# Patient Record
Sex: FEMALE | Race: WHITE | NOT HISPANIC OR LATINO | Employment: OTHER | URBAN - NONMETROPOLITAN AREA
[De-identification: names, ages, dates, MRNs, and addresses within clinical notes are randomized per-mention and may not be internally consistent; named-entity substitution may affect disease eponyms.]

---

## 2017-01-30 ENCOUNTER — OFFICE VISIT (OUTPATIENT)
Dept: FAMILY MEDICINE CLINIC | Facility: CLINIC | Age: 57
End: 2017-01-30

## 2017-01-30 VITALS
HEART RATE: 107 BPM | BODY MASS INDEX: 22.26 KG/M2 | DIASTOLIC BLOOD PRESSURE: 80 MMHG | HEIGHT: 62 IN | SYSTOLIC BLOOD PRESSURE: 122 MMHG | WEIGHT: 121 LBS | TEMPERATURE: 98.6 F

## 2017-01-30 DIAGNOSIS — Z12.31 ENCOUNTER FOR SCREENING MAMMOGRAM FOR BREAST CANCER: ICD-10-CM

## 2017-01-30 DIAGNOSIS — F17.200 TOBACCO DEPENDENCE SYNDROME: Primary | ICD-10-CM

## 2017-01-30 DIAGNOSIS — Z13.820 ENCOUNTER FOR SCREENING FOR OSTEOPOROSIS: ICD-10-CM

## 2017-01-30 PROBLEM — I67.1 BRAIN ANEURYSM: Status: ACTIVE | Noted: 2017-01-30

## 2017-01-30 PROBLEM — F33.1 MODERATE EPISODE OF RECURRENT MAJOR DEPRESSIVE DISORDER: Status: ACTIVE | Noted: 2017-01-30

## 2017-01-30 PROBLEM — F41.1 GAD (GENERALIZED ANXIETY DISORDER): Status: ACTIVE | Noted: 2017-01-30

## 2017-01-30 PROBLEM — E78.5 HYPERLIPIDEMIA: Status: ACTIVE | Noted: 2017-01-30

## 2017-01-30 PROBLEM — K21.00 GASTROESOPHAGEAL REFLUX DISEASE WITH ESOPHAGITIS: Status: ACTIVE | Noted: 2017-01-30

## 2017-01-30 PROBLEM — F43.10 PTSD (POST-TRAUMATIC STRESS DISORDER): Status: ACTIVE | Noted: 2017-01-30

## 2017-01-30 PROCEDURE — 99214 OFFICE O/P EST MOD 30 MIN: CPT | Performed by: FAMILY MEDICINE

## 2017-01-30 RX ORDER — DEXLANSOPRAZOLE 60 MG/1
60 CAPSULE, DELAYED RELEASE ORAL DAILY
COMMUNITY
End: 2017-08-24 | Stop reason: SDUPTHER

## 2017-01-30 RX ORDER — RANITIDINE 150 MG/1
150 TABLET ORAL NIGHTLY
COMMUNITY
End: 2017-08-24

## 2017-01-30 NOTE — MR AVS SNAPSHOT
Tricia LAWSON Milla   1/30/2017 2:30 PM   Office Visit    Dept Phone:  [phone]   Encounter #:  [id number]    Provider:  Sonia Mata MD   Department:  [facility]                Your Full Care Plan              Today's Medication Changes          These changes are accurate as of: 1/30/17  3:06 PM.  If you have any questions, ask your nurse or doctor.               Stop taking medication(s)listed here:     dicyclomine 10 MG capsule   Commonly known as:  BENTYL   Stopped by:  Sonia Mata MD           pravastatin 20 MG tablet   Commonly known as:  PRAVACHOL   Stopped by:  Sonia Mata MD           VICOPROFEN 7.5-200 MG per tablet   Generic drug:  HYDROcodone-ibuprofen   Stopped by:  Sonia Mata MD                      Your Updated Medication List          This list is accurate as of: 1/30/17  3:06 PM.  Always use your most recent med list.                amitriptyline 50 MG tablet   Commonly known as:  ELAVIL       clonazePAM 1 MG tablet   Commonly known as:  KlonoPIN       DEXILANT 60 MG capsule   Generic drug:  dexlansoprazole       prochlorperazine 5 MG tablet   Commonly known as:  COMPAZINE       raNITIdine 150 MG tablet   Commonly known as:  ZANTAC               We Performed the Following     DEXA Bone Density Axial     Mammo Screening Bilateral With CAD       You Were Diagnosed With        Codes Comments    Moderate episode of recurrent major depressive disorder    -  Primary ICD-10-CM: F33.1  ICD-9-CM: 296.32     Tobacco dependence syndrome     ICD-10-CM: F17.200  ICD-9-CM: 305.1     PRISCILLA (generalized anxiety disorder)     ICD-10-CM: F41.1  ICD-9-CM: 300.02     PTSD (post-traumatic stress disorder)     ICD-10-CM: F43.10  ICD-9-CM: 309.81     Encounter for screening mammogram for breast cancer     ICD-10-CM: Z12.31  ICD-9-CM: V76.12     Encounter for screening for osteoporosis     ICD-10-CM: Z13.820  ICD-9-CM: V82.81   "     Instructions     None    Patient Instructions History      Upcoming Appointments     Visit Type Date Time Department    NEW PATIENT 1/30/2017  2:30 PM [facility]    MAMMO PWD  SCREEN BILAT 3/1/2017 10:30 AM [facility]    DE MAD BONE DENSITY AXIAL 3/1/2017 11:05 AM [facility] DEXA [facility]      MyChart Signup     Our records indicate that you have declined [facility] signup. If you would like to sign up for [employer], please email [email or web address] or call [phone] to obtain an activation code.             Other Info from Your Visit           Your Appointments     Mar 01, 2017 10:30 AM CST   Mammo mad screen bilat with MAD PWD MAMM 1   [facility] ([place])    [address] [place] [address]   [phone]           Arrive 15 minutes prior to appointment time.            Mar 01, 2017 11:05 AM CST   DEXA BONE DENSITY AXIAL with MAD PWD DEXA 1   [facility] ([place])    [address] [place] [address]   [phone]              Allergies     Augmentin [Amoxicillin-pot Clavulanate]      Hives    Ciprofloxacin      Cipro:  Rash    Fiorinal [Butalbital-aspirin-caffeine]      Rapid heart rate    Imitrex [Sumatriptan]      Rapid heart rate    Iodine      Anaphylaxis    Other      MIDRIN  -  Rapid heart rate    Toradol [Ketorolac Tromethamine]      Anaphylaxis    Ultram [Tramadol]      Rapid heart rate      Reason for Visit     Establish Care           Vital Signs     Blood Pressure Pulse Temperature Height Weight       122/80 107 98.6 °F (37 °C) 62\" (157.5 cm) 121 lb (54.9 kg)     Body Mass Index Smoking Status                22.13 kg/m2 Current Every Day Smoker          Problems and Diagnoses Noted     Tobacco dependence    Mood problem    -  Primary    PRISCILLA (generalized anxiety disorder)        PTSD (post-traumatic stress disorder)        Encounter for screening mammogram for breast cancer        " Screening for osteoporosis

## 2017-01-30 NOTE — PROGRESS NOTES
"Subjective   Chief Complaint   Patient presents with   • Establish Care     Tricia Loyola is a 57 y.o. female.   Establish Care    History of Present Illness     [facility] - h/o brain aneurysm, anxiety, depression, PTSD, insomnia, cholesterol, gerd    Anxiety - controlled with klonopin PRN    Depression, PTSD - controlled with elavil  Counseling once a month and attends group as much as possible  Followed by Jamee Gallo and Hannah at [facility]    Insomnia - controlled with ambien    hypercholesterol - controlled with atorvastatin  He recently changed from pravastatin to atorvastatin  Last lab work was done one month ago - Dr. 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    Review of Systems   Constitutional: Negative for appetite change, chills, fatigue and fever.   HENT: Negative for congestion, ear pain, rhinorrhea and sore throat.    Eyes: Negative for pain.   Respiratory: Negative for cough and shortness of breath.    Cardiovascular: Negative for chest pain and palpitations.   Gastrointestinal: Negative for abdominal pain, constipation, diarrhea and nausea.   Genitourinary: Negative for dysuria.   Musculoskeletal: Negative for back pain, joint swelling and neck pain.   Skin: Negative for rash.   Neurological: Negative for dizziness and headaches.       Objective   Visit Vitals   • /80   • Pulse 107   • Temp 98.6 °F (37 °C)   • Ht 62\" (157.5 cm)   • Wt 121 lb (54.9 kg)   • LMP Comment: menopause "   • BMI 22.13 kg/m2     Physical Exam   Constitutional: She is oriented to person, place, and time. She appears well-developed and well-nourished.   HENT:   Head: Normocephalic and atraumatic.   Eyes: Pupils are equal, round, and reactive to light.   Neck: Normal range of motion. Neck supple.   Cardiovascular: Normal rate, regular rhythm and normal heart sounds.    Pulmonary/Chest: Effort normal and breath sounds normal. No respiratory distress. She has no wheezes. She has no rales.   Abdominal: Soft. Bowel sounds are normal.   Musculoskeletal: Normal range of motion.   Neurological: She is alert and oriented to person, place, and time.   Skin: Skin is warm and dry.   Psychiatric: She has a normal mood and affect.   Nursing note and vitals reviewed.      Assessment/Plan   Problems Addressed this Visit        Other    Tobacco dependence syndrome - Primary      Other Visit Diagnoses     Encounter for screening mammogram for breast cancer        Relevant Orders    Mammo Screening Bilateral With CAD    Encounter for screening for osteoporosis        Relevant Orders    DEXA Bone Density Axial        Sign a medical release for lab work, immunizations, images, pathology    Mammogram and bone density scan ordered    Discussed and recommended smoking cessation    Recheck in 4 weeks

## 2017-02-07 ENCOUNTER — OFFICE VISIT (OUTPATIENT)
Dept: FAMILY MEDICINE CLINIC | Facility: CLINIC | Age: 57
End: 2017-02-07

## 2017-02-07 VITALS
BODY MASS INDEX: 22.26 KG/M2 | DIASTOLIC BLOOD PRESSURE: 70 MMHG | TEMPERATURE: 97.9 F | HEIGHT: 62 IN | HEART RATE: 107 BPM | SYSTOLIC BLOOD PRESSURE: 118 MMHG | WEIGHT: 121 LBS

## 2017-02-07 DIAGNOSIS — J11.1 INFLUENZA-LIKE ILLNESS: ICD-10-CM

## 2017-02-07 DIAGNOSIS — J20.9 ACUTE BRONCHITIS, UNSPECIFIED ORGANISM: Primary | ICD-10-CM

## 2017-02-07 LAB
FLUAV AG NPH QL: NEGATIVE
FLUBV AG NPH QL IA: NEGATIVE

## 2017-02-07 PROCEDURE — 87804 INFLUENZA ASSAY W/OPTIC: CPT | Performed by: FAMILY MEDICINE

## 2017-02-07 PROCEDURE — 99213 OFFICE O/P EST LOW 20 MIN: CPT | Performed by: FAMILY MEDICINE

## 2017-02-07 RX ORDER — ALBUTEROL SULFATE 90 UG/1
2 AEROSOL, METERED RESPIRATORY (INHALATION) EVERY 6 HOURS PRN
Qty: 1 INHALER | Refills: 3 | Status: SHIPPED | OUTPATIENT
Start: 2017-02-07 | End: 2018-01-23

## 2017-02-07 RX ORDER — DOXYCYCLINE HYCLATE 100 MG
100 TABLET ORAL 2 TIMES DAILY
Qty: 20 TABLET | Refills: 0 | Status: SHIPPED | OUTPATIENT
Start: 2017-02-07 | End: 2017-02-17

## 2017-02-07 RX ORDER — BENZONATATE 100 MG/1
100 CAPSULE ORAL 3 TIMES DAILY PRN
Qty: 30 CAPSULE | Refills: 0 | Status: SHIPPED | OUTPATIENT
Start: 2017-02-07 | End: 2017-02-14

## 2017-02-07 NOTE — PROGRESS NOTES
"Subjective   Chief Complaint   Patient presents with   • Nasal Congestion     sore throat, for almost 2 weeks     Tricia Loyola is a 57 y.o. female.   Nasal Congestion (sore throat, for almost 2 weeks)    Cough   This is a new problem. The current episode started in the past 7 days. The problem has been gradually worsening. The problem occurs constantly. The cough is non-productive. Associated symptoms include chills, a fever, headaches, myalgias, nasal congestion, postnasal drip, rhinorrhea and a sore throat. Pertinent negatives include no wheezing. The symptoms are aggravated by lying down. Risk factors for lung disease include smoking/tobacco exposure. She has tried a beta-agonist inhaler for the symptoms. The treatment provided no relief.      The following portions of the patient's history were reviewed and updated as appropriate: allergies, current medications, past family history, past medical history, past social history, past surgical history and problem list.    Review of Systems   Constitutional: Positive for chills and fever.   HENT: Positive for postnasal drip, rhinorrhea and sore throat.    Respiratory: Positive for cough. Negative for wheezing.    Musculoskeletal: Positive for myalgias.   Neurological: Positive for headaches.       Objective   Visit Vitals   • /70   • Pulse 107   • Temp 97.9 °F (36.6 °C)   • Ht 62\" (157.5 cm)   • Wt 121 lb (54.9 kg)   • LMP Comment: menopause   • BMI 22.13 kg/m2     Physical Exam   Constitutional: She is oriented to person, place, and time. She appears well-developed and well-nourished.   HENT:   Head: Normocephalic and atraumatic.   Right Ear: External ear normal.   Left Ear: External ear normal.   Nose: Nose normal.   Mouth/Throat: Oropharynx is clear and moist.   Eyes: Pupils are equal, round, and reactive to light.   Neck: Normal range of motion. Neck supple.   Cardiovascular: Normal rate, regular rhythm and normal heart sounds.    Pulmonary/Chest: No " respiratory distress. She has wheezes. She has no rales.   Abdominal: Soft. Bowel sounds are normal.   Musculoskeletal: Normal range of motion.   Neurological: She is alert and oriented to person, place, and time.   Skin: Skin is warm and dry.   Psychiatric: She has a normal mood and affect.   Nursing note and vitals reviewed.      Assessment/Plan   Problems Addressed this Visit     None      Visit Diagnoses     Acute bronchitis, unspecified organism    -  Primary    Relevant Medications    albuterol (VENTOLIN HFA) 108 (90 BASE) MCG/ACT inhaler    benzonatate (TESSALON PERLES) 100 MG capsule    Influenza-like illness        Relevant Orders    Influenza Antigen (Completed)        Flu screen - negative    Start inhaler    Start tessalon perles for cough    Start doxycycline     Recheck as needed

## 2017-02-08 ENCOUNTER — TELEPHONE (OUTPATIENT)
Dept: FAMILY MEDICINE CLINIC | Facility: CLINIC | Age: 57
End: 2017-02-08

## 2017-02-08 RX ORDER — FLUTICASONE PROPIONATE 50 MCG
2 SPRAY, SUSPENSION (ML) NASAL DAILY
Qty: 1 EACH | Refills: 5 | Status: SHIPPED | OUTPATIENT
Start: 2017-02-08 | End: 2018-02-06

## 2017-02-08 NOTE — TELEPHONE ENCOUNTER
----- Message from Sonia Mata MD sent at 2/8/2017  2:56 PM CST -----  Recommend flonase. This is otc.  ----- Message -----     From: Siomara Mata MA     Sent: 2/8/2017   2:30 PM       To: Sonia Mata MD    Please advise  ----- Message -----     From: Hannah Prado     Sent: 2/8/2017   9:44 AM       To: Siomara Mata MA    Patient was seen yesterday. She was given medication for bronchitis but she wants to know if she can get something for her nose. Says its been stuffy and feels like she can't breathe. Her number is [phone]

## 2017-02-14 ENCOUNTER — OFFICE VISIT (OUTPATIENT)
Dept: FAMILY MEDICINE CLINIC | Facility: CLINIC | Age: 57
End: 2017-02-14

## 2017-02-14 VITALS
DIASTOLIC BLOOD PRESSURE: 64 MMHG | SYSTOLIC BLOOD PRESSURE: 116 MMHG | HEIGHT: 62 IN | BODY MASS INDEX: 21.71 KG/M2 | TEMPERATURE: 97.7 F | WEIGHT: 118 LBS | HEART RATE: 106 BPM

## 2017-02-14 DIAGNOSIS — J20.9 ACUTE BRONCHITIS, UNSPECIFIED ORGANISM: Primary | ICD-10-CM

## 2017-02-14 PROCEDURE — 99213 OFFICE O/P EST LOW 20 MIN: CPT | Performed by: FAMILY MEDICINE

## 2017-02-14 PROCEDURE — 96372 THER/PROPH/DIAG INJ SC/IM: CPT | Performed by: FAMILY MEDICINE

## 2017-02-14 RX ORDER — GUAIFENESIN AND CODEINE PHOSPHATE 100; 10 MG/5ML; MG/5ML
10 SOLUTION ORAL 4 TIMES DAILY PRN
Qty: 236 ML | Refills: 0 | Status: SHIPPED | OUTPATIENT
Start: 2017-02-14 | End: 2017-03-20

## 2017-02-14 RX ORDER — TRIAMCINOLONE ACETONIDE 40 MG/ML
80 INJECTION, SUSPENSION INTRA-ARTICULAR; INTRAMUSCULAR ONCE
Status: COMPLETED | OUTPATIENT
Start: 2017-02-14 | End: 2017-02-14

## 2017-02-14 RX ADMIN — TRIAMCINOLONE ACETONIDE 80 MG: 40 INJECTION, SUSPENSION INTRA-ARTICULAR; INTRAMUSCULAR at 10:32

## 2017-02-14 NOTE — PROGRESS NOTES
"Subjective   Chief Complaint   Patient presents with   • not feeling any better     fever, cough, sneezing, congested, fatigue     Tricia Loyola is a 57 y.o. female.   not feeling any better (fever, cough, sneezing, congested, fatigue)    Cough   This is a new problem. The current episode started in the past 7 days. The problem has been unchanged. The problem occurs constantly. The cough is non-productive. Associated symptoms include a fever, nasal congestion, postnasal drip and rhinorrhea. The symptoms are aggravated by lying down. She has tried a beta-agonist inhaler, OTC cough suppressant and prescription cough suppressant for the symptoms. The treatment provided no relief. Her past medical history is significant for COPD.        The following portions of the patient's history were reviewed and updated as appropriate: allergies, current medications, past family history, past medical history, past social history, past surgical history and problem list.    Review of Systems   Constitutional: Positive for fatigue and fever.   HENT: Positive for postnasal drip, rhinorrhea and sneezing.    Respiratory: Positive for cough.        Objective   Visit Vitals   • /64   • Pulse 106   • Temp 97.7 °F (36.5 °C)   • Ht 62\" (157.5 cm)   • Wt 118 lb (53.5 kg)   • LMP Comment: menopause   • BMI 21.58 kg/m2     Physical Exam   Constitutional: She is oriented to person, place, and time. She appears well-developed and well-nourished.   HENT:   Head: Normocephalic and atraumatic.   Right Ear: External ear normal.   Left Ear: External ear normal.   Mouth/Throat: No oropharyngeal exudate, posterior oropharyngeal edema or posterior oropharyngeal erythema.   Postnasal drainage   Eyes: Pupils are equal, round, and reactive to light.   Neck: Normal range of motion. Neck supple.   Cardiovascular: Normal rate, regular rhythm and normal heart sounds.    Pulmonary/Chest: Effort normal. No respiratory distress. She has wheezes. She has no " rales.   Lymphadenopathy:     She has no cervical adenopathy.   Neurological: She is alert and oriented to person, place, and time.   Skin: Skin is warm and dry.   Psychiatric: She has a normal mood and affect.   Nursing note and vitals reviewed.      Assessment/Plan   Problems Addressed this Visit     None      Visit Diagnoses     Acute bronchitis, unspecified organism    -  Primary    Relevant Medications    triamcinolone acetonide (KENALOG-40) injection 80 mg (Start on 2/14/2017 10:45 AM)    guaifenesin-codeine (GUAIFENESIN AC) 100-10 MG/5ML liquid        Stop tessalon    Use inhaler every 6 hrs for soa, cough,wheeze    Use cough suppressant four times a day as needed    Kenalog 80mg IM    Recheck as needed

## 2017-02-15 ENCOUNTER — TELEPHONE (OUTPATIENT)
Dept: FAMILY MEDICINE CLINIC | Facility: CLINIC | Age: 57
End: 2017-02-15

## 2017-02-15 NOTE — TELEPHONE ENCOUNTER
Called pt and told her to stop taking the medicine and that Dr. Mata suggests taking something OTC.

## 2017-02-15 NOTE — TELEPHONE ENCOUNTER
----- Message from Hannah Prado sent at 2/15/2017 12:41 PM CST -----  Patient says the Guaifenesin that was prescribed yesterday is making her really nauseated. Wants to know what she needs to do. Her number is [phone]

## 2017-02-27 ENCOUNTER — OFFICE VISIT (OUTPATIENT)
Dept: FAMILY MEDICINE CLINIC | Facility: CLINIC | Age: 57
End: 2017-02-27

## 2017-02-27 VITALS
TEMPERATURE: 98.3 F | SYSTOLIC BLOOD PRESSURE: 122 MMHG | WEIGHT: 120 LBS | HEART RATE: 109 BPM | BODY MASS INDEX: 22.08 KG/M2 | DIASTOLIC BLOOD PRESSURE: 80 MMHG | HEIGHT: 62 IN

## 2017-02-27 DIAGNOSIS — F41.1 GAD (GENERALIZED ANXIETY DISORDER): Primary | ICD-10-CM

## 2017-02-27 DIAGNOSIS — Z79.899 LONG TERM PRESCRIPTION BENZODIAZEPINE USE: ICD-10-CM

## 2017-02-27 DIAGNOSIS — F17.200 TOBACCO DEPENDENCE SYNDROME: ICD-10-CM

## 2017-02-27 PROCEDURE — 80307 DRUG TEST PRSMV CHEM ANLYZR: CPT | Performed by: FAMILY MEDICINE

## 2017-02-27 PROCEDURE — 99213 OFFICE O/P EST LOW 20 MIN: CPT | Performed by: FAMILY MEDICINE

## 2017-02-27 RX ORDER — CLONAZEPAM 0.5 MG/1
0.25 TABLET ORAL DAILY
Qty: 15 TABLET | Refills: 2 | Status: SHIPPED | OUTPATIENT
Start: 2017-02-27 | End: 2017-03-20

## 2017-02-27 NOTE — PROGRESS NOTES
Subjective   Chief Complaint   Patient presents with   • Med Refill     last dose clonazepam this morning     Tricia Loyola is a 57 y.o. female.   Med Refill (last dose clonazepam this morning)    History of Present Illness     [facility] - h/o brain aneurysm, anxiety, depression, PTSD, insomnia, cholesterol, gerd    Anxiety - controlled with klonopin PRN  Needing a refill today  Needs uds and darcy today    Depression, PTSD - controlled with elavil  Counseling once a month and attends group as much as possible  Followed by Jamee Gallo and Hannah at [facility]    Insomnia - controlled with ambien    Hyperlipidemia - controlled with atorvastatin  He recently changed from pravastatin to atorvastatin  Last lab work was done one month ago - Dr. 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    Review of Systems   Constitutional: Negative for appetite change, chills, fatigue and fever.   HENT: Negative for congestion, ear pain, rhinorrhea and sore throat.    Eyes: Negative for pain.   Respiratory: Negative for cough and shortness of breath.    Cardiovascular: Negative for chest pain and palpitations.   Gastrointestinal: Negative for abdominal pain, constipation, diarrhea and nausea.   Genitourinary: Negative for dysuria.   Musculoskeletal: Negative for back pain, joint swelling and neck pain.   Skin: Negative for rash.   Neurological: Negative for dizziness and headaches.       Objective   Visit Vitals   • BP  "122/80   • Pulse 109   • Temp 98.3 °F (36.8 °C)   • Ht 62\" (157.5 cm)   • Wt 120 lb (54.4 kg)   • LMP Comment: menopause   • BMI 21.95 kg/m2     Physical Exam   Constitutional: She is oriented to person, place, and time. She appears well-developed and well-nourished.   HENT:   Head: Normocephalic and atraumatic.   Eyes: Pupils are equal, round, and reactive to light.   Neck: Normal range of motion. Neck supple.   Cardiovascular: Normal rate, regular rhythm and normal heart sounds.    Pulmonary/Chest: Effort normal and breath sounds normal. No respiratory distress. She has no wheezes. She has no rales.   Abdominal: Soft. Bowel sounds are normal.   Musculoskeletal: Normal range of motion.   Neurological: She is alert and oriented to person, place, and time.   Skin: Skin is warm and dry.   Psychiatric: She has a normal mood and affect.   Nursing note and vitals reviewed.      Assessment/Plan   Problems Addressed this Visit        Other    Tobacco dependence syndrome    PRISCILLA (generalized anxiety disorder) - Primary      Other Visit Diagnoses     Long term prescription benzodiazepine use        Relevant Orders    Urine Drug Screen        Last dose of klonopin 2/27  uds today  kate today [id number]  KATE query complete. Treatment plan to include limited course of prescribed  controlled substance. Risks including addiction, benefits, and alternatives presented to patient.   Refilled klonopin with 2 refills    Discussed smoking cessation    Recheck in 3 months           "

## 2017-02-28 LAB
AMPHET+METHAMPHET UR QL: NEGATIVE
BARBITURATES UR QL SCN: NEGATIVE
BENZODIAZ UR QL SCN: POSITIVE
CANNABINOIDS SERPL QL: POSITIVE
COCAINE UR QL: NEGATIVE
METHADONE UR QL SCN: NEGATIVE
OPIATES UR QL: POSITIVE
OXYCODONE UR QL SCN: NEGATIVE

## 2017-03-01 ENCOUNTER — TELEPHONE (OUTPATIENT)
Dept: FAMILY MEDICINE CLINIC | Facility: CLINIC | Age: 57
End: 2017-03-01

## 2017-03-01 NOTE — TELEPHONE ENCOUNTER
----- Message from Sonia Mata MD sent at 2/28/2017 11:02 AM CST -----  Terminated from pain contract due to positive for THC.

## 2017-03-01 NOTE — TELEPHONE ENCOUNTER
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 She was upset. 3/1/2017

## 2017-03-03 ENCOUNTER — TRANSCRIBE ORDERS (OUTPATIENT)
Dept: MAMMOGRAPHY | Facility: CLINIC | Age: 57
End: 2017-03-03

## 2017-03-03 DIAGNOSIS — Z13.820 SCREENING FOR OSTEOPOROSIS: ICD-10-CM

## 2017-03-03 DIAGNOSIS — Z12.31 VISIT FOR SCREENING MAMMOGRAM: Primary | ICD-10-CM

## 2017-03-10 ENCOUNTER — DOCUMENTATION (OUTPATIENT)
Dept: FAMILY MEDICINE CLINIC | Facility: CLINIC | Age: 57
End: 2017-03-10

## 2017-03-18 ENCOUNTER — APPOINTMENT (OUTPATIENT)
Dept: ULTRASOUND IMAGING | Facility: HOSPITAL | Age: 57
End: 2017-03-18

## 2017-03-18 ENCOUNTER — HOSPITAL ENCOUNTER (EMERGENCY)
Facility: HOSPITAL | Age: 57
Discharge: HOME | End: 2017-03-18
Attending: EMERGENCY MEDICINE | Admitting: EMERGENCY MEDICINE

## 2017-03-18 VITALS
BODY MASS INDEX: 23 KG/M2 | DIASTOLIC BLOOD PRESSURE: 69 MMHG | RESPIRATION RATE: 18 BRPM | HEART RATE: 77 BPM | SYSTOLIC BLOOD PRESSURE: 147 MMHG | TEMPERATURE: 97.5 F | HEIGHT: 62 IN | WEIGHT: 125 LBS | OXYGEN SATURATION: 98 %

## 2017-03-18 DIAGNOSIS — M25.561 ARTHRALGIA OF RIGHT KNEE: Primary | ICD-10-CM

## 2017-03-18 LAB
ALBUMIN SERPL-MCNC: 4.5 G/DL (ref 3.4–4.8)
ALBUMIN/GLOB SERPL: 1.4 G/DL (ref 1.1–1.8)
ALP SERPL-CCNC: 75 U/L (ref 38–126)
ALT SERPL W P-5'-P-CCNC: 30 U/L (ref 9–52)
ANION GAP SERPL CALCULATED.3IONS-SCNC: 10 MMOL/L (ref 5–15)
APTT PPP: 26.4 SECONDS (ref 20–40.3)
AST SERPL-CCNC: 66 U/L (ref 14–36)
BASOPHILS # BLD AUTO: 0.03 10*3/MM3 (ref 0–0.2)
BASOPHILS NFR BLD AUTO: 0.3 % (ref 0–2)
BILIRUB SERPL-MCNC: 0.3 MG/DL (ref 0.2–1.3)
BUN BLD-MCNC: 15 MG/DL (ref 7–21)
BUN/CREAT SERPL: 20 (ref 7–25)
CALCIUM SPEC-SCNC: 9.7 MG/DL (ref 8.4–10.2)
CHLORIDE SERPL-SCNC: 104 MMOL/L (ref 95–110)
CO2 SERPL-SCNC: 28 MMOL/L (ref 22–31)
CREAT BLD-MCNC: 0.75 MG/DL (ref 0.5–1)
D-DIMER, QUANTITATIVE (MAD,POW, STR): 851 NG/ML (FEU) (ref 0–470)
DEPRECATED RDW RBC AUTO: 50.2 FL (ref 36.4–46.3)
EOSINOPHIL # BLD AUTO: 0.12 10*3/MM3 (ref 0–0.7)
EOSINOPHIL NFR BLD AUTO: 1.3 % (ref 0–7)
ERYTHROCYTE [DISTWIDTH] IN BLOOD BY AUTOMATED COUNT: 15.3 % (ref 11.5–14.5)
GFR SERPL CREATININE-BSD FRML MDRD: 79 ML/MIN/1.73 (ref 51–120)
GLOBULIN UR ELPH-MCNC: 3.2 GM/DL (ref 2.3–3.5)
GLUCOSE BLD-MCNC: 87 MG/DL (ref 60–100)
HCT VFR BLD AUTO: 41.9 % (ref 35–45)
HGB BLD-MCNC: 14.4 G/DL (ref 12–15.5)
IMM GRANULOCYTES # BLD: 0.02 10*3/MM3 (ref 0–0.02)
IMM GRANULOCYTES NFR BLD: 0.2 % (ref 0–0.5)
INR PPP: 0.95 (ref 0.8–1.2)
LYMPHOCYTES # BLD AUTO: 3.24 10*3/MM3 (ref 0.6–4.2)
LYMPHOCYTES NFR BLD AUTO: 35.1 % (ref 10–50)
MCH RBC QN AUTO: 30.9 PG (ref 26.5–34)
MCHC RBC AUTO-ENTMCNC: 34.4 G/DL (ref 31.4–36)
MCV RBC AUTO: 89.9 FL (ref 80–98)
MONOCYTES # BLD AUTO: 0.97 10*3/MM3 (ref 0–0.9)
MONOCYTES NFR BLD AUTO: 10.5 % (ref 0–12)
NEUTROPHILS # BLD AUTO: 4.84 10*3/MM3 (ref 2–8.6)
NEUTROPHILS NFR BLD AUTO: 52.6 % (ref 37–80)
PLATELET # BLD AUTO: 316 10*3/MM3 (ref 150–450)
PMV BLD AUTO: 9.4 FL (ref 8–12)
POTASSIUM BLD-SCNC: 4.6 MMOL/L (ref 3.5–5.1)
PROT SERPL-MCNC: 7.7 G/DL (ref 6.3–8.6)
PROTHROMBIN TIME: 12.7 SECONDS (ref 11.1–15.3)
RBC # BLD AUTO: 4.66 10*6/MM3 (ref 3.77–5.16)
SODIUM BLD-SCNC: 142 MMOL/L (ref 137–145)
WBC NRBC COR # BLD: 9.22 10*3/MM3 (ref 3.2–9.8)

## 2017-03-18 PROCEDURE — 80053 COMPREHEN METABOLIC PANEL: CPT | Performed by: EMERGENCY MEDICINE

## 2017-03-18 PROCEDURE — 96374 THER/PROPH/DIAG INJ IV PUSH: CPT

## 2017-03-18 PROCEDURE — 25010000002 HYDROMORPHONE PER 4 MG: Performed by: EMERGENCY MEDICINE

## 2017-03-18 PROCEDURE — 93971 EXTREMITY STUDY: CPT

## 2017-03-18 PROCEDURE — 85610 PROTHROMBIN TIME: CPT | Performed by: EMERGENCY MEDICINE

## 2017-03-18 PROCEDURE — 25010000002 ONDANSETRON PER 1 MG: Performed by: EMERGENCY MEDICINE

## 2017-03-18 PROCEDURE — 85730 THROMBOPLASTIN TIME PARTIAL: CPT | Performed by: EMERGENCY MEDICINE

## 2017-03-18 PROCEDURE — 85379 FIBRIN DEGRADATION QUANT: CPT | Performed by: EMERGENCY MEDICINE

## 2017-03-18 PROCEDURE — 85025 COMPLETE CBC W/AUTO DIFF WBC: CPT | Performed by: EMERGENCY MEDICINE

## 2017-03-18 PROCEDURE — 99283 EMERGENCY DEPT VISIT LOW MDM: CPT

## 2017-03-18 PROCEDURE — 96376 TX/PRO/DX INJ SAME DRUG ADON: CPT

## 2017-03-18 PROCEDURE — 96375 TX/PRO/DX INJ NEW DRUG ADDON: CPT

## 2017-03-18 RX ORDER — BACLOFEN 10 MG/1
10 TABLET ORAL 3 TIMES DAILY
COMMUNITY
End: 2017-03-20

## 2017-03-18 RX ORDER — OXCARBAZEPINE 150 MG/1
150 TABLET, FILM COATED ORAL NIGHTLY
COMMUNITY

## 2017-03-18 RX ORDER — ATORVASTATIN CALCIUM 20 MG/1
20 TABLET, FILM COATED ORAL DAILY
COMMUNITY
End: 2017-05-02

## 2017-03-18 RX ORDER — ZOLPIDEM TARTRATE 10 MG/1
10 TABLET ORAL NIGHTLY PRN
COMMUNITY
End: 2017-04-03

## 2017-03-18 RX ORDER — ONDANSETRON 2 MG/ML
4 INJECTION INTRAMUSCULAR; INTRAVENOUS ONCE
Status: COMPLETED | OUTPATIENT
Start: 2017-03-18 | End: 2017-03-18

## 2017-03-18 RX ORDER — SODIUM CHLORIDE 0.9 % (FLUSH) 0.9 %
10 SYRINGE (ML) INJECTION AS NEEDED
Status: DISCONTINUED | OUTPATIENT
Start: 2017-03-18 | End: 2017-03-18 | Stop reason: HOSPADM

## 2017-03-18 RX ORDER — ROPINIROLE 0.5 MG/1
0.5 TABLET, FILM COATED ORAL NIGHTLY
COMMUNITY
End: 2017-08-02 | Stop reason: SDUPTHER

## 2017-03-18 RX ORDER — NAPROXEN 500 MG/1
500 TABLET ORAL 2 TIMES DAILY PRN
Qty: 10 TABLET | Refills: 0 | Status: SHIPPED | OUTPATIENT
Start: 2017-03-18 | End: 2017-03-20

## 2017-03-18 RX ORDER — PANTOPRAZOLE SODIUM 40 MG/10ML
80 INJECTION, POWDER, LYOPHILIZED, FOR SOLUTION INTRAVENOUS ONCE
Status: COMPLETED | OUTPATIENT
Start: 2017-03-18 | End: 2017-03-18

## 2017-03-18 RX ADMIN — Medication 10 ML: at 14:48

## 2017-03-18 RX ADMIN — ONDANSETRON 4 MG: 2 INJECTION INTRAMUSCULAR; INTRAVENOUS at 14:44

## 2017-03-18 RX ADMIN — PANTOPRAZOLE SODIUM 80 MG: 40 INJECTION, POWDER, FOR SOLUTION INTRAVENOUS at 16:53

## 2017-03-18 RX ADMIN — HYDROMORPHONE HYDROCHLORIDE 0.5 MG: 1 INJECTION, SOLUTION INTRAMUSCULAR; INTRAVENOUS; SUBCUTANEOUS at 14:44

## 2017-03-18 RX ADMIN — HYDROMORPHONE HYDROCHLORIDE 0.5 MG: 1 INJECTION, SOLUTION INTRAMUSCULAR; INTRAVENOUS; SUBCUTANEOUS at 17:31

## 2017-03-18 RX ADMIN — Medication 10 ML: at 17:00

## 2017-03-18 NOTE — ED NOTES
Pt presents to ED with c/o R leg pain with swelling. Reports seen per [facility] for related symptoms. Discharged from ED with dx of sprain. Reports pain has worsen as well as swelling. Concern about blood clot.     Niru Cosme RN  03/18/17 [id number]
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
## 2017-03-18 NOTE — ED NOTES
Patient presented to ED with C/O increased  right knee pain down to right ankle that began about one week ago. Patient denies falling, reports did a lot of cleaning one wk ago and woke up with right knee pain down to right ankle. Reports seen at [facility] ED and dx with a sprain, patient reports concerns about blood clot due to family history.      Nancy Taveras LPN  03/18/17 1200

## 2017-03-20 ENCOUNTER — OFFICE VISIT (OUTPATIENT)
Dept: FAMILY MEDICINE CLINIC | Facility: CLINIC | Age: 57
End: 2017-03-20

## 2017-03-20 VITALS
SYSTOLIC BLOOD PRESSURE: 126 MMHG | HEART RATE: 100 BPM | HEIGHT: 62 IN | DIASTOLIC BLOOD PRESSURE: 70 MMHG | TEMPERATURE: 97.6 F | BODY MASS INDEX: 22.08 KG/M2 | WEIGHT: 120 LBS

## 2017-03-20 DIAGNOSIS — M25.561 ACUTE PAIN OF RIGHT KNEE: Primary | ICD-10-CM

## 2017-03-20 DIAGNOSIS — R60.0 EDEMA OF RIGHT LOWER EXTREMITY: ICD-10-CM

## 2017-03-20 PROCEDURE — 96372 THER/PROPH/DIAG INJ SC/IM: CPT | Performed by: FAMILY MEDICINE

## 2017-03-20 PROCEDURE — 99213 OFFICE O/P EST LOW 20 MIN: CPT | Performed by: FAMILY MEDICINE

## 2017-03-20 RX ORDER — TRIAMCINOLONE ACETONIDE 40 MG/ML
80 INJECTION, SUSPENSION INTRA-ARTICULAR; INTRAMUSCULAR ONCE
Status: COMPLETED | OUTPATIENT
Start: 2017-03-20 | End: 2017-03-20

## 2017-03-20 RX ORDER — PROCHLORPERAZINE MALEATE 5 MG/1
10 TABLET ORAL EVERY 6 HOURS PRN
Qty: 240 TABLET | Refills: 12 | Status: SHIPPED | OUTPATIENT
Start: 2017-03-20 | End: 2017-03-24

## 2017-03-20 RX ORDER — NABUMETONE 500 MG/1
500 TABLET, FILM COATED ORAL 2 TIMES DAILY PRN
Qty: 60 TABLET | Refills: 5 | Status: SHIPPED | OUTPATIENT
Start: 2017-03-20 | End: 2017-04-03

## 2017-03-20 RX ADMIN — TRIAMCINOLONE ACETONIDE 80 MG: 40 INJECTION, SUSPENSION INTRA-ARTICULAR; INTRAMUSCULAR at 10:46

## 2017-03-20 NOTE — PROGRESS NOTES
"Subjective   Chief Complaint   Patient presents with   • Knee Injury     right knee, over 1 week   • Med Refill     Tricia Loyola is a 57 y.o. female.   Knee Injury (right knee, over 1 week) and Med Refill    History of Present Illness     New onset of lower extremity swelling of the right knee and ankle  This occurred following a day of housework  Was evaluated at [facility] and diagnosed with right knee and ankle sprain  Xray of the right knee were negative  Recommended a referral to orthopedic surgery  She was then re-evaluated at [facility]  Lower extremity ultrasound for possible DVT - negative  Referral was placed for Dr Caraballo  Currently prescribed naproxen and baclofen  Currently taking naproxen with side effect of nausea  Also using cold compress with alternating heat  Pain level is 6/10  Complaining of sharp pain in the right knee even at rest    The following portions of the patient's history were reviewed and updated as appropriate: allergies, current medications, past family history, past medical history, past social history, past surgical history and problem list.    Review of Systems   Constitutional: Negative for appetite change, chills, fatigue and fever.   HENT: Negative for congestion, ear pain, rhinorrhea and sore throat.    Eyes: Negative for pain.   Respiratory: Negative for cough and shortness of breath.    Cardiovascular: Negative for chest pain and palpitations.   Gastrointestinal: Negative for abdominal pain, constipation, diarrhea and nausea.   Genitourinary: Negative for dysuria.   Musculoskeletal: Positive for arthralgias. Negative for back pain, joint swelling and neck pain.   Skin: Negative for rash.   Neurological: Negative for dizziness and headaches.       Objective   Visit Vitals   • /70   • Pulse 100   • Temp 97.6 °F (36.4 °C)   • Ht 62\" (157.5 cm)   • Wt 120 lb (54.4 kg)   • BMI 21.95 kg/m2     Physical Exam   Constitutional: She is oriented to person, place, and " time. She appears well-developed and well-nourished.   HENT:   Head: Normocephalic and atraumatic.   Eyes: Pupils are equal, round, and reactive to light.   Neck: Normal range of motion. Neck supple.   Cardiovascular: Normal rate, regular rhythm and normal heart sounds.    Pulmonary/Chest: Effort normal and breath sounds normal. No respiratory distress. She has no wheezes. She has no rales.   Abdominal: Soft. Bowel sounds are normal.   Musculoskeletal:        Right knee: She exhibits decreased range of motion and swelling. She exhibits no erythema. Tenderness found. Medial joint line tenderness noted.        Left knee: Normal.        Right ankle: She exhibits decreased range of motion and swelling.        Left ankle: Normal.   Limited ROM due to pain   Neurological: She is alert and oriented to person, place, and time.   Skin: Skin is warm and dry.   Psychiatric: She has a normal mood and affect.   Nursing note and vitals reviewed.      Assessment/Plan   Problems Addressed this Visit        Musculoskeletal and Integument    Acute pain of right knee - Primary    Relevant Medications    nabumetone (RELAFEN) 500 MG tablet    triamcinolone acetonide (KENALOG-40) injection 80 mg (Completed) (Start on 3/20/2017 11:15 AM)    Other Relevant Orders    Ambulatory Referral to Orthopedic Surgery       Other    Edema of right lower extremity        Plenty of rest  Limited movement  Use ice for swelling with elevation  Start relafen  Kenalog IM today  Referral to orthopedics  Recheck in 2 weeks

## 2017-03-24 ENCOUNTER — OFFICE VISIT (OUTPATIENT)
Dept: ORTHOPEDIC SURGERY | Facility: CLINIC | Age: 57
End: 2017-03-24

## 2017-03-24 VITALS
WEIGHT: 117 LBS | BODY MASS INDEX: 21.53 KG/M2 | DIASTOLIC BLOOD PRESSURE: 78 MMHG | SYSTOLIC BLOOD PRESSURE: 132 MMHG | HEIGHT: 62 IN

## 2017-03-24 DIAGNOSIS — M17.11 PRIMARY OSTEOARTHRITIS OF RIGHT KNEE: ICD-10-CM

## 2017-03-24 DIAGNOSIS — M25.561 ACUTE PAIN OF RIGHT KNEE: Primary | ICD-10-CM

## 2017-03-24 DIAGNOSIS — M17.12 PRIMARY OSTEOARTHRITIS OF LEFT KNEE: Primary | ICD-10-CM

## 2017-03-24 PROCEDURE — 99203 OFFICE O/P NEW LOW 30 MIN: CPT | Performed by: ORTHOPAEDIC SURGERY

## 2017-03-24 RX ORDER — MIRTAZAPINE 15 MG/1
TABLET, FILM COATED ORAL
Refills: 4 | COMMUNITY
Start: 2017-03-08 | End: 2017-04-03

## 2017-03-24 RX ORDER — PROCHLORPERAZINE MALEATE 10 MG
TABLET ORAL
Refills: 12 | COMMUNITY
Start: 2017-03-20 | End: 2017-08-24

## 2017-03-24 RX ORDER — DOXYCYCLINE HYCLATE 100 MG/1
CAPSULE ORAL
Refills: 0 | COMMUNITY
Start: 2017-02-07 | End: 2017-04-03

## 2017-03-24 RX ORDER — BUSPIRONE HYDROCHLORIDE 10 MG/1
10 TABLET ORAL 3 TIMES DAILY
Refills: 4 | COMMUNITY
Start: 2017-03-08 | End: 2018-02-06 | Stop reason: SDUPTHER

## 2017-03-24 RX ORDER — AMITRIPTYLINE HYDROCHLORIDE 75 MG/1
TABLET, FILM COATED ORAL
Refills: 4 | COMMUNITY
Start: 2017-03-02 | End: 2017-03-24

## 2017-03-24 RX ORDER — AZITHROMYCIN 250 MG/1
TABLET, FILM COATED ORAL
Refills: 0 | COMMUNITY
Start: 2017-02-20 | End: 2017-04-03

## 2017-03-24 RX ORDER — METHYLPREDNISOLONE 4 MG/1
TABLET ORAL
Refills: 0 | COMMUNITY
Start: 2017-02-20 | End: 2017-04-03

## 2017-03-24 NOTE — PROGRESS NOTES
"Negrito Loyola is a 57 y.o. female. 1960- Consult- Right knee and ankle pain- Patient brought xrays- referred by Dr. Sonia Mata.      History of Present Illness   Patient is here for consult- right knee and ankle pain. Patient states that her right knee and ankle began hurting roughly 2 weeks ago. Patient denies fall or injury. Patient does state that she had been \"deep spring cleaning\" her home the day before the pain in the right knee and ankle appeared. Patient states that 2 days after the pain progressed she was evaluated at [facility] and DX with right knee and right ankle sprain. Patient was given a brace to wear and prescribed Naproxen and baclofen. [place] recommended to cold compress with alternative heat but the patient states that does not relieve the pain. Patient wanted a second opinion so she made a appointment with her primary care physician, Dr. Mata. Her family physician prescribed Relafen, gave Kenalog injection IM and referred her to our office. The patient states that she had to discontinue the Relafen due to the medication severely upsetting her stomach. Patient states that she has severe striking pain that is constant.     The following portions of the patient's history were reviewed and updated as appropriate:   She  has a past medical history of Abdominal pain; Anemia; Chronic post-traumatic headache; Depressive disorder; Encounter for gynecological examination; Gastroesophageal reflux disease; Generalized anxiety disorder; History of mammogram (08/2008); Hyperlipidemia; Nonruptured cerebral aneurysm; Osteoporosis; Posttraumatic stress disorder; Seizure; and Viral hepatitis A.  She  does not have any pertinent problems on file.  She  has a past surgical history that includes Appendectomy; Cholecystectomy; Pap Smear (08/16/2012); Tonsillectomy; and Craniotomy for Aneurysm (2003).  Her family history includes Alcohol abuse in her father; Anxiety disorder in her " brother, brother, brother, and mother; Arthritis in her brother; Constipation in her mother; Diabetes in her brother; Heart disease in her mother; Hypertension in her brother, brother, and mother; Kidney disease in her brother and brother.  She  reports that she has been smoking Cigarettes.  She has a 21.50 pack-year smoking history. She does not have any smokeless tobacco history on file. She reports that she does not drink alcohol or use illicit drugs.  Current Outpatient Prescriptions   Medication Sig Dispense Refill   • albuterol (VENTOLIN HFA) 108 (90 BASE) MCG/ACT inhaler Inhale 2 puffs Every 6 (Six) Hours As Needed for wheezing or shortness of air. 1 inhaler 3   • amitriptyline (ELAVIL) 50 MG tablet Take 50 mg by mouth Every Night.     • atorvastatin (LIPITOR) 20 MG tablet Take 20 mg by mouth Daily.     • azithromycin (ZITHROMAX) 250 MG tablet   0   • busPIRone (BUSPAR) 10 MG tablet   4   • dexlansoprazole (DEXILANT) 60 MG capsule Take 60 mg by mouth Daily.     • doxycycline (VIBRAMYCIN) 100 MG capsule   0   • fluticasone (FLONASE) 50 MCG/ACT nasal spray 2 sprays into each nostril Daily. 1 each 5   • HYDROcod Polst-CPM Polst ER (TUSSIONEX PENNKINETIC) 10-8 MG/5ML ER suspension   0   • MethylPREDNISolone (MEDROL, SRINIVAS,) 4 MG tablet   0   • mirtazapine (REMERON) 15 MG tablet   4   • nabumetone (RELAFEN) 500 MG tablet Take 1 tablet by mouth 2 (Two) Times a Day As Needed for Mild Pain (1-3). 60 tablet 5   • OXcarbazepine (TRILEPTAL) 150 MG tablet Take 150 mg by mouth Daily. Daily at bedtime     • prochlorperazine (COMPAZINE) 10 MG tablet   12   • raNITIdine (ZANTAC) 150 MG tablet Take 150 mg by mouth Every Night.     • rOPINIRole (REQUIP) 0.5 MG tablet Take 0.5 mg by mouth Every Night. Take 1 hour before bedtime.     • zolpidem (AMBIEN) 10 MG tablet Take 10 mg by mouth At Night As Needed for Sleep.       No current facility-administered medications for this visit.      Current Outpatient Prescriptions on File  Prior to Visit   Medication Sig   • albuterol (VENTOLIN HFA) 108 (90 BASE) MCG/ACT inhaler Inhale 2 puffs Every 6 (Six) Hours As Needed for wheezing or shortness of air.   • amitriptyline (ELAVIL) 50 MG tablet Take 50 mg by mouth Every Night.   • atorvastatin (LIPITOR) 20 MG tablet Take 20 mg by mouth Daily.   • dexlansoprazole (DEXILANT) 60 MG capsule Take 60 mg by mouth Daily.   • fluticasone (FLONASE) 50 MCG/ACT nasal spray 2 sprays into each nostril Daily.   • nabumetone (RELAFEN) 500 MG tablet Take 1 tablet by mouth 2 (Two) Times a Day As Needed for Mild Pain (1-3).   • OXcarbazepine (TRILEPTAL) 150 MG tablet Take 150 mg by mouth Daily. Daily at bedtime   • raNITIdine (ZANTAC) 150 MG tablet Take 150 mg by mouth Every Night.   • rOPINIRole (REQUIP) 0.5 MG tablet Take 0.5 mg by mouth Every Night. Take 1 hour before bedtime.   • zolpidem (AMBIEN) 10 MG tablet Take 10 mg by mouth At Night As Needed for Sleep.   • [DISCONTINUED] prochlorperazine (COMPAZINE) 5 MG tablet Take 2 tablets by mouth Every 6 (Six) Hours As Needed for Nausea or Vomiting.     No current facility-administered medications on file prior to visit.      She is allergic to augmentin [amoxicillin-pot clavulanate]; ciprofloxacin; fiorinal [butalbital-aspirin-caffeine]; imitrex [sumatriptan]; iodine; other; percocet [oxycodone-acetaminophen]; toradol [ketorolac tromethamine]; and ultram [tramadol]..    Review of Systems  REVIEW OF SYSTEMS:  Negative, other than presenting complaint.  HEENT: No headaches, diplopia, blurred vision, tinnitus, vertigo, epistaxis, hoarseness or sore throat.  Pulmonary: No cough, sputum, hemoptysis, dyspnea, wheezing, or chest pain.  Cardiac: No chest pain, palpitations, orthopnea, paroxysmal nocturnal dyspnea, shortness of breath, or pedal edema.  Gastrointestinal: No diarrhea, melena, or constipation.  Genitourinary: No dysuria, hematuria, nocturia, frequency, bladder or bowel incontinence.  Hematology: No history of any  "anemia, fatigue, fever, or chills or night sweats.  Dermatology: No rashes, pruritus, or increased pigmentation changes of the skin.     Objective   Physical Exam  /78 (BP Location: Right arm, Patient Position: Sitting)  Ht 62\" (157.5 cm)  Wt 117 lb (53.1 kg)  BMI 21.4 kg/m2    Social History     Social History   • Marital status: Legally      Spouse name: N/A   • Number of children: N/A   • Years of education: N/A     Occupational History   • Not on file.     Social History Main Topics   • Smoking status: Current Every Day Smoker     Packs/day: 0.50     Years: 43.00     Types: Cigarettes   • Smokeless tobacco: Not on file   • Alcohol use No   • Drug use: No   • Sexual activity: No     Other Topics Concern   • Not on file     Social History Narrative       HEENT: Normocephalic.  PERRLA.  TM's clear bilaterally.  Oropharynx: Clear.  Neck: Supple, with no adenopathy.  Chest: Equal bilateral expansion.  Clear to auscultation and percussion.  Heart: Regular sinus rhythm, S1 and S2 normal.  No murmurs or extra heart sounds heard.  Abdomen: Soft, nontender, and no organomegaly.  Neurological: cranial nerves II-XII normal Vascular: pulses are present  Dermatological: no rashes  or blemishes, or any abnormality of the skin.    Exam shows guarded motion from -5 degrees to 100 degrees. Mediolateral and anterior posterior stability intact neuro intact no significant crepitus or tenderness to palpation.  xrays hos minimal arthritic changes  With join space well matained.  X rays of the rt ankle are normal, negative instability to testing of the rt ankle.   neuro  Intact.       Assessment/Plan   Problems Addressed this Visit        Musculoskeletal and Integument    Acute pain of right knee - Primary    Primary osteoarthritis of right knee                 "

## 2017-04-03 ENCOUNTER — OFFICE VISIT (OUTPATIENT)
Dept: FAMILY MEDICINE CLINIC | Facility: CLINIC | Age: 57
End: 2017-04-03

## 2017-04-03 VITALS
HEART RATE: 107 BPM | BODY MASS INDEX: 21.53 KG/M2 | DIASTOLIC BLOOD PRESSURE: 82 MMHG | OXYGEN SATURATION: 97 % | HEIGHT: 62 IN | SYSTOLIC BLOOD PRESSURE: 130 MMHG | TEMPERATURE: 98.8 F | WEIGHT: 117 LBS

## 2017-04-03 DIAGNOSIS — M17.11 PRIMARY OSTEOARTHRITIS OF RIGHT KNEE: ICD-10-CM

## 2017-04-03 DIAGNOSIS — M81.0 OSTEOPOROSIS: ICD-10-CM

## 2017-04-03 DIAGNOSIS — F17.200 TOBACCO DEPENDENCE SYNDROME: ICD-10-CM

## 2017-04-03 DIAGNOSIS — M25.561 ACUTE PAIN OF RIGHT KNEE: ICD-10-CM

## 2017-04-03 DIAGNOSIS — F41.1 GAD (GENERALIZED ANXIETY DISORDER): Primary | ICD-10-CM

## 2017-04-03 PROBLEM — R60.0 EDEMA OF RIGHT LOWER EXTREMITY: Status: RESOLVED | Noted: 2017-03-20 | Resolved: 2017-04-03

## 2017-04-03 PROCEDURE — 82306 VITAMIN D 25 HYDROXY: CPT | Performed by: FAMILY MEDICINE

## 2017-04-03 PROCEDURE — 99213 OFFICE O/P EST LOW 20 MIN: CPT | Performed by: FAMILY MEDICINE

## 2017-04-03 RX ORDER — RAMELTEON 8 MG/1
8 TABLET ORAL NIGHTLY
COMMUNITY
End: 2017-11-08

## 2017-04-03 NOTE — PATIENT INSTRUCTIONS
Recommended to start buspar today    Recommended to try melatonin at bedtime to help with sleep    Bone density results discussed - reclast at [place], due in August  Will get records from [place]  Discussed good food sources of calcium and vitamin D  Order vitamin D level    Recommended patient to call Jamee Gallo to discuss issues with medication    Recheck as needed

## 2017-04-03 NOTE — PROGRESS NOTES
"Subjective   Chief Complaint   Patient presents with   • Knee Pain     2 week follow up   • medicine consult     Tricia Loyola is a 57 y.o. female.   Knee Pain (2 week follow up) and medicine consult    History of Present Illness     PRISCILLA - followed by Jamee Gallo  Has been prescribed buspar, remeron  The buspar has not yet been started  She is currently tapering her klonopin due to inappropriate uds  She had side effects with the remeron - this was discontinued  Currently prescribed rozerem for insomnia - she is having nightmares  She has not yet called this provider to inform her of these issues    R knee pain - resolved  Diagnosed with osteoarthritis  Starting physical therapy 4/5  Stopped relafen    Tobacco dependence syndrome - currently uncontrolled    Bone density and mammogram done - bone density results available  Mammogram results not resulted yet    The following portions of the patient's history were reviewed and updated as appropriate: allergies, current medications, past family history, past medical history, past social history, past surgical history and problem list.    Review of Systems   Constitutional: Negative for appetite change, chills, fatigue and fever.   HENT: Negative for congestion, ear pain, rhinorrhea and sore throat.    Eyes: Negative for pain.   Respiratory: Negative for cough and shortness of breath.    Cardiovascular: Negative for chest pain and palpitations.   Gastrointestinal: Negative for abdominal pain, constipation, diarrhea and nausea.   Genitourinary: Negative for dysuria.   Musculoskeletal: Negative for back pain, joint swelling and neck pain.   Skin: Negative for rash.   Neurological: Negative for dizziness and headaches.   Psychiatric/Behavioral: Positive for sleep disturbance. The patient is nervous/anxious.      Objective   /82  Pulse 107  Temp 98.8 °F (37.1 °C)  Ht 62\" (157.5 cm)  Wt 117 lb (53.1 kg)  SpO2 97%  BMI 21.4 kg/m2  Physical Exam   Constitutional: " She is oriented to person, place, and time. She appears well-developed and well-nourished.   HENT:   Head: Normocephalic and atraumatic.   Eyes: Pupils are equal, round, and reactive to light.   Neck: Normal range of motion. Neck supple.   Cardiovascular: Normal rate, regular rhythm and normal heart sounds.    Pulmonary/Chest: Effort normal and breath sounds normal. No respiratory distress. She has no wheezes. She has no rales.   Abdominal: Soft. Bowel sounds are normal.   Musculoskeletal: Normal range of motion.   Neurological: She is alert and oriented to person, place, and time.   Skin: Skin is warm and dry.   Psychiatric: Her mood appears anxious.   Tearful during history   Nursing note and vitals reviewed.      Assessment/Plan   Problems Addressed this Visit        Musculoskeletal and Integument    Primary osteoarthritis of right knee    Osteoporosis    Relevant Orders    Vitamin D 25 Hydroxy    RESOLVED: Acute pain of right knee       Other    Tobacco dependence syndrome    PRISCILLA (generalized anxiety disorder) - Primary    Relevant Medications    ramelteon (ROZEREM) 8 MG tablet        Recommended to start buspar today    Continue taper of klonopin    Recommended to try melatonin at bedtime to help with sleep    Bone density results discussed - reclast at [place] in August  Will get records from [facility]  Discussed good food sources of calcium and vitamin D  Order vitamin D level    Recommended patient to call Jamee Gallo to discuss issues with medication    Mammogram not available yet  Waiting on previous image to compare  Will contact patient once resulted    Discussed smoking cessation    Recheck as needed

## 2017-04-04 LAB — 25(OH)D3 SERPL-MCNC: 18.8 NG/ML (ref 30–100)

## 2017-04-05 ENCOUNTER — CONSULT (OUTPATIENT)
Dept: PHYSICAL THERAPY | Facility: CLINIC | Age: 57
End: 2017-04-05

## 2017-04-05 ENCOUNTER — TRANSCRIBE ORDERS (OUTPATIENT)
Dept: PHYSICAL THERAPY | Facility: CLINIC | Age: 57
End: 2017-04-05

## 2017-04-05 ENCOUNTER — TRANSCRIBE ORDERS (OUTPATIENT)
Dept: PHYSICAL THERAPY | Facility: HOSPITAL | Age: 57
End: 2017-04-05

## 2017-04-05 DIAGNOSIS — M17.11 PRIMARY OSTEOARTHRITIS OF RIGHT KNEE: Primary | ICD-10-CM

## 2017-04-05 PROCEDURE — 97162 PT EVAL MOD COMPLEX 30 MIN: CPT | Performed by: PHYSICAL THERAPIST

## 2017-04-05 PROCEDURE — 97110 THERAPEUTIC EXERCISES: CPT | Performed by: PHYSICAL THERAPIST

## 2017-04-05 PROCEDURE — G8978 MOBILITY CURRENT STATUS: HCPCS | Performed by: PHYSICAL THERAPIST

## 2017-04-05 PROCEDURE — G8979 MOBILITY GOAL STATUS: HCPCS | Performed by: PHYSICAL THERAPIST

## 2017-04-05 RX ORDER — ERGOCALCIFEROL 1.25 MG/1
50000 CAPSULE ORAL
Qty: 12 CAPSULE | Refills: 0 | Status: SHIPPED | OUTPATIENT
Start: 2017-04-05 | End: 2017-11-08

## 2017-04-05 NOTE — PROGRESS NOTES
Outpatient Physical Therapy Ortho Initial Evaluation       Patient Name: Tricia Loyola  : 1960  MRN: [id number]  Today's Date: 2017      Visit Date: 2017    TIME IN 14:35    TIME OUT 15:20     Patient Active Problem List   Diagnosis   • Tobacco dependence syndrome   • Moderate episode of recurrent major depressive disorder   • PRISCILLA (generalized anxiety disorder)   • PTSD (post-traumatic stress disorder)   • Gastroesophageal reflux disease with esophagitis   • Brain aneurysm   • Hyperlipidemia   • Primary osteoarthritis of right knee   • Osteoporosis        Past Medical History:   Diagnosis Date   • Abdominal pain    • Anemia    • Chronic post-traumatic headache      rebound      • Depressive disorder    • Encounter for gynecological examination    • Gastroesophageal reflux disease    • Generalized anxiety disorder    • History of mammogram 2008    MAMMOGRAM DIAGNOSTIC BILATERAL 51526 ([facility]) (1)     • Hyperlipidemia    • Nonruptured cerebral aneurysm    • Osteoporosis    • Posttraumatic stress disorder    • Seizure     Psychogenic non-epileptic sz    • Viral hepatitis A         Past Surgical History:   Procedure Laterality Date   • APPENDECTOMY     • BREAST BIOPSY     • CHOLECYSTECTOMY     • CRANIOTOMY FOR ANEURYSM     • PAP SMEAR  2012   • TONSILLECTOMY       Outpatient Medications     albuterol (VENTOLIN HFA) 108 (90 BASE) MCG/ACT inhaler      amitriptyline (ELAVIL) 50 MG tablet      atorvastatin (LIPITOR) 20 MG tablet      busPIRone (BUSPAR) 10 MG tablet      dexlansoprazole (DEXILANT) 60 MG capsule      fluticasone (FLONASE) 50 MCG/ACT nasal spray      OXcarbazepine (TRILEPTAL) 150 MG tablet      prochlorperazine (COMPAZINE) 10 MG tablet      ramelteon (ROZEREM) 8 MG tablet      raNITIdine (ZANTAC) 150 MG tablet      rOPINIRole (REQUIP) 0.5 MG tablet      vitamin D (ERGOCALCIFEROL) 25742 UNITS capsule capsule      ALLERGIES:  Augmentin, ciprofloxacin, Fiorinal, Imitrex, iodine,  Percocet, Toradol, Ultram    Visit Dx:     ICD-10-CM ICD-9-CM   1. Primary osteoarthritis of right knee M17.11 715.16       Subjective Evaluation    History of Present Illness  Mechanism of injury: Knee pain following spring cleaning of her mother's house.  She reports she did 5 days of repetitive up-and-down cleaning windows and walls off of the ladder.  He awoke on  morning with significant swelling of the right lower extremity to the ankle and could hardly walk on it.  She reports she had crutches at the visit to the orthopedic surgeon's office.  Has had shots in it a couple of years ago.    Quality of life: good    Pain  Current pain ratin  Quality: dull ache  Relieving factors: ice  Aggravating factors: lifting (walking)    Social Support  Lives in: one-story house  Lives with: parents (brother)    Hand dominance: right    Diagnostic Tests  X-ray: abnormal (osteoporosis)    Treatments  Previous treatment: immobilization (crutches)  Patient Goals  Patient goals for therapy: decreased pain and increased strength  Patient goal: 1.  Weight bearing status with appropriate assistive device with NAG  5.  LEFS score 65/80.  6.  Patient to report 50% subjective improvement.   LT. LEFS 70/80  2. LE endurance to ride bike 10 without stopping  3.  Quadriceps and Hamstring MMT 5/5 without pain        OBJECTIVE:  The patient presents today with range of motion 0-133 on the right and 0-142 on the left.  She is able to straight leg raise without lag, can't hold resistance more than 4+ with straight leg raise.  Quad and hamstring manual muscle test are 5 out of 5.  Sensation intact to crude light touch.  There is no crepitus in the patella.  There is tenderness to palpation on both the medial and lateral facets and undersurface of patella.  Infrapatellar tendon is also painful to palpation.  There is minimal swelling present today but is visibly noticed.  No ligamentous laxity noted with valgus or varus stress  "testing negative anterior drawer.         EXERCISE 32 min  Exercise 1  Exercise Name 1: Quad sets  Sets/Reps 1: 20  Exercise 1 Home Program: Yes  Exercise 2  Exercise Name 2: SAQ  Sets/Reps 2: 20  Exercise 2 Home Program: Yes Exercise 3  Exercise Name 3: Seated hamstring stretch  Sets/Reps 3: 3 x 30\"  Exercise 3 Home Program: Yes Exercise 4  Exercise Name 4: iso adducttion  Sets/Reps 4: 20x  Exercise 4 Home Program: Yes Exercise 5  Exercise Name 5: ham sets  Sets/Reps 5: 20  Exercise 5 Home Program: Yes Exercise 6  Exercise Name 6: clamshells  Sets/Reps 6: 20x   Exercise 6 Home Program: Yes   Exercise 7  Exercise Name 7: Bike LE Strengtheining.  Time 7: 6 min Exercise 8  Exercise Name 8: standing hip flexor stretch  Sets/Reps 8: 3 x 30\"  Exercise 8 Home Program: Yes                                   Assessment & Plan       Goals  1.  Weight bearing status with appropriate assistive device with NAG  5.  LEFS score 65/80.  6.  Patient to report 50% subjective improvement.   LT. LEFS 70/80  2. LE endurance to ride bike 10 without stopping  3.  Quadriceps and Hamstring MMT 5/5 without pain      Plan  Therapy options: will be seen for skilled physical therapy services  Planned modality interventions: cryotherapy and electrical stimulation/Russian stimulation  Planned therapy interventions: gait training, home exercise program, strengthening and stretching  Frequency: 2x week  Duration in weeks: 4  Treatment plan discussed with: patient  Plan details: Knee ROM, strengthening, CKC as tolerated. Gait training, edema control as needed.  Ice and e-stim for pain control PRN.      Time Calculation: 45      PT G-Codes  Outcome Measure Options: Lower Extremity Functional Scale (LEFS)  Score: 61  Functional Limitation: Mobility: Walking and moving around  Mobility: Walking and Moving Around Current Status (): At least 20 percent but less than 40 percent impaired, limited or restricted  Mobility: Walking and Moving Around " Goal Status (): At least 1 percent but less than 20 percent impaired, limited or restricted       Dia Luna, PT, ATC, DPT  4/5/2017        Tricia LAWSON Loyola    1960

## 2017-04-07 ENCOUNTER — TREATMENT (OUTPATIENT)
Dept: PHYSICAL THERAPY | Facility: CLINIC | Age: 57
End: 2017-04-07

## 2017-04-07 DIAGNOSIS — M17.11 PRIMARY OSTEOARTHRITIS OF RIGHT KNEE: Primary | ICD-10-CM

## 2017-04-07 PROCEDURE — 97110 THERAPEUTIC EXERCISES: CPT | Performed by: PHYSICAL THERAPIST

## 2017-04-07 NOTE — PROGRESS NOTES
"Daily Progress Note    Time In: 1022      Time Out: 1100    ICD-10-CM ICD-9-CM   1. Primary osteoarthritis of right knee M17.11 715.16       Subjective   Pt reports that she was in some pain after last treatment b/c of riding the bike, Pt states that her knee swelled up and she couldn't walk  Pre treatment pain    4-5/10  Post treatment pain  6/10    Visits 2/2   Recert Date 4/26/17   MD appointment TBD   Pt reports  --% improvement       Objective    NAD, TTP when moving patella in any direction    AROM:                                  PROCEDURES AND MODALITIES:            Ice  Patient reports will apply ice at home to involved area: Yes                        EXERCISE  Exercise 1  Exercise Name 1: bike   Equipment/Resistance 1: L1  Time: 3 min  Exercise 1 Completed: Yes  Exercise 2  Exercise Name 2: st HS stretch  Sets/Reps 2: 2/30  Exercise 2 Completed: Yes Exercise 3  Exercise Name 3: incline stretch  Sets/Reps 3: 2/30\"  Exercise 3 Completed: Yes Exercise 4  Exercise Name 4: step up fwd,lat  Sets/Reps 4: 20x  Exercise 4 Completed: Yes Exercise 5  Exercise Name 5: LAQ  Sets/Reps 5: 20x  Exercise 5 Completed: Yes Exercise 6  Exercise Name 6: seated hip ADD  Sets/Reps 6: 20x  Exercise 6 Completed: Yes   Exercise 7  Exercise Name 7: prone hip flexor stretch  Time 7: 1 min  Exercise 7 Completed: Yes Exercise 8  Exercise Name 8: prone TKE  Sets/Reps 8: 20x  Exercise 8 Completed: Yes Exercise 9  Exercise Name 9: bridge  Sets/Reps 9: 20x  Exercise 9 Completed: Yes Exercise 10  Exercise Name 10: hsupine hip ABD  Equipment/Resistance 10: yellow tb  Sets/Reps 10: 20x  Exercise 10 Completed: Yes Exercise 11  Exercise Name 11: SAQ  Sets/Reps 11: 20x  Time 11: 3 sec hold  Exercise 11 Completed: Yes Exercise 12  Exercise Name 12: supine SLR  Sets/Reps 12: 20x  Additional Exercises?: Yes  Exercise 12 Completed: Yes   Exercise 13  Exercise Name 13: SLR VMO  Sets/Reps 13: 20x  Exercise 13 Completed: Yes                     "       MANUAL PT:                    Therapy Exercise [id number] 38 minutes    Total Treatment Time: 38 Minutes    Assessment/Plan   Pt dasia tx well,  No adverse effects with new exercise this date. Pt reports compliant with HEP.  Progress per Plan of Care             Manav Cannon PTA  Physical Therapist

## 2017-04-17 ENCOUNTER — TREATMENT (OUTPATIENT)
Dept: PHYSICAL THERAPY | Facility: CLINIC | Age: 57
End: 2017-04-17

## 2017-04-17 DIAGNOSIS — M17.11 PRIMARY OSTEOARTHRITIS OF RIGHT KNEE: Primary | ICD-10-CM

## 2017-04-17 PROCEDURE — 97110 THERAPEUTIC EXERCISES: CPT | Performed by: PHYSICAL THERAPIST

## 2017-04-17 NOTE — PROGRESS NOTES
"Daily Progress Note    Time In: 0850      Time Out: 0932    ICD-10-CM ICD-9-CM   1. Primary osteoarthritis of right knee M17.11 715.16       Subjective   Pt reports she had a loss in the family and is not sure how she is feeling.   Pre treatment pain    0/10  Post treatment pain  /10    Visits 3/5   Recert Date 4/26/17   MD appointment    Pt reports  50% improvement       Objective    AMB     AROM:                                  PROCEDURES AND MODALITIES:            Ice  Ice Applied: Yes  Location: R knee  Rx Minutes: 10 mins (Pt left after 2 min)  Ice S/P Rx: Yes                        EXERCISE  Exercise 1  Exercise Name 1: PRO II  Equipment/Resistance 1: L 4  Time: 8 min  Exercise 1 Completed: Yes  Exercise 2  Exercise Name 2: st HS stretch  Sets/Reps 2: 2/30  Exercise 2 Completed: Yes Exercise 3  Exercise Name 3: incline stretch  Sets/Reps 3: 2/30\"  Exercise 3 Completed: Yes Exercise 4  Exercise Name 4: step up fwd,lat  Equipment/Resistance 4: 6 in  Sets/Reps 4: 20x  Exercise 4 Completed: Yes Exercise 5  Exercise Name 5: Step down   Equipment/Resistance 5: 4 in   Sets/Reps 5: 20  Exercise 5 Completed: Yes Exercise 6  Exercise Name 6: CC TKE   Equipment/Resistance 6: 20#  Sets/Reps 6: 30x  Exercise 6 Completed: Yes   Exercise 7  Exercise Name 7: LAQ.  Equipment/Resistance 7: 2#  Sets/Reps 7: 20x  Exercise 7 Completed: Yes Exercise 8  Exercise Name 8: seated ham curl  Equipment/Resistance 8: red tb  Sets/Reps 8: 20x  Exercise 8 Completed: Yes Exercise 9  Exercise Name 9: seated hip ABD  Equipment/Resistance 9: red tb  Sets/Reps 9: 20x  Exercise 9 Completed: Yes Exercise 10  Exercise Name 10: seated hip ADD  Sets/Reps 10: 20x  Exercise 10 Completed: Yes Exercise 11  Exercise Name 11: MS  Sets/Reps 11: 20x  Exercise 11 Completed: Yes Exercise 12  Exercise Name 12: sit to stand  Sets/Reps 12: 2/10  Exercise 12 Completed: Yes                               MANUAL PT:                    Therapy Exercise [id number] 40 " minutes    Total Treatment Time: 47 Minutes    Assessment/Plan    No adverse effects with new exercise this date. Pt able to complete all TE with only slight increase in pain, Pt progresses through exercise fast and needed VC's to slow down.  Progress per Plan of Care             Manav Cannon PTA  Physical Therapist

## 2017-04-19 ENCOUNTER — TREATMENT (OUTPATIENT)
Dept: PHYSICAL THERAPY | Facility: CLINIC | Age: 57
End: 2017-04-19

## 2017-04-19 DIAGNOSIS — M17.11 PRIMARY OSTEOARTHRITIS OF RIGHT KNEE: Primary | ICD-10-CM

## 2017-04-19 PROCEDURE — 97110 THERAPEUTIC EXERCISES: CPT | Performed by: PHYSICAL THERAPIST

## 2017-04-19 PROCEDURE — G0283 ELEC STIM OTHER THAN WOUND: HCPCS | Performed by: PHYSICAL THERAPIST

## 2017-04-19 NOTE — PROGRESS NOTES
"Daily Progress Note    Time In: 8:50     Time Out: 9:43    ICD-10-CM ICD-9-CM   1. Primary osteoarthritis of right knee M17.11 715.16       Subjective   Pt reports some knee pain increase today maybe 2° mowing.   Patient reports to therapy 5/10 pain, and  50% improvement.  Attendance  4/6 visits.       Objective   V cues necessary for correct TE performance. Intermittent c/o discomfort with TE. Post treatment pain 1/10.    PROCEDURES AND MODALITIES:     Ice  Ice Applied: Yes  Location: R knee  Rx Minutes: 15 mins (Pt left after 2 min)  Ice S/P Rx: Yes    Electrical Stimulation  Stimulation Type: IFC  Max mAmp: 8  Location/Electrode Placement/Other: R knee  Rx Minutes: 15 mins     EXERCISE  Exercise 1  Exercise Name 1: Bike  Time: 8 min  Exercise 1 Completed: Yes  Exercise 2  Exercise Name 2: HS stretch  Sets/Reps 2: 1 min  Exercise 2 Completed: Yes Exercise 3  Exercise Name 3: Incline bd stretch  Sets/Reps 3: 1 min  Exercise 3 Completed: Yes Exercise 4  Exercise Name 4: Prone quad stretch  Sets/Reps 4: 2/30\"  Exercise 4 Completed: Yes Exercise 5  Exercise Name 5: SLR  Equipment/Resistance 5: 1#  Sets/Reps 5: 30x  Exercise 5 Completed: Yes Exercise 6  Exercise Name 6: S/L hip abd  Sets/Reps 6: 30x  Exercise 6 Completed: Yes   Exercise 7  Exercise Name 7: Bridges with add  Sets/Reps 7: 30x  Exercise 7 Completed: Yes Exercise 8  Exercise Name 8: Sit-stands  Sets/Reps 8: 20x  Exercise 8 Completed: Yes Exercise 9  Exercise Name 9: CC TKE  Equipment/Resistance 9: 20#  Sets/Reps 9: 30x  Exercise 9 Completed: Yes                                       Therapy Exercise [id number] 38 minutes and Other Procedure CPT 15 minutes Electrical Stimulation    Total Treatment Time: 53 Minutes    Assessment/Plan   Good dasia of today's treatment dispite increased pain today. Pt continues to benefit from PT for further progression towards goals.  Progress per Plan of Care             Teddy Ahmadi, PTA  Physical Therapist    "

## 2017-04-24 ENCOUNTER — TREATMENT (OUTPATIENT)
Dept: PHYSICAL THERAPY | Facility: CLINIC | Age: 57
End: 2017-04-24

## 2017-04-24 DIAGNOSIS — M17.11 PRIMARY OSTEOARTHRITIS OF RIGHT KNEE: Primary | ICD-10-CM

## 2017-04-24 PROCEDURE — G0283 ELEC STIM OTHER THAN WOUND: HCPCS | Performed by: PHYSICAL THERAPIST

## 2017-04-24 PROCEDURE — 97110 THERAPEUTIC EXERCISES: CPT | Performed by: PHYSICAL THERAPIST

## 2017-04-24 NOTE — PROGRESS NOTES
"Daily Progress Note    Time In: 10:15     Time Out: 11:05    ICD-10-CM ICD-9-CM   1. Primary osteoarthritis of right knee M17.11 715.16       Subjective   No new reports. Pt to go on vacation next week.   Patient reports to therapy 3/10 pain, and 60% improvement.  Attendance  5/8 visits. Recert 4-26-17.       Objective     V cues necessary for correct TE performance. Pain 6/10 post TE. Post treatment pain decreased.   PROCEDURES AND MODALITIES:     Ice  Ice Applied: Yes  Location: R knee  Rx Minutes: 10 mins  Ice S/P Rx: Yes    Electrical Stimulation  Stimulation Type: IFC  Max mAmp: 8  Location/Electrode Placement/Other: R knee  Rx Minutes: 10 mins       EXERCISE  Exercise 1  Exercise Name 1: Bike  Time: 10min  Exercise 1 Completed: Yes  Exercise 2  Exercise Name 2: HS stretch  Sets/Reps 2: 1 min  Exercise 2 Completed: Yes Exercise 3  Exercise Name 3: Incline bd stretch  Sets/Reps 3: 1 min  Exercise 3 Completed: Yes Exercise 4  Exercise Name 4: Prone quad stretch  Sets/Reps 4: 2/30\"  Exercise 4 Completed: Yes Exercise 5  Exercise Name 5: SLR  Equipment/Resistance 5: 2#  Sets/Reps 5: 30x  Exercise 5 Completed: Yes Exercise 6  Exercise Name 6: Bridges with add  Sets/Reps 6: 40x  Exercise 6 Completed: Yes   Exercise 7  Exercise Name 7: ST hip abd  Equipment/Resistance 7: 1#  Sets/Reps 7: 30x  Exercise 7 Completed: Yes Exercise 8  Exercise Name 8: HS curls  Equipment/Resistance 8: green tb  Sets/Reps 8: 30x  Exercise 8 Completed: Yes Exercise 9  Exercise Name 9: CC TKE  Equipment/Resistance 9: 25#  Sets/Reps 9: 30x  Exercise 9 Completed: Yes Exercise 10  Exercise Name 10: CR off step  Sets/Reps 10: 20x  Exercise 10 Completed: Yes Exercise 11  Exercise Name 11: Step-ups  Equipment/Resistance 11: 6in  Sets/Reps 11: 30x  Exercise 11 Completed: Yes                                   Therapy Exercise [id number] 40 minutes and Other Procedure CPT 10 minutes Electrical Stimulation    Total Treatment Time: 50 " Minutes    Assessment/Plan   Good tolerance of today's treatment. Some cont mild knee reactivity. No further goals met this date.   Progress per Plan of Care, next visit.              Teddy Ahmadi, KELLY  Physical Therapist

## 2017-04-26 ENCOUNTER — TREATMENT (OUTPATIENT)
Dept: PHYSICAL THERAPY | Facility: CLINIC | Age: 57
End: 2017-04-26

## 2017-04-26 DIAGNOSIS — M17.11 PRIMARY OSTEOARTHRITIS OF RIGHT KNEE: Primary | ICD-10-CM

## 2017-04-26 PROCEDURE — G8979 MOBILITY GOAL STATUS: HCPCS | Performed by: PHYSICAL THERAPIST

## 2017-04-26 PROCEDURE — G8978 MOBILITY CURRENT STATUS: HCPCS | Performed by: PHYSICAL THERAPIST

## 2017-04-26 PROCEDURE — G0283 ELEC STIM OTHER THAN WOUND: HCPCS | Performed by: PHYSICAL THERAPIST

## 2017-04-26 PROCEDURE — 97110 THERAPEUTIC EXERCISES: CPT | Performed by: PHYSICAL THERAPIST

## 2017-04-26 NOTE — PROGRESS NOTES
"PROGRESS NOTE    Diagnosis/ICD-10 Code:  Primary osteoarthritis of right knee [M17.11]  Referring practitioner: Anup Higgins MD  Date of Initial Visit: 4/26/2017  Patient seen for 6/9 sessions  Patient reports 0/10 pain and 60 % improvement since initiating therapy.  Attendance 6/9 appointments scheduled, Medicare approved by insurance. Next MD follow up is  6/9/17.  Post pain 0/10.  Subjective:   Tricia Loyola states: Hurts while exercising is better over all.  Excited about trip to [place] for her new grand child.      Objective:   Current condition: Good  Test measurement: NAG. ROM right knee 138. Minimal pain with quad MMT in infrapatellar area.   SLR 5/5. LEFS 72/80     Assessment:   Summary of Treatment:   EXERCISE 38 min  Exercise 1  Exercise Name 1: Bike  Time: 10min  Exercise 2  Exercise Name 2: HS stretch  Sets/Reps 2: 1 min Exercise 3  Exercise Name 3: Incline bd stretch  Sets/Reps 3: 1 min Exercise 4  Exercise Name 4: Prone quad stretch  Sets/Reps 4: 2/30\" Exercise 5  Exercise Name 5: SLR  Equipment/Resistance 5: 2#  Sets/Reps 5: 30x Exercise 6  Exercise Name 6: Bridges with add  Sets/Reps 6: 40x   Exercise 7  Exercise Name 7: ST hip abd  Equipment/Resistance 7: 1#  Sets/Reps 7: 30x Exercise 8  Exercise Name 8: HS curls  Equipment/Resistance 8: green tb  Sets/Reps 8: 30x Exercise 9  Exercise Name 9: CC TKE  Equipment/Resistance 9: 25#  Sets/Reps 9: 30x Exercise 10  Exercise Name 10: CR off step  Sets/Reps 10: 20x Exercise 11  Exercise Name 11: Step-ups  Equipment/Resistance 11: 6in  Sets/Reps 11: 30x                             Ice and electrical stimulation 15 mins.  IFC to the right knee. For inflammation. Pain 3 with exercise and back to 0 post treatment.    Progress toward previous goals:STG all met.  LTG1, 2,   Met.        Plan:   Goals  Short-term goals (STG):  ROM KNee flexion 140  Long-term goals (LTG):  Quad MMT without pain.    Timeframe: 2 weeks  Prognosis to achieve goals: good  G codes " 8978CI, 8979CI  Plan  Treatment plan with rationale: continue with follow up when she returns to town to reassess prior to MD visit in June.  Recommendations: Initiate/continue PT services    PT Signature: Dia Luna, PT, ATC, DPT      Based upon review of the patient's progress and continued therapy plan, it is my medical opinion that Tricia Loyola should continue physical therapy treatment at [facility]  [address] [place] [address] Dr Praneeth MALODNADO [id number] [phone].    Signature:  Anup Higgins MD

## 2017-04-28 ENCOUNTER — TREATMENT (OUTPATIENT)
Dept: PHYSICAL THERAPY | Facility: CLINIC | Age: 57
End: 2017-04-28

## 2017-04-28 DIAGNOSIS — M17.11 PRIMARY OSTEOARTHRITIS OF RIGHT KNEE: Primary | ICD-10-CM

## 2017-04-28 PROCEDURE — 97110 THERAPEUTIC EXERCISES: CPT | Performed by: PHYSICAL THERAPIST

## 2017-04-28 NOTE — PROGRESS NOTES
Daily Progress Note    Time In: 10:19     Time Out: 11:10    ICD-10-CM ICD-9-CM   1. Primary osteoarthritis of right knee M17.11 715.16       Subjective   Pt reports knee doing well. She will be gone for the next month.   Patient reports to therapy 0/10 pain, and 75% improvement.  Attendance  7/10 visits.  MD cannon/judy 6-9-17.       Objective     V cues necessary for correct TE performance. Mild pain increase post TE. Post treatment pain decreased.     AROM: Knee flex 143°      PROCEDURES AND MODALITIES:       Ice  Ice Applied: Yes  Location: R knee  Rx Minutes: 12 mins  Ice S/P Rx: Yes    Electrical Stimulation  Stimulation Type: IFC  Max mAmp: 8  Location/Electrode Placement/Other: R knee  Rx Minutes: Other: (12 min)  EXERCISE  Exercise 1  Exercise Name 1: Bike  Time: 10min  Exercise 1 Completed: Yes  Exercise 2  Exercise Name 2: HS stretch  Sets/Reps 2: 1 min  Exercise 2 Completed: Yes Exercise 3  Exercise Name 3: Incline bd stretch  Sets/Reps 3: 1 min  Exercise 3 Completed: Yes Exercise 4  Exercise Name 4: Prone quad stretch  Sets/Reps 4: 1 min  Exercise 4 Completed: Yes Exercise 5  Exercise Name 5: Bridges with abd  Equipment/Resistance 5: red tb  Sets/Reps 5: 30x  Exercise 5 Completed: Yes Exercise 6  Exercise Name 6: TG squats  Equipment/Resistance 6: L5  Sets/Reps 6: 30x  Exercise 6 Completed: Yes   Exercise 7  Exercise Name 7: HS curls  Equipment/Resistance 7: green tb  Sets/Reps 7: 20x  Exercise 7 Completed: Yes Exercise 8  Exercise Name 8: Step ups  Equipment/Resistance 8: 6in  Sets/Reps 8: 20x  Exercise 8 Completed: Yes Exercise 9  Exercise Name 9: Rev Lunge slide  Sets/Reps 9: 2/10x  Exercise 9 Completed: Yes Exercise 10  Exercise Name 10: S/L hip abd circles  Sets/Reps 10: 2/10x ea  Exercise 10 Completed: Yes                                     Therapy Exercise [id number] 39 minutes    Total Treatment Time: 51 Minutes    Assessment/Plan   ROM increased today. Recert STG met. Good tolerance of today's treatment.    Other. F/U PRN after pt vacation.              Teddy Ahmadi, PTA  Physical Therapist

## 2017-05-02 ENCOUNTER — OFFICE VISIT (OUTPATIENT)
Dept: FAMILY MEDICINE CLINIC | Facility: CLINIC | Age: 57
End: 2017-05-02

## 2017-05-02 VITALS
HEIGHT: 62 IN | SYSTOLIC BLOOD PRESSURE: 115 MMHG | DIASTOLIC BLOOD PRESSURE: 62 MMHG | TEMPERATURE: 97.9 F | WEIGHT: 116 LBS | BODY MASS INDEX: 21.35 KG/M2 | HEART RATE: 98 BPM | OXYGEN SATURATION: 99 %

## 2017-05-02 DIAGNOSIS — B36.0 TINEA VERSICOLOR: ICD-10-CM

## 2017-05-02 DIAGNOSIS — B37.0 ORAL THRUSH: Primary | ICD-10-CM

## 2017-05-02 PROCEDURE — 99212 OFFICE O/P EST SF 10 MIN: CPT | Performed by: FAMILY MEDICINE

## 2017-05-02 RX ORDER — FLUCONAZOLE 150 MG/1
150 TABLET ORAL DAILY
Qty: 7 TABLET | Refills: 0 | Status: SHIPPED | OUTPATIENT
Start: 2017-05-02 | End: 2017-06-16

## 2017-05-02 RX ORDER — CLONIDINE HYDROCHLORIDE 0.1 MG/1
0.1 TABLET ORAL 2 TIMES DAILY
COMMUNITY
End: 2017-08-24 | Stop reason: SDUPTHER

## 2017-06-16 ENCOUNTER — OFFICE VISIT (OUTPATIENT)
Dept: FAMILY MEDICINE CLINIC | Facility: CLINIC | Age: 57
End: 2017-06-16

## 2017-06-16 VITALS
HEIGHT: 62 IN | HEART RATE: 121 BPM | WEIGHT: 117 LBS | SYSTOLIC BLOOD PRESSURE: 138 MMHG | DIASTOLIC BLOOD PRESSURE: 80 MMHG | OXYGEN SATURATION: 97 % | BODY MASS INDEX: 21.53 KG/M2 | TEMPERATURE: 98.4 F

## 2017-06-16 DIAGNOSIS — Z86.69 HISTORY OF SEIZURE DISORDER: ICD-10-CM

## 2017-06-16 DIAGNOSIS — R25.3 EYE MUSCLE TWITCHES: ICD-10-CM

## 2017-06-16 DIAGNOSIS — F17.200 TOBACCO DEPENDENCE SYNDROME: ICD-10-CM

## 2017-06-16 DIAGNOSIS — I67.1 BRAIN ANEURYSM: ICD-10-CM

## 2017-06-16 DIAGNOSIS — F41.1 GAD (GENERALIZED ANXIETY DISORDER): ICD-10-CM

## 2017-06-16 DIAGNOSIS — R00.0 SINUS TACHYCARDIA: Primary | ICD-10-CM

## 2017-06-16 PROCEDURE — 99214 OFFICE O/P EST MOD 30 MIN: CPT | Performed by: FAMILY MEDICINE

## 2017-06-16 RX ORDER — METOPROLOL SUCCINATE 25 MG/1
25 TABLET, EXTENDED RELEASE ORAL DAILY
Qty: 30 TABLET | Refills: 5 | Status: SHIPPED | OUTPATIENT
Start: 2017-06-16 | End: 2017-08-24 | Stop reason: SDUPTHER

## 2017-06-16 NOTE — PROGRESS NOTES
"Subjective   Chief Complaint   Patient presents with   • Eye Twitching     Tricia Loyola is a 57 y.o. female.   Eye Twitching    History of Present Illness     PRISCILLA - followed by Jamee Gallo  Has been prescribed buspar, remeron    Tobacco dependence syndrome - currently uncontrolled    Requesting a referral to neurology for history of bleeding brain aneurysm  Paperwork scanned into media  C/o eye twitching    The following portions of the patient's history were reviewed and updated as appropriate: allergies, current medications, past family history, past medical history, past social history, past surgical history and problem list.    Review of Systems   Constitutional: Negative for appetite change, chills, fatigue and fever.   HENT: Negative for congestion, ear pain, rhinorrhea and sore throat.    Eyes: Negative for pain.   Respiratory: Negative for cough and shortness of breath.    Cardiovascular: Negative for chest pain and palpitations.   Gastrointestinal: Negative for abdominal pain, constipation, diarrhea and nausea.   Genitourinary: Negative for dysuria.   Musculoskeletal: Negative for back pain, joint swelling and neck pain.   Skin: Negative for rash.   Neurological: Negative for dizziness and headaches.       Objective   /80  Pulse (!) 121  Temp 98.4 °F (36.9 °C)  Ht 62\" (157.5 cm)  Wt 117 lb (53.1 kg)  SpO2 97%  BMI 21.4 kg/m2  Physical Exam   Constitutional: She is oriented to person, place, and time. She appears well-developed and well-nourished.   HENT:   Head: Normocephalic and atraumatic.   Eyes: Pupils are equal, round, and reactive to light.   Neck: Normal range of motion. Neck supple.   Cardiovascular: Regular rhythm and normal heart sounds.  Tachycardia present.    Pulmonary/Chest: Effort normal and breath sounds normal. No respiratory distress. She has no wheezes. She has no rales.   Abdominal: Soft. Bowel sounds are normal.   Musculoskeletal: Normal range of motion.   Neurological: " She is alert and oriented to person, place, and time.   Skin: Skin is warm and dry.   Psychiatric: She has a normal mood and affect.   Nursing note and vitals reviewed.      Assessment/Plan   Problems Addressed this Visit        Cardiovascular and Mediastinum    Brain aneurysm    Relevant Orders    Ambulatory Referral to Neurology    Sinus tachycardia - Primary    Relevant Medications    metoprolol succinate XL (TOPROL XL) 25 MG 24 hr tablet       Other    Tobacco dependence syndrome    PRISCILLA (generalized anxiety disorder)    History of seizure disorder    Relevant Orders    Ambulatory Referral to Neurology      Other Visit Diagnoses     Eye muscle twitches        Relevant Orders    Ambulatory Referral to Neurology        buspar - recommended 2 -3 times per day    Discussed smoking cessation    Start metoprolol for HR    Referral to neurology  Copy of records provided by patient on history of aneurysm to be scanned into chart    Recheck in 4 weeks

## 2017-06-19 PROBLEM — Z86.69 HISTORY OF SEIZURE DISORDER: Status: ACTIVE | Noted: 2017-06-19

## 2017-06-23 DIAGNOSIS — I67.1 BRAIN ANEURYSM: Primary | ICD-10-CM

## 2017-07-14 ENCOUNTER — OFFICE VISIT (OUTPATIENT)
Dept: FAMILY MEDICINE CLINIC | Facility: CLINIC | Age: 57
End: 2017-07-14

## 2017-07-14 VITALS
BODY MASS INDEX: 21.71 KG/M2 | DIASTOLIC BLOOD PRESSURE: 64 MMHG | WEIGHT: 118 LBS | SYSTOLIC BLOOD PRESSURE: 118 MMHG | HEIGHT: 62 IN | HEART RATE: 85 BPM | OXYGEN SATURATION: 98 % | TEMPERATURE: 98.2 F

## 2017-07-14 DIAGNOSIS — R11.0 NAUSEA: ICD-10-CM

## 2017-07-14 DIAGNOSIS — G43.111 INTRACTABLE MIGRAINE WITH AURA WITH STATUS MIGRAINOSUS: Primary | ICD-10-CM

## 2017-07-14 DIAGNOSIS — R35.0 URINARY FREQUENCY: ICD-10-CM

## 2017-07-14 LAB
BILIRUB BLD-MCNC: NEGATIVE MG/DL
CLARITY, POC: CLEAR
COLOR UR: YELLOW
GLUCOSE UR STRIP-MCNC: NEGATIVE MG/DL
KETONES UR QL: NEGATIVE
LEUKOCYTE EST, POC: NEGATIVE
NITRITE UR-MCNC: NEGATIVE MG/ML
PH UR: 6 [PH] (ref 5–8)
PROT UR STRIP-MCNC: ABNORMAL MG/DL
RBC # UR STRIP: NEGATIVE /UL
SP GR UR: 1.01 (ref 1–1.03)
UROBILINOGEN UR QL: NORMAL

## 2017-07-14 PROCEDURE — 99214 OFFICE O/P EST MOD 30 MIN: CPT | Performed by: FAMILY MEDICINE

## 2017-07-14 PROCEDURE — 81002 URINALYSIS NONAUTO W/O SCOPE: CPT | Performed by: FAMILY MEDICINE

## 2017-07-14 PROCEDURE — 96372 THER/PROPH/DIAG INJ SC/IM: CPT | Performed by: FAMILY MEDICINE

## 2017-07-14 RX ORDER — HYDROCODONE BITARTRATE AND ACETAMINOPHEN 10; 325 MG/1; MG/1
1 TABLET ORAL EVERY 4 HOURS PRN
Qty: 3 TABLET | Refills: 0 | Status: SHIPPED | OUTPATIENT
Start: 2017-07-14 | End: 2017-08-02 | Stop reason: SDUPTHER

## 2017-07-14 RX ORDER — ONDANSETRON 2 MG/ML
4 INJECTION INTRAMUSCULAR; INTRAVENOUS ONCE
Status: COMPLETED | OUTPATIENT
Start: 2017-07-14 | End: 2017-07-14

## 2017-07-14 RX ORDER — TRIAMCINOLONE ACETONIDE 40 MG/ML
80 INJECTION, SUSPENSION INTRA-ARTICULAR; INTRAMUSCULAR ONCE
Status: COMPLETED | OUTPATIENT
Start: 2017-07-14 | End: 2017-07-14

## 2017-07-14 RX ADMIN — ONDANSETRON 4 MG: 2 INJECTION INTRAMUSCULAR; INTRAVENOUS at 14:47

## 2017-07-14 RX ADMIN — TRIAMCINOLONE ACETONIDE 80 MG: 40 INJECTION, SUSPENSION INTRA-ARTICULAR; INTRAMUSCULAR at 14:48

## 2017-07-14 NOTE — PROGRESS NOTES
Subjective   Chief Complaint   Patient presents with   • follow up on heart rate     4 week follow up   • Headache     started this morning   • Urinary Tract Infection      about 1 week     Tricia Loyola is a 57 y.o. female.   follow up on heart rate (4 week follow up); Headache (started this morning); and Urinary Tract Infection ( about 1 week)    Urinary Tract Infection    This is a new problem. The current episode started in the past 7 days. The problem occurs every urination. The problem has been gradually worsening. The pain is at a severity of 0/10. The patient is experiencing no pain. There has been no fever. She is not sexually active. There is a history of pyelonephritis. Associated symptoms include frequency, nausea and urgency. Pertinent negatives include no chills, discharge, flank pain or hesitancy. She has tried nothing for the symptoms.      C/o nausea, headache   Took tylenol am, compazine - no improvement  Associated with phonophobia, photophobia, nausea  Persistent throbbing headache  Usually abortive after visiting the ER and receiving injection    The following portions of the patient's history were reviewed and updated as appropriate: allergies, current medications, past family history, past medical history, past social history, past surgical history and problem list.    Review of Systems   Constitutional: Negative for appetite change, chills, fatigue and fever.   HENT: Negative for congestion, ear pain, rhinorrhea and sore throat.    Eyes: Negative for pain.   Respiratory: Negative for cough and shortness of breath.    Cardiovascular: Negative for chest pain and palpitations.   Gastrointestinal: Positive for nausea. Negative for abdominal pain, constipation and diarrhea.   Genitourinary: Positive for frequency and urgency. Negative for dysuria, flank pain and hesitancy.   Musculoskeletal: Negative for back pain, joint swelling and neck pain.   Skin: Negative for rash.   Neurological: Positive  "for headaches. Negative for dizziness.       Objective   /64  Pulse 85  Temp 98.2 °F (36.8 °C)  Ht 62\" (157.5 cm)  Wt 118 lb (53.5 kg)  SpO2 98%  BMI 21.58 kg/m2  Physical Exam   Constitutional: She is oriented to person, place, and time. She appears well-developed and well-nourished.   HENT:   Head: Normocephalic and atraumatic.   Eyes: Pupils are equal, round, and reactive to light.   Neck: Normal range of motion. Neck supple.   Cardiovascular: Normal rate, regular rhythm and normal heart sounds.    Pulmonary/Chest: Effort normal and breath sounds normal. No respiratory distress. She has no wheezes. She has no rales.   Abdominal: Soft. Bowel sounds are normal.   Musculoskeletal: Normal range of motion.   Neurological: She is alert and oriented to person, place, and time.   Skin: Skin is warm and dry.   Psychiatric: She has a normal mood and affect.   Nursing note and vitals reviewed.      Assessment/Plan   Problems Addressed this Visit     None      Visit Diagnoses     Intractable migraine with aura with status migrainosus    -  Primary    Relevant Medications    triamcinolone acetonide (KENALOG-40) injection 80 mg (Start on 7/14/2017  3:00 PM)    HYDROcodone-acetaminophen (NORCO)  MG per tablet    Nausea        Relevant Medications    ondansetron (ZOFRAN) injection 4 mg (Start on 7/14/2017  3:00 PM)    Urinary frequency            zofran IM    Kenalog IM    norco - disp#3    uA - negative    Recheck as needed         "

## 2017-08-02 ENCOUNTER — OFFICE VISIT (OUTPATIENT)
Dept: FAMILY MEDICINE CLINIC | Facility: CLINIC | Age: 57
End: 2017-08-02

## 2017-08-02 VITALS
TEMPERATURE: 97 F | HEART RATE: 84 BPM | DIASTOLIC BLOOD PRESSURE: 68 MMHG | WEIGHT: 118 LBS | SYSTOLIC BLOOD PRESSURE: 120 MMHG | HEIGHT: 62 IN | BODY MASS INDEX: 21.71 KG/M2 | OXYGEN SATURATION: 98 %

## 2017-08-02 DIAGNOSIS — F17.200 TOBACCO DEPENDENCE SYNDROME: ICD-10-CM

## 2017-08-02 DIAGNOSIS — R11.0 NAUSEA: ICD-10-CM

## 2017-08-02 DIAGNOSIS — G25.81 RLS (RESTLESS LEGS SYNDROME): ICD-10-CM

## 2017-08-02 DIAGNOSIS — G43.111 INTRACTABLE MIGRAINE WITH AURA WITH STATUS MIGRAINOSUS: Primary | ICD-10-CM

## 2017-08-02 PROCEDURE — 99213 OFFICE O/P EST LOW 20 MIN: CPT | Performed by: FAMILY MEDICINE

## 2017-08-02 PROCEDURE — 96372 THER/PROPH/DIAG INJ SC/IM: CPT | Performed by: FAMILY MEDICINE

## 2017-08-02 RX ORDER — ONDANSETRON 2 MG/ML
4 INJECTION INTRAMUSCULAR; INTRAVENOUS ONCE
Status: COMPLETED | OUTPATIENT
Start: 2017-08-02 | End: 2017-08-02

## 2017-08-02 RX ORDER — HYDROCODONE BITARTRATE AND ACETAMINOPHEN 10; 325 MG/1; MG/1
1 TABLET ORAL EVERY 4 HOURS PRN
Qty: 10 TABLET | Refills: 0 | Status: SHIPPED | OUTPATIENT
Start: 2017-08-02 | End: 2017-08-24 | Stop reason: SDUPTHER

## 2017-08-02 RX ORDER — ROPINIROLE 1 MG/1
1 TABLET, FILM COATED ORAL NIGHTLY
Qty: 30 TABLET | Refills: 5 | Status: SHIPPED | OUTPATIENT
Start: 2017-08-02 | End: 2017-12-28 | Stop reason: SDUPTHER

## 2017-08-02 RX ADMIN — ONDANSETRON 4 MG: 2 INJECTION INTRAMUSCULAR; INTRAVENOUS at 08:14

## 2017-08-02 NOTE — PROGRESS NOTES
"Subjective   Chief Complaint   Patient presents with   • Med Dose Change     restless legs   • Headache     Tricia Loyola is a 57 y.o. female.   Med Dose Change (restless legs) and Headache    History of Present Illness     Acute headache - present since yesterday  Has taken a hydrocodone without improvement  C/o tension headache, nausea, photophobia  Has failed imitrex, maxalt, excedrin, fioricet  Currently taking elavil but cannot tolerate a higher dose secondary to tachycardia and worsened anxiety    History of seizure disorder, headache, history of brain aneursym - referred to [place]  Last seizure was 4 years ago  Scheduled for an evaluation in September    RLS - not controlled with requip  Requesting a dose change    Tobacco dependence - remains uncontrolled    The following portions of the patient's history were reviewed and updated as appropriate: allergies, current medications, past family history, past medical history, past social history, past surgical history and problem list.    Review of Systems   Constitutional: Negative for appetite change, chills, fatigue and fever.   HENT: Negative for congestion, ear pain, rhinorrhea and sore throat.    Eyes: Positive for photophobia. Negative for pain.   Respiratory: Negative for cough and shortness of breath.    Cardiovascular: Negative for chest pain and palpitations.   Gastrointestinal: Negative for abdominal pain, constipation, diarrhea and nausea.   Genitourinary: Negative for dysuria.   Musculoskeletal: Negative for back pain, joint swelling and neck pain.   Skin: Negative for rash.   Neurological: Positive for headaches. Negative for dizziness.       Objective   /68  Pulse 84  Temp 97 °F (36.1 °C)  Ht 62\" (157.5 cm)  Wt 118 lb (53.5 kg)  SpO2 98%  BMI 21.58 kg/m2  Physical Exam   Constitutional: She is oriented to person, place, and time. She appears well-developed and well-nourished.   HENT:   Head: Normocephalic and atraumatic.   Eyes: " Pupils are equal, round, and reactive to light.   Neck: Normal range of motion. Neck supple.   Cardiovascular: Normal rate, regular rhythm and normal heart sounds.    Pulmonary/Chest: Effort normal and breath sounds normal. No respiratory distress. She has no wheezes. She has no rales.   Abdominal: Soft. Bowel sounds are normal.   Musculoskeletal: Normal range of motion.   Neurological: She is alert and oriented to person, place, and time.   Skin: Skin is warm and dry.   Psychiatric: She has a normal mood and affect.   Nursing note and vitals reviewed.      Assessment/Plan   Problems Addressed this Visit        Other    Tobacco dependence syndrome    RLS (restless legs syndrome)      Other Visit Diagnoses     Intractable migraine with aura with status migrainosus    -  Primary    Relevant Medications    HYDROcodone-acetaminophen (NORCO)  MG per tablet    Nausea        Relevant Medications    ondansetron (ZOFRAN) injection 4 mg (Start on 8/2/2017  8:45 AM)        zofran IM  Temporary norco    Referral scheduled for neurology    Adjusted dose of requip    Counseled on smoking cessation    Recheck as needed

## 2017-08-24 ENCOUNTER — OFFICE VISIT (OUTPATIENT)
Dept: FAMILY MEDICINE CLINIC | Facility: CLINIC | Age: 57
End: 2017-08-24

## 2017-08-24 VITALS
OXYGEN SATURATION: 98 % | HEART RATE: 117 BPM | BODY MASS INDEX: 21.9 KG/M2 | DIASTOLIC BLOOD PRESSURE: 74 MMHG | WEIGHT: 119 LBS | HEIGHT: 62 IN | TEMPERATURE: 96.8 F | SYSTOLIC BLOOD PRESSURE: 130 MMHG

## 2017-08-24 DIAGNOSIS — R00.0 SINUS TACHYCARDIA: ICD-10-CM

## 2017-08-24 DIAGNOSIS — R11.0 NAUSEA: Primary | ICD-10-CM

## 2017-08-24 DIAGNOSIS — G43.111 INTRACTABLE MIGRAINE WITH AURA WITH STATUS MIGRAINOSUS: ICD-10-CM

## 2017-08-24 PROCEDURE — 96372 THER/PROPH/DIAG INJ SC/IM: CPT | Performed by: FAMILY MEDICINE

## 2017-08-24 PROCEDURE — 99214 OFFICE O/P EST MOD 30 MIN: CPT | Performed by: FAMILY MEDICINE

## 2017-08-24 RX ORDER — HYDROCODONE BITARTRATE AND ACETAMINOPHEN 10; 325 MG/1; MG/1
1 TABLET ORAL EVERY 4 HOURS PRN
Qty: 30 TABLET | Refills: 0 | Status: SHIPPED | OUTPATIENT
Start: 2017-08-24 | End: 2017-09-12 | Stop reason: SDUPTHER

## 2017-08-24 RX ORDER — RANITIDINE 150 MG/1
150 TABLET ORAL NIGHTLY
Status: CANCELLED | OUTPATIENT
Start: 2017-08-24

## 2017-08-24 RX ORDER — METOPROLOL SUCCINATE 25 MG/1
25 TABLET, EXTENDED RELEASE ORAL DAILY
Qty: 30 TABLET | Refills: 5 | Status: CANCELLED | OUTPATIENT
Start: 2017-08-24

## 2017-08-24 RX ORDER — ATORVASTATIN CALCIUM 20 MG/1
20 TABLET, FILM COATED ORAL DAILY
Qty: 30 TABLET | Refills: 5 | Status: SHIPPED | OUTPATIENT
Start: 2017-08-24 | End: 2018-02-08 | Stop reason: SDUPTHER

## 2017-08-24 RX ORDER — CLONIDINE HYDROCHLORIDE 0.1 MG/1
0.1 TABLET ORAL 2 TIMES DAILY
Qty: 60 TABLET | Refills: 5 | Status: SHIPPED | OUTPATIENT
Start: 2017-08-24 | End: 2019-08-12 | Stop reason: ALTCHOICE

## 2017-08-24 RX ORDER — DEXLANSOPRAZOLE 60 MG/1
60 CAPSULE, DELAYED RELEASE ORAL DAILY
Qty: 30 CAPSULE | Refills: 5 | Status: SHIPPED | OUTPATIENT
Start: 2017-08-24 | End: 2018-02-06 | Stop reason: SDUPTHER

## 2017-08-24 RX ORDER — METOPROLOL SUCCINATE 50 MG/1
50 TABLET, EXTENDED RELEASE ORAL DAILY
Qty: 30 TABLET | Refills: 5 | Status: SHIPPED | OUTPATIENT
Start: 2017-08-24 | End: 2018-01-23 | Stop reason: SDUPTHER

## 2017-08-24 RX ORDER — ATORVASTATIN CALCIUM 20 MG/1
20 TABLET, FILM COATED ORAL DAILY
Status: CANCELLED | OUTPATIENT
Start: 2017-08-24

## 2017-08-24 RX ORDER — ONDANSETRON 2 MG/ML
4 INJECTION INTRAMUSCULAR; INTRAVENOUS ONCE
Status: COMPLETED | OUTPATIENT
Start: 2017-08-24 | End: 2017-08-24

## 2017-08-24 RX ORDER — ATORVASTATIN CALCIUM 20 MG/1
20 TABLET, FILM COATED ORAL DAILY
COMMUNITY
End: 2017-08-24 | Stop reason: SDUPTHER

## 2017-08-24 RX ORDER — ONDANSETRON 4 MG/1
4 TABLET, FILM COATED ORAL EVERY 8 HOURS PRN
Qty: 90 TABLET | Refills: 0 | Status: SHIPPED | OUTPATIENT
Start: 2017-08-24 | End: 2017-12-14 | Stop reason: SDUPTHER

## 2017-08-24 RX ORDER — DEXLANSOPRAZOLE 60 MG/1
60 CAPSULE, DELAYED RELEASE ORAL DAILY
Status: CANCELLED | OUTPATIENT
Start: 2017-08-24

## 2017-08-24 RX ADMIN — ONDANSETRON 4 MG: 2 INJECTION INTRAMUSCULAR; INTRAVENOUS at 11:20

## 2017-08-24 NOTE — PROGRESS NOTES
"Subjective   Chief Complaint   Patient presents with   • referral to pain clinic   • Med Refill     Tricia Loyola is a 57 y.o. female.   referral to pain clinic and Med Refill    History of Present Illness     Chronic headaches not controlled  Complaining of change in intensity  Associated with nausea, eye twitching, blurry vision  Denies aura prior to headache or migraine  Previously seen by neurology in [place] - Dr Falcon   Previously treated with botox injections  Currently taking elavil, norco  Neurology scheduled with [place] in September  Neurologist in [place], Dr. Campa in September  Current headache started 8/22 and has been using norco PRN  Pain medication lasts about 4hrs   Headache rated 5-6/10 with norco, without pain rated 8-9/10  Associated with nausea, headache on the occipital lobe to the left side of the face    Requesting medication refills    Sinus tachycardia and blood pressure - remain uncontrolled on toprol    The following portions of the patient's history were reviewed and updated as appropriate: allergies, current medications, past family history, past medical history, past social history, past surgical history and problem list.    Review of Systems   Constitutional: Negative for appetite change, chills, fatigue and fever.   HENT: Negative for congestion, ear pain, rhinorrhea and sore throat.    Eyes: Negative for pain.   Respiratory: Negative for cough and shortness of breath.    Cardiovascular: Negative for chest pain and palpitations.   Gastrointestinal: Positive for nausea. Negative for abdominal pain, constipation and diarrhea.   Genitourinary: Negative for dysuria.   Musculoskeletal: Negative for back pain, joint swelling and neck pain.   Skin: Negative for rash.   Neurological: Positive for headaches. Negative for dizziness.       Objective   /74  Pulse 117  Temp 96.8 °F (36 °C)  Ht 62\" (157.5 cm)  Wt 119 lb (54 kg)  SpO2 98%  BMI 21.77 kg/m2  Physical Exam "   Constitutional: She is oriented to person, place, and time. She appears well-developed and well-nourished.   HENT:   Head: Normocephalic and atraumatic.   Eyes: Pupils are equal, round, and reactive to light.   Neck: Normal range of motion. Neck supple.   Cardiovascular: Regular rhythm and normal heart sounds.  Tachycardia present.    Pulmonary/Chest: Effort normal and breath sounds normal. No respiratory distress. She has no wheezes. She has no rales.   Abdominal: Soft. Bowel sounds are normal.   Musculoskeletal: Normal range of motion.   Neurological: She is alert and oriented to person, place, and time.   Skin: Skin is warm and dry.   Psychiatric: She has a normal mood and affect.   Nursing note and vitals reviewed.      Assessment/Plan   Problems Addressed this Visit        Cardiovascular and Mediastinum    Sinus tachycardia    Relevant Medications    metoprolol succinate XL (TOPROL-XL) 50 MG 24 hr tablet      Other Visit Diagnoses     Nausea    -  Primary    Relevant Medications    ondansetron (ZOFRAN) injection 4 mg (Start on 8/24/2017 11:30 AM)    ondansetron (ZOFRAN) 4 MG tablet    Intractable migraine with aura with status migrainosus        Relevant Medications    HYDROcodone-acetaminophen (NORCO)  MG per tablet    metoprolol succinate XL (TOPROL-XL) 50 MG 24 hr tablet    Other Relevant Orders    CT Head Without Contrast        Ct head ordered    Refilled norco  darcy reviewed [id number]    Stop compazine  Start zofran    zofran IM today    Refilled medications    Adjusted toprol    Recheck in 4 weeks

## 2017-08-25 ENCOUNTER — TELEPHONE (OUTPATIENT)
Dept: FAMILY MEDICINE CLINIC | Facility: CLINIC | Age: 57
End: 2017-08-25

## 2017-09-12 ENCOUNTER — OFFICE VISIT (OUTPATIENT)
Dept: FAMILY MEDICINE CLINIC | Facility: CLINIC | Age: 57
End: 2017-09-12

## 2017-09-12 VITALS
WEIGHT: 119 LBS | SYSTOLIC BLOOD PRESSURE: 122 MMHG | OXYGEN SATURATION: 98 % | TEMPERATURE: 97.2 F | HEIGHT: 62 IN | DIASTOLIC BLOOD PRESSURE: 72 MMHG | HEART RATE: 76 BPM | BODY MASS INDEX: 21.9 KG/M2

## 2017-09-12 DIAGNOSIS — J30.2 SEASONAL ALLERGIC RHINITIS, UNSPECIFIED ALLERGIC RHINITIS TRIGGER: ICD-10-CM

## 2017-09-12 DIAGNOSIS — G43.111 INTRACTABLE MIGRAINE WITH AURA WITH STATUS MIGRAINOSUS: Primary | ICD-10-CM

## 2017-09-12 DIAGNOSIS — F17.200 TOBACCO DEPENDENCE SYNDROME: ICD-10-CM

## 2017-09-12 DIAGNOSIS — R00.0 SINUS TACHYCARDIA: ICD-10-CM

## 2017-09-12 PROCEDURE — 99214 OFFICE O/P EST MOD 30 MIN: CPT | Performed by: FAMILY MEDICINE

## 2017-09-12 RX ORDER — HYDROCODONE BITARTRATE AND ACETAMINOPHEN 10; 325 MG/1; MG/1
1 TABLET ORAL EVERY 4 HOURS PRN
Qty: 30 TABLET | Refills: 0 | Status: SHIPPED | OUTPATIENT
Start: 2017-09-12 | End: 2017-11-08 | Stop reason: SDUPTHER

## 2017-09-12 RX ORDER — CETIRIZINE HYDROCHLORIDE 10 MG/1
10 TABLET ORAL DAILY
Qty: 30 TABLET | Refills: 0 | Status: SHIPPED | OUTPATIENT
Start: 2017-09-12 | End: 2018-11-19

## 2017-09-12 NOTE — PROGRESS NOTES
"Subjective   Chief Complaint   Patient presents with   • want to talk about her results     Mufti   • Med Refill     hydrocodone last dose yesterday     Tricia Loyola is a 57 y.o. female.   want to talk about her results (Mufti) and Med Refill (hydrocodone last dose yesterday)    History of Present Illness     Presents to discuss consultation with Dr Campa due to intractable migraines, history of brain aneurysm  Plan is to start with topamax BID and EEG  EEG scheduled for next week  Last headache was a couple of weeks ago  Patient is currently waiting for topamax prescription to start for migraine therapy  Patient understands that norco is just used for acute intractable migraines  Patient is getting ready to travel to [place] to visit family for one month    Seasonal allergies - currenlty using flonase  Complaining of postnasal drainage with rhinorrhea, dry cough in the evenings  Currently using benadryl and has recently restarted flonase    Tobacco dependence syndrome - currently smoking 1ppd    The following portions of the patient's history were reviewed and updated as appropriate: allergies, current medications, past family history, past medical history, past social history, past surgical history and problem list.    Review of Systems   Constitutional: Negative for appetite change, chills, fatigue and fever.   HENT: Positive for postnasal drip and rhinorrhea. Negative for congestion, ear pain and sore throat.    Eyes: Negative for pain.   Respiratory: Positive for cough. Negative for shortness of breath.    Cardiovascular: Negative for chest pain and palpitations.   Gastrointestinal: Negative for abdominal pain, constipation, diarrhea and nausea.   Genitourinary: Negative for dysuria.   Musculoskeletal: Negative for back pain, joint swelling and neck pain.   Skin: Negative for rash.   Neurological: Negative for dizziness and headaches.       Objective   /72  Pulse 76  Temp 97.2 °F (36.2 °C)  Ht 62\" " (157.5 cm)  Wt 119 lb (54 kg)  SpO2 98%  BMI 21.77 kg/m2  Physical Exam   Constitutional: She is oriented to person, place, and time. She appears well-developed and well-nourished.   HENT:   Head: Normocephalic and atraumatic.   Eyes: Pupils are equal, round, and reactive to light.   Neck: Normal range of motion. Neck supple.   Cardiovascular: Normal rate, regular rhythm and normal heart sounds.    Pulmonary/Chest: Effort normal and breath sounds normal. No respiratory distress. She has no wheezes. She has no rales.   Abdominal: Soft. Bowel sounds are normal.   Musculoskeletal: Normal range of motion.   Neurological: She is alert and oriented to person, place, and time.   Skin: Skin is warm and dry.   Psychiatric: She has a normal mood and affect.   Nursing note and vitals reviewed.      Assessment/Plan   Problems Addressed this Visit        Cardiovascular and Mediastinum    Sinus tachycardia       Other    Tobacco dependence syndrome      Other Visit Diagnoses     Intractable migraine with aura with status migrainosus    -  Primary    Relevant Medications    HYDROcodone-acetaminophen (NORCO)  MG per tablet    Seasonal allergic rhinitis, unspecified allergic rhinitis trigger        Relevant Medications    cetirizine (zyrTEC) 10 MG tablet        Start zyrtec    Continue with metoprolol    Refilled norco PRN  Controlled contract signed for initial prescription  Temporary script for intractable migraines  EEG scheduled  Start topamax as ordered  Follow up scheduled with Dr Campa    Discussed smoking cessation    Recheck in 6-8 weeks

## 2017-09-13 ENCOUNTER — DOCUMENTATION (OUTPATIENT)
Dept: PHYSICAL THERAPY | Facility: CLINIC | Age: 57
End: 2017-09-13

## 2017-09-13 NOTE — PROGRESS NOTES
Discharge Summary  Discharge Summary from Physical Therapy Report      Date od eval: 4-5-17  Number of Visits: 7/10     Discharge Status of Patient: Pt reported 75% improvement. Pt was to f/u with PT post vacation PRN.     Goals: Partially Met    Prognosis: Good    Comments: D/C to [facility].     Date of Discharge: 9-13-17.        Teddy Ahmadi, PTA  Physical Therapist

## 2017-11-01 ENCOUNTER — APPOINTMENT (OUTPATIENT)
Dept: GENERAL RADIOLOGY | Age: 57
End: 2017-11-01
Attending: FAMILY MEDICINE
Payer: MEDICARE

## 2017-11-01 ENCOUNTER — HOSPITAL ENCOUNTER (OUTPATIENT)
Age: 57
Discharge: HOME | End: 2017-11-01
Attending: FAMILY MEDICINE
Payer: MEDICARE

## 2017-11-01 VITALS
SYSTOLIC BLOOD PRESSURE: 129 MMHG | OXYGEN SATURATION: 98 % | WEIGHT: 118 LBS | RESPIRATION RATE: 16 BRPM | HEART RATE: 74 BPM | DIASTOLIC BLOOD PRESSURE: 74 MMHG | BODY MASS INDEX: 21.71 KG/M2 | HEIGHT: 62 IN | TEMPERATURE: 98 F

## 2017-11-01 DIAGNOSIS — M25.561 CHRONIC PAIN OF RIGHT KNEE: Primary | ICD-10-CM

## 2017-11-01 DIAGNOSIS — G89.29 CHRONIC PAIN OF RIGHT KNEE: Primary | ICD-10-CM

## 2017-11-01 PROCEDURE — 73560 X-RAY EXAM OF KNEE 1 OR 2: CPT | Performed by: FAMILY MEDICINE

## 2017-11-01 PROCEDURE — 99203 OFFICE O/P NEW LOW 30 MIN: CPT

## 2017-11-01 RX ORDER — BUSPIRONE HYDROCHLORIDE 10 MG/1
10 TABLET ORAL 3 TIMES DAILY
COMMUNITY

## 2017-11-01 RX ORDER — PREDNISONE 20 MG/1
20 TABLET ORAL 2 TIMES DAILY
Qty: 14 TABLET | Refills: 0 | Status: SHIPPED | OUTPATIENT
Start: 2017-11-01 | End: 2017-11-08

## 2017-11-01 RX ORDER — OXCARBAZEPINE 150 MG/1
150 TABLET, FILM COATED ORAL 2 TIMES DAILY
COMMUNITY

## 2017-11-01 RX ORDER — PROCHLORPERAZINE MALEATE 5 MG/1
5 TABLET ORAL EVERY 6 HOURS PRN
COMMUNITY

## 2017-11-01 RX ORDER — METOPROLOL SUCCINATE 25 MG/1
25 TABLET, EXTENDED RELEASE ORAL DAILY
COMMUNITY

## 2017-11-01 RX ORDER — ATORVASTATIN CALCIUM 20 MG/1
20 TABLET, FILM COATED ORAL NIGHTLY
COMMUNITY

## 2017-11-01 RX ORDER — ACETAMINOPHEN 500 MG
500 TABLET ORAL ONCE
Status: COMPLETED | OUTPATIENT
Start: 2017-11-01 | End: 2017-11-01

## 2017-11-01 RX ORDER — AMITRIPTYLINE HYDROCHLORIDE 75 MG/1
75 TABLET, FILM COATED ORAL NIGHTLY
COMMUNITY

## 2017-11-01 RX ORDER — RANITIDINE 150 MG/1
150 TABLET ORAL 2 TIMES DAILY
COMMUNITY

## 2017-11-01 RX ORDER — ROPINIROLE 0.5 MG/1
0.5 TABLET, FILM COATED ORAL 3 TIMES DAILY
COMMUNITY

## 2017-11-01 NOTE — ED NOTES
Patient came out of exam room yelling that she wanted to see an MD and that she did not like like way Dr Carolyn Farley \"twisted my knee\" during exam. Explained to patient that Dr Carolyn Farley is a medical doctor and explained that he was trying to assess her knee.  Of

## 2017-11-01 NOTE — ED NOTES
While discharging patient. States her pain is now a ten and is requesting ES Tylenol. States her last dose was 4 1/2 hours ago.

## 2017-11-01 NOTE — ED INITIAL ASSESSMENT (HPI)
[place] from [place]. C/O right knee pain. Has a history of injuring her right knee approximately 3-4 months ago. Was seen by Ortho at that time. Was given a leg stabilizer, crutches and physical therapy.  States began having pain and slight sw

## 2017-11-01 NOTE — ED PROVIDER NOTES
Patient Seen in: [facility] [place]    History   Patient presents with:  Knee Pain    Stated Complaint: knee swelling    HPI    57-year-old female with a past medical history of Baker's cyst and osteoarthritis of the right knee had a recent Extension with pain, Full Flexion with pain  Neg Ant Drawer, Neg Lachman, Neg Post Drawer  Pos Valgus Stress, Pos Varus Stress  Neg Cielo, Neg Apley  Vascular: Popliteal pulses intact. Dorsalis pedis and posterior tibialis pulses intact. Neg Rustam's.  No

## 2017-11-08 ENCOUNTER — OFFICE VISIT (OUTPATIENT)
Dept: FAMILY MEDICINE CLINIC | Facility: CLINIC | Age: 57
End: 2017-11-08

## 2017-11-08 VITALS
HEIGHT: 62 IN | OXYGEN SATURATION: 98 % | HEART RATE: 82 BPM | DIASTOLIC BLOOD PRESSURE: 70 MMHG | BODY MASS INDEX: 21.71 KG/M2 | SYSTOLIC BLOOD PRESSURE: 136 MMHG | TEMPERATURE: 98.7 F | WEIGHT: 118 LBS

## 2017-11-08 DIAGNOSIS — M54.50 ACUTE BILATERAL LOW BACK PAIN WITHOUT SCIATICA: Primary | ICD-10-CM

## 2017-11-08 DIAGNOSIS — Z79.891 LONG TERM PRESCRIPTION OPIATE USE: Primary | ICD-10-CM

## 2017-11-08 DIAGNOSIS — Z79.891 LONG TERM PRESCRIPTION OPIATE USE: ICD-10-CM

## 2017-11-08 DIAGNOSIS — M17.11 PRIMARY OSTEOARTHRITIS OF RIGHT KNEE: ICD-10-CM

## 2017-11-08 DIAGNOSIS — G43.111 INTRACTABLE MIGRAINE WITH AURA WITH STATUS MIGRAINOSUS: ICD-10-CM

## 2017-11-08 DIAGNOSIS — M25.561 ACUTE PAIN OF RIGHT KNEE: ICD-10-CM

## 2017-11-08 PROCEDURE — 96372 THER/PROPH/DIAG INJ SC/IM: CPT | Performed by: FAMILY MEDICINE

## 2017-11-08 PROCEDURE — 80307 DRUG TEST PRSMV CHEM ANLYZR: CPT | Performed by: FAMILY MEDICINE

## 2017-11-08 PROCEDURE — 99214 OFFICE O/P EST MOD 30 MIN: CPT | Performed by: FAMILY MEDICINE

## 2017-11-08 RX ORDER — TRIAMCINOLONE ACETONIDE 40 MG/ML
80 INJECTION, SUSPENSION INTRA-ARTICULAR; INTRAMUSCULAR ONCE
Status: COMPLETED | OUTPATIENT
Start: 2017-11-08 | End: 2017-11-08

## 2017-11-08 RX ORDER — ORPHENADRINE CITRATE 100 MG/1
100 TABLET, EXTENDED RELEASE ORAL 2 TIMES DAILY
Qty: 30 TABLET | Refills: 0 | Status: SHIPPED | OUTPATIENT
Start: 2017-11-08 | End: 2018-03-07

## 2017-11-08 RX ORDER — PROCHLORPERAZINE MALEATE 10 MG
10 TABLET ORAL EVERY 6 HOURS PRN
COMMUNITY
End: 2018-10-19 | Stop reason: SDUPTHER

## 2017-11-08 RX ORDER — HYDROCODONE BITARTRATE AND ACETAMINOPHEN 10; 325 MG/1; MG/1
1 TABLET ORAL EVERY 8 HOURS PRN
Qty: 30 TABLET | Refills: 0 | Status: SHIPPED | OUTPATIENT
Start: 2017-11-08 | End: 2017-12-14

## 2017-11-08 RX ORDER — ORPHENADRINE CITRATE 30 MG/ML
60 INJECTION INTRAMUSCULAR; INTRAVENOUS ONCE
Status: COMPLETED | OUTPATIENT
Start: 2017-11-08 | End: 2017-11-08

## 2017-11-08 RX ORDER — PHENOL 1.4 %
20 AEROSOL, SPRAY (ML) MUCOUS MEMBRANE NIGHTLY
COMMUNITY
End: 2019-09-10

## 2017-11-08 RX ORDER — RANITIDINE 150 MG/1
150 TABLET ORAL DAILY
Qty: 30 TABLET | Refills: 5 | Status: SHIPPED | OUTPATIENT
Start: 2017-11-08 | End: 2018-11-05 | Stop reason: SDUPTHER

## 2017-11-08 RX ORDER — RANITIDINE 150 MG/1
150 TABLET ORAL DAILY
COMMUNITY
End: 2017-11-08 | Stop reason: SDUPTHER

## 2017-11-08 RX ORDER — AMITRIPTYLINE HYDROCHLORIDE 75 MG/1
75 TABLET, FILM COATED ORAL NIGHTLY
COMMUNITY
End: 2018-01-29 | Stop reason: SDUPTHER

## 2017-11-08 RX ADMIN — ORPHENADRINE CITRATE 60 MG: 30 INJECTION INTRAMUSCULAR; INTRAVENOUS at 14:45

## 2017-11-08 RX ADMIN — TRIAMCINOLONE ACETONIDE 80 MG: 40 INJECTION, SUSPENSION INTRA-ARTICULAR; INTRAMUSCULAR at 14:45

## 2017-11-08 NOTE — PROGRESS NOTES
Subjective   Chief Complaint   Patient presents with   • Knee Pain     right knee   • Back Pain     Tricia Loyola is a 57 y.o. female.   Knee Pain (right knee) and Back Pain    Knee Pain    The incident occurred more than 1 week ago. The incident occurred at home. The injury mechanism was a fall. The pain is present in the right knee. The quality of the pain is described as burning. The pain is at a severity of 5/10. The pain is moderate. The pain has been constant since onset. Pertinent negatives include no inability to bear weight, loss of motion, muscle weakness, numbness or tingling. She reports no foreign bodies present. The symptoms are aggravated by movement. She has tried acetaminophen, ice, rest and immobilization (steroid, knee sleeve) for the symptoms. The treatment provided mild relief.   Back Pain   This is a new problem. The current episode started yesterday. The problem occurs constantly. The problem has been gradually worsening since onset. The pain is present in the lumbar spine. The quality of the pain is described as burning and shooting. The pain does not radiate. The pain is at a severity of 5/10. The pain is moderate. The pain is the same all the time. The symptoms are aggravated by bending, lying down, position, sitting and standing. Stiffness is present all day. Pertinent negatives include no abdominal pain, chest pain, dysuria, fever, headaches, numbness or tingling. Risk factors: recent immobiliation due to long train ride.   suddenly reoccured - while on vacation  Steroids had to be stopped due to worsening anxiety  Knee xray - negative    The following portions of the patient's history were reviewed and updated as appropriate: allergies, current medications, past family history, past medical history, past social history, past surgical history and problem list.    Review of Systems   Constitutional: Negative for appetite change, chills, fatigue and fever.   HENT: Negative for congestion,  "ear pain, rhinorrhea and sore throat.    Eyes: Negative for pain.   Respiratory: Negative for cough and shortness of breath.    Cardiovascular: Negative for chest pain and palpitations.   Gastrointestinal: Negative for abdominal pain, constipation, diarrhea and nausea.   Genitourinary: Negative for dysuria.   Musculoskeletal: Positive for arthralgias and back pain. Negative for joint swelling and neck pain.   Skin: Negative for rash.   Neurological: Negative for dizziness, tingling, numbness and headaches.       Objective   /70 (BP Location: Left arm, Patient Position: Sitting, Cuff Size: Adult)  Pulse 82  Temp 98.7 °F (37.1 °C) (Oral)   Ht 62\" (157.5 cm)  Wt 118 lb (53.5 kg)  SpO2 98% Comment: room air  BMI 21.58 kg/m2  Physical Exam   Constitutional: She is oriented to person, place, and time. She appears well-developed and well-nourished.   HENT:   Head: Normocephalic and atraumatic.   Eyes: Pupils are equal, round, and reactive to light.   Neck: Normal range of motion. Neck supple.   Cardiovascular: Normal rate, regular rhythm and normal heart sounds.    Pulmonary/Chest: Effort normal and breath sounds normal. No respiratory distress. She has no wheezes. She has no rales.   Abdominal: Soft. Bowel sounds are normal.   Musculoskeletal:        Right knee: She exhibits decreased range of motion and bony tenderness. Tenderness found. Medial joint line tenderness noted.        Left knee: Normal.        Thoracic back: She exhibits decreased range of motion, tenderness, pain and spasm.        Lumbar back: She exhibits decreased range of motion, tenderness, pain and spasm.   Tenderness along the spinal muscles bilateral thoracic into lumbar region   Neurological: She is alert and oriented to person, place, and time.   Skin: Skin is warm and dry.   Psychiatric: She has a normal mood and affect.   Nursing note and vitals reviewed.      Assessment/Plan   Problems Addressed this Visit        Musculoskeletal and " Integument    Primary osteoarthritis of right knee      Other Visit Diagnoses     Acute bilateral low back pain without sciatica    -  Primary    Relevant Medications    triamcinolone acetonide (KENALOG-40) injection 80 mg (Completed)    orphenadrine (NORFLEX) injection 60 mg (Completed)    orphenadrine (NORFLEX) 100 MG 12 hr tablet    Acute pain of right knee        Intractable migraine with aura with status migrainosus        Relevant Medications    amitriptyline (ELAVIL) 75 MG tablet    orphenadrine (NORFLEX) 100 MG 12 hr tablet    HYDROcodone-acetaminophen (NORCO)  MG per tablet    Long term prescription opiate use            Kenalog IM  Norflex IM    Start norflex PO    Refilled zantac and norco    Recheck as needed

## 2017-11-09 LAB
AMPHET+METHAMPHET UR QL: NEGATIVE
BARBITURATES UR QL SCN: NEGATIVE
BENZODIAZ UR QL SCN: NEGATIVE
CANNABINOIDS SERPL QL: NEGATIVE
COCAINE UR QL: NEGATIVE
METHADONE UR QL SCN: NEGATIVE
OPIATES UR QL: NEGATIVE
OXYCODONE UR QL SCN: NEGATIVE

## 2017-12-14 ENCOUNTER — OFFICE VISIT (OUTPATIENT)
Dept: FAMILY MEDICINE CLINIC | Facility: CLINIC | Age: 57
End: 2017-12-14

## 2017-12-14 VITALS
HEART RATE: 81 BPM | SYSTOLIC BLOOD PRESSURE: 126 MMHG | TEMPERATURE: 96.7 F | WEIGHT: 117 LBS | HEIGHT: 62 IN | BODY MASS INDEX: 21.53 KG/M2 | DIASTOLIC BLOOD PRESSURE: 72 MMHG | OXYGEN SATURATION: 94 %

## 2017-12-14 DIAGNOSIS — R11.0 NAUSEA: ICD-10-CM

## 2017-12-14 DIAGNOSIS — G43.111 INTRACTABLE MIGRAINE WITH AURA WITH STATUS MIGRAINOSUS: Primary | ICD-10-CM

## 2017-12-14 PROCEDURE — 96372 THER/PROPH/DIAG INJ SC/IM: CPT | Performed by: FAMILY MEDICINE

## 2017-12-14 PROCEDURE — 99213 OFFICE O/P EST LOW 20 MIN: CPT | Performed by: FAMILY MEDICINE

## 2017-12-14 RX ORDER — ONDANSETRON 4 MG/1
4 TABLET, FILM COATED ORAL EVERY 8 HOURS PRN
Qty: 90 TABLET | Refills: 5 | Status: SHIPPED | OUTPATIENT
Start: 2017-12-14 | End: 2018-06-22 | Stop reason: SDUPTHER

## 2017-12-14 RX ORDER — TOPIRAMATE 50 MG/1
50 TABLET, FILM COATED ORAL 2 TIMES DAILY
COMMUNITY
End: 2020-05-28 | Stop reason: DRUGHIGH

## 2017-12-14 RX ORDER — ONDANSETRON 2 MG/ML
4 INJECTION INTRAMUSCULAR; INTRAVENOUS ONCE
Status: COMPLETED | OUTPATIENT
Start: 2017-12-14 | End: 2017-12-14

## 2017-12-14 RX ORDER — ONDANSETRON 2 MG/ML
4 INJECTION INTRAMUSCULAR; INTRAVENOUS ONCE
Status: DISCONTINUED | OUTPATIENT
Start: 2017-12-14 | End: 2017-12-14

## 2017-12-14 RX ORDER — HYDROCODONE BITARTRATE AND ACETAMINOPHEN 10; 325 MG/1; MG/1
1 TABLET ORAL EVERY 8 HOURS PRN
Qty: 10 TABLET | Refills: 0 | Status: SHIPPED | OUTPATIENT
Start: 2017-12-14 | End: 2017-12-28

## 2017-12-14 RX ORDER — TRIAMCINOLONE ACETONIDE 40 MG/ML
40 INJECTION, SUSPENSION INTRA-ARTICULAR; INTRAMUSCULAR ONCE
Status: COMPLETED | OUTPATIENT
Start: 2017-12-14 | End: 2017-12-14

## 2017-12-14 RX ADMIN — ONDANSETRON 4 MG: 2 INJECTION INTRAMUSCULAR; INTRAVENOUS at 14:51

## 2017-12-14 RX ADMIN — TRIAMCINOLONE ACETONIDE 40 MG: 40 INJECTION, SUSPENSION INTRA-ARTICULAR; INTRAMUSCULAR at 14:48

## 2017-12-14 NOTE — PROGRESS NOTES
"Subjective   Chief Complaint   Patient presents with   • Headache     Tricia Loyola is a 57 y.o. female.   Headache    History of Present Illness     Presents today for an acute migraine  Has seen neurology - followed by Dr Campa  She had made an adjustment to the topamax  EEG - results indicated slowed brain waves  If migraines and headaches persist she would consider a nerve block  Acute migraine started 2 days ago  Associated with left sided facial and head pain, nausea  Has taken compazine without improvement  Needs a refill on zofran  Follow up with neurology is scheduled in 3 months     The following portions of the patient's history were reviewed and updated as appropriate: allergies, current medications, past family history, past medical history, past social history, past surgical history and problem list.    Review of Systems   Eyes: Positive for photophobia.   Gastrointestinal: Positive for nausea.   Neurological: Positive for headaches.       Objective   /72  Pulse 81  Temp 96.7 °F (35.9 °C)  Ht 157.5 cm (62.01\")  Wt 53.1 kg (117 lb)  SpO2 94%  BMI 21.39 kg/m2  Physical Exam   Constitutional: She is oriented to person, place, and time. She appears well-developed and well-nourished.   HENT:   Head: Normocephalic and atraumatic.   Eyes:   photophobic   Neck: Neck supple. Muscular tenderness present.   Cardiovascular: Normal rate and regular rhythm.    Pulmonary/Chest: Effort normal and breath sounds normal.   Musculoskeletal: Normal range of motion.   Neurological: She is alert and oriented to person, place, and time.   Skin: Skin is warm and dry.   Psychiatric: She has a normal mood and affect.   Nursing note and vitals reviewed.      Assessment/Plan   Problems Addressed this Visit     None      Visit Diagnoses     Intractable migraine with aura with status migrainosus    -  Primary    Relevant Medications    topiramate (TOPAMAX) 50 MG tablet    HYDROcodone-acetaminophen (NORCO)  MG per " tablet    triamcinolone acetonide (KENALOG-40) injection 40 mg (Start on 12/14/2017  3:15 PM)    Nausea        Relevant Medications    ondansetron (ZOFRAN) injection 4 mg (Start on 12/14/2017  3:00 PM)    ondansetron (ZOFRAN) 4 MG tablet        zofran refilled    zofran IM  Kenalog IM    norco script - this will be the last prescription for controlled medication  Recommend for future migraines she be evaluated by the ER due to limited treatments available for her in the office    Follow up with neurology as scheduled

## 2018-01-03 RX ORDER — ROPINIROLE 1 MG/1
TABLET, FILM COATED ORAL
Qty: 30 TABLET | Refills: 1 | Status: SHIPPED | OUTPATIENT
Start: 2018-01-03 | End: 2018-01-23 | Stop reason: SDUPTHER

## 2018-01-07 ENCOUNTER — APPOINTMENT (OUTPATIENT)
Dept: GENERAL RADIOLOGY | Facility: HOSPITAL | Age: 58
End: 2018-01-07

## 2018-01-07 ENCOUNTER — HOSPITAL ENCOUNTER (EMERGENCY)
Facility: HOSPITAL | Age: 58
Discharge: HOME | End: 2018-01-07
Attending: EMERGENCY MEDICINE | Admitting: EMERGENCY MEDICINE

## 2018-01-07 VITALS
RESPIRATION RATE: 20 BRPM | HEART RATE: 98 BPM | HEIGHT: 62 IN | SYSTOLIC BLOOD PRESSURE: 111 MMHG | WEIGHT: 118 LBS | OXYGEN SATURATION: 98 % | TEMPERATURE: 99.2 F | DIASTOLIC BLOOD PRESSURE: 61 MMHG | BODY MASS INDEX: 21.71 KG/M2

## 2018-01-07 DIAGNOSIS — R05.9 COUGH: ICD-10-CM

## 2018-01-07 DIAGNOSIS — J10.1 INFLUENZA A: Primary | ICD-10-CM

## 2018-01-07 LAB
ALBUMIN SERPL-MCNC: 3.9 G/DL (ref 3.4–4.8)
ALBUMIN/GLOB SERPL: 1.3 G/DL (ref 1.1–1.8)
ALP SERPL-CCNC: 89 U/L (ref 38–126)
ALT SERPL W P-5'-P-CCNC: 44 U/L (ref 9–52)
ANION GAP SERPL CALCULATED.3IONS-SCNC: 11 MMOL/L (ref 5–15)
AST SERPL-CCNC: 79 U/L (ref 14–36)
BASOPHILS # BLD AUTO: 0.01 10*3/MM3 (ref 0–0.2)
BASOPHILS NFR BLD AUTO: 0.1 % (ref 0–2)
BILIRUB SERPL-MCNC: <0.1 MG/DL (ref 0.2–1.3)
BUN BLD-MCNC: 21 MG/DL (ref 7–21)
BUN/CREAT SERPL: 23.6 (ref 7–25)
CALCIUM SPEC-SCNC: 8.5 MG/DL (ref 8.4–10.2)
CHLORIDE SERPL-SCNC: 103 MMOL/L (ref 95–110)
CO2 SERPL-SCNC: 20 MMOL/L (ref 22–31)
CREAT BLD-MCNC: 0.89 MG/DL (ref 0.5–1)
DEPRECATED RDW RBC AUTO: 49.6 FL (ref 36.4–46.3)
EOSINOPHIL # BLD AUTO: 0.01 10*3/MM3 (ref 0–0.7)
EOSINOPHIL NFR BLD AUTO: 0.1 % (ref 0–7)
ERYTHROCYTE [DISTWIDTH] IN BLOOD BY AUTOMATED COUNT: 14.5 % (ref 11.5–14.5)
FLUAV AG NPH QL: POSITIVE
FLUBV AG NPH QL IA: NEGATIVE
GFR SERPL CREATININE-BSD FRML MDRD: 65 ML/MIN/1.73 (ref 60–120)
GLOBULIN UR ELPH-MCNC: 3.1 GM/DL (ref 2.3–3.5)
GLUCOSE BLD-MCNC: 110 MG/DL (ref 60–100)
HCT VFR BLD AUTO: 39.7 % (ref 35–45)
HGB BLD-MCNC: 13.4 G/DL (ref 12–15.5)
IMM GRANULOCYTES # BLD: 0.06 10*3/MM3 (ref 0–0.02)
IMM GRANULOCYTES NFR BLD: 0.7 % (ref 0–0.5)
LYMPHOCYTES # BLD AUTO: 1.25 10*3/MM3 (ref 0.6–4.2)
LYMPHOCYTES NFR BLD AUTO: 15.5 % (ref 10–50)
MCH RBC QN AUTO: 31.5 PG (ref 26.5–34)
MCHC RBC AUTO-ENTMCNC: 33.8 G/DL (ref 31.4–36)
MCV RBC AUTO: 93.4 FL (ref 80–98)
MONOCYTES # BLD AUTO: 0.88 10*3/MM3 (ref 0–0.9)
MONOCYTES NFR BLD AUTO: 10.9 % (ref 0–12)
NEUTROPHILS # BLD AUTO: 5.84 10*3/MM3 (ref 2–8.6)
NEUTROPHILS NFR BLD AUTO: 72.7 % (ref 37–80)
PLATELET # BLD AUTO: 188 10*3/MM3 (ref 150–450)
PMV BLD AUTO: 10.1 FL (ref 8–12)
POTASSIUM BLD-SCNC: 3.8 MMOL/L (ref 3.5–5.1)
PROT SERPL-MCNC: 7 G/DL (ref 6.3–8.6)
RBC # BLD AUTO: 4.25 10*6/MM3 (ref 3.77–5.16)
S PYO AG THROAT QL: NEGATIVE
SODIUM BLD-SCNC: 134 MMOL/L (ref 137–145)
WBC NRBC COR # BLD: 8.05 10*3/MM3 (ref 3.2–9.8)

## 2018-01-07 PROCEDURE — 99283 EMERGENCY DEPT VISIT LOW MDM: CPT

## 2018-01-07 PROCEDURE — 85025 COMPLETE CBC W/AUTO DIFF WBC: CPT | Performed by: PHYSICIAN ASSISTANT

## 2018-01-07 PROCEDURE — 80053 COMPREHEN METABOLIC PANEL: CPT | Performed by: PHYSICIAN ASSISTANT

## 2018-01-07 PROCEDURE — 71046 X-RAY EXAM CHEST 2 VIEWS: CPT

## 2018-01-07 PROCEDURE — 87081 CULTURE SCREEN ONLY: CPT | Performed by: PHYSICIAN ASSISTANT

## 2018-01-07 PROCEDURE — 87804 INFLUENZA ASSAY W/OPTIC: CPT | Performed by: PHYSICIAN ASSISTANT

## 2018-01-07 PROCEDURE — 87880 STREP A ASSAY W/OPTIC: CPT | Performed by: PHYSICIAN ASSISTANT

## 2018-01-07 RX ORDER — GUAIFENESIN AND CODEINE PHOSPHATE 100; 10 MG/5ML; MG/5ML
5 SOLUTION ORAL 3 TIMES DAILY PRN
Qty: 70 ML | Refills: 0 | Status: SHIPPED | OUTPATIENT
Start: 2018-01-07 | End: 2018-01-11

## 2018-01-07 NOTE — ED PROVIDER NOTES
Subjective   HPI Comments: Patient presents to emergency department with URI which is not improving with antibiotics.  States her fever was 103F last night.  Endorses taking Tylenol when she gets a fever but denies regular dosing.  Endorses smoking but not currently.  States her cough is causing her a headache and chest to be sore (only when coughing).      Patient is a 57 y.o. female presenting with URI.   History provided by:  Patient   used: No    URI   Presenting symptoms: cough, ear pain, rhinorrhea and sore throat    Presenting symptoms: no fever    Cough:     Cough characteristics:  Dry    Severity:  Moderate    Onset quality:  Sudden    Duration:  7 days    Timing:  Constant    Progression:  Worsening    Chronicity:  New  Severity:  Moderate  Onset quality:  Sudden  Associated symptoms: headaches, myalgias and sneezing    Associated symptoms: no arthralgias, no neck pain, no sinus pain, no swollen glands and no wheezing        Review of Systems   Constitutional: Negative for chills and fever.   HENT: Positive for ear pain, rhinorrhea, sneezing and sore throat. Negative for sinus pain.    Respiratory: Positive for cough. Negative for shortness of breath and wheezing.    Cardiovascular: Negative for chest pain, palpitations and leg swelling.   Gastrointestinal: Negative for constipation, diarrhea, nausea and vomiting.   Genitourinary: Negative for dysuria, flank pain, frequency, hematuria, vaginal bleeding and vaginal discharge.   Musculoskeletal: Positive for myalgias. Negative for arthralgias, neck pain and neck stiffness.   Skin: Negative for color change.   Allergic/Immunologic: Negative for immunocompromised state.   Neurological: Positive for headaches. Negative for dizziness, syncope, speech difficulty, weakness and light-headedness.   Hematological: Does not bruise/bleed easily.   Psychiatric/Behavioral: Negative for confusion.       Past Medical History:   Diagnosis Date   •  Abdominal pain    • Anemia    • Chronic post-traumatic headache      rebound      • Depressive disorder    • Encounter for gynecological examination    • Gastroesophageal reflux disease    • Generalized anxiety disorder    • History of mammogram 08/2008    MAMMOGRAM DIAGNOSTIC BILATERAL 58439 ([facility]) (1)     • Hyperlipidemia    • Nonruptured cerebral aneurysm    • Osteoporosis    • Posttraumatic stress disorder    • Seizure     Psychogenic non-epileptic sz    • Viral hepatitis A        Allergies   Allergen Reactions   • Augmentin [Amoxicillin-Pot Clavulanate]      Hives   • Ciprofloxacin      Cipro:  Rash   • Codeine GI Intolerance   • Fiorinal [Butalbital-Aspirin-Caffeine]      Rapid heart rate   • Imitrex [Sumatriptan]      Rapid heart rate   • Iodine      Anaphylaxis   • Other      MIDRIN  -  Rapid heart rate   • Percocet [Oxycodone-Acetaminophen]    • Toradol [Ketorolac Tromethamine]      Anaphylaxis   • Ultram [Tramadol]      Rapid heart rate   • Ibuprofen Rash       Past Surgical History:   Procedure Laterality Date   • APPENDECTOMY     • BREAST BIOPSY     • CHOLECYSTECTOMY     • CRANIOTOMY FOR ANEURYSM  2003   • PAP SMEAR  08/16/2012   • TONSILLECTOMY         Family History   Problem Relation Age of Onset   • Constipation Mother    • Heart disease Mother    • Hypertension Mother    • Anxiety disorder Mother    • Alcohol abuse Father    • Anxiety disorder Brother    • Kidney disease Brother    • Hypertension Brother    • Arthritis Brother    • Anxiety disorder Brother    • Kidney disease Brother    • Diabetes Brother    • Anxiety disorder Brother    • Hypertension Brother    • Breast cancer Paternal Aunt        Social History     Social History   • Marital status: Legally      Spouse name: N/A   • Number of children: N/A   • Years of education: N/A     Social History Main Topics   • Smoking status: Current Every Day Smoker     Packs/day: 0.50     Years: 43.00     Types: Cigarettes   • Smokeless tobacco:  "Never Used   • Alcohol use No   • Drug use: No   • Sexual activity: No     Other Topics Concern   • None     Social History Narrative           Objective      /61 (BP Location: Right arm, Patient Position: Lying)  Pulse 98  Temp 99.2 °F (37.3 °C) Comment: tylenol 1 hour prior to arrival  Resp 20  Ht 157.5 cm (62\")  Wt 53.5 kg (118 lb)  SpO2 98%  BMI 21.58 kg/m2    Physical Exam   Constitutional: She is oriented to person, place, and time. She appears well-developed and well-nourished.   HENT:   Head: Normocephalic and atraumatic.   Right Ear: Hearing, tympanic membrane, external ear and ear canal normal. No mastoid tenderness. Tympanic membrane is not injected, not perforated and not bulging. No middle ear effusion.   Left Ear: Hearing, tympanic membrane, external ear and ear canal normal. No mastoid tenderness. Tympanic membrane is not injected, not perforated and not bulging.  No middle ear effusion.   Mouth/Throat: Oropharynx is clear and moist.   Clear rhinorrhea   Eyes: EOM are normal. Pupils are equal, round, and reactive to light. Right conjunctiva is injected. Left conjunctiva is not injected.   Cardiovascular: Normal rate, regular rhythm, normal heart sounds and intact distal pulses.    Pulmonary/Chest: Effort normal and breath sounds normal. No respiratory distress. She has no wheezes. She has no rales. She exhibits no tenderness.   Abdominal: Soft. Bowel sounds are normal. She exhibits no distension and no mass. There is no tenderness. There is no guarding.   Neurological: She is alert and oriented to person, place, and time.   Skin: Skin is warm and dry.   Psychiatric: She has a normal mood and affect. Her behavior is normal. Thought content normal.   Nursing note and vitals reviewed.      Procedures         ED Course  ED Course   Comment By Time   Discussed with patient regular dosing of Tylenol before fever can get severe.  Keep adequate hydration and gave medication to help with cough " symptoms at night. Manav Millan PA-C 01/07 0954   [facility][id number] Manav Millan PA-C 01/07 0959      Results for orders placed or performed during the hospital encounter of 01/07/18   Influenza Antigen, Rapid - Swab, Nasopharynx   Result Value Ref Range    Influenza A Ag, EIA Positive (A) Negative    Influenza B Ag, EIA Negative Negative   Rapid Strep A Screen - Swab, Throat   Result Value Ref Range    Strep A Ag Negative Negative   CBC Auto Differential   Result Value Ref Range    WBC 8.05 3.20 - 9.80 10*3/mm3    RBC 4.25 3.77 - 5.16 10*6/mm3    Hemoglobin 13.4 12.0 - 15.5 g/dL    Hematocrit 39.7 35.0 - 45.0 %    MCV 93.4 80.0 - 98.0 fL    MCH 31.5 26.5 - 34.0 pg    MCHC 33.8 31.4 - 36.0 g/dL    RDW 14.5 11.5 - 14.5 %    RDW-SD 49.6 (H) 36.4 - 46.3 fl    MPV 10.1 8.0 - 12.0 fL    Platelets 188 150 - 450 10*3/mm3    Neutrophil % 72.7 37.0 - 80.0 %    Lymphocyte % 15.5 10.0 - 50.0 %    Monocyte % 10.9 0.0 - 12.0 %    Eosinophil % 0.1 0.0 - 7.0 %    Basophil % 0.1 0.0 - 2.0 %    Immature Grans % 0.7 (H) 0.0 - 0.5 %    Neutrophils, Absolute 5.84 2.00 - 8.60 10*3/mm3    Lymphocytes, Absolute 1.25 0.60 - 4.20 10*3/mm3    Monocytes, Absolute 0.88 0.00 - 0.90 10*3/mm3    Eosinophils, Absolute 0.01 0.00 - 0.70 10*3/mm3    Basophils, Absolute 0.01 0.00 - 0.20 10*3/mm3    Immature Grans, Absolute 0.06 (H) 0.00 - 0.02 10*3/mm3     Xr Chest 2 View    Result Date: 12/28/2017  Narrative: EXAM:          Radiograph(s), Chest VIEWS:    PA / Lat ; 2     DATE/TIME:  12/28/2017 2:52 PM CST            INDICATION:   cough with fever x 3 days  COMPARISON:  CXR: none         FINDINGS:         - lines/tubes:    none   - cardiac:         size within normal limits       - mediastinum: contour within normal limits       - lungs:         no focal air space process, pulmonary interstitial edema, nodule(s)/mass           - pleura:         no evidence of  fluid                - osseous:         unremarkable for age                 - misc.:        Impression: CONCLUSION:    1. No evidence of an acute cardiopulmonary process.                                  Electronically signed by:  LG Werner MD  12/28/2017 2:53 PM CST Workstation: [id number]    Xr Chest Pa & Lateral    Result Date: 1/7/2018  Narrative: Chest x-ray PA and lateral. CLINICAL INDICATION: Shortness of breath. Respiratory infection. COMPARISON: Chest December 28, 2017 FINDINGS: Cardiac silhouette is normal in size. Pulmonary vascularity is unremarkable. No focal infiltrate or consolidation.  No pleural effusion.  No pneumothorax. No change since prior exam.     Impression: CONCLUSION: No evidence of active disease.  Electronically signed by:  Nolan Mendez MD  1/7/2018 9:27 AM CST Workstation: [id number]                [facility]    Final diagnoses:   Influenza A   Cough            Manav Millan PA-C  01/07/18 0955       Manav Millan PA-C  01/07/18 1002

## 2018-01-07 NOTE — ED NOTES
Patient presented to ED with C/O fever, cough, bilateral ear infections, URI that has been going on since christmas. On antibiotics now with last dose to take today.     Nancy Taveras LPN  01/07/18 0864

## 2018-01-10 LAB — BACTERIA SPEC AEROBE CULT: NORMAL

## 2018-01-23 ENCOUNTER — OFFICE VISIT (OUTPATIENT)
Dept: FAMILY MEDICINE CLINIC | Facility: CLINIC | Age: 58
End: 2018-01-23

## 2018-01-23 VITALS
HEIGHT: 62 IN | BODY MASS INDEX: 20.8 KG/M2 | SYSTOLIC BLOOD PRESSURE: 115 MMHG | TEMPERATURE: 98.1 F | OXYGEN SATURATION: 98 % | DIASTOLIC BLOOD PRESSURE: 66 MMHG | WEIGHT: 113 LBS | HEART RATE: 84 BPM

## 2018-01-23 DIAGNOSIS — J20.9 ACUTE BRONCHITIS, UNSPECIFIED ORGANISM: Primary | ICD-10-CM

## 2018-01-23 DIAGNOSIS — R00.0 SINUS TACHYCARDIA: ICD-10-CM

## 2018-01-23 DIAGNOSIS — F17.200 TOBACCO DEPENDENCE SYNDROME: ICD-10-CM

## 2018-01-23 PROCEDURE — 99214 OFFICE O/P EST MOD 30 MIN: CPT | Performed by: FAMILY MEDICINE

## 2018-01-23 RX ORDER — GUAIFENESIN AND CODEINE PHOSPHATE 100; 10 MG/5ML; MG/5ML
5 SOLUTION ORAL 4 TIMES DAILY PRN
Qty: 180 ML | Refills: 0 | Status: SHIPPED | OUTPATIENT
Start: 2018-01-23 | End: 2018-02-06

## 2018-01-23 RX ORDER — ALBUTEROL SULFATE 90 UG/1
2 AEROSOL, METERED RESPIRATORY (INHALATION) EVERY 6 HOURS
Qty: 1 INHALER | Refills: 3
Start: 2018-01-23 | End: 2018-08-06 | Stop reason: SDUPTHER

## 2018-01-23 RX ORDER — METOPROLOL SUCCINATE 50 MG/1
50 TABLET, EXTENDED RELEASE ORAL DAILY
Qty: 90 TABLET | Refills: 1 | Status: SHIPPED | OUTPATIENT
Start: 2018-01-23 | End: 2018-05-18 | Stop reason: SDUPTHER

## 2018-01-23 RX ORDER — ROPINIROLE 1 MG/1
1 TABLET, FILM COATED ORAL NIGHTLY
Qty: 90 TABLET | Refills: 1 | Status: SHIPPED | OUTPATIENT
Start: 2018-01-23 | End: 2018-04-23 | Stop reason: SDUPTHER

## 2018-01-23 NOTE — PROGRESS NOTES
"Subjective   Chief Complaint   Patient presents with   • Otitis Media   • Bronchitis   • Med Refill     Tricia Loyola is a 58 y.o. female.   Otitis Media; Bronchitis; and Med Refill    History of Present Illness     Started getting sick prior to [place]  Has been to urgent care 12/28 - diagnosed with acute bronchitis  Was treated with doxycycliine  Was then seen 1/03 and diagnosed with acute otitis externa of both ears  Was treated with ear drops  Was then seen 1/07 and diagnosed with influenza  Was treated with cough suppressant  Was then seen on 1/16 at [facility] and diagnosed with acute bronchitis  Treated with steroid injection, zpak, tessalon perles, cefprozil, steroid pack  Currently complaining of cough, ear pain, chest tightness   Nothing has made an improvement in her symptoms    Asking for refills    The following portions of the patient's history were reviewed and updated as appropriate: allergies, current medications, past family history, past medical history, past social history, past surgical history and problem list.    Review of Systems   Constitutional: Positive for fatigue. Negative for appetite change, chills and fever.   HENT: Positive for congestion and ear pain. Negative for rhinorrhea and sore throat.    Eyes: Negative for pain.   Respiratory: Positive for cough, chest tightness, shortness of breath and wheezing.    Cardiovascular: Negative for chest pain and palpitations.   Gastrointestinal: Negative for abdominal pain, constipation, diarrhea and nausea.   Genitourinary: Negative for dysuria.   Musculoskeletal: Negative for back pain, joint swelling and neck pain.   Skin: Negative for rash.   Neurological: Negative for dizziness and headaches.       Objective   /66  Pulse 84  Temp 98.1 °F (36.7 °C)  Ht 157.5 cm (62.01\")  Wt 51.3 kg (113 lb)  SpO2 98%  BMI 20.66 kg/m2  Physical Exam   Constitutional: She is oriented to person, place, and time. She appears well-developed " and well-nourished.   HENT:   Head: Normocephalic and atraumatic.   Eyes: Pupils are equal, round, and reactive to light.   Neck: Normal range of motion. Neck supple.   Cardiovascular: Normal rate, regular rhythm and normal heart sounds.    Pulmonary/Chest: No respiratory distress. She has wheezes. She has no rales.   Abdominal: Soft. Bowel sounds are normal.   Musculoskeletal: Normal range of motion.   Neurological: She is alert and oriented to person, place, and time.   Skin: Skin is warm and dry.   Psychiatric: She has a normal mood and affect.   Nursing note and vitals reviewed.      Assessment/Plan   Problems Addressed this Visit        Cardiovascular and Mediastinum    Sinus tachycardia    Relevant Medications    metoprolol succinate XL (TOPROL-XL) 50 MG 24 hr tablet       Other    Tobacco dependence syndrome      Other Visit Diagnoses     Acute bronchitis, unspecified organism    -  Primary    Relevant Medications    guaifenesin-codeine (GUAIFENESIN AC) 100-10 MG/5ML liquid    albuterol (VENTOLIN HFA) 108 (90 Base) MCG/ACT inhaler        Smoking cessation recommended    Change to nebulizer breathing treatments scheduled every 6 hours or may use her inhaler but not together    Start cheritussin AC cough suppressant as needed QID    Finish course of medication - steroids, antibiotics    Refilled medications    Recheck in 1 week

## 2018-01-29 RX ORDER — AMITRIPTYLINE HYDROCHLORIDE 75 MG/1
TABLET, FILM COATED ORAL
Qty: 30 TABLET | Refills: 2 | Status: SHIPPED | OUTPATIENT
Start: 2018-01-29 | End: 2021-03-15

## 2018-02-06 ENCOUNTER — OFFICE VISIT (OUTPATIENT)
Dept: FAMILY MEDICINE CLINIC | Facility: CLINIC | Age: 58
End: 2018-02-06

## 2018-02-06 VITALS
HEIGHT: 62 IN | WEIGHT: 113 LBS | BODY MASS INDEX: 20.8 KG/M2 | DIASTOLIC BLOOD PRESSURE: 70 MMHG | OXYGEN SATURATION: 98 % | HEART RATE: 84 BPM | SYSTOLIC BLOOD PRESSURE: 114 MMHG | TEMPERATURE: 97 F

## 2018-02-06 DIAGNOSIS — R00.0 SINUS TACHYCARDIA: ICD-10-CM

## 2018-02-06 DIAGNOSIS — Z12.31 ENCOUNTER FOR SCREENING MAMMOGRAM FOR BREAST CANCER: ICD-10-CM

## 2018-02-06 DIAGNOSIS — F17.200 TOBACCO DEPENDENCE SYNDROME: ICD-10-CM

## 2018-02-06 DIAGNOSIS — F41.1 GAD (GENERALIZED ANXIETY DISORDER): Primary | ICD-10-CM

## 2018-02-06 DIAGNOSIS — E78.5 HYPERLIPIDEMIA, UNSPECIFIED HYPERLIPIDEMIA TYPE: ICD-10-CM

## 2018-02-06 DIAGNOSIS — M81.6 LOCALIZED OSTEOPOROSIS WITHOUT CURRENT PATHOLOGICAL FRACTURE: ICD-10-CM

## 2018-02-06 DIAGNOSIS — K21.00 GASTROESOPHAGEAL REFLUX DISEASE WITH ESOPHAGITIS: ICD-10-CM

## 2018-02-06 DIAGNOSIS — Z23 FLU VACCINE NEED: ICD-10-CM

## 2018-02-06 DIAGNOSIS — Z11.59 NEED FOR HEPATITIS C SCREENING TEST: ICD-10-CM

## 2018-02-06 PROCEDURE — G0008 ADMIN INFLUENZA VIRUS VAC: HCPCS | Performed by: FAMILY MEDICINE

## 2018-02-06 PROCEDURE — 99214 OFFICE O/P EST MOD 30 MIN: CPT | Performed by: FAMILY MEDICINE

## 2018-02-06 PROCEDURE — 90686 IIV4 VACC NO PRSV 0.5 ML IM: CPT | Performed by: FAMILY MEDICINE

## 2018-02-06 RX ORDER — DEXLANSOPRAZOLE 60 MG/1
CAPSULE, DELAYED RELEASE ORAL
Qty: 30 CAPSULE | Refills: 1 | Status: SHIPPED | OUTPATIENT
Start: 2018-02-06 | End: 2018-02-06 | Stop reason: SDUPTHER

## 2018-02-06 RX ORDER — DEXLANSOPRAZOLE 60 MG/1
60 CAPSULE, DELAYED RELEASE ORAL DAILY
Qty: 90 CAPSULE | Refills: 1 | Status: SHIPPED | OUTPATIENT
Start: 2018-02-06 | End: 2018-04-06 | Stop reason: SDUPTHER

## 2018-02-06 RX ORDER — BUSPIRONE HYDROCHLORIDE 30 MG/1
30 TABLET ORAL 2 TIMES DAILY
Qty: 60 TABLET | Refills: 0 | Status: SHIPPED | OUTPATIENT
Start: 2018-02-06 | End: 2018-03-07 | Stop reason: SDUPTHER

## 2018-02-06 NOTE — PROGRESS NOTES
"Subjective   Chief Complaint   Patient presents with   • Follow-up     1 week    • Med Refill     Tricia Loyola is a 58 y.o. female.   Follow-up (1 week ) and Med Refill    History of Present Illness     Recheck after recurrent issues with bronchitis  Cough has improved, still present but less severe    Complaining of increased anxiety due to medical problems with her brother  He is scheduled for vascular surgery tomorrow  Currently managed with buspar  Followed by mental health    Osteoporosis diagnosed by dexa bone scan  Previously treated with reclast ordered by last provider   Last infusion/injection done at [facility]  Sinus tachycardia - managed with metoprolol    History of migraine  Currently managed with topamax, elavil    Due for mammogram, pelvic exam    Colonoscopy - need medical record    Influenza vaccine - patient desire vaccination    Due for shingles vaccine    Due for lab work    The following portions of the patient's history were reviewed and updated as appropriate: allergies, current medications, past family history, past medical history, past social history, past surgical history and problem list.    Review of Systems   Constitutional: Negative for appetite change, chills, fatigue and fever.   HENT: Negative for congestion, ear pain, rhinorrhea and sore throat.    Eyes: Negative for pain.   Respiratory: Positive for cough. Negative for shortness of breath.    Cardiovascular: Negative for chest pain and palpitations.   Gastrointestinal: Negative for abdominal pain, constipation, diarrhea and nausea.   Genitourinary: Negative for dysuria.   Musculoskeletal: Negative for back pain, joint swelling and neck pain.   Skin: Negative for rash.   Neurological: Negative for dizziness and headaches.   Psychiatric/Behavioral: The patient is nervous/anxious.        Objective   /70  Pulse 84  Temp 97 °F (36.1 °C)  Ht 157.5 cm (62.01\")  Wt 51.3 kg (113 lb)  SpO2 98%  BMI 20.66 " kg/m2  Physical Exam   Constitutional: She is oriented to person, place, and time. She appears well-developed and well-nourished.   HENT:   Head: Normocephalic and atraumatic.   Eyes: Pupils are equal, round, and reactive to light.   Neck: Normal range of motion. Neck supple.   Cardiovascular: Normal rate, regular rhythm and normal heart sounds.    Pulmonary/Chest: Effort normal and breath sounds normal. No respiratory distress. She has no wheezes. She has no rales.   Abdominal: Soft. Bowel sounds are normal.   Musculoskeletal: Normal range of motion.   Neurological: She is alert and oriented to person, place, and time.   Skin: Skin is warm and dry.   Psychiatric: She has a normal mood and affect.   Nursing note and vitals reviewed.      Assessment/Plan   Problems Addressed this Visit        Cardiovascular and Mediastinum    Hyperlipidemia    Relevant Orders    Lipid Panel    Sinus tachycardia       Digestive    Gastroesophageal reflux disease with esophagitis    Relevant Medications    dexlansoprazole (DEXILANT) 60 MG capsule       Musculoskeletal and Integument    Osteoporosis       Other    Tobacco dependence syndrome    PRISCILLA (generalized anxiety disorder) - Primary    Relevant Medications    busPIRone (BUSPAR) 30 MG tablet      Other Visit Diagnoses     Need for hepatitis C screening test        Relevant Orders    Hepatitis Panel, Acute    Flu vaccine need        Relevant Orders    Flu Vaccine Quad PF 3YR+ (FLUARIX/FLUZONE [id number]) (Completed)    Encounter for screening mammogram for breast cancer        Relevant Orders    Mammo Screening Digital Tomosynthesis Bilateral With CAD        Influenza vaccine today    Medical release for colonoscopy and infusion history (osteoporosis, reclast)    Adjusted buspar    Mammogram and pelvic exam scheduled    Refilled dexilant    Labs ordered    Information provided on shingles vaccine    Will be due for a medicare wellness visit    Smoking cessation recommended    Recheck  as scheduled

## 2018-02-08 RX ORDER — ATORVASTATIN CALCIUM 20 MG/1
TABLET, FILM COATED ORAL
Qty: 30 TABLET | Refills: 3 | Status: SHIPPED | OUTPATIENT
Start: 2018-02-08 | End: 2018-03-16 | Stop reason: SDUPTHER

## 2018-03-07 ENCOUNTER — TELEPHONE (OUTPATIENT)
Dept: FAMILY MEDICINE CLINIC | Facility: CLINIC | Age: 58
End: 2018-03-07

## 2018-03-07 ENCOUNTER — OFFICE VISIT (OUTPATIENT)
Dept: FAMILY MEDICINE CLINIC | Facility: CLINIC | Age: 58
End: 2018-03-07

## 2018-03-07 VITALS
SYSTOLIC BLOOD PRESSURE: 124 MMHG | BODY MASS INDEX: 21.35 KG/M2 | WEIGHT: 116 LBS | OXYGEN SATURATION: 99 % | TEMPERATURE: 97.5 F | HEIGHT: 62 IN | DIASTOLIC BLOOD PRESSURE: 74 MMHG | HEART RATE: 99 BPM

## 2018-03-07 DIAGNOSIS — R51.9 CHRONIC INTRACTABLE HEADACHE, UNSPECIFIED HEADACHE TYPE: ICD-10-CM

## 2018-03-07 DIAGNOSIS — G89.29 CHRONIC INTRACTABLE HEADACHE, UNSPECIFIED HEADACHE TYPE: ICD-10-CM

## 2018-03-07 DIAGNOSIS — F41.1 GAD (GENERALIZED ANXIETY DISORDER): ICD-10-CM

## 2018-03-07 DIAGNOSIS — Z86.69 HISTORY OF SEIZURE DISORDER: ICD-10-CM

## 2018-03-07 DIAGNOSIS — M54.50 ACUTE BILATERAL LOW BACK PAIN WITHOUT SCIATICA: Primary | ICD-10-CM

## 2018-03-07 PROCEDURE — 99214 OFFICE O/P EST MOD 30 MIN: CPT | Performed by: FAMILY MEDICINE

## 2018-03-07 PROCEDURE — 96372 THER/PROPH/DIAG INJ SC/IM: CPT | Performed by: FAMILY MEDICINE

## 2018-03-07 RX ORDER — NABUMETONE 750 MG/1
750 TABLET, FILM COATED ORAL 2 TIMES DAILY PRN
Qty: 30 TABLET | Refills: 0 | Status: SHIPPED | OUTPATIENT
Start: 2018-03-07 | End: 2018-03-16

## 2018-03-07 RX ORDER — TIZANIDINE 2 MG/1
2 TABLET ORAL EVERY 8 HOURS PRN
Qty: 30 TABLET | Refills: 0 | Status: SHIPPED | OUTPATIENT
Start: 2018-03-07 | End: 2018-03-16 | Stop reason: SDUPTHER

## 2018-03-07 RX ORDER — BUSPIRONE HYDROCHLORIDE 30 MG/1
30 TABLET ORAL 2 TIMES DAILY
Qty: 60 TABLET | Refills: 0 | Status: SHIPPED | OUTPATIENT
Start: 2018-03-07

## 2018-03-07 RX ORDER — TRIAMCINOLONE ACETONIDE 40 MG/ML
80 INJECTION, SUSPENSION INTRA-ARTICULAR; INTRAMUSCULAR ONCE
Status: COMPLETED | OUTPATIENT
Start: 2018-03-07 | End: 2018-03-07

## 2018-03-07 RX ADMIN — TRIAMCINOLONE ACETONIDE 80 MG: 40 INJECTION, SUSPENSION INTRA-ARTICULAR; INTRAMUSCULAR at 14:32

## 2018-03-07 NOTE — PROGRESS NOTES
"Subjective   Chief Complaint   Patient presents with   • Back Pain     3 days ago   • Med Refill     Tricia Loyola is a 58 y.o. female.   Back Pain (3 days ago) and Med Refill    Back Pain   This is a new problem. The current episode started in the past 7 days. The problem occurs daily. The problem has been gradually worsening since onset. The pain is present in the lumbar spine. The quality of the pain is described as burning. The pain does not radiate. The pain is at a severity of 7/10. The pain is moderate. The pain is the same all the time. The symptoms are aggravated by sitting, standing, position and bending. Stiffness is present all day. Pertinent negatives include no abdominal pain, chest pain, dysuria, fever or headaches. She has tried ice, heat and NSAIDs (acetaminophen) for the symptoms. The treatment provided no relief.      Migraines - requested a referral to neurology due to current provider now unavailable    Anxiety - refilled buspar    The following portions of the patient's history were reviewed and updated as appropriate: allergies, current medications, past family history, past medical history, past social history, past surgical history and problem list.    Review of Systems   Constitutional: Negative for appetite change, chills, fatigue and fever.   HENT: Negative for congestion, ear pain, rhinorrhea and sore throat.    Eyes: Negative for pain.   Respiratory: Negative for cough and shortness of breath.    Cardiovascular: Negative for chest pain and palpitations.   Gastrointestinal: Negative for abdominal pain, constipation, diarrhea and nausea.   Genitourinary: Negative for dysuria.   Musculoskeletal: Positive for back pain. Negative for joint swelling and neck pain.   Skin: Negative for rash.   Neurological: Negative for dizziness and headaches.       Objective   /74  Pulse 99  Temp 97.5 °F (36.4 °C)  Ht 157.5 cm (62.01\")  Wt 52.6 kg (116 lb)  SpO2 99%  BMI 21.21 kg/m2  Physical " Exam   Constitutional: She is oriented to person, place, and time. She appears well-developed and well-nourished.   HENT:   Head: Normocephalic and atraumatic.   Eyes: Pupils are equal, round, and reactive to light.   Neck: Normal range of motion. Neck supple.   Cardiovascular: Normal rate, regular rhythm and normal heart sounds.    Pulmonary/Chest: Effort normal and breath sounds normal. No respiratory distress. She has no wheezes. She has no rales.   Abdominal: Soft. Bowel sounds are normal.   Musculoskeletal:        Lumbar back: She exhibits decreased range of motion, tenderness, pain and spasm.   Neurological: She is alert and oriented to person, place, and time.   Skin: Skin is warm and dry.   Psychiatric: She has a normal mood and affect.   Nursing note and vitals reviewed.      Assessment/Plan   Problems Addressed this Visit        Other    PRISCILLA (generalized anxiety disorder)    Relevant Medications    busPIRone (BUSPAR) 30 MG tablet    History of seizure disorder      Other Visit Diagnoses     Acute bilateral low back pain without sciatica    -  Primary    Relevant Medications    nabumetone (RELAFEN) 750 MG tablet    triamcinolone acetonide (KENALOG-40) injection 80 mg (Start on 3/7/2018  3:00 PM)    tiZANidine (ZANAFLEX) 2 MG tablet    Other Relevant Orders    XR Spine Lumbar 2 or 3 View    Chronic intractable headache, unspecified headache type        Relevant Orders    Ambulatory Referral to Neurology (Completed)        Lumbar xray ordered    Kenalog IM  Start zanaflex  Start relafen  Use ice and heat PRN    Refilled buspar  Referral to neurology    Recheck as needed

## 2018-03-07 NOTE — TELEPHONE ENCOUNTER
----- Message from Sonia Mata MD sent at 3/7/2018  3:26 PM CST -----  Possible left lower kidney stone.  No new issues.

## 2018-03-08 ENCOUNTER — OFFICE VISIT (OUTPATIENT)
Dept: FAMILY MEDICINE CLINIC | Facility: CLINIC | Age: 58
End: 2018-03-08

## 2018-03-08 ENCOUNTER — LAB (OUTPATIENT)
Dept: LAB | Facility: OTHER | Age: 58
End: 2018-03-08

## 2018-03-08 VITALS
OXYGEN SATURATION: 98 % | HEIGHT: 62 IN | WEIGHT: 116 LBS | TEMPERATURE: 97.7 F | HEART RATE: 90 BPM | SYSTOLIC BLOOD PRESSURE: 142 MMHG | BODY MASS INDEX: 21.35 KG/M2 | DIASTOLIC BLOOD PRESSURE: 80 MMHG

## 2018-03-08 DIAGNOSIS — F41.1 GAD (GENERALIZED ANXIETY DISORDER): Chronic | ICD-10-CM

## 2018-03-08 DIAGNOSIS — R30.0 DYSURIA: ICD-10-CM

## 2018-03-08 DIAGNOSIS — F17.200 TOBACCO DEPENDENCE SYNDROME: Chronic | ICD-10-CM

## 2018-03-08 DIAGNOSIS — R11.0 NAUSEA: ICD-10-CM

## 2018-03-08 DIAGNOSIS — N30.00 ACUTE CYSTITIS WITHOUT HEMATURIA: Primary | ICD-10-CM

## 2018-03-08 DIAGNOSIS — M54.42 BILATERAL LOW BACK PAIN WITH LEFT-SIDED SCIATICA, UNSPECIFIED CHRONICITY: ICD-10-CM

## 2018-03-08 DIAGNOSIS — F33.1 MODERATE EPISODE OF RECURRENT MAJOR DEPRESSIVE DISORDER (HCC): Chronic | ICD-10-CM

## 2018-03-08 LAB
BACTERIA UR QL AUTO: ABNORMAL /HPF
BILIRUB UR QL STRIP: NEGATIVE
CLARITY UR: CLEAR
COLOR UR: YELLOW
GLUCOSE UR STRIP-MCNC: NEGATIVE MG/DL
HGB UR QL STRIP.AUTO: NEGATIVE
HYALINE CASTS UR QL AUTO: ABNORMAL /LPF
KETONES UR QL STRIP: NEGATIVE
LEUKOCYTE ESTERASE UR QL STRIP.AUTO: ABNORMAL
NITRITE UR QL STRIP: NEGATIVE
PH UR STRIP.AUTO: 7 [PH] (ref 5.5–8)
PROT UR QL STRIP: NEGATIVE
RBC # UR: ABNORMAL /HPF
REF LAB TEST METHOD: ABNORMAL
SP GR UR STRIP: 1.01 (ref 1–1.03)
SQUAMOUS #/AREA URNS HPF: ABNORMAL /HPF
UROBILINOGEN UR QL STRIP: ABNORMAL
WBC UR QL AUTO: ABNORMAL /HPF

## 2018-03-08 PROCEDURE — 96372 THER/PROPH/DIAG INJ SC/IM: CPT | Performed by: NURSE PRACTITIONER

## 2018-03-08 PROCEDURE — 99214 OFFICE O/P EST MOD 30 MIN: CPT | Performed by: NURSE PRACTITIONER

## 2018-03-08 PROCEDURE — 81001 URINALYSIS AUTO W/SCOPE: CPT | Performed by: NURSE PRACTITIONER

## 2018-03-08 RX ORDER — CEFTRIAXONE 1 G/1
1 INJECTION, POWDER, FOR SOLUTION INTRAMUSCULAR; INTRAVENOUS ONCE
Status: COMPLETED | OUTPATIENT
Start: 2018-03-08 | End: 2018-03-08

## 2018-03-08 RX ORDER — ONDANSETRON 2 MG/ML
4 INJECTION INTRAMUSCULAR; INTRAVENOUS EVERY 6 HOURS PRN
Status: DISCONTINUED | OUTPATIENT
Start: 2018-03-08 | End: 2018-03-16

## 2018-03-08 RX ORDER — ONDANSETRON 2 MG/ML
4 INJECTION INTRAMUSCULAR; INTRAVENOUS EVERY 6 HOURS PRN
Status: DISCONTINUED | OUTPATIENT
Start: 2018-03-08 | End: 2018-03-08

## 2018-03-08 RX ORDER — HYDROCODONE BITARTRATE AND ACETAMINOPHEN 7.5; 325 MG/1; MG/1
1 TABLET ORAL 2 TIMES DAILY PRN
Qty: 6 TABLET | Refills: 0 | Status: SHIPPED | OUTPATIENT
Start: 2018-03-08 | End: 2018-03-16

## 2018-03-08 RX ADMIN — ONDANSETRON 4 MG: 2 INJECTION INTRAMUSCULAR; INTRAVENOUS at 15:21

## 2018-03-08 RX ADMIN — CEFTRIAXONE 1 G: 1 INJECTION, POWDER, FOR SOLUTION INTRAMUSCULAR; INTRAVENOUS at 15:23

## 2018-03-08 NOTE — PROGRESS NOTES
"Negrito Loyola is a 58 y.o. female. Patient here today with complaints of Urinary Tract Infection and Flank Pain (kidney stones per ER visit )  Patient here today with complaints of dysuria, lower back pain, lower, pain, decreased urinary output.  Reports she saw PCP yesterday, ER last night, was given Keflex and Pyridium in the emergency room but has not started on these.  Was also given to Norco in ER and has taken these for pain.  Also had urinalysis, CT of abdomen and pelvis as well as labs in ER.  Relates has history of low back pain, Sunday after carrying groceries her back \"went out\".  Has had pain since.  Was given muscle relaxers, steroid injection, pain persists.  Rates pain as 8/10 on pain scale.  Has history of anxiety, depression, PTSD, osteoporosis.  Reports has numerous allergies to medications and did not take Keflex or Pyridium.    Vitals:    03/08/18 1419   BP: 142/80   Pulse: 90   Temp: 97.7 °F (36.5 °C)   SpO2: 98%     Past Medical History:   Diagnosis Date   • Abdominal pain    • Anemia    • Chronic post-traumatic headache      rebound      • Depressive disorder    • Encounter for gynecological examination    • Gastroesophageal reflux disease    • Generalized anxiety disorder    • History of mammogram 08/2008    MAMMOGRAM DIAGNOSTIC BILATERAL 70146 ([facility]) (1)     • Hyperlipidemia    • Nonruptured cerebral aneurysm    • Osteoporosis    • Posttraumatic stress disorder    • Seizure     Psychogenic non-epileptic sz    • Viral hepatitis A      Urinary Tract Infection    This is a new problem. The current episode started in the past 7 days. The problem occurs intermittently. The problem has been waxing and waning. The quality of the pain is described as aching. The pain is at a severity of 8/10. The pain is severe. There has been no fever. Associated symptoms include flank pain, frequency and urgency. Pertinent negatives include no chills, discharge, hematuria, nausea, possible pregnancy, " sweats or vomiting. She has tried increased fluids for the symptoms. The treatment provided no relief.   Back Pain   This is a recurrent problem. The pain is present in the lumbar spine. The quality of the pain is described as aching. The pain does not radiate. The pain is at a severity of 8/10. The pain is severe. The pain is the same all the time. Associated symptoms include abdominal pain and dysuria. Pertinent negatives include no bladder incontinence, bowel incontinence, headaches, leg pain, numbness, pelvic pain, perianal numbness, tingling, weakness or weight loss. She has tried muscle relaxant, analgesics and bed rest for the symptoms. The treatment provided mild relief.        The following portions of the patient's history were reviewed and updated as appropriate: allergies, current medications, past family history, past medical history, past social history, past surgical history and problem list.    Review of Systems   Constitutional: Negative.  Negative for chills and weight loss.   HENT: Negative.    Eyes: Negative.    Respiratory: Negative.    Cardiovascular: Negative.    Gastrointestinal: Positive for abdominal pain. Negative for bowel incontinence, nausea and vomiting.   Endocrine: Negative.    Genitourinary: Positive for dysuria, flank pain, frequency and urgency. Negative for bladder incontinence, hematuria and pelvic pain.   Musculoskeletal: Positive for back pain.   Skin: Negative.    Allergic/Immunologic: Negative.    Neurological: Negative.  Negative for tingling, weakness, numbness and headaches.   Hematological: Negative.    Psychiatric/Behavioral: Negative.        Objective   Physical Exam   Constitutional: She is oriented to person, place, and time. She appears well-developed and well-nourished. No distress.   HENT:   Head: Normocephalic and atraumatic.   Cardiovascular: Normal rate, regular rhythm and normal heart sounds.  Exam reveals no gallop and no friction rub.    No murmur  heard.  Pulmonary/Chest: Effort normal and breath sounds normal. No respiratory distress. She has no wheezes. She has no rales.   Abdominal: Soft. Bowel sounds are normal. She exhibits no distension and no mass. There is tenderness in the right lower quadrant, suprapubic area and left lower quadrant. There is CVA tenderness (mild CVA tenderness noted on exam). There is no rigidity, no rebound, no guarding, no tenderness at McBurney's point and negative Lucas's sign. No hernia.   Musculoskeletal: She exhibits tenderness.        Lumbar back: She exhibits decreased range of motion, tenderness, bony tenderness and pain. She exhibits no swelling, no edema, no deformity, no laceration, no spasm and normal pulse.   Neurological: She is alert and oriented to person, place, and time. She has normal reflexes.   Skin: Skin is warm and dry. No rash noted. She is not diaphoretic. No erythema. No pallor.   Psychiatric: Her mood appears anxious. She exhibits a depressed mood.   Pt crying in pain in exam room, appears by herself in exam room but states her mother drove her here today    Nursing note and vitals reviewed.      Assessment/Plan   Tricia was seen today for urinary tract infection and flank pain.    Diagnoses and all orders for this visit:    Acute cystitis without hematuria  -     cefTRIAXone (ROCEPHIN) injection 1 g; Inject 1 g into the shoulder, thigh, or buttocks 1 (One) Time.    Dysuria  -     XR Abdomen KUB  -     Urinalysis With / Culture If Indicated - Urine, Clean Catch; Future  -     cefTRIAXone (ROCEPHIN) injection 1 g; Inject 1 g into the shoulder, thigh, or buttocks 1 (One) Time.    Bilateral low back pain with left-sided sciatica, unspecified chronicity  -     cefTRIAXone (ROCEPHIN) injection 1 g; Inject 1 g into the shoulder, thigh, or buttocks 1 (One) Time.    Tobacco dependence syndrome    Moderate episode of recurrent major depressive disorder    PRISCILLA (generalized anxiety disorder)    Nausea  -      Discontinue: ondansetron (ZOFRAN) injection 4 mg; Infuse 2 mL into a venous catheter Every 6 (Six) Hours As Needed for Nausea or Vomiting.  -     ondansetron (ZOFRAN) injection 4 mg; Inject 2 mL into the shoulder, thigh, or buttocks Every 6 (Six) Hours As Needed for Nausea or Vomiting.    Other orders  -     HYDROcodone-acetaminophen (NORCO) 7.5-325 MG per tablet; Take 1 tablet by mouth 2 (Two) Times a Day As Needed for Moderate Pain .    [facility] [place] ER records are reviewed including labs, CT and notes  Also reviewed last PCP note  Will repeat UA and obtain KUB today   Consulted with her PCP today   kate is obtained and reviewed  zofran given to her today IM in office, 4mg   Will give her rocephin 1gm IM in office today as well  She is given norco as above x 3 days only  Will follow up here, ER, with pcp should symptoms persist or worsen  She is aware and is in agreement to this plan  All questions and concerns are addressed with understanding noted.   KATE query complete. Treatment plan to include limited course of prescribed controlled substance. Risks including addiction, benefits, and alternatives presented to patient.   Pt was kept in office for approx 10-15 minutes at least after rocephin inj without adverse reaction noted

## 2018-03-15 ENCOUNTER — LAB (OUTPATIENT)
Dept: LAB | Facility: OTHER | Age: 58
End: 2018-03-15

## 2018-03-15 DIAGNOSIS — Z11.59 NEED FOR HEPATITIS C SCREENING TEST: ICD-10-CM

## 2018-03-15 LAB
CHOLEST SERPL-MCNC: 185 MG/DL (ref 150–200)
HDLC SERPL-MCNC: 43 MG/DL (ref 35–100)
LDLC SERPL CALC-MCNC: 108 MG/DL
LDLC/HDLC SERPL: 2.52 {RATIO}
TRIGL SERPL-MCNC: 168 MG/DL (ref 35–160)
VLDLC SERPL-MCNC: 33.6 MG/DL

## 2018-03-15 PROCEDURE — 80074 ACUTE HEPATITIS PANEL: CPT | Performed by: FAMILY MEDICINE

## 2018-03-15 PROCEDURE — 80061 LIPID PANEL: CPT | Performed by: FAMILY MEDICINE

## 2018-03-15 PROCEDURE — 36415 COLL VENOUS BLD VENIPUNCTURE: CPT | Performed by: FAMILY MEDICINE

## 2018-03-16 ENCOUNTER — OFFICE VISIT (OUTPATIENT)
Dept: FAMILY MEDICINE CLINIC | Facility: CLINIC | Age: 58
End: 2018-03-16

## 2018-03-16 ENCOUNTER — TELEPHONE (OUTPATIENT)
Dept: FAMILY MEDICINE CLINIC | Facility: CLINIC | Age: 58
End: 2018-03-16

## 2018-03-16 VITALS
HEIGHT: 62 IN | OXYGEN SATURATION: 99 % | SYSTOLIC BLOOD PRESSURE: 132 MMHG | WEIGHT: 116 LBS | HEART RATE: 88 BPM | DIASTOLIC BLOOD PRESSURE: 74 MMHG | BODY MASS INDEX: 21.35 KG/M2 | TEMPERATURE: 97.6 F

## 2018-03-16 DIAGNOSIS — R30.0 DYSURIA: Primary | ICD-10-CM

## 2018-03-16 DIAGNOSIS — J41.0 SIMPLE CHRONIC BRONCHITIS (HCC): Primary | ICD-10-CM

## 2018-03-16 DIAGNOSIS — M54.50 ACUTE BILATERAL LOW BACK PAIN WITHOUT SCIATICA: ICD-10-CM

## 2018-03-16 LAB
BILIRUB BLD-MCNC: NEGATIVE MG/DL
CLARITY, POC: CLEAR
COLOR UR: YELLOW
GLUCOSE UR STRIP-MCNC: NEGATIVE MG/DL
HAV IGM SERPL QL IA: NEGATIVE
HBV CORE IGM SERPL QL IA: NEGATIVE
HBV SURFACE AG SERPL QL IA: NEGATIVE
HCV AB SER DONR QL: NEGATIVE
KETONES UR QL: NEGATIVE
LEUKOCYTE EST, POC: NEGATIVE
NITRITE UR-MCNC: NEGATIVE MG/ML
PH UR: 5 [PH] (ref 5–8)
PROT UR STRIP-MCNC: ABNORMAL MG/DL
RBC # UR STRIP: NEGATIVE /UL
SP GR UR: 1.01 (ref 1–1.03)
UROBILINOGEN UR QL: NORMAL

## 2018-03-16 PROCEDURE — 99213 OFFICE O/P EST LOW 20 MIN: CPT | Performed by: FAMILY MEDICINE

## 2018-03-16 RX ORDER — PREDNISONE 20 MG/1
20 TABLET ORAL 2 TIMES DAILY
Qty: 10 TABLET | Refills: 0 | Status: SHIPPED | OUTPATIENT
Start: 2018-03-16 | End: 2018-04-06

## 2018-03-16 RX ORDER — TIZANIDINE 2 MG/1
1 TABLET ORAL EVERY 8 HOURS PRN
Qty: 30 TABLET | Refills: 0
Start: 2018-03-16 | End: 2018-04-06 | Stop reason: SDUPTHER

## 2018-03-16 RX ORDER — ATORVASTATIN CALCIUM 20 MG/1
20 TABLET, FILM COATED ORAL DAILY
Qty: 30 TABLET | Refills: 3 | Status: SHIPPED | OUTPATIENT
Start: 2018-03-16 | End: 2018-07-19 | Stop reason: SDUPTHER

## 2018-03-16 NOTE — TELEPHONE ENCOUNTER
Patient called and states she forgot to let Dr. Mata know she needed a refill of Lipitor. Sent note to [facility] in [place].

## 2018-03-16 NOTE — PROGRESS NOTES
"Subjective   Chief Complaint   Patient presents with   • Follow-up     seen Rita Tobar    • Cough     Tricia Loyola is a 58 y.o. female.   Follow-up (seen Rita Tobar ) and Cough    History of Present Illness     Presents today to follow up with back pain  Recently treated by Rita Tobar for a urinary tract infection  Had completed course of treatment  Presents today with persistent low back pain  She was provided a temporary script for norco that she had completed  She also has been using zanaflex but cannot tolerate due to sedation    C/o cough    The following portions of the patient's history were reviewed and updated as appropriate: allergies, current medications, past family history, past medical history, past social history, past surgical history and problem list.    Review of Systems   Constitutional: Negative for appetite change, chills, fatigue and fever.   HENT: Negative for congestion, ear pain, rhinorrhea and sore throat.    Eyes: Negative for pain.   Respiratory: Positive for cough. Negative for shortness of breath.    Cardiovascular: Negative for chest pain and palpitations.   Gastrointestinal: Negative for abdominal pain, constipation, diarrhea and nausea.   Genitourinary: Negative for dysuria.   Musculoskeletal: Positive for back pain. Negative for joint swelling and neck pain.   Skin: Negative for rash.   Neurological: Negative for dizziness and headaches.       Objective   /74   Pulse 88   Temp 97.6 °F (36.4 °C)   Ht 157.5 cm (62.01\")   Wt 52.6 kg (116 lb)   SpO2 99%   BMI 21.21 kg/m²   Physical Exam   Constitutional: She is oriented to person, place, and time. She appears well-developed and well-nourished.   HENT:   Head: Normocephalic and atraumatic.   Eyes: Pupils are equal, round, and reactive to light.   Neck: Normal range of motion. Neck supple.   Cardiovascular: Normal rate, regular rhythm and normal heart sounds.    Pulmonary/Chest: Effort normal and breath sounds normal. No " respiratory distress. She has no wheezes. She has no rales.   Abdominal: Soft. Bowel sounds are normal.   Musculoskeletal:        Lumbar back: She exhibits decreased range of motion and pain.   Neurological: She is alert and oriented to person, place, and time.   Skin: Skin is warm and dry.   Psychiatric: She has a normal mood and affect.   Nursing note and vitals reviewed.      Assessment/Plan   Problems Addressed this Visit     None      Visit Diagnoses     Simple chronic bronchitis    -  Primary    Acute bilateral low back pain without sciatica        Relevant Medications    tiZANidine (ZANAFLEX) 2 MG tablet        Adjust zanaflex to half dose  Start prednisone BID  Urinalysis - negative  Updated medication list  Use inhaler scheduled every 6 hours  Recheck as needed

## 2018-04-02 ENCOUNTER — PROCEDURE VISIT (OUTPATIENT)
Dept: FAMILY MEDICINE CLINIC | Facility: CLINIC | Age: 58
End: 2018-04-02

## 2018-04-02 VITALS
BODY MASS INDEX: 21.35 KG/M2 | HEART RATE: 80 BPM | TEMPERATURE: 97.8 F | WEIGHT: 116 LBS | SYSTOLIC BLOOD PRESSURE: 124 MMHG | OXYGEN SATURATION: 96 % | DIASTOLIC BLOOD PRESSURE: 68 MMHG | HEIGHT: 62 IN

## 2018-04-02 DIAGNOSIS — Z01.419 ENCOUNTER FOR WELL WOMAN EXAM WITH ROUTINE GYNECOLOGICAL EXAM: Primary | ICD-10-CM

## 2018-04-02 DIAGNOSIS — F17.200 TOBACCO DEPENDENCE SYNDROME: ICD-10-CM

## 2018-04-02 DIAGNOSIS — Z12.4 ENCOUNTER FOR PAPANICOLAOU SMEAR FOR CERVICAL CANCER SCREENING: ICD-10-CM

## 2018-04-02 PROCEDURE — G0101 CA SCREEN;PELVIC/BREAST EXAM: HCPCS | Performed by: FAMILY MEDICINE

## 2018-04-02 PROCEDURE — 87624 HPV HI-RISK TYP POOLED RSLT: CPT | Performed by: FAMILY MEDICINE

## 2018-04-02 NOTE — PROGRESS NOTES
"Subjective   Chief Complaint   Patient presents with   • Gynecologic Exam     PAP     Tricia Loyola is a 58 y.o. year old  presenting to be seen for her annual exam.      She is not sexually active.  In the past 12 months there has not been new sexual partners.  Condoms are not typically used.  She would not like to be screened for STD's at today's exam.     She exercises regularly: yes.  She wears her seat belt:yes.  She has concerns about domestic violence: no.  She is taking Vit D and Calcium:no  Last colonoscopy: 5 years ago - Dr Walters  Last DEXA: unknown  Last MM  Last PAP: 2 years ago  Hx of abnormal pap: never    Yes  NATURAL MENOPAUSE  LMP: unknown    OB History      Para Term  AB Living    3 2 2  1 2    SAB TAB Ectopic Molar Multiple Live Births    1               No Additional Complaints Reported    The following portions of the patient's history were reviewed and updated as appropriate:problem list, current medications, allergies, past family history, past medical history, past social history and past surgical history.    Smoking status: Light Tobacco Smoker                                                       Packs/day: 0.50      Years: 43.00        Types: Cigarettes  Smokeless tobacco: Never Used                        Review of Systems      Objective   /68   Pulse 80   Temp 97.8 °F (36.6 °C)   Ht 157.5 cm (62.01\")   Wt 52.6 kg (116 lb)   SpO2 96%   BMI 21.21 kg/m²     General:  well developed; well nourished  no acute distress   Skin:  No suspicious lesions seen   Cardiac: Heart sounds are normal.  Regular rate and rhythm without murmur, gallop or rub.  Heart regular rate and rhythm   Resp:  Normal expansion.  Clear to auscultation.  No rales, rhonchi, or wheezing.   Thyroid: normal to inspection and palpation   Breasts:  Examined in supine position  Symmetric without masses or skin dimpling  Nipples normal without inversion, lesions or discharge  There are " no palpable axillary nodes   Abdomen: soft, non-tender; no masses  no umbilical or inginual hernias are present  no hepato-splenomegaly   Psych: alert,oriented, in NAD with a full range of affect, normal behavior and no psychotic features  alert, oriented and normal behavior   Pelvis: Clinical staff was present for exam  External genitalia:  normal appearance of the external genitalia including Bartholin's and [place]'s glands.  :  urethral meatus normal;  Vaginal:  normal pink mucosa without prolapse or lesions.  Cervix:  normal appearance.  Uterus:  normal size, shape and consistency.  Adnexa:  absent, bilateral.  Rectal:  digital rectal exam not performed; external hemorrhoids present;  Pelvic floor pain: pelvic floor is nontender to palpation in 4 quadrants.     Lab Review   No data reviewed    Imaging  No data reviewed       Diagnoses and all orders for this visit:    Encounter for well woman exam with routine gynecological exam    Encounter for Papanicolaou smear for cervical cancer screening  -     Liquid-based Pap Smear, Screening    Tobacco dependence syndrome      Mammogram scheduled today     I advised the patient of the risks in continuing to use tobacco, and I provided this patient with smoking cessation educational materials.    During this visit, I spent < 3 minutes counseling the patient regarding smoking cessation.    Discussed the patient's BMI with her. BMI is within normal parameters. No follow-up required.      This note was electronically signed.    Sonia Mata MD  April 2, 2018

## 2018-04-06 ENCOUNTER — OFFICE VISIT (OUTPATIENT)
Dept: FAMILY MEDICINE CLINIC | Facility: CLINIC | Age: 58
End: 2018-04-06

## 2018-04-06 ENCOUNTER — TELEPHONE (OUTPATIENT)
Dept: FAMILY MEDICINE CLINIC | Facility: CLINIC | Age: 58
End: 2018-04-06

## 2018-04-06 VITALS
HEART RATE: 83 BPM | WEIGHT: 113 LBS | BODY MASS INDEX: 20.8 KG/M2 | TEMPERATURE: 98.6 F | DIASTOLIC BLOOD PRESSURE: 70 MMHG | OXYGEN SATURATION: 97 % | HEIGHT: 62 IN | SYSTOLIC BLOOD PRESSURE: 122 MMHG

## 2018-04-06 DIAGNOSIS — M54.50 CHRONIC MIDLINE LOW BACK PAIN WITHOUT SCIATICA: Primary | ICD-10-CM

## 2018-04-06 DIAGNOSIS — M54.50 ACUTE BILATERAL LOW BACK PAIN WITHOUT SCIATICA: ICD-10-CM

## 2018-04-06 DIAGNOSIS — G89.29 CHRONIC MIDLINE LOW BACK PAIN WITHOUT SCIATICA: Primary | ICD-10-CM

## 2018-04-06 DIAGNOSIS — K21.00 GASTROESOPHAGEAL REFLUX DISEASE WITH ESOPHAGITIS: ICD-10-CM

## 2018-04-06 DIAGNOSIS — M54.6 CHRONIC THORACIC SPINE PAIN: ICD-10-CM

## 2018-04-06 DIAGNOSIS — G89.29 CHRONIC THORACIC SPINE PAIN: ICD-10-CM

## 2018-04-06 LAB
LAB AP CASE REPORT: NORMAL
LAB AP GYN ADDITIONAL INFORMATION: NORMAL
LAB AP GYN OTHER FINDINGS: NORMAL
Lab: NORMAL
PATH INTERP SPEC-IMP: NORMAL
STAT OF ADQ CVX/VAG CYTO-IMP: NORMAL

## 2018-04-06 PROCEDURE — 99214 OFFICE O/P EST MOD 30 MIN: CPT | Performed by: FAMILY MEDICINE

## 2018-04-06 RX ORDER — TIZANIDINE 4 MG/1
4 TABLET ORAL EVERY 8 HOURS PRN
Qty: 30 TABLET | Refills: 0
Start: 2018-04-06 | End: 2018-07-27 | Stop reason: SDUPTHER

## 2018-04-06 RX ORDER — DEXLANSOPRAZOLE 60 MG/1
60 CAPSULE, DELAYED RELEASE ORAL DAILY
Qty: 90 CAPSULE | Refills: 1 | Status: SHIPPED | OUTPATIENT
Start: 2018-04-06 | End: 2018-10-19 | Stop reason: SDUPTHER

## 2018-04-06 NOTE — PROGRESS NOTES
"Subjective   Chief Complaint   Patient presents with   • wants referral to pain clinic   • wants referral to PT   • Med Refill     wants 90 day supply     Tricia Loyola is a 58 y.o. female.   wants referral to pain clinic; wants referral to PT; and Med Refill (wants 90 day supply)    History of Present Illness     Back Pain   This is a new problem that stared in November. The pain is present in the lumbar spine. The quality of the pain is described as burning and shooting. The pain does not radiate. The pain is at a severity of 5/10. The pain is moderate. The pain is intermittent. The symptoms are aggravated by bending, lying down, position, sitting and standing. Stiffness is present all day. Pertinent negatives include no abdominal pain, chest pain, dysuria, fever, headaches, numbness or tingling.  Lumbar spine xray done 3/07/2018 - normal lumbar spine.    Cannot get an MRI done - due to clips in her brain.  CT abdomen/pelvis done at the emergency room but did not show any findings of the spine.    The following portions of the patient's history were reviewed and updated as appropriate: allergies, current medications, past family history, past medical history, past social history, past surgical history and problem list.    Review of Systems   Constitutional: Negative for appetite change, chills, fatigue and fever.   HENT: Negative for congestion, ear pain, rhinorrhea and sore throat.    Eyes: Negative for pain.   Respiratory: Negative for cough and shortness of breath.    Cardiovascular: Negative for chest pain and palpitations.   Gastrointestinal: Negative for abdominal pain, constipation, diarrhea and nausea.   Genitourinary: Negative for dysuria.   Musculoskeletal: Positive for back pain. Negative for joint swelling and neck pain.   Skin: Negative for rash.   Neurological: Negative for dizziness and headaches.       Objective   /70   Pulse 83   Temp 98.6 °F (37 °C)   Ht 157.5 cm (62.01\")   Wt 51.3 kg " (113 lb)   SpO2 97%   BMI 20.66 kg/m²   Physical Exam   Constitutional: She is oriented to person, place, and time. She appears well-developed and well-nourished.   HENT:   Head: Normocephalic and atraumatic.   Eyes: Pupils are equal, round, and reactive to light.   Neck: Normal range of motion. Neck supple.   Cardiovascular: Normal rate, regular rhythm and normal heart sounds.    Pulmonary/Chest: Effort normal and breath sounds normal. No respiratory distress. She has no wheezes. She has no rales.   Abdominal: Soft. Bowel sounds are normal.   Musculoskeletal:        Thoracic back: She exhibits decreased range of motion, bony tenderness and pain.        Lumbar back: She exhibits decreased range of motion, bony tenderness and pain.   Neurological: She is alert and oriented to person, place, and time.   Skin: Skin is warm and dry.   Psychiatric: She has a normal mood and affect.   Nursing note and vitals reviewed.      Assessment/Plan   Problems Addressed this Visit        Digestive    Gastroesophageal reflux disease with esophagitis    Relevant Medications    dexlansoprazole (DEXILANT) 60 MG capsule      Other Visit Diagnoses     Chronic midline low back pain without sciatica    -  Primary    Relevant Orders    Ambulatory Referral to Pain Management (Completed)    Ambulatory Referral to Physical Therapy Evaluate and treat    CT Lumbar Spine Without Contrast    Chronic thoracic spine pain        Relevant Orders    Ambulatory Referral to Pain Management (Completed)    Ambulatory Referral to Physical Therapy Evaluate and treat    CT Thoracic Spine Without Contrast        CT thoracic and lumbar spine ordered    Reviewed xray results and previous CT abdomen/pelvis    Referral to pain management    Referral to physical therapy    Refilled dexilant    Recheck in 6 weeks

## 2018-04-06 NOTE — TELEPHONE ENCOUNTER
----- Message from Sonia Mata MD sent at 4/6/2018  1:05 PM CDT -----  papsmear is negative for malignancy

## 2018-04-10 LAB — HPV I/H RISK 4 DNA CVX QL PROBE+SIG AMP: NEGATIVE

## 2018-04-17 ENCOUNTER — HOSPITAL ENCOUNTER (EMERGENCY)
Facility: HOSPITAL | Age: 58
Discharge: HOME | End: 2018-04-17
Attending: EMERGENCY MEDICINE | Admitting: EMERGENCY MEDICINE

## 2018-04-17 VITALS
HEIGHT: 62 IN | RESPIRATION RATE: 20 BRPM | OXYGEN SATURATION: 99 % | HEART RATE: 83 BPM | BODY MASS INDEX: 20.8 KG/M2 | TEMPERATURE: 99.8 F | SYSTOLIC BLOOD PRESSURE: 145 MMHG | DIASTOLIC BLOOD PRESSURE: 88 MMHG | WEIGHT: 113 LBS

## 2018-04-17 DIAGNOSIS — M54.6 ACUTE BILATERAL THORACIC BACK PAIN: Primary | ICD-10-CM

## 2018-04-17 PROCEDURE — 25010000002 MORPHINE SULFATE (PF) 2 MG/ML SOLUTION: Performed by: EMERGENCY MEDICINE

## 2018-04-17 PROCEDURE — 96372 THER/PROPH/DIAG INJ SC/IM: CPT

## 2018-04-17 PROCEDURE — 25010000002 PROMETHAZINE PER 50 MG: Performed by: EMERGENCY MEDICINE

## 2018-04-17 PROCEDURE — 99283 EMERGENCY DEPT VISIT LOW MDM: CPT

## 2018-04-17 PROCEDURE — 25010000002 MORPHINE PER 10 MG

## 2018-04-17 RX ORDER — PROMETHAZINE HYDROCHLORIDE 25 MG/ML
25 INJECTION, SOLUTION INTRAMUSCULAR; INTRAVENOUS ONCE
Status: COMPLETED | OUTPATIENT
Start: 2018-04-17 | End: 2018-04-17

## 2018-04-17 RX ORDER — ONDANSETRON 4 MG/1
4 TABLET, ORALLY DISINTEGRATING ORAL EVERY 6 HOURS PRN
Qty: 12 TABLET | Refills: 0 | Status: SHIPPED | OUTPATIENT
Start: 2018-04-17 | End: 2018-11-19

## 2018-04-17 RX ORDER — HYDROCODONE BITARTRATE AND ACETAMINOPHEN 5; 325 MG/1; MG/1
1 TABLET ORAL EVERY 6 HOURS PRN
Qty: 15 TABLET | Refills: 0 | Status: SHIPPED | OUTPATIENT
Start: 2018-04-17 | End: 2018-07-19

## 2018-04-17 RX ORDER — MORPHINE SULFATE 2 MG/ML
6 INJECTION, SOLUTION INTRAMUSCULAR; INTRAVENOUS ONCE
Status: COMPLETED | OUTPATIENT
Start: 2018-04-17 | End: 2018-04-17

## 2018-04-17 RX ORDER — MORPHINE SULFATE 4 MG/ML
INJECTION, SOLUTION INTRAMUSCULAR; INTRAVENOUS
Status: COMPLETED
Start: 2018-04-17 | End: 2018-04-17

## 2018-04-17 RX ADMIN — MORPHINE SULFATE 2 MG: 4 INJECTION, SOLUTION INTRAMUSCULAR; INTRAVENOUS at 20:15

## 2018-04-17 RX ADMIN — MORPHINE SULFATE 6 MG: 2 INJECTION, SOLUTION INTRAMUSCULAR; INTRAVENOUS at 20:16

## 2018-04-17 RX ADMIN — PROMETHAZINE HYDROCHLORIDE 25 MG: 25 INJECTION INTRAMUSCULAR; INTRAVENOUS at 20:19

## 2018-04-18 ENCOUNTER — TELEPHONE (OUTPATIENT)
Dept: FAMILY MEDICINE CLINIC | Facility: CLINIC | Age: 58
End: 2018-04-18

## 2018-04-18 PROCEDURE — 87086 URINE CULTURE/COLONY COUNT: CPT | Performed by: PHYSICIAN ASSISTANT

## 2018-04-18 NOTE — TELEPHONE ENCOUNTER
Informed patient of results, she said something has to be done about her pain, I informed her that her referral to pain management was put in. Patient asked about what the CT of her neck showed, I informed her that we did not do one of her Neck, she said the technician told her that it would show her neck, and it did not, I informed her that the CT was of her Thoracic and Lumbar spine. She stated that someone has messed up and does not know what they are talking about pt said she would discuss this with  on Friday. I informed dr mejia of pt's concern.

## 2018-04-18 NOTE — ED PROVIDER NOTES
Subjective   Patient presents with a back pain.  Patient notes a subacute back pain for which she is being seen by an outside provider.  She is being treated with muscle relaxants and a cream that she applies to her back.  Patient had CT scan of her thoracic spine and lumbar spine.  The thoracic is been read at this point which shows degenerative changes and some loss of vertebral height at T11 otherwise unremarkable for acute pathology.  Patient denies any fevers chills nausea vomiting.  Patient notes that 2 days of the acute nature of this back pain on her chronic.  Patient denies any dysuria or bowel changes.  CT scan was read as degenerative changes otherwise unremarkable.  Patient notes no bowel or bladder changes no saddle anesthesia no bilateral radicular symptoms.            Review of Systems   Constitutional: Negative.  Negative for appetite change, chills and fever.   HENT: Negative.  Negative for congestion.    Eyes: Negative.  Negative for photophobia and visual disturbance.   Respiratory: Negative.  Negative for cough, chest tightness and shortness of breath.    Cardiovascular: Negative.  Negative for chest pain and palpitations.   Gastrointestinal: Negative.  Negative for abdominal pain, constipation, diarrhea, nausea and vomiting.   Endocrine: Negative.    Genitourinary: Negative.  Negative for decreased urine volume, dysuria, flank pain and hematuria.   Musculoskeletal: Positive for back pain. Negative for arthralgias, myalgias, neck pain and neck stiffness.   Skin: Negative.  Negative for pallor.   Neurological: Negative.  Negative for dizziness, syncope, weakness, light-headedness, numbness and headaches.   Psychiatric/Behavioral: Negative.  Negative for confusion and suicidal ideas. The patient is not nervous/anxious.        Past Medical History:   Diagnosis Date   • Abdominal pain    • Anemia    • Chronic post-traumatic headache      rebound      • Depressive disorder    • Encounter for  gynecological examination    • Gastroesophageal reflux disease    • Generalized anxiety disorder    • History of mammogram 08/2008    MAMMOGRAM DIAGNOSTIC BILATERAL 49261 ([facility]) (1)     • Hyperlipidemia    • Injury of back    • Nonruptured cerebral aneurysm    • Osteoporosis    • Posttraumatic stress disorder    • Seizure     Psychogenic non-epileptic sz    • Viral hepatitis A        Allergies   Allergen Reactions   • Augmentin [Amoxicillin-Pot Clavulanate]      Hives   • Ciprofloxacin      Cipro:  Rash   • Fiorinal [Butalbital-Aspirin-Caffeine]      Rapid heart rate   • Imitrex [Sumatriptan]      Rapid heart rate   • Iodine      Anaphylaxis   • Other      MIDRIN  -  Rapid heart rate   • Percocet [Oxycodone-Acetaminophen]    • Toradol [Ketorolac Tromethamine]      Anaphylaxis   • Ultram [Tramadol]      Rapid heart rate   • Ibuprofen Rash       Past Surgical History:   Procedure Laterality Date   • APPENDECTOMY     • BREAST BIOPSY     • CHOLECYSTECTOMY     • CRANIOTOMY FOR ANEURYSM  2003   • PAP SMEAR  08/16/2012   • TONSILLECTOMY         Family History   Problem Relation Age of Onset   • Constipation Mother    • Heart disease Mother    • Hypertension Mother    • Anxiety disorder Mother    • Alcohol abuse Father    • Anxiety disorder Brother    • Kidney disease Brother    • Hypertension Brother    • Arthritis Brother    • Anxiety disorder Brother    • Kidney disease Brother    • Diabetes Brother    • Anxiety disorder Brother    • Hypertension Brother    • Breast cancer Paternal Aunt        Social History     Social History   • Marital status: Legally      Social History Main Topics   • Smoking status: Light Tobacco Smoker     Packs/day: 0.50     Years: 43.00     Types: Cigarettes   • Smokeless tobacco: Never Used   • Alcohol use No   • Drug use: No   • Sexual activity: No     Other Topics Concern   • Not on file           Objective   Physical Exam   Constitutional: She is oriented to person, place, and time.  She appears well-developed and well-nourished. No distress.   HENT:   Head: Normocephalic and atraumatic.   Nose: Nose normal.   Mouth/Throat: Oropharynx is clear and moist.   Eyes: Conjunctivae and EOM are normal. No scleral icterus.   Neck: Normal range of motion. Neck supple. No JVD present.   Cardiovascular: Normal rate, regular rhythm, normal heart sounds and intact distal pulses.  Exam reveals no gallop and no friction rub.    No murmur heard.  Pulmonary/Chest: Effort normal. No respiratory distress. She has no wheezes. She has no rales. She exhibits no tenderness.   Abdominal: Soft. She exhibits no distension and no mass. There is no tenderness. There is no rebound and no guarding.   Musculoskeletal: Normal range of motion. She exhibits no edema, tenderness or deformity.   Negative straight leg raise.  Patient has normal bilateral reflexes.  No neurologic findings.   Lymphadenopathy:     She has no cervical adenopathy.   Neurological: She is alert and oriented to person, place, and time. No cranial nerve deficit. She exhibits normal muscle tone.   Skin: Skin is warm and dry. No rash noted. She is not diaphoretic. No erythema. No pallor.   Psychiatric: She has a normal mood and affect. Her behavior is normal. Judgment and thought content normal.   Nursing note and vitals reviewed.      Procedures         ED Course  ED Course    Patient receives some pain control ED [facility] refusing further workup would like to go home.  Patient refusing urinalysis.  No acute findings on the patient's CT scan dated today.  Patient with degenerative changes.  We'll give a short-term course of pain control and follow-up with her primary care physician for further evaluation and management.[facility]# [id number]                [facility]    Final diagnoses:   Acute bilateral thoracic back pain            Lorenzo Richardson MD  04/17/18 2030       Lorenzo Richardson MD  04/17/18 2030

## 2018-04-18 NOTE — TELEPHONE ENCOUNTER
----- Message from Sonia Mata MD sent at 4/18/2018 12:28 PM CDT -----  CT of thoracic and lumbar spine - degenerative disc disease and osteopenia.

## 2018-04-20 ENCOUNTER — TELEPHONE (OUTPATIENT)
Dept: FAMILY MEDICINE CLINIC | Facility: CLINIC | Age: 58
End: 2018-04-20

## 2018-04-20 ENCOUNTER — OFFICE VISIT (OUTPATIENT)
Dept: FAMILY MEDICINE CLINIC | Facility: CLINIC | Age: 58
End: 2018-04-20

## 2018-04-20 ENCOUNTER — LAB (OUTPATIENT)
Dept: LAB | Facility: OTHER | Age: 58
End: 2018-04-20

## 2018-04-20 VITALS
TEMPERATURE: 97.4 F | HEART RATE: 78 BPM | BODY MASS INDEX: 21.35 KG/M2 | DIASTOLIC BLOOD PRESSURE: 78 MMHG | SYSTOLIC BLOOD PRESSURE: 126 MMHG | HEIGHT: 62 IN | OXYGEN SATURATION: 98 % | WEIGHT: 116 LBS

## 2018-04-20 DIAGNOSIS — M85.80 OSTEOPENIA DETERMINED BY X-RAY: ICD-10-CM

## 2018-04-20 DIAGNOSIS — G89.29 CHRONIC MIDLINE THORACIC BACK PAIN: ICD-10-CM

## 2018-04-20 DIAGNOSIS — M54.6 CHRONIC MIDLINE THORACIC BACK PAIN: ICD-10-CM

## 2018-04-20 DIAGNOSIS — M81.6 LOCALIZED OSTEOPOROSIS WITHOUT CURRENT PATHOLOGICAL FRACTURE: ICD-10-CM

## 2018-04-20 DIAGNOSIS — R30.0 DYSURIA: ICD-10-CM

## 2018-04-20 DIAGNOSIS — R30.0 DYSURIA: Primary | ICD-10-CM

## 2018-04-20 DIAGNOSIS — R10.9 FLANK PAIN: ICD-10-CM

## 2018-04-20 DIAGNOSIS — M54.50 CHRONIC MIDLINE LOW BACK PAIN WITHOUT SCIATICA: Primary | ICD-10-CM

## 2018-04-20 DIAGNOSIS — G89.29 CHRONIC MIDLINE LOW BACK PAIN WITHOUT SCIATICA: Primary | ICD-10-CM

## 2018-04-20 LAB
ALBUMIN SERPL-MCNC: 4.5 G/DL (ref 3.2–5.5)
ALBUMIN/GLOB SERPL: 1.3 G/DL (ref 1–3)
ALP SERPL-CCNC: 64 U/L (ref 15–121)
ALT SERPL W P-5'-P-CCNC: 20 U/L (ref 10–60)
ANION GAP SERPL CALCULATED.3IONS-SCNC: 11 MMOL/L (ref 5–15)
ANISOCYTOSIS BLD QL: NORMAL
AST SERPL-CCNC: 53 U/L (ref 10–60)
BILIRUB BLD-MCNC: NEGATIVE MG/DL
BILIRUB SERPL-MCNC: 0.5 MG/DL (ref 0.2–1)
BUN BLD-MCNC: 12 MG/DL (ref 8–25)
BUN/CREAT SERPL: 12 (ref 7–25)
CALCIUM SPEC-SCNC: 9.2 MG/DL (ref 8.4–10.8)
CHLORIDE SERPL-SCNC: 105 MMOL/L (ref 100–112)
CLARITY, POC: CLEAR
CO2 SERPL-SCNC: 21 MMOL/L (ref 20–32)
COLOR UR: YELLOW
CREAT BLD-MCNC: 1 MG/DL (ref 0.4–1.3)
DEPRECATED RDW RBC AUTO: 51.8 FL (ref 36.4–46.3)
EOSINOPHIL # BLD MANUAL: 0.16 10*3/MM3 (ref 0–0.7)
EOSINOPHIL NFR BLD MANUAL: 2 % (ref 0–7)
ERYTHROCYTE [DISTWIDTH] IN BLOOD BY AUTOMATED COUNT: 14.6 % (ref 11.5–14.5)
GFR SERPL CREATININE-BSD FRML MDRD: 57 ML/MIN/1.73 (ref 45–104)
GLOBULIN UR ELPH-MCNC: 3.5 GM/DL (ref 2.5–4.6)
GLUCOSE BLD-MCNC: 96 MG/DL (ref 70–100)
GLUCOSE UR STRIP-MCNC: NEGATIVE MG/DL
HCT VFR BLD AUTO: 42.8 % (ref 35–45)
HGB BLD-MCNC: 13.9 G/DL (ref 12–15.5)
KETONES UR QL: NEGATIVE
LARGE PLATELETS: NORMAL
LEUKOCYTE EST, POC: NEGATIVE
LYMPHOCYTES # BLD MANUAL: 1.44 10*3/MM3 (ref 0.6–4.2)
LYMPHOCYTES NFR BLD MANUAL: 18 % (ref 10–50)
LYMPHOCYTES NFR BLD MANUAL: 9 % (ref 0–12)
MCH RBC QN AUTO: 32 PG (ref 26.5–34)
MCHC RBC AUTO-ENTMCNC: 32.5 G/DL (ref 31.4–36)
MCV RBC AUTO: 98.4 FL (ref 80–98)
MONOCYTES # BLD AUTO: 0.72 10*3/MM3 (ref 0–0.9)
NEUTROPHILS # BLD AUTO: 5.69 10*3/MM3 (ref 2–8.6)
NEUTROPHILS NFR BLD MANUAL: 71 % (ref 37–80)
NITRITE UR-MCNC: NEGATIVE MG/ML
PH UR: 6 [PH] (ref 5–8)
PLATELET # BLD AUTO: 288 10*3/MM3 (ref 150–450)
PMV BLD AUTO: 9.5 FL (ref 8–12)
POTASSIUM BLD-SCNC: 3.5 MMOL/L (ref 3.4–5.4)
PROT SERPL-MCNC: 8 G/DL (ref 6.7–8.2)
PROT UR STRIP-MCNC: ABNORMAL MG/DL
RBC # BLD AUTO: 4.35 10*6/MM3 (ref 3.77–5.16)
RBC # UR STRIP: NEGATIVE /UL
SODIUM BLD-SCNC: 137 MMOL/L (ref 134–146)
SP GR UR: 1 (ref 1–1.03)
UROBILINOGEN UR QL: NORMAL
WBC MORPH BLD: NORMAL
WBC NRBC COR # BLD: 8.02 10*3/MM3 (ref 3.2–9.8)

## 2018-04-20 PROCEDURE — 36415 COLL VENOUS BLD VENIPUNCTURE: CPT | Performed by: FAMILY MEDICINE

## 2018-04-20 PROCEDURE — 80053 COMPREHEN METABOLIC PANEL: CPT | Performed by: FAMILY MEDICINE

## 2018-04-20 PROCEDURE — 87086 URINE CULTURE/COLONY COUNT: CPT | Performed by: FAMILY MEDICINE

## 2018-04-20 PROCEDURE — 99214 OFFICE O/P EST MOD 30 MIN: CPT | Performed by: FAMILY MEDICINE

## 2018-04-20 PROCEDURE — 85025 COMPLETE CBC W/AUTO DIFF WBC: CPT | Performed by: FAMILY MEDICINE

## 2018-04-20 PROCEDURE — 81002 URINALYSIS NONAUTO W/O SCOPE: CPT | Performed by: FAMILY MEDICINE

## 2018-04-20 RX ORDER — ZOLEDRONIC ACID 5 MG/100ML
5 INJECTION, SOLUTION INTRAVENOUS ONCE
Status: CANCELLED | OUTPATIENT
Start: 2018-04-20 | End: 2018-04-20

## 2018-04-20 NOTE — PROGRESS NOTES
Subjective   Chief Complaint   Patient presents with   • Follow-up     ER follow up       Tricia Loyola is a 58 y.o. female.   Follow-up (ER follow up  )    History of Present Illness     Presents today to follow up from urgent care for dysuria on 4/18/18  She was diagnosed with acute cystitis and started on bactrim  The urinalysis indicated trace leukocytes and the urine culture was contaminated  She started taking her antibiotics 2 days ago  She feels like her back pain has been gradually worsening  She is concerned about a kidney infection  She is asking for blood work and repeat urinalysis today    Presents today for results on CT of thoracic and lumbar spine  She previously asked for a pain management referral  CT of thoracic spine and lumbar spine:  1.  Mild age-related degenerative changes with osteopenia.    Bone density indicated osteopenia and osteoporosis - previously treated with reclast at [facility]  She does not know when this was last done  She has failed numerous medications but she cannot provide any information  There are no records to indicate this has been previously ordered or approved    The following portions of the patient's history were reviewed and updated as appropriate: allergies, current medications, past family history, past medical history, past social history, past surgical history and problem list.    Review of Systems   Constitutional: Negative for appetite change, chills, fatigue and fever.   HENT: Negative for congestion, ear pain, rhinorrhea and sore throat.    Eyes: Negative for pain.   Respiratory: Negative for cough and shortness of breath.    Cardiovascular: Negative for chest pain and palpitations.   Gastrointestinal: Positive for abdominal pain. Negative for constipation, diarrhea and nausea.   Genitourinary: Negative for dysuria.   Musculoskeletal: Positive for back pain and myalgias. Negative for joint swelling and neck pain.   Skin: Negative for rash.  "  Neurological: Negative for dizziness and headaches.       Objective   /78   Pulse 78   Temp 97.4 °F (36.3 °C)   Ht 157.5 cm (62.01\")   Wt 52.6 kg (116 lb)   SpO2 98%   BMI 21.21 kg/m²   Physical Exam   Constitutional: She is oriented to person, place, and time. She appears well-developed and well-nourished.   HENT:   Head: Normocephalic and atraumatic.   Eyes: Pupils are equal, round, and reactive to light.   Neck: Normal range of motion. Neck supple.   Cardiovascular: Normal rate, regular rhythm and normal heart sounds.    Pulmonary/Chest: Effort normal and breath sounds normal. No respiratory distress. She has no wheezes. She has no rales.   Abdominal: Soft. Bowel sounds are normal.   Musculoskeletal:        Thoracic back: She exhibits decreased range of motion and pain.        Lumbar back: She exhibits decreased range of motion and pain.   Neurological: She is alert and oriented to person, place, and time.   Skin: Skin is warm and dry.   Psychiatric: She has a normal mood and affect.   Nursing note and vitals reviewed.      Assessment/Plan   Problems Addressed this Visit        Nervous and Auditory    Chronic midline thoracic back pain    Chronic midline low back pain without sciatica - Primary       Musculoskeletal and Integument    Osteoporosis      Other Visit Diagnoses     Flank pain        Relevant Orders    CBC & Differential    Comprehensive Metabolic Panel    Osteopenia determined by x-ray        Dysuria            Urinalysis ordered - negative  Urine culture ordered  Continue with bactrim    Labs ordered    Reviewed CT scan results  Reviewed bone density scan results  Suggested orthopedic referral - she declined    Need more information to order reclast  Offered to order and work on a PA - she declined and said she would work on getting records    Recheck as needed             "

## 2018-04-20 NOTE — TELEPHONE ENCOUNTER
----- Message from Sonia Mata MD sent at 4/20/2018  4:29 PM CDT -----  No elevation in her wbc count - antibiotics are working so she needs to continue with bactrim.

## 2018-04-20 NOTE — TELEPHONE ENCOUNTER
Paige called  [facility], her broth answered. Paige told him of results and to continue with medication. 4/20/18

## 2018-04-21 LAB — BACTERIA SPEC AEROBE CULT: NORMAL

## 2018-04-23 RX ORDER — ROPINIROLE 1 MG/1
1 TABLET, FILM COATED ORAL NIGHTLY
Qty: 90 TABLET | Refills: 1 | Status: SHIPPED | OUTPATIENT
Start: 2018-04-23 | End: 2018-07-19 | Stop reason: SDUPTHER

## 2018-05-18 DIAGNOSIS — R00.0 SINUS TACHYCARDIA: ICD-10-CM

## 2018-05-21 RX ORDER — METOPROLOL SUCCINATE 50 MG/1
TABLET, EXTENDED RELEASE ORAL
Qty: 90 TABLET | Refills: 1 | Status: SHIPPED | OUTPATIENT
Start: 2018-05-21 | End: 2019-01-30 | Stop reason: SDUPTHER

## 2018-06-20 RX ORDER — ATORVASTATIN CALCIUM 20 MG/1
TABLET, FILM COATED ORAL
Qty: 30 TABLET | Refills: 3 | Status: SHIPPED | OUTPATIENT
Start: 2018-06-20 | End: 2018-11-19

## 2018-06-22 ENCOUNTER — OFFICE VISIT (OUTPATIENT)
Dept: FAMILY MEDICINE CLINIC | Facility: CLINIC | Age: 58
End: 2018-06-22

## 2018-06-22 VITALS
SYSTOLIC BLOOD PRESSURE: 114 MMHG | HEART RATE: 81 BPM | WEIGHT: 109.6 LBS | OXYGEN SATURATION: 99 % | HEIGHT: 62 IN | DIASTOLIC BLOOD PRESSURE: 76 MMHG | BODY MASS INDEX: 20.17 KG/M2

## 2018-06-22 DIAGNOSIS — K52.9 GASTROENTERITIS: Primary | ICD-10-CM

## 2018-06-22 DIAGNOSIS — R11.0 NAUSEA: ICD-10-CM

## 2018-06-22 PROCEDURE — 99213 OFFICE O/P EST LOW 20 MIN: CPT | Performed by: FAMILY MEDICINE

## 2018-06-22 PROCEDURE — 96372 THER/PROPH/DIAG INJ SC/IM: CPT | Performed by: FAMILY MEDICINE

## 2018-06-22 RX ORDER — ONDANSETRON 2 MG/ML
4 INJECTION INTRAMUSCULAR; INTRAVENOUS ONCE
Status: COMPLETED | OUTPATIENT
Start: 2018-06-22 | End: 2018-06-22

## 2018-06-22 RX ORDER — ONDANSETRON 4 MG/1
4 TABLET, FILM COATED ORAL EVERY 8 HOURS PRN
Qty: 90 TABLET | Refills: 5 | Status: SHIPPED | OUTPATIENT
Start: 2018-06-22 | End: 2019-10-09

## 2018-06-22 RX ADMIN — ONDANSETRON 4 MG: 2 INJECTION INTRAMUSCULAR; INTRAVENOUS at 13:18

## 2018-06-22 NOTE — PROGRESS NOTES
"Subjective   Chief Complaint   Patient presents with   • Nausea     Going on since last week- vomited once    • Diarrhea     Tricia Loyola is a 58 y.o. female.   Nausea (Going on since last week- vomited once ) and Diarrhea    History of Present Illness     Complaints of nausea, vomiting, diarrhea  Started last week  Was seen last week at urgent care for a rash on the left upper extremity  She was diagnosed with a contusion   Lab work was normal    The following portions of the patient's history were reviewed and updated as appropriate: allergies, current medications, past family history, past medical history, past social history, past surgical history and problem list.    Review of Systems   Gastrointestinal: Positive for diarrhea, nausea and vomiting.       Objective   /76   Pulse 81   Ht 157.5 cm (62.01\")   Wt 49.7 kg (109 lb 9.6 oz)   SpO2 99%   BMI 20.04 kg/m²   Physical Exam   Constitutional: She is oriented to person, place, and time. She appears well-developed and well-nourished.   HENT:   Head: Normocephalic and atraumatic.   Eyes: Pupils are equal, round, and reactive to light.   Neck: Normal range of motion. Neck supple.   Cardiovascular: Normal rate, regular rhythm and normal heart sounds.    Pulmonary/Chest: Effort normal and breath sounds normal. No respiratory distress. She has no wheezes. She has no rales.   Abdominal: Soft. Bowel sounds are normal. There is no tenderness.   Musculoskeletal: Normal range of motion.   Neurological: She is alert and oriented to person, place, and time.   Skin: Skin is warm and dry.   Psychiatric: She has a normal mood and affect.   Nursing note and vitals reviewed.      Assessment/Plan   Problems Addressed this Visit     None      Visit Diagnoses     Gastroenteritis    -  Primary    Nausea        Relevant Medications    ondansetron (ZOFRAN) 4 MG tablet    ondansetron (ZOFRAN) injection 4 mg (Start on 6/22/2018  1:45 PM)        zofran IM    Refilled " zofran    Educational material for diet and fluid replacement  Suggested a bland diet    Recheck as needed

## 2018-06-22 NOTE — PATIENT INSTRUCTIONS
Food Choices to Help Relieve Diarrhea, Adult  When you have diarrhea, the foods you eat and your eating habits are very important. Choosing the right foods and drinks can help:  · Relieve diarrhea.  · Replace lost fluids and nutrients.  · Prevent dehydration.    What general guidelines should I follow?  Relieving diarrhea  · Choose foods with less than 2 g or .07 oz. of fiber per serving.  · Limit fats to less than 8 tsp (38 g or 1.34 oz.) a day.  · Avoid the following:  ? Foods and beverages sweetened with high-fructose corn syrup, honey, or sugar alcohols such as xylitol, sorbitol, and mannitol.  ? Foods that contain a lot of fat or sugar.  ? Fried, greasy, or spicy foods.  ? High-fiber grains, breads, and cereals.  ? Raw fruits and vegetables.  · Eat foods that are rich in probiotics. These foods include dairy products such as yogurt and fermented milk products. They help increase healthy bacteria in the stomach and intestines (gastrointestinal tract, or GI tract).  · If you have lactose intolerance, avoid dairy products. These may make your diarrhea worse.  · Take medicine to help stop diarrhea (antidiarrheal medicine) only as told by your health care provider.  Replacing nutrients  · Eat small meals or snacks every 3-4 hours.  · Eat bland foods, such as white rice, toast, or baked potato, until your diarrhea starts to get better. Gradually reintroduce nutrient-rich foods as tolerated or as told by your health care provider. This includes:  ? Well-cooked protein foods.  ? Peeled, seeded, and soft-cooked fruits and vegetables.  ? Low-fat dairy products.  · Take vitamin and mineral supplements as told by your health care provider.  Preventing dehydration    · Start by sipping water or a special solution to prevent dehydration (oral rehydration solution, ORS). Urine that is clear or pale yellow means that you are getting enough fluid.  · Try to drink at least 8-10 cups of fluid each day to help replace lost  fluids.  · You may add other liquids in addition to water, such as clear juice or decaffeinated sports drinks, as tolerated or as told by your health care provider.  · Avoid drinks with caffeine, such as coffee, tea, or soft drinks.  · Avoid alcohol.  What foods are recommended?  The items listed may not be a complete list. Talk with your health care provider about what dietary choices are best for you.  Grains  White rice. White, Vincentian, or addis breads (fresh or toasted), including plain rolls, buns, or bagels. White pasta. Saltine, soda, or marcos crackers. Pretzels. Low-fiber cereal. Cooked cereals made with water (such as cornmeal, farina, or cream cereals). Plain muffins. Matzo. [place] toast. [place]wie[place].  Vegetables  Potatoes (without the skin). Most well-cooked and canned vegetables without skins or seeds. Tender lettuce.  Fruits  Apple sauce. Fruits canned in juice. Cooked apricots, cherries, grapefruit, peaches, pears, or plums. Fresh bananas and cantaloupe.  Meats and other protein foods  Baked or boiled chicken. Eggs. Tofu. Fish. Seafood. Smooth nut butters. Ground or well-cooked tender beef, ham, veal, lamb, pork, or poultry.  Dairy  Plain yogurt, kefir, and unsweetened liquid yogurt. Lactose-free milk, buttermilk, skim milk, or soy milk. Low-fat or nonfat hard cheese.  Beverages  Water. Low-calorie sports drinks. Fruit juices without pulp. Strained tomato and vegetable juices. Decaffeinated teas. Sugar-free beverages not sweetened with sugar alcohols. Oral rehydration solutions, if approved by your health care provider.  Seasoning and other foods  Bouillon, broth, or soups made from recommended foods.  What foods are not recommended?  The items listed may not be a complete list. Talk with your health care provider about what dietary choices are best for you.  Grains  Whole grain, whole wheat, bran, or rye breads, rolls, pastas, and crackers. Wild or brown rice. Whole grain or bran cereals. Barley. Oats and  oatmeal. Corn tortillas or taco shells. Granola. Popcorn.  Vegetables  Raw vegetables. Fried vegetables. Cabbage, broccoli, [place] sprouts, artichokes, baked beans, beet greens, corn, kale, legumes, peas, sweet potatoes, and yams. Potato skins. Cooked spinach and cabbage.  Fruits  Dried fruit, including raisins and dates. Raw fruits. Stewed or dried prunes. Canned fruits with syrup.  Meat and other protein foods  Fried or fatty meats. Deli meats. [place] nut butters. Nuts and seeds. Beans and lentils. [place]. Hot dogs. Sausage.  Dairy  High-fat cheeses. Whole milk, chocolate milk, and beverages made with milk, such as milk shakes. Half-and-half. Cream. sour cream. Ice cream.  Beverages  Caffeinated beverages (such as coffee, tea, soda, or energy drinks). Alcoholic beverages. Fruit juices with pulp. Prune juice. Soft drinks sweetened with high-fructose corn syrup or sugar alcohols. High-calorie sports drinks.  Fats and oils  Butter. Cream sauces. Margarine. Salad oils. Plain salad dressings. Olives. Avocados. Mayonnaise.  Sweets and desserts  Sweet rolls, doughnuts, and sweet breads. Sugar-free desserts sweetened with sugar alcohols such as xylitol and sorbitol.  Seasoning and other foods  Honey. Hot sauce. Chili powder. Gravy. Cream-based or milk-based soups. Pancakes and waffles.  Summary  · When you have diarrhea, the foods you eat and your eating habits are very important.  · Make sure you get at least 8-10 cups of fluid each day, or enough to keep your urine clear or pale yellow.  · Eat bland foods and gradually reintroduce healthy, nutrient-rich foods as tolerated, or as told by your health care provider.  · Avoid high-fiber, fried, greasy, or spicy foods.  This information is not intended to replace advice given to you by your health care provider. Make sure you discuss any questions you have with your health care provider.  Document Released: 03/09/2005 Document Revised: 12/15/2017 Document Reviewed:  12/15/2017  [employer] Interactive Patient Education © 2018 [employer]    Viral Gastroenteritis, Adult  Viral gastroenteritis is also known as the stomach flu. This condition is caused by certain germs (viruses). These germs can be passed from person to person very easily (are very contagious). This condition can cause sudden watery poop (diarrhea), fever, and throwing up (vomiting).  Having watery poop and throwing up can make you feel weak and cause you to get dehydrated. Dehydration can make you tired and thirsty, make you have a dry mouth, and make it so you pee (urinate) less often. Older adults and people with other diseases or a weak defense system (immune system) are at higher risk for dehydration. It is important to replace the fluids that you lose from having watery poop and throwing up.  Follow these instructions at home:  Follow instructions from your doctor about how to care for yourself at home.  Eating and drinking    Follow these instructions as told by your doctor:  · Take an oral rehydration solution (ORS). This is a drink that is sold at pharmacies and stores.  · Drink clear fluids in small amounts as you are able, such as:  ? Water.  ? Ice chips.  ? Diluted fruit juice.  ? Low-calorie sports drinks.  · Eat bland, easy-to-digest foods in small amounts as you are able, such as:  ? Bananas.  ? Applesauce.  ? Rice.  ? Low-fat (lean) meats.  ? Toast.  ? Crackers.  · Avoid fluids that have a lot of sugar or caffeine in them.  · Avoid alcohol.  · Avoid spicy or fatty foods.    General instructions  · Drink enough fluid to keep your pee (urine) clear or pale yellow.  · Wash your hands often. If you cannot use soap and water, use hand .  · Make sure that all people in your home wash their hands well and often.  · Rest at home while you get better.  · Take over-the-counter and prescription medicines only as told by your doctor.  · Watch your condition for any changes.  · Take a warm bath to help  with any burning or pain from having watery poop.  · Keep all follow-up visits as told by your doctor. This is important.  Contact a doctor if:  · You cannot keep fluids down.  · Your symptoms get worse.  · You have new symptoms.  · You feel light-headed or dizzy.  · You have muscle cramps.  Get help right away if:  · You have chest pain.  · You feel very weak or you pass out (faint).  · You see blood in your throw-up.  · Your throw-up looks like coffee grounds.  · You have bloody or black poop (stools) or poop that look like tar.  · You have a very bad headache, a stiff neck, or both.  · You have a rash.  · You have very bad pain, cramping, or bloating in your belly (abdomen).  · You have trouble breathing.  · You are breathing very quickly.  · Your heart is beating very quickly.  · Your skin feels cold and clammy.  · You feel confused.  · You have pain when you pee.  · You have signs of dehydration, such as:  ? Dark pee, hardly any pee, or no pee.  ? Cracked lips.  ? Dry mouth.  ? Sunken eyes.  ? Sleepiness.  ? Weakness.  This information is not intended to replace advice given to you by your health care provider. Make sure you discuss any questions you have with your health care provider.  Document Released: 06/05/2009 Document Revised: 07/07/2017 Document Reviewed: 08/23/2016  [employer] Interactive Patient Education © 2017 [employer]

## 2018-07-19 ENCOUNTER — OFFICE VISIT (OUTPATIENT)
Dept: FAMILY MEDICINE CLINIC | Facility: CLINIC | Age: 58
End: 2018-07-19

## 2018-07-19 VITALS
HEIGHT: 62 IN | HEART RATE: 79 BPM | BODY MASS INDEX: 19.58 KG/M2 | SYSTOLIC BLOOD PRESSURE: 115 MMHG | TEMPERATURE: 98.1 F | OXYGEN SATURATION: 100 % | WEIGHT: 106.4 LBS | DIASTOLIC BLOOD PRESSURE: 76 MMHG

## 2018-07-19 DIAGNOSIS — F41.0 PANIC DISORDER WITHOUT AGORAPHOBIA: Primary | ICD-10-CM

## 2018-07-19 DIAGNOSIS — G25.81 RESTLESS LEG SYNDROME: ICD-10-CM

## 2018-07-19 DIAGNOSIS — Z86.69 HISTORY OF MIGRAINE HEADACHES: ICD-10-CM

## 2018-07-19 DIAGNOSIS — E78.5 HYPERLIPIDEMIA, UNSPECIFIED HYPERLIPIDEMIA TYPE: ICD-10-CM

## 2018-07-19 PROCEDURE — 99214 OFFICE O/P EST MOD 30 MIN: CPT | Performed by: FAMILY MEDICINE

## 2018-07-19 RX ORDER — CLONAZEPAM 0.5 MG/1
0.5 TABLET ORAL DAILY PRN
Qty: 15 TABLET | Refills: 0 | Status: SHIPPED | OUTPATIENT
Start: 2018-07-19 | End: 2018-09-19 | Stop reason: SDUPTHER

## 2018-07-19 RX ORDER — ATORVASTATIN CALCIUM 20 MG/1
20 TABLET, FILM COATED ORAL DAILY
Qty: 30 TABLET | Refills: 5 | Status: SHIPPED | OUTPATIENT
Start: 2018-07-19 | End: 2019-01-30 | Stop reason: SDUPTHER

## 2018-07-19 RX ORDER — TOPIRAMATE 25 MG/1
25 TABLET ORAL 2 TIMES DAILY
COMMUNITY
End: 2018-07-19 | Stop reason: SDUPTHER

## 2018-07-19 RX ORDER — ROPINIROLE 1 MG/1
1 TABLET, FILM COATED ORAL NIGHTLY
Qty: 30 TABLET | Refills: 5 | Status: SHIPPED | OUTPATIENT
Start: 2018-07-19 | End: 2018-09-19 | Stop reason: SDUPTHER

## 2018-07-19 NOTE — PROGRESS NOTES
Subjective   Chief Complaint   Patient presents with   • gastroenteritis     4 weeks   • Med Refill   • having panic attacks     Tricia Loyola is a 58 y.o. female.   gastroenteritis (4 weeks); Med Refill; and having panic attacks    Panic Attack   This is a chronic problem. The current episode started more than 1 year ago. The problem occurs daily. The problem has been gradually worsening. Associated symptoms include diaphoresis. Pertinent negatives include no abdominal pain, chest pain, chills, congestion, coughing, fatigue, fever, headaches, joint swelling, nausea, neck pain, rash or sore throat. Associated symptoms comments: palpitations. Nothing aggravates the symptoms. Treatments tried: buspar. The treatment provided no relief.   currently treated with buspar BID with very little effect  Cannot tolerate zoloft - caused insomnia, hallucinations  Failed celexa - unable to explain why  Asking for something for panic attacks  Scheduled for vacation in 1.5 weeks and worried about her panic disorder consuming her and causing travel complications.    Asking for medication refills on lipitor and requip    Migraines - managed by neurology  botox injections for treatment  Has done one treatment  Recheck every 3 months    The following portions of the patient's history were reviewed and updated as appropriate: allergies, current medications, past family history, past medical history, past social history, past surgical history and problem list.    Review of Systems   Constitutional: Positive for diaphoresis. Negative for appetite change, chills, fatigue and fever.   HENT: Negative for congestion, ear pain, rhinorrhea and sore throat.    Eyes: Negative for pain.   Respiratory: Positive for shortness of breath. Negative for cough.    Cardiovascular: Positive for palpitations. Negative for chest pain.   Gastrointestinal: Negative for abdominal pain, constipation, diarrhea and nausea.   Genitourinary: Negative for dysuria.  "  Musculoskeletal: Negative for back pain, joint swelling and neck pain.   Skin: Negative for rash.   Neurological: Negative for dizziness and headaches.   Psychiatric/Behavioral: The patient is nervous/anxious.        Objective   /76   Pulse 79   Temp 98.1 °F (36.7 °C)   Ht 157.5 cm (62.01\")   Wt 48.3 kg (106 lb 6.4 oz)   SpO2 100%   BMI 19.46 kg/m²   Physical Exam   Constitutional: She is oriented to person, place, and time. She appears well-developed and well-nourished.   HENT:   Head: Normocephalic and atraumatic.   Eyes: Pupils are equal, round, and reactive to light.   Neck: Normal range of motion. Neck supple.   Cardiovascular: Normal rate, regular rhythm and normal heart sounds.    Pulmonary/Chest: Effort normal and breath sounds normal. No respiratory distress. She has no wheezes. She has no rales.   Abdominal: Soft. Bowel sounds are normal.   Musculoskeletal: Normal range of motion.   Neurological: She is alert and oriented to person, place, and time.   Skin: Skin is warm and dry.   Psychiatric: She has a normal mood and affect.   Nursing note and vitals reviewed.      Assessment/Plan   Problems Addressed this Visit        Cardiovascular and Mediastinum    Hyperlipidemia    Relevant Medications    atorvastatin (LIPITOR) 20 MG tablet       Other    Restless leg syndrome    Relevant Medications    rOPINIRole (REQUIP) 1 MG tablet    Panic disorder without agoraphobia - Primary    Relevant Medications    clonazePAM (KLONOPIN) 0.5 MG tablet      Other Visit Diagnoses     History of migraine headaches            Continue to follow up with neurology    Start klonopin  The patient has read and signed the [facility] Controlled Substance Contract.  I will continue to see patient for regular follow up appointments.  They are well controlled on their medication.  KATE is updated every 3 months. The patient is aware of the potential for addiction and dependence.    Refilled requp    Refilled " lipitor    I advised Tricia of the risks of continuing to use tobacco, and I provided her with tobacco cessation educational materials in the After Visit Summary.     During this visit, I spent <3 minutes counseling the patient regarding tobacco cessation.    Patient's Body mass index is 19.46 kg/m². BMI is within normal parameters. No follow-up required.    Recheck in 4 weeks

## 2018-07-27 DIAGNOSIS — M54.50 ACUTE BILATERAL LOW BACK PAIN WITHOUT SCIATICA: ICD-10-CM

## 2018-07-27 RX ORDER — TIZANIDINE 4 MG/1
TABLET ORAL
Qty: 30 TABLET | Refills: 0 | Status: SHIPPED | OUTPATIENT
Start: 2018-07-27 | End: 2018-09-13 | Stop reason: ALTCHOICE

## 2018-08-06 RX ORDER — ALBUTEROL SULFATE 90 UG/1
2 AEROSOL, METERED RESPIRATORY (INHALATION) EVERY 6 HOURS
Qty: 1 INHALER | Refills: 0
Start: 2018-08-06 | End: 2018-08-09 | Stop reason: SDUPTHER

## 2018-08-09 RX ORDER — ALBUTEROL SULFATE 90 UG/1
2 AEROSOL, METERED RESPIRATORY (INHALATION) EVERY 6 HOURS
Qty: 1 INHALER | Refills: 0 | Status: SHIPPED | OUTPATIENT
Start: 2018-08-09 | End: 2018-12-20 | Stop reason: SDUPTHER

## 2018-08-09 RX ORDER — ALBUTEROL SULFATE 90 UG/1
2 AEROSOL, METERED RESPIRATORY (INHALATION) EVERY 6 HOURS
Qty: 1 INHALER | Refills: 0
Start: 2018-08-09 | End: 2018-08-09 | Stop reason: SDUPTHER

## 2018-08-27 RX ORDER — ALBUTEROL SULFATE 90 UG/1
AEROSOL, METERED RESPIRATORY (INHALATION)
Qty: 18 G | Refills: 0 | OUTPATIENT
Start: 2018-08-27

## 2018-09-13 ENCOUNTER — APPOINTMENT (OUTPATIENT)
Dept: GENERAL RADIOLOGY | Facility: HOSPITAL | Age: 58
End: 2018-09-13

## 2018-09-13 ENCOUNTER — HOSPITAL ENCOUNTER (EMERGENCY)
Facility: HOSPITAL | Age: 58
Discharge: HOME | End: 2018-09-13
Attending: EMERGENCY MEDICINE | Admitting: FAMILY MEDICINE

## 2018-09-13 VITALS
BODY MASS INDEX: 20.04 KG/M2 | RESPIRATION RATE: 20 BRPM | OXYGEN SATURATION: 99 % | HEART RATE: 72 BPM | HEIGHT: 62 IN | TEMPERATURE: 97.4 F | DIASTOLIC BLOOD PRESSURE: 65 MMHG | WEIGHT: 108.9 LBS | SYSTOLIC BLOOD PRESSURE: 119 MMHG

## 2018-09-13 DIAGNOSIS — R10.84 GENERALIZED ABDOMINAL PAIN: ICD-10-CM

## 2018-09-13 DIAGNOSIS — M54.50 ACUTE BILATERAL LOW BACK PAIN WITHOUT SCIATICA: Primary | ICD-10-CM

## 2018-09-13 LAB
ALBUMIN SERPL-MCNC: 4 G/DL (ref 3.4–4.8)
ALBUMIN/GLOB SERPL: 1.3 G/DL (ref 1.1–1.8)
ALP SERPL-CCNC: 84 U/L (ref 38–126)
ALT SERPL W P-5'-P-CCNC: 31 U/L (ref 9–52)
ANION GAP SERPL CALCULATED.3IONS-SCNC: 9 MMOL/L (ref 5–15)
AST SERPL-CCNC: 69 U/L (ref 14–36)
BACTERIA UR QL AUTO: NORMAL /HPF
BASOPHILS # BLD AUTO: 0.03 10*3/MM3 (ref 0–0.2)
BASOPHILS NFR BLD AUTO: 0.3 % (ref 0–2)
BILIRUB SERPL-MCNC: 0.3 MG/DL (ref 0.2–1.3)
BILIRUB UR QL STRIP: NEGATIVE
BUN BLD-MCNC: 13 MG/DL (ref 7–21)
BUN/CREAT SERPL: 14.8 (ref 7–25)
CALCIUM SPEC-SCNC: 8.8 MG/DL (ref 8.4–10.2)
CHLORIDE SERPL-SCNC: 102 MMOL/L (ref 95–110)
CLARITY UR: CLEAR
CO2 SERPL-SCNC: 25 MMOL/L (ref 22–31)
COLOR UR: YELLOW
CREAT BLD-MCNC: 0.88 MG/DL (ref 0.5–1)
DEPRECATED RDW RBC AUTO: 49.1 FL (ref 36.4–46.3)
EOSINOPHIL # BLD AUTO: 0.18 10*3/MM3 (ref 0–0.7)
EOSINOPHIL NFR BLD AUTO: 1.7 % (ref 0–7)
ERYTHROCYTE [DISTWIDTH] IN BLOOD BY AUTOMATED COUNT: 14.2 % (ref 11.5–14.5)
GFR SERPL CREATININE-BSD FRML MDRD: 66 ML/MIN/1.73 (ref 45–104)
GLOBULIN UR ELPH-MCNC: 3.1 GM/DL (ref 2.3–3.5)
GLUCOSE BLD-MCNC: 94 MG/DL (ref 60–100)
GLUCOSE UR STRIP-MCNC: NEGATIVE MG/DL
HCT VFR BLD AUTO: 39 % (ref 35–45)
HGB BLD-MCNC: 13.1 G/DL (ref 12–15.5)
HGB UR QL STRIP.AUTO: NEGATIVE
HOLD SPECIMEN: NORMAL
HOLD SPECIMEN: NORMAL
HYALINE CASTS UR QL AUTO: NORMAL /LPF
IMM GRANULOCYTES # BLD: 0.03 10*3/MM3 (ref 0–0.02)
IMM GRANULOCYTES NFR BLD: 0.3 % (ref 0–0.5)
KETONES UR QL STRIP: NEGATIVE
LEUKOCYTE ESTERASE UR QL STRIP.AUTO: ABNORMAL
LIPASE SERPL-CCNC: 28 U/L (ref 23–300)
LYMPHOCYTES # BLD AUTO: 3.48 10*3/MM3 (ref 0.6–4.2)
LYMPHOCYTES NFR BLD AUTO: 32.6 % (ref 10–50)
MCH RBC QN AUTO: 31.8 PG (ref 26.5–34)
MCHC RBC AUTO-ENTMCNC: 33.6 G/DL (ref 31.4–36)
MCV RBC AUTO: 94.7 FL (ref 80–98)
MONOCYTES # BLD AUTO: 0.95 10*3/MM3 (ref 0–0.9)
MONOCYTES NFR BLD AUTO: 8.9 % (ref 0–12)
NEUTROPHILS # BLD AUTO: 5.99 10*3/MM3 (ref 2–8.6)
NEUTROPHILS NFR BLD AUTO: 56.2 % (ref 37–80)
NITRITE UR QL STRIP: NEGATIVE
PH UR STRIP.AUTO: 7.5 [PH] (ref 5–9)
PLATELET # BLD AUTO: 288 10*3/MM3 (ref 150–450)
PMV BLD AUTO: 10.1 FL (ref 8–12)
POTASSIUM BLD-SCNC: 4 MMOL/L (ref 3.5–5.1)
PROT SERPL-MCNC: 7.1 G/DL (ref 6.3–8.6)
PROT UR QL STRIP: NEGATIVE
RBC # BLD AUTO: 4.12 10*6/MM3 (ref 3.77–5.16)
RBC # UR: NORMAL /HPF
REF LAB TEST METHOD: NORMAL
SODIUM BLD-SCNC: 136 MMOL/L (ref 137–145)
SP GR UR STRIP: 1.01 (ref 1–1.03)
SQUAMOUS #/AREA URNS HPF: NORMAL /HPF
UROBILINOGEN UR QL STRIP: ABNORMAL
WBC NRBC COR # BLD: 10.66 10*3/MM3 (ref 3.2–9.8)
WBC UR QL AUTO: NORMAL /HPF
WHOLE BLOOD HOLD SPECIMEN: NORMAL
WHOLE BLOOD HOLD SPECIMEN: NORMAL

## 2018-09-13 PROCEDURE — 99284 EMERGENCY DEPT VISIT MOD MDM: CPT

## 2018-09-13 PROCEDURE — 80053 COMPREHEN METABOLIC PANEL: CPT | Performed by: PHYSICIAN ASSISTANT

## 2018-09-13 PROCEDURE — 25010000002 ONDANSETRON PER 1 MG: Performed by: PHYSICIAN ASSISTANT

## 2018-09-13 PROCEDURE — 81001 URINALYSIS AUTO W/SCOPE: CPT

## 2018-09-13 PROCEDURE — 96372 THER/PROPH/DIAG INJ SC/IM: CPT

## 2018-09-13 PROCEDURE — 83690 ASSAY OF LIPASE: CPT | Performed by: PHYSICIAN ASSISTANT

## 2018-09-13 PROCEDURE — 72100 X-RAY EXAM L-S SPINE 2/3 VWS: CPT

## 2018-09-13 PROCEDURE — 74018 RADEX ABDOMEN 1 VIEW: CPT

## 2018-09-13 PROCEDURE — 85025 COMPLETE CBC W/AUTO DIFF WBC: CPT | Performed by: PHYSICIAN ASSISTANT

## 2018-09-13 PROCEDURE — 96374 THER/PROPH/DIAG INJ IV PUSH: CPT

## 2018-09-13 PROCEDURE — 25010000002 DEXAMETHASONE PER 1 MG: Performed by: PHYSICIAN ASSISTANT

## 2018-09-13 RX ORDER — SODIUM CHLORIDE 0.9 % (FLUSH) 0.9 %
10 SYRINGE (ML) INJECTION AS NEEDED
Status: DISCONTINUED | OUTPATIENT
Start: 2018-09-13 | End: 2018-09-13 | Stop reason: HOSPADM

## 2018-09-13 RX ORDER — DEXAMETHASONE SODIUM PHOSPHATE 10 MG/ML
6 INJECTION INTRAMUSCULAR; INTRAVENOUS ONCE
Status: COMPLETED | OUTPATIENT
Start: 2018-09-13 | End: 2018-09-13

## 2018-09-13 RX ORDER — ACETAMINOPHEN AND CODEINE PHOSPHATE 300; 30 MG/1; MG/1
1 TABLET ORAL ONCE
Status: COMPLETED | OUTPATIENT
Start: 2018-09-13 | End: 2018-09-13

## 2018-09-13 RX ORDER — ONDANSETRON 2 MG/ML
4 INJECTION INTRAMUSCULAR; INTRAVENOUS ONCE
Status: COMPLETED | OUTPATIENT
Start: 2018-09-13 | End: 2018-09-13

## 2018-09-13 RX ORDER — ALUMINA, MAGNESIA, AND SIMETHICONE 2400; 2400; 240 MG/30ML; MG/30ML; MG/30ML
15 SUSPENSION ORAL ONCE
Status: COMPLETED | OUTPATIENT
Start: 2018-09-13 | End: 2018-09-13

## 2018-09-13 RX ORDER — CYCLOBENZAPRINE HCL 5 MG
5 TABLET ORAL 3 TIMES DAILY PRN
Qty: 12 TABLET | Refills: 0 | Status: SHIPPED | OUTPATIENT
Start: 2018-09-13 | End: 2018-09-17

## 2018-09-13 RX ADMIN — LIDOCAINE HYDROCHLORIDE 15 ML: 20 SOLUTION ORAL; TOPICAL at 16:50

## 2018-09-13 RX ADMIN — ALUMINUM HYDROXIDE, MAGNESIUM HYDROXIDE, AND DIMETHICONE 15 ML: 400; 400; 40 SUSPENSION ORAL at 16:50

## 2018-09-13 RX ADMIN — DEXAMETHASONE SODIUM PHOSPHATE 6 MG: 10 INJECTION INTRAMUSCULAR; INTRAVENOUS at 16:53

## 2018-09-13 RX ADMIN — ONDANSETRON HYDROCHLORIDE 4 MG: 2 INJECTION, SOLUTION INTRAMUSCULAR; INTRAVENOUS at 16:53

## 2018-09-13 RX ADMIN — ACETAMINOPHEN AND CODEINE PHOSPHATE 1 TABLET: 300; 30 TABLET ORAL at 18:01

## 2018-09-13 NOTE — ED NOTES
While provider in room, Patient requesting dilaudid by name for her pain.     Sherry Palomares, RN  09/13/18 1007

## 2018-09-13 NOTE — ED PROVIDER NOTES
Subjective   Patient presents to emergency department for abdominal pain, back pain since last Tuesday after lifting some heavy luggage.  Was given flexeril at urgent care while out of town.  Abdominal pain started 3 days ago and described as burning.                History provided by:  Patient   used: No    Abdominal Pain   Pain location:  Generalized  Pain quality: burning    Pain radiates to:  Does not radiate  Pain severity:  Moderate  Onset quality:  Gradual  Duration:  7 days  Timing:  Constant  Progression:  Worsening  Chronicity:  New  Associated symptoms: no chest pain, no chills, no dysuria, no fever, no nausea, no shortness of breath, no sore throat and no vomiting    Back Pain   Location:  Lumbar spine  Quality:  Aching  Radiates to:  Does not radiate  Pain severity:  Moderate  Onset quality:  Sudden  Duration:  1 week  Timing:  Constant  Progression:  Worsening  Chronicity:  New  Context: lifting heavy objects    Context: not falling, not MVA, not recent injury and not twisting    Relieved by:  Nothing  Associated symptoms: abdominal pain    Associated symptoms: no abdominal swelling, no bladder incontinence, no bowel incontinence, no chest pain, no dysuria, no fever, no headaches, no leg pain, no numbness, no paresthesias, no pelvic pain, no perianal numbness, no tingling, no weakness and no weight loss    Risk factors: hx of osteoporosis        Review of Systems   Constitutional: Negative for chills, fever and weight loss.   HENT: Negative for sore throat and trouble swallowing.    Eyes: Negative for visual disturbance.   Respiratory: Negative for shortness of breath and wheezing.    Cardiovascular: Negative for chest pain.   Gastrointestinal: Positive for abdominal pain. Negative for bowel incontinence, nausea and vomiting.   Genitourinary: Negative for bladder incontinence, dysuria, flank pain and pelvic pain.   Musculoskeletal: Positive for back pain. Negative for joint  swelling, neck pain and neck stiffness.   Skin: Negative for color change.   Allergic/Immunologic: Negative for immunocompromised state.   Neurological: Negative for tingling, weakness, numbness, headaches and paresthesias.   Hematological: Does not bruise/bleed easily.   Psychiatric/Behavioral: Negative for confusion.       Past Medical History:   Diagnosis Date   • Abdominal pain    • Anemia    • Chronic post-traumatic headache      rebound      • Depressive disorder    • Encounter for gynecological examination    • Gastroesophageal reflux disease    • Generalized anxiety disorder    • History of mammogram 08/2008    MAMMOGRAM DIAGNOSTIC BILATERAL 95501 ([facility]) (1)     • Hyperlipidemia    • Injury of back    • Nonruptured cerebral aneurysm    • Osteoporosis    • Posttraumatic stress disorder    • Seizure (CMS/HCC)     Psychogenic non-epileptic sz    • Viral hepatitis A        Allergies   Allergen Reactions   • Augmentin [Amoxicillin-Pot Clavulanate]      Hives   • Ciprofloxacin      Cipro:  Rash   • Fiorinal [Butalbital-Aspirin-Caffeine]      Rapid heart rate   • Imitrex [Sumatriptan]      Rapid heart rate   • Iodine      Anaphylaxis   • Other      MIDRIN  -  Rapid heart rate   • Percocet [Oxycodone-Acetaminophen]    • Toradol [Ketorolac Tromethamine]      Anaphylaxis   • Ultram [Tramadol]      Rapid heart rate   • Ibuprofen Rash       Past Surgical History:   Procedure Laterality Date   • APPENDECTOMY     • BREAST BIOPSY     • CHOLECYSTECTOMY     • CRANIOTOMY FOR ANEURYSM  2003   • PAP SMEAR  08/16/2012   • TONSILLECTOMY         Family History   Problem Relation Age of Onset   • Constipation Mother    • Heart disease Mother    • Hypertension Mother    • Anxiety disorder Mother    • Alcohol abuse Father    • Anxiety disorder Brother    • Kidney disease Brother    • Hypertension Brother    • Arthritis Brother    • Anxiety disorder Brother    • Kidney disease Brother    • Diabetes Brother    • Anxiety disorder Brother  "   • Hypertension Brother    • Breast cancer Paternal Aunt        Social History     Social History   • Marital status: Legally      Social History Main Topics   • Smoking status: Light Tobacco Smoker     Packs/day: 0.50     Years: 43.00     Types: Cigarettes   • Smokeless tobacco: Never Used   • Alcohol use No   • Drug use: No   • Sexual activity: No     Other Topics Concern   • Not on file           Objective      /65 (BP Location: Left arm, Patient Position: Lying)   Pulse 72   Temp 97.4 °F (36.3 °C)   Resp 20   Ht 157.5 cm (62\")   Wt 49.4 kg (108 lb 14.4 oz)   SpO2 99%   BMI 19.92 kg/m²     Physical Exam   Constitutional: She is oriented to person, place, and time. She appears well-developed and well-nourished. No distress.   HENT:   Head: Normocephalic and atraumatic.   Eyes: Conjunctivae are normal.   Cardiovascular: Normal rate, regular rhythm, normal heart sounds and intact distal pulses.    Pulmonary/Chest: Effort normal and breath sounds normal. No respiratory distress. She has no wheezes.   Abdominal: Soft. Normal appearance and bowel sounds are normal. She exhibits no distension. There is generalized tenderness. There is no rigidity, no rebound, no guarding and no CVA tenderness.   Musculoskeletal: She exhibits no edema.        Lumbar back: She exhibits tenderness. She exhibits normal range of motion, no swelling, no edema, no deformity and no pain.        Back:    Ambulates without pain   Neurological: She is alert and oriented to person, place, and time.   Skin: Skin is warm. Capillary refill takes less than 2 seconds.   Psychiatric: She has a normal mood and affect. Her behavior is normal.   Flattened affect   Nursing note and vitals reviewed.      Procedures           ED Course  ED Course as of Sep 13 1746   Thu Sep 13, 2018   1622 When asked what she normally takes for pain she states she can only take Dilaudid.  [CORNELIO]   7995 Discussed results patient.  No concerning findings, " vital stable.  Recommend follow-up with primary care provider for further symptoms.  [CORNELIO]      ED Course User Index  [CORNELIO] Manav Millan PA-C      Results for orders placed or performed during the hospital encounter of 09/13/18   Urinalysis With Microscopic If Indicated (No Culture) - Urine, Clean Catch   Result Value Ref Range    Color, UA Yellow Yellow, Straw, Dark Yellow, Paige    Appearance, UA Clear Clear    pH, UA 7.5 5.0 - 9.0    Specific Gravity, UA 1.012 1.003 - 1.030    Glucose, UA Negative Negative    Ketones, UA Negative Negative    Bilirubin, UA Negative Negative    Blood, UA Negative Negative    Protein, UA Negative Negative    Leuk Esterase, UA Trace (A) Negative    Nitrite, UA Negative Negative    Urobilinogen, UA 0.2 E.U./dL 0.2 - 1.0 E.U./dL   Urinalysis, Microscopic Only - Urine, Clean Catch   Result Value Ref Range    RBC, UA None Seen None Seen /HPF    WBC, UA 0-2 None Seen, 0-2, 3-5 /HPF    Bacteria, UA None Seen None Seen /HPF    Squamous Epithelial Cells, UA None Seen None Seen, 0-2 /HPF    Hyaline Casts, UA None Seen None Seen /LPF    Methodology Automated Microscopy    Comprehensive Metabolic Panel   Result Value Ref Range    Glucose 94 60 - 100 mg/dL    BUN 13 7 - 21 mg/dL    Creatinine 0.88 0.50 - 1.00 mg/dL    Sodium 136 (L) 137 - 145 mmol/L    Potassium 4.0 3.5 - 5.1 mmol/L    Chloride 102 95 - 110 mmol/L    CO2 25.0 22.0 - 31.0 mmol/L    Calcium 8.8 8.4 - 10.2 mg/dL    Total Protein 7.1 6.3 - 8.6 g/dL    Albumin 4.00 3.40 - 4.80 g/dL    ALT (SGPT) 31 9 - 52 U/L    AST (SGOT) 69 (H) 14 - 36 U/L    Alkaline Phosphatase 84 38 - 126 U/L    Total Bilirubin 0.3 0.2 - 1.3 mg/dL    eGFR Non  Amer 66 45 - 104 mL/min/1.73    Globulin 3.1 2.3 - 3.5 gm/dL    A/G Ratio 1.3 1.1 - 1.8 g/dL    BUN/Creatinine Ratio 14.8 7.0 - 25.0    Anion Gap 9.0 5.0 - 15.0 mmol/L   Lipase   Result Value Ref Range    Lipase 28 23 - 300 U/L   CBC Auto Differential   Result Value Ref Range    WBC 10.66 (H)  3.20 - 9.80 10*3/mm3    RBC 4.12 3.77 - 5.16 10*6/mm3    Hemoglobin 13.1 12.0 - 15.5 g/dL    Hematocrit 39.0 35.0 - 45.0 %    MCV 94.7 80.0 - 98.0 fL    MCH 31.8 26.5 - 34.0 pg    MCHC 33.6 31.4 - 36.0 g/dL    RDW 14.2 11.5 - 14.5 %    RDW-SD 49.1 (H) 36.4 - 46.3 fl    MPV 10.1 8.0 - 12.0 fL    Platelets 288 150 - 450 10*3/mm3    Neutrophil % 56.2 37.0 - 80.0 %    Lymphocyte % 32.6 10.0 - 50.0 %    Monocyte % 8.9 0.0 - 12.0 %    Eosinophil % 1.7 0.0 - 7.0 %    Basophil % 0.3 0.0 - 2.0 %    Immature Grans % 0.3 0.0 - 0.5 %    Neutrophils, Absolute 5.99 2.00 - 8.60 10*3/mm3    Lymphocytes, Absolute 3.48 0.60 - 4.20 10*3/mm3    Monocytes, Absolute 0.95 (H) 0.00 - 0.90 10*3/mm3    Eosinophils, Absolute 0.18 0.00 - 0.70 10*3/mm3    Basophils, Absolute 0.03 0.00 - 0.20 10*3/mm3    Immature Grans, Absolute 0.03 (H) 0.00 - 0.02 10*3/mm3   Light Blue Top   Result Value Ref Range    Extra Tube hold for add-on    Green Top (Gel)   Result Value Ref Range    Extra Tube Hold for add-ons.    Lavender Top   Result Value Ref Range    Extra Tube hold for add-on    Gold Top - SST   Result Value Ref Range    Extra Tube Hold for add-ons.      Xr Spine Lumbar 2 Or 3 View    Result Date: 9/13/2018  Narrative: EXAM:        Radiograph(s)   REGION:        Spine, lumbar    VIEWS:       3        INDICATION:      low back pain, osteoporosis COMPARISON:    none        FINDINGS:          - Fracture(s):      none      - Alignment:      within normal limits        - Disc space heights:    within normal limits for age        - Focal osteolytic/blastic: none       - Bone density:    grossly within normal limits for age      - misc.:      mild degenerative facet changes    .       Impression: CONCLUSION:      1.  Negative examination for age.           If pain or symptoms persist beyond reasonable expectations, suggest MRI as is deemed clinically appropriate.                        Electronically signed by:  LG Werner MD  9/13/2018 5:28 PM CDT  Workstation: [id number]    Xr Abdomen Kub    Result Date: 9/13/2018  Narrative: EXAM:      Radiograph(s), Abdomen     VIEWS:    1 DATE/TIME:   9/13/2018 5:28 PM CDT    INDICATION:      abdominal pain, nausea COMPARISON:   none             FINDINGS:         - bowel gas pattern:    nonobstructive     - free air:    none     - soft tissue:    No gross evidence of organomegaly, an abdominal mass, ascites     - calculi:    none projecting over gallbladder fossa, renal shadows, or anticipated course of the ureters.     - osseous:      limited assessment, unremarkable for age.     - misc.:      Status post cholecystectomy     .         Impression: CONCLUSION:         1. Non-obstructed bowel gas pattern.         If pain or symptoms persist beyond reasonable expectations, a contrast enhanced CT examination is suggested, as is deemed clinical appropriate.                          Electronically signed by:  LG Werner MD  9/13/2018 5:29 PM CDT Workstation: [id number]                 [facility]      Final diagnoses:   Acute bilateral low back pain without sciatica   Generalized abdominal pain            Manav Millan PA-C  09/13/18 [id number]

## 2018-09-19 ENCOUNTER — OFFICE VISIT (OUTPATIENT)
Dept: FAMILY MEDICINE CLINIC | Facility: CLINIC | Age: 58
End: 2018-09-19

## 2018-09-19 VITALS
TEMPERATURE: 99.1 F | DIASTOLIC BLOOD PRESSURE: 72 MMHG | OXYGEN SATURATION: 100 % | SYSTOLIC BLOOD PRESSURE: 118 MMHG | HEIGHT: 62 IN | HEART RATE: 91 BPM | BODY MASS INDEX: 20.24 KG/M2 | WEIGHT: 110 LBS

## 2018-09-19 DIAGNOSIS — Z12.12 SCREENING FOR COLORECTAL CANCER: ICD-10-CM

## 2018-09-19 DIAGNOSIS — M54.50 CHRONIC MIDLINE LOW BACK PAIN WITHOUT SCIATICA: ICD-10-CM

## 2018-09-19 DIAGNOSIS — Z23 NEED FOR SHINGLES VACCINE: ICD-10-CM

## 2018-09-19 DIAGNOSIS — L98.9 SKIN LESION: ICD-10-CM

## 2018-09-19 DIAGNOSIS — G89.29 CHRONIC MIDLINE LOW BACK PAIN WITHOUT SCIATICA: ICD-10-CM

## 2018-09-19 DIAGNOSIS — Z00.00 ENCOUNTER FOR MEDICARE ANNUAL WELLNESS EXAM: Primary | ICD-10-CM

## 2018-09-19 DIAGNOSIS — F17.200 TOBACCO DEPENDENCE SYNDROME: ICD-10-CM

## 2018-09-19 DIAGNOSIS — G25.81 RESTLESS LEG SYNDROME: ICD-10-CM

## 2018-09-19 DIAGNOSIS — F41.0 PANIC DISORDER WITHOUT AGORAPHOBIA: ICD-10-CM

## 2018-09-19 DIAGNOSIS — Z12.11 SCREENING FOR COLORECTAL CANCER: ICD-10-CM

## 2018-09-19 PROCEDURE — G0438 PPPS, INITIAL VISIT: HCPCS | Performed by: FAMILY MEDICINE

## 2018-09-19 PROCEDURE — 17110 DESTRUCTION B9 LES UP TO 14: CPT | Performed by: FAMILY MEDICINE

## 2018-09-19 PROCEDURE — 99214 OFFICE O/P EST MOD 30 MIN: CPT | Performed by: FAMILY MEDICINE

## 2018-09-19 RX ORDER — ROPINIROLE 2 MG/1
2 TABLET, FILM COATED ORAL NIGHTLY
Qty: 30 TABLET | Refills: 5 | Status: SHIPPED | OUTPATIENT
Start: 2018-09-19 | End: 2019-03-08 | Stop reason: SDUPTHER

## 2018-09-19 RX ORDER — LIDOCAINE 50 MG/G
1 PATCH TOPICAL EVERY 24 HOURS
Qty: 30 PATCH | Refills: 1 | Status: SHIPPED | OUTPATIENT
Start: 2018-09-19 | End: 2018-10-10 | Stop reason: SDUPTHER

## 2018-09-19 RX ORDER — CLONAZEPAM 0.5 MG/1
0.5 TABLET ORAL DAILY PRN
Qty: 15 TABLET | Refills: 0 | Status: SHIPPED | OUTPATIENT
Start: 2018-09-19 | End: 2018-11-14 | Stop reason: SDUPTHER

## 2018-09-19 NOTE — PROGRESS NOTES
QUICK REFERENCE INFORMATION:  The ABCs of the Annual Wellness Visit    Initial Medicare Wellness Visit    HEALTH RISK ASSESSMENT    1960    Recent Hospitalizations:  No hospitalization(s) within the last year..        Current Medical Providers:  Patient Care Team:  Sonia Mata MD as PCP - General (Family Medicine)        Smoking Status:  History   Smoking Status   • Light Tobacco Smoker   • Packs/day: 0.50   • Years: 43.00   • Types: Cigarettes   Smokeless Tobacco   • Never Used       Alcohol Consumption:  History   Alcohol Use No       Depression Screen:   PHQ-2/PHQ-9 Depression Screening 9/19/2018   Little interest or pleasure in doing things 1   Feeling down, depressed, or hopeless 1   Trouble falling or staying asleep, or sleeping too much 3   Feeling tired or having little energy 1   Poor appetite or overeating 1   Feeling bad about yourself - or that you are a failure or have let yourself or your family down 1   Trouble concentrating on things, such as reading the newspaper or watching television 1   Moving or speaking so slowly that other people could have noticed. Or the opposite - being so fidgety or restless that you have been moving around a lot more than usual 0   Thoughts that you would be better off dead, or of hurting yourself in some way 0   Total Score 9       Health Habits and Functional and Cognitive Screening:  Functional & Cognitive Status 9/19/2018   Do you have difficulty preparing food and eating? No   Do you have difficulty bathing yourself, getting dressed or grooming yourself? No   Do you have difficulty using the toilet? No   Do you have difficulty moving around from place to place? No   Do you have trouble with steps or getting out of a bed or a chair? No   In the past year have you fallen or experienced a near fall? No   Current Diet Well Balanced Diet   Dental Exam Up to date   Eye Exam Not up to date   Exercise (times per week) 3 times per week   Current Exercise  Activities Include Housecleaning   Do you need help using the phone?  No   Are you deaf or do you have serious difficulty hearing?  No   Do you need help with transportation? No   Do you need help shopping? No   Do you need help preparing meals?  No   Do you need help with housework?  No   Do you need help with laundry? No   Do you need help taking your medications? No   Do you need help managing money? No   Do you ever drive or ride in a car without wearing a seat belt? No   Have you felt unusual stress, anger or loneliness in the last month? No   Who do you live with? Sibling   If you need help, do you have trouble finding someone available to you? No   Have you been bothered in the last four weeks by sexual problems? No   Do you have difficulty concentrating, remembering or making decisions? No           Does the patient have evidence of cognitive impairment? No    Asiprin use counseling: Does not need ASA (and currently is not on it)      Recent Lab Results:    Visual Acuity:  No exam data present    Age-appropriate Screening Schedule:  Refer to the list below for future screening recommendations based on patient's age, sex and/or medical conditions. Orders for these recommended tests are listed in the plan section. The patient has been provided with a written plan.    Health Maintenance   Topic Date Due   • ZOSTER VACCINE (1 of 2) 01/21/2008   • COLONOSCOPY  01/30/2017   • INFLUENZA VACCINE  08/01/2018   • LIPID PANEL  03/15/2019   • DXA SCAN  03/31/2019   • MAMMOGRAM  04/02/2019   • PAP SMEAR  04/02/2021   • TDAP/TD VACCINES (2 - Td) 05/10/2026   • PNEUMOCOCCAL VACCINE (19-64 MEDIUM RISK)  Completed        Subjective   History of Present Illness    Tricia Loyola is a 58 y.o. female who presents for an Annual Wellness Visit.    The following portions of the patient's history were reviewed and updated as appropriate: allergies, current medications, past family history, past medical history, past social  history, past surgical history and problem list.    Outpatient Medications Prior to Visit   Medication Sig Dispense Refill   • albuterol (VENTOLIN HFA) 108 (90 Base) MCG/ACT inhaler Inhale 2 puffs Every 6 (Six) Hours. 1 inhaler 0   • amitriptyline (ELAVIL) 75 MG tablet TAKE ONE TABLET BY MOUTH AT BEDTIME. 30 tablet 2   • atorvastatin (LIPITOR) 20 MG tablet TAKE ONE TABLET BY MOUTH EVERY DAY. 30 tablet 3   • atorvastatin (LIPITOR) 20 MG tablet Take 1 tablet by mouth Daily. 30 tablet 5   • busPIRone (BUSPAR) 30 MG tablet Take 1 tablet by mouth 2 (Two) Times a Day. 60 tablet 0   • cetirizine (zyrTEC) 10 MG tablet Take 1 tablet by mouth Daily. 30 tablet 0   • CloNIDine (CATAPRES) 0.1 MG tablet Take 1 tablet by mouth 2 (Two) Times a Day. 60 tablet 5   • dexlansoprazole (DEXILANT) 60 MG capsule Take 1 capsule by mouth Daily. 90 capsule 1   • Melatonin 10 MG tablet Take 20 mg by mouth Every Night.     • metoprolol succinate XL (TOPROL-XL) 50 MG 24 hr tablet TAKE ONE TABLET BY MOUTH DAILY 90 tablet 1   • ondansetron (ZOFRAN) 4 MG tablet Take 1 tablet by mouth Every 8 (Eight) Hours As Needed for Nausea or Vomiting. 90 tablet 5   • ondansetron ODT (ZOFRAN-ODT) 4 MG disintegrating tablet Take 1 tablet by mouth Every 6 (Six) Hours As Needed for Nausea or Vomiting. 12 tablet 0   • OXcarbazepine (TRILEPTAL) 150 MG tablet Take 150 mg by mouth Daily. Daily at bedtime     • prochlorperazine (COMPAZINE) 10 MG tablet Take 10 mg by mouth Every 6 (Six) Hours As Needed for Nausea or Vomiting.     • raNITIdine (ZANTAC) 150 MG tablet Take 1 tablet by mouth Daily. 30 tablet 5   • topiramate (TOPAMAX) 50 MG tablet Take 50 mg by mouth 2 (Two) Times a Day.     • clonazePAM (KLONOPIN) 0.5 MG tablet Take 1 tablet by mouth Daily As Needed for Anxiety (panic attacks). 15 tablet 0   • rOPINIRole (REQUIP) 1 MG tablet Take 1 tablet by mouth Every Night. Take 1 hour before bedtime. 30 tablet 5     No facility-administered medications prior to visit.   "      Patient Active Problem List   Diagnosis   • Tobacco dependence syndrome   • Moderate episode of recurrent major depressive disorder (CMS/HCC)   • PRISCILLA (generalized anxiety disorder)   • PTSD (post-traumatic stress disorder)   • Gastroesophageal reflux disease with esophagitis   • Brain aneurysm   • Hyperlipidemia   • Primary osteoarthritis of right knee   • Osteoporosis   • Sinus tachycardia   • History of seizure disorder   • Restless leg syndrome   • Chronic midline thoracic back pain   • Chronic midline low back pain without sciatica   • Panic disorder without agoraphobia       Advance Care Planning:  has NO advance directive - information provided to the patient today    Identification of Risk Factors:  Risk factors include: weight , unhealthy diet, cardiovascular risk, inactivity, tobacco use and chronic pain.    Review of Systems    Compared to one year ago, the patient feels her physical health is the same.  Compared to one year ago, the patient feels her mental health is the same.    Objective     Physical Exam    Vitals:    09/19/18 1521   BP: 118/72   Pulse: 91   Temp: 99.1 °F (37.3 °C)   SpO2: 100%   Weight: 49.9 kg (110 lb)   Height: 157.5 cm (62.01\")   PainSc:   5       Patient's Body mass index is 20.11 kg/m². BMI is within normal parameters. No follow-up required.      Assessment/Plan   Patient Self-Management and Personalized Health Advice  The patient has been provided with information about: diet, exercise, weight management, prevention of cardiac or vascular disease, the relationship between weight and GERD, tobacco cessation, fall prevention and designing advance directives and preventive services including:   · Advance directive, Exercise counseling provided, Fall Risk assessment done, Fall Risk plan of care done, Nutrition counseling provided, Pneumococcal vaccine , Smoking cessation counseling completed, Zostavax vaccine (Herpes Zoster).    Visit Diagnoses:    ICD-10-CM ICD-9-CM   1. " Encounter for Medicare annual wellness exam Z00.00 V70.0   2. Panic disorder without agoraphobia F41.0 300.01   3. Restless leg syndrome G25.81 333.94   4. Tobacco dependence syndrome F17.200 305.1   5. Screening for colorectal cancer Z12.11 V76.51    Z12.12    6. Need for shingles vaccine Z23 V04.89   7. Chronic midline low back pain without sciatica M54.5 724.2    G89.29 338.29       Orders Placed This Encounter   Procedures   • Ambulatory Referral to General Surgery     Referral Priority:   Routine     Referral Type:   Consultation     Referral Reason:   Specialty Services Required     Referred to Provider:   Meet Mcintyre MD     Requested Specialty:   General Surgery     Number of Visits Requested:   1       Outpatient Encounter Prescriptions as of 9/19/2018   Medication Sig Dispense Refill   • albuterol (VENTOLIN HFA) 108 (90 Base) MCG/ACT inhaler Inhale 2 puffs Every 6 (Six) Hours. 1 inhaler 0   • amitriptyline (ELAVIL) 75 MG tablet TAKE ONE TABLET BY MOUTH AT BEDTIME. 30 tablet 2   • atorvastatin (LIPITOR) 20 MG tablet TAKE ONE TABLET BY MOUTH EVERY DAY. 30 tablet 3   • atorvastatin (LIPITOR) 20 MG tablet Take 1 tablet by mouth Daily. 30 tablet 5   • busPIRone (BUSPAR) 30 MG tablet Take 1 tablet by mouth 2 (Two) Times a Day. 60 tablet 0   • cetirizine (zyrTEC) 10 MG tablet Take 1 tablet by mouth Daily. 30 tablet 0   • CloNIDine (CATAPRES) 0.1 MG tablet Take 1 tablet by mouth 2 (Two) Times a Day. 60 tablet 5   • dexlansoprazole (DEXILANT) 60 MG capsule Take 1 capsule by mouth Daily. 90 capsule 1   • Melatonin 10 MG tablet Take 20 mg by mouth Every Night.     • metoprolol succinate XL (TOPROL-XL) 50 MG 24 hr tablet TAKE ONE TABLET BY MOUTH DAILY 90 tablet 1   • ondansetron (ZOFRAN) 4 MG tablet Take 1 tablet by mouth Every 8 (Eight) Hours As Needed for Nausea or Vomiting. 90 tablet 5   • ondansetron ODT (ZOFRAN-ODT) 4 MG disintegrating tablet Take 1 tablet by mouth Every 6 (Six) Hours As Needed for Nausea  or Vomiting. 12 tablet 0   • OXcarbazepine (TRILEPTAL) 150 MG tablet Take 150 mg by mouth Daily. Daily at bedtime     • prochlorperazine (COMPAZINE) 10 MG tablet Take 10 mg by mouth Every 6 (Six) Hours As Needed for Nausea or Vomiting.     • raNITIdine (ZANTAC) 150 MG tablet Take 1 tablet by mouth Daily. 30 tablet 5   • rOPINIRole (REQUIP) 2 MG tablet Take 1 tablet by mouth Every Night. Take 1 hour before bedtime. 30 tablet 5   • topiramate (TOPAMAX) 50 MG tablet Take 50 mg by mouth 2 (Two) Times a Day.     • [DISCONTINUED] clonazePAM (KLONOPIN) 0.5 MG tablet Take 1 tablet by mouth Daily As Needed for Anxiety (panic attacks). 15 tablet 0   • [DISCONTINUED] rOPINIRole (REQUIP) 1 MG tablet Take 1 tablet by mouth Every Night. Take 1 hour before bedtime. 30 tablet 5   • clonazePAM (KLONOPIN) 0.5 MG tablet Take 1 tablet by mouth Daily As Needed for Anxiety (panic attacks). 15 tablet 0   • lidocaine (LIDODERM) 5 % Place 1 patch on the skin as directed by provider Daily. Remove & Discard patch within 12 hours or as directed by MD 30 patch 1     No facility-administered encounter medications on file as of 9/19/2018.        Reviewed use of high risk medication in the elderly: yes  Reviewed for potential of harmful drug interactions in the elderly: yes    Follow Up:  Return in about 4 weeks (around 10/17/2018), or if symptoms worsen or fail to improve.     An After Visit Summary and PPPS with all of these plans were given to the patient.

## 2018-09-19 NOTE — PROGRESS NOTES
Procedure   Cryotherapy, Skin Lesion  Date/Time: 9/19/2018 5:14 PM  Performed by: TIANA RAGSDALE  Authorized by: TIANA RAGSDALE   Local anesthesia used: no    Anesthesia:  Local anesthesia used: no    Sedation:  Patient sedated: no  Patient tolerance: Patient tolerated the procedure well with no immediate complications  Comments: Area treated twice with good coverage on the mid left back

## 2018-09-19 NOTE — PROGRESS NOTES
"Subjective   Chief Complaint   Patient presents with   • Pain     2 months   • Med Refill   • spot on back     Tricia Loyola is a 58 y.o. female.   Pain (2 months); Med Refill; and spot on back    History of Present Illness     Due for a medicare wellness examination    Due for influenza, shingles vaccine    Due for colonoscopy    Had a previous visit to urgent care for acute back pain  She was started on flexeril but had to stop medication  Asking for alternative therapy    Complaining of skin lesion on her back    Complaints of restless leg syndrome not controlled with requp    The following portions of the patient's history were reviewed and updated as appropriate: allergies, current medications, past family history, past medical history, past social history, past surgical history and problem list.    Review of Systems   Constitutional: Negative for appetite change, chills, fatigue and fever.   HENT: Negative for congestion, ear pain, rhinorrhea and sore throat.    Eyes: Negative for pain.   Respiratory: Negative for cough and shortness of breath.    Cardiovascular: Negative for chest pain and palpitations.   Gastrointestinal: Negative for abdominal pain, constipation, diarrhea and nausea.   Genitourinary: Negative for dysuria.   Musculoskeletal: Positive for back pain. Negative for joint swelling and neck pain.   Skin: Negative for rash.        Skin lesion on back   Neurological: Negative for dizziness and headaches.       Objective   /72   Pulse 91   Temp 99.1 °F (37.3 °C)   Ht 157.5 cm (62.01\")   Wt 49.9 kg (110 lb)   SpO2 100%   BMI 20.11 kg/m²   Physical Exam   Constitutional: She is oriented to person, place, and time. She appears well-developed and well-nourished.   HENT:   Head: Normocephalic and atraumatic.   Eyes: Pupils are equal, round, and reactive to light.   Neck: Normal range of motion. Neck supple.   Cardiovascular: Normal rate, regular rhythm and normal heart sounds.  "   Pulmonary/Chest: Effort normal and breath sounds normal. No respiratory distress. She has no wheezes. She has no rales.   Abdominal: Soft. Bowel sounds are normal.   Musculoskeletal:        Lumbar back: She exhibits decreased range of motion and pain.   Neurological: She is alert and oriented to person, place, and time.   Skin: Skin is warm and dry.        Psychiatric: She has a normal mood and affect.   Nursing note and vitals reviewed.      Assessment/Plan   Problems Addressed this Visit        Nervous and Auditory    Chronic midline low back pain without sciatica       Other    Tobacco dependence syndrome    Restless leg syndrome    Relevant Medications    rOPINIRole (REQUIP) 2 MG tablet    Panic disorder without agoraphobia    Relevant Medications    clonazePAM (KLONOPIN) 0.5 MG tablet      Other Visit Diagnoses     Encounter for Medicare annual wellness exam    -  Primary    Screening for colorectal cancer        Relevant Orders    Ambulatory Referral to General Surgery    Need for shingles vaccine            Script for shingles vaccine    Medicare wellness visit done today    Patient's Body mass index is 20.11 kg/m². BMI is within normal parameters. No follow-up required.    The patient has read and signed the [facility] Controlled Substance Contract.  I will continue to see patient for regular follow up appointments.  They are well controlled on their medication.  KATE is updated every 3 months. The patient is aware of the potential for addiction and dependence.  Refilled klonopin PRN    Cryotherapy of skin lesion in office    Referral to general surgery    Adjusted dose of requip for RLS    I advised Tricia of the risks of continuing to use tobacco, and I provided her with tobacco cessation educational materials in the After Visit Summary.     During this visit, I spent <3 minutes counseling the patient regarding tobacco cessation.    Recheck in 4 weeks

## 2018-09-19 NOTE — PATIENT INSTRUCTIONS
Medicare Wellness  Personal Prevention Plan of Service     Date of Office Visit:  2018  Encounter Provider:  Sonia Mata MD  Place of Service:  [facility]  Patient Name: Tricia Loyola  :  1960    As part of the Medicare Wellness portion of your visit today, we are providing you with this personalized preventive plan of services (PPPS). This plan is based upon recommendations of the [place] Preventive Services Task Force (USPSTF) and the Advisory Committee on Immunization Practices (ACIP).    This lists the preventive care services that should be considered, and provides dates of when you are due. Items listed as completed are up-to-date and do not require any further intervention.    Health Maintenance   Topic Date Due   • ZOSTER VACCINE (1 of 2) 2008   • MEDICARE ANNUAL WELLNESS  2017   • COLONOSCOPY  2017   • INFLUENZA VACCINE  2018   • LIPID PANEL  03/15/2019   • DXA SCAN  2019   • MAMMOGRAM  2019   • PAP SMEAR  2021   • TDAP/TD VACCINES (2 - Td) 05/10/2026   • PNEUMOCOCCAL VACCINE (19-64 MEDIUM RISK)  Completed   • HEPATITIS C SCREENING  Completed       Orders Placed This Encounter   Procedures   • Ambulatory Referral to General Surgery     Referral Priority:   Routine     Referral Type:   Consultation     Referral Reason:   Specialty Services Required     Referred to Provider:   Meet Mcintyre MD     Requested Specialty:   General Surgery     Number of Visits Requested:   1       No Follow-up on file.

## 2018-10-05 ENCOUNTER — OFFICE VISIT (OUTPATIENT)
Dept: FAMILY MEDICINE CLINIC | Facility: CLINIC | Age: 58
End: 2018-10-05

## 2018-10-05 VITALS
WEIGHT: 110 LBS | BODY MASS INDEX: 20.24 KG/M2 | HEART RATE: 89 BPM | HEIGHT: 62 IN | DIASTOLIC BLOOD PRESSURE: 90 MMHG | SYSTOLIC BLOOD PRESSURE: 130 MMHG | RESPIRATION RATE: 14 BRPM | OXYGEN SATURATION: 98 %

## 2018-10-05 DIAGNOSIS — Z79.899 LONG TERM USE OF DRUG: ICD-10-CM

## 2018-10-05 DIAGNOSIS — M54.15 RADICULOPATHY OF THORACOLUMBAR REGION: Chronic | ICD-10-CM

## 2018-10-05 DIAGNOSIS — M54.6 CHRONIC MIDLINE THORACIC BACK PAIN: ICD-10-CM

## 2018-10-05 DIAGNOSIS — G89.29 CHRONIC MIDLINE THORACIC BACK PAIN: ICD-10-CM

## 2018-10-05 DIAGNOSIS — F41.0 PANIC DISORDER WITHOUT AGORAPHOBIA: ICD-10-CM

## 2018-10-05 DIAGNOSIS — G40.909 SEIZURE DISORDER (HCC): Chronic | ICD-10-CM

## 2018-10-05 DIAGNOSIS — M80.00XA OSTEOPOROSIS WITH CURRENT PATHOLOGICAL FRACTURE, UNSPECIFIED OSTEOPOROSIS TYPE, INITIAL ENCOUNTER: ICD-10-CM

## 2018-10-05 DIAGNOSIS — Z79.891 ENCOUNTER FOR LONG-TERM USE OF OPIATE ANALGESIC: ICD-10-CM

## 2018-10-05 DIAGNOSIS — M48.50XA COMPRESSION FRACTURE OF VERTEBRA (HCC): Primary | ICD-10-CM

## 2018-10-05 DIAGNOSIS — G89.29 CHRONIC MIDLINE LOW BACK PAIN WITHOUT SCIATICA: ICD-10-CM

## 2018-10-05 DIAGNOSIS — M54.50 CHRONIC MIDLINE LOW BACK PAIN WITHOUT SCIATICA: ICD-10-CM

## 2018-10-05 PROBLEM — I72.9 ANEURYSM (HCC): Status: ACTIVE | Noted: 2018-10-05

## 2018-10-05 PROBLEM — I72.9 ANEURYSM: Status: RESOLVED | Noted: 2018-10-05 | Resolved: 2018-10-05

## 2018-10-05 PROBLEM — G43.909 MIGRAINE: Status: ACTIVE | Noted: 2018-10-05
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
## 2018-10-05 PROBLEM — F51.5 NIGHTMARES ASSOCIATED WITH CHRONIC POST-TRAUMATIC STRESS DISORDER: Status: ACTIVE | Noted: 2018-10-05

## 2018-10-05 PROBLEM — Z72.0 TOBACCO ABUSE: Status: ACTIVE | Noted: 2018-10-05

## 2018-10-05 PROBLEM — H91.90 HEARING LOSS: Status: ACTIVE | Noted: 2018-10-05

## 2018-10-05 PROBLEM — Z90.89 HX OF TONSILLECTOMY: Status: ACTIVE | Noted: 2018-10-05

## 2018-10-05 PROBLEM — I67.1 BRAIN ANEURYSM: Status: RESOLVED | Noted: 2017-01-30 | Resolved: 2018-10-05

## 2018-10-05 PROBLEM — J44.9 COPD (CHRONIC OBSTRUCTIVE PULMONARY DISEASE) (HCC): Status: ACTIVE | Noted: 2018-10-05

## 2018-10-05 PROBLEM — IMO0002 CHRONIC MIGRAINE: Status: ACTIVE | Noted: 2018-10-05

## 2018-10-05 PROBLEM — Z90.49 HX OF CHOLECYSTECTOMY: Status: ACTIVE | Noted: 2018-10-05

## 2018-10-05 PROBLEM — F43.12 NIGHTMARES ASSOCIATED WITH CHRONIC POST-TRAUMATIC STRESS DISORDER: Status: ACTIVE | Noted: 2018-10-05

## 2018-10-05 PROBLEM — R12 HEARTBURN: Status: ACTIVE | Noted: 2018-10-05

## 2018-10-05 LAB
AMPHET+METHAMPHET UR QL: NEGATIVE
BARBITURATES UR QL SCN: NEGATIVE
BENZODIAZ UR QL SCN: POSITIVE
CANNABINOIDS SERPL QL: NEGATIVE
COCAINE UR QL: NEGATIVE
METHADONE UR QL SCN: NEGATIVE
OPIATES UR QL: POSITIVE
OXYCODONE UR QL SCN: NEGATIVE

## 2018-10-05 PROCEDURE — 80307 DRUG TEST PRSMV CHEM ANLYZR: CPT | Performed by: NURSE PRACTITIONER

## 2018-10-05 PROCEDURE — 99214 OFFICE O/P EST MOD 30 MIN: CPT | Performed by: NURSE PRACTITIONER

## 2018-10-05 PROCEDURE — G0480 DRUG TEST DEF 1-7 CLASSES: HCPCS | Performed by: NURSE PRACTITIONER

## 2018-10-05 RX ORDER — HYDROCODONE BITARTRATE AND ACETAMINOPHEN 5; 325 MG/1; MG/1
1 TABLET ORAL EVERY 8 HOURS PRN
Qty: 90 TABLET | Refills: 0 | Status: SHIPPED | OUTPATIENT
Start: 2018-10-05 | End: 2018-10-05 | Stop reason: SDUPTHER

## 2018-10-05 RX ORDER — GABAPENTIN 300 MG/1
CAPSULE ORAL
COMMUNITY
Start: 2018-10-04 | End: 2018-10-05 | Stop reason: DRUGHIGH

## 2018-10-05 RX ORDER — GABAPENTIN 300 MG/1
300 CAPSULE ORAL 2 TIMES DAILY
Qty: 60 CAPSULE | Refills: 0 | Status: SHIPPED | OUTPATIENT
Start: 2018-10-05 | End: 2018-11-19

## 2018-10-05 RX ORDER — HYDROCODONE BITARTRATE AND ACETAMINOPHEN 5; 325 MG/1; MG/1
1 TABLET ORAL EVERY 8 HOURS PRN
Qty: 90 TABLET | Refills: 0 | Status: SHIPPED | OUTPATIENT
Start: 2018-10-05 | End: 2018-11-14 | Stop reason: SDUPTHER

## 2018-10-05 NOTE — PROGRESS NOTES
Subjective   Tricia Loyola is a 58 y.o. female.     Patient of Dr Ibarra presents with acute on chronic thoracolumbar pain with left radiculopathy. There is a pathologic fracture of the spine related to osteoporosis. For some reason there has been a gap in the provision of Reclast infusions for her osteoporosis for several years. She does not know why. She is primary caregiver for her brother who is on chronic hemodialysis and her mother who has dementia and she has little time for her own care. She has had inconsistent PCP relationship for several years and may have simply been lost to followup.      Pain   This is a chronic (acute on chronic) problem. The current episode started more than 1 year ago. The problem occurs constantly. The problem has been rapidly worsening. Pertinent negatives include no chest pain, coughing, fever or rash. The symptoms are aggravated by bending, walking, twisting and standing. She has tried acetaminophen, lying down, NSAIDs, relaxation, position changes, rest, sleep, walking and oral narcotics (gabapentin ordered yesterday by ER provider helps some with the burning, lortab x 1 helped but didnt last long.) for the symptoms.        The following portions of the patient's history were reviewed and updated as appropriate: allergies, current medications, past family history, past medical history, past social history, past surgical history and problem list.    Review of Systems   Constitutional: Negative.  Negative for fever and unexpected weight change.   HENT: Negative.    Eyes: Negative.    Respiratory: Negative.  Negative for cough, chest tightness and shortness of breath.    Cardiovascular: Negative.  Negative for chest pain.   Gastrointestinal: Negative.    Endocrine: Negative.    Genitourinary: Negative.  Negative for dysuria.   Musculoskeletal: Positive for back pain.   Skin: Negative.  Negative for color change, pallor, rash and wound.   Allergic/Immunologic: Negative.   "  Neurological: Negative.    Hematological: Negative.    Psychiatric/Behavioral: Negative for sleep disturbance and suicidal ideas. The patient is nervous/anxious (states history of panic disorder).         Very stressed with care of her mother and a handicapped brother.       Objective    Vitals:    10/05/18 1038   BP: 130/90   Pulse: 89   Resp: 14   SpO2: 98%   Weight: 49.9 kg (110 lb)   Height: 157.5 cm (62\")   PainSc: 10-Worst pain ever       Physical Exam   Constitutional: She is oriented to person, place, and time. She appears well-developed and well-nourished. No distress.   Very thin   HENT:   Head: Normocephalic and atraumatic.   Eyes: Pupils are equal, round, and reactive to light. Conjunctivae and EOM are normal. Right eye exhibits no discharge. Left eye exhibits no discharge. No scleral icterus.   Neck: Normal range of motion. Neck supple. No JVD present. No tracheal deviation present. No thyromegaly present.   Cardiovascular: Normal rate, regular rhythm, normal heart sounds and intact distal pulses.  Exam reveals no gallop and no friction rub.    No murmur heard.  Pulmonary/Chest: Effort normal and breath sounds normal. No stridor. No respiratory distress. She has no wheezes. She has no rales. She exhibits no tenderness.   Abdominal: Soft. Bowel sounds are normal. She exhibits no distension and no mass. There is no tenderness. There is no guarding.   Musculoskeletal: She exhibits tenderness (over paravertebral musculatore of thoracic spine and lower Tspine. ). She exhibits no edema or deformity.   Limited ROM of back due to pain   Lymphadenopathy:     She has no cervical adenopathy.   Neurological: She is alert and oriented to person, place, and time. No cranial nerve deficit. Coordination normal.   Skin: Skin is warm. Capillary refill takes 2 to 3 seconds. She is not diaphoretic. No erythema. No pallor.   Psychiatric: She has a normal mood and affect. Her behavior is normal. Judgment and thought " content normal.   Nursing note and vitals reviewed.      Assessment/Plan   Tricia was seen today for pain.    Diagnoses and all orders for this visit:    Compression fracture of vertebra (CMS/[facility])  Comments:  acute exacerbation of pain due to pathogenic fracture related to osteoporosis  Orders:  -     DEXA Assess Fracture Vertebral  -     Discontinue: HYDROcodone-acetaminophen (NORCO) 5-325 MG per tablet; Take 1 tablet by mouth Every 8 (Eight) Hours As Needed for Moderate Pain  or Severe Pain .  -     Urine Drug Screen - Urine, Clean Catch  -     HYDROcodone-acetaminophen (NORCO) 5-325 MG per tablet; Take 1 tablet by mouth Every 8 (Eight) Hours As Needed for Moderate Pain  or Severe Pain .    Osteoporosis with current pathological fracture, unspecified osteoporosis type, initial encounter  -     DEXA Assess Fracture Vertebral  -     Discontinue: HYDROcodone-acetaminophen (NORCO) 5-325 MG per tablet; Take 1 tablet by mouth Every 8 (Eight) Hours As Needed for Moderate Pain  or Severe Pain .  -     Urine Drug Screen - Urine, Clean Catch  -     HYDROcodone-acetaminophen (NORCO) 5-325 MG per tablet; Take 1 tablet by mouth Every 8 (Eight) Hours As Needed for Moderate Pain  or Severe Pain .    Radiculopathy of thoracolumbar region  Comments:  acute exacerbation of chronic radiculopathy due to pathologic fracture of the spine  Orders:  -     DEXA Assess Fracture Vertebral  -     Discontinue: HYDROcodone-acetaminophen (NORCO) 5-325 MG per tablet; Take 1 tablet by mouth Every 8 (Eight) Hours As Needed for Moderate Pain  or Severe Pain .  -     Urine Drug Screen - Urine, Clean Catch  -     HYDROcodone-acetaminophen (NORCO) 5-325 MG per tablet; Take 1 tablet by mouth Every 8 (Eight) Hours As Needed for Moderate Pain  or Severe Pain .    Panic disorder without agoraphobia  -     DEXA Assess Fracture Vertebral  -     Discontinue: HYDROcodone-acetaminophen (NORCO) 5-325 MG per tablet; Take 1 tablet by mouth Every 8 (Eight) Hours As  Needed for Moderate Pain  or Severe Pain .  -     Urine Drug Screen - Urine, Clean Catch  -     HYDROcodone-acetaminophen (NORCO) 5-325 MG per tablet; Take 1 tablet by mouth Every 8 (Eight) Hours As Needed for Moderate Pain  or Severe Pain .    Chronic midline thoracic back pain  -     DEXA Assess Fracture Vertebral  -     Discontinue: HYDROcodone-acetaminophen (NORCO) 5-325 MG per tablet; Take 1 tablet by mouth Every 8 (Eight) Hours As Needed for Moderate Pain  or Severe Pain .  -     Urine Drug Screen - Urine, Clean Catch  -     HYDROcodone-acetaminophen (NORCO) 5-325 MG per tablet; Take 1 tablet by mouth Every 8 (Eight) Hours As Needed for Moderate Pain  or Severe Pain .    Chronic midline low back pain without sciatica  -     DEXA Assess Fracture Vertebral  -     Discontinue: HYDROcodone-acetaminophen (NORCO) 5-325 MG per tablet; Take 1 tablet by mouth Every 8 (Eight) Hours As Needed for Moderate Pain  or Severe Pain .  -     Urine Drug Screen - Urine, Clean Catch  -     HYDROcodone-acetaminophen (NORCO) 5-325 MG per tablet; Take 1 tablet by mouth Every 8 (Eight) Hours As Needed for Moderate Pain  or Severe Pain .    Encounter for long-term use of opiate analgesic  -     Urine Drug Screen - Urine, Clean Catch    Long term use of drug  -     Urine Drug Screen - Urine, Clean Catch  -     Gabapentin, Urine -    Seizure disorder (CMS/[facility])  Comments:  stable no seizures in years    Other orders  -     gabapentin (NEURONTIN) 300 MG capsule; Take 1 capsule by mouth 2 (Two) Times a Day.      Return in about 1 month (around 11/5/2018).   There are no Patient Instructions on file for this visit.  After results of BMD/ dexa received and reviewed, consider resuming her Reclast infusions.       This document has been electronically signed by UMESH Guzmán on October 12, 2018 12:13 PM

## 2018-10-09 LAB — GABAPENTIN UR-MCNC: 125.7 UG/ML

## 2018-10-10 RX ORDER — LIDOCAINE 50 MG/G
1 PATCH TOPICAL EVERY 24 HOURS
Qty: 30 PATCH | Refills: 1 | Status: SHIPPED | OUTPATIENT
Start: 2018-10-10 | End: 2018-11-14

## 2018-10-12 PROBLEM — F51.5 NIGHTMARES ASSOCIATED WITH CHRONIC POST-TRAUMATIC STRESS DISORDER: Status: RESOLVED | Noted: 2018-10-05 | Resolved: 2018-10-12

## 2018-10-12 PROBLEM — F43.12 NIGHTMARES ASSOCIATED WITH CHRONIC POST-TRAUMATIC STRESS DISORDER: Status: RESOLVED | Noted: 2018-10-05 | Resolved: 2018-10-12

## 2018-10-12 PROBLEM — G40.909 SEIZURE DISORDER: Status: RESOLVED | Noted: 2018-10-05 | Resolved: 2018-10-12

## 2018-10-12 PROBLEM — F33.1 MODERATE EPISODE OF RECURRENT MAJOR DEPRESSIVE DISORDER: Status: RESOLVED | Noted: 2017-01-30 | Resolved: 2018-10-12

## 2018-10-17 ENCOUNTER — PREP FOR SURGERY (OUTPATIENT)
Dept: OTHER | Facility: HOSPITAL | Age: 58
End: 2018-10-17

## 2018-10-17 DIAGNOSIS — Z12.11 SCREEN FOR COLON CANCER: Primary | ICD-10-CM

## 2018-10-17 RX ORDER — DEXTROSE AND SODIUM CHLORIDE 5; .45 G/100ML; G/100ML
100 INJECTION, SOLUTION INTRAVENOUS CONTINUOUS
Status: CANCELLED | OUTPATIENT
Start: 2018-11-26

## 2018-10-19 ENCOUNTER — OFFICE VISIT (OUTPATIENT)
Dept: FAMILY MEDICINE CLINIC | Facility: CLINIC | Age: 58
End: 2018-10-19

## 2018-10-19 VITALS
DIASTOLIC BLOOD PRESSURE: 70 MMHG | BODY MASS INDEX: 20.68 KG/M2 | SYSTOLIC BLOOD PRESSURE: 118 MMHG | OXYGEN SATURATION: 100 % | HEART RATE: 88 BPM | WEIGHT: 112.4 LBS | TEMPERATURE: 98.7 F | HEIGHT: 62 IN

## 2018-10-19 DIAGNOSIS — M80.80XA LOCALIZED OSTEOPOROSIS WITH CURRENT PATHOLOGICAL FRACTURE, INITIAL ENCOUNTER: Primary | ICD-10-CM

## 2018-10-19 DIAGNOSIS — K21.00 GASTROESOPHAGEAL REFLUX DISEASE WITH ESOPHAGITIS: ICD-10-CM

## 2018-10-19 DIAGNOSIS — G89.29 CHRONIC MIDLINE THORACIC BACK PAIN: ICD-10-CM

## 2018-10-19 DIAGNOSIS — Z23 INFLUENZA VACCINE NEEDED: ICD-10-CM

## 2018-10-19 DIAGNOSIS — R23.3 EASY BRUISING: ICD-10-CM

## 2018-10-19 DIAGNOSIS — M54.6 CHRONIC MIDLINE THORACIC BACK PAIN: ICD-10-CM

## 2018-10-19 DIAGNOSIS — M48.50XA COMPRESSION FRACTURE OF VERTEBRA (HCC): ICD-10-CM

## 2018-10-19 PROCEDURE — G0008 ADMIN INFLUENZA VIRUS VAC: HCPCS | Performed by: FAMILY MEDICINE

## 2018-10-19 PROCEDURE — 99214 OFFICE O/P EST MOD 30 MIN: CPT | Performed by: FAMILY MEDICINE

## 2018-10-19 PROCEDURE — 90674 CCIIV4 VAC NO PRSV 0.5 ML IM: CPT | Performed by: FAMILY MEDICINE

## 2018-10-19 RX ORDER — ZOLEDRONIC ACID 0.04 MG/ML
4 INJECTION, SOLUTION INTRAVENOUS ONCE
Status: DISCONTINUED | OUTPATIENT
Start: 2018-10-19 | End: 2019-09-05

## 2018-10-19 RX ORDER — PROCHLORPERAZINE MALEATE 10 MG
10 TABLET ORAL EVERY 6 HOURS PRN
Qty: 120 TABLET | Refills: 5 | Status: SHIPPED | OUTPATIENT
Start: 2018-10-19 | End: 2019-11-11 | Stop reason: SDUPTHER

## 2018-10-19 RX ORDER — DEXLANSOPRAZOLE 60 MG/1
60 CAPSULE, DELAYED RELEASE ORAL DAILY
Qty: 90 CAPSULE | Refills: 1 | Status: SHIPPED | OUTPATIENT
Start: 2018-10-19 | End: 2019-01-30 | Stop reason: SDUPTHER

## 2018-10-19 NOTE — PROGRESS NOTES
Subjective   Chief Complaint   Patient presents with   • Back Pain   • Med Refill   • Flu Vaccine     Tricia Loyola is a 58 y.o. female.   Back Pain; Med Refill; and Flu Vaccine    History of Present Illness     Would like to restart reclast infusions  Currently diagnosed with a compression fracture of the thoracic spine secondary to osteoporosis  She was previously treated with reclast by her last provider but we have been unable to obtain records that indicate this treatment  Her last bone density scan was performed in 2017 which indicated osteopenia and osteoporosis  She states that she could not tolerate the oral medications that she was prescribed and she was never started on an IM injection such as prolia  She is currently using norco PRN, neurontin BID  She was referred to a pain clinic but canceled this appointment and failed to reschedule  She did see Rosario Ceron APRKENNEDY 10/12/18 and she ordered dexa assess fracture    Due for flu vaccine    Due for refills on dexilant and compazine    C/o easy bruising and easily broken skin  Denies use of anticoagulants or aspirin    The following portions of the patient's history were reviewed and updated as appropriate: allergies, current medications, past family history, past medical history, past social history, past surgical history and problem list.    Review of Systems   Constitutional: Negative for appetite change, chills, fatigue and fever.   HENT: Negative for congestion, ear pain, rhinorrhea and sore throat.    Eyes: Negative for pain.   Respiratory: Negative for cough and shortness of breath.    Cardiovascular: Negative for chest pain and palpitations.   Gastrointestinal: Negative for abdominal pain, constipation, diarrhea and nausea.   Genitourinary: Negative for dysuria.   Musculoskeletal: Positive for back pain. Negative for joint swelling and neck pain.   Skin: Negative for rash.   Neurological: Negative for dizziness and headaches.   Hematological:  "Bruises/bleeds easily.       Objective   /70   Pulse 88   Temp 98.7 °F (37.1 °C)   Ht 157.5 cm (62.01\")   Wt 51 kg (112 lb 6.4 oz)   SpO2 100%   BMI 20.55 kg/m²   Physical Exam   Constitutional: She is oriented to person, place, and time. She appears well-developed and well-nourished.   HENT:   Head: Normocephalic and atraumatic.   Eyes: Pupils are equal, round, and reactive to light.   Neck: Normal range of motion. Neck supple.   Cardiovascular: Normal rate, regular rhythm and normal heart sounds.    Pulmonary/Chest: Effort normal and breath sounds normal. No respiratory distress. She has no wheezes. She has no rales.   Abdominal: Soft. Bowel sounds are normal.   Musculoskeletal:        Thoracic back: She exhibits decreased range of motion and pain.   Neurological: She is alert and oriented to person, place, and time.   Skin: Skin is warm and dry.   Psychiatric: She has a normal mood and affect.   Nursing note and vitals reviewed.      Assessment/Plan   Problems Addressed this Visit        Digestive    Gastroesophageal reflux disease with esophagitis    Relevant Medications    dexlansoprazole (DEXILANT) 60 MG capsule       Nervous and Auditory    Chronic midline thoracic back pain       Musculoskeletal and Integument    Osteoporosis - Primary    Relevant Medications    zoledronic acid (ZOMETA) infusion 4 mg/100 mL (premix)    Compression fracture of vertebra (CMS/HCC)      Other Visit Diagnoses     Influenza vaccine needed        Relevant Orders    Flucelvax Quad=>4Years (6958-2841) (Completed)    Easy bruising            Flu vaccine today    Patient's Body mass index is 20.55 kg/m². BMI is within normal parameters. No follow-up required.    Continue with the norco PRN, neurontin  dexa scan ordered - pending approval to schedule    Ordered reclast to see if insurance will approve  Otherwise may try for prolia    Recommended increasing sources of vitamin K to help with bruising    Refilled dexilant, " compazine    Recheck in 3 months

## 2018-11-05 ENCOUNTER — OFFICE VISIT (OUTPATIENT)
Dept: FAMILY MEDICINE CLINIC | Facility: CLINIC | Age: 58
End: 2018-11-05

## 2018-11-05 VITALS
SYSTOLIC BLOOD PRESSURE: 130 MMHG | DIASTOLIC BLOOD PRESSURE: 76 MMHG | HEART RATE: 91 BPM | TEMPERATURE: 98.1 F | WEIGHT: 113.2 LBS | HEIGHT: 62 IN | BODY MASS INDEX: 20.83 KG/M2 | OXYGEN SATURATION: 98 %

## 2018-11-05 DIAGNOSIS — L60.0 INGROWN NAIL OF GREAT TOE OF RIGHT FOOT: Primary | ICD-10-CM

## 2018-11-05 DIAGNOSIS — L60.0 INGROWN NAIL OF GREAT TOE OF LEFT FOOT: ICD-10-CM

## 2018-11-05 PROCEDURE — 99213 OFFICE O/P EST LOW 20 MIN: CPT | Performed by: FAMILY MEDICINE

## 2018-11-05 RX ORDER — RANITIDINE 150 MG/1
150 TABLET ORAL DAILY
Qty: 30 TABLET | Refills: 5 | Status: SHIPPED | OUTPATIENT
Start: 2018-11-05 | End: 2019-03-30 | Stop reason: SDUPTHER

## 2018-11-05 NOTE — PROGRESS NOTES
"Subjective   Chief Complaint   Patient presents with   • Ingrown Toenail     want referral to podiatry   • Med Refill     Tricia Loyola is a 58 y.o. female.   Ingrown Toenail (want referral to podiatry) and Med Refill    History of Present Illness     She has complaints of bilateral ingrown toe nails  Symptoms started last week  Complaints of tenderness along the medial aspect of the right great toe and the medial aspect of the left great toe  She can barely wear shoes due to the pain    The following portions of the patient's history were reviewed and updated as appropriate: allergies, current medications, past family history, past medical history, past social history, past surgical history and problem list.    Review of Systems   Constitutional: Negative for appetite change, chills, fatigue and fever.   HENT: Negative for congestion, ear pain, rhinorrhea and sore throat.    Eyes: Negative for pain.   Respiratory: Negative for cough and shortness of breath.    Cardiovascular: Negative for chest pain and palpitations.   Gastrointestinal: Negative for abdominal pain, constipation, diarrhea and nausea.   Genitourinary: Negative for dysuria.   Musculoskeletal: Negative for back pain, joint swelling and neck pain.        Foot pain   Skin: Negative for rash.        Ingrown nail   Neurological: Negative for dizziness and headaches.       Objective   /76   Pulse 91   Temp 98.1 °F (36.7 °C)   Ht 157.5 cm (62.01\")   Wt 51.3 kg (113 lb 3.2 oz)   SpO2 98%   BMI 20.70 kg/m²   Physical Exam   Constitutional: She is oriented to person, place, and time. She appears well-developed and well-nourished.   HENT:   Head: Normocephalic and atraumatic.   Eyes: Pupils are equal, round, and reactive to light.   Neck: Normal range of motion. Neck supple.   Cardiovascular: Normal rate, regular rhythm and normal heart sounds.    Pulmonary/Chest: Effort normal and breath sounds normal. No respiratory distress. She has no wheezes. " She has no rales.   Abdominal: Soft. Bowel sounds are normal.   Musculoskeletal: Normal range of motion.     Skin Integrity  -  She has right foot ingrown toenail.She has left foot ingrown toenail..  Neurological: She is alert and oriented to person, place, and time.   Skin: Skin is warm and dry.   Psychiatric: She has a normal mood and affect.   Nursing note and vitals reviewed.      Assessment/Plan   Problems Addressed this Visit     None      Visit Diagnoses     Ingrown nail of great toe of right foot    -  Primary    Relevant Orders    Ambulatory Referral to Podiatry    Ingrown nail of great toe of left foot        Relevant Orders    Ambulatory Referral to Podiatry        Referral to podiatry - toe nail removal  No infection noted - suggest foot soaks  Suggested soft surface shoes - slippers or flip flops  Educational handout provided    Recheck as needed

## 2018-11-05 NOTE — PATIENT INSTRUCTIONS
Ingrown Toenail  An ingrown toenail occurs when the corner or sides of your toenail grow into the surrounding skin. The big toe is most commonly affected, but it can happen to any of your toes. If your ingrown toenail is not treated, you will be at risk for infection.  What are the causes?  This condition may be caused by:  · Wearing shoes that are too small or tight.  · Injury or trauma, such as stubbing your toe or having your toe stepped on.  · Improper cutting or care of your toenails.  · Being born with (congenital) nail or foot abnormalities, such as having a nail that is too big for your toe.    What increases the risk?  Risk factors for an ingrown toenail include:  · Age. Your nails tend to thicken as you get older, so ingrown nails are more common in older people.  · Diabetes.  · Cutting your toenails incorrectly.  · Blood circulation problems.    What are the signs or symptoms?  Symptoms may include:  · Pain, soreness, or tenderness.  · Redness.  · Swelling.  · Hardening of the skin surrounding the toe.    Your ingrown toenail may be infected if there is fluid, pus, or drainage.  How is this diagnosed?  An ingrown toenail may be diagnosed by medical history and physical exam. If your toenail is infected, your health care provider may test a sample of the drainage.  How is this treated?  Treatment depends on the severity of your ingrown toenail. Some ingrown toenails may be treated at home. More severe or infected ingrown toenails may require surgery to remove all or part of the nail. Infected ingrown toenails may also be treated with antibiotic medicines.  Follow these instructions at home:  · If you were prescribed an antibiotic medicine, finish all of it even if you start to feel better.  · Soak your foot in warm soapy water for 20 minutes, 3 times per day or as directed by your health care provider.  · Carefully lift the edge of the nail away from the sore skin by wedging a small piece of cotton under  the corner of the nail. This may help with the pain. Be careful not to cause more injury to the area.  · Wear shoes that fit well. If your ingrown toenail is causing you pain, try wearing sandals, if possible.  · Trim your toenails regularly and carefully. Do not cut them in a curved shape. Cut your toenails straight across. This prevents injury to the skin at the corners of the toenail.  · Keep your feet clean and dry.  · If you are having trouble walking and are given crutches by your health care provider, use them as directed.  · Do not pick at your toenail or try to remove it yourself.  · Take medicines only as directed by your health care provider.  · Keep all follow-up visits as directed by your health care provider. This is important.  Contact a health care provider if:  · Your symptoms do not improve with treatment.  Get help right away if:  · You have red streaks that start at your foot and go up your leg.  · You have a fever.  · You have increased redness, swelling, or pain.  · You have fluid, blood, or pus coming from your toenail.  This information is not intended to replace advice given to you by your health care provider. Make sure you discuss any questions you have with your health care provider.  Document Released: 12/15/2001 Document Revised: 05/19/2017 Document Reviewed: 11/11/2015  [employer] Patient Education © 2018 [employer]

## 2018-11-14 ENCOUNTER — OFFICE VISIT (OUTPATIENT)
Dept: FAMILY MEDICINE CLINIC | Facility: CLINIC | Age: 58
End: 2018-11-14

## 2018-11-14 VITALS
OXYGEN SATURATION: 98 % | BODY MASS INDEX: 21.12 KG/M2 | DIASTOLIC BLOOD PRESSURE: 74 MMHG | HEART RATE: 83 BPM | WEIGHT: 114.8 LBS | TEMPERATURE: 97.7 F | HEIGHT: 62 IN | SYSTOLIC BLOOD PRESSURE: 130 MMHG

## 2018-11-14 DIAGNOSIS — F41.0 PANIC DISORDER WITHOUT AGORAPHOBIA: ICD-10-CM

## 2018-11-14 DIAGNOSIS — M54.6 CHRONIC MIDLINE THORACIC BACK PAIN: ICD-10-CM

## 2018-11-14 DIAGNOSIS — M48.50XA COMPRESSION FRACTURE OF VERTEBRA (HCC): Primary | ICD-10-CM

## 2018-11-14 DIAGNOSIS — M80.00XA OSTEOPOROSIS WITH CURRENT PATHOLOGICAL FRACTURE, UNSPECIFIED OSTEOPOROSIS TYPE, INITIAL ENCOUNTER: ICD-10-CM

## 2018-11-14 DIAGNOSIS — M54.15 RADICULOPATHY OF THORACOLUMBAR REGION: Chronic | ICD-10-CM

## 2018-11-14 DIAGNOSIS — G89.29 CHRONIC MIDLINE THORACIC BACK PAIN: ICD-10-CM

## 2018-11-14 PROCEDURE — 99214 OFFICE O/P EST MOD 30 MIN: CPT | Performed by: FAMILY MEDICINE

## 2018-11-14 PROCEDURE — 96372 THER/PROPH/DIAG INJ SC/IM: CPT | Performed by: FAMILY MEDICINE

## 2018-11-14 RX ORDER — HYDROCODONE BITARTRATE AND ACETAMINOPHEN 5; 325 MG/1; MG/1
1 TABLET ORAL EVERY 8 HOURS PRN
Qty: 90 TABLET | Refills: 0 | Status: SHIPPED | OUTPATIENT
Start: 2018-11-14 | End: 2018-11-14 | Stop reason: SDUPTHER

## 2018-11-14 RX ORDER — CLONAZEPAM 0.5 MG/1
0.5 TABLET ORAL DAILY PRN
Qty: 15 TABLET | Refills: 0 | Status: SHIPPED | OUTPATIENT
Start: 2018-11-14 | End: 2019-01-30 | Stop reason: SDUPTHER

## 2018-11-14 RX ORDER — HYDROCODONE BITARTRATE AND ACETAMINOPHEN 5; 325 MG/1; MG/1
1 TABLET ORAL EVERY 8 HOURS PRN
Qty: 90 TABLET | Refills: 0 | Status: SHIPPED | OUTPATIENT
Start: 2018-11-14 | End: 2019-01-07 | Stop reason: SDUPTHER

## 2018-11-14 RX ORDER — TRIAMCINOLONE ACETONIDE 40 MG/ML
80 INJECTION, SUSPENSION INTRA-ARTICULAR; INTRAMUSCULAR ONCE
Status: COMPLETED | OUTPATIENT
Start: 2018-11-14 | End: 2018-11-14

## 2018-11-14 RX ADMIN — TRIAMCINOLONE ACETONIDE 80 MG: 40 INJECTION, SUSPENSION INTRA-ARTICULAR; INTRAMUSCULAR at 13:37

## 2018-11-14 NOTE — PROGRESS NOTES
Subjective   Chief Complaint   Patient presents with   • Back Pain   • Med Refill     Tricia Loyola is a 58 y.o. female.   Back Pain and Med Refill    History of Present Illness     Presents today with acute back pain in the thoracic region  Currently diagnosed with a compression fracture of the thoracic spine secondary to osteoporosis  She was previously treated with reclast by her last provider but we have been unable to obtain records that indicate this treatment  Her last bone density scan was performed in 2017 which indicated osteopenia and osteoporosis  She was prescribed norco by Rosario CALVO but had finished this prescription and was not interested in refilling this at her last appointment  She was also scheduled for pain management but canceled this because she did not want to be on long term pain medication    Due for klonopin refill for anxiety/ panic attacks    She is scheduled to see podiatry for bilateral toe nail removal with Dr Baljit baldwin  She is having a lot of pain with the right great toe     The following portions of the patient's history were reviewed and updated as appropriate: allergies, current medications, past family history, past medical history, past social history, past surgical history and problem list.    Review of Systems   Constitutional: Negative for appetite change, chills, fatigue and fever.   HENT: Negative for congestion, ear pain, rhinorrhea and sore throat.    Eyes: Negative for pain.   Respiratory: Negative for cough and shortness of breath.    Cardiovascular: Negative for chest pain and palpitations.   Gastrointestinal: Negative for abdominal pain, constipation, diarrhea and nausea.   Genitourinary: Negative for dysuria.   Musculoskeletal: Positive for back pain. Negative for joint swelling and neck pain.   Skin: Negative for rash.   Neurological: Negative for dizziness and headaches.       Objective   /74   Pulse 83   Temp 97.7 °F (36.5 °C)   Ht 157.5  "cm (62.01\")   Wt 52.1 kg (114 lb 12.8 oz)   SpO2 98%   BMI 20.99 kg/m²   Physical Exam   Constitutional: She is oriented to person, place, and time. She appears well-developed and well-nourished.   HENT:   Head: Normocephalic and atraumatic.   Eyes: Pupils are equal, round, and reactive to light.   Neck: Normal range of motion. Neck supple.   Cardiovascular: Normal rate, regular rhythm and normal heart sounds.   Pulmonary/Chest: Effort normal and breath sounds normal. No respiratory distress. She has no wheezes. She has no rales.   Abdominal: Soft. Bowel sounds are normal.   Musculoskeletal:        Thoracic back: She exhibits decreased range of motion, tenderness, bony tenderness and pain.   Neurological: She is alert and oriented to person, place, and time.   Skin: Skin is warm and dry.   Psychiatric: She has a normal mood and affect.   Nursing note and vitals reviewed.      Assessment/Plan   Problems Addressed this Visit        Nervous and Auditory    Chronic midline thoracic back pain    Relevant Medications    HYDROcodone-acetaminophen (NORCO) 5-325 MG per tablet    Other Relevant Orders    Ambulatory Referral to Pain Management (Completed)    Radiculopathy of thoracolumbar region    Relevant Medications    triamcinolone acetonide (KENALOG-40) injection 80 mg    HYDROcodone-acetaminophen (NORCO) 5-325 MG per tablet    Other Relevant Orders    Ambulatory Referral to Pain Management (Completed)       Musculoskeletal and Integument    Osteoporosis    Relevant Medications    HYDROcodone-acetaminophen (NORCO) 5-325 MG per tablet    Compression fracture of vertebra (CMS/HCC) - Primary    Relevant Medications    HYDROcodone-acetaminophen (NORCO) 5-325 MG per tablet    Other Relevant Orders    Ambulatory Referral to Pain Management (Completed)       Other    Panic disorder without agoraphobia    Relevant Medications    clonazePAM (KLONOPIN) 0.5 MG tablet    HYDROcodone-acetaminophen (NORCO) 5-325 MG per tablet    "     Restart norco  Refilled klonopin - used only for panic attacks  Kenalog IM today  The patient has read and signed the [facility] Controlled Substance Contract.  I will continue to see patient for regular follow up appointments.  They are well controlled on their medication.  KATE is updated every 3 months. The patient is aware of the potential for addiction and dependence.    Referral to pain management    Call made to podiatry to ask for a sooner appointment    Recheck in 4 weeks

## 2018-11-26 ENCOUNTER — HOSPITAL ENCOUNTER (OUTPATIENT)
Facility: HOSPITAL | Age: 58
Setting detail: HOSPITAL OUTPATIENT SURGERY
Discharge: HOME | End: 2018-11-26
Attending: SURGERY | Admitting: SURGERY

## 2018-11-26 ENCOUNTER — ANESTHESIA (OUTPATIENT)
Dept: GASTROENTEROLOGY | Facility: HOSPITAL | Age: 58
End: 2018-11-26

## 2018-11-26 ENCOUNTER — ANESTHESIA EVENT (OUTPATIENT)
Dept: GASTROENTEROLOGY | Facility: HOSPITAL | Age: 58
End: 2018-11-26

## 2018-11-26 VITALS
HEIGHT: 62 IN | RESPIRATION RATE: 18 BRPM | BODY MASS INDEX: 20.33 KG/M2 | HEART RATE: 75 BPM | OXYGEN SATURATION: 100 % | WEIGHT: 110.5 LBS | SYSTOLIC BLOOD PRESSURE: 116 MMHG | TEMPERATURE: 97.6 F | DIASTOLIC BLOOD PRESSURE: 58 MMHG

## 2018-11-26 DIAGNOSIS — Z12.11 SCREEN FOR COLON CANCER: ICD-10-CM

## 2018-11-26 PROCEDURE — 88305 TISSUE EXAM BY PATHOLOGIST: CPT | Performed by: SURGERY

## 2018-11-26 PROCEDURE — 25010000002 ONDANSETRON PER 1 MG: Performed by: NURSE ANESTHETIST, CERTIFIED REGISTERED

## 2018-11-26 PROCEDURE — 88305 TISSUE EXAM BY PATHOLOGIST: CPT | Performed by: PATHOLOGY

## 2018-11-26 PROCEDURE — 25010000002 PROPOFOL 10 MG/ML EMULSION: Performed by: NURSE ANESTHETIST, CERTIFIED REGISTERED

## 2018-11-26 PROCEDURE — 45380 COLONOSCOPY AND BIOPSY: CPT | Performed by: SURGERY

## 2018-11-26 RX ORDER — ONDANSETRON 2 MG/ML
INJECTION INTRAMUSCULAR; INTRAVENOUS AS NEEDED
Status: DISCONTINUED | OUTPATIENT
Start: 2018-11-26 | End: 2018-11-26 | Stop reason: SURG

## 2018-11-26 RX ORDER — DEXTROSE AND SODIUM CHLORIDE 5; .45 G/100ML; G/100ML
100 INJECTION, SOLUTION INTRAVENOUS CONTINUOUS
Status: DISCONTINUED | OUTPATIENT
Start: 2018-11-26 | End: 2018-11-26 | Stop reason: HOSPADM

## 2018-11-26 RX ORDER — PROPOFOL 10 MG/ML
VIAL (ML) INTRAVENOUS AS NEEDED
Status: DISCONTINUED | OUTPATIENT
Start: 2018-11-26 | End: 2018-11-26 | Stop reason: SURG

## 2018-11-26 RX ORDER — LIDOCAINE HYDROCHLORIDE 10 MG/ML
INJECTION, SOLUTION INFILTRATION; PERINEURAL AS NEEDED
Status: DISCONTINUED | OUTPATIENT
Start: 2018-11-26 | End: 2018-11-26 | Stop reason: SURG

## 2018-11-26 RX ADMIN — DEXTROSE AND SODIUM CHLORIDE 100 ML/HR: 5; 450 INJECTION, SOLUTION INTRAVENOUS at 09:17

## 2018-11-26 RX ADMIN — ONDANSETRON 4 MG: 2 INJECTION INTRAMUSCULAR; INTRAVENOUS at 10:08

## 2018-11-26 RX ADMIN — PROPOFOL 100 MG: 10 INJECTION, EMULSION INTRAVENOUS at 10:08

## 2018-11-26 RX ADMIN — PROPOFOL 20 MG: 10 INJECTION, EMULSION INTRAVENOUS at 10:15

## 2018-11-26 RX ADMIN — PROPOFOL 30 MG: 10 INJECTION, EMULSION INTRAVENOUS at 10:12

## 2018-11-26 RX ADMIN — LIDOCAINE HYDROCHLORIDE 50 MG: 10 INJECTION, SOLUTION INFILTRATION; PERINEURAL at 10:08

## 2018-11-26 NOTE — H&P
No chief complaint on file.      Tricia Loyola is a 58 y.o. female referred today for evaluation for colonoscopy.  She notes no change in bowel habits, no blood in the stool.     Prior Colonoscopy:yes  Prior Polyps:no  Family History of Colon Cancer:no  On anticoagulation:no    Past Surgical History:   Procedure Laterality Date   • APPENDECTOMY     • BREAST BIOPSY     • CHOLECYSTECTOMY     • CRANIOTOMY FOR ANEURYSM  2003   • PAP SMEAR  08/16/2012   • TONSILLECTOMY       Past Medical History:   Diagnosis Date   • Abdominal pain    • Anemia    • Chronic post-traumatic headache      rebound      • Depressive disorder    • Encounter for gynecological examination    • Gastroesophageal reflux disease    • Generalized anxiety disorder    • History of mammogram 08/2008    MAMMOGRAM DIAGNOSTIC BILATERAL 11697 ([facility]) (1)     • Hyperlipidemia    • Injury of back    • Nonruptured cerebral aneurysm    • Osteoporosis    • Posttraumatic stress disorder    • Seizure (CMS/HCC)     Psychogenic non-epileptic sz    • Viral hepatitis A      Social History     Socioeconomic History   • Marital status: Legally      Spouse name: Not on file   • Number of children: Not on file   • Years of education: Not on file   • Highest education level: Not on file   Social Needs   • Financial resource strain: Not on file   • Food insecurity - worry: Not on file   • Food insecurity - inability: Not on file   • Transportation needs - medical: Not on file   • Transportation needs - non-medical: Not on file   Occupational History   • Not on file   Tobacco Use   • Smoking status: Light Tobacco Smoker     Packs/day: 0.50     Years: 43.00     Pack years: 21.50     Types: Cigarettes   • Smokeless tobacco: Never Used   Substance and Sexual Activity   • Alcohol use: No   • Drug use: No   • Sexual activity: No   Other Topics Concern   • Not on file   Social History Narrative   • Not on file     Allergies   Allergen Reactions   • Augmentin  [Amoxicillin-Pot Clavulanate]      Hives   • Ciprofloxacin      Cipro:  Rash   • Fiorinal [Butalbital-Aspirin-Caffeine]      Rapid heart rate   • Imitrex [Sumatriptan]      Rapid heart rate   • Iodine      Anaphylaxis   • Other      MIDRIN  -  Rapid heart rate   • Toradol [Ketorolac Tromethamine]      Anaphylaxis   • Ultram [Tramadol]      Rapid heart rate   • Ibuprofen Rash       Home Medications:  Prior to Admission medications    Medication Sig Start Date End Date Taking? Authorizing Provider   amitriptyline (ELAVIL) 75 MG tablet TAKE ONE TABLET BY MOUTH AT BEDTIME. 1/29/18  Yes Sonia Mata MD   atorvastatin (LIPITOR) 20 MG tablet Take 1 tablet by mouth Daily. 7/19/18  Yes Sonia Mata MD   busPIRone (BUSPAR) 30 MG tablet Take 1 tablet by mouth 2 (Two) Times a Day. 3/7/18  Yes Sonia Mata MD   clonazePAM (KLONOPIN) 0.5 MG tablet Take 1 tablet by mouth Daily As Needed for Anxiety (panic attacks). 11/14/18  Yes Sonia Mata MD   dexlansoprazole (DEXILANT) 60 MG capsule Take 1 capsule by mouth Daily. 10/19/18  Yes Sonia Mata MD   HYDROcodone-acetaminophen (NORCO) 5-325 MG per tablet Take 1 tablet by mouth Every 8 (Eight) Hours As Needed for Moderate Pain  or Severe Pain . 11/14/18  Yes Sonia Mata MD   Melatonin 10 MG tablet Take 20 mg by mouth Every Night.   Yes Doris Alejo MD   metoprolol succinate XL (TOPROL-XL) 50 MG 24 hr tablet TAKE ONE TABLET BY MOUTH DAILY 5/21/18  Yes Sonia Mata MD   ondansetron (ZOFRAN) 4 MG tablet Take 1 tablet by mouth Every 8 (Eight) Hours As Needed for Nausea or Vomiting. 6/22/18  Yes Sonia Mata MD   OXcarbazepine (TRILEPTAL) 150 MG tablet Take 150 mg by mouth Daily. Daily at bedtime   Yes Doris Alejo MD   prochlorperazine (COMPAZINE) 10 MG tablet Take 1 tablet by mouth Every 6 (Six) Hours As Needed for Nausea or Vomiting. 10/19/18  Yes Sonia Mata MD   raNITIdine (ZANTAC) 150  MG tablet Take 1 tablet by mouth Daily. 11/5/18  Yes Sonia Mata MD   rOPINIRole (REQUIP) 2 MG tablet Take 1 tablet by mouth Every Night. Take 1 hour before bedtime. 9/19/18  Yes Sonia Mata MD   topiramate (TOPAMAX) 50 MG tablet Take 50 mg by mouth 2 (Two) Times a Day.   Yes ProviderDoris MD   albuterol (VENTOLIN HFA) 108 (90 Base) MCG/ACT inhaler Inhale 2 puffs Every 6 (Six) Hours. 8/9/18   Sonia Mata MD   CloNIDine (CATAPRES) 0.1 MG tablet Take 1 tablet by mouth 2 (Two) Times a Day. 8/24/17   Sonia Mata MD       Review of Systems   Constitutional: Negative.    HENT: Negative for hearing loss, nosebleeds and trouble swallowing.    Respiratory: Negative for apnea, chest tightness and shortness of breath.    Cardiovascular: Negative for chest pain and palpitations.   Gastrointestinal: Negative for abdominal distention, abdominal pain, blood in stool, constipation, diarrhea, nausea and vomiting.   Genitourinary: Negative for difficulty urinating, dysuria, frequency and urgency.   Musculoskeletal: Negative for back pain, joint swelling and neck pain.   Skin: Negative for rash.   Neurological: Negative for dizziness, seizures, weakness, light-headedness, numbness and headaches.   Hematological: Negative for adenopathy.   Psychiatric/Behavioral: Negative for agitation. The patient is not nervous/anxious.        Vitals:    11/26/18 0902   BP: 140/74   Pulse: 79   Resp: 20   Temp: 98.2 °F (36.8 °C)   SpO2: 100%       Physical Exam   Constitutional: She is oriented to person, place, and time. She appears well-developed and well-nourished. No distress.   HENT:   Head: Normocephalic and atraumatic.   Nose: Nose normal.   Eyes: Conjunctivae are normal.   Neck: Normal range of motion. No tracheal deviation present. No thyromegaly present.   Cardiovascular: Normal rate, regular rhythm and normal heart sounds.   No murmur heard.  Pulmonary/Chest: Effort normal and breath sounds  normal. No respiratory distress. She has no wheezes. She has no rales. She exhibits no tenderness.   Abdominal: Soft. She exhibits no distension. There is no tenderness. There is no rebound and no guarding. No hernia.   Musculoskeletal: She exhibits no tenderness or deformity.   Neurological: She is alert and oriented to person, place, and time.   Skin: Skin is warm and dry. No rash noted.   Psychiatric: She has a normal mood and affect. Her behavior is normal. Judgment and thought content normal.   Vitals reviewed.      Assessment     In need of screening colonoscopy.    Plan     Risks, benefits, rationale and prep for colonoscopy have been discussed with the patient.  The patient indicates understanding of these issues and agrees with the plan.

## 2018-11-26 NOTE — ANESTHESIA POSTPROCEDURE EVALUATION
Patient: Tricia Loyola    Procedure Summary     Date:  11/26/18 Room / Location:  [facility] ENDOSCOPY 2 / [facility]COPY    Anesthesia Start:  1004 Anesthesia Stop:  1030    Procedure:  COLONOSCOPY (N/A ) Diagnosis:       Screen for colon cancer      (Screen for colon cancer [Z12.11])    Surgeon:  Meet Mcintyre MD Provider:  Chano Galdamez CRNA    Anesthesia Type:  MAC ASA Status:  3          Anesthesia Type: MAC  Last vitals  BP   140/74 (11/26/18 0902)   Temp   98.2 °F (36.8 °C) (11/26/18 0902)   Pulse   79 (11/26/18 0902)   Resp   20 (11/26/18 0902)     SpO2   100 % (11/26/18 0902)     Post Anesthesia Care and Evaluation    Patient location during evaluation: bedside  Patient participation: complete - patient participated  Level of consciousness: awake and alert  Pain score: 0  Pain management: adequate  Airway patency: patent  Anesthetic complications: No anesthetic complications  PONV Status: none  Cardiovascular status: acceptable  Respiratory status: acceptable  Hydration status: acceptable

## 2018-11-26 NOTE — ANESTHESIA PREPROCEDURE EVALUATION
Anesthesia Evaluation     Patient summary reviewed and Nursing notes reviewed   NPO Solid Status: > 8 hours  NPO Liquid Status: > 2 hours           Airway   Mallampati: II  TM distance: >3 FB  Neck ROM: full  No difficulty expected  Dental    (+) poor dentition    Pulmonary - normal exam   (+) a smoker Current Abstained day of surgery,   Cardiovascular     Rhythm: regular  Rate: normal        Neuro/Psych  GI/Hepatic/Renal/Endo      Musculoskeletal     Abdominal    Substance History      OB/GYN          Other                        Anesthesia Plan    ASA 3     MAC     intravenous induction   Anesthetic plan, all risks, benefits, and alternatives have been provided, discussed and informed consent has been obtained with: patient.    Plan discussed with CRNA.

## 2018-11-27 LAB
LAB AP CASE REPORT: NORMAL
PATH REPORT.FINAL DX SPEC: NORMAL
PATH REPORT.GROSS SPEC: NORMAL

## 2018-11-29 ENCOUNTER — OFFICE VISIT (OUTPATIENT)
Dept: PODIATRY | Facility: CLINIC | Age: 58
End: 2018-11-29

## 2018-11-29 VITALS — HEIGHT: 62 IN | HEART RATE: 79 BPM | OXYGEN SATURATION: 98 % | WEIGHT: 112 LBS | BODY MASS INDEX: 20.61 KG/M2

## 2018-11-29 DIAGNOSIS — L60.0 INGROWN TOENAIL: Primary | ICD-10-CM

## 2018-11-29 DIAGNOSIS — M79.674 PAIN IN TOES OF BOTH FEET: ICD-10-CM

## 2018-11-29 DIAGNOSIS — M79.675 PAIN IN TOES OF BOTH FEET: ICD-10-CM

## 2018-11-29 PROCEDURE — 99203 OFFICE O/P NEW LOW 30 MIN: CPT | Performed by: PODIATRIST

## 2018-11-29 PROCEDURE — 11750 EXCISION NAIL&NAIL MATRIX: CPT | Performed by: PODIATRIST

## 2018-12-03 ENCOUNTER — OFFICE VISIT (OUTPATIENT)
Dept: SURGERY | Facility: CLINIC | Age: 58
End: 2018-12-03

## 2018-12-03 VITALS
TEMPERATURE: 97 F | BODY MASS INDEX: 20.87 KG/M2 | WEIGHT: 113.4 LBS | HEART RATE: 84 BPM | SYSTOLIC BLOOD PRESSURE: 122 MMHG | HEIGHT: 62 IN | DIASTOLIC BLOOD PRESSURE: 64 MMHG

## 2018-12-03 DIAGNOSIS — Z12.11 ENCOUNTER FOR COLONOSCOPY DUE TO HISTORY OF ADENOMATOUS COLONIC POLYPS: Primary | ICD-10-CM

## 2018-12-03 DIAGNOSIS — Z86.010 ENCOUNTER FOR COLONOSCOPY DUE TO HISTORY OF ADENOMATOUS COLONIC POLYPS: Primary | ICD-10-CM

## 2018-12-03 PROCEDURE — 99212 OFFICE O/P EST SF 10 MIN: CPT | Performed by: SURGERY

## 2018-12-03 RX ORDER — TIZANIDINE 2 MG/1
1 TABLET ORAL
COMMUNITY
Start: 2018-03-16 | End: 2019-01-07

## 2018-12-03 RX ORDER — CYCLOBENZAPRINE HCL 10 MG
TABLET ORAL
Refills: 0 | COMMUNITY
Start: 2018-09-04 | End: 2019-01-07 | Stop reason: SDUPTHER

## 2018-12-03 NOTE — PATIENT INSTRUCTIONS

## 2018-12-03 NOTE — PROGRESS NOTES
58-year-old female here to follow-up after recent screening colonoscopy.  Patient is a good job with her prep and findings were significant for a 5 mm tubular adenomatous polyp removed from her hepatic flexure.  Patient was also noted to have a staple line consistent with the hemorrhoidal pexy in the past.  Patient did well with the procedure she has no complaints.  I would recommend that she have another colonoscopy in 3 years or sooner she has any other concerns or questions.

## 2018-12-10 ENCOUNTER — OFFICE VISIT (OUTPATIENT)
Dept: PODIATRY | Facility: CLINIC | Age: 58
End: 2018-12-10

## 2018-12-10 VITALS — BODY MASS INDEX: 20.8 KG/M2 | HEIGHT: 62 IN | WEIGHT: 113 LBS

## 2018-12-10 DIAGNOSIS — S91.209D AVULSION OF TOENAIL, SUBSEQUENT ENCOUNTER: Primary | ICD-10-CM

## 2018-12-10 DIAGNOSIS — L60.0 INGROWN TOENAIL: ICD-10-CM

## 2018-12-10 PROCEDURE — 99213 OFFICE O/P EST LOW 20 MIN: CPT | Performed by: PODIATRIST

## 2018-12-10 NOTE — PROGRESS NOTES
Tricia Loyola  1960  58 y.o. female   Patient presents to office today for a follow up after having a partial permanent nail avulsion on the right foot. Patient states she has been to urgent care and the ER and her toe is infected.     12/10/2018    Chief Complaint   Patient presents with   • Right Foot - Follow-up       History of Present Illness    Tricia Loyola is a 58 y.o. female who presents for f/u right hallux partial permanent nail avulsion. Continues to have some pain, states it is improving.     Past Medical History:   Diagnosis Date   • Abdominal pain    • Anemia    • Chronic post-traumatic headache      rebound      • Depressive disorder    • Encounter for gynecological examination    • Gastroesophageal reflux disease    • Generalized anxiety disorder    • History of mammogram 08/2008    MAMMOGRAM DIAGNOSTIC BILATERAL 03621 ([facility]) (1)     • Hyperlipidemia    • Ingrown toenail    • Injury of back    • Nonruptured cerebral aneurysm    • Osteoporosis    • Posttraumatic stress disorder    • Seizure (CMS/HCC)     Psychogenic non-epileptic sz    • Viral hepatitis A          Past Surgical History:   Procedure Laterality Date   • APPENDECTOMY     • BREAST BIOPSY     • CHOLECYSTECTOMY     • CRANIOTOMY FOR ANEURYSM  2003   • PAP SMEAR  08/16/2012   • TONSILLECTOMY           Family History   Problem Relation Age of Onset   • Constipation Mother    • Hypertension Mother    • Anxiety disorder Mother    • Heart disease Mother    • Alcohol abuse Father    • Heart disease Father    • Anxiety disorder Brother    • Kidney disease Brother    • Hypertension Brother    • Arthritis Brother    • Anxiety disorder Brother    • Kidney disease Brother    • Diabetes Brother    • Anxiety disorder Brother    • Hypertension Brother    • Breast cancer Paternal Aunt    • Heart disease Maternal Grandmother    • Clotting disorder Maternal Grandmother    • Heart disease Maternal Grandfather          Social History     Socioeconomic  History   • Marital status: Legally      Spouse name: Not on file   • Number of children: Not on file   • Years of education: Not on file   • Highest education level: Not on file   Social Needs   • Financial resource strain: Not on file   • Food insecurity - worry: Not on file   • Food insecurity - inability: Not on file   • Transportation needs - medical: Not on file   • Transportation needs - non-medical: Not on file   Occupational History   • Not on file   Tobacco Use   • Smoking status: Light Tobacco Smoker     Packs/day: 0.50     Years: 43.00     Pack years: 21.50     Types: Cigarettes   • Smokeless tobacco: Never Used   Substance and Sexual Activity   • Alcohol use: No   • Drug use: No   • Sexual activity: No   Other Topics Concern   • Not on file   Social History Narrative   • Not on file         Current Outpatient Medications   Medication Sig Dispense Refill   • albuterol (VENTOLIN HFA) 108 (90 Base) MCG/ACT inhaler Inhale 2 puffs Every 6 (Six) Hours. 1 inhaler 0   • amitriptyline (ELAVIL) 75 MG tablet TAKE ONE TABLET BY MOUTH AT BEDTIME. 30 tablet 2   • atorvastatin (LIPITOR) 20 MG tablet Take 1 tablet by mouth Daily. 30 tablet 5   • busPIRone (BUSPAR) 30 MG tablet Take 1 tablet by mouth 2 (Two) Times a Day. 60 tablet 0   • clonazePAM (KLONOPIN) 0.5 MG tablet Take 1 tablet by mouth Daily As Needed for Anxiety (panic attacks). 15 tablet 0   • CloNIDine (CATAPRES) 0.1 MG tablet Take 1 tablet by mouth 2 (Two) Times a Day. 60 tablet 5   • cyclobenzaprine (FLEXERIL) 10 MG tablet TK 1 T PO TID FOR 5 DAYS PRF MUSCLE SPASM  0   • dexlansoprazole (DEXILANT) 60 MG capsule Take 1 capsule by mouth Daily. 90 capsule 1   • HYDROcodone-acetaminophen (NORCO) 5-325 MG per tablet Take 1 tablet by mouth Every 8 (Eight) Hours As Needed for Moderate Pain  or Severe Pain . 90 tablet 0   • Melatonin 10 MG tablet Take 20 mg by mouth Every Night.     • metoprolol succinate XL (TOPROL-XL) 50 MG 24 hr tablet TAKE ONE TABLET  "BY MOUTH DAILY 90 tablet 1   • mupirocin (BACTROBAN) 2 % ointment Apply  topically to the appropriate area as directed 3 (Three) Times a Day. 30 g 0   • ondansetron (ZOFRAN) 4 MG tablet Take 1 tablet by mouth Every 8 (Eight) Hours As Needed for Nausea or Vomiting. 90 tablet 5   • OXcarbazepine (TRILEPTAL) 150 MG tablet Take 150 mg by mouth Daily. Daily at bedtime     • prochlorperazine (COMPAZINE) 10 MG tablet Take 1 tablet by mouth Every 6 (Six) Hours As Needed for Nausea or Vomiting. 120 tablet 5   • raNITIdine (ZANTAC) 150 MG tablet Take 1 tablet by mouth Daily. 30 tablet 5   • rOPINIRole (REQUIP) 2 MG tablet Take 1 tablet by mouth Every Night. Take 1 hour before bedtime. 30 tablet 5   • sulfamethoxazole-trimethoprim (BACTRIM DS,SEPTRA DS) 800-160 MG per tablet Take 1 tablet by mouth 2 (Two) Times a Day. 14 tablet 0   • tiZANidine (ZANAFLEX) 2 MG tablet Take 1 mg by mouth.     • topiramate (TOPAMAX) 50 MG tablet Take 50 mg by mouth 2 (Two) Times a Day.       Current Facility-Administered Medications   Medication Dose Route Frequency Provider Last Rate Last Dose   • zoledronic acid (ZOMETA) infusion 4 mg/100 mL (premix)  4 mg Intravenous Once Sonia Mata MD             OBJECTIVE    Ht 157.5 cm (62\")   Wt 51.3 kg (113 lb)   BMI 20.67 kg/m²       Review of Systems   Constitutional: Negative.    HENT: Positive for hearing loss.    Eyes: Negative.    Respiratory: Negative.    Cardiovascular: Negative.    Gastrointestinal: Negative.    Endocrine: Negative.    Genitourinary: Negative.    Musculoskeletal: Positive for arthralgias and back pain.   Neurological: Positive for headaches.   Psychiatric/Behavioral: The patient is nervous/anxious.         Anxiety   Depression          Physical Exam   Constitutional: she appears well-developed and well-nourished.   CV: No chest pain. Normal RR  Resp: Non labored respirations  Psychiatric: she has a normal mood and affect. her behavior is normal.      Lower Extremity " Exam:  Vascular: DP/PT pulses palpable 2+.   No hallux edema  Toes warm  Neuro: Protective sensation intact, b/l.  DTRs intact  Integument: No open wounds.  Ingrown left nail border, bilateral hallux. No erythema, edema. +tenderness to palpation.  Right hallux bilateral nail borders c/d/i, minimal erythema. +TTP  Web spaces c/d/i  No skin lesions  Musculoskeletal: LE muscle strength 5/5.   Gait normal  Ankle ROM full without pain or crepitus  STJ ROM full without pain or crepitus  Mild hammertoes 2-5, b/l      Procedures          ASSESSMENT AND PLAN    Tricia was seen today for follow-up.    Diagnoses and all orders for this visit:    Avulsion of toenail, subsequent encounter    Ingrown toenail      -Doing well overall. Pain, erythema improving  -Continue soaks, bandaging for 1 additional week  -Recheck 2 weeks, as needed            This document has been electronically signed by Alfred Smiley DPM on December 16, 2018 4:33 PM     EMR Dragon/Transcription disclaimer:   Much of this encounter note is an electronic transcription/translation of spoken language to printed text. The electronic translation of spoken language may permit erroneous, or at times, nonsensical words or phrases to be inadvertently transcribed; Although I have reviewed the note for such errors, some may still exist.    Alfred Smiley DPM  12/16/2018  4:33 PM

## 2018-12-20 RX ORDER — ALBUTEROL SULFATE 90 UG/1
2 AEROSOL, METERED RESPIRATORY (INHALATION) EVERY 6 HOURS
Qty: 1 INHALER | Refills: 0 | Status: SHIPPED | OUTPATIENT
Start: 2018-12-20 | End: 2019-06-06 | Stop reason: SDUPTHER

## 2018-12-20 RX ORDER — ALBUTEROL SULFATE 90 UG/1
2 AEROSOL, METERED RESPIRATORY (INHALATION) EVERY 6 HOURS
Qty: 1 INHALER | Refills: 0 | Status: SHIPPED | OUTPATIENT
Start: 2018-12-20 | End: 2018-12-20 | Stop reason: SDUPTHER

## 2019-01-07 ENCOUNTER — OFFICE VISIT (OUTPATIENT)
Dept: FAMILY MEDICINE CLINIC | Facility: CLINIC | Age: 59
End: 2019-01-07

## 2019-01-07 VITALS
TEMPERATURE: 97.6 F | DIASTOLIC BLOOD PRESSURE: 80 MMHG | HEART RATE: 87 BPM | OXYGEN SATURATION: 99 % | HEIGHT: 62 IN | WEIGHT: 111.4 LBS | BODY MASS INDEX: 20.5 KG/M2 | RESPIRATION RATE: 14 BRPM | SYSTOLIC BLOOD PRESSURE: 130 MMHG

## 2019-01-07 DIAGNOSIS — M48.50XA COMPRESSION FRACTURE OF VERTEBRA (HCC): ICD-10-CM

## 2019-01-07 DIAGNOSIS — M54.6 CHRONIC MIDLINE THORACIC BACK PAIN: Primary | ICD-10-CM

## 2019-01-07 DIAGNOSIS — R11.0 NAUSEA: ICD-10-CM

## 2019-01-07 DIAGNOSIS — M80.00XA OSTEOPOROSIS WITH CURRENT PATHOLOGICAL FRACTURE, UNSPECIFIED OSTEOPOROSIS TYPE, INITIAL ENCOUNTER: ICD-10-CM

## 2019-01-07 DIAGNOSIS — G89.29 CHRONIC MIDLINE THORACIC BACK PAIN: Primary | ICD-10-CM

## 2019-01-07 DIAGNOSIS — M54.15 RADICULOPATHY OF THORACOLUMBAR REGION: Chronic | ICD-10-CM

## 2019-01-07 PROCEDURE — 96372 THER/PROPH/DIAG INJ SC/IM: CPT | Performed by: FAMILY MEDICINE

## 2019-01-07 PROCEDURE — 99214 OFFICE O/P EST MOD 30 MIN: CPT | Performed by: FAMILY MEDICINE

## 2019-01-07 RX ORDER — METHYLPREDNISOLONE ACETATE 80 MG/ML
80 INJECTION, SUSPENSION INTRA-ARTICULAR; INTRALESIONAL; INTRAMUSCULAR; SOFT TISSUE ONCE
Status: COMPLETED | OUTPATIENT
Start: 2019-01-07 | End: 2019-01-07

## 2019-01-07 RX ORDER — ONDANSETRON 2 MG/ML
4 INJECTION INTRAMUSCULAR; INTRAVENOUS ONCE
Status: COMPLETED | OUTPATIENT
Start: 2019-01-07 | End: 2019-01-07

## 2019-01-07 RX ORDER — HYDROCODONE BITARTRATE AND ACETAMINOPHEN 5; 325 MG/1; MG/1
1 TABLET ORAL EVERY 8 HOURS PRN
Qty: 45 TABLET | Refills: 0 | Status: SHIPPED | OUTPATIENT
Start: 2019-01-07 | End: 2019-01-30 | Stop reason: SDUPTHER

## 2019-01-07 RX ORDER — CYCLOBENZAPRINE HCL 10 MG
10 TABLET ORAL 3 TIMES DAILY PRN
Qty: 90 TABLET | Refills: 0 | Status: SHIPPED | OUTPATIENT
Start: 2019-01-07 | End: 2020-05-28

## 2019-01-07 RX ADMIN — METHYLPREDNISOLONE ACETATE 80 MG: 80 INJECTION, SUSPENSION INTRA-ARTICULAR; INTRALESIONAL; INTRAMUSCULAR; SOFT TISSUE at 11:31

## 2019-01-07 RX ADMIN — ONDANSETRON 4 MG: 2 INJECTION INTRAMUSCULAR; INTRAVENOUS at 11:33

## 2019-01-07 NOTE — PROGRESS NOTES
Subjective   Chief Complaint   Patient presents with   • Back Pain     Tricia Loyola is a 58 y.o. female.   Back Pain    History of Present Illness     Presents today with acute on chronic back pain in the thoracic region  Currently diagnosed with a compression fracture of the thoracic spine secondary to osteoporosis  She was previously treated with reclast by her last provider but we have been unable to obtain records that indicate this treatment  Her last bone density scan was performed in 2017 which indicated osteopenia and osteoporosis  She was scheduled for pain management but her appointment was rescheduled  She was then seen at the emergency room for uncontrolled back pain  She was provided prednisone tablets - she chose not to take this  She was also provided flexeril x 21 pills that she has been taking to help with her back pain  She is emotional today and she does not have an appointment scheduled with pain management yet - she has tried numerous times to contact them    She is asking for a zofran injection today    The following portions of the patient's history were reviewed and updated as appropriate: allergies, current medications, past family history, past medical history, past social history, past surgical history and problem list.    Review of Systems   Constitutional: Negative for appetite change, chills, fatigue and fever.   HENT: Negative for congestion, ear pain, rhinorrhea and sore throat.    Eyes: Negative for pain.   Respiratory: Negative for cough and shortness of breath.    Cardiovascular: Negative for chest pain and palpitations.   Gastrointestinal: Positive for nausea. Negative for abdominal pain, constipation and diarrhea.   Genitourinary: Negative for dysuria.   Musculoskeletal: Positive for back pain. Negative for joint swelling and neck pain.   Skin: Negative for rash.   Neurological: Negative for dizziness and headaches.       Objective   /80   Pulse 87   Temp 97.6 °F (36.4  "°C) (Temporal)   Resp 14   Ht 157.5 cm (62\")   Wt 50.5 kg (111 lb 6.4 oz)   SpO2 99%   Breastfeeding? No   BMI 20.38 kg/m²   Physical Exam   Constitutional: She is oriented to person, place, and time. She appears well-developed and well-nourished.   HENT:   Head: Normocephalic and atraumatic.   Eyes: Pupils are equal, round, and reactive to light.   Neck: Normal range of motion. Neck supple.   Cardiovascular: Normal rate, regular rhythm and normal heart sounds.   Pulmonary/Chest: Effort normal and breath sounds normal. No respiratory distress. She has no wheezes. She has no rales.   Abdominal: Soft. Bowel sounds are normal.   Musculoskeletal:        Thoracic back: She exhibits decreased range of motion, bony tenderness and pain.        Lumbar back: She exhibits decreased range of motion and pain.   Neurological: She is alert and oriented to person, place, and time.   Skin: Skin is warm and dry.   Psychiatric: She has a normal mood and affect.   Nursing note and vitals reviewed.      Assessment/Plan   Problems Addressed this Visit        Nervous and Auditory    Chronic midline thoracic back pain - Primary    Relevant Medications    HYDROcodone-acetaminophen (NORCO) 5-325 MG per tablet    methylPREDNISolone acetate (DEPO-medrol) injection 80 mg    Radiculopathy of thoracolumbar region    Relevant Medications    HYDROcodone-acetaminophen (NORCO) 5-325 MG per tablet       Musculoskeletal and Integument    Osteoporosis    Relevant Medications    HYDROcodone-acetaminophen (NORCO) 5-325 MG per tablet    Compression fracture of vertebra (CMS/HCC)    Relevant Medications    HYDROcodone-acetaminophen (NORCO) 5-325 MG per tablet      Other Visit Diagnoses     Nausea        Relevant Medications    ondansetron (ZOFRAN) injection 4 mg        The patient has read and signed the [facility] Controlled Substance Contract.  I will continue to see patient for regular follow up appointments.  They are well controlled on their " medication.  KATE is updated every 3 months. The patient is aware of the potential for addiction and dependence.  kate [id number]  Refilled norco - temporary prescription - provided 2 weeks of medication  Refilled flexeri  Pain clinic called - verified that there was a provider that was scheduled to see the patient but was no longer working at the clinic  They will call her to reschedule her initial appointment - this was relayed to the patient    zofran IM for nausea    Patient's Body mass index is 20.38 kg/m². BMI is within normal parameters. No follow-up required.    Recheck as needed

## 2019-01-30 ENCOUNTER — OFFICE VISIT (OUTPATIENT)
Dept: FAMILY MEDICINE CLINIC | Facility: CLINIC | Age: 59
End: 2019-01-30

## 2019-01-30 VITALS
DIASTOLIC BLOOD PRESSURE: 90 MMHG | SYSTOLIC BLOOD PRESSURE: 130 MMHG | HEART RATE: 88 BPM | TEMPERATURE: 97.2 F | HEIGHT: 62 IN | OXYGEN SATURATION: 99 % | WEIGHT: 111 LBS | BODY MASS INDEX: 20.43 KG/M2 | RESPIRATION RATE: 16 BRPM

## 2019-01-30 DIAGNOSIS — R00.0 SINUS TACHYCARDIA: ICD-10-CM

## 2019-01-30 DIAGNOSIS — F17.200 TOBACCO DEPENDENCE SYNDROME: ICD-10-CM

## 2019-01-30 DIAGNOSIS — M54.15 RADICULOPATHY OF THORACOLUMBAR REGION: Chronic | ICD-10-CM

## 2019-01-30 DIAGNOSIS — L60.0 INGROWN TOENAIL OF RIGHT FOOT: ICD-10-CM

## 2019-01-30 DIAGNOSIS — K21.00 GASTROESOPHAGEAL REFLUX DISEASE WITH ESOPHAGITIS: ICD-10-CM

## 2019-01-30 DIAGNOSIS — M54.6 CHRONIC MIDLINE THORACIC BACK PAIN: Primary | ICD-10-CM

## 2019-01-30 DIAGNOSIS — E78.5 HYPERLIPIDEMIA, UNSPECIFIED HYPERLIPIDEMIA TYPE: ICD-10-CM

## 2019-01-30 DIAGNOSIS — R68.84 JAW PAIN: ICD-10-CM

## 2019-01-30 DIAGNOSIS — M48.50XA COMPRESSION FRACTURE OF VERTEBRA (HCC): ICD-10-CM

## 2019-01-30 DIAGNOSIS — G89.29 CHRONIC MIDLINE THORACIC BACK PAIN: Primary | ICD-10-CM

## 2019-01-30 DIAGNOSIS — F41.0 PANIC DISORDER WITHOUT AGORAPHOBIA: ICD-10-CM

## 2019-01-30 DIAGNOSIS — M80.00XA OSTEOPOROSIS WITH CURRENT PATHOLOGICAL FRACTURE, UNSPECIFIED OSTEOPOROSIS TYPE, INITIAL ENCOUNTER: ICD-10-CM

## 2019-01-30 PROCEDURE — 99214 OFFICE O/P EST MOD 30 MIN: CPT | Performed by: FAMILY MEDICINE

## 2019-01-30 RX ORDER — DEXLANSOPRAZOLE 60 MG/1
60 CAPSULE, DELAYED RELEASE ORAL DAILY
Qty: 90 CAPSULE | Refills: 1 | Status: SHIPPED | OUTPATIENT
Start: 2019-01-30 | End: 2019-03-08 | Stop reason: SDUPTHER

## 2019-01-30 RX ORDER — METOPROLOL SUCCINATE 50 MG/1
50 TABLET, EXTENDED RELEASE ORAL DAILY
Qty: 90 TABLET | Refills: 1 | Status: SHIPPED | OUTPATIENT
Start: 2019-01-30 | End: 2019-04-29 | Stop reason: SDUPTHER

## 2019-01-30 RX ORDER — ATORVASTATIN CALCIUM 20 MG/1
20 TABLET, FILM COATED ORAL DAILY
Qty: 90 TABLET | Refills: 1 | Status: SHIPPED | OUTPATIENT
Start: 2019-01-30 | End: 2019-06-03 | Stop reason: SDUPTHER

## 2019-01-30 RX ORDER — ZOSTER VACCINE RECOMBINANT, ADJUVANTED 50 MCG/0.5
KIT INTRAMUSCULAR
Refills: 0 | COMMUNITY
Start: 2019-01-15 | End: 2019-02-08

## 2019-01-30 RX ORDER — HYDROCODONE BITARTRATE AND ACETAMINOPHEN 5; 325 MG/1; MG/1
1 TABLET ORAL EVERY 8 HOURS PRN
Qty: 21 TABLET | Refills: 0 | Status: SHIPPED | OUTPATIENT
Start: 2019-01-30 | End: 2019-03-08 | Stop reason: SDUPTHER

## 2019-01-30 RX ORDER — CLONAZEPAM 0.5 MG/1
0.5 TABLET ORAL DAILY PRN
Qty: 15 TABLET | Refills: 0 | Status: SHIPPED | OUTPATIENT
Start: 2019-01-30 | End: 2019-05-17 | Stop reason: SDUPTHER

## 2019-01-30 NOTE — PROGRESS NOTES
Subjective   Chief Complaint   Patient presents with   • Follow-up     med refill , back pain and face swelling dog bite      Tricia Loyola is a 59 y.o. female.   Follow-up (med refill , back pain and face swelling dog bite )    History of Present Illness     She presents today with complaints of an ingrown toe nail of the right great toe  She was seen in urgent care 12/04/18  She was started on bactrim and mupirocin  She completed the course of antibiotic provided  She had complaints of toe nail not improving  She also has complaints of her last visit with podiatry - she does not want to see Dr Smiley again  She is asking for a new referral to a different provider    Recently seen in urgent care for dog bite to the right maxillofacial region  She was treated with clindamycin and bactrim but she had a reaction - she had a flushing reaction and was seen in the emergency room for treatment  She was treated with zantac, atarax and steroid injection  She was started on keflex to replace the other antibiotics    Acute on chronic back pain in the thoracic region  Currently diagnosed with a compression fracture of the thoracic spine secondary to osteoporosis  Her last bone density scan was performed in 2017 which indicated osteopenia and osteoporosis  Scheduled for pain management February 4th - [place] location  She is asking for a temporary prescription to hold her over until her appointment with pain management    She is also requesting a refill on her klonopin for her panic disorder - last filled back in November    Asking for medication refills on maintenance medications    The following portions of the patient's history were reviewed and updated as appropriate: allergies, current medications, past family history, past medical history, past social history, past surgical history and problem list.    Review of Systems   Constitutional: Negative for appetite change, chills, fatigue and fever.   HENT: Negative for  "congestion, ear pain, rhinorrhea and sore throat.         Jaw pain   Eyes: Negative for pain.   Respiratory: Negative for cough and shortness of breath.    Cardiovascular: Negative for chest pain and palpitations.   Gastrointestinal: Negative for abdominal pain, constipation, diarrhea and nausea.   Genitourinary: Negative for dysuria.   Musculoskeletal: Positive for back pain. Negative for joint swelling and neck pain.   Skin: Negative for rash.        Toe pain and swelling   Neurological: Negative for dizziness and headaches.       Objective   /90   Pulse 88   Temp 97.2 °F (36.2 °C) (Temporal)   Resp 16   Ht 157.5 cm (62\")   Wt 50.3 kg (111 lb)   SpO2 99%   Breastfeeding? No   BMI 20.30 kg/m²   Physical Exam   Constitutional: She is oriented to person, place, and time. She appears well-developed and well-nourished.   HENT:   Head: Normocephalic and atraumatic.       Eyes: Pupils are equal, round, and reactive to light.   Neck: Normal range of motion. Neck supple.   Cardiovascular: Normal rate, regular rhythm and normal heart sounds.   Pulmonary/Chest: Effort normal and breath sounds normal. No respiratory distress. She has no wheezes. She has no rales.   Abdominal: Soft. Bowel sounds are normal.   Musculoskeletal:        Lumbar back: She exhibits decreased range of motion, bony tenderness and pain.     Skin Integrity  -  She has right foot ingrown toenail..  Neurological: She is alert and oriented to person, place, and time.   Skin: Skin is warm and dry.   Psychiatric: She has a normal mood and affect.   Nursing note and vitals reviewed.      Assessment/Plan   Problems Addressed this Visit        Cardiovascular and Mediastinum    Hyperlipidemia    Relevant Medications    atorvastatin (LIPITOR) 20 MG tablet    Sinus tachycardia    Relevant Medications    metoprolol succinate XL (TOPROL-XL) 50 MG 24 hr tablet       Digestive    Gastroesophageal reflux disease with esophagitis    Relevant Medications    " dexlansoprazole (DEXILANT) 60 MG capsule       Nervous and Auditory    Chronic midline thoracic back pain - Primary    Relevant Medications    HYDROcodone-acetaminophen (NORCO) 5-325 MG per tablet    Radiculopathy of thoracolumbar region    Relevant Medications    HYDROcodone-acetaminophen (NORCO) 5-325 MG per tablet       Musculoskeletal and Integument    Osteoporosis    Relevant Medications    HYDROcodone-acetaminophen (NORCO) 5-325 MG per tablet    Compression fracture of vertebra (CMS/HCC)    Relevant Medications    HYDROcodone-acetaminophen (NORCO) 5-325 MG per tablet       Other    Tobacco dependence syndrome    Panic disorder without agoraphobia    Relevant Medications    clonazePAM (KLONOPIN) 0.5 MG tablet      Other Visit Diagnoses     Ingrown toenail of right foot        Relevant Orders    Ambulatory Referral to Podiatry    Jaw pain            Referral to podiatry  Continue with foot soaks  Continue with antibiotic until course completed    Jaw pain secondary to injury by jaw pain - recommended cold compress and complete course of antibiotics    Patient's Body mass index is 20.3 kg/m². BMI is within normal parameters. No follow-up required..    The patient has read and signed the [facility] Controlled Substance Contract.  I will continue to see patient for regular follow up appointments.  They are well controlled on their medication.  KATE is updated every 3 months. The patient is aware of the potential for addiction and dependence.  Refilled norco - temporary supply until her appointment with pain management Feb 4th - disp#21 tablets    Refilled klonopin - cautioned patient on use of medication with concurrent use of opiates - patient understands risk but only uses for panic attacks    Refilled maintenance medications    Recheck in 3 - 4 months

## 2019-02-08 ENCOUNTER — OFFICE VISIT (OUTPATIENT)
Dept: FAMILY MEDICINE CLINIC | Facility: CLINIC | Age: 59
End: 2019-02-08

## 2019-02-08 VITALS
HEIGHT: 62 IN | RESPIRATION RATE: 14 BRPM | SYSTOLIC BLOOD PRESSURE: 126 MMHG | BODY MASS INDEX: 20.24 KG/M2 | OXYGEN SATURATION: 99 % | WEIGHT: 110 LBS | DIASTOLIC BLOOD PRESSURE: 80 MMHG | TEMPERATURE: 97.9 F | HEART RATE: 85 BPM

## 2019-02-08 DIAGNOSIS — F41.1 GAD (GENERALIZED ANXIETY DISORDER): ICD-10-CM

## 2019-02-08 DIAGNOSIS — R00.2 INTERMITTENT PALPITATIONS: Primary | ICD-10-CM

## 2019-02-08 DIAGNOSIS — G89.29 CHRONIC BILATERAL THORACIC BACK PAIN: ICD-10-CM

## 2019-02-08 DIAGNOSIS — M54.6 CHRONIC BILATERAL THORACIC BACK PAIN: ICD-10-CM

## 2019-02-08 DIAGNOSIS — Z00.00 ENCOUNTER FOR MEDICAL EXAMINATION TO ESTABLISH CARE: ICD-10-CM

## 2019-02-08 PROBLEM — Z72.0 TOBACCO ABUSE: Chronic | Status: ACTIVE | Noted: 2018-10-05

## 2019-02-08 PROBLEM — Z90.49 HX OF CHOLECYSTECTOMY: Chronic | Status: RESOLVED | Noted: 2018-10-05 | Resolved: 2019-02-08

## 2019-02-08 PROBLEM — F17.200 TOBACCO DEPENDENCE SYNDROME: Chronic | Status: ACTIVE | Noted: 2017-01-30

## 2019-02-08 PROBLEM — K21.00 GASTROESOPHAGEAL REFLUX DISEASE WITH ESOPHAGITIS: Chronic | Status: ACTIVE | Noted: 2017-01-30

## 2019-02-08 PROBLEM — G25.81 RESTLESS LEG SYNDROME: Chronic | Status: ACTIVE | Noted: 2017-08-02

## 2019-02-08 PROBLEM — Z86.010 ENCOUNTER FOR COLONOSCOPY DUE TO HISTORY OF ADENOMATOUS COLONIC POLYPS: Chronic | Status: ACTIVE | Noted: 2018-12-03

## 2019-02-08 PROBLEM — Z90.49 HX OF CHOLECYSTECTOMY: Chronic | Status: ACTIVE | Noted: 2018-10-05

## 2019-02-08 PROBLEM — F43.10 PTSD (POST-TRAUMATIC STRESS DISORDER): Chronic | Status: ACTIVE | Noted: 2017-01-30

## 2019-02-08 PROBLEM — M81.0 OSTEOPOROSIS: Chronic | Status: ACTIVE | Noted: 2017-04-03

## 2019-02-08 PROBLEM — M54.15 RADICULOPATHY OF THORACOLUMBAR REGION: Chronic | Status: ACTIVE | Noted: 2018-10-05

## 2019-02-08 PROBLEM — Z90.89 HX OF TONSILLECTOMY: Chronic | Status: RESOLVED | Noted: 2018-10-05 | Resolved: 2019-02-08

## 2019-02-08 PROBLEM — R12 HEARTBURN: Chronic | Status: ACTIVE | Noted: 2018-10-05

## 2019-02-08 PROBLEM — M54.50 CHRONIC MIDLINE LOW BACK PAIN WITHOUT SCIATICA: Chronic | Status: ACTIVE | Noted: 2018-04-20

## 2019-02-08 PROBLEM — IMO0002 CHRONIC MIGRAINE: Chronic | Status: ACTIVE | Noted: 2018-10-05

## 2019-02-08 PROBLEM — F41.0 PANIC DISORDER WITHOUT AGORAPHOBIA: Chronic | Status: ACTIVE | Noted: 2018-07-19

## 2019-02-08 PROBLEM — R12 HEARTBURN: Chronic | Status: RESOLVED | Noted: 2018-10-05 | Resolved: 2019-02-08
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
## 2019-02-08 PROBLEM — M48.50XA COMPRESSION FRACTURE OF VERTEBRA: Chronic | Status: ACTIVE | Noted: 2018-10-05

## 2019-02-08 PROBLEM — R00.0 SINUS TACHYCARDIA: Chronic | Status: ACTIVE | Noted: 2017-06-16

## 2019-02-08 PROBLEM — Z12.11 ENCOUNTER FOR COLONOSCOPY DUE TO HISTORY OF ADENOMATOUS COLONIC POLYPS: Chronic | Status: ACTIVE | Noted: 2018-12-03

## 2019-02-08 PROBLEM — Z86.69 HISTORY OF SEIZURE DISORDER: Chronic | Status: RESOLVED | Noted: 2017-06-19 | Resolved: 2019-02-08

## 2019-02-08 PROBLEM — E78.5 HYPERLIPIDEMIA: Chronic | Status: ACTIVE | Noted: 2017-01-30

## 2019-02-08 PROBLEM — Z86.69 HISTORY OF SEIZURE DISORDER: Chronic | Status: ACTIVE | Noted: 2017-06-19

## 2019-02-08 PROBLEM — M17.11 PRIMARY OSTEOARTHRITIS OF RIGHT KNEE: Chronic | Status: ACTIVE | Noted: 2017-03-24

## 2019-02-08 PROBLEM — H91.90 HEARING LOSS: Chronic | Status: ACTIVE | Noted: 2018-10-05

## 2019-02-08 PROBLEM — Z72.0 TOBACCO ABUSE: Chronic | Status: RESOLVED | Noted: 2018-10-05 | Resolved: 2019-02-08

## 2019-02-08 PROBLEM — J44.9 COPD (CHRONIC OBSTRUCTIVE PULMONARY DISEASE): Chronic | Status: ACTIVE | Noted: 2018-10-05

## 2019-02-08 PROBLEM — Z90.89 HX OF TONSILLECTOMY: Chronic | Status: ACTIVE | Noted: 2018-10-05

## 2019-02-08 LAB
ANION GAP SERPL CALCULATED.3IONS-SCNC: 9 MMOL/L (ref 5–15)
BUN BLD-MCNC: 10 MG/DL (ref 7–17)
BUN/CREAT SERPL: 10.6 (ref 7–25)
CALCIUM SPEC-SCNC: 9.2 MG/DL (ref 8.4–10.2)
CHLORIDE SERPL-SCNC: 111 MMOL/L (ref 98–107)
CO2 SERPL-SCNC: 23 MMOL/L (ref 22–30)
CREAT BLD-MCNC: 0.94 MG/DL (ref 0.52–1.04)
GFR SERPL CREATININE-BSD FRML MDRD: 61 ML/MIN/1.73 (ref 45–104)
GLUCOSE BLD-MCNC: 85 MG/DL (ref 74–99)
MAGNESIUM SERPL-MCNC: 1.9 MG/DL (ref 1.6–2.3)
POTASSIUM BLD-SCNC: 3.6 MMOL/L (ref 3.4–5)
SODIUM BLD-SCNC: 143 MMOL/L (ref 137–145)

## 2019-02-08 PROCEDURE — 93005 ELECTROCARDIOGRAM TRACING: CPT | Performed by: NURSE PRACTITIONER

## 2019-02-08 PROCEDURE — 99214 OFFICE O/P EST MOD 30 MIN: CPT | Performed by: NURSE PRACTITIONER

## 2019-02-08 PROCEDURE — 80048 BASIC METABOLIC PNL TOTAL CA: CPT | Performed by: NURSE PRACTITIONER

## 2019-02-08 PROCEDURE — 83735 ASSAY OF MAGNESIUM: CPT | Performed by: NURSE PRACTITIONER

## 2019-02-08 PROCEDURE — 36415 COLL VENOUS BLD VENIPUNCTURE: CPT | Performed by: NURSE PRACTITIONER

## 2019-02-08 NOTE — PROGRESS NOTES
"Chief Complaint   Patient presents with   • Establish Care     Subjective   Tricia Loyola is a 59 y.o. female who presents to the office to establish care.  She does not want to go to pain clinic any more, wants to see if her back problem can be fixed surgically instead.  Previously seen in absence of primary care provider, when she presented imaging records from [facility].  These are not available today and all imaging in chart indicates mild DDD of cspine only, no mention of any degeneration or fracture which may be operable. She is to sign consent for records from [facility] imaging.  On closer inspection the ED record is on file now from [facility] of 10/5/18. CT of T and L spine done on that day show stable Tspine with iniaml old cpompression fracture deformity of superior endplate of T11.  Mild posterior spurring of superior enplates at T2-3 and T5-6. L spine shows mild degenerative changes at L4-5 and L5-S1. Unfortunately, there is nothing that supports a neurosurgical consult. If pain persists, will schedule MRI. She does not wish to go back to pain clinic. First appointment there on 2/4/19 lasted over 4 hours and she states she \"simply does not have the time to do that every month\".  She wishes for me to assume her pain medication management. I will assume this as long as she remains compliant and qualifies for continued opiate treatment, when she needs her next refill. She did receive a prescription from the pain management provider on 2/4/19. clw    The following portions of the patient's history were reviewed and updated as appropriate: allergies, current medications, past family history, past medical history, past social history, past surgical history and problem list.    Previous patient of Dr Ibarra presents to establish care as Dr Mata will be leaving soon.  Previously seen by me with acute on chronic thoracolumbar pain with left radiculopathy. There was at that time a CT or MRI presented from [facility] by the " patient showing a pathologic fracture of the spine related to osteoporosis.She had previously received Reclast infusions for her osteoporosis for several years, which has not been consistently maintained. She does not know why. She is primary caregiver for her brother who is on chronic hemodialysis and her mother who has dementia and she has little time for her own care. She has had inconsistent PCP relationship for several years and may have simply been lost to followup.      Pain   This is a chronic (acute on chronic) problem. The current episode started more than 1 year ago. The problem occurs constantly. The problem has been rapidly worsening. Pertinent negatives include no chest pain, coughing, fever, nausea or rash. The symptoms are aggravated by bending, walking, twisting and standing. She has tried acetaminophen, lying down, NSAIDs, relaxation, position changes, rest, sleep, walking and oral narcotics (gabapentin helps some with the burning, lortab  helps) for the symptoms. The treatment provided significant relief.   Anxiety   Presents for follow-up visit. Symptoms include excessive worry, nervous/anxious behavior (states history of panic disorder), palpitations (INTERMITTENT), panic (occassional panic attacks) and restlessness. Patient reports no chest pain, compulsions, confusion, decreased concentration, depressed mood, dizziness, dry mouth, feeling of choking, hyperventilation, impotence, insomnia, irritability, malaise, muscle tension, nausea, obsessions, shortness of breath or suicidal ideas. Symptoms occur occasionally (controlled without frequent exacerbations while on her medications). The most recent episode lasted 3 hours. The severity of symptoms is moderate. The patient sleeps 7 hours per night. The quality of sleep is good. Nighttime awakenings: occasional.     Her past medical history is significant for anxiety/panic attacks. Compliance with medications is %.   Palpitations    This is a  new problem. The current episode started 1 to 4 weeks ago. The problem occurs 2 to 4 times per day. The problem has been gradually worsening. On average, each episode lasts 2 minutes. Nothing aggravates the symptoms. Associated symptoms include anxiety (states history of panic disorder). Pertinent negatives include no chest pain, coughing, dizziness, fever, nausea or shortness of breath. She has tried nothing for the symptoms. Risk factors include post menopause and stress. Her past medical history is significant for anxiety.        Past Medical History:   Diagnosis Date   • Abdominal pain    • Anemia    • Chronic post-traumatic headache      rebound      • Depressive disorder    • Encounter for gynecological examination    • Gastroesophageal reflux disease    • Generalized anxiety disorder    • History of mammogram 08/2008    MAMMOGRAM DIAGNOSTIC BILATERAL 86077 ([facility]) (1)     • Hyperlipidemia    • Ingrown toenail    • Injury of back    • Nonruptured cerebral aneurysm    • Osteoporosis    • Posttraumatic stress disorder    • Seizure (CMS/HCC)     Psychogenic non-epileptic sz    • Viral hepatitis A           Family History   Problem Relation Age of Onset   • Constipation Mother    • Hypertension Mother    • Anxiety disorder Mother    • Heart disease Mother    • Alcohol abuse Father    • Heart disease Father    • Anxiety disorder Brother    • Kidney disease Brother    • Hypertension Brother    • Arthritis Brother    • Anxiety disorder Brother    • Kidney disease Brother    • Diabetes Brother    • Anxiety disorder Brother    • Hypertension Brother    • Breast cancer Paternal Aunt    • Heart disease Maternal Grandmother    • Clotting disorder Maternal Grandmother    • Heart disease Maternal Grandfather         Review of Systems   Constitutional: Negative for fever, irritability and unexpected weight change.   HENT: Negative.    Eyes: Negative.    Respiratory: Negative.  Negative for cough, chest tightness and shortness  "of breath.    Cardiovascular: Positive for palpitations (INTERMITTENT). Negative for chest pain.   Gastrointestinal: Negative.  Negative for nausea.   Endocrine: Negative.    Genitourinary: Negative.  Negative for dysuria and impotence.   Musculoskeletal: Positive for back pain.   Skin: Negative.  Negative for color change, pallor, rash and wound.   Allergic/Immunologic: Negative.    Neurological: Negative.  Negative for dizziness.   Hematological: Negative.    Psychiatric/Behavioral: Negative for confusion, decreased concentration, sleep disturbance and suicidal ideas. The patient is nervous/anxious (states history of panic disorder). The patient does not have insomnia.         Very stressed with care of her mother and a handicapped brother.       Objective   Vitals:    02/08/19 0938   BP: 126/80   BP Location: Left arm   Patient Position: Sitting   Cuff Size: Adult   Pulse: 85   Resp: 14   Temp: 97.9 °F (36.6 °C)   TempSrc: Oral   SpO2: 99%   Weight: 49.9 kg (110 lb)   Height: 157.5 cm (62\")     Physical Exam   Constitutional: She is oriented to person, place, and time. She appears well-developed and well-nourished. No distress.   Very thin   HENT:   Head: Normocephalic and atraumatic.   Eyes: Conjunctivae and EOM are normal. Pupils are equal, round, and reactive to light. Right eye exhibits no discharge. Left eye exhibits no discharge. No scleral icterus.   Neck: Normal range of motion. Neck supple. No JVD present. No tracheal deviation present. No thyromegaly present.   Cardiovascular: Normal rate, regular rhythm, normal heart sounds and intact distal pulses. Exam reveals no gallop and no friction rub.   No murmur heard.  Pulmonary/Chest: Effort normal and breath sounds normal. No stridor. No respiratory distress. She has no wheezes. She has no rales. She exhibits no tenderness.   Abdominal: Soft. Bowel sounds are normal. She exhibits no distension and no mass. There is no tenderness. There is no guarding. "   Musculoskeletal: She exhibits tenderness (over paravertebral musculatore of thoracic spine and lower Tspine. ). She exhibits no edema or deformity.   Limited ROM of back due to pain   Lymphadenopathy:     She has no cervical adenopathy.   Neurological: She is alert and oriented to person, place, and time. No cranial nerve deficit. Coordination normal.   Skin: Skin is warm. Capillary refill takes 2 to 3 seconds. She is not diaphoretic. No erythema. No pallor.   Psychiatric: She has a normal mood and affect. Her behavior is normal. Judgment and thought content normal.   Nursing note and vitals reviewed.      Assessment/Plan   Tricia was seen today for establish care.    Diagnoses and all orders for this visit:    Intermittent palpitations  -     Basic Metabolic Panel  -     Magnesium  -     ECG 12 Lead  -     Holter Monitor - 48 Hour; Future  -     Ambulatory Referral to Cardiology    Chronic bilateral thoracic back pain    Encounter for medical examination to establish care    PRISCILLA (generalized anxiety disorder)    EKG:   Indication: palpitations  Vent Rate: 69  bpm   HEIDI:  160  ms   QRS: 90 ms   QT/QTc:402/430  ms   Interpretation: NSR. Possible right ventricular conduction delay and possible old anterior infarct  Previous EKG: none available for comparison.  Referral to cardiology.         PHQ-2/PHQ-9 Depression Screening 2/8/2019   Little interest or pleasure in doing things 0   Feeling down, depressed, or hopeless 0   Trouble falling or staying asleep, or sleeping too much -   Feeling tired or having little energy -   Poor appetite or overeating -   Feeling bad about yourself - or that you are a failure or have let yourself or your family down -   Trouble concentrating on things, such as reading the newspaper or watching television -   Moving or speaking so slowly that other people could have noticed. Or the opposite - being so fidgety or restless that you have been moving around a lot more than usual -    Thoughts that you would be better off dead, or of hurting yourself in some way -   Total Score 0       Crystal L Ceron, APRN         Return in about 1 week (around 2/15/2019).    There are no Patient Instructions on file for this visit.

## 2019-02-15 ENCOUNTER — OFFICE VISIT (OUTPATIENT)
Dept: FAMILY MEDICINE CLINIC | Facility: CLINIC | Age: 59
End: 2019-02-15

## 2019-02-15 VITALS
OXYGEN SATURATION: 100 % | HEART RATE: 96 BPM | HEIGHT: 62 IN | TEMPERATURE: 98.1 F | DIASTOLIC BLOOD PRESSURE: 82 MMHG | WEIGHT: 112 LBS | RESPIRATION RATE: 14 BRPM | BODY MASS INDEX: 20.61 KG/M2 | SYSTOLIC BLOOD PRESSURE: 120 MMHG

## 2019-02-15 DIAGNOSIS — M48.54XS COMPRESSION FRACTURE OF THORACIC SPINE, NON-TRAUMATIC, SEQUELA: ICD-10-CM

## 2019-02-15 DIAGNOSIS — G89.29 CHRONIC MIDLINE THORACIC BACK PAIN: Primary | Chronic | ICD-10-CM

## 2019-02-15 DIAGNOSIS — M54.6 CHRONIC MIDLINE THORACIC BACK PAIN: Primary | Chronic | ICD-10-CM

## 2019-02-15 DIAGNOSIS — M17.11 PRIMARY OSTEOARTHRITIS OF RIGHT KNEE: Chronic | ICD-10-CM

## 2019-02-15 DIAGNOSIS — M54.15 RADICULOPATHY OF THORACOLUMBAR REGION: Chronic | ICD-10-CM

## 2019-02-15 DIAGNOSIS — M48.50XA COMPRESSION FRACTURE OF VERTEBRA (HCC): Chronic | ICD-10-CM

## 2019-02-15 PROCEDURE — 99214 OFFICE O/P EST MOD 30 MIN: CPT | Performed by: NURSE PRACTITIONER

## 2019-02-15 NOTE — PATIENT INSTRUCTIONS
See me again in 3 weeks, for chronic pain management,  or sooner if needed  Physical therapy orders have been sent to [facility]    We will call with a report and any new orders regarding your 24 hour heart monitor after it is reviewed.

## 2019-02-15 NOTE — PROGRESS NOTES
Chief Complaint   Patient presents with   • Follow-up     1 week follow-up     Subjective   Tricia Loyola is a 59 y.o. female who presents to the office for follow up 1 week after see in office for 1) chronic back pain and 2) palpitations.  Back pain not so severe now she filled the Rx from the pain provider at her first and only visit.   Her palpitations have recurred since last weeks visit. She did wear the 24 hour holter from 2/12/19-2/14/19, it was removed and submitted after 3pm yesterday, but no report is yet available. No other new concerns or questions at this time.            The following portions of the patient's history were reviewed and updated as appropriate: allergies, current medications, past family history, past medical history, past social history, past surgical history and problem list.      Anxiety: She has difficulty being consistent with appointments and has been inconsistent in regard to compliance with Reclast infusion treatment in the past. She is rather overwhelmed by her family responsibilities. She is primary caregiver for her brother who is on chronic hemodialysis and her mother who has dementia and she has little time for her own care.     Pain   This is a chronic (acute on chronic) problem. The current episode started more than 1 year ago. The problem occurs constantly. The problem has been waxing and waning. Pertinent negatives include no chest pain, coughing, fever, nausea or rash. The symptoms are aggravated by bending, walking, twisting and standing. She has tried acetaminophen, lying down, NSAIDs, relaxation, position changes, rest, sleep, walking and oral narcotics (gabapentin helps some with the burning, lortab  helps) for the symptoms. The treatment provided significant relief.   Anxiety   Presents for follow-up visit. Symptoms include excessive worry, nervous/anxious behavior (states history of panic disorder), palpitations (INTERMITTENT), panic (occassional panic attacks)  and restlessness. Patient reports no chest pain, compulsions, confusion, decreased concentration, depressed mood, dizziness, dry mouth, feeling of choking, hyperventilation, impotence, insomnia, irritability, malaise, muscle tension, nausea, obsessions, shortness of breath or suicidal ideas. Symptoms occur occasionally (controlled without frequent exacerbations while on her medications). The most recent episode lasted 3 hours. The severity of symptoms is moderate. The patient sleeps 7 hours per night. The quality of sleep is good. Nighttime awakenings: occasional.     Her past medical history is significant for anxiety/panic attacks. Compliance with medications is % (a prescription of 15 tablets Klonopin lasts several months, only uses benzos when has a panic attack. ).   Palpitations    This is a new problem. The current episode started 1 to 4 weeks ago. The problem occurs 2 to 4 times per day. The problem has been gradually worsening. On average, each episode lasts 2 minutes. Nothing aggravates the symptoms. Associated symptoms include anxiety (states history of panic disorder). Pertinent negatives include no chest pain, coughing, dizziness, fever, nausea or shortness of breath. She has tried nothing for the symptoms. Risk factors include post menopause and stress. Her past medical history is significant for anxiety.    Back pain:  Imaging in chart indicates mild DDD of cspine . An ED record is on file from [facility] of 10/5/18. CT of T and L spine done on that day show stable Tspine with an old compression fracture deformity of superior endplate of T11. This is thought a result of chronic osteoporosis.  Mild posterior spurring of superior enplates at T2-3 and T5-6. L spine shows mild degenerative changes at L4-5 and L5-S1. Unfortunately, there is nothing that supports a neurosurgical consult. If pain persists in excess of these findings, will schedule MRI.     She is in agreement with a physical therapy consult  "for evaluation and treatment including any modalities felt to be beneficial in relieving the burning pain she has in the thoracic spine. She is intolerant of gabapentin at lowest dose (100mg daily) stating this made her \"feel like a Zombie\".  She is already on elavil, moderate dose of 75 mg daily, which should give some relief of nerve pain, which is what this sounds like.  She takes several other psychiatric medications, so I am hesitant to try Duloxetine. I will assume her chronic pain management prescribing for her back pain when she needs her next refill. She did receive a prescription from the pain management provider on 2/4/19 and has filled this once. She will follow with me ongoing.     Previously seen by me with acute on chronic thoracolumbar pain with left radiculopathy.     Past Medical History:   Diagnosis Date   • Abdominal pain    • Anemia    • Chronic post-traumatic headache      rebound      • Depressive disorder    • Encounter for gynecological examination    • Gastroesophageal reflux disease    • Generalized anxiety disorder    • History of mammogram 08/2008    MAMMOGRAM DIAGNOSTIC BILATERAL 98874 ([facility]) (1)     • Hyperlipidemia    • Ingrown toenail    • Injury of back    • Nonruptured cerebral aneurysm    • Osteoporosis    • Posttraumatic stress disorder    • Seizure (CMS/HCC)     Psychogenic non-epileptic sz    • Viral hepatitis A           Family History   Problem Relation Age of Onset   • Constipation Mother    • Hypertension Mother    • Anxiety disorder Mother    • Heart disease Mother    • Alcohol abuse Father    • Heart disease Father    • Anxiety disorder Brother    • Kidney disease Brother    • Hypertension Brother    • Arthritis Brother    • Anxiety disorder Brother    • Kidney disease Brother    • Diabetes Brother    • Anxiety disorder Brother    • Hypertension Brother    • Breast cancer Paternal Aunt    • Heart disease Maternal Grandmother    • Clotting disorder Maternal Grandmother    • " "Heart disease Maternal Grandfather         Review of Systems   Constitutional: Negative.  Negative for fever, irritability and unexpected weight change.   HENT: Negative.    Eyes: Negative.    Respiratory: Negative.  Negative for cough, chest tightness and shortness of breath.    Cardiovascular: Positive for palpitations (INTERMITTENT). Negative for chest pain.   Gastrointestinal: Negative.  Negative for nausea.   Endocrine: Negative.    Genitourinary: Negative.  Negative for dysuria and impotence.   Musculoskeletal: Positive for back pain.   Skin: Negative.  Negative for color change, pallor, rash and wound.   Allergic/Immunologic: Negative.    Neurological: Negative.  Negative for dizziness.   Hematological: Negative.    Psychiatric/Behavioral: Negative for confusion, decreased concentration, sleep disturbance and suicidal ideas. The patient is nervous/anxious (states history of panic disorder). The patient does not have insomnia.         Very stressed with care of her mother and a handicapped brother.   All other systems reviewed and are negative.      Objective   Vitals:    02/15/19 1305   BP: 120/82   BP Location: Left arm   Patient Position: Sitting   Cuff Size: Adult   Pulse: 96   Resp: 14   Temp: 98.1 °F (36.7 °C)   TempSrc: Oral   SpO2: 100%   Weight: 50.8 kg (112 lb)   Height: 157.5 cm (62\")   PainSc:   4     Physical Exam   Constitutional: She is oriented to person, place, and time. She appears well-developed and well-nourished. No distress.   Very thin   HENT:   Head: Normocephalic and atraumatic.   Eyes: Conjunctivae and EOM are normal. Pupils are equal, round, and reactive to light. Right eye exhibits no discharge. Left eye exhibits no discharge. No scleral icterus.   Neck: Normal range of motion. Neck supple. No JVD present. No tracheal deviation present. No thyromegaly present.   Cardiovascular: Normal rate, regular rhythm, normal heart sounds and intact distal pulses. Exam reveals no gallop and no " friction rub.   No murmur heard.  Pulmonary/Chest: Effort normal and breath sounds normal. No stridor. No respiratory distress. She has no wheezes. She has no rales. She exhibits no tenderness.   Abdominal: Soft. Bowel sounds are normal. She exhibits no distension and no mass. There is no tenderness. There is no guarding.   Musculoskeletal: She exhibits tenderness (over paravertebral musculatore of thoracic spine and lower Tspine. ). She exhibits no edema or deformity.   Limited ROM of back due to pain   Lymphadenopathy:     She has no cervical adenopathy.   Neurological: She is alert and oriented to person, place, and time. No cranial nerve deficit. Coordination normal.   Skin: Skin is warm. Capillary refill takes 2 to 3 seconds. She is not diaphoretic. No erythema. No pallor.   Psychiatric: She has a normal mood and affect. Her behavior is normal. Judgment and thought content normal.   Nursing note and vitals reviewed.      Assessment/Plan   Tricia was seen today for follow-up.    Diagnoses and all orders for this visit:    Chronic midline thoracic back pain  -     Ambulatory Referral to Physical Therapy Evaluate and treat (chronic T spine pain, burning pain); Heat (anything beneficial for burning pain along Tspine. Dry needling? ), Electrotherapy; (eval and treat); Full weight bearing    Radiculopathy of thoracolumbar region  -     Ambulatory Referral to Physical Therapy Evaluate and treat (chronic T spine pain, burning pain); Heat (anything beneficial for burning pain along Tspine. Dry needling? ), Electrotherapy; (eval and treat); Full weight bearing    Primary osteoarthritis of right knee  -     Ambulatory Referral to Physical Therapy Evaluate and treat (chronic T spine pain, burning pain); Heat (anything beneficial for burning pain along Tspine. Dry needling? ), Electrotherapy; (eval and treat); Full weight bearing    Compression fracture of vertebra (CMS/HCC)  -     Ambulatory Referral to Physical Therapy  Evaluate and treat (chronic T spine pain, burning pain); Heat (anything beneficial for burning pain along Tspine. Dry needling? ), Electrotherapy; (eval and treat); Full weight bearing    Compression fracture of thoracic spine, non-traumatic, sequela  -     Ambulatory Referral to Physical Therapy Evaluate and treat (chronic T spine pain, burning pain); Heat (anything beneficial for burning pain along Tspine. Dry needling? ), Electrotherapy; (eval and treat); Full weight bearing                  PHQ-2/PHQ-9 Depression Screening 2/8/2019   Little interest or pleasure in doing things 0   Feeling down, depressed, or hopeless 0   Trouble falling or staying asleep, or sleeping too much -   Feeling tired or having little energy -   Poor appetite or overeating -   Feeling bad about yourself - or that you are a failure or have let yourself or your family down -   Trouble concentrating on things, such as reading the newspaper or watching television -   Moving or speaking so slowly that other people could have noticed. Or the opposite - being so fidgety or restless that you have been moving around a lot more than usual -   Thoughts that you would be better off dead, or of hurting yourself in some way -   Total Score 0       Rosario Ceron, APRKENNEDY         No Follow-up on file.    There are no Patient Instructions on file for this visit.

## 2019-03-07 RX ORDER — RANITIDINE 150 MG/1
TABLET ORAL
Qty: 90 TABLET | Refills: 3 | OUTPATIENT
Start: 2019-03-07

## 2019-03-08 ENCOUNTER — OFFICE VISIT (OUTPATIENT)
Dept: FAMILY MEDICINE CLINIC | Facility: CLINIC | Age: 59
End: 2019-03-08

## 2019-03-08 VITALS
BODY MASS INDEX: 20.43 KG/M2 | TEMPERATURE: 97.4 F | HEART RATE: 80 BPM | DIASTOLIC BLOOD PRESSURE: 70 MMHG | OXYGEN SATURATION: 99 % | WEIGHT: 111 LBS | HEIGHT: 62 IN | SYSTOLIC BLOOD PRESSURE: 110 MMHG | RESPIRATION RATE: 14 BRPM

## 2019-03-08 DIAGNOSIS — M48.50XA COMPRESSION FRACTURE OF VERTEBRA (HCC): ICD-10-CM

## 2019-03-08 DIAGNOSIS — G89.29 CHRONIC MIDLINE THORACIC BACK PAIN: ICD-10-CM

## 2019-03-08 DIAGNOSIS — M54.15 RADICULOPATHY OF THORACOLUMBAR REGION: Primary | Chronic | ICD-10-CM

## 2019-03-08 DIAGNOSIS — G25.81 RESTLESS LEG SYNDROME: ICD-10-CM

## 2019-03-08 DIAGNOSIS — M54.6 CHRONIC MIDLINE THORACIC BACK PAIN: ICD-10-CM

## 2019-03-08 DIAGNOSIS — M80.00XA OSTEOPOROSIS WITH CURRENT PATHOLOGICAL FRACTURE, UNSPECIFIED OSTEOPOROSIS TYPE, INITIAL ENCOUNTER: ICD-10-CM

## 2019-03-08 DIAGNOSIS — K21.00 GASTROESOPHAGEAL REFLUX DISEASE WITH ESOPHAGITIS: ICD-10-CM

## 2019-03-08 DIAGNOSIS — M54.50 CHRONIC MIDLINE LOW BACK PAIN WITHOUT SCIATICA: Chronic | ICD-10-CM

## 2019-03-08 DIAGNOSIS — G89.29 CHRONIC MIDLINE LOW BACK PAIN WITHOUT SCIATICA: Chronic | ICD-10-CM

## 2019-03-08 DIAGNOSIS — M54.15 RADICULOPATHY OF THORACOLUMBAR REGION: Chronic | ICD-10-CM

## 2019-03-08 PROCEDURE — 99214 OFFICE O/P EST MOD 30 MIN: CPT | Performed by: NURSE PRACTITIONER

## 2019-03-08 RX ORDER — ROPINIROLE 2 MG/1
2 TABLET, FILM COATED ORAL NIGHTLY
Qty: 90 TABLET | Refills: 3 | Status: SHIPPED | OUTPATIENT
Start: 2019-03-08 | End: 2019-07-08 | Stop reason: DRUGHIGH

## 2019-03-08 RX ORDER — HYDROCODONE BITARTRATE AND ACETAMINOPHEN 5; 325 MG/1; MG/1
1 TABLET ORAL EVERY 8 HOURS PRN
Qty: 90 TABLET | Refills: 0 | Status: SHIPPED | OUTPATIENT
Start: 2019-03-08 | End: 2019-04-04 | Stop reason: SDUPTHER

## 2019-03-08 RX ORDER — DEXLANSOPRAZOLE 60 MG/1
60 CAPSULE, DELAYED RELEASE ORAL DAILY
Qty: 90 CAPSULE | Refills: 3 | Status: SHIPPED | OUTPATIENT
Start: 2019-03-08 | End: 2020-03-23

## 2019-03-08 NOTE — PROGRESS NOTES
Chief Complaint   Patient presents with   • Follow-up     Subjective   Tricia Loyola is a 59 y.o. female who presents to the office for follow up of chronic pain management. Also needs refills on Dexilant and Requip.    The following portions of the patient's history were reviewed and updated as appropriate: allergies, current medications, past family history, past medical history, past social history, past surgical history and problem list.    History of Present Illness   She is primary caregiver for her brother who is on chronic hemodialysis and her mother who has dementia and she has little time for her own care.      Pain   This is a chronic (acute on chronic) problem. The current episode started more than 1 year ago. The problem occurs constantly. The problem has been waxing and waning. Pertinent negatives include no chest pain, coughing, fever, nausea or rash. The symptoms are aggravated by bending, walking, twisting and standing. She has tried acetaminophen, lying down, NSAIDs, relaxation, position changes, rest, sleep, walking and oral narcotics (gabapentin helps some with the burning, lortab  helps) for the symptoms. The treatment provided significant relief.   Back pain:  Imaging in chart indicates mild DDD of cspine . An ED record is on file from [facility] of 10/5/18. CT of T and L spine done on that day show stable Tspine with an old compression fracture deformity of superior endplate of T11. This is thought a result of chronic osteoporosis.  Mild posterior spurring of superior enplates at T2-3 and T5-6. L spine shows mild degenerative changes at L4-5 and L5-S1. Unfortunately, there is nothing that supports a neurosurgical consult. If pain persists in excess of these findings, will schedule MRI.      She is in agreement with a physical therapy consult for evaluation and treatment including any modalities felt to be beneficial in relieving the burning pain she has in the thoracic spine. She is intolerant of  "gabapentin at lowest dose (100mg daily) stating this made her \"feel like a Zombie\".  She is already on elavil, moderate dose of 75 mg daily, which should give some relief of nerve pain, which is what this sounds like.  She takes several other psychiatric medications, so I am hesitant to try Duloxetine. Managed well on hydrocodone.    Past Medical History:   Diagnosis Date   • Abdominal pain    • Anemia    • Chronic post-traumatic headache      rebound      • Depressive disorder    • Encounter for gynecological examination    • Gastroesophageal reflux disease    • Generalized anxiety disorder    • History of mammogram 08/2008    MAMMOGRAM DIAGNOSTIC BILATERAL 57818 ([facility]) (1)     • Hyperlipidemia    • Ingrown toenail    • Injury of back    • Nonruptured cerebral aneurysm    • Osteoporosis    • Posttraumatic stress disorder    • Seizure (CMS/HCC)     Psychogenic non-epileptic sz    • Viral hepatitis A           Family History   Problem Relation Age of Onset   • Constipation Mother    • Hypertension Mother    • Anxiety disorder Mother    • Heart disease Mother    • Alcohol abuse Father    • Heart disease Father    • Anxiety disorder Brother    • Kidney disease Brother    • Hypertension Brother    • Arthritis Brother    • Anxiety disorder Brother    • Kidney disease Brother    • Diabetes Brother    • Anxiety disorder Brother    • Hypertension Brother    • Breast cancer Paternal Aunt    • Heart disease Maternal Grandmother    • Clotting disorder Maternal Grandmother    • Heart disease Maternal Grandfather         Review of Systems   Constitutional: Negative for appetite change, chills, fatigue and fever.   HENT: Negative for congestion, ear pain, rhinorrhea and sore throat.         Jaw pain   Eyes: Negative for pain.   Respiratory: Negative for cough and shortness of breath.    Cardiovascular: Negative for chest pain and palpitations.   Gastrointestinal: Negative for abdominal pain, constipation, diarrhea and nausea. " "  Genitourinary: Negative for dysuria.   Musculoskeletal: Positive for back pain. Negative for joint swelling and neck pain.   Skin: Negative for rash.        Toe pain and swelling   Neurological: Negative for dizziness and headaches.       Objective   Vitals:    03/08/19 1106   BP: 110/70   BP Location: Left arm   Patient Position: Sitting   Cuff Size: Adult   Pulse: 80   Resp: 14   Temp: 97.4 °F (36.3 °C)   TempSrc: Oral   SpO2: 99%   Weight: 50.3 kg (111 lb)   Height: 157.5 cm (62\")   PainSc: 0-No pain     Physical Exam   Constitutional: She is oriented to person, place, and time. She appears well-developed and well-nourished.   HENT:   Head: Normocephalic and atraumatic.       Eyes: Conjunctivae are normal. Pupils are equal, round, and reactive to light.   Neck: Normal range of motion. Neck supple.   Cardiovascular: Normal rate, regular rhythm, normal heart sounds and intact distal pulses. Exam reveals no gallop and no friction rub.   No murmur heard.  Pulmonary/Chest: Effort normal and breath sounds normal. No respiratory distress. She has no wheezes. She has no rales. She exhibits no tenderness.   Abdominal: Soft. Bowel sounds are normal.   Musculoskeletal: She exhibits no edema, tenderness or deformity.        Lumbar back: She exhibits decreased range of motion, bony tenderness and pain.        Back:      Skin Integrity  -  She has right foot ingrown toenail..  Lymphadenopathy:     She has no cervical adenopathy.   Neurological: She is alert and oriented to person, place, and time.   Skin: Skin is warm and dry. Capillary refill takes 2 to 3 seconds. No erythema. No pallor.   Psychiatric: She has a normal mood and affect. Her behavior is normal. Judgment and thought content normal.   Nursing note and vitals reviewed.      Assessment/Plan   Tricia was seen today for follow-up.    Diagnoses and all orders for this visit:    Radiculopathy of thoracolumbar region  -     HYDROcodone-acetaminophen (NORCO) 5-325 MG " per tablet; Take 1 tablet by mouth Every 8 (Eight) Hours As Needed for Moderate Pain  or Severe Pain .    Chronic midline low back pain without sciatica    Compression fracture of vertebra (CMS/HCC)  Comments:  acute exacerbation of pain due to pathogenic fracture related to osteoporosis  Orders:  -     HYDROcodone-acetaminophen (NORCO) 5-325 MG per tablet; Take 1 tablet by mouth Every 8 (Eight) Hours As Needed for Moderate Pain  or Severe Pain .    Osteoporosis with current pathological fracture, unspecified osteoporosis type, initial encounter  -     HYDROcodone-acetaminophen (NORCO) 5-325 MG per tablet; Take 1 tablet by mouth Every 8 (Eight) Hours As Needed for Moderate Pain  or Severe Pain .    Radiculopathy of thoracolumbar region  Comments:  acute exacerbation of chronic radiculopathy due to pathologic fracture of the spine  Orders:  -     HYDROcodone-acetaminophen (NORCO) 5-325 MG per tablet; Take 1 tablet by mouth Every 8 (Eight) Hours As Needed for Moderate Pain  or Severe Pain .    Chronic midline thoracic back pain  -     HYDROcodone-acetaminophen (NORCO) 5-325 MG per tablet; Take 1 tablet by mouth Every 8 (Eight) Hours As Needed for Moderate Pain  or Severe Pain .    Gastroesophageal reflux disease with esophagitis  -     dexlansoprazole (DEXILANT) 60 MG capsule; Take 1 capsule by mouth Daily.    Restless leg syndrome  -     rOPINIRole (REQUIP) 2 MG tablet; Take 1 tablet by mouth Every Night. Take 1 hour before bedtime.           PHQ-2/PHQ-9 Depression Screening 2/8/2019   Little interest or pleasure in doing things 0   Feeling down, depressed, or hopeless 0   Trouble falling or staying asleep, or sleeping too much -   Feeling tired or having little energy -   Poor appetite or overeating -   Feeling bad about yourself - or that you are a failure or have let yourself or your family down -   Trouble concentrating on things, such as reading the newspaper or watching television -   Moving or speaking so  slowly that other people could have noticed. Or the opposite - being so fidgety or restless that you have been moving around a lot more than usual -   Thoughts that you would be better off dead, or of hurting yourself in some way -   Total Score 0       UMESH Guzmán         Return in about 4 weeks (around 4/5/2019).    Patient Instructions   See me in 4 weeks for pain management.

## 2019-04-01 RX ORDER — RANITIDINE 150 MG/1
TABLET ORAL
Qty: 90 TABLET | Refills: 0 | Status: SHIPPED | OUTPATIENT
Start: 2019-04-01 | End: 2019-10-29 | Stop reason: RX

## 2019-04-02 DIAGNOSIS — M81.0 AGE-RELATED OSTEOPOROSIS WITHOUT CURRENT PATHOLOGICAL FRACTURE: Primary | Chronic | ICD-10-CM

## 2019-04-02 DIAGNOSIS — Z13.820 SCREENING FOR OSTEOPOROSIS: ICD-10-CM

## 2019-04-04 ENCOUNTER — OFFICE VISIT (OUTPATIENT)
Dept: FAMILY MEDICINE CLINIC | Facility: CLINIC | Age: 59
End: 2019-04-04

## 2019-04-04 VITALS
OXYGEN SATURATION: 99 % | HEART RATE: 94 BPM | HEIGHT: 62 IN | BODY MASS INDEX: 20.61 KG/M2 | RESPIRATION RATE: 16 BRPM | WEIGHT: 112 LBS | TEMPERATURE: 98.4 F | DIASTOLIC BLOOD PRESSURE: 78 MMHG | SYSTOLIC BLOOD PRESSURE: 128 MMHG

## 2019-04-04 DIAGNOSIS — M48.50XA COMPRESSION FRACTURE OF VERTEBRA (HCC): ICD-10-CM

## 2019-04-04 DIAGNOSIS — G89.29 CHRONIC MIDLINE LOW BACK PAIN WITHOUT SCIATICA: Primary | Chronic | ICD-10-CM

## 2019-04-04 DIAGNOSIS — Z12.39 SCREENING FOR MALIGNANT NEOPLASM OF BREAST: ICD-10-CM

## 2019-04-04 DIAGNOSIS — M79.674 PAIN IN TOE OF RIGHT FOOT: ICD-10-CM

## 2019-04-04 DIAGNOSIS — M80.00XA OSTEOPOROSIS WITH CURRENT PATHOLOGICAL FRACTURE, UNSPECIFIED OSTEOPOROSIS TYPE, INITIAL ENCOUNTER: ICD-10-CM

## 2019-04-04 DIAGNOSIS — M54.6 CHRONIC MIDLINE THORACIC BACK PAIN: ICD-10-CM

## 2019-04-04 DIAGNOSIS — M54.15 RADICULOPATHY OF THORACOLUMBAR REGION: Chronic | ICD-10-CM

## 2019-04-04 DIAGNOSIS — M54.50 CHRONIC MIDLINE LOW BACK PAIN WITHOUT SCIATICA: Primary | Chronic | ICD-10-CM

## 2019-04-04 DIAGNOSIS — G89.29 CHRONIC MIDLINE THORACIC BACK PAIN: ICD-10-CM

## 2019-04-04 DIAGNOSIS — M48.50XA COMPRESSION FRACTURE OF VERTEBRA (HCC): Chronic | ICD-10-CM

## 2019-04-04 DIAGNOSIS — S91.209D NAIL AVULSION OF TOE, SUBSEQUENT ENCOUNTER: ICD-10-CM

## 2019-04-04 PROCEDURE — 99214 OFFICE O/P EST MOD 30 MIN: CPT | Performed by: NURSE PRACTITIONER

## 2019-04-04 RX ORDER — CALCIUM CARBONATE 300MG(750)
3 TABLET,CHEWABLE ORAL AS NEEDED
Qty: 3 EACH | Refills: 11 | Status: SHIPPED | OUTPATIENT
Start: 2019-04-04 | End: 2019-09-10

## 2019-04-04 RX ORDER — HYDROCODONE BITARTRATE AND ACETAMINOPHEN 5; 325 MG/1; MG/1
1 TABLET ORAL EVERY 8 HOURS PRN
Qty: 90 TABLET | Refills: 0 | Status: SHIPPED | OUTPATIENT
Start: 2019-04-04 | End: 2019-05-03 | Stop reason: SDUPTHER

## 2019-04-04 NOTE — PROGRESS NOTES
"Chief Complaint   Patient presents with   • Ingrown Toenail     Subjective   Tricia Loyola is a 59 y.o. female who presents to the office for painful right great nailbed.  She is over due for a mammogram since 4/2/19- will order today.  Was scheduled for chronic pain management visit on Monday 4/8/19, will see her today instead as she is already here.     The following portions of the patient's history were reviewed and updated as appropriate: allergies, current medications, past family history, past medical history, past social history, past surgical history and problem list.    History of Present Illness   Pain   This is a chronic (acute on chronic) problem. The current episode started more than 1 year ago. The problem occurs constantly. The problem has been waxing and waning. Pertinent negatives include no chest pain, coughing, fever, nausea or rash. The symptoms are aggravated by bending, walking, twisting and standing. She has tried acetaminophen, lying down, NSAIDs, relaxation, position changes, rest, sleep, walking and oral narcotics (gabapentin helps some with the burning, lortab  helps) for the symptoms. The treatment provided significant relief.   Back pain:  Imaging in chart indicates mild DDD of cspine . An ED record is on file from [facility] of 10/5/18. CT of T and L spine done on that day show stable Tspine with an old compression fracture deformity of superior endplate of T11. This is thought a result of chronic osteoporosis.  Mild posterior spurring of superior enplates at T2-3 and T5-6. L spine shows mild degenerative changes at L4-5 and L5-S1. Unfortunately, there is nothing that supports a neurosurgical consult. If pain persists in excess of these findings, will schedule MRI.      She is in \"doing well\" with physical therapy consult for evaluation and treatment including any modalities felt to be beneficial in relieving the burning pain she has in the thoracic spine. Her physical therapist has " "requested an order for a TENS unit for back pain, will submit today. She is intolerant of gabapentin at lowest dose (100mg daily) stating this made her \"feel like a Zombie\".  She is already on elavil, moderate dose of 75 mg daily, which should give some relief of nerve pain, which is what this sounds like.  She takes several other psychiatric medications, so I am hesitant to try Duloxetine. Managed well on hydrocodone.    Osteoporosis: BMP with T score -2.5 with known pathologic thoracic spine compression fractures. Previously on Reclast, has been off since local hospital outpatient department stopped administering this medication, will consult with Dr Davey (Dr Baird out of office) regarding best treatment for her with her comorbid GERD.      Partial nail avulsion right great toe: a previously removed partial nail (original procedure by Dr Smiley, [place] [facility] caught on her sock last week and was traumatically ripped off at home several days ago. There is so much tenderness she can not tolerate full weight bearing. She does not wish to return to Dr Smiley, but wants a podiatrist in [place] (referral) for full nail removal. There is uneven depth surface of nail bed with apparent adherent pieces of nail remaining, and  I am uncomfortable with attempting any manipulation myself in office. I will refer to Dr Pérez in [place].     Past Medical History:   Diagnosis Date   • Abdominal pain    • Anemia    • Chronic post-traumatic headache      rebound      • Depressive disorder    • Encounter for gynecological examination    • Gastroesophageal reflux disease    • Generalized anxiety disorder    • History of mammogram 08/2008    MAMMOGRAM DIAGNOSTIC BILATERAL 76672 ([facility]) (1)     • Hyperlipidemia    • Ingrown toenail    • Injury of back    • Nonruptured cerebral aneurysm    • Osteoporosis    • Posttraumatic stress disorder    • Seizure (CMS/HCC)     Psychogenic non-epileptic sz    • Viral hepatitis A     " "      Family History   Problem Relation Age of Onset   • Constipation Mother    • Hypertension Mother    • Anxiety disorder Mother    • Heart disease Mother    • Alcohol abuse Father    • Heart disease Father    • Anxiety disorder Brother    • Kidney disease Brother    • Hypertension Brother    • Arthritis Brother    • Anxiety disorder Brother    • Kidney disease Brother    • Diabetes Brother    • Anxiety disorder Brother    • Hypertension Brother    • Breast cancer Paternal Aunt    • Heart disease Maternal Grandmother    • Clotting disorder Maternal Grandmother    • Heart disease Maternal Grandfather         Review of Systems   Constitutional: Negative for appetite change, chills, fatigue and fever.   HENT: Negative for congestion, ear pain, rhinorrhea and sore throat.         Jaw pain   Eyes: Negative for pain.   Respiratory: Negative for cough and shortness of breath.    Cardiovascular: Negative for chest pain and palpitations.   Gastrointestinal: Negative for abdominal pain, constipation, diarrhea and nausea.   Genitourinary: Negative for dysuria.   Musculoskeletal: Positive for back pain. Negative for joint swelling and neck pain.   Skin: Negative for rash.        Toe pain and swelling right great toe, partial nail avulsion.   Neurological: Negative for dizziness and headaches.       Objective   Vitals:    04/04/19 1423   BP: 128/78   BP Location: Left arm   Patient Position: Sitting   Cuff Size: Adult   Pulse: 94   Resp: 16   Temp: 98.4 °F (36.9 °C)   TempSrc: Oral   SpO2: 99%   Weight: 50.8 kg (112 lb)   Height: 157.5 cm (62.01\")   PainSc:   5   PainLoc: Foot     Physical Exam   Constitutional: She is oriented to person, place, and time. She appears well-developed and well-nourished.   HENT:   Head: Normocephalic and atraumatic.       Eyes: Conjunctivae are normal. Pupils are equal, round, and reactive to light.   Neck: Normal range of motion. Neck supple.   Cardiovascular: Normal rate, regular rhythm, normal " heart sounds and intact distal pulses. Exam reveals no gallop and no friction rub.   No murmur heard.  Pulmonary/Chest: Effort normal and breath sounds normal. No respiratory distress. She has no wheezes. She has no rales. She exhibits no tenderness.   Abdominal: Soft. Bowel sounds are normal.   Musculoskeletal: She exhibits no edema, tenderness or deformity.        Lumbar back: She exhibits decreased range of motion, bony tenderness and pain.        Back:      Skin Integrity  -  She has right foot ingrown toenail..  Lymphadenopathy:     She has no cervical adenopathy.   Neurological: She is alert and oriented to person, place, and time.   Skin: Skin is warm and dry. Capillary refill takes 2 to 3 seconds. No erythema. No pallor.   Partial right great toe nail avulsion appears layers have been removed, no redness, drainage or bleeding. CMS normal with great tenderness.    Psychiatric: She has a normal mood and affect. Her behavior is normal. Judgment and thought content normal.   Nursing note and vitals reviewed.      Assessment/Plan   Tricia was seen today for ingrown toenail.    Diagnoses and all orders for this visit:    Chronic midline low back pain without sciatica  -     Nerve Stimulator (TENS THERAPY PAIN RELIEF) device; 1 each 1 (One) Time for 1 dose. For use as prescribed for back pain  -     Nerve Stimulator (TENS THERAPY REPLACE BODY PADS) misc; Apply 3 each topically to the appropriate area as directed As Needed (loose, dry or soiled pad).    Nail avulsion of toe, subsequent encounter  -     Ambulatory Referral to Podiatry    Pain in toe of right foot  -     Ambulatory Referral to Podiatry  -     lidocaine (XYLOCAINE) 2 % jelly; Apply  topically to the appropriate area as directed 2 (Two) Times a Day.    Screening for malignant neoplasm of breast  -     Mammo Screening Digital Tomosynthesis Bilateral With CAD    Radiculopathy of thoracolumbar region  -     Nerve Stimulator (TENS THERAPY PAIN RELIEF)  device; 1 each 1 (One) Time for 1 dose. For use as prescribed for back pain  -     Nerve Stimulator (TENS THERAPY REPLACE BODY PADS) misc; Apply 3 each topically to the appropriate area as directed As Needed (loose, dry or soiled pad).  -     HYDROcodone-acetaminophen (NORCO) 5-325 MG per tablet; Take 1 tablet by mouth Every 8 (Eight) Hours As Needed for Moderate Pain  or Severe Pain .    Compression fracture of vertebra (CMS/HCC)  -     HYDROcodone-acetaminophen (NORCO) 5-325 MG per tablet; Take 1 tablet by mouth Every 8 (Eight) Hours As Needed for Moderate Pain  or Severe Pain .    Compression fracture of vertebra (CMS/HCC)  Comments:  acute exacerbation of pain due to pathogenic fracture related to osteoporosis  Orders:  -     HYDROcodone-acetaminophen (NORCO) 5-325 MG per tablet; Take 1 tablet by mouth Every 8 (Eight) Hours As Needed for Moderate Pain  or Severe Pain .    Osteoporosis with current pathological fracture, unspecified osteoporosis type, initial encounter  -     HYDROcodone-acetaminophen (NORCO) 5-325 MG per tablet; Take 1 tablet by mouth Every 8 (Eight) Hours As Needed for Moderate Pain  or Severe Pain .    Radiculopathy of thoracolumbar region  Comments:  acute exacerbation of chronic radiculopathy due to pathologic fracture of the spine  Orders:  -     Nerve Stimulator (TENS THERAPY PAIN RELIEF) device; 1 each 1 (One) Time for 1 dose. For use as prescribed for back pain  -     Nerve Stimulator (TENS THERAPY REPLACE BODY PADS) misc; Apply 3 each topically to the appropriate area as directed As Needed (loose, dry or soiled pad).  -     HYDROcodone-acetaminophen (NORCO) 5-325 MG per tablet; Take 1 tablet by mouth Every 8 (Eight) Hours As Needed for Moderate Pain  or Severe Pain .    Chronic midline thoracic back pain  -     HYDROcodone-acetaminophen (NORCO) 5-325 MG per tablet; Take 1 tablet by mouth Every 8 (Eight) Hours As Needed for Moderate Pain  or Severe Pain .           PHQ-2/PHQ-9 Depression  Screening 4/4/2019   Little interest or pleasure in doing things 0   Feeling down, depressed, or hopeless 0   Trouble falling or staying asleep, or sleeping too much -   Feeling tired or having little energy -   Poor appetite or overeating -   Feeling bad about yourself - or that you are a failure or have let yourself or your family down -   Trouble concentrating on things, such as reading the newspaper or watching television -   Moving or speaking so slowly that other people could have noticed. Or the opposite - being so fidgety or restless that you have been moving around a lot more than usual -   Thoughts that you would be better off dead, or of hurting yourself in some way -   Total Score 0   Patient understands the risks associated with this controlled medication, including tolerance and addiction.  Patient also agrees to only obtain this medication from me, and not from a another provider, unless that provider is covering for me in my absence.  Patient also agrees to be compliant in dosing, and not self adjust the dose of medication.  A signed controlled substance agreement is on file, and the patient has received a controlled substance education sheet at this a previous visit.  The patient has also signed a consent for treatment with a controlled substance as per [facility] policy. KATE was obtained.      UMESH Guzmán         Return in about 4 weeks (around 5/2/2019).    There are no Patient Instructions on file for this visit.

## 2019-04-18 ENCOUNTER — OFFICE VISIT (OUTPATIENT)
Dept: FAMILY MEDICINE CLINIC | Facility: CLINIC | Age: 59
End: 2019-04-18

## 2019-04-18 VITALS
SYSTOLIC BLOOD PRESSURE: 126 MMHG | OXYGEN SATURATION: 98 % | WEIGHT: 113.2 LBS | HEIGHT: 62 IN | BODY MASS INDEX: 20.83 KG/M2 | HEART RATE: 88 BPM | DIASTOLIC BLOOD PRESSURE: 76 MMHG | RESPIRATION RATE: 16 BRPM

## 2019-04-18 DIAGNOSIS — M79.674 PAIN IN TOE OF RIGHT FOOT: ICD-10-CM

## 2019-04-18 DIAGNOSIS — S91.209D NAIL AVULSION OF TOE, SUBSEQUENT ENCOUNTER: Primary | ICD-10-CM

## 2019-04-18 PROCEDURE — 99214 OFFICE O/P EST MOD 30 MIN: CPT | Performed by: NURSE PRACTITIONER

## 2019-04-18 NOTE — PROGRESS NOTES
Chief Complaint   Patient presents with   • Toe Pain     Subjective   Tricia Loyola is a 59 y.o. female who presents to the office for pain of right great toe, post nail removal yesterday, 4/17/19. Her lortab is not controlling this pain. I will approve an additional 2 lortab per day for the next week, so she will run out of lortab sooner, and she is to return before out for refill due to earlier depletion.     The following portions of the patient's history were reviewed and updated as appropriate: allergies, current medications, past family history, past medical history, past social history, past surgical history and problem list.    History of Present Illness   She is advised to perform epsom salt baths / soak of right foot 15 minutes daily to twice daily until it feels better. Continue other wound care as directed by podiatry. She is advised she may Increase hydrocodone to 1-2 tabs po three times daily as needed for pain, not to exceed 5 tablets daily x next 7 days. Current pill count is #54, filled #90 on 4/5/19. Count is good for appropriate dosing to date. 5 per day for 7 days would account for #35 being taken between now and a week from today, which would leave #19, and this would last for 6 additional days at the previous TID dosing.  I need to see her back in 1-2 weeks, dependent on pain severity.  She is due to see me again on 5/2/19, so I think she wont even be short.  Past Medical History:   Diagnosis Date   • Abdominal pain    • Anemia    • Chronic post-traumatic headache      rebound      • Depressive disorder    • Encounter for gynecological examination    • Gastroesophageal reflux disease    • Generalized anxiety disorder    • History of mammogram 08/2008    MAMMOGRAM DIAGNOSTIC BILATERAL 48491 ([facility]) (1)     • Hyperlipidemia    • Ingrown toenail    • Injury of back    • Nonruptured cerebral aneurysm    • Osteoporosis    • Posttraumatic stress disorder    • Seizure (CMS/HCC)     Psychogenic  "non-epileptic sz    • Viral hepatitis A           Family History   Problem Relation Age of Onset   • Constipation Mother    • Hypertension Mother    • Anxiety disorder Mother    • Heart disease Mother    • Alcohol abuse Father    • Heart disease Father    • Anxiety disorder Brother    • Kidney disease Brother    • Hypertension Brother    • Arthritis Brother    • Anxiety disorder Brother    • Kidney disease Brother    • Diabetes Brother    • Anxiety disorder Brother    • Hypertension Brother    • Breast cancer Paternal Aunt    • Heart disease Maternal Grandmother    • Clotting disorder Maternal Grandmother    • Heart disease Maternal Grandfather         Review of Systems   Constitutional: Negative for appetite change, chills, fatigue and fever.   HENT: Negative for congestion, ear pain, rhinorrhea and sore throat.         Jaw pain   Eyes: Negative for pain.   Respiratory: Negative for cough and shortness of breath.    Cardiovascular: Negative for chest pain and palpitations.   Gastrointestinal: Negative for abdominal pain, constipation, diarrhea and nausea.   Genitourinary: Negative for dysuria.   Musculoskeletal: Positive for back pain. Negative for joint swelling and neck pain.   Skin: Negative for rash.        Toe pain and swelling right great toe, nail is surgically absent and she is in a postop shoe.    Neurological: Negative for dizziness and headaches.   All other systems reviewed and are negative.      Objective   Vitals:    04/18/19 1541   BP: 126/76   BP Location: Left arm   Patient Position: Sitting   Cuff Size: Adult   Pulse: 88   Resp: 16   SpO2: 98%   Weight: 51.3 kg (113 lb 3.2 oz)   Height: 157.5 cm (62.01\")   PainSc: 10-Worst pain ever   PainLoc: Toe     Physical Exam   Constitutional: She is oriented to person, place, and time. She appears well-developed and well-nourished.   HENT:   Head: Normocephalic and atraumatic.       Eyes: Conjunctivae are normal. Pupils are equal, round, and reactive to " light.   Neck: Normal range of motion. Neck supple.   Cardiovascular: Normal rate, regular rhythm, normal heart sounds and intact distal pulses. Exam reveals no gallop and no friction rub.   No murmur heard.  Pulmonary/Chest: Effort normal and breath sounds normal. No respiratory distress. She has no wheezes. She has no rales. She exhibits no tenderness.   Abdominal: Soft. Bowel sounds are normal.   Musculoskeletal: She exhibits no edema, tenderness or deformity.        Lumbar back: She exhibits decreased range of motion, bony tenderness and pain.        Back:      Skin Integrity  -  She has right foot ingrown toenail..  Lymphadenopathy:     She has no cervical adenopathy.   Neurological: She is alert and oriented to person, place, and time.   Skin: Skin is warm and dry. Capillary refill takes 2 to 3 seconds. No erythema. No pallor.   Partial right great toe nail avulsion appears layers have been removed, no redness, drainage or bleeding. CMS normal with great tenderness.    Psychiatric: She has a normal mood and affect. Her behavior is normal. Judgment and thought content normal.   Nursing note and vitals reviewed.      Assessment/Plan   Tricia was seen today for toe pain.    Diagnoses and all orders for this visit:    Nail avulsion of toe, subsequent encounter    Pain in toe of right foot           PHQ-2/PHQ-9 Depression Screening 4/4/2019   Little interest or pleasure in doing things 0   Feeling down, depressed, or hopeless 0   Trouble falling or staying asleep, or sleeping too much -   Feeling tired or having little energy -   Poor appetite or overeating -   Feeling bad about yourself - or that you are a failure or have let yourself or your family down -   Trouble concentrating on things, such as reading the newspaper or watching television -   Moving or speaking so slowly that other people could have noticed. Or the opposite - being so fidgety or restless that you have been moving around a lot more than usual -    Thoughts that you would be better off dead, or of hurting yourself in some way -   Total Score 0   Patient understands the risks associated with this controlled medication, including tolerance and addiction.  Patient also agrees to only obtain this medication from me, and not from a another provider, unless that provider is covering for me in my absence.  Patient also agrees to be compliant in dosing, and not self adjust the dose of medication.  A signed controlled substance agreement is on file, and the patient has received a controlled substance education sheet at this a previous visit.  The patient has also signed a consent for treatment with a controlled substance as per [facility] policy. KATE was obtained.      UMESH Guzmán         Return in about 2 weeks (around 5/2/2019).    There are no Patient Instructions on file for this visit.

## 2019-04-23 RX ORDER — CEPHALEXIN 250 MG/1
250 CAPSULE ORAL 2 TIMES DAILY
Qty: 14 CAPSULE | Refills: 0 | Status: SHIPPED | OUTPATIENT
Start: 2019-04-23 | End: 2019-09-03

## 2019-04-29 ENCOUNTER — OFFICE VISIT (OUTPATIENT)
Dept: FAMILY MEDICINE CLINIC | Facility: CLINIC | Age: 59
End: 2019-04-29

## 2019-04-29 ENCOUNTER — LAB (OUTPATIENT)
Dept: LAB | Facility: OTHER | Age: 59
End: 2019-04-29

## 2019-04-29 VITALS
RESPIRATION RATE: 16 BRPM | BODY MASS INDEX: 20.68 KG/M2 | OXYGEN SATURATION: 99 % | HEART RATE: 74 BPM | TEMPERATURE: 97.4 F | DIASTOLIC BLOOD PRESSURE: 74 MMHG | WEIGHT: 112.4 LBS | HEIGHT: 62 IN | SYSTOLIC BLOOD PRESSURE: 122 MMHG

## 2019-04-29 DIAGNOSIS — N39.0 URINARY TRACT INFECTION WITHOUT HEMATURIA, SITE UNSPECIFIED: ICD-10-CM

## 2019-04-29 DIAGNOSIS — R35.0 URINARY FREQUENCY: ICD-10-CM

## 2019-04-29 DIAGNOSIS — R31.9 URINARY TRACT INFECTION WITH HEMATURIA, SITE UNSPECIFIED: ICD-10-CM

## 2019-04-29 DIAGNOSIS — N39.0 URINARY TRACT INFECTION WITH HEMATURIA, SITE UNSPECIFIED: ICD-10-CM

## 2019-04-29 DIAGNOSIS — R10.9 RT FLANK PAIN: Primary | ICD-10-CM

## 2019-04-29 DIAGNOSIS — N39.0 URINARY TRACT INFECTION WITHOUT HEMATURIA, SITE UNSPECIFIED: Primary | ICD-10-CM

## 2019-04-29 DIAGNOSIS — Z87.442 PERSONAL HISTORY OF KIDNEY STONES: ICD-10-CM

## 2019-04-29 DIAGNOSIS — R31.9 HEMATURIA, UNSPECIFIED TYPE: ICD-10-CM

## 2019-04-29 DIAGNOSIS — R00.0 SINUS TACHYCARDIA: ICD-10-CM

## 2019-04-29 LAB
BACTERIA UR QL AUTO: ABNORMAL /HPF
BILIRUB UR QL STRIP: NEGATIVE
CLARITY UR: CLEAR
COLOR UR: YELLOW
GLUCOSE UR STRIP-MCNC: NEGATIVE MG/DL
HGB UR QL STRIP.AUTO: ABNORMAL
HYALINE CASTS UR QL AUTO: ABNORMAL /LPF
KETONES UR QL STRIP: NEGATIVE
LEUKOCYTE ESTERASE UR QL STRIP.AUTO: NEGATIVE
NITRITE UR QL STRIP: NEGATIVE
PH UR STRIP.AUTO: 7 [PH] (ref 5.5–8)
PROT UR QL STRIP: NEGATIVE
RBC # UR: ABNORMAL /HPF
REF LAB TEST METHOD: ABNORMAL
SP GR UR STRIP: 1.01 (ref 1–1.03)
SQUAMOUS #/AREA URNS HPF: ABNORMAL /HPF
UROBILINOGEN UR QL STRIP: ABNORMAL
WBC UR QL AUTO: ABNORMAL /HPF

## 2019-04-29 PROCEDURE — 99214 OFFICE O/P EST MOD 30 MIN: CPT | Performed by: NURSE PRACTITIONER

## 2019-04-29 PROCEDURE — 87086 URINE CULTURE/COLONY COUNT: CPT | Performed by: NURSE PRACTITIONER

## 2019-04-29 PROCEDURE — 81001 URINALYSIS AUTO W/SCOPE: CPT | Performed by: NURSE PRACTITIONER

## 2019-04-29 RX ORDER — SULFAMETHOXAZOLE AND TRIMETHOPRIM 800; 160 MG/1; MG/1
1 TABLET ORAL 2 TIMES DAILY
Qty: 20 TABLET | Refills: 0 | Status: SHIPPED | OUTPATIENT
Start: 2019-04-29 | End: 2019-05-09

## 2019-04-29 RX ORDER — METOPROLOL SUCCINATE 50 MG/1
50 TABLET, EXTENDED RELEASE ORAL DAILY
Qty: 90 TABLET | Refills: 3 | Status: SHIPPED | OUTPATIENT
Start: 2019-04-29 | End: 2019-11-01
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
## 2019-04-30 ENCOUNTER — TELEPHONE (OUTPATIENT)
Dept: FAMILY MEDICINE CLINIC | Facility: CLINIC | Age: 59
End: 2019-04-30

## 2019-04-30 DIAGNOSIS — R35.0 URINARY FREQUENCY: ICD-10-CM

## 2019-04-30 DIAGNOSIS — R31.9 HEMATURIA, UNSPECIFIED TYPE: Primary | ICD-10-CM

## 2019-04-30 DIAGNOSIS — R10.9 RT FLANK PAIN: Primary | ICD-10-CM

## 2019-04-30 DIAGNOSIS — Z87.442 PERSONAL HISTORY OF KIDNEY STONES: ICD-10-CM

## 2019-04-30 DIAGNOSIS — R31.9 HEMATURIA, UNSPECIFIED TYPE: ICD-10-CM

## 2019-04-30 LAB — BACTERIA SPEC AEROBE CULT: NO GROWTH

## 2019-04-30 PROCEDURE — 88112 CYTOPATH CELL ENHANCE TECH: CPT | Performed by: NURSE PRACTITIONER

## 2019-04-30 PROCEDURE — 88112 CYTOPATH CELL ENHANCE TECH: CPT | Performed by: PATHOLOGY

## 2019-04-30 NOTE — TELEPHONE ENCOUNTER
----- Message from UMESH Arriaga sent at 4/30/2019  1:37 PM CDT -----  Please have patient return to the lab for a urine cytology (cell count and idenitfication by pathology) thanks edith

## 2019-04-30 NOTE — TELEPHONE ENCOUNTER
Pt is aware of all test results and is aware of the referral. Pt states she will come in later today to do another urine

## 2019-05-03 ENCOUNTER — OFFICE VISIT (OUTPATIENT)
Dept: FAMILY MEDICINE CLINIC | Facility: CLINIC | Age: 59
End: 2019-05-03

## 2019-05-03 VITALS
TEMPERATURE: 97.8 F | SYSTOLIC BLOOD PRESSURE: 120 MMHG | RESPIRATION RATE: 16 BRPM | OXYGEN SATURATION: 98 % | WEIGHT: 111.39 LBS | DIASTOLIC BLOOD PRESSURE: 68 MMHG | HEART RATE: 76 BPM | BODY MASS INDEX: 20.5 KG/M2 | HEIGHT: 62 IN

## 2019-05-03 DIAGNOSIS — M48.50XA COMPRESSION FRACTURE OF VERTEBRA (HCC): ICD-10-CM

## 2019-05-03 DIAGNOSIS — Z87.442 PERSONAL HISTORY OF KIDNEY STONES: ICD-10-CM

## 2019-05-03 DIAGNOSIS — R35.0 URINARY FREQUENCY: ICD-10-CM

## 2019-05-03 DIAGNOSIS — M54.15 RADICULOPATHY OF THORACOLUMBAR REGION: Chronic | ICD-10-CM

## 2019-05-03 DIAGNOSIS — G89.29 CHRONIC MIDLINE THORACIC BACK PAIN: ICD-10-CM

## 2019-05-03 DIAGNOSIS — R10.9 RT FLANK PAIN: ICD-10-CM

## 2019-05-03 DIAGNOSIS — M80.00XA OSTEOPOROSIS WITH CURRENT PATHOLOGICAL FRACTURE, UNSPECIFIED OSTEOPOROSIS TYPE, INITIAL ENCOUNTER: ICD-10-CM

## 2019-05-03 DIAGNOSIS — M54.6 CHRONIC MIDLINE THORACIC BACK PAIN: ICD-10-CM

## 2019-05-03 DIAGNOSIS — R31.9 HEMATURIA, UNSPECIFIED TYPE: Primary | ICD-10-CM

## 2019-05-03 PROCEDURE — 99214 OFFICE O/P EST MOD 30 MIN: CPT | Performed by: NURSE PRACTITIONER

## 2019-05-03 RX ORDER — HYDROCODONE BITARTRATE AND ACETAMINOPHEN 5; 325 MG/1; MG/1
1 TABLET ORAL EVERY 8 HOURS PRN
Qty: 90 TABLET | Refills: 0 | Status: SHIPPED | OUTPATIENT
Start: 2019-05-03 | End: 2019-06-03 | Stop reason: SDUPTHER
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
## 2019-05-06 LAB
CYTO UR: NORMAL
LAB AP CASE REPORT: NORMAL
LAB AP NON-GYN INTERPRETATION: NORMAL
PATH REPORT.FINAL DX SPEC: NORMAL
PATH REPORT.GROSS SPEC: NORMAL
STAT OF ADQ CVX/VAG CYTO-IMP: NORMAL

## 2019-05-09 ENCOUNTER — TELEPHONE (OUTPATIENT)
Dept: FAMILY MEDICINE CLINIC | Facility: CLINIC | Age: 59
End: 2019-05-09

## 2019-05-09 ENCOUNTER — HOSPITAL ENCOUNTER (EMERGENCY)
Facility: HOSPITAL | Age: 59
Discharge: HOME | End: 2019-05-10
Attending: EMERGENCY MEDICINE | Admitting: EMERGENCY MEDICINE

## 2019-05-09 ENCOUNTER — APPOINTMENT (OUTPATIENT)
Dept: CT IMAGING | Facility: HOSPITAL | Age: 59
End: 2019-05-09

## 2019-05-09 DIAGNOSIS — R31.9 HEMATURIA, UNSPECIFIED TYPE: Primary | ICD-10-CM

## 2019-05-09 DIAGNOSIS — R10.9 RT FLANK PAIN: ICD-10-CM

## 2019-05-09 DIAGNOSIS — Z87.442 PERSONAL HISTORY OF KIDNEY STONES: ICD-10-CM

## 2019-05-09 DIAGNOSIS — R35.0 URINARY FREQUENCY: ICD-10-CM

## 2019-05-09 DIAGNOSIS — N20.1 URETEROLITHIASIS: Primary | ICD-10-CM

## 2019-05-09 LAB
ALBUMIN SERPL-MCNC: 4.1 G/DL (ref 3.5–5.2)
ALBUMIN/GLOB SERPL: 1.2 G/DL
ALP SERPL-CCNC: 161 U/L (ref 39–117)
ALT SERPL W P-5'-P-CCNC: 203 U/L (ref 1–33)
ANION GAP SERPL CALCULATED.3IONS-SCNC: 12 MMOL/L
AST SERPL-CCNC: 190 U/L (ref 1–32)
BACTERIA UR QL AUTO: ABNORMAL /HPF
BASOPHILS # BLD AUTO: 0.08 10*3/MM3 (ref 0–0.2)
BASOPHILS NFR BLD AUTO: 0.9 % (ref 0–1.5)
BILIRUB SERPL-MCNC: <0.2 MG/DL (ref 0.2–1.2)
BILIRUB UR QL STRIP: NEGATIVE
BUN BLD-MCNC: 14 MG/DL (ref 8–23)
BUN/CREAT SERPL: 12.2 (ref 7–25)
CALCIUM SPEC-SCNC: 9.4 MG/DL (ref 8.6–10.5)
CHLORIDE SERPL-SCNC: 103 MMOL/L (ref 98–107)
CLARITY UR: CLEAR
CO2 SERPL-SCNC: 22 MMOL/L (ref 22–29)
COLOR UR: YELLOW
CREAT BLD-MCNC: 1.15 MG/DL (ref 0.57–1)
DEPRECATED RDW RBC AUTO: 45.6 FL (ref 37–54)
EOSINOPHIL # BLD AUTO: 0.25 10*3/MM3 (ref 0–0.4)
EOSINOPHIL NFR BLD AUTO: 2.8 % (ref 0.3–6.2)
ERYTHROCYTE [DISTWIDTH] IN BLOOD BY AUTOMATED COUNT: 13.4 % (ref 12.3–15.4)
GFR SERPL CREATININE-BSD FRML MDRD: 48 ML/MIN/1.73
GLOBULIN UR ELPH-MCNC: 3.5 GM/DL
GLUCOSE BLD-MCNC: 99 MG/DL (ref 65–99)
GLUCOSE UR STRIP-MCNC: NEGATIVE MG/DL
HCT VFR BLD AUTO: 39.4 % (ref 34–46.6)
HGB BLD-MCNC: 13.2 G/DL (ref 12–15.9)
HGB UR QL STRIP.AUTO: ABNORMAL
HYALINE CASTS UR QL AUTO: ABNORMAL /LPF
IMM GRANULOCYTES # BLD AUTO: 0.06 10*3/MM3 (ref 0–0.05)
IMM GRANULOCYTES NFR BLD AUTO: 0.7 % (ref 0–0.5)
KETONES UR QL STRIP: NEGATIVE
LEUKOCYTE ESTERASE UR QL STRIP.AUTO: NEGATIVE
LIPASE SERPL-CCNC: 18 U/L (ref 13–60)
LYMPHOCYTES # BLD AUTO: 2.47 10*3/MM3 (ref 0.7–3.1)
LYMPHOCYTES NFR BLD AUTO: 27.6 % (ref 19.6–45.3)
MCH RBC QN AUTO: 30.9 PG (ref 26.6–33)
MCHC RBC AUTO-ENTMCNC: 33.5 G/DL (ref 31.5–35.7)
MCV RBC AUTO: 92.3 FL (ref 79–97)
MONOCYTES # BLD AUTO: 1.12 10*3/MM3 (ref 0.1–0.9)
MONOCYTES NFR BLD AUTO: 12.5 % (ref 5–12)
NEUTROPHILS # BLD AUTO: 4.97 10*3/MM3 (ref 1.7–7)
NEUTROPHILS NFR BLD AUTO: 55.5 % (ref 42.7–76)
NITRITE UR QL STRIP: NEGATIVE
NRBC BLD AUTO-RTO: 0 /100 WBC (ref 0–0.2)
PH UR STRIP.AUTO: 7 [PH] (ref 5–9)
PLATELET # BLD AUTO: 304 10*3/MM3 (ref 140–450)
PMV BLD AUTO: 9.5 FL (ref 6–12)
POTASSIUM BLD-SCNC: 3.9 MMOL/L (ref 3.5–5.2)
PROT SERPL-MCNC: 7.6 G/DL (ref 6–8.5)
PROT UR QL STRIP: NEGATIVE
RBC # BLD AUTO: 4.27 10*6/MM3 (ref 3.77–5.28)
RBC # UR: ABNORMAL /HPF
REF LAB TEST METHOD: ABNORMAL
SODIUM BLD-SCNC: 137 MMOL/L (ref 136–145)
SP GR UR STRIP: 1 (ref 1–1.03)
SQUAMOUS #/AREA URNS HPF: ABNORMAL /HPF
UROBILINOGEN UR QL STRIP: ABNORMAL
WBC NRBC COR # BLD: 8.95 10*3/MM3 (ref 3.4–10.8)
WBC UR QL AUTO: ABNORMAL /HPF

## 2019-05-09 PROCEDURE — 80053 COMPREHEN METABOLIC PANEL: CPT | Performed by: EMERGENCY MEDICINE

## 2019-05-09 PROCEDURE — 99284 EMERGENCY DEPT VISIT MOD MDM: CPT

## 2019-05-09 PROCEDURE — 81001 URINALYSIS AUTO W/SCOPE: CPT | Performed by: EMERGENCY MEDICINE

## 2019-05-09 PROCEDURE — 96375 TX/PRO/DX INJ NEW DRUG ADDON: CPT

## 2019-05-09 PROCEDURE — 85025 COMPLETE CBC W/AUTO DIFF WBC: CPT | Performed by: EMERGENCY MEDICINE

## 2019-05-09 PROCEDURE — 74176 CT ABD & PELVIS W/O CONTRAST: CPT

## 2019-05-09 PROCEDURE — 25010000002 MORPHINE PER 10 MG: Performed by: EMERGENCY MEDICINE

## 2019-05-09 PROCEDURE — 83690 ASSAY OF LIPASE: CPT | Performed by: EMERGENCY MEDICINE

## 2019-05-09 PROCEDURE — 25010000002 ONDANSETRON PER 1 MG: Performed by: EMERGENCY MEDICINE

## 2019-05-09 PROCEDURE — 96374 THER/PROPH/DIAG INJ IV PUSH: CPT

## 2019-05-09 RX ORDER — ONDANSETRON 2 MG/ML
4 INJECTION INTRAMUSCULAR; INTRAVENOUS ONCE
Status: COMPLETED | OUTPATIENT
Start: 2019-05-09 | End: 2019-05-09

## 2019-05-09 RX ADMIN — MORPHINE SULFATE 4 MG: 4 INJECTION INTRAVENOUS at 23:27

## 2019-05-09 RX ADMIN — ONDANSETRON 4 MG: 2 INJECTION INTRAMUSCULAR; INTRAVENOUS at 23:25

## 2019-05-09 RX ADMIN — SODIUM CHLORIDE 1000 ML: 900 INJECTION, SOLUTION INTRAVENOUS at 23:10

## 2019-05-09 NOTE — TELEPHONE ENCOUNTER
----- Message from UMESH Arriaga sent at 5/9/2019  5:18 PM CDT -----  Regarding: RE: REFERRAL  Referral sent for Dr Hayder Amaya, Urology in [place], she may call his office personally to see when they can see her.    [phone]  Thanks clw    ----- Message -----  From: Case, Eden Duong MA  Sent: 5/9/2019   5:00 PM  To: UMESH Arriaga  Subject: REFERRAL                                         PT WANT ANOTHER REFERRAL NEUROLOGIST SHE CAN NOT WAIT 6 WEEKS TO GO SEE THE ONE IN [place] AND PT ALSO STATED SHE WENT TO THE REST ROOM 23 TIMES SO FAR TODAY.

## 2019-05-09 NOTE — TELEPHONE ENCOUNTER
----- Message from UMESH Arriaga sent at 5/6/2019  5:56 PM CDT -----  Please notify pt that her urine cytology exam shows no sign of cancer! Thanks clw

## 2019-05-10 VITALS
DIASTOLIC BLOOD PRESSURE: 63 MMHG | HEART RATE: 75 BPM | RESPIRATION RATE: 18 BRPM | HEIGHT: 62 IN | SYSTOLIC BLOOD PRESSURE: 117 MMHG | BODY MASS INDEX: 20.8 KG/M2 | OXYGEN SATURATION: 100 % | TEMPERATURE: 98.3 F | WEIGHT: 113 LBS

## 2019-05-10 RX ORDER — DOXYCYCLINE 100 MG/1
100 CAPSULE ORAL 2 TIMES DAILY
Qty: 10 CAPSULE | Refills: 0 | Status: SHIPPED | OUTPATIENT
Start: 2019-05-10 | End: 2019-09-03

## 2019-05-13 ENCOUNTER — PATIENT OUTREACH (OUTPATIENT)
Dept: CASE MANAGEMENT | Facility: OTHER | Age: 59
End: 2019-05-13

## 2019-05-13 ENCOUNTER — EPISODE CHANGES (OUTPATIENT)
Dept: CASE MANAGEMENT | Facility: OTHER | Age: 59
End: 2019-05-13

## 2019-05-13 NOTE — OUTREACH NOTE
Care Plan Note    Care Management Plan 5/13/2019   Lifestyle Goals Routine follow-up with doctor(s)   Barriers Disease education   Self Management Medication Adherence   Does patient have depression diagnosis? Yes   Ed Visits past 12 months: 3 or more   Hospitalizations past 12 months 1   Medication Adherence Medications understood     The main concerns and/or symptoms the patient would like to address are: Patient seen at [facility] on 5-9-19 with kidney stones. She stated she passed the stone but believes she has another one trying to pass.    Education/instruction provided by Care Coordinator: RN-CC could hear the discomfort in the patient's voice and opted to do a brief care plan and assessment to avoid keeping patient on the phone. She stated she does have some pain medication. She has help at home and has no difficulty obtaining her medications. She is agreeable to a return call in 3-4 days.    Follow Up Outreach Due: 3-4 days    Cecily Conley RN    5/13/2019, 1:18 PM

## 2019-05-16 ENCOUNTER — PATIENT OUTREACH (OUTPATIENT)
Dept: CASE MANAGEMENT | Facility: OTHER | Age: 59
End: 2019-05-16

## 2019-05-16 NOTE — OUTREACH NOTE
"Care Plan Note    Care Management Plan 5/16/2019   Lifestyle Goals Routine follow-up with doctor(s)   Barriers Disease education   Self Management Medication Adherence;Home BP Monitoring   Annual Wellness Visit:  Patient Will Schedule   Specific Disease Process Teaching COPD   Does patient have depression diagnosis? No   Advanced Directives: Not Interested At This Time   Ed Visits past 12 months: 3 or more   Hospitalizations past 12 months None   Medication Adherence Medications understood   Health Literacy Good     The main concerns and/or symptoms the patient would like to address are: Patient seen in ED at [facility] for ureterolithiasis. She has pain in her right flank but believes she passed the stone.    Education/instruction provided by Care Coordinator: She is to see urologist on 5-22-19 for f/u. She has taken the antibiotics prescribed in the ED and she continues to drink plenty of water. She denies hematuria, burning, or urgency. She monitors her BP at home and stated it \"runs good\". She has an Active MyChart but does not have an Advance Directive. She had a mammogram 4-26-19 and colonoscopy 11-26-18. She had flu vaccine 10-19-18 and pneumonia vaccine 1-30-15. She continues to smoke and has not had a fall. She does not have a depression diagnosis. She did not wish for further calls at this time. I provided my contact information for needs.     Follow Up Outreach Due: None at this time.     Cecily Conley RN    5/16/2019, 11:40 AM    "

## 2019-05-17 ENCOUNTER — OFFICE VISIT (OUTPATIENT)
Dept: FAMILY MEDICINE CLINIC | Facility: CLINIC | Age: 59
End: 2019-05-17

## 2019-05-17 VITALS
RESPIRATION RATE: 18 BRPM | BODY MASS INDEX: 20.8 KG/M2 | WEIGHT: 113 LBS | SYSTOLIC BLOOD PRESSURE: 118 MMHG | TEMPERATURE: 98.4 F | HEART RATE: 95 BPM | DIASTOLIC BLOOD PRESSURE: 68 MMHG | HEIGHT: 62 IN | OXYGEN SATURATION: 99 %

## 2019-05-17 DIAGNOSIS — F41.0 PANIC DISORDER WITHOUT AGORAPHOBIA: Chronic | ICD-10-CM

## 2019-05-17 DIAGNOSIS — R74.8 ELEVATED LIVER ENZYMES: Primary | ICD-10-CM

## 2019-05-17 LAB
ALBUMIN SERPL-MCNC: 4.1 G/DL (ref 3.5–5)
ALP SERPL-CCNC: 99 U/L (ref 38–126)
ALT SERPL W P-5'-P-CCNC: 51 U/L
AST SERPL-CCNC: 87 U/L (ref 14–36)
BILIRUB CONJ SERPL-MCNC: <0 MG/DL (ref 0–0.3)
BILIRUB INDIRECT SERPL-MCNC: 0.2 MG/DL (ref 0–1.1)
BILIRUB SERPL-MCNC: 0.3 MG/DL (ref 0.2–1.3)
PROT SERPL-MCNC: 7.3 G/DL (ref 6.3–8.6)

## 2019-05-17 PROCEDURE — 80076 HEPATIC FUNCTION PANEL: CPT | Performed by: NURSE PRACTITIONER

## 2019-05-17 PROCEDURE — 99213 OFFICE O/P EST LOW 20 MIN: CPT | Performed by: NURSE PRACTITIONER

## 2019-05-17 PROCEDURE — 36415 COLL VENOUS BLD VENIPUNCTURE: CPT | Performed by: NURSE PRACTITIONER

## 2019-05-17 RX ORDER — CLONAZEPAM 0.5 MG/1
0.5 TABLET ORAL DAILY PRN
Qty: 15 TABLET | Refills: 0 | Status: SHIPPED | OUTPATIENT
Start: 2019-05-17 | End: 2019-07-08 | Stop reason: SDUPTHER

## 2019-05-17 NOTE — ADDENDUM NOTE
Addended by: RENETTA HORNER on: 5/17/2019 01:27 PM     Modules accepted: Orders, Level of Service

## 2019-05-17 NOTE — PROGRESS NOTES
Chief Complaint   Patient presents with   • Results     Talk with crystal about results.      Subjective   Tricia Loyola is a 59 y.o. female who presents to the office for review of lab results she found on My Chart.     The following portions of the patient's history were reviewed and updated as appropriate: allergies, current medications, past family history, past medical history, past social history, past surgical history and problem list.    History of Present Illness     Liver enzymes were elevated on 5/9/19 in [facility] when there for kidney stone found on CT that day.  No other labs are concerning other than expected hematuria associated with kidney stones.  Will repeat LFTs today.     Anxiety:chronic problem, usually well controlled with amitriptylline and PRN buspar, with rare use of buspirone. Requests a refill of clonazepam today, last filled on 1/30/19 for #15 tablets. Will refill today. Uses sparingly.        Past Medical History:   Diagnosis Date   • Abdominal pain    • Anemia    • Chronic post-traumatic headache      rebound      • Depressive disorder    • Encounter for gynecological examination    • Gastroesophageal reflux disease    • Generalized anxiety disorder    • History of mammogram 08/2008    MAMMOGRAM DIAGNOSTIC BILATERAL 94501 ([facility]) (1)     • Hyperlipidemia    • Ingrown toenail    • Injury of back    • Nonruptured cerebral aneurysm    • Osteoporosis    • Posttraumatic stress disorder    • Seizure (CMS/HCC)     Psychogenic non-epileptic sz    • Viral hepatitis A           Family History   Problem Relation Age of Onset   • Constipation Mother    • Hypertension Mother    • Anxiety disorder Mother    • Heart disease Mother    • Alcohol abuse Father    • Heart disease Father    • Anxiety disorder Brother    • Kidney disease Brother    • Hypertension Brother    • Arthritis Brother    • Anxiety disorder Brother    • Kidney disease Brother    • Diabetes Brother    • Anxiety disorder  "Brother    • Hypertension Brother    • Breast cancer Paternal Aunt    • Heart disease Maternal Grandmother    • Clotting disorder Maternal Grandmother    • Heart disease Maternal Grandfather         Review of Systems   Constitutional: Negative.  Negative for fever and unexpected weight change.   HENT: Negative.    Eyes: Negative.    Respiratory: Negative.  Negative for cough, chest tightness and shortness of breath.    Cardiovascular: Negative.  Negative for chest pain.   Gastrointestinal: Negative.  Negative for nausea and vomiting.   Endocrine: Negative.    Genitourinary: Positive for flank pain, frequency, urgency and vaginal pain. Negative for dysuria.   Skin: Negative.  Negative for color change, pallor, rash and wound.   Allergic/Immunologic: Negative.    Neurological: Negative.    Hematological: Negative.    Psychiatric/Behavioral: Negative.  Negative for sleep disturbance and suicidal ideas.   All other systems reviewed and are negative.      Objective   Vitals:    05/17/19 1300   BP: 118/68   BP Location: Left arm   Patient Position: Sitting   Pulse: 95   Resp: 18   Temp: 98.4 °F (36.9 °C)   SpO2: 99%   Weight: 51.3 kg (113 lb)   Height: 157.5 cm (62.01\")   PainSc:   2     Physical Exam   Constitutional: She is oriented to person, place, and time. She appears well-developed and well-nourished.   HENT:   Head: Normocephalic and atraumatic.   Eyes: Conjunctivae are normal. Pupils are equal, round, and reactive to light.   Neck: Normal range of motion. Neck supple.   Cardiovascular: Normal rate, regular rhythm, normal heart sounds and intact distal pulses. Exam reveals no gallop and no friction rub.   No murmur heard.  Pulmonary/Chest: Effort normal and breath sounds normal. No respiratory distress. She has no wheezes. She has no rales. She exhibits no tenderness.   Abdominal: Soft. Bowel sounds are normal.   Musculoskeletal: Normal range of motion. She exhibits no edema, tenderness or deformity. "   Lymphadenopathy:     She has no cervical adenopathy.   Neurological: She is alert and oriented to person, place, and time.   Skin: Skin is warm and dry. Capillary refill takes 2 to 3 seconds. No erythema. No pallor.   Psychiatric: She has a normal mood and affect. Her behavior is normal. Judgment and thought content normal.   Nursing note and vitals reviewed.      Assessment/Plan   Tricia was seen today for results.    Diagnoses and all orders for this visit:    Elevated liver enzymes  -     Hepatic Function Panel    Panic disorder without agoraphobia  -     clonazePAM (KLONOPIN) 0.5 MG tablet; Take 1 tablet by mouth Daily As Needed for Anxiety (panic attacks).           PHQ-2/PHQ-9 Depression Screening 4/29/2019   Little interest or pleasure in doing things 0   Feeling down, depressed, or hopeless 0   Trouble falling or staying asleep, or sleeping too much -   Feeling tired or having little energy -   Poor appetite or overeating -   Feeling bad about yourself - or that you are a failure or have let yourself or your family down -   Trouble concentrating on things, such as reading the newspaper or watching television -   Moving or speaking so slowly that other people could have noticed. Or the opposite - being so fidgety or restless that you have been moving around a lot more than usual -   Thoughts that you would be better off dead, or of hurting yourself in some way -   Total Score 0   Patient understands the risks associated with this controlled medication, including tolerance and addiction.  Patient also agrees to only obtain this medication from me, and not from a another provider, unless that provider is covering for me in my absence.  Patient also agrees to be compliant in dosing, and not self adjust the dose of medication.  A signed controlled substance agreement is on file, and the patient has received a controlled substance education sheet at this a previous visit.  The patient has also signed a consent  for treatment with a controlled substance as per [facility] policy. KATE was obtained.      Rosario CERVANTES Osmany, APRN         Return for Next scheduled follow up.    There are no Patient Instructions on file for this visit.    Admission on 05/09/2019, Discharged on 05/10/2019   Component Date Value Ref Range Status   • Color, UA 05/09/2019 Yellow  Yellow, Straw, Dark Yellow, Paige Final   • Appearance, UA 05/09/2019 Clear  Clear Final   • pH, UA 05/09/2019 7.0  5.0 - 9.0 Final   • Specific Gravity, UA 05/09/2019 1.002* 1.003 - 1.030 Final    Result obtained by Refractometer   • Glucose, UA 05/09/2019 Negative  Negative Final   • Ketones, UA 05/09/2019 Negative  Negative Final   • Bilirubin, UA 05/09/2019 Negative  Negative Final   • Blood, UA 05/09/2019 Small (1+)* Negative Final   • Protein, UA 05/09/2019 Negative  Negative Final   • Leuk Esterase, UA 05/09/2019 Negative  Negative Final   • Nitrite, UA 05/09/2019 Negative  Negative Final   • Urobilinogen, UA 05/09/2019 0.2 E.U./dL  0.2 - 1.0 E.U./dL Final   • RBC, UA 05/09/2019 3-5* None Seen /HPF Final   • WBC, UA 05/09/2019 None Seen  None Seen, 0-2, 3-5 /HPF Final   • Bacteria, UA 05/09/2019 None Seen  None Seen /HPF Final   • Squamous Epithelial Cells, UA 05/09/2019 None Seen  None Seen, 0-2 /HPF Final   • Hyaline Casts, UA 05/09/2019 None Seen  None Seen /LPF Final   • Methodology 05/09/2019 Automated Microscopy   Final   • Glucose 05/09/2019 99  65 - 99 mg/dL Final   • BUN 05/09/2019 14  8 - 23 mg/dL Final   • Creatinine 05/09/2019 1.15* 0.57 - 1.00 mg/dL Final   • Sodium 05/09/2019 137  136 - 145 mmol/L Final   • Potassium 05/09/2019 3.9  3.5 - 5.2 mmol/L Final   • Chloride 05/09/2019 103  98 - 107 mmol/L Final   • CO2 05/09/2019 22.0  22.0 - 29.0 mmol/L Final   • Calcium 05/09/2019 9.4  8.6 - 10.5 mg/dL Final   • Total Protein 05/09/2019 7.6  6.0 - 8.5 g/dL Final   • Albumin 05/09/2019 4.10  3.50 - 5.20 g/dL Final   • ALT (SGPT) 05/09/2019 203* 1 - 33 U/L  Final   • AST (SGOT) 05/09/2019 190* 1 - 32 U/L Final   • Alkaline Phosphatase 05/09/2019 161* 39 - 117 U/L Final   • Total Bilirubin 05/09/2019 <0.2* 0.2 - 1.2 mg/dL Final   • eGFR Non African Amer 05/09/2019 48* >60 mL/min/1.73 Final   • Globulin 05/09/2019 3.5  gm/dL Final   • A/G Ratio 05/09/2019 1.2  g/dL Final   • BUN/Creatinine Ratio 05/09/2019 12.2  7.0 - 25.0 Final   • Anion Gap 05/09/2019 12.0  mmol/L Final   • Lipase 05/09/2019 18  13 - 60 U/L Final   • WBC 05/09/2019 8.95  3.40 - 10.80 10*3/mm3 Final   • RBC 05/09/2019 4.27  3.77 - 5.28 10*6/mm3 Final   • Hemoglobin 05/09/2019 13.2  12.0 - 15.9 g/dL Final   • Hematocrit 05/09/2019 39.4  34.0 - 46.6 % Final   • MCV 05/09/2019 92.3  79.0 - 97.0 fL Final   • MCH 05/09/2019 30.9  26.6 - 33.0 pg Final   • MCHC 05/09/2019 33.5  31.5 - 35.7 g/dL Final   • RDW 05/09/2019 13.4  12.3 - 15.4 % Final   • RDW-SD 05/09/2019 45.6  37.0 - 54.0 fl Final   • MPV 05/09/2019 9.5  6.0 - 12.0 fL Final   • Platelets 05/09/2019 304  140 - 450 10*3/mm3 Final   • Neutrophil % 05/09/2019 55.5  42.7 - 76.0 % Final   • Lymphocyte % 05/09/2019 27.6  19.6 - 45.3 % Final   • Monocyte % 05/09/2019 12.5* 5.0 - 12.0 % Final   • Eosinophil % 05/09/2019 2.8  0.3 - 6.2 % Final   • Basophil % 05/09/2019 0.9  0.0 - 1.5 % Final   • Immature Grans % 05/09/2019 0.7* 0.0 - 0.5 % Final   • Neutrophils, Absolute 05/09/2019 4.97  1.70 - 7.00 10*3/mm3 Final   • Lymphocytes, Absolute 05/09/2019 2.47  0.70 - 3.10 10*3/mm3 Final   • Monocytes, Absolute 05/09/2019 1.12* 0.10 - 0.90 10*3/mm3 Final   • Eosinophils, Absolute 05/09/2019 0.25  0.00 - 0.40 10*3/mm3 Final   • Basophils, Absolute 05/09/2019 0.08  0.00 - 0.20 10*3/mm3 Final   • Immature Grans, Absolute 05/09/2019 0.06* 0.00 - 0.05 10*3/mm3 Final   • nRBC 05/09/2019 0.0  0.0 - 0.2 /100 WBC Final   Orders Only on 04/30/2019   Component Date Value Ref Range Status   • Case Report 04/30/2019    Final                    Value:Non-gynecologic Cytology                           Case: [id number]                                  Authorizing Provider:  Rosario Ceron APRN   Collected:           04/30/2019 02:19 PM          Ordering Location:     [facility]     Received:            04/30/2019 02:19 PM                                 [facility]                                                                                  [facility]                                                                     Pathologist:           Goyo Camarena MD                                                         Specimen:    Urine, Clean Catch, CLEAR, 50MLS                                                          • Final Diagnosis 04/30/2019    Final                    Value:This result contains rich text formatting which cannot be displayed here.   • Specimen Adequacy 04/30/2019 Satisfactory for evaluation   Final   • Interpretation 04/30/2019 See final diagnosis   Final   • Gross Description 04/30/2019    Final                    Value:This result contains rich text formatting which cannot be displayed here.   • Microscopic Description 04/30/2019    Final                    Value:This result contains rich text formatting which cannot be displayed here.   Lab on 04/29/2019   Component Date Value Ref Range Status   • Urine Culture 04/29/2019 No growth   Final   • Color, UA 04/29/2019 Yellow  Yellow, Straw Final   • Appearance, UA 04/29/2019 Clear  Clear Final   • pH, UA 04/29/2019 7.0  5.5 - 8.0 Final   • Specific Gravity, UA 04/29/2019 1.015  1.005 - 1.030 Final   • Glucose, UA 04/29/2019 Negative  Negative Final   • Ketones, UA 04/29/2019 Negative  Negative Final   • Bilirubin, UA 04/29/2019 Negative  Negative Final   • Blood, UA 04/29/2019 Moderate (2+)* Negative Final   • Protein, UA 04/29/2019 Negative  Negative Final   • Leuk Esterase, UA 04/29/2019 Negative  Negative Final   • Nitrite, UA 04/29/2019 Negative  Negative Final   • Urobilinogen, UA 04/29/2019  0.2 E.U./dL  0.2 - 1.0 E.U./dL Final   • RBC, UA 04/29/2019 13-20* None Seen /HPF Final   • WBC, UA 04/29/2019 None Seen  None Seen /HPF Final   • Bacteria, UA 04/29/2019 None Seen  None Seen /HPF Final   • Squamous Epithelial Cells, UA 04/29/2019 0-2  None Seen, 0-2 /HPF Final   • Hyaline Casts, UA 04/29/2019 None Seen  None Seen /LPF Final   • Methodology 04/29/2019 Manual Light Microscopy   Final   Office Visit on 02/08/2019   Component Date Value Ref Range Status   • Glucose 02/08/2019 85  74 - 99 mg/dL Final   • BUN 02/08/2019 10  7 - 17 mg/dL Final   • Creatinine 02/08/2019 0.94  0.52 - 1.04 mg/dL Final   • Sodium 02/08/2019 143  137 - 145 mmol/L Final   • Potassium 02/08/2019 3.6  3.4 - 5.0 mmol/L Final   • Chloride 02/08/2019 111* 98 - 107 mmol/L Final   • CO2 02/08/2019 23.0  22.0 - 30.0 mmol/L Final   • Calcium 02/08/2019 9.2  8.4 - 10.2 mg/dL Final   • eGFR Non African Amer 02/08/2019 61  45 - 104 mL/min/1.73 Final   • BUN/Creatinine Ratio 02/08/2019 10.6  7.0 - 25.0 Final   • Anion Gap 02/08/2019 9.0  5.0 - 15.0 mmol/L Final   • Magnesium 02/08/2019 1.9  1.6 - 2.3 mg/dL Final   ]

## 2019-05-20 ENCOUNTER — TELEPHONE (OUTPATIENT)
Dept: FAMILY MEDICINE CLINIC | Facility: CLINIC | Age: 59
End: 2019-05-20

## 2019-05-20 NOTE — TELEPHONE ENCOUNTER
PT IS AWARE OF RESULTS PT STATED SHE HAD AN APPOINTMENT ON THE 3RD AND SHE WILL GET THE BLOOD WORK DONE THEN

## 2019-05-20 NOTE — TELEPHONE ENCOUNTER
----- Message from UMESH Arriaga sent at 5/17/2019  3:34 PM CDT -----  Notify pt that her liver function tests are nearly normal now, repeat in 2 more weeks to ensure return to normal. Thanks edith

## 2019-05-21 ENCOUNTER — EPISODE CHANGES (OUTPATIENT)
Dept: CASE MANAGEMENT | Facility: OTHER | Age: 59
End: 2019-05-21

## 2019-06-03 ENCOUNTER — OFFICE VISIT (OUTPATIENT)
Dept: FAMILY MEDICINE CLINIC | Facility: CLINIC | Age: 59
End: 2019-06-03

## 2019-06-03 ENCOUNTER — TELEPHONE (OUTPATIENT)
Dept: FAMILY MEDICINE CLINIC | Facility: CLINIC | Age: 59
End: 2019-06-03

## 2019-06-03 ENCOUNTER — LAB (OUTPATIENT)
Dept: LAB | Facility: OTHER | Age: 59
End: 2019-06-03

## 2019-06-03 VITALS
DIASTOLIC BLOOD PRESSURE: 74 MMHG | HEART RATE: 77 BPM | RESPIRATION RATE: 16 BRPM | WEIGHT: 110.5 LBS | SYSTOLIC BLOOD PRESSURE: 118 MMHG | OXYGEN SATURATION: 99 % | BODY MASS INDEX: 25.57 KG/M2 | HEIGHT: 55 IN | TEMPERATURE: 99.1 F

## 2019-06-03 DIAGNOSIS — G89.29 CHRONIC MIDLINE THORACIC BACK PAIN: ICD-10-CM

## 2019-06-03 DIAGNOSIS — M54.6 CHRONIC MIDLINE THORACIC BACK PAIN: ICD-10-CM

## 2019-06-03 DIAGNOSIS — M48.50XA COMPRESSION FRACTURE OF VERTEBRA (HCC): ICD-10-CM

## 2019-06-03 DIAGNOSIS — R74.8 ELEVATED LIVER ENZYMES: ICD-10-CM

## 2019-06-03 DIAGNOSIS — Z79.891 LONG TERM (CURRENT) USE OF OPIATE ANALGESIC: Primary | ICD-10-CM

## 2019-06-03 DIAGNOSIS — M80.00XA OSTEOPOROSIS WITH CURRENT PATHOLOGICAL FRACTURE, UNSPECIFIED OSTEOPOROSIS TYPE, INITIAL ENCOUNTER: ICD-10-CM

## 2019-06-03 DIAGNOSIS — E78.5 HYPERLIPIDEMIA, UNSPECIFIED HYPERLIPIDEMIA TYPE: ICD-10-CM

## 2019-06-03 DIAGNOSIS — M54.15 RADICULOPATHY OF THORACOLUMBAR REGION: Chronic | ICD-10-CM

## 2019-06-03 LAB
ALBUMIN SERPL-MCNC: 4.3 G/DL (ref 3.5–5)
ALP SERPL-CCNC: 84 U/L (ref 38–126)
ALT SERPL W P-5'-P-CCNC: 18 U/L
AST SERPL-CCNC: 57 U/L (ref 14–36)
BILIRUB CONJ SERPL-MCNC: <0 MG/DL (ref 0–0.3)
BILIRUB INDIRECT SERPL-MCNC: 0.2 MG/DL (ref 0–1.1)
BILIRUB SERPL-MCNC: 0.2 MG/DL (ref 0.2–1.3)
PROT SERPL-MCNC: 7.7 G/DL (ref 6.3–8.6)

## 2019-06-03 PROCEDURE — G0481 DRUG TEST DEF 8-14 CLASSES: HCPCS | Performed by: NURSE PRACTITIONER

## 2019-06-03 PROCEDURE — 99214 OFFICE O/P EST MOD 30 MIN: CPT | Performed by: NURSE PRACTITIONER

## 2019-06-03 PROCEDURE — 80307 DRUG TEST PRSMV CHEM ANLYZR: CPT | Performed by: NURSE PRACTITIONER

## 2019-06-03 PROCEDURE — 36415 COLL VENOUS BLD VENIPUNCTURE: CPT | Performed by: NURSE PRACTITIONER

## 2019-06-03 PROCEDURE — 80076 HEPATIC FUNCTION PANEL: CPT | Performed by: NURSE PRACTITIONER

## 2019-06-03 RX ORDER — ATORVASTATIN CALCIUM 20 MG/1
20 TABLET, FILM COATED ORAL DAILY
Qty: 90 TABLET | Refills: 1 | Status: SHIPPED | OUTPATIENT
Start: 2019-06-03 | End: 2019-11-21 | Stop reason: SDUPTHER

## 2019-06-03 RX ORDER — TAMSULOSIN HYDROCHLORIDE 0.4 MG/1
0.4 CAPSULE ORAL DAILY
COMMUNITY
Start: 2019-05-04 | End: 2019-06-04

## 2019-06-03 RX ORDER — HYDROCODONE BITARTRATE AND ACETAMINOPHEN 5; 325 MG/1; MG/1
1 TABLET ORAL EVERY 6 HOURS PRN
Qty: 105 TABLET | Refills: 0 | Status: SHIPPED | OUTPATIENT
Start: 2019-06-03 | End: 2019-07-08 | Stop reason: SDUPTHER

## 2019-06-03 NOTE — PROGRESS NOTES
Chief Complaint   Patient presents with   • Pain     4 week f/u med refills     Subjective   Tricia Loyola is a 59 y.o. female who presents to the office for chronic pain management. Due for refill.    The following portions of the patient's history were reviewed and updated as appropriate: allergies, current medications, past family history, past medical history, past social history, past surgical history and problem list.    History of Present Illness   Pain   This is a chronic (acute on chronic) problem. The current episode started more than 1 year ago. The problem occurs constantly. The problem has been waxing and waning. Pertinent negatives include no chest pain, coughing, fever, nausea or rash. The symptoms are aggravated by bending, walking, twisting and standing. She has tried acetaminophen, lying down, NSAIDs, relaxation, position changes, rest, sleep, walking and oral narcotics (gabapentin helps some with the burning, lortab  helps) for the symptoms. The treatment provided significant relief.   Back pain:  Imaging in chart indicates mild DDD of cspine . An ED record is on file from [facility] of 10/5/18. CT of T and L spine done on that day show stable Tspine with an old compression fracture deformity of superior endplate of T11. This is thought a result of chronic osteoporosis.  Mild posterior spurring of superior enplates at T2-3 and T5-6. L spine shows mild degenerative changes at L4-5 and L5-S1. Unfortunately, there is nothing that supports a neurosurgical consult. If pain persists in excess of these findings, will schedule MRI  Anxiety: controlled with amitriptyline and buspirone with PRN clonazepam.   Elevated LFTs: due for repeat labs today.   Kidney stones: still has one known, unpassed. No pain today.     Chief concern today is having enough pain medication to last until she returns from [place]. Must leave this week for her sons house to watch a grandchild for a month, will not be back until 7.9/19.  Her pain med will be due again on 7/3/19. Is requesting a temporary increase in number issued to include 15 tablets additional over her scheduled TID dosing as I cannot [place] a ref[place]. With her recurrent kidney stone problems she does not feel she can reasonably space current number out enough to cover additional 5 days before she returns. I agree for this one time request only and will immediately decrease to #90 from the #105 issued today, at next refill in July. I have increased the frequency with which she may take her pain meds for 1 month and she will have to mete them out to make them last the month or do without when they are gone.     Past Medical History:   Diagnosis Date   • Abdominal pain    • Anemia    • Chronic post-traumatic headache      rebound      • Depressive disorder    • Encounter for gynecological examination    • Gastroesophageal reflux disease    • Generalized anxiety disorder    • History of mammogram 08/2008    MAMMOGRAM DIAGNOSTIC BILATERAL 33735 ([facility]) (1)     • Hyperlipidemia    • Ingrown toenail    • Injury of back    • Nonruptured cerebral aneurysm    • Osteoporosis    • Posttraumatic stress disorder    • Seizure (CMS/HCC)     Psychogenic non-epileptic sz    • Viral hepatitis A           Family History   Problem Relation Age of Onset   • Constipation Mother    • Hypertension Mother    • Anxiety disorder Mother    • Heart disease Mother    • Alcohol abuse Father    • Heart disease Father    • Anxiety disorder Brother    • Kidney disease Brother    • Hypertension Brother    • Arthritis Brother    • Anxiety disorder Brother    • Kidney disease Brother    • Diabetes Brother    • Anxiety disorder Brother    • Hypertension Brother    • Breast cancer Paternal Aunt    • Heart disease Maternal Grandmother    • Clotting disorder Maternal Grandmother    • Heart disease Maternal Grandfather         Review of Systems   Constitutional: Negative for appetite change, chills, fatigue and  "fever.   HENT: Negative for congestion, ear pain, rhinorrhea and sore throat.         Jaw pain   Eyes: Negative for pain.   Respiratory: Negative for cough and shortness of breath.    Cardiovascular: Negative for chest pain and palpitations.   Gastrointestinal: Negative for abdominal pain, constipation, diarrhea and nausea.   Genitourinary: Negative for dysuria.   Musculoskeletal: Positive for back pain. Negative for joint swelling and neck pain.   Skin: Negative for rash.        Toe pain and swelling right great toe, partial nail avulsion.   Neurological: Negative for dizziness and headaches.   Psychiatric/Behavioral: Negative for confusion, decreased concentration, dysphoric mood, self-injury, sleep disturbance and suicidal ideas. The patient is nervous/anxious.    All other systems reviewed and are negative.      Objective   Vitals:    06/03/19 0902   BP: 118/74   BP Location: Left arm   Patient Position: Sitting   Cuff Size: Adult   Pulse: 77   Resp: 16   Temp: 99.1 °F (37.3 °C)   SpO2: 99%   Weight: 50.1 kg (110 lb 8 oz)   Height: 81.3 cm (32.01\")   PainSc:   5   PainLoc: Back     Physical Exam   Constitutional: She is oriented to person, place, and time. She appears well-developed and well-nourished.   HENT:   Head: Normocephalic and atraumatic.       Eyes: Conjunctivae are normal. Pupils are equal, round, and reactive to light.   Neck: Normal range of motion. Neck supple.   Cardiovascular: Normal rate, regular rhythm, normal heart sounds and intact distal pulses. Exam reveals no gallop and no friction rub.   No murmur heard.  Pulmonary/Chest: Effort normal and breath sounds normal. No respiratory distress. She has no wheezes. She has no rales. She exhibits no tenderness.   Abdominal: Soft. Bowel sounds are normal.   Musculoskeletal: She exhibits no edema, tenderness or deformity.        Lumbar back: She exhibits decreased range of motion, bony tenderness and pain.        Back:      Skin Integrity  -  She has " right foot ingrown toenail..  Lymphadenopathy:     She has no cervical adenopathy.   Neurological: She is alert and oriented to person, place, and time.   Skin: Skin is warm and dry. Capillary refill takes 2 to 3 seconds. No erythema. No pallor.   Partial right great toe nail avulsion appears layers have been removed, no redness, drainage or bleeding. CMS normal with great tenderness.    Psychiatric: She has a normal mood and affect. Her behavior is normal. Judgment and thought content normal.   Nursing note and vitals reviewed.      Assessment/Plan   Tricia was seen today for pain.    Diagnoses and all orders for this visit:    Long term (current) use of opiate analgesic  -     ToxASSURE Select 13 Discrete -    Compression fracture of vertebra (CMS/HCC)  Comments:  acute exacerbation of pain due to pathogenic fracture related to osteoporosis  Orders:  -     HYDROcodone-acetaminophen (NORCO) 5-325 MG per tablet; Take 1 tablet by mouth Every 6 (Six) Hours As Needed for Moderate Pain  or Severe Pain . Fill when due    Osteoporosis with current pathological fracture, unspecified osteoporosis type, initial encounter  -     HYDROcodone-acetaminophen (NORCO) 5-325 MG per tablet; Take 1 tablet by mouth Every 6 (Six) Hours As Needed for Moderate Pain  or Severe Pain . Fill when due    Radiculopathy of thoracolumbar region  Comments:  acute exacerbation of chronic radiculopathy due to pathologic fracture of the spine  Orders:  -     HYDROcodone-acetaminophen (NORCO) 5-325 MG per tablet; Take 1 tablet by mouth Every 6 (Six) Hours As Needed for Moderate Pain  or Severe Pain . Fill when due    Chronic midline thoracic back pain  -     HYDROcodone-acetaminophen (NORCO) 5-325 MG per tablet; Take 1 tablet by mouth Every 6 (Six) Hours As Needed for Moderate Pain  or Severe Pain . Fill when due    Hyperlipidemia, unspecified hyperlipidemia type  -     atorvastatin (LIPITOR) 20 MG tablet; Take 1 tablet by mouth Daily.            PHQ-2/PHQ-9 Depression Screening 6/3/2019   Little interest or pleasure in doing things 0   Feeling down, depressed, or hopeless 0   Trouble falling or staying asleep, or sleeping too much -   Feeling tired or having little energy -   Poor appetite or overeating -   Feeling bad about yourself - or that you are a failure or have let yourself or your family down -   Trouble concentrating on things, such as reading the newspaper or watching television -   Moving or speaking so slowly that other people could have noticed. Or the opposite - being so fidgety or restless that you have been moving around a lot more than usual -   Thoughts that you would be better off dead, or of hurting yourself in some way -   Total Score 0   Patient understands the risks associated with this controlled medication, including tolerance and addiction.  Patient also agrees to only obtain this medication from me, and not from a another provider, unless that provider is covering for me in my absence.  Patient also agrees to be compliant in dosing, and not self adjust the dose of medication.  A signed controlled substance agreement is on file, and the patient has received a controlled substance education sheet at this a previous visit.  The patient has also signed a consent for treatment with a controlled substance as per [facility] policy. KATE was obtained.      MUESH Guzmán         Return in about 4 weeks (around 7/1/2019) for Next scheduled follow up.    There are no Patient Instructions on file for this visit.    Office Visit on 05/17/2019   Component Date Value Ref Range Status   • Total Protein 05/17/2019 7.3  6.3 - 8.6 g/dL Final   • Albumin 05/17/2019 4.10  3.50 - 5.00 g/dL Final   • ALT (SGPT) 05/17/2019 51* <=35 U/L Final   • AST (SGOT) 05/17/2019 87* 14 - 36 U/L Final   • Alkaline Phosphatase 05/17/2019 99  38 - 126 U/L Final   • Total Bilirubin 05/17/2019 0.3  0.2 - 1.3 mg/dL Final   • Bilirubin, Direct 05/17/2019  <0.0* 0.0 - 0.3 mg/dL Final   • Bilirubin, Indirect 05/17/2019 0.2  0.0 - 1.1 mg/dL Final   Admission on 05/09/2019, Discharged on 05/10/2019   Component Date Value Ref Range Status   • Color, UA 05/09/2019 Yellow  Yellow, Straw, Dark Yellow, Paige Final   • Appearance, UA 05/09/2019 Clear  Clear Final   • pH, UA 05/09/2019 7.0  5.0 - 9.0 Final   • Specific Gravity, UA 05/09/2019 1.002* 1.003 - 1.030 Final    Result obtained by Refractometer   • Glucose, UA 05/09/2019 Negative  Negative Final   • Ketones, UA 05/09/2019 Negative  Negative Final   • Bilirubin, UA 05/09/2019 Negative  Negative Final   • Blood, UA 05/09/2019 Small (1+)* Negative Final   • Protein, UA 05/09/2019 Negative  Negative Final   • Leuk Esterase, UA 05/09/2019 Negative  Negative Final   • Nitrite, UA 05/09/2019 Negative  Negative Final   • Urobilinogen, UA 05/09/2019 0.2 E.U./dL  0.2 - 1.0 E.U./dL Final   • RBC, UA 05/09/2019 3-5* None Seen /HPF Final   • WBC, UA 05/09/2019 None Seen  None Seen, 0-2, 3-5 /HPF Final   • Bacteria, UA 05/09/2019 None Seen  None Seen /HPF Final   • Squamous Epithelial Cells, UA 05/09/2019 None Seen  None Seen, 0-2 /HPF Final   • Hyaline Casts, UA 05/09/2019 None Seen  None Seen /LPF Final   • Methodology 05/09/2019 Automated Microscopy   Final   • Glucose 05/09/2019 99  65 - 99 mg/dL Final   • BUN 05/09/2019 14  8 - 23 mg/dL Final   • Creatinine 05/09/2019 1.15* 0.57 - 1.00 mg/dL Final   • Sodium 05/09/2019 137  136 - 145 mmol/L Final   • Potassium 05/09/2019 3.9  3.5 - 5.2 mmol/L Final   • Chloride 05/09/2019 103  98 - 107 mmol/L Final   • CO2 05/09/2019 22.0  22.0 - 29.0 mmol/L Final   • Calcium 05/09/2019 9.4  8.6 - 10.5 mg/dL Final   • Total Protein 05/09/2019 7.6  6.0 - 8.5 g/dL Final   • Albumin 05/09/2019 4.10  3.50 - 5.20 g/dL Final   • ALT (SGPT) 05/09/2019 203* 1 - 33 U/L Final   • AST (SGOT) 05/09/2019 190* 1 - 32 U/L Final   • Alkaline Phosphatase 05/09/2019 161* 39 - 117 U/L Final   • Total Bilirubin  05/09/2019 <0.2* 0.2 - 1.2 mg/dL Final   • eGFR Non African Amer 05/09/2019 48* >60 mL/min/1.73 Final   • Globulin 05/09/2019 3.5  gm/dL Final   • A/G Ratio 05/09/2019 1.2  g/dL Final   • BUN/Creatinine Ratio 05/09/2019 12.2  7.0 - 25.0 Final   • Anion Gap 05/09/2019 12.0  mmol/L Final   • Lipase 05/09/2019 18  13 - 60 U/L Final   • WBC 05/09/2019 8.95  3.40 - 10.80 10*3/mm3 Final   • RBC 05/09/2019 4.27  3.77 - 5.28 10*6/mm3 Final   • Hemoglobin 05/09/2019 13.2  12.0 - 15.9 g/dL Final   • Hematocrit 05/09/2019 39.4  34.0 - 46.6 % Final   • MCV 05/09/2019 92.3  79.0 - 97.0 fL Final   • MCH 05/09/2019 30.9  26.6 - 33.0 pg Final   • MCHC 05/09/2019 33.5  31.5 - 35.7 g/dL Final   • RDW 05/09/2019 13.4  12.3 - 15.4 % Final   • RDW-SD 05/09/2019 45.6  37.0 - 54.0 fl Final   • MPV 05/09/2019 9.5  6.0 - 12.0 fL Final   • Platelets 05/09/2019 304  140 - 450 10*3/mm3 Final   • Neutrophil % 05/09/2019 55.5  42.7 - 76.0 % Final   • Lymphocyte % 05/09/2019 27.6  19.6 - 45.3 % Final   • Monocyte % 05/09/2019 12.5* 5.0 - 12.0 % Final   • Eosinophil % 05/09/2019 2.8  0.3 - 6.2 % Final   • Basophil % 05/09/2019 0.9  0.0 - 1.5 % Final   • Immature Grans % 05/09/2019 0.7* 0.0 - 0.5 % Final   • Neutrophils, Absolute 05/09/2019 4.97  1.70 - 7.00 10*3/mm3 Final   • Lymphocytes, Absolute 05/09/2019 2.47  0.70 - 3.10 10*3/mm3 Final   • Monocytes, Absolute 05/09/2019 1.12* 0.10 - 0.90 10*3/mm3 Final   • Eosinophils, Absolute 05/09/2019 0.25  0.00 - 0.40 10*3/mm3 Final   • Basophils, Absolute 05/09/2019 0.08  0.00 - 0.20 10*3/mm3 Final   • Immature Grans, Absolute 05/09/2019 0.06* 0.00 - 0.05 10*3/mm3 Final   • nRBC 05/09/2019 0.0  0.0 - 0.2 /100 WBC Final   Orders Only on 04/30/2019   Component Date Value Ref Range Status   • Case Report 04/30/2019    Final                    Value:Non-gynecologic Cytology                          Case: [id number]                                  Authorizing Provider:  Rosario Ceron APRN   Collected:            04/30/2019 02:19 PM          Ordering Location:     [facility]     Received:            04/30/2019 02:19 PM                                 [facility]                                                                                  [facility]                                                                     Pathologist:           Goyo Camarena MD                                                         Specimen:    Urine, Clean Catch, CLEAR, 50MLS                                                          • Final Diagnosis 04/30/2019    Final                    Value:This result contains rich text formatting which cannot be displayed here.   • Specimen Adequacy 04/30/2019 Satisfactory for evaluation   Final   • Interpretation 04/30/2019 See final diagnosis   Final   • Gross Description 04/30/2019    Final                    Value:This result contains rich text formatting which cannot be displayed here.   • Microscopic Description 04/30/2019    Final                    Value:This result contains rich text formatting which cannot be displayed here.   Lab on 04/29/2019   Component Date Value Ref Range Status   • Urine Culture 04/29/2019 No growth   Final   • Color, UA 04/29/2019 Yellow  Yellow, Straw Final   • Appearance, UA 04/29/2019 Clear  Clear Final   • pH, UA 04/29/2019 7.0  5.5 - 8.0 Final   • Specific Gravity, UA 04/29/2019 1.015  1.005 - 1.030 Final   • Glucose, UA 04/29/2019 Negative  Negative Final   • Ketones, UA 04/29/2019 Negative  Negative Final   • Bilirubin, UA 04/29/2019 Negative  Negative Final   • Blood, UA 04/29/2019 Moderate (2+)* Negative Final   • Protein, UA 04/29/2019 Negative  Negative Final   • Leuk Esterase, UA 04/29/2019 Negative  Negative Final   • Nitrite, UA 04/29/2019 Negative  Negative Final   • Urobilinogen, UA 04/29/2019 0.2 E.U./dL  0.2 - 1.0 E.U./dL Final   • RBC, UA 04/29/2019 13-20* None Seen /HPF Final   • WBC, UA 04/29/2019 None Seen  None Seen  /HPF Final   • Bacteria, UA 04/29/2019 None Seen  None Seen /HPF Final   • Squamous Epithelial Cells, UA 04/29/2019 0-2  None Seen, 0-2 /HPF Final   • Hyaline Casts, UA 04/29/2019 None Seen  None Seen /LPF Final   • Methodology 04/29/2019 Manual Light Microscopy   Final   Office Visit on 02/08/2019   Component Date Value Ref Range Status   • Glucose 02/08/2019 85  74 - 99 mg/dL Final   • BUN 02/08/2019 10  7 - 17 mg/dL Final   • Creatinine 02/08/2019 0.94  0.52 - 1.04 mg/dL Final   • Sodium 02/08/2019 143  137 - 145 mmol/L Final   • Potassium 02/08/2019 3.6  3.4 - 5.0 mmol/L Final   • Chloride 02/08/2019 111* 98 - 107 mmol/L Final   • CO2 02/08/2019 23.0  22.0 - 30.0 mmol/L Final   • Calcium 02/08/2019 9.2  8.4 - 10.2 mg/dL Final   • eGFR Non African Amer 02/08/2019 61  45 - 104 mL/min/1.73 Final   • BUN/Creatinine Ratio 02/08/2019 10.6  7.0 - 25.0 Final   • Anion Gap 02/08/2019 9.0  5.0 - 15.0 mmol/L Final   • Magnesium 02/08/2019 1.9  1.6 - 2.3 mg/dL Final   ]

## 2019-06-03 NOTE — TELEPHONE ENCOUNTER
----- Message from UMESH Arriaga sent at 6/3/2019  1:21 PM CDT -----  Please notify pt of nearly normal liver functions  At this time. Thanks clw

## 2019-06-06 RX ORDER — ALBUTEROL SULFATE 90 UG/1
2 AEROSOL, METERED RESPIRATORY (INHALATION) EVERY 6 HOURS PRN
Qty: 1 INHALER | Refills: 3 | Status: SHIPPED | OUTPATIENT
Start: 2019-06-06 | End: 2020-06-01

## 2019-06-07 LAB
6-ACETYLMORPHINE: NEGATIVE
6MAM SERPLBLD-MCNC: NOT DETECTED NG/MG CREAT
7-AMINOCLONAZEPAM UR: NOT DETECTED NG/MG CREAT
A-OH ALPRAZ/CREAT UR: NOT DETECTED NG/MG CREAT
ALFENTANIL UR QL: NOT DETECTED NG/MG CREAT
ALPHA-HYDROXYMIDAZOLAM, URINE: NOT DETECTED NG/MG CREAT
ALPHA-HYDROXYTRIAZOLAM, URINE: NOT DETECTED NG/MG CREAT
AMOBARBITAL UR: NOT DETECTED
AMPHET UR QL CFM: NOT DETECTED NG/MG CREAT
AMPHETAMINES UR QL SCN: NEGATIVE
BARBITAL UR QL CFM: NOT DETECTED
BARBITURATES UR QL SCN: NEGATIVE
BENZODIAZ UR QL SCN: NEGATIVE
BENZOYLECGONINE UR: NOT DETECTED NG/MG CREAT
BUPRENORPHINE UR QL: NEGATIVE
BUPRENORPHINE UR QL: NOT DETECTED NG/MG CREAT
BUTABARBITAL UR QL: NOT DETECTED
BUTALBITAL UR QL: NOT DETECTED
CANNABINOIDS UR QL CFM: NEGATIVE
CLONAZEPAM UR QL: NOT DETECTED NG/MG CREAT
COCAETHYLENE UR QL CFM: NOT DETECTED NG/MG CREAT
COCAINE UR QL CFM: NEGATIVE
CODEINE UR QL: NOT DETECTED NG/MG CREAT
CONV COCAINE, UR: NOT DETECTED NG/MG CREAT
CONV REPORT SUMMARY: NORMAL
CREAT 24H UR-MCNC: 72 MG/DL
DESALKYLFLURAZ/CREAT UR: NOT DETECTED NG/MG CREAT
DESMETHYLFLUNITRAZEPAM: NOT DETECTED NG/MG CREAT
DIAZEPAM UR-MCNC: NOT DETECTED NG/MG CREAT
DIHYDROCODEINE UR: 201 NG/MG CREAT
EDDP SERPL QL: NOT DETECTED NG/MG CREAT
ETHANOL SCREEN URINE (REF): NEGATIVE
ETHANOL UR-MCNC: NOT DETECTED G/DL
FENTANYL UR QL: NEGATIVE
FENTANYL+NORFENTANYL UR QL SCN: NOT DETECTED NG/MG CREAT
FLUNITRAZEPAM SERPLBLD-MCNC: NOT DETECTED NG/MG CREAT
HYDROCODONE UR QL: 1282 NG/MG CREAT
HYDROMORPHONE UR QL: NOT DETECTED NG/MG CREAT
LEVEL OF DETECTION:: NORMAL
LORAZEPAM UR-MCNC: NOT DETECTED NG/MG CREAT
LORAZEPAM/CREAT UR: NOT DETECTED NG/MG CREAT
MDA SERPLBLD-MCNC: NOT DETECTED NG/MG CREAT
MDMA UR QL SCN: NOT DETECTED NG/MG CREAT
MEPHOBARBITAL UR QL CFM: NOT DETECTED
METHADONE BLD QL SCN: NEGATIVE
METHADONE, URINE: NOT DETECTED NG/MG CREAT
METHAMPHETAMINE UR: NOT DETECTED NG/MG CREAT
MIDAZOLAM UR-MCNC: NOT DETECTED NG/MG CREAT
MORPHINE UR QL: NOT DETECTED NG/MG CREAT
N-DESMETHYLTRAMADOL, U: NOT DETECTED NG/MG CREAT
NARCOTICS UR: NEGATIVE
NORBUPRENORPHINE SERPLBLD-MCNC: NOT DETECTED NG/MG CREAT
NORCODEINE UR-MCNC: NOT DETECTED NG/MG CREAT
NORDIAZEPAM SERPL CFM-MCNC: NOT DETECTED NG/MG CREAT
NORFENTANYL UR: NOT DETECTED NG/MG CREAT
NORMORPHINE: NOT DETECTED NG/MG CREAT
NOROXYMORPHONE: NOT DETECTED NG/MG CREAT
O-DESMETHYLTRAMADOL, UR: NOT DETECTED NG/MG CREAT
OPIATES UR QL CFM: NORMAL
OPIATES, URINE, NORHYDROCODONE: 3168 NG/MG CREAT
OPIATES, URINE, NOROXYCODONE: NOT DETECTED NG/MG CREAT
OXAZEPAM UR QL: NOT DETECTED NG/MG CREAT
OXYCODONE UR QL: NEGATIVE
OXYCODONE UR: NOT DETECTED NG/MG CREAT
OXYMORPHONE UR: NOT DETECTED NG/MG CREAT
PENTOBARBITAL UR: NOT DETECTED
PHENOBARB UR QL: NOT DETECTED
SECOBARBITAL UR QL: NOT DETECTED
SUFENTANIL UR QL: NOT DETECTED NG/MG CREAT
TAPENTADOL SERPLBLD-MCNC: NEGATIVE NG/ML
TAPENTADOL UR-MCNC: NOT DETECTED NG/MG CREAT
TEMAZEPAM UR QL CFM: NOT DETECTED NG/MG CREAT
THC UR CFM-MCNC: NOT DETECTED NG/MG CREAT
THIOPENTAL UR QL CFM: NOT DETECTED
TRAMADOL UR: NOT DETECTED NG/MG CREAT

## 2019-07-08 ENCOUNTER — OFFICE VISIT (OUTPATIENT)
Dept: FAMILY MEDICINE CLINIC | Facility: CLINIC | Age: 59
End: 2019-07-08

## 2019-07-08 VITALS
SYSTOLIC BLOOD PRESSURE: 116 MMHG | WEIGHT: 110.9 LBS | OXYGEN SATURATION: 98 % | TEMPERATURE: 97.8 F | HEIGHT: 62 IN | RESPIRATION RATE: 16 BRPM | BODY MASS INDEX: 20.41 KG/M2 | DIASTOLIC BLOOD PRESSURE: 70 MMHG | HEART RATE: 89 BPM

## 2019-07-08 DIAGNOSIS — R79.89 ABNORMAL LFTS: Primary | ICD-10-CM

## 2019-07-08 DIAGNOSIS — G89.29 CHRONIC MIDLINE THORACIC BACK PAIN: Primary | Chronic | ICD-10-CM

## 2019-07-08 DIAGNOSIS — F41.1 GAD (GENERALIZED ANXIETY DISORDER): Chronic | ICD-10-CM

## 2019-07-08 DIAGNOSIS — R74.8 ELEVATED LIVER ENZYMES: ICD-10-CM

## 2019-07-08 DIAGNOSIS — M48.50XA COMPRESSION FRACTURE OF VERTEBRA (HCC): ICD-10-CM

## 2019-07-08 DIAGNOSIS — R23.4 THINNING OF SKIN: ICD-10-CM

## 2019-07-08 DIAGNOSIS — R94.4 ABNORMAL RENAL FUNCTION TEST: ICD-10-CM

## 2019-07-08 DIAGNOSIS — G25.81 RESTLESS LEG SYNDROME: Chronic | ICD-10-CM

## 2019-07-08 DIAGNOSIS — M54.15 RADICULOPATHY OF THORACOLUMBAR REGION: Chronic | ICD-10-CM

## 2019-07-08 DIAGNOSIS — F41.0 PANIC DISORDER WITHOUT AGORAPHOBIA: Chronic | ICD-10-CM

## 2019-07-08 DIAGNOSIS — M54.6 CHRONIC MIDLINE THORACIC BACK PAIN: Primary | Chronic | ICD-10-CM

## 2019-07-08 LAB
ALBUMIN SERPL-MCNC: 4.5 G/DL (ref 3.5–5)
ALBUMIN/GLOB SERPL: 1.3 G/DL (ref 1.1–1.8)
ALP SERPL-CCNC: 80 U/L (ref 38–126)
ALT SERPL W P-5'-P-CCNC: 19 U/L
ANION GAP SERPL CALCULATED.3IONS-SCNC: 13 MMOL/L (ref 5–15)
AST SERPL-CCNC: 64 U/L (ref 14–36)
BILIRUB SERPL-MCNC: 0.2 MG/DL (ref 0.2–1.3)
BUN BLD-MCNC: 19 MG/DL (ref 7–23)
BUN/CREAT SERPL: 19 (ref 7–25)
CALCIUM SPEC-SCNC: 9.5 MG/DL (ref 8.4–10.2)
CHLORIDE SERPL-SCNC: 109 MMOL/L (ref 101–112)
CO2 SERPL-SCNC: 23 MMOL/L (ref 22–30)
CREAT BLD-MCNC: 1 MG/DL (ref 0.52–1.04)
GFR SERPL CREATININE-BSD FRML MDRD: 56 ML/MIN/1.73 (ref 45–104)
GLOBULIN UR ELPH-MCNC: 3.4 GM/DL (ref 2.3–3.5)
GLUCOSE BLD-MCNC: 98 MG/DL (ref 70–99)
POTASSIUM BLD-SCNC: 4.6 MMOL/L (ref 3.4–5)
PROT SERPL-MCNC: 7.9 G/DL (ref 6.3–8.6)
SODIUM BLD-SCNC: 145 MMOL/L (ref 137–145)

## 2019-07-08 PROCEDURE — 84480 ASSAY TRIIODOTHYRONINE (T3): CPT | Performed by: NURSE PRACTITIONER

## 2019-07-08 PROCEDURE — 80053 COMPREHEN METABOLIC PANEL: CPT | Performed by: NURSE PRACTITIONER

## 2019-07-08 PROCEDURE — 36415 COLL VENOUS BLD VENIPUNCTURE: CPT | Performed by: NURSE PRACTITIONER

## 2019-07-08 PROCEDURE — 84443 ASSAY THYROID STIM HORMONE: CPT | Performed by: NURSE PRACTITIONER

## 2019-07-08 PROCEDURE — 99214 OFFICE O/P EST MOD 30 MIN: CPT | Performed by: NURSE PRACTITIONER

## 2019-07-08 PROCEDURE — 84439 ASSAY OF FREE THYROXINE: CPT | Performed by: NURSE PRACTITIONER

## 2019-07-08 RX ORDER — HYDROCODONE BITARTRATE AND ACETAMINOPHEN 5; 325 MG/1; MG/1
1 TABLET ORAL EVERY 6 HOURS PRN
Qty: 90 TABLET | Refills: 0 | Status: SHIPPED | OUTPATIENT
Start: 2019-07-08 | End: 2019-08-06 | Stop reason: SDUPTHER

## 2019-07-08 RX ORDER — CLONAZEPAM 0.5 MG/1
0.5 TABLET ORAL DAILY PRN
Qty: 15 TABLET | Refills: 0 | Status: SHIPPED | OUTPATIENT
Start: 2019-07-08 | End: 2019-10-04 | Stop reason: SDUPTHER

## 2019-07-08 RX ORDER — ROPINIROLE 3 MG/1
3 TABLET, FILM COATED ORAL NIGHTLY
Qty: 30 TABLET | Refills: 5 | Status: SHIPPED | OUTPATIENT
Start: 2019-07-08 | End: 2019-11-21 | Stop reason: SDUPTHER

## 2019-07-08 RX ORDER — GLYCOPYRROLATE 2 MG/1
TABLET ORAL
Refills: 2 | COMMUNITY
Start: 2019-06-10 | End: 2019-07-08

## 2019-07-08 NOTE — PROGRESS NOTES
Chief Complaint   Patient presents with   • Anxiety     follow up med rerills   • Pain     Subjective   rTicia Loyola is a 59 y.o. female who presents to the office for chronic pain management.     The following portions of the patient's history were reviewed and updated as appropriate: allergies, current medications, past family history, past medical history, past social history, past surgical history and problem list.    History of Present Illness   UDS 6/3/19 appropriate for hydrocodone. PRN use clonazepam appropriately absent.   6/3/19 LFT  AST 57  Fasting labs 5/9/19  Lipids over due. Last done 3/5/18 trig 168  TSH, T4 over due.   CBC normal  UA 3-5 RBC likely due to kidney stones  Lipase WNL    New complaint today: thinning skin, easily broken and bruised, asks for a referral to derm today.    Pain   This is a chronic (acute on chronic) problem. The current episode started more than 1 year ago. The problem occurs constantly. The problem has been waxing and waning. Pertinent negatives include no chest pain, coughing, fever, nausea or rash. The symptoms are aggravated by bending, walking, twisting and standing. She has tried acetaminophen, lying down, NSAIDs, relaxation, position changes, rest, sleep, walking and oral narcotics (gabapentin helps some with the burning, lortab  helps) for the symptoms. The treatment provided significant relief.   Back pain:  Imaging in chart indicates mild DDD of cspine . An ED record is on file from [facility] of 10/5/18. CT of T and L spine done on that day show stable Tspine with an old compression fracture deformity of superior endplate of T11. This is thought a result of chronic osteoporosis.  Mild posterior spurring of superior enplates at T2-3 and T5-6. L spine shows mild degenerative changes at L4-5 and L5-S1. Unfortunately, there is nothing that supports a neurosurgical consult. If pain persists in excess of these findings, will schedule MRI  Anxiety: controlled with  amitriptyline and buspirone with PRN clonazepam. Needs refill today.   Elevated LFTs: due for repeat labs today.   Kidney stones: still has one known, unpassed. No pain today.   RLS: states is worsening, interfering with sleep despite requip. Requests increased dose of requip today.    Past Medical History:   Diagnosis Date   • Abdominal pain    • Anemia    • Chronic post-traumatic headache      rebound      • Depressive disorder    • Encounter for gynecological examination    • Gastroesophageal reflux disease    • Generalized anxiety disorder    • History of mammogram 08/2008    MAMMOGRAM DIAGNOSTIC BILATERAL 74472 ([facility]) (1)     • Hyperlipidemia    • Ingrown toenail    • Injury of back    • Nonruptured cerebral aneurysm    • Osteoporosis    • Posttraumatic stress disorder    • Seizure (CMS/HCC)     Psychogenic non-epileptic sz    • Viral hepatitis A           Family History   Problem Relation Age of Onset   • Constipation Mother    • Hypertension Mother    • Anxiety disorder Mother    • Heart disease Mother    • Alcohol abuse Father    • Heart disease Father    • Anxiety disorder Brother    • Kidney disease Brother    • Hypertension Brother    • Arthritis Brother    • Anxiety disorder Brother    • Kidney disease Brother    • Diabetes Brother    • Anxiety disorder Brother    • Hypertension Brother    • Breast cancer Paternal Aunt    • Heart disease Maternal Grandmother    • Clotting disorder Maternal Grandmother    • Heart disease Maternal Grandfather         Review of Systems   Constitutional: Negative for appetite change, chills, fatigue and fever.   HENT: Negative for congestion, ear pain, rhinorrhea and sore throat.         Jaw pain   Eyes: Negative for pain.   Respiratory: Negative for cough and shortness of breath.    Cardiovascular: Negative for chest pain and palpitations.   Gastrointestinal: Negative for abdominal pain, constipation, diarrhea and nausea.   Genitourinary: Negative for dysuria.  "  Musculoskeletal: Positive for back pain. Negative for joint swelling and neck pain.   Skin: Negative for rash.        Complaints of thinning skin, states easily torn and bruised.   Neurological: Negative for dizziness and headaches.   Psychiatric/Behavioral: Negative for self-injury, sleep disturbance and suicidal ideas. The patient is nervous/anxious.    All other systems reviewed and are negative.      Objective   Vitals:    07/08/19 1030   BP: 116/70   BP Location: Left arm   Patient Position: Sitting   Cuff Size: Adult   Pulse: 89   Resp: 16   Temp: 97.8 °F (36.6 °C)   TempSrc: Tympanic   SpO2: 98%   Weight: 50.3 kg (110 lb 14.4 oz)   Height: 157.5 cm (62.01\")   PainSc:   4   PainLoc: Back     Physical Exam   Constitutional: She is oriented to person, place, and time. She appears well-developed and well-nourished.   HENT:   Head: Normocephalic and atraumatic.   Eyes: Conjunctivae are normal. Pupils are equal, round, and reactive to light.   Neck: Normal range of motion. Neck supple.   Cardiovascular: Normal rate, regular rhythm, normal heart sounds and intact distal pulses. Exam reveals no gallop and no friction rub.   No murmur heard.  Pulmonary/Chest: Effort normal and breath sounds normal. No respiratory distress. She has no wheezes. She has no rales. She exhibits no tenderness.   Abdominal: Soft. Bowel sounds are normal.   Musculoskeletal: Normal range of motion. She exhibits no edema, tenderness or deformity.   Lymphadenopathy:     She has no cervical adenopathy.   Neurological: She is alert and oriented to person, place, and time.   Skin: Skin is warm and dry. Capillary refill takes 2 to 3 seconds. No erythema. No pallor.   Few small skin tears of right arm, small, fading bruises of right arm as well.    Psychiatric: She has a normal mood and affect. Her behavior is normal. Judgment and thought content normal.   Nursing note and vitals reviewed.      Assessment/Plan   Tricia was seen today for anxiety and " pain.    Diagnoses and all orders for this visit:    Chronic midline thoracic back pain  Comments:  controlled with hydrocodone, refill due today  Orders:  -     HYDROcodone-acetaminophen (NORCO) 5-325 MG per tablet; Take 1 tablet by mouth Every 6 (Six) Hours As Needed for Moderate Pain  or Severe Pain . Fill when due    Elevated liver enzymes  Comments:  improving, repeat labs today  Orders:  -     Comprehensive Metabolic Panel    Abnormal renal function test  Comments:  repeat due today  Orders:  -     Comprehensive Metabolic Panel    PRISCILLA (generalized anxiety disorder)  -     T4, Free  -     TSH  -     T3    Compression fracture of vertebra (CMS/HCC)  Comments:  acute exacerbation of pain due to pathogenic fracture related to osteoporosis  Orders:  -     HYDROcodone-acetaminophen (NORCO) 5-325 MG per tablet; Take 1 tablet by mouth Every 6 (Six) Hours As Needed for Moderate Pain  or Severe Pain . Fill when due    Radiculopathy of thoracolumbar region  Comments:  acute exacerbation of chronic radiculopathy due to pathologic fracture of the spine  Orders:  -     HYDROcodone-acetaminophen (NORCO) 5-325 MG per tablet; Take 1 tablet by mouth Every 6 (Six) Hours As Needed for Moderate Pain  or Severe Pain . Fill when due    Panic disorder without agoraphobia  -     clonazePAM (KLONOPIN) 0.5 MG tablet; Take 1 tablet by mouth Daily As Needed for Anxiety (panic attacks).    Restless leg syndrome  Comments:  worsening, needs increased dose today  Orders:  -     rOPINIRole (REQUIP) 3 MG tablet; Take 1 tablet by mouth Every Night. Dose increase 7/8/19 may fill today    Thinning of skin  -     Ambulatory Referral to Dermatology           PHQ-2/PHQ-9 Depression Screening 7/8/2019   Little interest or pleasure in doing things 0   Feeling down, depressed, or hopeless 0   Trouble falling or staying asleep, or sleeping too much -   Feeling tired or having little energy -   Poor appetite or overeating -   Feeling bad about yourself -  or that you are a failure or have let yourself or your family down -   Trouble concentrating on things, such as reading the newspaper or watching television -   Moving or speaking so slowly that other people could have noticed. Or the opposite - being so fidgety or restless that you have been moving around a lot more than usual -   Thoughts that you would be better off dead, or of hurting yourself in some way -   Total Score 0   Patient understands the risks associated with this controlled medication, including tolerance and addiction.  Patient also agrees to only obtain this medication from me, and not from a another provider, unless that provider is covering for me in my absence.  Patient also agrees to be compliant in dosing, and not self adjust the dose of medication.  A signed controlled substance agreement is on file, and the patient has received a controlled substance education sheet at this a previous visit.  The patient has also signed a consent for treatment with a controlled substance as per [facility] policy. KATE was obtained.      UMESH Guzmán         Return in about 4 weeks (around 8/5/2019).    Patient Instructions   Have labs drawn today to check thyroid function, liver enzymes and kidney function.

## 2019-07-09 DIAGNOSIS — E07.89 OTHER SPECIFIED DISORDERS OF THYROID: ICD-10-CM

## 2019-07-09 DIAGNOSIS — R79.89 ABNORMAL SERUM THYROXINE (T4) LEVEL: Primary | ICD-10-CM

## 2019-07-09 LAB
T3 SERPL-MCNC: 109 NG/DL (ref 80–200)
T4 FREE SERPL-MCNC: 0.87 NG/DL (ref 0.93–1.7)
TSH SERPL DL<=0.05 MIU/L-ACNC: 1.54 MIU/ML (ref 0.27–4.2)

## 2019-07-10 ENCOUNTER — LAB (OUTPATIENT)
Dept: LAB | Facility: OTHER | Age: 59
End: 2019-07-10

## 2019-07-10 DIAGNOSIS — R79.89 ABNORMAL LFTS: ICD-10-CM

## 2019-07-10 DIAGNOSIS — R79.89 ABNORMAL SERUM THYROXINE (T4) LEVEL: ICD-10-CM

## 2019-07-10 DIAGNOSIS — E07.89 OTHER SPECIFIED DISORDERS OF THYROID: ICD-10-CM

## 2019-07-10 PROCEDURE — 80074 ACUTE HEPATITIS PANEL: CPT | Performed by: NURSE PRACTITIONER

## 2019-07-10 PROCEDURE — 36415 COLL VENOUS BLD VENIPUNCTURE: CPT | Performed by: NURSE PRACTITIONER

## 2019-07-11 DIAGNOSIS — R23.4 THINNING OF SKIN: Primary | ICD-10-CM

## 2019-07-11 LAB
HAV IGM SERPL QL IA: NORMAL
HBV CORE IGM SERPL QL IA: NORMAL
HBV SURFACE AG SERPL QL IA: NORMAL
HCV AB SER DONR QL: NORMAL
T3 SERPL-MCNC: 98.3 NG/DL (ref 80–200)
T4 FREE SERPL-MCNC: 0.87 NG/DL (ref 0.93–1.7)
TSH SERPL DL<=0.05 MIU/L-ACNC: 2.07 MIU/ML (ref 0.27–4.2)

## 2019-07-15 ENCOUNTER — OFFICE VISIT (OUTPATIENT)
Dept: FAMILY MEDICINE CLINIC | Facility: CLINIC | Age: 59
End: 2019-07-15

## 2019-07-15 VITALS
WEIGHT: 109.7 LBS | OXYGEN SATURATION: 97 % | TEMPERATURE: 98 F | HEART RATE: 80 BPM | HEIGHT: 62 IN | BODY MASS INDEX: 20.19 KG/M2 | RESPIRATION RATE: 16 BRPM | SYSTOLIC BLOOD PRESSURE: 112 MMHG | DIASTOLIC BLOOD PRESSURE: 66 MMHG

## 2019-07-15 DIAGNOSIS — R79.89 LOW SERUM T4 LEVEL: Primary | ICD-10-CM

## 2019-07-15 DIAGNOSIS — R79.89 ABNORMAL LFTS: ICD-10-CM

## 2019-07-15 PROCEDURE — 99213 OFFICE O/P EST LOW 20 MIN: CPT | Performed by: NURSE PRACTITIONER

## 2019-07-15 NOTE — PROGRESS NOTES
Chief Complaint   Patient presents with   • Follow-up     labs     Subjective   Tricia Loyola is a 59 y.o. female who presents to the office for follow up of recent labs.     The following portions of the patient's history were reviewed and updated as appropriate: allergies, current medications, past family history, past medical history, past social history, past surgical history and problem list.    History of Present Illness     Abnormal T4, minimally low with normal TSH and T3.  Done 7 days ago, was inadvertently drawn again 4 days later, which should be credited to the patient as was due a month later, but was still low. Discussed possible causes and patient is agreeable with endocrinology consult.      Elevated LFTs: initial spike was noted 5/9/19- asymptomatic with , , alk phos 161. Labs have shown a steady decline in LFTs since.  On 7/9/19 AST 64, otherwise LFTs normal. Hepatitis panel 7/10/19 negative. Denies ETOH use, street drug use or over use of controlled hydrocodone prescription. Med changes since then included increase of topiramate from 75mg to 200mg by neurology.         Past Medical History:   Diagnosis Date   • Abdominal pain    • Anemia    • Chronic post-traumatic headache      rebound      • Depressive disorder    • Encounter for gynecological examination    • Gastroesophageal reflux disease    • Generalized anxiety disorder    • History of mammogram 08/2008    MAMMOGRAM DIAGNOSTIC BILATERAL 67127 ([facility]) (1)     • Hyperlipidemia    • Ingrown toenail    • Injury of back    • Nonruptured cerebral aneurysm    • Osteoporosis    • Posttraumatic stress disorder    • Seizure (CMS/HCC)     Psychogenic non-epileptic sz    • Viral hepatitis A           Family History   Problem Relation Age of Onset   • Constipation Mother    • Hypertension Mother    • Anxiety disorder Mother    • Heart disease Mother    • Alcohol abuse Father    • Heart disease Father    • Anxiety disorder Brother    •  "Kidney disease Brother    • Hypertension Brother    • Arthritis Brother    • Anxiety disorder Brother    • Kidney disease Brother    • Diabetes Brother    • Anxiety disorder Brother    • Hypertension Brother    • Breast cancer Paternal Aunt    • Heart disease Maternal Grandmother    • Clotting disorder Maternal Grandmother    • Heart disease Maternal Grandfather         Review of Systems   Constitutional: Negative.  Negative for fever and unexpected weight change.   HENT: Negative.    Eyes: Negative.    Respiratory: Negative.  Negative for cough, chest tightness and shortness of breath.    Cardiovascular: Negative.  Negative for chest pain.   Gastrointestinal: Negative.    Endocrine: Negative.    Genitourinary: Negative.  Negative for dysuria.   Musculoskeletal: Negative.    Skin: Negative.  Negative for color change, pallor, rash and wound.   Allergic/Immunologic: Negative.    Neurological: Negative.    Hematological: Negative.    Psychiatric/Behavioral: Negative.  Negative for sleep disturbance and suicidal ideas.   All other systems reviewed and are negative.      Objective   Vitals:    07/15/19 1112   BP: 112/66   BP Location: Left arm   Patient Position: Sitting   Cuff Size: Adult   Pulse: 80   Resp: 16   Temp: 98 °F (36.7 °C)   TempSrc: Tympanic   SpO2: 97%   Weight: 49.8 kg (109 lb 11.2 oz)   Height: 157.5 cm (62.01\")   PainSc: 0-No pain     Physical Exam   Constitutional: She appears well-developed and well-nourished.   Cardiovascular: Normal rate, regular rhythm, normal heart sounds and intact distal pulses. Exam reveals no gallop and no friction rub.   No murmur heard.  Pulmonary/Chest: Breath sounds normal. No stridor. No respiratory distress. She has no wheezes. She has no rales.   Skin: Skin is warm and dry.       Assessment/Plan   Tricia was seen today for follow-up.    Diagnoses and all orders for this visit:    Low serum T4 level  -     Ambulatory Referral to Endocrinology    Abnormal " LFTs  Comments:  slowly decreasing AST            PHQ-2/PHQ-9 Depression Screening 7/8/2019   Little interest or pleasure in doing things 0   Feeling down, depressed, or hopeless 0   Trouble falling or staying asleep, or sleeping too much -   Feeling tired or having little energy -   Poor appetite or overeating -   Feeling bad about yourself - or that you are a failure or have let yourself or your family down -   Trouble concentrating on things, such as reading the newspaper or watching television -   Moving or speaking so slowly that other people could have noticed. Or the opposite - being so fidgety or restless that you have been moving around a lot more than usual -   Thoughts that you would be better off dead, or of hurting yourself in some way -   Total Score 0       Crystal L Ceron, APRN         Return for Next scheduled follow up.    There are no Patient Instructions on file for this visit.    Lab on 07/10/2019   Component Date Value Ref Range Status   • Hepatitis B Surface Ag 07/10/2019 Non-Reactive  Non-Reactive Final   • Hep A IgM 07/10/2019 Non-Reactive  Non-Reactive Final   • Hep B C IgM 07/10/2019 Non-Reactive  Non-Reactive Final   • Hepatitis C Ab 07/10/2019 Non-Reactive  Non-Reactive Final   • Free T4 07/10/2019 0.87* 0.93 - 1.70 ng/dL Final   • TSH 07/10/2019 2.070  0.270 - 4.200 mIU/mL Final   • T3, Total 07/10/2019 98.3  80.0 - 200.0 ng/dl Final   Office Visit on 07/08/2019   Component Date Value Ref Range Status   • Glucose 07/08/2019 98  70 - 99 mg/dL Final   • BUN 07/08/2019 19  7 - 23 mg/dL Final   • Creatinine 07/08/2019 1.00  0.52 - 1.04 mg/dL Final   • Sodium 07/08/2019 145  137 - 145 mmol/L Final   • Potassium 07/08/2019 4.6  3.4 - 5.0 mmol/L Final   • Chloride 07/08/2019 109  101 - 112 mmol/L Final   • CO2 07/08/2019 23.0  22.0 - 30.0 mmol/L Final   • Calcium 07/08/2019 9.5  8.4 - 10.2 mg/dL Final   • Total Protein 07/08/2019 7.9  6.3 - 8.6 g/dL Final   • Albumin 07/08/2019 4.50  3.50 -  5.00 g/dL Final   • ALT (SGPT) 07/08/2019 19  <=35 U/L Final   • AST (SGOT) 07/08/2019 64* 14 - 36 U/L Final   • Alkaline Phosphatase 07/08/2019 80  38 - 126 U/L Final   • Total Bilirubin 07/08/2019 0.2  0.2 - 1.3 mg/dL Final   • eGFR Non  Amer 07/08/2019 56  45 - 104 mL/min/1.73 Final   • Globulin 07/08/2019 3.4  2.3 - 3.5 gm/dL Final   • A/G Ratio 07/08/2019 1.3  1.1 - 1.8 g/dL Final   • BUN/Creatinine Ratio 07/08/2019 19.0  7.0 - 25.0 Final   • Anion Gap 07/08/2019 13.0  5.0 - 15.0 mmol/L Final   • Free T4 07/08/2019 0.87* 0.93 - 1.70 ng/dL Final   • TSH 07/08/2019 1.540  0.270 - 4.200 mIU/mL Final   • T3, Total 07/08/2019 109.0  80.0 - 200.0 ng/dl Final   Lab on 06/03/2019   Component Date Value Ref Range Status   • Total Protein 06/03/2019 7.7  6.3 - 8.6 g/dL Final   • Albumin 06/03/2019 4.30  3.50 - 5.00 g/dL Final   • ALT (SGPT) 06/03/2019 18  <=35 U/L Final   • AST (SGOT) 06/03/2019 57* 14 - 36 U/L Final   • Alkaline Phosphatase 06/03/2019 84  38 - 126 U/L Final   • Total Bilirubin 06/03/2019 0.2  0.2 - 1.3 mg/dL Final   • Bilirubin, Direct 06/03/2019 <0.0* 0.0 - 0.3 mg/dL Final   • Bilirubin, Indirect 06/03/2019 0.2  0.0 - 1.1 mg/dL Final   Office Visit on 06/03/2019   Component Date Value Ref Range Status   • Report Summary 06/03/2019 FINAL   Final    Comment: ====================================================================  TOXASSURE SELECT 13 PRACHI-DIS  ====================================================================  Test                             Result       Flag       Units  Drug Present    Hydrocodone                    1282                    ng/mg creat    Dihydrocodeine                 201                     ng/mg creat    Norhydrocodone                 3168                    ng/mg creat     Sources of hydrocodone include scheduled prescription     medications. Dihydrocodeine and norhydrocodone are expected     metabolites of hydrocodone. Dihydrocodeine is also available as a      scheduled prescription medication.  ====================================================================  Test                      Result    Flag   Units      Ref Range    Creatinine              72               mg/dL      >=20  ====================================================================  Declared Medications:   Medication list was not                            provided.  ====================================================================  For clinical consultation, please call [phone].  ====================================================================   • Ethanol Screen Urine (Ref) 06/03/2019 Negative   Final   • Ethanol, Urine 06/03/2019 Not Detected  g/dL Final   • Amphetamine, Urine Qual 06/03/2019 Negative   Final   • Methamphetamine, Urine 06/03/2019 Not Detected  ng/mg creat Final   • Amphetamine, Urine 06/03/2019 Not Detected  ng/mg creat Final   • MDMA URINE 06/03/2019 Not Detected  ng/mg creat Final   • MDA 06/03/2019 Not Detected  ng/mg creat Final   • Benzodiazepine Screen, Urine 06/03/2019 Negative   Final   • DIAZEPAM URINE QUANT (REF) 06/03/2019 Not Detected  ng/mg creat Final   • Desmethyldiazepam 06/03/2019 Not Detected  ng/mg creat Final   • Oxazepam, urine 06/03/2019 Not Detected  ng/mg creat Final   • Temazepam, urine 06/03/2019 Not Detected  ng/mg creat Final    Expected metabolism of benzodiazepine class drugs:   Parent Drug       Detected Metabolites   -----------       --------------------   Diazepam:         Desmethyldiazepam, Temazepam, Oxazepam   Chlordiazepoxide: Desmethyldiazepam, Oxazepam   Clorazepate:      Desmethyldiazepam, Oxazepam   Halazepam:        Desmethyldiazepam, Oxazepam   Temazepam:        Oxazepam   Oxazepam:         None   • ALPRAZOLAM 06/03/2019 Not Detected  ng/mg creat Final   • ALPHA-HYDROXYALPRAZOLAM UR, QT (RE* 06/03/2019 Not Detected  ng/mg creat Final   • DESALKLFLURAZEPAM UR QUANT (REF) 06/03/2019 Not Detected  ng/mg creat Final   •  LORAZEPAM URINE QUANT (REF) 06/03/2019 Not Detected  ng/mg creat Final   • Alpha-hydroxytriazolam, Urine 06/03/2019 Not Detected  ng/mg creat Final   • Clonazepam Urine 06/03/2019 Not Detected  ng/mg creat Final   • 7-Aminoclonazepam Urine 06/03/2019 Not Detected  ng/mg creat Final   • MIDAZOLAM UR, QUANT (REF) 06/03/2019 Not Detected  ng/mg creat Final   • Alpha-hydroxymidazolam, Urine 06/03/2019 Not Detected  ng/mg creat Final   • Flunitrazepam 06/03/2019 Not Detected  ng/mg creat Final   • DESMETHYLFLUNITRAZEPAM 06/03/2019 Not Detected  ng/mg creat Final   • Cocaine & Metabolite 06/03/2019 Negative   Final   • Cocaine Screen, Urine 06/03/2019 Not Detected  ng/mg creat Final   • Benzoylecgonine, Urine 06/03/2019 Not Detected  ng/mg creat Final   • Cocaethylene Ur 06/03/2019 Not Detected  ng/mg creat Final   • THC, Urine 06/03/2019 Negative   Final   • Carboxy THC, Urine 06/03/2019 Not Detected  ng/mg creat Final   • 6-ACETYLMORPHINE 06/03/2019 Negative   Final   • 6-acetylmorphine 06/03/2019 Not Detected  ng/mg creat Final   • Opiate Class 06/03/2019 +POSITIVE+   Final   • Codeine, Urine 06/03/2019 Not Detected  ng/mg creat Final   • Morphine, Urine 06/03/2019 Not Detected  ng/mg creat Final   • normorphine 06/03/2019 Not Detected  ng/mg creat Final   • Norcodeine Ur 06/03/2019 Not Detected  ng/mg creat Final   • Hydrocodone UR 06/03/2019 1282  ng/mg creat Final   • Hydromorphone Urine 06/03/2019 Not Detected  ng/mg creat Final   • Dihydrocodeine, Urine 06/03/2019 201  ng/mg creat Final   • Opiates, Norhydrocodone, Urine 06/03/2019 3168  ng/mg creat Final    Expected metabolism of opiate class drugs:  Parent Drug       Detected Metabolites   -----------       --------------------   Codeine:          Major:  Morphine, Norcodeine                     Minor:  Hydrocodone, Hydromorphone,                             Dihydrocodeine, Norhydrocodone,                             Normorphine   Morphine:         Major:   Normorphine                     Minor:  Hydromorphone   Hydrocodone:      Hydromorphone, Dihydrocodeine,                     Norhydrocodone   Hydromorphone:    None   Dihydrocodeine:   None   Heroin:           6-Acetylmorphine (if included),                     Morphine, Normorphine                     Codeine, in small amounts in comparison                      to morphine, is often detected when                      heroin is the source drug.   • Oxycodone Class Ur 06/03/2019 Negative   Final   • Oxycodone, Confirmation, Urine 06/03/2019 Not Detected  ng/mg creat Final   • Oxymorphone UR 06/03/2019 Not Detected  ng/mg creat Final   • Opiates, Noroxycodone, Urine 06/03/2019 Not Detected  ng/mg creat Final   • Noroxymorphone 06/03/2019 Not Detected  ng/mg creat Final    Expected metabolism of oxycodone class drugs:   Parent Drug       Detected Metabolites   -----------       --------------------   Oxycodone:        Oxymorphone, Noroxycodone, Noroxymorphone   Oxymorphone:      Noroxymorphone   • Methadone 06/03/2019 Negative   Final   • Methadone, Quantitative 06/03/2019 Not Detected  ng/mg creat Final   • EDDP 06/03/2019 Not Detected  ng/mg creat Final   • Fentanyl and Analogues, Ur 06/03/2019 Negative   Final   • Fentanyl, Urine 06/03/2019 Not Detected  ng/mg creat Final   • Norfentanyl Urine 06/03/2019 Not Detected  ng/mg creat Final   • Sufentanil, Ur 06/03/2019 Not Detected  ng/mg creat Final   • Alfentanil, Ur 06/03/2019 Not Detected  ng/mg creat Final   • BUPRENORPHINE 06/03/2019 Negative   Final   • Buprenorphine, Urine 06/03/2019 Not Detected  ng/mg creat Final   • Norbuprenorphine 06/03/2019 Not Detected  ng/mg creat Final   • Tapentadol 06/03/2019 Negative   Final   • Tapentadol Ur 06/03/2019 Not Detected  ng/mg creat Final   • Opoidss other, UR 06/03/2019 Negative   Final   • Tramadol, Urine 06/03/2019 Not Detected  ng/mg creat Final   • O-desmethyltramadol, Ur 06/03/2019 Not Detected  ng/mg creat Final    • N-Desmethyltramadol, U 06/03/2019 Not Detected  ng/mg creat Final   • BARBITURATES, URINE 06/03/2019 Negative   Final   • Amobarbital, Urine 06/03/2019 Not Detected   Final   • Barbital 06/03/2019 Not Detected   Final   • Butabarbital, Ur 06/03/2019 Not Detected   Final   • Butalbital Screen, Urine 06/03/2019 Not Detected   Final   • Mephobarbital Urine 06/03/2019 Not Detected   Final   • Pentobarbital UR 06/03/2019 Not Detected   Final   • Phenobarbital 06/03/2019 Not Detected   Final   • Secobarbital, urine 06/03/2019 Not Detected   Final   • Thiopental, Ur 06/03/2019 Not Detected   Final   • Creatinine, Urine 06/03/2019 72  mg/dL Final    REFERENCE RANGE: Ref Range>=20   • Level of Detection: 06/03/2019 Comment   Final    TESTING THRESHOLDS ARE AS FOLLOWS:  AMPHETAMINES: 50 ng/mL  BENZODIAZEPINES: 20 ng/mL  COCAINE / METABOLITE: 50 ng/mL  ALCOHOL, ETHYL: 0.020 gm/dL  FENTANYL / ANALOGUES: fentanyl-1.0 ng/mL, others-5.0 ng/mL  BUPRENORPHINE: buprenorphine-1.0 ng/mL,                 norbuprenorphine-5 ng/mL  TAPENTADOL: 50 ng/mL  CANNABINOIDS: total-20 ng/mL, carboxy-THC-2 ng/mL  METHADONE: 50 ng/mL/mL  OPIATE CLASS: 50 ng/mL/mL  OXYCODONE CLASS: 50 ng/mL  BARBITURATES: 200 ng/mL  TRAMADOL: 50 ng/mL  This test was developed and its performance characteristics  determined by [facility].  It has not been cleared or approved  by the Food and Drug Administration.   Office Visit on 05/17/2019   Component Date Value Ref Range Status   • Total Protein 05/17/2019 7.3  6.3 - 8.6 g/dL Final   • Albumin 05/17/2019 4.10  3.50 - 5.00 g/dL Final   • ALT (SGPT) 05/17/2019 51* <=35 U/L Final   • AST (SGOT) 05/17/2019 87* 14 - 36 U/L Final   • Alkaline Phosphatase 05/17/2019 99  38 - 126 U/L Final   • Total Bilirubin 05/17/2019 0.3  0.2 - 1.3 mg/dL Final   • Bilirubin, Direct 05/17/2019 <0.0* 0.0 - 0.3 mg/dL Final   • Bilirubin, Indirect 05/17/2019 0.2  0.0 - 1.1 mg/dL Final   Admission on 05/09/2019, Discharged on 05/10/2019    Component Date Value Ref Range Status   • Color, UA 05/09/2019 Yellow  Yellow, Straw, Dark Yellow, Paige Final   • Appearance, UA 05/09/2019 Clear  Clear Final   • pH, UA 05/09/2019 7.0  5.0 - 9.0 Final   • Specific Gravity, UA 05/09/2019 1.002* 1.003 - 1.030 Final    Result obtained by Refractometer   • Glucose, UA 05/09/2019 Negative  Negative Final   • Ketones, UA 05/09/2019 Negative  Negative Final   • Bilirubin, UA 05/09/2019 Negative  Negative Final   • Blood, UA 05/09/2019 Small (1+)* Negative Final   • Protein, UA 05/09/2019 Negative  Negative Final   • Leuk Esterase, UA 05/09/2019 Negative  Negative Final   • Nitrite, UA 05/09/2019 Negative  Negative Final   • Urobilinogen, UA 05/09/2019 0.2 E.U./dL  0.2 - 1.0 E.U./dL Final   • RBC, UA 05/09/2019 3-5* None Seen /HPF Final   • WBC, UA 05/09/2019 None Seen  None Seen, 0-2, 3-5 /HPF Final   • Bacteria, UA 05/09/2019 None Seen  None Seen /HPF Final   • Squamous Epithelial Cells, UA 05/09/2019 None Seen  None Seen, 0-2 /HPF Final   • Hyaline Casts, UA 05/09/2019 None Seen  None Seen /LPF Final   • Methodology 05/09/2019 Automated Microscopy   Final   • Glucose 05/09/2019 99  65 - 99 mg/dL Final   • BUN 05/09/2019 14  8 - 23 mg/dL Final   • Creatinine 05/09/2019 1.15* 0.57 - 1.00 mg/dL Final   • Sodium 05/09/2019 137  136 - 145 mmol/L Final   • Potassium 05/09/2019 3.9  3.5 - 5.2 mmol/L Final   • Chloride 05/09/2019 103  98 - 107 mmol/L Final   • CO2 05/09/2019 22.0  22.0 - 29.0 mmol/L Final   • Calcium 05/09/2019 9.4  8.6 - 10.5 mg/dL Final   • Total Protein 05/09/2019 7.6  6.0 - 8.5 g/dL Final   • Albumin 05/09/2019 4.10  3.50 - 5.20 g/dL Final   • ALT (SGPT) 05/09/2019 203* 1 - 33 U/L Final   • AST (SGOT) 05/09/2019 190* 1 - 32 U/L Final   • Alkaline Phosphatase 05/09/2019 161* 39 - 117 U/L Final   • Total Bilirubin 05/09/2019 <0.2* 0.2 - 1.2 mg/dL Final   • eGFR Non African Amer 05/09/2019 48* >60 mL/min/1.73 Final   • Globulin 05/09/2019 3.5  gm/dL Final    • A/G Ratio 05/09/2019 1.2  g/dL Final   • BUN/Creatinine Ratio 05/09/2019 12.2  7.0 - 25.0 Final   • Anion Gap 05/09/2019 12.0  mmol/L Final   • Lipase 05/09/2019 18  13 - 60 U/L Final   • WBC 05/09/2019 8.95  3.40 - 10.80 10*3/mm3 Final   • RBC 05/09/2019 4.27  3.77 - 5.28 10*6/mm3 Final   • Hemoglobin 05/09/2019 13.2  12.0 - 15.9 g/dL Final   • Hematocrit 05/09/2019 39.4  34.0 - 46.6 % Final   • MCV 05/09/2019 92.3  79.0 - 97.0 fL Final   • MCH 05/09/2019 30.9  26.6 - 33.0 pg Final   • MCHC 05/09/2019 33.5  31.5 - 35.7 g/dL Final   • RDW 05/09/2019 13.4  12.3 - 15.4 % Final   • RDW-SD 05/09/2019 45.6  37.0 - 54.0 fl Final   • MPV 05/09/2019 9.5  6.0 - 12.0 fL Final   • Platelets 05/09/2019 304  140 - 450 10*3/mm3 Final   • Neutrophil % 05/09/2019 55.5  42.7 - 76.0 % Final   • Lymphocyte % 05/09/2019 27.6  19.6 - 45.3 % Final   • Monocyte % 05/09/2019 12.5* 5.0 - 12.0 % Final   • Eosinophil % 05/09/2019 2.8  0.3 - 6.2 % Final   • Basophil % 05/09/2019 0.9  0.0 - 1.5 % Final   • Immature Grans % 05/09/2019 0.7* 0.0 - 0.5 % Final   • Neutrophils, Absolute 05/09/2019 4.97  1.70 - 7.00 10*3/mm3 Final   • Lymphocytes, Absolute 05/09/2019 2.47  0.70 - 3.10 10*3/mm3 Final   • Monocytes, Absolute 05/09/2019 1.12* 0.10 - 0.90 10*3/mm3 Final   • Eosinophils, Absolute 05/09/2019 0.25  0.00 - 0.40 10*3/mm3 Final   • Basophils, Absolute 05/09/2019 0.08  0.00 - 0.20 10*3/mm3 Final   • Immature Grans, Absolute 05/09/2019 0.06* 0.00 - 0.05 10*3/mm3 Final   • nRBC 05/09/2019 0.0  0.0 - 0.2 /100 WBC Final   Orders Only on 04/30/2019   Component Date Value Ref Range Status   • Case Report 04/30/2019    Final                    Value:Non-gynecologic Cytology                          Case: [id number]                                  Authorizing Provider:  Rosario Ceron APRN   Collected:           04/30/2019 02:19 PM          Ordering Location:     [facility]     Received:            04/30/2019 02:19 PM                                  [facility]                                                                                  [place]                                                                     Pathologist:           Goyo Camarena MD                                                         Specimen:    Urine, Clean Catch, CLEAR, 50MLS                                                          • Final Diagnosis 04/30/2019    Final                    Value:This result contains rich text formatting which cannot be displayed here.   • Specimen Adequacy 04/30/2019 Satisfactory for evaluation   Final   • Interpretation 04/30/2019 See final diagnosis   Final   • Gross Description 04/30/2019    Final                    Value:This result contains rich text formatting which cannot be displayed here.   • Microscopic Description 04/30/2019    Final                    Value:This result contains rich text formatting which cannot be displayed here.   Lab on 04/29/2019   Component Date Value Ref Range Status   • Urine Culture 04/29/2019 No growth   Final   • Color, UA 04/29/2019 Yellow  Yellow, Straw Final   • Appearance, UA 04/29/2019 Clear  Clear Final   • pH, UA 04/29/2019 7.0  5.5 - 8.0 Final   • Specific Gravity, UA 04/29/2019 1.015  1.005 - 1.030 Final   • Glucose, UA 04/29/2019 Negative  Negative Final   • Ketones, UA 04/29/2019 Negative  Negative Final   • Bilirubin, UA 04/29/2019 Negative  Negative Final   • Blood, UA 04/29/2019 Moderate (2+)* Negative Final   • Protein, UA 04/29/2019 Negative  Negative Final   • Leuk Esterase, UA 04/29/2019 Negative  Negative Final   • Nitrite, UA 04/29/2019 Negative  Negative Final   • Urobilinogen, UA 04/29/2019 0.2 E.U./dL  0.2 - 1.0 E.U./dL Final   • RBC, UA 04/29/2019 13-20* None Seen /HPF Final   • WBC, UA 04/29/2019 None Seen  None Seen /HPF Final   • Bacteria, UA 04/29/2019 None Seen  None Seen /HPF Final   • Squamous Epithelial Cells, UA 04/29/2019 0-2  None Seen, 0-2 /HPF  Final   • Hyaline Casts, UA 04/29/2019 None Seen  None Seen /LPF Final   • Methodology 04/29/2019 Manual Light Microscopy   Final   ]

## 2019-07-29 ENCOUNTER — OFFICE VISIT (OUTPATIENT)
Dept: FAMILY MEDICINE CLINIC | Facility: CLINIC | Age: 59
End: 2019-07-29

## 2019-07-29 VITALS
HEIGHT: 62 IN | BODY MASS INDEX: 20.06 KG/M2 | HEART RATE: 89 BPM | DIASTOLIC BLOOD PRESSURE: 58 MMHG | WEIGHT: 109 LBS | TEMPERATURE: 98.6 F | OXYGEN SATURATION: 99 % | SYSTOLIC BLOOD PRESSURE: 118 MMHG

## 2019-07-29 DIAGNOSIS — E78.5 HYPERLIPIDEMIA, UNSPECIFIED HYPERLIPIDEMIA TYPE: Chronic | ICD-10-CM

## 2019-07-29 DIAGNOSIS — K64.4 EXTERNAL HEMORRHOIDS WITHOUT COMPLICATION: Chronic | ICD-10-CM

## 2019-07-29 DIAGNOSIS — Z12.4 SCREENING FOR CERVICAL CANCER: ICD-10-CM

## 2019-07-29 DIAGNOSIS — R79.89 LOW SERUM T4 LEVEL: ICD-10-CM

## 2019-07-29 DIAGNOSIS — R79.89 ABNORMAL LFTS: Chronic | ICD-10-CM

## 2019-07-29 DIAGNOSIS — Z00.00 ANNUAL PHYSICAL EXAM: ICD-10-CM

## 2019-07-29 DIAGNOSIS — R94.6 ABNORMAL THYROID FUNCTION TEST: Primary | ICD-10-CM

## 2019-07-29 PROCEDURE — 99214 OFFICE O/P EST MOD 30 MIN: CPT | Performed by: NURSE PRACTITIONER

## 2019-07-29 PROCEDURE — G0123 SCREEN CERV/VAG THIN LAYER: HCPCS | Performed by: NURSE PRACTITIONER

## 2019-07-29 NOTE — PROGRESS NOTES
Chief Complaint   Patient presents with   • Annual Exam     pap     Subjective   Tricia Loyola is a 59 y.o. female who presents to the office for annual physical with PAP/ pelvic.    The following portions of the patient's history were reviewed and updated as appropriate: allergies, current medications, past family history, past medical history, past social history, past surgical history and problem list.    History of Present Illness     UDS 6/3/19 appropriate for hydrocodone. PRN use clonazepam appropriately absent.   6/3/19 LFT  AST 57  Fasting labs 5/9/19  Lipids over due. Last done 3/5/18 trig 168  TSH normal, T3 normal T4 0.87  CBC normal  UA 3-5 RBC likely due to kidney stones  Lipase WNL  Mammogram: 4/4/19  Colonoscopy 11/26/18  Dexa scan 4/2/19  Tetanus due 5/10/26  Influenza vaccine due 8/1/19 or when available  Medicare AWV due 9/19/19      Abnormal T4, minimally low with normal TSH and T3.  Done 7 days ago, was inadvertently drawn again 4 days later, which should be credited to the patient as was due a month later, but was still low. Discussed possible causes and patient is agreeable with endocrinology consult. Dr Montana cannot see her until November, so I will order some preliminary tests.     Pain   This is a chronic problem. Pain located mid upper back, constant. The current episode started more than 1 year ago. The problem has been waxing and waning. Pertinent negatives include no chest pain, coughing, fever, nausea or rash. The symptoms are aggravated by bending, walking, twisting and standing. She has tried acetaminophen, lying down, NSAIDs, relaxation, position changes, rest, sleep, walking and oral narcotics for the symptoms. Ineffective treatment without hydrocodone, which has provided significant relief. Pain rated as 4/10 at this time, gets as high as 8 at times. Not due for refill today.    Back pain:  Imaging in chart indicates mild DDD of cspine . An ED record is on file from [facility] of  10/5/18. CT of T and L spine done on that day show stable Tspine with an old compression fracture deformity of superior endplate of T11. This is thought a result of chronic osteoporosis.  Mild posterior spurring of superior enplates at T2-3 and T5-6. L spine shows mild degenerative changes at L4-5 and L5-S1. Unfortunately, there is nothing that supports a neurosurgical consult. If pain persists in excess of these findings, will schedule MRI  Anxiety/depression: controlled with amitriptyline and buspirone with PRN clonazepam. Not due for refill today.    Elevated LFTs: initial spike was noted 5/9/19- asymptomatic with , , alk phos 161. Labs have shown a steady decline in LFTs since.  On 7/9/19 AST 64, otherwise LFTs normal. Hepatitis panel 7/10/19 negative. Denies ETOH use, street drug use or over use of controlled hydrocodone prescription. Med changes since then included increase of topiramate from 75mg to 200mg by neurology.   Kidney stones: still has one known, unpassed. No pain today.   CT abd pelvis 5/9/19:    IMPRESSION:  1. Mild right hydronephrosis and hydroureter to the level of a 5 mm right distal ureteral stone a few CM above the right UVJ in the right pelvis.   2. Left nephrolithiasis.  3. Diverticulosis.  RLS: states is worsening, interfering with sleep despite requip. Requests increased dose of requip today.       Past Surgical History:   Procedure Laterality Date   • APPENDECTOMY     • BREAST BIOPSY Left    • CHOLECYSTECTOMY     • COLONOSCOPY N/A 11/26/2018    Procedure: COLONOSCOPY;  Surgeon: Meet Mcintyre MD;  Location: [facility] ENDOSCOPY;  Service: General   • CRANIOTOMY FOR ANEURYSM  2003   • PAP SMEAR  08/16/2012   • TONSILLECTOMY         Past Medical History:   Diagnosis Date   • Abdominal pain    • Anemia    • Chronic post-traumatic headache      rebound      • Depressive disorder    • Encounter for gynecological examination    • Gastroesophageal reflux disease    • Generalized  anxiety disorder    • History of mammogram 08/2008    MAMMOGRAM DIAGNOSTIC BILATERAL 34410 ([facility]) (1)     • Hyperlipidemia    • Ingrown toenail    • Injury of back    • Nonruptured cerebral aneurysm    • Osteoporosis    • Posttraumatic stress disorder    • Seizure (CMS/HCC)     Psychogenic non-epileptic sz    • Viral hepatitis A           Family History   Problem Relation Age of Onset   • Constipation Mother    • Hypertension Mother    • Anxiety disorder Mother    • Heart disease Mother    • Alcohol abuse Father    • Heart disease Father    • Anxiety disorder Brother    • Kidney disease Brother    • Hypertension Brother    • Arthritis Brother    • Anxiety disorder Brother    • Kidney disease Brother    • Diabetes Brother    • Anxiety disorder Brother    • Hypertension Brother    • Breast cancer Paternal Aunt    • Heart disease Maternal Grandmother    • Clotting disorder Maternal Grandmother    • Heart disease Maternal Grandfather         Review of Systems   Constitutional: Negative.  Negative for appetite change, chills, fatigue, fever and unexpected weight change.   HENT: Negative.  Negative for congestion, ear pain, rhinorrhea and sore throat.         Jaw pain   Eyes: Negative.  Negative for pain.   Respiratory: Negative.  Negative for cough, chest tightness and shortness of breath.    Cardiovascular: Negative.  Negative for chest pain and palpitations.   Gastrointestinal: Negative.  Negative for abdominal pain, constipation, diarrhea and nausea.   Endocrine: Negative.    Genitourinary: Negative.  Negative for dysuria.   Musculoskeletal: Positive for back pain. Negative for joint swelling and neck pain.   Skin: Negative.  Negative for color change, pallor, rash and wound.        Complaints of thinning skin, states easily torn and bruised.   Allergic/Immunologic: Negative.    Neurological: Negative.  Negative for dizziness and headaches.   Hematological: Negative.    Psychiatric/Behavioral: Negative.  Negative for  "confusion, decreased concentration, dysphoric mood, self-injury, sleep disturbance and suicidal ideas.   All other systems reviewed and are negative.      Objective   Vitals:    07/29/19 1339 07/29/19 1436   BP: 118/58    Pulse: 89    Temp: 98.6 °F (37 °C)    TempSrc: Temporal    SpO2: 99%    Weight: 49.4 kg (109 lb)    Height: 157.5 cm (62\")    PainSc: 0-No pain   4   PainLoc:  Back     Physical Exam   Constitutional: She is oriented to person, place, and time. She appears well-developed and well-nourished.   HENT:   Head: Normocephalic and atraumatic.       Eyes: Conjunctivae are normal. Pupils are equal, round, and reactive to light.   Neck: Normal range of motion. Neck supple.   Cardiovascular: Normal rate, regular rhythm, normal heart sounds and intact distal pulses. Exam reveals no gallop and no friction rub.   No murmur heard.  Pulmonary/Chest: Effort normal and breath sounds normal. No respiratory distress. She has no wheezes. She has no rales. She exhibits no tenderness.   Abdominal: Soft. Bowel sounds are normal. A hernia is present. Hernia confirmed negative in the right inguinal area and confirmed negative in the left inguinal area.   Genitourinary: Vagina normal and uterus normal. Rectal exam shows external hemorrhoid (painful) and tenderness. Rectal exam shows no internal hemorrhoid, no fissure, no mass, anal tone normal and guaiac negative stool (negative 7/29/19). Pelvic exam was performed with patient supine. There is no rash, tenderness, lesion or injury on the right labia. There is no rash, tenderness, lesion or injury on the left labia. Uterus is not deviated, not enlarged, not fixed and not tender. Cervix exhibits no motion tenderness, no discharge and no friability. Right adnexum displays no mass, no tenderness and no fullness. Left adnexum displays no mass, no tenderness and no fullness.       Musculoskeletal: She exhibits no edema, tenderness or deformity.        Thoracic back: She exhibits " bony tenderness and pain.        Lumbar back: She exhibits decreased range of motion, bony tenderness and pain.        Back:    Lymphadenopathy:     She has no cervical adenopathy. No inguinal adenopathy noted on the right or left side.   Neurological: She is alert and oriented to person, place, and time.   Skin: Skin is warm, dry and intact. Capillary refill takes 2 to 3 seconds. No erythema. No pallor.   Psychiatric: She has a normal mood and affect. Her behavior is normal. Judgment and thought content normal.   Nursing note and vitals reviewed.      Assessment/Plan   Tricia was seen today for annual exam.    Diagnoses and all orders for this visit:    Abnormal thyroid function test  -     ACTH; Future  -     Cortisol - AM; Future  -     Estradiol; Future  -     Testosterone (Free & Total), LC / MS  -     Prolactin; Future  -     Insulin-like Growth Factor; Future  -     Osmolality, Urine - Urine, Clean Catch; Future  -     Anti-Thyroglobulin Antibody  -     T3  -     T4, Free  -     Thyroid Peroxidase Antibody  -     TSH    Abnormal LFTs  Comments:  since large elevation on 5/9/19 which coicides with date of CT abd/ pelvis for kidney stones. falling since.   Orders:  -     Ambulatory Referral to Gastroenterology  -     US Liver    Low serum T4 level  Comments:  referred to Dr Izaguirre, appt is in November, will get labs until able to see him to eval for potential hypopituitarism  Orders:  -     ACTH; Future  -     Cortisol - AM; Future  -     Estradiol; Future  -     Testosterone (Free & Total), LC / MS  -     Prolactin; Future  -     Insulin-like Growth Factor; Future  -     Osmolality, Urine - Urine, Clean Catch; Future  -     Anti-Thyroglobulin Antibody  -     T3  -     T4, Free  -     Thyroid Peroxidase Antibody  -     TSH    Screening for cervical cancer  Comments:  thin prep pap collected  Orders:  -     Liquid-based Pap Smear, Screening    Hyperlipidemia, unspecified hyperlipidemia type  Comments:  due for  labs  Orders:  -     Lipid Panel    Annual physical exam  Comments:  physical exam without acute problem, PAP/ pelvic performed    External hemorrhoids without complication  Comments:  painful, recurrent, referral to GI also to eval this.            PHQ-2/PHQ-9 Depression Screening 7/8/2019   Little interest or pleasure in doing things 0   Feeling down, depressed, or hopeless 0   Trouble falling or staying asleep, or sleeping too much -   Feeling tired or having little energy -   Poor appetite or overeating -   Feeling bad about yourself - or that you are a failure or have let yourself or your family down -   Trouble concentrating on things, such as reading the newspaper or watching television -   Moving or speaking so slowly that other people could have noticed. Or the opposite - being so fidgety or restless that you have been moving around a lot more than usual -   Thoughts that you would be better off dead, or of hurting yourself in some way -   Total Score 0       UMESH Guzmán         Return in about 1 year (around 7/29/2020) for Next scheduled follow up Friday for pain.    There are no Patient Instructions on file for this visit.

## 2019-08-01 ENCOUNTER — TELEPHONE (OUTPATIENT)
Dept: FAMILY MEDICINE CLINIC | Facility: CLINIC | Age: 59
End: 2019-08-01

## 2019-08-01 LAB
GEN CATEG CVX/VAG CYTO-IMP: NORMAL
LAB AP CASE REPORT: NORMAL
LAB AP GYN ADDITIONAL INFORMATION: NORMAL
PATH INTERP SPEC-IMP: NORMAL
STAT OF ADQ CVX/VAG CYTO-IMP: NORMAL

## 2019-08-01 NOTE — TELEPHONE ENCOUNTER
----- Message from UMESH Arriaga sent at 8/1/2019  7:27 AM CDT -----  Regarding: notify pt needs early morning fasting labs Thurs or Fri before visit next week.  notify pt needs early morning fasting labs Thurs or Fri before visit next week.  Thanks Rosario

## 2019-08-02 ENCOUNTER — LAB (OUTPATIENT)
Dept: LAB | Facility: OTHER | Age: 59
End: 2019-08-02

## 2019-08-02 ENCOUNTER — TELEPHONE (OUTPATIENT)
Dept: FAMILY MEDICINE CLINIC | Facility: CLINIC | Age: 59
End: 2019-08-02

## 2019-08-02 DIAGNOSIS — R94.6 ABNORMAL THYROID FUNCTION TEST: ICD-10-CM

## 2019-08-02 DIAGNOSIS — R79.89 LOW SERUM T4 LEVEL: ICD-10-CM

## 2019-08-02 LAB
CHOLEST SERPL-MCNC: 214 MG/DL (ref 150–200)
CORTIS AM PEAK SERPL-MCNC: 18.83 MCG/DL
ESTRADIOL SERPL HS-MCNC: <5 PG/ML
HDLC SERPL-MCNC: 35 MG/DL (ref 40–59)
LDLC SERPL CALC-MCNC: 129 MG/DL
LDLC/HDLC SERPL: 3.69 {RATIO} (ref 0–3.22)
PROLACTIN SERPL-MCNC: 9.27 NG/ML (ref 4.79–23.3)
T3 SERPL-MCNC: 103 NG/DL (ref 80–200)
T4 FREE SERPL-MCNC: 0.9 NG/DL (ref 0.93–1.7)
TRIGL SERPL-MCNC: 250 MG/DL
TSH SERPL DL<=0.05 MIU/L-ACNC: 2.48 MIU/ML (ref 0.27–4.2)
VLDLC SERPL-MCNC: 50 MG/DL

## 2019-08-02 PROCEDURE — 83935 ASSAY OF URINE OSMOLALITY: CPT | Performed by: NURSE PRACTITIONER

## 2019-08-02 PROCEDURE — 82670 ASSAY OF TOTAL ESTRADIOL: CPT | Performed by: NURSE PRACTITIONER

## 2019-08-02 PROCEDURE — 84403 ASSAY OF TOTAL TESTOSTERONE: CPT | Performed by: NURSE PRACTITIONER

## 2019-08-02 PROCEDURE — 84480 ASSAY TRIIODOTHYRONINE (T3): CPT | Performed by: NURSE PRACTITIONER

## 2019-08-02 PROCEDURE — 82024 ASSAY OF ACTH: CPT | Performed by: NURSE PRACTITIONER

## 2019-08-02 PROCEDURE — 80061 LIPID PANEL: CPT | Performed by: NURSE PRACTITIONER

## 2019-08-02 PROCEDURE — 84305 ASSAY OF SOMATOMEDIN: CPT | Performed by: NURSE PRACTITIONER

## 2019-08-02 PROCEDURE — 86800 THYROGLOBULIN ANTIBODY: CPT | Performed by: NURSE PRACTITIONER

## 2019-08-02 PROCEDURE — 84402 ASSAY OF FREE TESTOSTERONE: CPT | Performed by: NURSE PRACTITIONER

## 2019-08-02 PROCEDURE — 86376 MICROSOMAL ANTIBODY EACH: CPT | Performed by: NURSE PRACTITIONER

## 2019-08-02 PROCEDURE — 84439 ASSAY OF FREE THYROXINE: CPT | Performed by: NURSE PRACTITIONER

## 2019-08-02 PROCEDURE — 84146 ASSAY OF PROLACTIN: CPT | Performed by: NURSE PRACTITIONER

## 2019-08-02 PROCEDURE — 82533 TOTAL CORTISOL: CPT | Performed by: NURSE PRACTITIONER

## 2019-08-02 PROCEDURE — 84443 ASSAY THYROID STIM HORMONE: CPT | Performed by: NURSE PRACTITIONER

## 2019-08-02 PROCEDURE — 36415 COLL VENOUS BLD VENIPUNCTURE: CPT | Performed by: NURSE PRACTITIONER

## 2019-08-02 NOTE — TELEPHONE ENCOUNTER
----- Message from UMESH Arriaga sent at 8/1/2019  7:12 PM CDT -----  Regarding: RE: QUESTIONS  1- she needs to still see gastroenterology because I am not a liver specialist and even with a normal ultrasound, she has elevated liver function tests for some reason. Also she has acid reflux and diverticular disease and he will assess and treat all this. clw    ----- Message -----  From: Case, Eden Duong MA  Sent: 8/1/2019   4:20 PM  To: UMESH Arriaga  Subject: QUESTIONS                                        PT WANTS TO KNOW WHY SHE NEEDS TO SEE A GASTRO DR IF HER ULTRASOUND WAS NORMAL AND CAN SHE TAKE HER MEDS IN THE MORNING BEFORE SHE GETS HER BLOOD DRAWN

## 2019-08-03 LAB
IGF-I SERPL-MCNC: 119 NG/ML (ref 42–169)
OSMOLALITY UR: 578 MOSM/KG
THYROPEROXIDASE AB SERPL-ACNC: 16 IU/ML (ref 0–34)

## 2019-08-05 LAB
ACTH PLAS-MCNC: 32.1 PG/ML (ref 7.2–63.3)
TESTOST FREE SERPL-MCNC: 0.8 PG/ML (ref 0–4.2)
TESTOST SERPL-MCNC: 15.9 NG/DL (ref 7–40)
THYROGLOB AB SERPL-ACNC: <1 IU/ML (ref 0–0.9)

## 2019-08-06 ENCOUNTER — OFFICE VISIT (OUTPATIENT)
Dept: FAMILY MEDICINE CLINIC | Facility: CLINIC | Age: 59
End: 2019-08-06

## 2019-08-06 ENCOUNTER — OFFICE VISIT (OUTPATIENT)
Dept: GASTROENTEROLOGY | Facility: CLINIC | Age: 59
End: 2019-08-06

## 2019-08-06 VITALS
HEART RATE: 76 BPM | RESPIRATION RATE: 16 BRPM | WEIGHT: 109 LBS | TEMPERATURE: 98.2 F | DIASTOLIC BLOOD PRESSURE: 68 MMHG | HEIGHT: 62 IN | BODY MASS INDEX: 20.06 KG/M2 | OXYGEN SATURATION: 99 % | SYSTOLIC BLOOD PRESSURE: 128 MMHG

## 2019-08-06 VITALS
WEIGHT: 109 LBS | OXYGEN SATURATION: 99 % | SYSTOLIC BLOOD PRESSURE: 128 MMHG | DIASTOLIC BLOOD PRESSURE: 68 MMHG | BODY MASS INDEX: 20.06 KG/M2 | HEART RATE: 76 BPM | HEIGHT: 62 IN

## 2019-08-06 DIAGNOSIS — R74.8 ELEVATED LIVER ENZYMES: Primary | ICD-10-CM

## 2019-08-06 DIAGNOSIS — F41.0 PANIC DISORDER WITHOUT AGORAPHOBIA: Chronic | ICD-10-CM

## 2019-08-06 DIAGNOSIS — M54.6 CHRONIC MIDLINE THORACIC BACK PAIN: Chronic | ICD-10-CM

## 2019-08-06 DIAGNOSIS — E03.9 ACQUIRED HYPOTHYROIDISM: Primary | ICD-10-CM

## 2019-08-06 DIAGNOSIS — E71.30 DISORDER OF FATTY-ACID METABOLISM: ICD-10-CM

## 2019-08-06 DIAGNOSIS — K76.0 FATTY (CHANGE OF) LIVER, NOT ELSEWHERE CLASSIFIED: ICD-10-CM

## 2019-08-06 DIAGNOSIS — K74.00 HEPATIC FIBROSIS: ICD-10-CM

## 2019-08-06 DIAGNOSIS — G89.29 CHRONIC MIDLINE THORACIC BACK PAIN: Chronic | ICD-10-CM

## 2019-08-06 DIAGNOSIS — R10.10 PAIN OF UPPER ABDOMEN: ICD-10-CM

## 2019-08-06 DIAGNOSIS — M54.15 RADICULOPATHY OF THORACOLUMBAR REGION: Chronic | ICD-10-CM

## 2019-08-06 DIAGNOSIS — M48.50XA COMPRESSION FRACTURE OF VERTEBRA (HCC): ICD-10-CM

## 2019-08-06 PROCEDURE — 99213 OFFICE O/P EST LOW 20 MIN: CPT | Performed by: PHYSICIAN ASSISTANT

## 2019-08-06 PROCEDURE — 99214 OFFICE O/P EST MOD 30 MIN: CPT | Performed by: NURSE PRACTITIONER

## 2019-08-06 RX ORDER — CLONAZEPAM 0.5 MG/1
0.5 TABLET ORAL DAILY PRN
Qty: 15 TABLET | Refills: 0 | Status: CANCELLED | OUTPATIENT
Start: 2019-08-06

## 2019-08-06 RX ORDER — LEVOTHYROXINE SODIUM 0.03 MG/1
12.5 TABLET ORAL DAILY
Qty: 15 TABLET | Refills: 3 | Status: SHIPPED | OUTPATIENT
Start: 2019-08-06 | End: 2019-10-04 | Stop reason: SDUPTHER

## 2019-08-06 RX ORDER — HYDROCODONE BITARTRATE AND ACETAMINOPHEN 5; 325 MG/1; MG/1
1 TABLET ORAL EVERY 6 HOURS PRN
Qty: 90 TABLET | Refills: 0 | Status: SHIPPED | OUTPATIENT
Start: 2019-08-06 | End: 2019-09-05 | Stop reason: SDUPTHER

## 2019-08-06 NOTE — PROGRESS NOTES
Chief Complaint   Patient presents with   • abnormal liver enzymes       ENDO PROCEDURE ORDERED:    Negrito Loyola is a 59 y.o. female. she is being seen for consultation today at the request of UMESH Arriaga    History of Present Illness    This 59-year-old disabled female was sent for consultation for elevated liver enzymes by UMESH Barahona, who saw the patient for gynecological exam on 07/29/2019. The patient has had at least a mild elevation in her AST since 2017. She states she has never had extensive workup to evaluate this. She has been having some problems with nausea recently. She thinks it is related to anxiety about her health. She is on Dexilant and Zantac for chronic heartburn. She denied dysphagia. Bowels are moving without blood or mucus. Weight is stable. She had a prior colonoscopy by Dr. Mcintyre on 11/26/2018 that showed a tubular adenoma removed from the hepatic flexure.     The patient has had extensive studies recently to evaluate for a potential pituitary disorder. She had a renal ultrasound on 04/29/2019 that showed mild right hydronephrosis with a 1.94 cm left renal cyst. CT scan abdomen and pelvis without contrast on 05/09/2019 showed postcholecystectomy with mild right hydronephrosis, hydroureter with stone in the right UVJ, left renal cyst, diverticular disease, and prior appendectomy. Laboratory on that date showed normal lipase, CMP showed creatinine 1.15, alkaline phosphatase 161, , , GFR 48, otherwise normal. CBC was fairly normal. Urinalysis showed some blood.     Patient had repeat LFTs on 05/17/2019 that showed AST 51, ALT 87, otherwise normal. Repeat laboratory on 06/03/2019 LFTs showed AST of 57, otherwise normal. Urine drug screen positive for hydrocodone on that date.     Laboratory on 07/08/2019 showed normal T3, TSH, T4 was 0.87; CMP showed AST 64, otherwise normal. Laboratory 07/10/2019: TSH, T3 were normal, T4 0.87. It is not clear  why this was repeated. Hepatitis diagnostic panel was negative. Liver ultrasound on 2019 showed postcholecystectomy with 4.1 mm common bile duct.     Most recent laboratory on 2019 showed normal insulin growth factor, prolactin, estradiol, cortisol, ACTH, osmolality, TSH, thyroid antibody was negative. T4 was low at 0.90, T3 was normal, testosterone normal. Cholesterol 214 with triglycerides 250, and . The patient was very anxious about all of the laboratories that had been drawn on her. She is on Lipitor as well as Topamax. She states she saw Dr. Duckworth for her kidney. He is a nephrologist not a urologist.     The patient is a half-pack-per-day smoker for 20 years, strongly encouraged to quit. She denied alcohol and illicit substance use. She has had an aneurysm repair, cholecystectomy, appendectomy, tonsillectomy, history of tachycardia, PTSD and anxiety.     Family history of heart disease, ulcers, cancer, hypertension, kidney problems, and nervous problems. Father  age 49 of heart attack. Mother age 84 in good health. Spouse age 60 in good health.  since  but have been  since . Three brothers, 1 in poor health with liver cancer and renal failure. Two sons in good health.     A/P: Patient with mild elevation primarily in transaminases. She had a recent significant elevation in her enzymes but at the time had a right-sided hydronephrosis and this likely contributed to that. She may have some degree of BOLDEN, but she is certainly not overweight. I recommended BOLDEN FibroSure, YUKO, AMA, SMA, alpha-1 antitrypsin, ceruloplasmin, and iron studies. She had a negative hepatitis diagnostic panel. Liver appeared normal on imaging. Would consider EGD if her nausea persists, but I have asked her to follow up in 4-6 weeks, further pending clinical course and the results of the above. Note that she was very anxious to get to her followup with UMESH Barahona, and her visit was  somewhat rushed. Will pursue further pending clinical course and the results of the above.     Thank you very much, Rosario, for this consultation and for allowing us to participate in the care of your patient. Will keep you informed.        The following portions of the patient's history were reviewed and updated as appropriate:   Past Medical History:   Diagnosis Date   • Abdominal pain    • Anemia    • Chronic post-traumatic headache      rebound      • Depressive disorder    • Encounter for gynecological examination    • Gastroesophageal reflux disease    • Generalized anxiety disorder    • History of mammogram 2008    MAMMOGRAM DIAGNOSTIC BILATERAL 35306 ([facility]) (1)     • Hyperlipidemia    • Ingrown toenail    • Injury of back    • Nonruptured cerebral aneurysm    • Osteoporosis    • Posttraumatic stress disorder    • Seizure (CMS/HCC)     Psychogenic non-epileptic sz    • Viral hepatitis A      Past Surgical History:   Procedure Laterality Date   • APPENDECTOMY     • BREAST BIOPSY Left    • CHOLECYSTECTOMY     • COLONOSCOPY N/A 2018    Procedure: COLONOSCOPY;  Surgeon: Meet Mcintyre MD;  Location: [facility] ENDOSCOPY;  Service: General   • CRANIOTOMY FOR ANEURYSM     • PAP SMEAR  2012   • TONSILLECTOMY       Family History   Problem Relation Age of Onset   • Constipation Mother    • Hypertension Mother    • Anxiety disorder Mother    • Heart disease Mother    • Alcohol abuse Father    • Heart disease Father    • Anxiety disorder Brother    • Kidney disease Brother    • Hypertension Brother    • Arthritis Brother    • Anxiety disorder Brother    • Kidney disease Brother    • Diabetes Brother    • Anxiety disorder Brother    • Hypertension Brother    • Breast cancer Paternal Aunt    • Heart disease Maternal Grandmother    • Clotting disorder Maternal Grandmother    • Heart disease Maternal Grandfather      OB History      Para Term  AB Living    3 2 2   1 2    SAB TAB Ectopic  Molar Multiple Live Births    1                  Allergies   Allergen Reactions   • Augmentin [Amoxicillin-Pot Clavulanate]      Hives   • Ciprofloxacin      Cipro:  Rash   • Fiorinal [Butalbital-Aspirin-Caffeine]      Rapid heart rate   • Imitrex [Sumatriptan]      Rapid heart rate   • Iodine      Anaphylaxis   • Other      MIDRIN  -  Rapid heart rate   • Toradol [Ketorolac Tromethamine]      Anaphylaxis   • Ultram [Tramadol]      Rapid heart rate   • Ibuprofen Rash     Social History     Socioeconomic History   • Marital status: Legally      Spouse name: Not on file   • Number of children: Not on file   • Years of education: Not on file   • Highest education level: Not on file   Tobacco Use   • Smoking status: Light Tobacco Smoker     Packs/day: 0.50     Years: 43.00     Pack years: 21.50     Types: Cigarettes   • Smokeless tobacco: Never Used   Substance and Sexual Activity   • Alcohol use: No   • Drug use: No   • Sexual activity: No     Current Medications:  Prior to Admission medications    Medication Sig Start Date End Date Taking? Authorizing Provider   albuterol sulfate HFA (VENTOLIN HFA) 108 (90 Base) MCG/ACT inhaler Inhale 2 puffs Every 6 (Six) Hours As Needed for Wheezing or Shortness of Air. 6/6/19  Yes Rosario Ceron APRN   amitriptyline (ELAVIL) 75 MG tablet TAKE ONE TABLET BY MOUTH AT BEDTIME. 1/29/18  Yes Sonia Mata MD   atorvastatin (LIPITOR) 20 MG tablet Take 1 tablet by mouth Daily. 6/3/19  Yes Rosario Ceron APRN   busPIRone (BUSPAR) 30 MG tablet Take 1 tablet by mouth 2 (Two) Times a Day. 3/7/18  Yes Sonia Mata MD   cephalexin (KEFLEX) 250 MG capsule Take 1 capsule by mouth 2 (Two) Times a Day. 4/23/19  Yes Rosario Ceron APRN   clonazePAM (KLONOPIN) 0.5 MG tablet Take 1 tablet by mouth Daily As Needed for Anxiety (panic attacks). 7/8/19  Yes Rosario Ceron APRN   cyclobenzaprine (FLEXERIL) 10 MG tablet Take 1 tablet by mouth 3 (Three) Times a  Day As Needed for Muscle Spasms. 1/7/19  Yes Sonia Mata MD   dexlansoprazole (DEXILANT) 60 MG capsule Take 1 capsule by mouth Daily. 3/8/19  Yes Rosario Ceron APRN   doxycycline (MONODOX) 100 MG capsule Take 1 capsule by mouth 2 (Two) Times a Day. 5/10/19  Yes Jonathon Miranda MD   HYDROcodone-acetaminophen (NORCO) 5-325 MG per tablet Take 1 tablet by mouth Every 6 (Six) Hours As Needed for Moderate Pain  or Severe Pain . Fill when due 7/8/19  Yes Rosario Ceron APRN   lidocaine (XYLOCAINE) 2 % jelly Apply  topically to the appropriate area as directed 2 (Two) Times a Day. 4/4/19  Yes Rosario Ceron APRN   Melatonin 10 MG tablet Take 20 mg by mouth Every Night.   Yes ProviderDoris MD   metoprolol succinate XL (TOPROL-XL) 50 MG 24 hr tablet Take 1 tablet by mouth Daily. 4/29/19  Yes Rosario Ceron APRN   Nerve Stimulator (TENS THERAPY REPLACE BODY PADS) misc Apply 3 each topically to the appropriate area as directed As Needed (loose, dry or soiled pad). 4/4/19  Yes Rosario Ceron APRN   ondansetron (ZOFRAN) 4 MG tablet Take 1 tablet by mouth Every 8 (Eight) Hours As Needed for Nausea or Vomiting. 6/22/18  Yes Sonia Mata MD   OXcarbazepine (TRILEPTAL) 150 MG tablet Take 150 mg by mouth Daily. Daily at bedtime   Yes Provider, MD Doris   prochlorperazine (COMPAZINE) 10 MG tablet Take 1 tablet by mouth Every 6 (Six) Hours As Needed for Nausea or Vomiting. 10/19/18  Yes Sonia Mata MD   raNITIdine (ZANTAC) 150 MG tablet take 1 tablet by mouth once daily 4/1/19  Yes Rosario Ceron APRN   rOPINIRole (REQUIP) 3 MG tablet Take 1 tablet by mouth Every Night. Dose increase 7/8/19 may fill today 7/8/19  Yes Rosario Ceron APRN   topiramate (TOPAMAX) 50 MG tablet Take 50 mg by mouth 2 (Two) Times a Day.   Yes Provider, MD Doris   CloNIDine (CATAPRES) 0.1 MG tablet Take 1 tablet by mouth 2 (Two) Times a Day. 8/24/17   Sonia Mata MD  "  Nerve Stimulator (TENS THERAPY PAIN RELIEF) device 1 each 1 (One) Time for 1 dose. For use as prescribed for back pain 4/4/19 4/4/19  Ceron, UMESH Ponce     Review of Systems  Review of Systems   Constitutional: Negative for unexpected weight change.   HENT: Negative for trouble swallowing.    Gastrointestinal: Positive for nausea. Negative for abdominal distention, abdominal pain, anal bleeding, blood in stool, constipation, diarrhea, rectal pain and vomiting.   All other systems reviewed and are negative.         Objective    /68 (BP Location: Left arm, Patient Position: Sitting)   Pulse 76   Ht 157.5 cm (62\")   Wt 49.4 kg (109 lb)   SpO2 99%   BMI 19.94 kg/m²   Physical Exam   Constitutional: She is oriented to person, place, and time. She appears well-developed and well-nourished. No distress.   HENT:   Head: Normocephalic and atraumatic.   Eyes: EOM are normal. Pupils are equal, round, and reactive to light.   Neck: Normal range of motion.   Cardiovascular: Normal rate, regular rhythm and normal heart sounds.   Pulmonary/Chest: Effort normal and breath sounds normal.   Abdominal: Soft. Bowel sounds are normal. She exhibits no shifting dullness, no distension, no abdominal bruit, no ascites and no mass. There is no hepatosplenomegaly. There is tenderness. There is no rigidity, no rebound, no guarding and no CVA tenderness. No hernia. Hernia confirmed negative in the ventral area.   mild   Musculoskeletal: Normal range of motion.   Neurological: She is alert and oriented to person, place, and time.   Skin: Skin is warm and dry.   Psychiatric: She has a normal mood and affect. Her behavior is normal. Judgment and thought content normal.   Nursing note and vitals reviewed.    Assessment/Plan      1. Elevated liver enzymes    2. Pain of upper abdomen    3. Hepatic fibrosis     4. Fatty (change of) liver, not elsewhere classified     5. Disorder of fatty-acid metabolism     .   Tricia was seen today " for abnormal liver enzymes.    Diagnoses and all orders for this visit:    Elevated liver enzymes  -     Iron and TIBC  -     Ferritin  -     AFP Tumor Marker  -     Alpha - 1 - Antitrypsin  -     Anti-Smooth Muscle Antibody Titer  -     Ceruloplasmin  -     BOLDEN Fibrosure  -     Nuclear Antigen Antibody, IFA  -     Mitochondrial Antibodies, M2    Pain of upper abdomen    Hepatic fibrosis   -     AFP Tumor Marker    Fatty (change of) liver, not elsewhere classified   -     BOLDEN Fibrosure    Disorder of fatty-acid metabolism   -     BOLDEN Fibrosure        Orders placed during this encounter include:  Orders Placed This Encounter   Procedures   • Iron and TIBC   • Ferritin     Order Specific Question:   Is the patient on iron therapy?     Answer:   No   • AFP Tumor Marker   • Alpha - 1 - Antitrypsin   • Anti-Smooth Muscle Antibody Titer   • Ceruloplasmin   • BOLDEN Fibrosure   • Nuclear Antigen Antibody, IFA   • Mitochondrial Antibodies, M2       Medications prescribed:  No orders of the defined types were placed in this encounter.      Requested Prescriptions      No prescriptions requested or ordered in this encounter       Review and/or summary of lab tests, radiology, procedures, medications. Review and summary of old records and obtaining of history. The risks and benefits of my recommendations, as well as other treatment options were discussed with the patient today. Questions were answered.    Follow-up: Return in about 1 month (around 9/6/2019), or if symptoms worsen or fail to improve.     * Surgery not found *      This document has been electronically signed by Johnny Smiley PA-C on August 12, 2019 5:25 PM      Results for orders placed or performed in visit on 08/02/19   Cortisol - AM   Result Value Ref Range    Cortisol - AM 18.83 mcg/dL   Osmolality, Urine - Urine, Clean Catch   Result Value Ref Range    Osmolality, Urine 578 mOsm/kg   Prolactin   Result Value Ref Range    Prolactin 9.27 4.79 - 23.30  ng/mL   Insulin-like Growth Factor   Result Value Ref Range    Insulin-Like Growth Factor-1 119 42 - 169 ng/mL   Estradiol   Result Value Ref Range    Estradiol <5.0 pg/mL   ACTH   Result Value Ref Range    ACTH 32.1 7.2 - 63.3 pg/mL   Results for orders placed or performed in visit on 07/29/19   Testosterone (Free & Total), LC / MS   Result Value Ref Range    Testosterone, Total 15.9 7.0 - 40.0 ng/dL    Testosterone, Free 0.8 0.0 - 4.2 pg/mL   Thyroid Peroxidase Antibody   Result Value Ref Range    Thyroid Peroxidase Antibody 16 0 - 34 IU/mL   Anti-Thyroglobulin Antibody   Result Value Ref Range    Thyroglobulin Ab <1.0 0.0 - 0.9 IU/mL   T3   Result Value Ref Range    T3, Total 103.0 80.0 - 200.0 ng/dl   TSH   Result Value Ref Range    TSH 2.480 0.270 - 4.200 mIU/mL   T4, Free   Result Value Ref Range    Free T4 0.90 (L) 0.93 - 1.70 ng/dL   Lipid Panel   Result Value Ref Range    Total Cholesterol 214 (H) 150 - 200 mg/dL    Triglycerides 250 (H) <=150 mg/dL    HDL Cholesterol 35 (L) 40 - 59 mg/dL    LDL Cholesterol  129 (H) <=100 mg/dL    VLDL Cholesterol 50 mg/dL    LDL/HDL Ratio 3.69 (H) 0.00 - 3.22   Liquid-based Pap Smear, Screening   Result Value Ref Range    Case Report       Gynecologic Cytology Report                       Case: [id number]                                  Authorizing Provider:  Rosario Ceron APRN   Collected:           07/29/2019 02:15 PM          Ordering Location:     [facility]     Received:            07/29/2019 02:56 PM                                 [facility]                                                                                  [facility]                                                                     First Screen:          Carmel Hardin                                                              Specimen:    Liquid-Based Pap, Screening, Cervix                                                        Interpretation Negative for intraepithelial  lesion or malignancy      General Categorization Within normal limits     Specimen Adequacy Satisfactory for evaluation     Additional Information       Disclaimer: Cervical cytology is a screening test primarily for squamous cancer and its precursors and has associated false-negative and false-positive results.  Technologies such as liquid-based preparations may decrease but will not eliminate all false-negative results.  Follow-up of unexplained clinical signs and symptoms is recommended to minimize false-negative results. (The [employer] System for Reporting Cervical Cytology: Wang, 2015).     Results for orders placed or performed in visit on 07/10/19   Hepatitis panel, acute   Result Value Ref Range    Hepatitis B Surface Ag Non-Reactive Non-Reactive    Hep A IgM Non-Reactive Non-Reactive    Hep B C IgM Non-Reactive Non-Reactive    Hepatitis C Ab Non-Reactive Non-Reactive   T3   Result Value Ref Range    T3, Total 98.3 80.0 - 200.0 ng/dl   TSH   Result Value Ref Range    TSH 2.070 0.270 - 4.200 mIU/mL   T4, Free   Result Value Ref Range    Free T4 0.87 (L) 0.93 - 1.70 ng/dL   Results for orders placed or performed in visit on 07/08/19   T3   Result Value Ref Range    T3, Total 109.0 80.0 - 200.0 ng/dl   TSH   Result Value Ref Range    TSH 1.540 0.270 - 4.200 mIU/mL   T4, Free   Result Value Ref Range    Free T4 0.87 (L) 0.93 - 1.70 ng/dL   Comprehensive Metabolic Panel   Result Value Ref Range    Glucose 98 70 - 99 mg/dL    BUN 19 7 - 23 mg/dL    Creatinine 1.00 0.52 - 1.04 mg/dL    Sodium 145 137 - 145 mmol/L    Potassium 4.6 3.4 - 5.0 mmol/L    Chloride 109 101 - 112 mmol/L    CO2 23.0 22.0 - 30.0 mmol/L    Calcium 9.5 8.4 - 10.2 mg/dL    Total Protein 7.9 6.3 - 8.6 g/dL    Albumin 4.50 3.50 - 5.00 g/dL    ALT (SGPT) 19 <=35 U/L    AST (SGOT) 64 (H) 14 - 36 U/L    Alkaline Phosphatase 80 38 - 126 U/L    Total Bilirubin 0.2 0.2 - 1.3 mg/dL    eGFR Non  Amer 56 45 - 104 mL/min/1.73    Globulin 3.4 2.3  - 3.5 gm/dL    A/G Ratio 1.3 1.1 - 1.8 g/dL    BUN/Creatinine Ratio 19.0 7.0 - 25.0    Anion Gap 13.0 5.0 - 15.0 mmol/L   Results for orders placed or performed in visit on 06/03/19   Hepatic Function Panel   Result Value Ref Range    Total Protein 7.7 6.3 - 8.6 g/dL    Albumin 4.30 3.50 - 5.00 g/dL    ALT (SGPT) 18 <=35 U/L    AST (SGOT) 57 (H) 14 - 36 U/L    Alkaline Phosphatase 84 38 - 126 U/L    Total Bilirubin 0.2 0.2 - 1.3 mg/dL    Bilirubin, Direct <0.0 (L) 0.0 - 0.3 mg/dL    Bilirubin, Indirect 0.2 0.0 - 1.1 mg/dL   Results for orders placed or performed in visit on 06/03/19   ToxASSURE Select 13 Discrete -   Result Value Ref Range    Report Summary FINAL     Ethanol Screen Urine (Ref) Negative     Ethanol, Urine Not Detected g/dL    Amphetamine, Urine Qual Negative     Methamphetamine, Urine Not Detected ng/mg creat    Amphetamine, Urine Not Detected ng/mg creat    MDMA URINE Not Detected ng/mg creat    MDA Not Detected ng/mg creat    Benzodiazepine Screen, Urine Negative     DIAZEPAM URINE QUANT (REF) Not Detected ng/mg creat    Desmethyldiazepam Not Detected ng/mg creat    Oxazepam, urine Not Detected ng/mg creat    Temazepam, urine Not Detected ng/mg creat    ALPRAZOLAM Not Detected ng/mg creat    ALPHA-HYDROXYALPRAZOLAM UR, QT (REF) Not Detected ng/mg creat    DESALKLFLURAZEPAM UR QUANT (REF) Not Detected ng/mg creat    LORAZEPAM URINE QUANT (REF) Not Detected ng/mg creat    Alpha-hydroxytriazolam, Urine Not Detected ng/mg creat    Clonazepam Urine Not Detected ng/mg creat    7-Aminoclonazepam Urine Not Detected ng/mg creat    MIDAZOLAM UR, QUANT (REF) Not Detected ng/mg creat    Alpha-hydroxymidazolam, Urine Not Detected ng/mg creat    Flunitrazepam Not Detected ng/mg creat    DESMETHYLFLUNITRAZEPAM Not Detected ng/mg creat    Cocaine & Metabolite Negative     Cocaine Screen, Urine Not Detected ng/mg creat    Benzoylecgonine, Urine Not Detected ng/mg creat    Cocaethylene Ur Not Detected ng/mg creat     THC, Urine Negative     Carboxy THC, Urine Not Detected ng/mg creat    6-ACETYLMORPHINE Negative     6-acetylmorphine Not Detected ng/mg creat    Opiate Class +POSITIVE+     Codeine, Urine Not Detected ng/mg creat    Morphine, Urine Not Detected ng/mg creat    normorphine Not Detected ng/mg creat    Norcodeine Ur Not Detected ng/mg creat    Hydrocodone UR 1282 ng/mg creat    Hydromorphone Urine Not Detected ng/mg creat    Dihydrocodeine, Urine 201 ng/mg creat    Opiates, Norhydrocodone, Urine 3168 ng/mg creat    Oxycodone Class Ur Negative     Oxycodone, Confirmation, Urine Not Detected ng/mg creat    Oxymorphone UR Not Detected ng/mg creat    Opiates, Noroxycodone, Urine Not Detected ng/mg creat    Noroxymorphone Not Detected ng/mg creat    Methadone Negative     Methadone, Quantitative Not Detected ng/mg creat    EDDP Not Detected ng/mg creat    Fentanyl and Analogues, Ur Negative     Fentanyl, Urine Not Detected ng/mg creat    Norfentanyl Urine Not Detected ng/mg creat    Sufentanil, Ur Not Detected ng/mg creat    Alfentanil, Ur Not Detected ng/mg creat    BUPRENORPHINE Negative     Buprenorphine, Urine Not Detected ng/mg creat    Norbuprenorphine Not Detected ng/mg creat    Tapentadol Negative     Tapentadol Ur Not Detected ng/mg creat    Opoidss other, UR Negative     Tramadol, Urine Not Detected ng/mg creat    O-desmethyltramadol, Ur Not Detected ng/mg creat    N-Desmethyltramadol, U Not Detected ng/mg creat    BARBITURATES, URINE Negative     Amobarbital, Urine Not Detected     Barbital Not Detected     Butabarbital, Ur Not Detected     Butalbital Screen, Urine Not Detected     Mephobarbital Urine Not Detected     Pentobarbital UR Not Detected     Phenobarbital Not Detected     Secobarbital, urine Not Detected     Thiopental, Ur Not Detected     Creatinine, Urine 72 mg/dL    Level of Detection: Comment      *Note: Due to a large number of results and/or encounters for the requested time period, some results  have not been displayed. A complete set of results can be found in Results Review.       Some portions of this note have been dictated using voice recognition software and may contain errors and/or omissions.

## 2019-08-06 NOTE — PATIENT INSTRUCTIONS

## 2019-08-06 NOTE — PROGRESS NOTES
Chief Complaint   Patient presents with   • Pain     med refills, lab results     Subjective   Tricia Loyola is a 59 y.o. female who presents to the office for chronic pain medication and abnormally low T4 findings. Reviewed recent labs    The following portions of the patient's history were reviewed and updated as appropriate: allergies, current medications, past family history, past medical history, past social history, past surgical history and problem list.    History of Present Illness   UDS 6/3/19 appropriate for hydrocodone. PRN use clonazepam appropriately absent.   6/3/19 LFT  AST 57  Fasting labs 5/9/19  Lipids over due. Last done 3/5/18 trig 168  TSH normal, T3 normal T4 0.87  CBC normal  UA 3-5 RBC likely due to kidney stones  Lipase WNL  Mammogram: 4/4/19  Colonoscopy 11/26/18  Dexa scan 4/2/19  Tetanus due 5/10/26  Influenza vaccine due 8/1/19 or when available  Medicare AWV due 9/19/19        Abnormal T4, minimally low with normal TSH and T3. Referred to Dr Montana, who cannot see her until November. ACTH, prolactin, ILGF, testosterone, cortisol, urine osmolality and thyroid antibodies all negative. Will give her a very low dose levothyroxine until she can see Dr Izaguirre.     Pain   This is a chronic problem. Pain located mid upper back, constant. The current episode started more than 1 year ago. The problem has been waxing and waning. Pertinent negatives include no chest pain, coughing, fever, nausea or rash. The symptoms are aggravated by bending, walking, twisting and standing. She has tried acetaminophen, lying down, NSAIDs, relaxation, position changes, rest, sleep, walking and oral narcotics for the symptoms. Ineffective treatment without hydrocodone, which has provided significant relief. Pain rated as 4/10 at this time, gets as high as 8 at times. due for refill today.    Back pain:  Imaging in chart indicates mild DDD of cspine . An ED record is on file from [facility] of 10/5/18. CT of T and L  spine done on that day show stable Tspine with an old compression fracture deformity of superior endplate of T11. This is thought a result of chronic osteoporosis.  Mild posterior spurring of superior enplates at T2-3 and T5-6. L spine shows mild degenerative changes at L4-5 and L5-S1. Unfortunately, there is nothing that supports a neurosurgical consult. If pain persists in excess of these findings, will schedule MRI  Anxiety/depression: controlled with amitriptyline and buspirone with PRN clonazepam. Not due for refill today.    Elevated LFTs: initial spike was noted 5/9/19- asymptomatic with , , alk phos 161. Labs have shown a steady decline in LFTs since.  On 7/9/19 AST 64, otherwise LFTs normal. Hepatitis panel 7/10/19 negative. Denies ETOH use, street drug use or over use of controlled hydrocodone prescription. Med changes since then included increase of topiramate from 75mg to 200mg by neurology.   Seen by Johnny Smiley, gastroenterology, today for this. Labs pending and follow up scheduled.   Past Medical History:   Diagnosis Date   • Abdominal pain    • Anemia    • Chronic post-traumatic headache      rebound      • Depressive disorder    • Encounter for gynecological examination    • Gastroesophageal reflux disease    • Generalized anxiety disorder    • History of mammogram 08/2008    MAMMOGRAM DIAGNOSTIC BILATERAL 42490 ([facility]) (1)     • Hyperlipidemia    • Ingrown toenail    • Injury of back    • Nonruptured cerebral aneurysm    • Osteoporosis    • Posttraumatic stress disorder    • Seizure (CMS/HCC)     Psychogenic non-epileptic sz    • Viral hepatitis A           Family History   Problem Relation Age of Onset   • Constipation Mother    • Hypertension Mother    • Anxiety disorder Mother    • Heart disease Mother    • Alcohol abuse Father    • Heart disease Father    • Anxiety disorder Brother    • Kidney disease Brother    • Hypertension Brother    • Arthritis Brother    • Anxiety disorder  "Brother    • Kidney disease Brother    • Diabetes Brother    • Anxiety disorder Brother    • Hypertension Brother    • Breast cancer Paternal Aunt    • Heart disease Maternal Grandmother    • Clotting disorder Maternal Grandmother    • Heart disease Maternal Grandfather         Review of Systems   Constitutional: Negative.  Negative for appetite change, chills, fatigue, fever and unexpected weight change.   HENT: Negative.  Negative for congestion, ear pain, rhinorrhea and sore throat.         Jaw pain   Eyes: Negative.  Negative for pain.   Respiratory: Negative.  Negative for cough, chest tightness and shortness of breath.    Cardiovascular: Negative.  Negative for chest pain and palpitations.   Gastrointestinal: Negative.  Negative for abdominal pain, constipation, diarrhea and nausea.   Endocrine: Negative.    Genitourinary: Negative.  Negative for dysuria.   Musculoskeletal: Positive for back pain. Negative for joint swelling and neck pain.   Skin: Negative.  Negative for color change, pallor, rash and wound.        Complaints of thinning skin, states easily torn and bruised.   Allergic/Immunologic: Negative.    Neurological: Negative.  Negative for dizziness and headaches.   Hematological: Negative.    Psychiatric/Behavioral: Negative.  Negative for confusion, decreased concentration, dysphoric mood, self-injury, sleep disturbance and suicidal ideas.   All other systems reviewed and are negative.      Objective   Vitals:    08/06/19 1140   BP: 128/68   BP Location: Left arm   Patient Position: Sitting   Cuff Size: Adult   Pulse: 76   Resp: 16   Temp: 98.2 °F (36.8 °C)   TempSrc: Tympanic   SpO2: 99%   Weight: 49.4 kg (109 lb)   Height: 157.5 cm (62\")     Physical Exam   Constitutional: She is oriented to person, place, and time. She appears well-developed and well-nourished.   HENT:   Head: Normocephalic and atraumatic.   Eyes: Conjunctivae are normal. Pupils are equal, round, and reactive to light.   Neck: " Normal range of motion. Neck supple.   Cardiovascular: Normal rate, regular rhythm, normal heart sounds and intact distal pulses. Exam reveals no gallop and no friction rub.   No murmur heard.  Pulmonary/Chest: Effort normal and breath sounds normal. No respiratory distress. She has no wheezes. She has no rales. She exhibits no tenderness.   Abdominal: Soft. Bowel sounds are normal. A hernia is present.   Musculoskeletal: She exhibits no edema, tenderness or deformity.        Thoracic back: She exhibits bony tenderness and pain.        Lumbar back: She exhibits decreased range of motion, bony tenderness and pain.        Back:    Lymphadenopathy:     She has no cervical adenopathy.   Neurological: She is alert and oriented to person, place, and time.   Skin: Skin is warm, dry and intact. Capillary refill takes 2 to 3 seconds. No erythema. No pallor.   Psychiatric: She has a normal mood and affect. Her behavior is normal. Judgment and thought content normal.   Nursing note and vitals reviewed.      Assessment/Plan   Tricia was seen today for pain.    Diagnoses and all orders for this visit:    Acquired hypothyroidism  -     levothyroxine (SYNTHROID, LEVOTHROID) 25 MCG tablet; Take 0.5 tablets by mouth Daily.    Compression fracture of vertebra (CMS/[facility])  Comments:  acute exacerbation of pain due to pathogenic fracture related to osteoporosis  Orders:  -     HYDROcodone-acetaminophen (NORCO) 5-325 MG per tablet; Take 1 tablet by mouth Every 6 (Six) Hours As Needed for Moderate Pain  or Severe Pain . Fill when due    Radiculopathy of thoracolumbar region  Comments:  acute exacerbation of chronic radiculopathy due to pathologic fracture of the spine  Orders:  -     HYDROcodone-acetaminophen (NORCO) 5-325 MG per tablet; Take 1 tablet by mouth Every 6 (Six) Hours As Needed for Moderate Pain  or Severe Pain . Fill when due    Chronic midline thoracic back pain  Comments:  controlled with hydrocodone, refill due  today  Orders:  -     HYDROcodone-acetaminophen (NORCO) 5-325 MG per tablet; Take 1 tablet by mouth Every 6 (Six) Hours As Needed for Moderate Pain  or Severe Pain . Fill when due    Panic disorder without agoraphobia    Other orders  -     Cancel: clonazePAM (KLONOPIN) 0.5 MG tablet; Take 1 tablet by mouth Daily As Needed for Anxiety (panic attacks).           PHQ-2/PHQ-9 Depression Screening 7/8/2019   Little interest or pleasure in doing things 0   Feeling down, depressed, or hopeless 0   Trouble falling or staying asleep, or sleeping too much -   Feeling tired or having little energy -   Poor appetite or overeating -   Feeling bad about yourself - or that you are a failure or have let yourself or your family down -   Trouble concentrating on things, such as reading the newspaper or watching television -   Moving or speaking so slowly that other people could have noticed. Or the opposite - being so fidgety or restless that you have been moving around a lot more than usual -   Thoughts that you would be better off dead, or of hurting yourself in some way -   Total Score 0   Patient understands the risks associated with this controlled medication, including tolerance and addiction.  Patient also agrees to only obtain this medication from me, and not from a another provider, unless that provider is covering for me in my absence.  Patient also agrees to be compliant in dosing, and not self adjust the dose of medication.  A signed controlled substance agreement is on file, and the patient has received a controlled substance education sheet at this a previous visit.  The patient has also signed a consent for treatment with a controlled substance as per [facility] policy. KATE was obtained.      UMESH Guzmán         Return in about 4 weeks (around 9/3/2019).    There are no Patient Instructions on file for this visit.    Lab on 08/02/2019   Component Date Value Ref Range Status   • Osmolality, Urine  08/02/2019 578  mOsm/kg Final   • ACTH 08/02/2019 32.1  7.2 - 63.3 pg/mL Final    ACTH reference interval for samples collected between 7 and 10 AM.   • Cortisol - AM 08/02/2019 18.83  mcg/dL Final   • Estradiol 08/02/2019 <5.0  pg/mL Final   • Prolactin 08/02/2019 9.27  4.79 - 23.30 ng/mL Final   • Insulin-Like Growth Factor-1 08/02/2019 119  42 - 169 ng/mL Final   Office Visit on 07/29/2019   Component Date Value Ref Range Status   • Case Report 07/29/2019    Final                    Value:Gynecologic Cytology Report                       Case: [id number]                                  Authorizing Provider:  Rosario Ceron APRN   Collected:           07/29/2019 02:15 PM          Ordering Location:     [facility]     Received:            07/29/2019 02:56 PM                                 [facility]                                                                                  [facility]                                                                     First Screen:          Carmel Hardin                                                              Specimen:    Liquid-Based Pap, Screening, Cervix                                                       • Interpretation 07/29/2019 Negative for intraepithelial lesion or malignancy    Final   • General Categorization 07/29/2019 Within normal limits   Final   • Specimen Adequacy 07/29/2019 Satisfactory for evaluation   Final   • Additional Information 07/29/2019    Final                    Value:This result contains rich text formatting which cannot be displayed here.   • Testosterone, Total 08/02/2019 15.9  7.0 - 40.0 ng/dL Final    This test was developed and its performance characteristics  determined by [facility]. It has not been cleared or approved  by the Food and Drug Administration.   • Testosterone, Free 08/02/2019 0.8  0.0 - 4.2 pg/mL Final   • Thyroglobulin Ab 08/02/2019 <1.0  0.0 - 0.9 IU/mL Final    Thyroglobulin Antibody measured  by [facility] Methodology   • T3, Total 08/02/2019 103.0  80.0 - 200.0 ng/dl Final   • Free T4 08/02/2019 0.90* 0.93 - 1.70 ng/dL Final   • Thyroid Peroxidase Antibody 08/02/2019 16  0 - 34 IU/mL Final   • TSH 08/02/2019 2.480  0.270 - 4.200 mIU/mL Final   • Total Cholesterol 08/02/2019 214* 150 - 200 mg/dL Final   • Triglycerides 08/02/2019 250* <=150 mg/dL Final   • HDL Cholesterol 08/02/2019 35* 40 - 59 mg/dL Final   • LDL Cholesterol  08/02/2019 129* <=100 mg/dL Final   • VLDL Cholesterol 08/02/2019 50  mg/dL Final   • LDL/HDL Ratio 08/02/2019 3.69* 0.00 - 3.22 Final   Lab on 07/10/2019   Component Date Value Ref Range Status   • Hepatitis B Surface Ag 07/10/2019 Non-Reactive  Non-Reactive Final   • Hep A IgM 07/10/2019 Non-Reactive  Non-Reactive Final   • Hep B C IgM 07/10/2019 Non-Reactive  Non-Reactive Final   • Hepatitis C Ab 07/10/2019 Non-Reactive  Non-Reactive Final   • Free T4 07/10/2019 0.87* 0.93 - 1.70 ng/dL Final   • TSH 07/10/2019 2.070  0.270 - 4.200 mIU/mL Final   • T3, Total 07/10/2019 98.3  80.0 - 200.0 ng/dl Final   Office Visit on 07/08/2019   Component Date Value Ref Range Status   • Glucose 07/08/2019 98  70 - 99 mg/dL Final   • BUN 07/08/2019 19  7 - 23 mg/dL Final   • Creatinine 07/08/2019 1.00  0.52 - 1.04 mg/dL Final   • Sodium 07/08/2019 145  137 - 145 mmol/L Final   • Potassium 07/08/2019 4.6  3.4 - 5.0 mmol/L Final   • Chloride 07/08/2019 109  101 - 112 mmol/L Final   • CO2 07/08/2019 23.0  22.0 - 30.0 mmol/L Final   • Calcium 07/08/2019 9.5  8.4 - 10.2 mg/dL Final   • Total Protein 07/08/2019 7.9  6.3 - 8.6 g/dL Final   • Albumin 07/08/2019 4.50  3.50 - 5.00 g/dL Final   • ALT (SGPT) 07/08/2019 19  <=35 U/L Final   • AST (SGOT) 07/08/2019 64* 14 - 36 U/L Final   • Alkaline Phosphatase 07/08/2019 80  38 - 126 U/L Final   • Total Bilirubin 07/08/2019 0.2  0.2 - 1.3 mg/dL Final   • eGFR Non  Amer 07/08/2019 56  45 - 104 mL/min/1.73 Final   • Globulin 07/08/2019 3.4  2.3 -  3.5 gm/dL Final   • A/G Ratio 07/08/2019 1.3  1.1 - 1.8 g/dL Final   • BUN/Creatinine Ratio 07/08/2019 19.0  7.0 - 25.0 Final   • Anion Gap 07/08/2019 13.0  5.0 - 15.0 mmol/L Final   • Free T4 07/08/2019 0.87* 0.93 - 1.70 ng/dL Final   • TSH 07/08/2019 1.540  0.270 - 4.200 mIU/mL Final   • T3, Total 07/08/2019 109.0  80.0 - 200.0 ng/dl Final   Lab on 06/03/2019   Component Date Value Ref Range Status   • Total Protein 06/03/2019 7.7  6.3 - 8.6 g/dL Final   • Albumin 06/03/2019 4.30  3.50 - 5.00 g/dL Final   • ALT (SGPT) 06/03/2019 18  <=35 U/L Final   • AST (SGOT) 06/03/2019 57* 14 - 36 U/L Final   • Alkaline Phosphatase 06/03/2019 84  38 - 126 U/L Final   • Total Bilirubin 06/03/2019 0.2  0.2 - 1.3 mg/dL Final   • Bilirubin, Direct 06/03/2019 <0.0* 0.0 - 0.3 mg/dL Final   • Bilirubin, Indirect 06/03/2019 0.2  0.0 - 1.1 mg/dL Final   Office Visit on 06/03/2019   Component Date Value Ref Range Status   • Report Summary 06/03/2019 FINAL   Final    Comment: ====================================================================  TOXASSURE SELECT 13 PRACHI-DIS  ====================================================================  Test                             Result       Flag       Units  Drug Present    Hydrocodone                    1282                    ng/mg creat    Dihydrocodeine                 201                     ng/mg creat    Norhydrocodone                 3168                    ng/mg creat     Sources of hydrocodone include scheduled prescription     medications. Dihydrocodeine and norhydrocodone are expected     metabolites of hydrocodone. Dihydrocodeine is also available as a     scheduled prescription medication.  ====================================================================  Test                      Result    Flag   Units      Ref Range    Creatinine              72               mg/dL      >=20  ====================================================================  Declared Medications:    Medication list was not                            provided.  ====================================================================  For clinical consultation, please call [phone].  ====================================================================   • Ethanol Screen Urine (Ref) 06/03/2019 Negative   Final   • Ethanol, Urine 06/03/2019 Not Detected  g/dL Final   • Amphetamine, Urine Qual 06/03/2019 Negative   Final   • Methamphetamine, Urine 06/03/2019 Not Detected  ng/mg creat Final   • Amphetamine, Urine 06/03/2019 Not Detected  ng/mg creat Final   • MDMA URINE 06/03/2019 Not Detected  ng/mg creat Final   • MDA 06/03/2019 Not Detected  ng/mg creat Final   • Benzodiazepine Screen, Urine 06/03/2019 Negative   Final   • DIAZEPAM URINE QUANT (REF) 06/03/2019 Not Detected  ng/mg creat Final   • Desmethyldiazepam 06/03/2019 Not Detected  ng/mg creat Final   • Oxazepam, urine 06/03/2019 Not Detected  ng/mg creat Final   • Temazepam, urine 06/03/2019 Not Detected  ng/mg creat Final    Expected metabolism of benzodiazepine class drugs:   Parent Drug       Detected Metabolites   -----------       --------------------   Diazepam:         Desmethyldiazepam, Temazepam, Oxazepam   Chlordiazepoxide: Desmethyldiazepam, Oxazepam   Clorazepate:      Desmethyldiazepam, Oxazepam   Halazepam:        Desmethyldiazepam, Oxazepam   Temazepam:        Oxazepam   Oxazepam:         None   • ALPRAZOLAM 06/03/2019 Not Detected  ng/mg creat Final   • ALPHA-HYDROXYALPRAZOLAM UR, QT (RE* 06/03/2019 Not Detected  ng/mg creat Final   • DESALKLFLURAZEPAM UR QUANT (REF) 06/03/2019 Not Detected  ng/mg creat Final   • LORAZEPAM URINE QUANT (REF) 06/03/2019 Not Detected  ng/mg creat Final   • Alpha-hydroxytriazolam, Urine 06/03/2019 Not Detected  ng/mg creat Final   • Clonazepam Urine 06/03/2019 Not Detected  ng/mg creat Final   • 7-Aminoclonazepam Urine 06/03/2019 Not Detected  ng/mg creat Final   • MIDAZOLAM UR, QUANT (REF) 06/03/2019 Not  Detected  ng/mg creat Final   • Alpha-hydroxymidazolam, Urine 06/03/2019 Not Detected  ng/mg creat Final   • Flunitrazepam 06/03/2019 Not Detected  ng/mg creat Final   • DESMETHYLFLUNITRAZEPAM 06/03/2019 Not Detected  ng/mg creat Final   • Cocaine & Metabolite 06/03/2019 Negative   Final   • Cocaine Screen, Urine 06/03/2019 Not Detected  ng/mg creat Final   • Benzoylecgonine, Urine 06/03/2019 Not Detected  ng/mg creat Final   • Cocaethylene Ur 06/03/2019 Not Detected  ng/mg creat Final   • THC, Urine 06/03/2019 Negative   Final   • Carboxy THC, Urine 06/03/2019 Not Detected  ng/mg creat Final   • 6-ACETYLMORPHINE 06/03/2019 Negative   Final   • 6-acetylmorphine 06/03/2019 Not Detected  ng/mg creat Final   • Opiate Class 06/03/2019 +POSITIVE+   Final   • Codeine, Urine 06/03/2019 Not Detected  ng/mg creat Final   • Morphine, Urine 06/03/2019 Not Detected  ng/mg creat Final   • normorphine 06/03/2019 Not Detected  ng/mg creat Final   • Norcodeine Ur 06/03/2019 Not Detected  ng/mg creat Final   • Hydrocodone UR 06/03/2019 1282  ng/mg creat Final   • Hydromorphone Urine 06/03/2019 Not Detected  ng/mg creat Final   • Dihydrocodeine, Urine 06/03/2019 201  ng/mg creat Final   • Opiates, Norhydrocodone, Urine 06/03/2019 3168  ng/mg creat Final    Expected metabolism of opiate class drugs:  Parent Drug       Detected Metabolites   -----------       --------------------   Codeine:          Major:  Morphine, Norcodeine                     Minor:  Hydrocodone, Hydromorphone,                             Dihydrocodeine, Norhydrocodone,                             Normorphine   Morphine:         Major:  Normorphine                     Minor:  Hydromorphone   Hydrocodone:      Hydromorphone, Dihydrocodeine,                     Norhydrocodone   Hydromorphone:    None   Dihydrocodeine:   None   Heroin:           6-Acetylmorphine (if included),                     Morphine, Normorphine                     Codeine, in small amounts in  comparison                      to morphine, is often detected when                      heroin is the source drug.   • Oxycodone Class Ur 06/03/2019 Negative   Final   • Oxycodone, Confirmation, Urine 06/03/2019 Not Detected  ng/mg creat Final   • Oxymorphone UR 06/03/2019 Not Detected  ng/mg creat Final   • Opiates, Noroxycodone, Urine 06/03/2019 Not Detected  ng/mg creat Final   • Noroxymorphone 06/03/2019 Not Detected  ng/mg creat Final    Expected metabolism of oxycodone class drugs:   Parent Drug       Detected Metabolites   -----------       --------------------   Oxycodone:        Oxymorphone, Noroxycodone, Noroxymorphone   Oxymorphone:      Noroxymorphone   • Methadone 06/03/2019 Negative   Final   • Methadone, Quantitative 06/03/2019 Not Detected  ng/mg creat Final   • EDDP 06/03/2019 Not Detected  ng/mg creat Final   • Fentanyl and Analogues, Ur 06/03/2019 Negative   Final   • Fentanyl, Urine 06/03/2019 Not Detected  ng/mg creat Final   • Norfentanyl Urine 06/03/2019 Not Detected  ng/mg creat Final   • Sufentanil, Ur 06/03/2019 Not Detected  ng/mg creat Final   • Alfentanil, Ur 06/03/2019 Not Detected  ng/mg creat Final   • BUPRENORPHINE 06/03/2019 Negative   Final   • Buprenorphine, Urine 06/03/2019 Not Detected  ng/mg creat Final   • Norbuprenorphine 06/03/2019 Not Detected  ng/mg creat Final   • Tapentadol 06/03/2019 Negative   Final   • Tapentadol Ur 06/03/2019 Not Detected  ng/mg creat Final   • Opoidss other, UR 06/03/2019 Negative   Final   • Tramadol, Urine 06/03/2019 Not Detected  ng/mg creat Final   • O-desmethyltramadol, Ur 06/03/2019 Not Detected  ng/mg creat Final   • N-Desmethyltramadol, U 06/03/2019 Not Detected  ng/mg creat Final   • BARBITURATES, URINE 06/03/2019 Negative   Final   • Amobarbital, Urine 06/03/2019 Not Detected   Final   • Barbital 06/03/2019 Not Detected   Final   • Butabarbital, Ur 06/03/2019 Not Detected   Final   • Butalbital Screen, Urine 06/03/2019 Not Detected    Final   • Mephobarbital Urine 06/03/2019 Not Detected   Final   • Pentobarbital UR 06/03/2019 Not Detected   Final   • Phenobarbital 06/03/2019 Not Detected   Final   • Secobarbital, urine 06/03/2019 Not Detected   Final   • Thiopental, Ur 06/03/2019 Not Detected   Final   • Creatinine, Urine 06/03/2019 72  mg/dL Final    REFERENCE RANGE: Ref Range>=20   • Level of Detection: 06/03/2019 Comment   Final    TESTING THRESHOLDS ARE AS FOLLOWS:  AMPHETAMINES: 50 ng/mL  BENZODIAZEPINES: 20 ng/mL  COCAINE / METABOLITE: 50 ng/mL  ALCOHOL, ETHYL: 0.020 gm/dL  FENTANYL / ANALOGUES: fentanyl-1.0 ng/mL, others-5.0 ng/mL  BUPRENORPHINE: buprenorphine-1.0 ng/mL,                 norbuprenorphine-5 ng/mL  TAPENTADOL: 50 ng/mL  CANNABINOIDS: total-20 ng/mL, carboxy-THC-2 ng/mL  METHADONE: 50 ng/mL/mL  OPIATE CLASS: 50 ng/mL/mL  OXYCODONE CLASS: 50 ng/mL  BARBITURATES: 200 ng/mL  TRAMADOL: 50 ng/mL  This test was developed and its performance characteristics  determined by [facility].  It has not been cleared or approved  by the Food and Drug Administration.   Office Visit on 05/17/2019   Component Date Value Ref Range Status   • Total Protein 05/17/2019 7.3  6.3 - 8.6 g/dL Final   • Albumin 05/17/2019 4.10  3.50 - 5.00 g/dL Final   • ALT (SGPT) 05/17/2019 51* <=35 U/L Final   • AST (SGOT) 05/17/2019 87* 14 - 36 U/L Final   • Alkaline Phosphatase 05/17/2019 99  38 - 126 U/L Final   • Total Bilirubin 05/17/2019 0.3  0.2 - 1.3 mg/dL Final   • Bilirubin, Direct 05/17/2019 <0.0* 0.0 - 0.3 mg/dL Final   • Bilirubin, Indirect 05/17/2019 0.2  0.0 - 1.1 mg/dL Final   Admission on 05/09/2019, Discharged on 05/10/2019   Component Date Value Ref Range Status   • Color, UA 05/09/2019 Yellow  Yellow, Straw, Dark Yellow, Paige Final   • Appearance, UA 05/09/2019 Clear  Clear Final   • pH, UA 05/09/2019 7.0  5.0 - 9.0 Final   • Specific Gravity, UA 05/09/2019 1.002* 1.003 - 1.030 Final    Result obtained by Refractometer   • Glucose, UA 05/09/2019  Negative  Negative Final   • Ketones, UA 05/09/2019 Negative  Negative Final   • Bilirubin, UA 05/09/2019 Negative  Negative Final   • Blood, UA 05/09/2019 Small (1+)* Negative Final   • Protein, UA 05/09/2019 Negative  Negative Final   • Leuk Esterase, UA 05/09/2019 Negative  Negative Final   • Nitrite, UA 05/09/2019 Negative  Negative Final   • Urobilinogen, UA 05/09/2019 0.2 E.U./dL  0.2 - 1.0 E.U./dL Final   • RBC, UA 05/09/2019 3-5* None Seen /HPF Final   • WBC, UA 05/09/2019 None Seen  None Seen, 0-2, 3-5 /HPF Final   • Bacteria, UA 05/09/2019 None Seen  None Seen /HPF Final   • Squamous Epithelial Cells, UA 05/09/2019 None Seen  None Seen, 0-2 /HPF Final   • Hyaline Casts, UA 05/09/2019 None Seen  None Seen /LPF Final   • Methodology 05/09/2019 Automated Microscopy   Final   • Glucose 05/09/2019 99  65 - 99 mg/dL Final   • BUN 05/09/2019 14  8 - 23 mg/dL Final   • Creatinine 05/09/2019 1.15* 0.57 - 1.00 mg/dL Final   • Sodium 05/09/2019 137  136 - 145 mmol/L Final   • Potassium 05/09/2019 3.9  3.5 - 5.2 mmol/L Final   • Chloride 05/09/2019 103  98 - 107 mmol/L Final   • CO2 05/09/2019 22.0  22.0 - 29.0 mmol/L Final   • Calcium 05/09/2019 9.4  8.6 - 10.5 mg/dL Final   • Total Protein 05/09/2019 7.6  6.0 - 8.5 g/dL Final   • Albumin 05/09/2019 4.10  3.50 - 5.20 g/dL Final   • ALT (SGPT) 05/09/2019 203* 1 - 33 U/L Final   • AST (SGOT) 05/09/2019 190* 1 - 32 U/L Final   • Alkaline Phosphatase 05/09/2019 161* 39 - 117 U/L Final   • Total Bilirubin 05/09/2019 <0.2* 0.2 - 1.2 mg/dL Final   • eGFR Non African Amer 05/09/2019 48* >60 mL/min/1.73 Final   • Globulin 05/09/2019 3.5  gm/dL Final   • A/G Ratio 05/09/2019 1.2  g/dL Final   • BUN/Creatinine Ratio 05/09/2019 12.2  7.0 - 25.0 Final   • Anion Gap 05/09/2019 12.0  mmol/L Final   • Lipase 05/09/2019 18  13 - 60 U/L Final   • WBC 05/09/2019 8.95  3.40 - 10.80 10*3/mm3 Final   • RBC 05/09/2019 4.27  3.77 - 5.28 10*6/mm3 Final   • Hemoglobin 05/09/2019 13.2  12.0 -  15.9 g/dL Final   • Hematocrit 05/09/2019 39.4  34.0 - 46.6 % Final   • MCV 05/09/2019 92.3  79.0 - 97.0 fL Final   • MCH 05/09/2019 30.9  26.6 - 33.0 pg Final   • MCHC 05/09/2019 33.5  31.5 - 35.7 g/dL Final   • RDW 05/09/2019 13.4  12.3 - 15.4 % Final   • RDW-SD 05/09/2019 45.6  37.0 - 54.0 fl Final   • MPV 05/09/2019 9.5  6.0 - 12.0 fL Final   • Platelets 05/09/2019 304  140 - 450 10*3/mm3 Final   • Neutrophil % 05/09/2019 55.5  42.7 - 76.0 % Final   • Lymphocyte % 05/09/2019 27.6  19.6 - 45.3 % Final   • Monocyte % 05/09/2019 12.5* 5.0 - 12.0 % Final   • Eosinophil % 05/09/2019 2.8  0.3 - 6.2 % Final   • Basophil % 05/09/2019 0.9  0.0 - 1.5 % Final   • Immature Grans % 05/09/2019 0.7* 0.0 - 0.5 % Final   • Neutrophils, Absolute 05/09/2019 4.97  1.70 - 7.00 10*3/mm3 Final   • Lymphocytes, Absolute 05/09/2019 2.47  0.70 - 3.10 10*3/mm3 Final   • Monocytes, Absolute 05/09/2019 1.12* 0.10 - 0.90 10*3/mm3 Final   • Eosinophils, Absolute 05/09/2019 0.25  0.00 - 0.40 10*3/mm3 Final   • Basophils, Absolute 05/09/2019 0.08  0.00 - 0.20 10*3/mm3 Final   • Immature Grans, Absolute 05/09/2019 0.06* 0.00 - 0.05 10*3/mm3 Final   • nRBC 05/09/2019 0.0  0.0 - 0.2 /100 WBC Final   Orders Only on 04/30/2019   Component Date Value Ref Range Status   • Case Report 04/30/2019    Final                    Value:Non-gynecologic Cytology                          Case: [id number]                                  Authorizing Provider:  Rosario Ceron APRN   Collected:           04/30/2019 02:19 PM          Ordering Location:     [facility]     Received:            04/30/2019 02:19 PM                                 [facility]                                                                                  [facility]                                                                     Pathologist:           Goyo Camarena MD                                                         Specimen:    Urine, Clean Catch,  CLEAR, 50MLS                                                          • Final Diagnosis 04/30/2019    Final                    Value:This result contains rich text formatting which cannot be displayed here.   • Specimen Adequacy 04/30/2019 Satisfactory for evaluation   Final   • Interpretation 04/30/2019 See final diagnosis   Final   • Gross Description 04/30/2019    Final                    Value:This result contains rich text formatting which cannot be displayed here.   • Microscopic Description 04/30/2019    Final                    Value:This result contains rich text formatting which cannot be displayed here.   Lab on 04/29/2019   Component Date Value Ref Range Status   • Urine Culture 04/29/2019 No growth   Final   • Color, UA 04/29/2019 Yellow  Yellow, Straw Final   • Appearance, UA 04/29/2019 Clear  Clear Final   • pH, UA 04/29/2019 7.0  5.5 - 8.0 Final   • Specific Gravity, UA 04/29/2019 1.015  1.005 - 1.030 Final   • Glucose, UA 04/29/2019 Negative  Negative Final   • Ketones, UA 04/29/2019 Negative  Negative Final   • Bilirubin, UA 04/29/2019 Negative  Negative Final   • Blood, UA 04/29/2019 Moderate (2+)* Negative Final   • Protein, UA 04/29/2019 Negative  Negative Final   • Leuk Esterase, UA 04/29/2019 Negative  Negative Final   • Nitrite, UA 04/29/2019 Negative  Negative Final   • Urobilinogen, UA 04/29/2019 0.2 E.U./dL  0.2 - 1.0 E.U./dL Final   • RBC, UA 04/29/2019 13-20* None Seen /HPF Final   • WBC, UA 04/29/2019 None Seen  None Seen /HPF Final   • Bacteria, UA 04/29/2019 None Seen  None Seen /HPF Final   • Squamous Epithelial Cells, UA 04/29/2019 0-2  None Seen, 0-2 /HPF Final   • Hyaline Casts, UA 04/29/2019 None Seen  None Seen /LPF Final   • Methodology 04/29/2019 Manual Light Microscopy   Final   There may be more visits with results that are not included.   ]

## 2019-08-28 LAB
ALPHA-FETOPROTEIN: 1.49 NG/ML (ref 0–8.3)
ALPHA1 GLOB MFR UR ELPH: 146 MG/DL (ref 90–200)
CERULOPLASMIN SERPL-MCNC: 23 MG/DL (ref 19–39)
FERRITIN SERPL-MCNC: 71.83 NG/ML (ref 13–150)
IRON 24H UR-MRATE: 89 MCG/DL (ref 37–145)
IRON SATN MFR SERPL: 31 % (ref 20–50)
TIBC SERPL-MCNC: 289 MCG/DL (ref 298–536)
TRANSFERRIN SERPL-MCNC: 194 MG/DL (ref 200–360)

## 2019-08-28 PROCEDURE — 82728 ASSAY OF FERRITIN: CPT | Performed by: PHYSICIAN ASSISTANT

## 2019-08-28 PROCEDURE — 82172 ASSAY OF APOLIPOPROTEIN: CPT | Performed by: PHYSICIAN ASSISTANT

## 2019-08-28 PROCEDURE — 83516 IMMUNOASSAY NONANTIBODY: CPT | Performed by: PHYSICIAN ASSISTANT

## 2019-08-28 PROCEDURE — 36415 COLL VENOUS BLD VENIPUNCTURE: CPT | Performed by: INTERNAL MEDICINE

## 2019-08-28 PROCEDURE — 82247 BILIRUBIN TOTAL: CPT | Performed by: PHYSICIAN ASSISTANT

## 2019-08-28 PROCEDURE — 84478 ASSAY OF TRIGLYCERIDES: CPT | Performed by: PHYSICIAN ASSISTANT

## 2019-08-28 PROCEDURE — 82947 ASSAY GLUCOSE BLOOD QUANT: CPT | Performed by: PHYSICIAN ASSISTANT

## 2019-08-28 PROCEDURE — 83540 ASSAY OF IRON: CPT | Performed by: PHYSICIAN ASSISTANT

## 2019-08-28 PROCEDURE — 83010 ASSAY OF HAPTOGLOBIN QUANT: CPT | Performed by: PHYSICIAN ASSISTANT

## 2019-08-28 PROCEDURE — 82105 ALPHA-FETOPROTEIN SERUM: CPT | Performed by: PHYSICIAN ASSISTANT

## 2019-08-28 PROCEDURE — 82103 ALPHA-1-ANTITRYPSIN TOTAL: CPT | Performed by: PHYSICIAN ASSISTANT

## 2019-08-28 PROCEDURE — 82465 ASSAY BLD/SERUM CHOLESTEROL: CPT | Performed by: PHYSICIAN ASSISTANT

## 2019-08-28 PROCEDURE — 84460 ALANINE AMINO (ALT) (SGPT): CPT | Performed by: PHYSICIAN ASSISTANT

## 2019-08-28 PROCEDURE — 82977 ASSAY OF GGT: CPT | Performed by: PHYSICIAN ASSISTANT

## 2019-08-28 PROCEDURE — 84466 ASSAY OF TRANSFERRIN: CPT | Performed by: PHYSICIAN ASSISTANT

## 2019-08-28 PROCEDURE — 83883 ASSAY NEPHELOMETRY NOT SPEC: CPT | Performed by: PHYSICIAN ASSISTANT

## 2019-08-28 PROCEDURE — 84450 TRANSFERASE (AST) (SGOT): CPT | Performed by: PHYSICIAN ASSISTANT

## 2019-08-28 PROCEDURE — 82390 ASSAY OF CERULOPLASMIN: CPT | Performed by: PHYSICIAN ASSISTANT

## 2019-08-28 PROCEDURE — 86038 ANTINUCLEAR ANTIBODIES: CPT | Performed by: PHYSICIAN ASSISTANT

## 2019-08-29 LAB
ACTIN IGG SERPL-ACNC: 3 UNITS (ref 0–19)
ANA SER QL IA: NEGATIVE
DEPRECATED MITOCHONDRIA M2 IGG SER-ACNC: <20 UNITS (ref 0–20)

## 2019-08-31 LAB
A2 MACROGLOB SERPL-MCNC: 266 MG/DL (ref 110–276)
ALT SERPL W P-5'-P-CCNC: 17 IU/L (ref 0–40)
APO A-I SERPL-MCNC: 120 MG/DL (ref 116–209)
AST SERPL W P-5'-P-CCNC: 54 IU/L (ref 0–40)
BILIRUB SERPL-MCNC: 0.2 MG/DL (ref 0–1.2)
CHOLEST SERPL-MCNC: 202 MG/DL (ref 100–199)
FIBROSIS SCORING:: ABNORMAL
FIBROSIS STAGE SERPL QL: ABNORMAL
GGT SERPL-CCNC: 19 IU/L (ref 0–60)
GLUCOSE SERPL-MCNC: 98 MG/DL (ref 65–99)
HAPTOGLOB SERPL-MCNC: 97 MG/DL (ref 34–200)
INTERPRETATIONS: (REFERENCE): ABNORMAL
LABORATORY COMMENT REPORT: ABNORMAL
LIMITATIONS: (REFERENCE): ABNORMAL
LIVER FIBR SCORE SERPL CALC.FIBROSURE: 0.25 (ref 0–0.21)
NASH GRADE (REFERENCE): ABNORMAL
NASH SCORE (REFERENCE): 0.25
NASH SCORING (REFERENCE): ABNORMAL
STEATOSIS GRADE (REFERENCE): ABNORMAL
STEATOSIS GRADING (REFERENCE): ABNORMAL
STEATOSIS SCORE (REFERENCE): 0.34 (ref 0–0.3)
TRIGL SERPL-MCNC: 297 MG/DL (ref 0–149)
WEIGHT: (REFERENCE): 109 LBS

## 2019-09-03 ENCOUNTER — OFFICE VISIT (OUTPATIENT)
Dept: GASTROENTEROLOGY | Facility: CLINIC | Age: 59
End: 2019-09-03

## 2019-09-03 VITALS
WEIGHT: 110 LBS | HEART RATE: 73 BPM | BODY MASS INDEX: 20.24 KG/M2 | SYSTOLIC BLOOD PRESSURE: 110 MMHG | HEIGHT: 62 IN | DIASTOLIC BLOOD PRESSURE: 68 MMHG

## 2019-09-03 DIAGNOSIS — K74.00 HEPATIC FIBROSIS: ICD-10-CM

## 2019-09-03 DIAGNOSIS — R11.0 NAUSEA: ICD-10-CM

## 2019-09-03 DIAGNOSIS — R10.10 PAIN OF UPPER ABDOMEN: ICD-10-CM

## 2019-09-03 DIAGNOSIS — R74.8 ELEVATED LIVER ENZYMES: Primary | ICD-10-CM

## 2019-09-03 PROCEDURE — 99214 OFFICE O/P EST MOD 30 MIN: CPT | Performed by: PHYSICIAN ASSISTANT

## 2019-09-03 RX ORDER — DEXTROSE AND SODIUM CHLORIDE 5; .45 G/100ML; G/100ML
30 INJECTION, SOLUTION INTRAVENOUS CONTINUOUS PRN
Status: CANCELLED | OUTPATIENT
Start: 2019-10-16

## 2019-09-03 NOTE — PROGRESS NOTES
Chief Complaint   Patient presents with   • Elevated Hepatic Enzymes   • Hepatic Fibrosis       ENDO PROCEDURE ORDERED: EGDwbiopsies, gerd, abd pain and burning    Subjective    Tricia Loyola is a 59 y.o. female. she is here today for follow-up.    History of Present Illness    The patient was seen on a recheck of her elevated liver enzymes, GERD, hepatic fibrosis.  Last seen 08/06/2019.  She had a previous normal liver ultrasound post cholecystectomy on 08/01/2019.  She is having a lot of burning in her stomach despite taking Dexilant, compazine, Zantac.  She complains of a lot of nausea, but denied regurgitation or dysphagia.  Bowel movements are regular without blood or mucus.  Weight is up 1 pound since last visit.  Last colonoscopy by Dr. Mcintyre showed tubular adenoma on 11/26/2018.    Laboratories on 08/28/2019 showed normal YUKO, AMA, ceruloplasmin, SMA, alpha 1 antitrypsin, AFP.  Iron studies showed slightly decreased IVC and transferring.  BOLDEN Fibrosure 0.25/F0-F1, steatosis 0.34/S0-S1, 0.25/N0.  Cholesterol 202.  AST 54, triglycerides 297.    ASSESSMENT/PLAN:  Patient with nausea, abdominal pain, possible H. pylori.  I recommended she avoid gastric irritants.  Given her persistent complaints, recommended EGD with biopsy to evaluate.  Would consider pancreatic evaluation.  Would consider further imaging if her symptoms persist.  Her weight has been stable.  We will plan further pending clinical course and the results of the above.       The following portions of the patient's history were reviewed and updated as appropriate:   Past Medical History:   Diagnosis Date   • Abdominal pain    • Anemia    • Chronic post-traumatic headache      rebound      • Depressive disorder    • Encounter for gynecological examination    • Gastroesophageal reflux disease    • Generalized anxiety disorder    • History of mammogram 08/2008    MAMMOGRAM DIAGNOSTIC BILATERAL 39804 ([facility]) (1)     • Hyperlipidemia    • Ingrown  toenail    • Injury of back    • Nonruptured cerebral aneurysm    • Osteoporosis    • Posttraumatic stress disorder    • Seizure (CMS/HCC)     Psychogenic non-epileptic sz    • Viral hepatitis A      Past Surgical History:   Procedure Laterality Date   • APPENDECTOMY     • BREAST BIOPSY Left    • CHOLECYSTECTOMY     • COLONOSCOPY N/A 2018    Procedure: COLONOSCOPY;  Surgeon: Meet Mcintyre MD;  Location: [facility] ENDOSCOPY;  Service: General   • CRANIOTOMY FOR ANEURYSM     • PAP SMEAR  2012   • TONSILLECTOMY       Family History   Problem Relation Age of Onset   • Constipation Mother    • Hypertension Mother    • Anxiety disorder Mother    • Heart disease Mother    • Alcohol abuse Father    • Heart disease Father    • Anxiety disorder Brother    • Kidney disease Brother    • Hypertension Brother    • Arthritis Brother    • Anxiety disorder Brother    • Kidney disease Brother    • Diabetes Brother    • Anxiety disorder Brother    • Hypertension Brother    • Breast cancer Paternal Aunt    • Heart disease Maternal Grandmother    • Clotting disorder Maternal Grandmother    • Heart disease Maternal Grandfather      OB History      Para Term  AB Living    3 2 2   1 2    SAB TAB Ectopic Molar Multiple Live Births    1                  Allergies   Allergen Reactions   • Augmentin [Amoxicillin-Pot Clavulanate]      Hives   • Ciprofloxacin      Cipro:  Rash   • Fiorinal [Butalbital-Aspirin-Caffeine]      Rapid heart rate   • Imitrex [Sumatriptan]      Rapid heart rate   • Iodine      Anaphylaxis   • Other      MIDRIN  -  Rapid heart rate   • Toradol [Ketorolac Tromethamine]      Anaphylaxis   • Ultram [Tramadol]      Rapid heart rate   • Ibuprofen Rash     Social History     Socioeconomic History   • Marital status: Legally      Spouse name: Not on file   • Number of children: Not on file   • Years of education: Not on file   • Highest education level: Not on file   Tobacco Use   •  Smoking status: Light Tobacco Smoker     Packs/day: 0.50     Years: 43.00     Pack years: 21.50     Types: Cigarettes   • Smokeless tobacco: Never Used   Substance and Sexual Activity   • Alcohol use: No   • Drug use: No   • Sexual activity: No     Current Medications:  Prior to Admission medications    Medication Sig Start Date End Date Taking? Authorizing Provider   albuterol sulfate HFA (VENTOLIN HFA) 108 (90 Base) MCG/ACT inhaler Inhale 2 puffs Every 6 (Six) Hours As Needed for Wheezing or Shortness of Air. 6/6/19  Yes Rosario Ceron APRN   amitriptyline (ELAVIL) 75 MG tablet TAKE ONE TABLET BY MOUTH AT BEDTIME. 1/29/18  Yes Sonia Mata MD   atorvastatin (LIPITOR) 20 MG tablet Take 1 tablet by mouth Daily. 6/3/19  Yes Rosario Ceron APRN   busPIRone (BUSPAR) 30 MG tablet Take 1 tablet by mouth 2 (Two) Times a Day. 3/7/18  Yes Sonia Mata MD   clonazePAM (KLONOPIN) 0.5 MG tablet Take 1 tablet by mouth Daily As Needed for Anxiety (panic attacks). 7/8/19  Yes Rosario Ceron APRN   cyclobenzaprine (FLEXERIL) 10 MG tablet Take 1 tablet by mouth 3 (Three) Times a Day As Needed for Muscle Spasms. 1/7/19  Yes Sonia Mata MD   dexlansoprazole (DEXILANT) 60 MG capsule Take 1 capsule by mouth Daily. 3/8/19  Yes Rosario eCron APRN   HYDROcodone-acetaminophen (NORCO) 5-325 MG per tablet Take 1 tablet by mouth Every 6 (Six) Hours As Needed for Moderate Pain  or Severe Pain . Fill when due 8/6/19  Yes Rosario Ceron APRN   levothyroxine (SYNTHROID, LEVOTHROID) 25 MCG tablet Take 0.5 tablets by mouth Daily. 8/6/19  Yes Rosario Ceron APRN   lidocaine (XYLOCAINE) 2 % jelly Apply  topically to the appropriate area as directed 2 (Two) Times a Day. 4/4/19  Yes Rosario Ceron APRN   Melatonin 10 MG tablet Take 20 mg by mouth Every Night.   Yes Provider, MD Doris   metoprolol succinate XL (TOPROL-XL) 50 MG 24 hr tablet Take 1 tablet by mouth Daily. 4/29/19  Yes  "Rosario Ceron APRN   Nerve Stimulator (TENS THERAPY REPLACE BODY PADS) misc Apply 3 each topically to the appropriate area as directed As Needed (loose, dry or soiled pad). 4/4/19  Yes Rosario Ceron APRN   ondansetron (ZOFRAN) 4 MG tablet Take 1 tablet by mouth Every 8 (Eight) Hours As Needed for Nausea or Vomiting. 6/22/18  Yes Sonia Mata MD   OXcarbazepine (TRILEPTAL) 150 MG tablet Take 150 mg by mouth Daily. Daily at bedtime   Yes Provider, MD Doris   prochlorperazine (COMPAZINE) 10 MG tablet Take 1 tablet by mouth Every 6 (Six) Hours As Needed for Nausea or Vomiting. 10/19/18  Yes Sonia Mata MD   raNITIdine (ZANTAC) 150 MG tablet take 1 tablet by mouth once daily 4/1/19  Yes Rosario Ceron APRN   rOPINIRole (REQUIP) 3 MG tablet Take 1 tablet by mouth Every Night. Dose increase 7/8/19 may fill today 7/8/19  Yes Rosario Ceron APRN   topiramate (TOPAMAX) 50 MG tablet Take 50 mg by mouth 2 (Two) Times a Day.   Yes Provider, MD Doris   Nerve Stimulator (TENS THERAPY PAIN RELIEF) device 1 each 1 (One) Time for 1 dose. For use as prescribed for back pain 4/4/19 4/4/19  Rosario Ceron APRN   cephalexin (KEFLEX) 250 MG capsule Take 1 capsule by mouth 2 (Two) Times a Day. 4/23/19 9/3/19  Rosario Ceron APRN   doxycycline (MONODOX) 100 MG capsule Take 1 capsule by mouth 2 (Two) Times a Day. 5/10/19 9/3/19  Jonathon Miranda MD     Review of Systems  Review of Systems   Gastrointestinal: Positive for abdominal distention, abdominal pain and nausea. Negative for anal bleeding, blood in stool, constipation, diarrhea, rectal pain and vomiting.          Objective    /68 (BP Location: Left arm)   Pulse 73   Ht 157.5 cm (62\")   Wt 49.9 kg (110 lb)   BMI 20.12 kg/m²   Physical Exam   Constitutional: She is oriented to person, place, and time. She appears well-developed and well-nourished. No distress.   HENT:   Head: Normocephalic and atraumatic.   Eyes: EOM " are normal. Pupils are equal, round, and reactive to light.   Neck: Normal range of motion.   Cardiovascular: Normal rate, regular rhythm and normal heart sounds.   Pulmonary/Chest: Effort normal and breath sounds normal.   Abdominal: Soft. Bowel sounds are normal. She exhibits no shifting dullness, no distension, no abdominal bruit, no ascites and no mass. There is no hepatosplenomegaly. There is tenderness. There is no rigidity, no rebound, no guarding and no CVA tenderness. No hernia. Hernia confirmed negative in the ventral area.   mild   Musculoskeletal: Normal range of motion.   Neurological: She is alert and oriented to person, place, and time.   Skin: Skin is warm and dry.   Psychiatric: She has a normal mood and affect. Her behavior is normal. Judgment and thought content normal.   Nursing note and vitals reviewed.    Assessment/Plan      1. Elevated liver enzymes    2. Pain of upper abdomen    3. Hepatic fibrosis     4. Nausea    .   Tricia was seen today for elevated hepatic enzymes and hepatic fibrosis.    Diagnoses and all orders for this visit:    Elevated liver enzymes  -     Case Request; Standing  -     dextrose 5 % and sodium chloride 0.45 % infusion  -     Case Request    Pain of upper abdomen  -     Case Request; Standing  -     dextrose 5 % and sodium chloride 0.45 % infusion  -     Case Request    Hepatic fibrosis   -     Case Request; Standing  -     dextrose 5 % and sodium chloride 0.45 % infusion  -     Case Request    Nausea  -     Case Request; Standing  -     dextrose 5 % and sodium chloride 0.45 % infusion  -     Case Request    Other orders  -     Follow Anesthesia Guidelines / Standing Orders; Future  -     Obtain Informed Consent; Future  -     Obtain Informed Consent; Standing  -     POC Glucose Once; Standing        Orders placed during this encounter include:  Orders Placed This Encounter   Procedures   • Follow Anesthesia Guidelines / Standing Orders     Standing Status:   Future    • Obtain Informed Consent     Standing Status:   Future     Order Specific Question:   Informed Consent Given For     Answer:   ESOPHAGOGASTRODUODENOSCOPY       Medications prescribed:  No orders of the defined types were placed in this encounter.    Discontinued Medications       Reason for Discontinue    cephalexin (KEFLEX) 250 MG capsule *Therapy completed    doxycycline (MONODOX) 100 MG capsule *Therapy completed        Requested Prescriptions      No prescriptions requested or ordered in this encounter       Review and/or summary of lab tests, radiology, procedures, medications. Review and summary of old records and obtaining of history. The risks and benefits of my recommendations, as well as other treatment options were discussed with the patient today. Questions were answered.    Follow-up: Return if symptoms worsen or fail to improve, for After the above.     ESOPHAGOGASTRODUODENOSCOPY (N/A)      This document has been electronically signed by Johnny Smiley PA-C on September 5, 2019 5:47 PM      Results for orders placed or performed in visit on 08/06/19   BOLDEN Fibrosure   Result Value Ref Range    Fibrosis Score (References) 0.25 (H) 0.00 - 0.21    Fibrosis Stage (Reference) F0-F1     Steatosis Score (Reference) 0.34 (H) 0.00 - 0.30    Steatosis Grade (Reference) Comment     BOLDEN Score (Reference) 0.25 0.25    Bolden Grade (Reference) Comment     Height: (Reference) 62 in    Weight: (Reference) 109 LBS    Alpha 2-Macroglobulins, Qn 266 110 - 276 mg/dL    Haptoglobin 97 34 - 200 mg/dL    Apolipoprotein A-1 120 116 - 209 mg/dL    Total Bilirubin 0.2 0.0 - 1.2 mg/dL    GGT 19 0 - 60 IU/L    ALT (SGPT) 17 0 - 40 IU/L    AST (SGOT) P5P (Reference) 54 (H) 0 - 40 IU/L    Cholesterol, Total (Reference) 202 (H) 100 - 199 mg/dL    Glucose, Serum (Reference) 98 65 - 99 mg/dL    Triglycerides 297 (H) 0 - 149 mg/dL    Interpretations: (Reference) Comment     Fibrosis Scoring: Comment     Steatosis Grading  (Reference) Comment     Bradley Scoring (Reference) Comment     Limitations: (Reference) Comment     Comment (Reference) Comment    Nuclear Antigen Antibody, IFA   Result Value Ref Range    YUKO Negative    Iron and TIBC   Result Value Ref Range    Iron 89 37 - 145 mcg/dL    Iron Saturation 31 20 - 50 %    Transferrin 194 (L) 200 - 360 mg/dL    TIBC 289 (L) 298 - 536 mcg/dL   Alpha - 1 - Antitrypsin   Result Value Ref Range    ALPHA -1 ANTITRYPSIN 146 90 - 200 mg/dL   Mitochondrial Antibodies, M2   Result Value Ref Range    Mitochondrial Ab <20.0 0.0 - 20.0 Units   Ceruloplasmin   Result Value Ref Range    Ceruloplasmin 23 19 - 39 mg/dL   AFP Tumor Marker   Result Value Ref Range    ALPHA-FETOPROTEIN 1.49 0 - 8.3 ng/mL   Anti-Smooth Muscle Antibody Titer   Result Value Ref Range    Smooth Muscle Ab 3 0 - 19 Units   Ferritin   Result Value Ref Range    Ferritin 71.83 13.00 - 150.00 ng/mL   Results for orders placed or performed in visit on 08/02/19   Cortisol - AM   Result Value Ref Range    Cortisol - AM 18.83 mcg/dL   Osmolality, Urine - Urine, Clean Catch   Result Value Ref Range    Osmolality, Urine 578 mOsm/kg   Prolactin   Result Value Ref Range    Prolactin 9.27 4.79 - 23.30 ng/mL   Insulin-like Growth Factor   Result Value Ref Range    Insulin-Like Growth Factor-1 119 42 - 169 ng/mL   Estradiol   Result Value Ref Range    Estradiol <5.0 pg/mL   ACTH   Result Value Ref Range    ACTH 32.1 7.2 - 63.3 pg/mL   Results for orders placed or performed in visit on 07/29/19   Testosterone (Free & Total), LC / MS   Result Value Ref Range    Testosterone, Total 15.9 7.0 - 40.0 ng/dL    Testosterone, Free 0.8 0.0 - 4.2 pg/mL   Thyroid Peroxidase Antibody   Result Value Ref Range    Thyroid Peroxidase Antibody 16 0 - 34 IU/mL   Anti-Thyroglobulin Antibody   Result Value Ref Range    Thyroglobulin Ab <1.0 0.0 - 0.9 IU/mL   T3   Result Value Ref Range    T3, Total 103.0 80.0 - 200.0 ng/dl   TSH   Result Value Ref Range    TSH  2.480 0.270 - 4.200 mIU/mL   T4, Free   Result Value Ref Range    Free T4 0.90 (L) 0.93 - 1.70 ng/dL   Lipid Panel   Result Value Ref Range    Total Cholesterol 214 (H) 150 - 200 mg/dL    Triglycerides 250 (H) <=150 mg/dL    HDL Cholesterol 35 (L) 40 - 59 mg/dL    LDL Cholesterol  129 (H) <=100 mg/dL    VLDL Cholesterol 50 mg/dL    LDL/HDL Ratio 3.69 (H) 0.00 - 3.22   Liquid-based Pap Smear, Screening   Result Value Ref Range    Case Report       Gynecologic Cytology Report                       Case: [id number]                                  Authorizing Provider:  Rosario Ceron APRN   Collected:           07/29/2019 02:15 PM          Ordering Location:     [facility]     Received:            07/29/2019 02:56 PM                                 [facility]                                                                                  [place]                                                                     First Screen:          Carmel Hardin                                                              Specimen:    Liquid-Based Pap, Screening, Cervix                                                        Interpretation Negative for intraepithelial lesion or malignancy      General Categorization Within normal limits     Specimen Adequacy Satisfactory for evaluation     Additional Information       Disclaimer: Cervical cytology is a screening test primarily for squamous cancer and its precursors and has associated false-negative and false-positive results.  Technologies such as liquid-based preparations may decrease but will not eliminate all false-negative results.  Follow-up of unexplained clinical signs and symptoms is recommended to minimize false-negative results. (The [place] System for Reporting Cervical Cytology: Wang, 2015).     Results for orders placed or performed in visit on 07/10/19   Hepatitis panel, acute   Result Value Ref Range    Hepatitis B Surface Ag  Non-Reactive Non-Reactive    Hep A IgM Non-Reactive Non-Reactive    Hep B C IgM Non-Reactive Non-Reactive    Hepatitis C Ab Non-Reactive Non-Reactive   T3   Result Value Ref Range    T3, Total 98.3 80.0 - 200.0 ng/dl   TSH   Result Value Ref Range    TSH 2.070 0.270 - 4.200 mIU/mL   T4, Free   Result Value Ref Range    Free T4 0.87 (L) 0.93 - 1.70 ng/dL   Results for orders placed or performed in visit on 07/08/19   T3   Result Value Ref Range    T3, Total 109.0 80.0 - 200.0 ng/dl   TSH   Result Value Ref Range    TSH 1.540 0.270 - 4.200 mIU/mL   T4, Free   Result Value Ref Range    Free T4 0.87 (L) 0.93 - 1.70 ng/dL   Comprehensive Metabolic Panel   Result Value Ref Range    Glucose 98 70 - 99 mg/dL    BUN 19 7 - 23 mg/dL    Creatinine 1.00 0.52 - 1.04 mg/dL    Sodium 145 137 - 145 mmol/L    Potassium 4.6 3.4 - 5.0 mmol/L    Chloride 109 101 - 112 mmol/L    CO2 23.0 22.0 - 30.0 mmol/L    Calcium 9.5 8.4 - 10.2 mg/dL    Total Protein 7.9 6.3 - 8.6 g/dL    Albumin 4.50 3.50 - 5.00 g/dL    ALT (SGPT) 19 <=35 U/L    AST (SGOT) 64 (H) 14 - 36 U/L    Alkaline Phosphatase 80 38 - 126 U/L    Total Bilirubin 0.2 0.2 - 1.3 mg/dL    eGFR Non  Amer 56 45 - 104 mL/min/1.73    Globulin 3.4 2.3 - 3.5 gm/dL    A/G Ratio 1.3 1.1 - 1.8 g/dL    BUN/Creatinine Ratio 19.0 7.0 - 25.0    Anion Gap 13.0 5.0 - 15.0 mmol/L   Results for orders placed or performed in visit on 06/03/19   Hepatic Function Panel   Result Value Ref Range    Total Protein 7.7 6.3 - 8.6 g/dL    Albumin 4.30 3.50 - 5.00 g/dL    ALT (SGPT) 18 <=35 U/L    AST (SGOT) 57 (H) 14 - 36 U/L    Alkaline Phosphatase 84 38 - 126 U/L    Total Bilirubin 0.2 0.2 - 1.3 mg/dL    Bilirubin, Direct <0.0 (L) 0.0 - 0.3 mg/dL    Bilirubin, Indirect 0.2 0.0 - 1.1 mg/dL   Results for orders placed or performed in visit on 06/03/19   ToxASSURE Select 13 Discrete -   Result Value Ref Range    Report Summary FINAL     Ethanol Screen Urine (Ref) Negative     Ethanol, Urine Not  Detected g/dL    Amphetamine, Urine Qual Negative     Methamphetamine, Urine Not Detected ng/mg creat    Amphetamine, Urine Not Detected ng/mg creat    MDMA URINE Not Detected ng/mg creat    MDA Not Detected ng/mg creat    Benzodiazepine Screen, Urine Negative     DIAZEPAM URINE QUANT (REF) Not Detected ng/mg creat    Desmethyldiazepam Not Detected ng/mg creat    Oxazepam, urine Not Detected ng/mg creat    Temazepam, urine Not Detected ng/mg creat    ALPRAZOLAM Not Detected ng/mg creat    ALPHA-HYDROXYALPRAZOLAM UR, QT (REF) Not Detected ng/mg creat    DESALKLFLURAZEPAM UR QUANT (REF) Not Detected ng/mg creat    LORAZEPAM URINE QUANT (REF) Not Detected ng/mg creat    Alpha-hydroxytriazolam, Urine Not Detected ng/mg creat    Clonazepam Urine Not Detected ng/mg creat    7-Aminoclonazepam Urine Not Detected ng/mg creat    MIDAZOLAM UR, QUANT (REF) Not Detected ng/mg creat    Alpha-hydroxymidazolam, Urine Not Detected ng/mg creat    Flunitrazepam Not Detected ng/mg creat    DESMETHYLFLUNITRAZEPAM Not Detected ng/mg creat    Cocaine & Metabolite Negative     Cocaine Screen, Urine Not Detected ng/mg creat    Benzoylecgonine, Urine Not Detected ng/mg creat    Cocaethylene Ur Not Detected ng/mg creat    THC, Urine Negative     Carboxy THC, Urine Not Detected ng/mg creat    6-ACETYLMORPHINE Negative     6-acetylmorphine Not Detected ng/mg creat    Opiate Class +POSITIVE+     Codeine, Urine Not Detected ng/mg creat    Morphine, Urine Not Detected ng/mg creat    normorphine Not Detected ng/mg creat    Norcodeine Ur Not Detected ng/mg creat    Hydrocodone UR 1282 ng/mg creat    Hydromorphone Urine Not Detected ng/mg creat    Dihydrocodeine, Urine 201 ng/mg creat    Opiates, Norhydrocodone, Urine 3168 ng/mg creat    Oxycodone Class Ur Negative     Oxycodone, Confirmation, Urine Not Detected ng/mg creat    Oxymorphone UR Not Detected ng/mg creat    Opiates, Noroxycodone, Urine Not Detected ng/mg creat    Noroxymorphone Not  Detected ng/mg creat    Methadone Negative     Methadone, Quantitative Not Detected ng/mg creat    EDDP Not Detected ng/mg creat    Fentanyl and Analogues, Ur Negative     Fentanyl, Urine Not Detected ng/mg creat    Norfentanyl Urine Not Detected ng/mg creat    Sufentanil, Ur Not Detected ng/mg creat    Alfentanil, Ur Not Detected ng/mg creat    BUPRENORPHINE Negative     Buprenorphine, Urine Not Detected ng/mg creat    Norbuprenorphine Not Detected ng/mg creat    Tapentadol Negative     Tapentadol Ur Not Detected ng/mg creat    Opoidss other, UR Negative     Tramadol, Urine Not Detected ng/mg creat    O-desmethyltramadol, Ur Not Detected ng/mg creat    N-Desmethyltramadol, U Not Detected ng/mg creat    BARBITURATES, URINE Negative     Amobarbital, Urine Not Detected     Barbital Not Detected     Butabarbital, Ur Not Detected     Butalbital Screen, Urine Not Detected     Mephobarbital Urine Not Detected     Pentobarbital UR Not Detected     Phenobarbital Not Detected     Secobarbital, urine Not Detected     Thiopental, Ur Not Detected     Creatinine, Urine 72 mg/dL    Level of Detection: Comment      *Note: Due to a large number of results and/or encounters for the requested time period, some results have not been displayed. A complete set of results can be found in Results Review.       Some portions of this note have been dictated using voice recognition software and may contain errors and/or omissions.

## 2019-09-03 NOTE — PATIENT INSTRUCTIONS

## 2019-09-05 ENCOUNTER — OFFICE VISIT (OUTPATIENT)
Dept: FAMILY MEDICINE CLINIC | Facility: CLINIC | Age: 59
End: 2019-09-05

## 2019-09-05 VITALS
RESPIRATION RATE: 16 BRPM | TEMPERATURE: 97.3 F | BODY MASS INDEX: 20.32 KG/M2 | WEIGHT: 110.4 LBS | OXYGEN SATURATION: 98 % | HEART RATE: 87 BPM | DIASTOLIC BLOOD PRESSURE: 70 MMHG | SYSTOLIC BLOOD PRESSURE: 116 MMHG | HEIGHT: 62 IN

## 2019-09-05 DIAGNOSIS — Z87.310 HISTORY OF PATHOLOGICAL FRACTURE DUE TO OSTEOPOROSIS: ICD-10-CM
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
## 2019-09-05 DIAGNOSIS — M48.50XA COMPRESSION FRACTURE OF VERTEBRA (HCC): ICD-10-CM

## 2019-09-05 DIAGNOSIS — M54.15 RADICULOPATHY OF THORACOLUMBAR REGION: Chronic | ICD-10-CM

## 2019-09-05 DIAGNOSIS — M81.0 AGE RELATED OSTEOPOROSIS, UNSPECIFIED PATHOLOGICAL FRACTURE PRESENCE: ICD-10-CM
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
## 2019-09-05 DIAGNOSIS — G89.29 CHRONIC MIDLINE THORACIC BACK PAIN: Primary | Chronic | ICD-10-CM

## 2019-09-05 DIAGNOSIS — M54.6 CHRONIC MIDLINE THORACIC BACK PAIN: Primary | Chronic | ICD-10-CM

## 2019-09-05 PROCEDURE — 96372 THER/PROPH/DIAG INJ SC/IM: CPT | Performed by: NURSE PRACTITIONER

## 2019-09-05 PROCEDURE — 99214 OFFICE O/P EST MOD 30 MIN: CPT | Performed by: NURSE PRACTITIONER

## 2019-09-05 RX ORDER — HYDROCODONE BITARTRATE AND ACETAMINOPHEN 5; 325 MG/1; MG/1
1 TABLET ORAL EVERY 8 HOURS PRN
Qty: 90 TABLET | Refills: 0 | Status: SHIPPED | OUTPATIENT
Start: 2019-09-05 | End: 2019-10-04 | Stop reason: SDUPTHER

## 2019-09-05 RX ORDER — METHYLPREDNISOLONE ACETATE 80 MG/ML
80 INJECTION, SUSPENSION INTRA-ARTICULAR; INTRALESIONAL; INTRAMUSCULAR; SOFT TISSUE ONCE
Status: COMPLETED | OUTPATIENT
Start: 2019-09-05 | End: 2019-09-05

## 2019-09-05 RX ORDER — ZOLEDRONIC ACID 5 MG/100ML
5 INJECTION, SOLUTION INTRAVENOUS ONCE
Status: DISCONTINUED | OUTPATIENT
Start: 2019-09-05 | End: 2022-09-15

## 2019-09-05 RX ADMIN — METHYLPREDNISOLONE ACETATE 80 MG: 80 INJECTION, SUSPENSION INTRA-ARTICULAR; INTRALESIONAL; INTRAMUSCULAR; SOFT TISSUE at 12:08

## 2019-09-05 NOTE — PROGRESS NOTES
Chief Complaint   Patient presents with   • Pain     1 mo f/u med refill     Subjective   Tricia Loyola is a 59 y.o. female who presents to the office for routine follow up of chronic pain, also reports suspected back injury of Thursday 8/29/19, followed by a fall over her dog in the home on Sunday 9/2/19, with acute exacerbation of chronic T spine pain after each event. Due to osteoporosis and known compression fx of T spine, worried for new, pathologic and/ or traumatic fracture of t spine. Pain of T spine is worse and requests a steroid injection today. Allergic to toradol.       The following portions of the patient's history were reviewed and updated as appropriate: allergies, current medications, past family history, past medical history, past social history, past surgical history and problem list.    History of Present Illness   UDS 6/3/19 appropriate for hydrocodone. PRN use clonazepam appropriately absent.   6/3/19 LFT  AST 57  Fasting labs 5/9/19  Lipids over due. Last done 3/5/18 trig 168  TSH normal, T3 normal T4 0.87  CBC normal  UA 3-5 RBC likely due to kidney stones  Lipase WNL  Mammogram: 4/4/19  Colonoscopy 11/26/18  Dexa scan 4/2/19  Tetanus due 5/10/26  Influenza vaccine due 8/1/19 or when available  Medicare AWV due 9/19/19       Pain   This is a chronic problem. Pain located mid upper back, constant.  See CC. The current episode started more than 1 year ago. The problem has been waxing and waning. Pertinent negatives include no chest pain, coughing, fever, nausea or rash. The symptoms are aggravated by bending, walking, twisting and standing. She has tried acetaminophen, lying down, NSAIDs, relaxation, position changes, rest, sleep, walking and oral narcotics for the symptoms. Ineffective treatment without hydrocodone, which has provided significant relief. Pain rated as 10/10 at this time, worse since Thurday 8/29/19. due for refill today.    Back pain:  Imaging in chart indicates mild DDD of  cspine . An ED record is on file from [facility] of 10/5/18. CT of T and L spine done on that day show stable Tspine with an old compression fracture deformity of superior endplate of T11. This is thought a result of chronic osteoporosis.  Mild posterior spurring of superior enplates at T2-3 and T5-6. L spine shows mild degenerative changes at L4-5 and L5-S1.      Past Medical History:   Diagnosis Date   • Abdominal pain    • Anemia    • Chronic post-traumatic headache      rebound      • Depressive disorder    • Encounter for gynecological examination    • Gastroesophageal reflux disease    • Generalized anxiety disorder    • History of mammogram 08/2008    MAMMOGRAM DIAGNOSTIC BILATERAL 17581 ([facility]) (1)     • Hyperlipidemia    • Ingrown toenail    • Injury of back    • Nonruptured cerebral aneurysm    • Osteoporosis    • Posttraumatic stress disorder    • Seizure (CMS/HCC)     Psychogenic non-epileptic sz    • Viral hepatitis A           Family History   Problem Relation Age of Onset   • Constipation Mother    • Hypertension Mother    • Anxiety disorder Mother    • Heart disease Mother    • Alcohol abuse Father    • Heart disease Father    • Anxiety disorder Brother    • Kidney disease Brother    • Hypertension Brother    • Arthritis Brother    • Anxiety disorder Brother    • Kidney disease Brother    • Diabetes Brother    • Anxiety disorder Brother    • Hypertension Brother    • Breast cancer Paternal Aunt    • Heart disease Maternal Grandmother    • Clotting disorder Maternal Grandmother    • Heart disease Maternal Grandfather         Review of Systems   Constitutional: Negative.  Negative for appetite change, chills, fatigue, fever and unexpected weight change.   HENT: Negative.  Negative for congestion, ear pain, rhinorrhea and sore throat.         Jaw pain   Eyes: Negative.  Negative for pain.   Respiratory: Negative.  Negative for cough, chest tightness and shortness of breath.    Cardiovascular: Negative.  Negative  "for chest pain and palpitations.   Gastrointestinal: Negative.  Negative for abdominal pain, constipation, diarrhea and nausea.   Endocrine: Negative.    Genitourinary: Negative.  Negative for dysuria.   Musculoskeletal: Positive for back pain. Negative for joint swelling and neck pain.   Skin: Negative.  Negative for color change, pallor, rash and wound.        Complaints of thinning skin, states easily torn and bruised.   Allergic/Immunologic: Negative.    Neurological: Negative.  Negative for dizziness and headaches.   Hematological: Negative.    Psychiatric/Behavioral: Negative.  Negative for confusion, decreased concentration, dysphoric mood, self-injury, sleep disturbance and suicidal ideas.   All other systems reviewed and are negative.      Objective   Vitals:    09/05/19 1040   BP: 116/70   BP Location: Left arm   Patient Position: Sitting   Cuff Size: Adult   Pulse: 87   Resp: 16   Temp: 97.3 °F (36.3 °C)   TempSrc: Tympanic   SpO2: 98%   Weight: 50.1 kg (110 lb 6.4 oz)   Height: 157.5 cm (62\")   PainSc: 10-Worst pain ever   PainLoc: Back     Physical Exam   Constitutional: She is oriented to person, place, and time. She appears well-developed and well-nourished.   HENT:   Head: Normocephalic and atraumatic.   Eyes: Conjunctivae are normal. Pupils are equal, round, and reactive to light.   Neck: Normal range of motion. Neck supple.   Cardiovascular: Normal rate, regular rhythm, normal heart sounds and intact distal pulses. Exam reveals no gallop and no friction rub.   No murmur heard.  Pulmonary/Chest: Effort normal and breath sounds normal. No respiratory distress. She has no wheezes. She has no rales. She exhibits no tenderness.   Abdominal: Soft. Bowel sounds are normal. A hernia is present.   Musculoskeletal: She exhibits no edema, tenderness or deformity.        Thoracic back: She exhibits bony tenderness and pain.        Lumbar back: She exhibits decreased range of motion, bony tenderness and pain. "        Back:    Lymphadenopathy:     She has no cervical adenopathy.   Neurological: She is alert and oriented to person, place, and time.   Skin: Skin is warm, dry and intact. Capillary refill takes 2 to 3 seconds. No erythema. No pallor.   Psychiatric: She has a normal mood and affect. Her behavior is normal. Judgment and thought content normal.   Nursing note and vitals reviewed.      Assessment/Plan   Tricia was seen today for pain.    Diagnoses and all orders for this visit:    Chronic midline thoracic back pain  Comments:  controlled with hydrocodone, refill due today  Orders:  -     HYDROcodone-acetaminophen (NORCO) 5-325 MG per tablet; Take 1 tablet by mouth Every 8 (Eight) Hours As Needed for Moderate Pain  or Severe Pain .  -     Cancel: XR Spine Thoracic 3 View    Compression fracture of vertebra (CMS/HCC)  Comments:  acute exacerbation of pain due to pathogenic fracture related to osteoporosis  Orders:  -     HYDROcodone-acetaminophen (NORCO) 5-325 MG per tablet; Take 1 tablet by mouth Every 8 (Eight) Hours As Needed for Moderate Pain  or Severe Pain .  -     Ambulatory Referral to [facility] For Infusion Treatment  -     zoledronic acid (RECLAST) infusion 5 mg  -     Cancel: XR Spine Thoracic 3 View  -     XR Spine Thoracic 3 View    Radiculopathy of thoracolumbar region  Comments:  acute exacerbation of chronic radiculopathy due to pathologic fracture of the spine  Orders:  -     HYDROcodone-acetaminophen (NORCO) 5-325 MG per tablet; Take 1 tablet by mouth Every 8 (Eight) Hours As Needed for Moderate Pain  or Severe Pain .    Age related osteoporosis, unspecified pathological fracture presence  -     Ambulatory Referral to [facility] For Infusion Treatment  -     zoledronic acid (RECLAST) infusion 5 mg  -     Comprehensive Metabolic Panel  -     Magnesium  -     Phosphorus  -     XR Spine Thoracic 3 View    History of pathological fracture due to osteoporosis  -     Ambulatory Referral to [facility] For Infusion  Treatment  -     zoledronic acid (RECLAST) infusion 5 mg  -     Comprehensive Metabolic Panel  -     Magnesium  -     Phosphorus  -     Cancel: XR Spine Thoracic 3 View  -     XR Spine Thoracic 3 View    Fall at home, initial encounter  Comments:  Fell last Thursday 8/29/19           PHQ-2/PHQ-9 Depression Screening 9/5/2019   Little interest or pleasure in doing things 0   Feeling down, depressed, or hopeless 0   Trouble falling or staying asleep, or sleeping too much 0   Feeling tired or having little energy 0   Poor appetite or overeating 0   Feeling bad about yourself - or that you are a failure or have let yourself or your family down 0   Trouble concentrating on things, such as reading the newspaper or watching television 0   Moving or speaking so slowly that other people could have noticed. Or the opposite - being so fidgety or restless that you have been moving around a lot more than usual 0   Thoughts that you would be better off dead, or of hurting yourself in some way 0   Total Score 0     Patient understands the risks associated with this controlled medication, including tolerance and addiction.  Patient also agrees to only obtain this medication from me, and not from a another provider, unless that provider is covering for me in my absence.  Patient also agrees to be compliant in dosing, and not self adjust the dose of medication.  A signed controlled substance agreement is on file, and the patient has received a controlled substance education sheet at this a previous visit.  The patient has also signed a consent for treatment with a controlled substance as per [facility] policy. KATE was obtained.    UMESH Guzmán         Return in about 4 weeks (around 10/3/2019).    There are no Patient Instructions on file for this visit.

## 2019-09-16 DIAGNOSIS — J40 BRONCHITIS: ICD-10-CM

## 2019-09-16 DIAGNOSIS — R05.9 COUGH: ICD-10-CM

## 2019-09-16 RX ORDER — ALBUTEROL SULFATE 1.25 MG/3ML
1 SOLUTION RESPIRATORY (INHALATION) EVERY 6 HOURS PRN
Qty: 120 VIAL | Refills: 1 | Status: SHIPPED | OUTPATIENT
Start: 2019-09-16 | End: 2019-10-09

## 2019-09-19 ENCOUNTER — OFFICE VISIT (OUTPATIENT)
Dept: FAMILY MEDICINE CLINIC | Facility: CLINIC | Age: 59
End: 2019-09-19

## 2019-09-19 VITALS
SYSTOLIC BLOOD PRESSURE: 133 MMHG | TEMPERATURE: 98.4 F | RESPIRATION RATE: 16 BRPM | OXYGEN SATURATION: 96 % | WEIGHT: 111.9 LBS | DIASTOLIC BLOOD PRESSURE: 80 MMHG | HEIGHT: 62 IN | BODY MASS INDEX: 20.59 KG/M2 | HEART RATE: 67 BPM

## 2019-09-19 DIAGNOSIS — R05.9 COUGH: ICD-10-CM

## 2019-09-19 DIAGNOSIS — J06.9 ACUTE URI: Primary | ICD-10-CM

## 2019-09-19 PROCEDURE — 99213 OFFICE O/P EST LOW 20 MIN: CPT | Performed by: NURSE PRACTITIONER

## 2019-09-19 RX ORDER — DEXTROMETHORPHAN HYDROBROMIDE AND PROMETHAZINE HYDROCHLORIDE 15; 6.25 MG/5ML; MG/5ML
SYRUP ORAL
Qty: 180 ML | Refills: 0 | Status: SHIPPED | OUTPATIENT
Start: 2019-09-19 | End: 2019-10-09

## 2019-09-19 RX ORDER — CEPHALEXIN 500 MG/1
500 CAPSULE ORAL 2 TIMES DAILY
Qty: 20 CAPSULE | Refills: 0 | Status: SHIPPED | OUTPATIENT
Start: 2019-09-19 | End: 2019-10-04

## 2019-09-19 NOTE — PROGRESS NOTES
Chief Complaint   Patient presents with   • Illness     cough     Subjective   Tricia Lyoola is a 59 y.o. female who presents to the office for unproductive cough that is keeping her up all night, chest congestion, thick green/yellow mucous from nose.    The following portions of the patient's history were reviewed and updated as appropriate: allergies, current medications, past family history, past medical history, past social history, past surgical history and problem list.    History of Present Illness     URI: Patient reports cough, congestion, and thick green/yellow mucous from nose that started last Thursday and has progressively gotten worse. She went to Urgent care on Sunday and was given ABX, albuterol nebulizer, methylprednisolone, and cough liquid-DM that made her sick. She is on her last day of antibiotics, has 2 more days of steroid, and continues on the nebulizer. She states that the cough is continually getting worse, it is keeping her up all night long, and her ribs and chest are extremely sore. Patient reports that she is increasing her fluid intake with no improvement in mucous drainage.       Past Medical History:   Diagnosis Date   • Abdominal pain    • Anemia    • Chronic post-traumatic headache      rebound      • Depressive disorder    • Encounter for gynecological examination    • Gastroesophageal reflux disease    • Generalized anxiety disorder    • History of mammogram 08/2008    MAMMOGRAM DIAGNOSTIC BILATERAL 97543 ([facility]) (1)     • Hyperlipidemia    • Ingrown toenail    • Injury of back    • Nonruptured cerebral aneurysm    • Osteoporosis    • Posttraumatic stress disorder    • Seizure (CMS/HCC)     Psychogenic non-epileptic sz    • Viral hepatitis A           Family History   Problem Relation Age of Onset   • Constipation Mother    • Hypertension Mother    • Anxiety disorder Mother    • Heart disease Mother    • Alcohol abuse Father    • Heart disease Father    • Anxiety disorder Brother  "   • Kidney disease Brother    • Hypertension Brother    • Arthritis Brother    • Anxiety disorder Brother    • Kidney disease Brother    • Diabetes Brother    • Anxiety disorder Brother    • Hypertension Brother    • Breast cancer Paternal Aunt    • Heart disease Maternal Grandmother    • Clotting disorder Maternal Grandmother    • Heart disease Maternal Grandfather         Review of Systems   Constitutional: Positive for activity change, appetite change and fatigue. Fever: at night, a little over 100.   HENT: Positive for congestion, ear pain (feels like pressure in ears), nosebleeds, rhinorrhea, sinus pressure, sinus pain and trouble swallowing. Negative for sneezing, sore throat and voice change.    Respiratory: Positive for cough, chest tightness, shortness of breath and wheezing.        Objective   Vitals:    09/19/19 1429   BP: 133/80   BP Location: Left arm   Patient Position: Sitting   Cuff Size: Adult   Pulse: 67   Resp: 16   Temp: 98.4 °F (36.9 °C)   TempSrc: Tympanic   SpO2: 96%   Weight: 50.8 kg (111 lb 14.4 oz)   Height: 157.5 cm (62\")   PainSc: 0-No pain     Physical Exam   Constitutional: She is oriented to person, place, and time. She appears well-developed and well-nourished.   HENT:   Head: Normocephalic and atraumatic.   Right Ear: External ear normal.   Left Ear: External ear normal.   Nose: Nose normal.   Mouth/Throat: Oropharynx is clear and moist.   Eyes: Conjunctivae are normal. Pupils are equal, round, and reactive to light.   Neck: Normal range of motion. Neck supple.   Cardiovascular: Normal rate, regular rhythm, normal heart sounds and intact distal pulses. Exam reveals no gallop and no friction rub.   No murmur heard.  Pulmonary/Chest: Effort normal and breath sounds normal. No respiratory distress. She has no wheezes. She has no rales. She exhibits no tenderness.   Abdominal: Soft. Bowel sounds are normal.   Musculoskeletal: Normal range of motion. She exhibits no edema, tenderness or " deformity.   Lymphadenopathy:     She has no cervical adenopathy.   Neurological: She is alert and oriented to person, place, and time.   Skin: Skin is warm and dry. Capillary refill takes 2 to 3 seconds. No erythema. No pallor.   Psychiatric: She has a normal mood and affect. Her behavior is normal. Judgment and thought content normal.   Nursing note and vitals reviewed.      Assessment/Plan   Tricia was seen today for illness.    Diagnoses and all orders for this visit:    Acute URI           PHQ-2/PHQ-9 Depression Screening 9/5/2019   Little interest or pleasure in doing things 0   Feeling down, depressed, or hopeless 0   Trouble falling or staying asleep, or sleeping too much 0   Feeling tired or having little energy 0   Poor appetite or overeating 0   Feeling bad about yourself - or that you are a failure or have let yourself or your family down 0   Trouble concentrating on things, such as reading the newspaper or watching television 0   Moving or speaking so slowly that other people could have noticed. Or the opposite - being so fidgety or restless that you have been moving around a lot more than usual 0   Thoughts that you would be better off dead, or of hurting yourself in some way 0   Total Score 0       Crystal L Ceron, APRN         No Follow-up on file.    There are no Patient Instructions on file for this visit.

## 2019-09-23 ENCOUNTER — OFFICE VISIT (OUTPATIENT)
Dept: FAMILY MEDICINE CLINIC | Facility: CLINIC | Age: 59
End: 2019-09-23

## 2019-09-23 VITALS
BODY MASS INDEX: 20.61 KG/M2 | DIASTOLIC BLOOD PRESSURE: 76 MMHG | TEMPERATURE: 99.7 F | OXYGEN SATURATION: 99 % | SYSTOLIC BLOOD PRESSURE: 128 MMHG | HEIGHT: 62 IN | RESPIRATION RATE: 18 BRPM | WEIGHT: 112 LBS | HEART RATE: 91 BPM

## 2019-09-23 DIAGNOSIS — J06.9 ACUTE URI: Primary | ICD-10-CM

## 2019-09-23 PROCEDURE — 96372 THER/PROPH/DIAG INJ SC/IM: CPT | Performed by: NURSE PRACTITIONER

## 2019-09-23 PROCEDURE — 99213 OFFICE O/P EST LOW 20 MIN: CPT | Performed by: NURSE PRACTITIONER

## 2019-09-23 RX ORDER — METHYLPREDNISOLONE ACETATE 80 MG/ML
80 INJECTION, SUSPENSION INTRA-ARTICULAR; INTRALESIONAL; INTRAMUSCULAR; SOFT TISSUE ONCE
Status: COMPLETED | OUTPATIENT
Start: 2019-09-23 | End: 2019-09-23

## 2019-09-23 RX ADMIN — METHYLPREDNISOLONE ACETATE 80 MG: 80 INJECTION, SUSPENSION INTRA-ARTICULAR; INTRALESIONAL; INTRAMUSCULAR; SOFT TISSUE at 16:22

## 2019-09-23 NOTE — PROGRESS NOTES
"Chief Complaint   Patient presents with   • URI     still not feeling well     Subjective   Tricia Loyola is a 59 y.o. female who presents to the office for productive cough, shortness of breath.    The following portions of the patient's history were reviewed and updated as appropriate: allergies, current medications, past family history, past medical history, past social history, past surgical history and problem list.    History of Present Illness     URI:  Seen in urgent care on 9/15, in this office on 9/19, and is back again today complaining of productive cough, with  thick, yellow sputum.  Reports fever at home was 100.1.  Reports using albuterol nebulizer three times daily, has been taking promethazine-DM which has helped some, but does make her somewhat dizzy.  Reports that she has increased her fluids. Denies nausea, vomiting, diarrhea. Reports good appetite. She has completed a course of azithromycin and  she started cephalexin two days ago.  She states that she feels no better and is short of breath. She is able to speak in full, lengthy sentences. She does not appear to be in significant distress, but does seem very anxious, stating \"I just know I'm going to die\" and \"This is bad\". Reassurance given.    Past Medical History:   Diagnosis Date   • Abdominal pain    • Anemia    • Chronic post-traumatic headache      rebound      • Depressive disorder    • Encounter for gynecological examination    • Gastroesophageal reflux disease    • Generalized anxiety disorder    • History of mammogram 08/2008    MAMMOGRAM DIAGNOSTIC BILATERAL 97566 ([facility]) (1)     • Hyperlipidemia    • Ingrown toenail    • Injury of back    • Nonruptured cerebral aneurysm    • Osteoporosis    • Posttraumatic stress disorder    • Seizure (CMS/HCC)     Psychogenic non-epileptic sz    • Viral hepatitis A           Family History   Problem Relation Age of Onset   • Constipation Mother    • Hypertension Mother    • Anxiety disorder Mother  " "  • Heart disease Mother    • Alcohol abuse Father    • Heart disease Father    • Anxiety disorder Brother    • Kidney disease Brother    • Hypertension Brother    • Arthritis Brother    • Anxiety disorder Brother    • Kidney disease Brother    • Diabetes Brother    • Anxiety disorder Brother    • Hypertension Brother    • Breast cancer Paternal Aunt    • Heart disease Maternal Grandmother    • Clotting disorder Maternal Grandmother    • Heart disease Maternal Grandfather         Review of Systems   Constitutional: Positive for fever (low-grade at night per patient). Negative for unexpected weight change.   HENT: Positive for congestion and rhinorrhea.    Eyes: Negative.    Respiratory: Positive for cough and shortness of breath. Negative for chest tightness.    Cardiovascular: Negative.  Negative for chest pain.   Gastrointestinal: Negative.  Negative for diarrhea and nausea.   Endocrine: Negative.    Genitourinary: Negative.  Negative for dysuria.   Musculoskeletal: Negative.    Skin: Negative.  Negative for color change, pallor, rash and wound.   Allergic/Immunologic: Negative.    Neurological: Negative.    Hematological: Negative.    Psychiatric/Behavioral: Negative.  Negative for sleep disturbance and suicidal ideas.       Objective   Vitals:    09/23/19 1522   BP: 128/76   BP Location: Left arm   Patient Position: Sitting   Cuff Size: Adult   Pulse: 91   Resp: 18   Temp: 99.7 °F (37.6 °C)   SpO2: 99%   Weight: 50.8 kg (112 lb)   Height: 157.5 cm (62.01\")   PainSc: 0-No pain     Physical Exam   Constitutional: She is oriented to person, place, and time. She appears well-developed and well-nourished.   HENT:   Head: Normocephalic and atraumatic.   Right Ear: External ear normal.   Left Ear: External ear normal.   Nose: Nose normal.   Mouth/Throat: Oropharynx is clear and moist.   Eyes: Conjunctivae are normal. Pupils are equal, round, and reactive to light.   Neck: Normal range of motion. Neck supple. "   Cardiovascular: Normal rate, regular rhythm, normal heart sounds and intact distal pulses. Exam reveals no gallop and no friction rub.   No murmur heard.  Pulmonary/Chest: Effort normal and breath sounds normal. No respiratory distress. She has no wheezes. She has no rales. She exhibits no tenderness.   Abdominal: Soft. Bowel sounds are normal.   Musculoskeletal: Normal range of motion. She exhibits no edema, tenderness or deformity.   Lymphadenopathy:     She has no cervical adenopathy.   Neurological: She is alert and oriented to person, place, and time.   Skin: Skin is warm and dry. Capillary refill takes 2 to 3 seconds. No erythema. No pallor.   Psychiatric: She has a normal mood and affect. Her behavior is normal. Judgment and thought content normal.   Nursing note and vitals reviewed.      Assessment/Plan   Tricia was seen today for uri.    Diagnoses and all orders for this visit:    Acute URI  -     methylPREDNISolone acetate (DEPO-medrol) injection 80 mg           PHQ-2/PHQ-9 Depression Screening 9/5/2019   Little interest or pleasure in doing things 0   Feeling down, depressed, or hopeless 0   Trouble falling or staying asleep, or sleeping too much 0   Feeling tired or having little energy 0   Poor appetite or overeating 0   Feeling bad about yourself - or that you are a failure or have let yourself or your family down 0   Trouble concentrating on things, such as reading the newspaper or watching television 0   Moving or speaking so slowly that other people could have noticed. Or the opposite - being so fidgety or restless that you have been moving around a lot more than usual 0   Thoughts that you would be better off dead, or of hurting yourself in some way 0   Total Score 0       Rosario CERVANTES Ceron, APRN         Return if symptoms worsen or fail to improve.    There are no Patient Instructions on file for this visit.

## 2019-09-26 DIAGNOSIS — R05.9 COUGH: Primary | ICD-10-CM

## 2019-09-27 ENCOUNTER — TELEPHONE (OUTPATIENT)
Dept: FAMILY MEDICINE CLINIC | Facility: CLINIC | Age: 59
End: 2019-09-27

## 2019-09-27 NOTE — TELEPHONE ENCOUNTER
----- Message from UMESH Arriaga sent at 9/26/2019  5:15 PM CDT -----  Notify pt of normal chest xray. edith

## 2019-10-04 ENCOUNTER — OFFICE VISIT (OUTPATIENT)
Dept: FAMILY MEDICINE CLINIC | Facility: CLINIC | Age: 59
End: 2019-10-04

## 2019-10-04 VITALS
SYSTOLIC BLOOD PRESSURE: 126 MMHG | BODY MASS INDEX: 20.8 KG/M2 | TEMPERATURE: 97.3 F | HEART RATE: 97 BPM | RESPIRATION RATE: 16 BRPM | OXYGEN SATURATION: 99 % | HEIGHT: 62 IN | WEIGHT: 113 LBS | DIASTOLIC BLOOD PRESSURE: 84 MMHG

## 2019-10-04 DIAGNOSIS — M48.50XA COMPRESSION FRACTURE OF VERTEBRA (HCC): ICD-10-CM

## 2019-10-04 DIAGNOSIS — F41.0 PANIC DISORDER WITHOUT AGORAPHOBIA: Chronic | ICD-10-CM

## 2019-10-04 DIAGNOSIS — J30.9 CHRONIC ALLERGIC RHINITIS: Primary | ICD-10-CM

## 2019-10-04 DIAGNOSIS — M54.6 CHRONIC MIDLINE THORACIC BACK PAIN: Chronic | ICD-10-CM

## 2019-10-04 DIAGNOSIS — M54.15 RADICULOPATHY OF THORACOLUMBAR REGION: Chronic | ICD-10-CM

## 2019-10-04 DIAGNOSIS — G89.29 CHRONIC MIDLINE THORACIC BACK PAIN: Chronic | ICD-10-CM

## 2019-10-04 DIAGNOSIS — E03.9 ACQUIRED HYPOTHYROIDISM: ICD-10-CM

## 2019-10-04 PROCEDURE — 99214 OFFICE O/P EST MOD 30 MIN: CPT | Performed by: NURSE PRACTITIONER

## 2019-10-04 RX ORDER — LEVOTHYROXINE SODIUM 0.03 MG/1
12.5 TABLET ORAL DAILY
Qty: 15 TABLET | Refills: 3 | Status: SHIPPED | OUTPATIENT
Start: 2019-10-04 | End: 2019-11-04 | Stop reason: SDUPTHER

## 2019-10-04 RX ORDER — HYDROCODONE BITARTRATE AND ACETAMINOPHEN 5; 325 MG/1; MG/1
1 TABLET ORAL EVERY 8 HOURS PRN
Qty: 90 TABLET | Refills: 0 | Status: SHIPPED | OUTPATIENT
Start: 2019-10-04 | End: 2019-11-04 | Stop reason: SDUPTHER

## 2019-10-04 RX ORDER — MONTELUKAST SODIUM 10 MG/1
10 TABLET ORAL NIGHTLY
Qty: 90 TABLET | Refills: 3 | Status: SHIPPED | OUTPATIENT
Start: 2019-10-04 | End: 2019-10-09

## 2019-10-04 RX ORDER — CLONAZEPAM 0.5 MG/1
0.5 TABLET ORAL DAILY PRN
Qty: 15 TABLET | Refills: 0 | Status: SHIPPED | OUTPATIENT
Start: 2019-10-04 | End: 2020-01-09 | Stop reason: SDUPTHER

## 2019-10-04 NOTE — PROGRESS NOTES
Chief Complaint   Patient presents with   • Pain     1 mo f/u med refills     Subjective   Tricia Loyola is a 59 y.o. female who presents to the office for chronic pain and anxiety. New complaint of continue rhinorrhea which will not improve with OTC antihistamines.    The following portions of the patient's history were reviewed and updated as appropriate: allergies, current medications, past family history, past medical history, past social history, past surgical history and problem list.    History of Present Illness   UDS 6/3/19 appropriate for hydrocodone. PRN use clonazepam appropriately absent.   6/3/19 LFT  AST 57  Fasting labs 5/9/19  Lipids over due. Last done 3/5/18 trig 168  TSH normal, T3 normal T4 0.87  CBC normal  UA 3-5 RBC likely due to kidney stones  Lipase WNL  Mammogram: 4/4/19  Colonoscopy 11/26/18  Dexa scan 4/2/19  Tetanus due 5/10/26  Influenza vaccine due 8/1/19 or when available  Medicare AWV due 9/19/19      allergic rhinitis: chronic. Ongoing rhinorrhea, watery, despite clearing of the URI and signs of illness.  Pain   This is a chronic problem. Pain located mid upper back, constant.  See CC. The current episode started more than 1 year ago. The problem has been waxing and waning. Pertinent negatives include no chest pain, coughing, fever, nausea or rash. The symptoms are aggravated by bending, walking, twisting and standing. She has tried acetaminophen, lying down, NSAIDs, relaxation, position changes, rest, sleep, walking and oral narcotics for the symptoms. Ineffective treatment without hydrocodone, which has provided significant relief. Pain rated as 10/10 at this time, worse since Thurday 8/29/19. due for refill today.    Back pain:  Imaging in chart indicates mild DDD of cspine . An ED record is on file from [facility] of 10/5/18. CT of T and L spine done on that day show stable Tspine with an old compression fracture deformity of superior endplate of T11. This is thought a result of  chronic osteoporosis.  Mild posterior spurring of superior endplates at T2-3 and T5-6. L spine shows mild degenerative changes at L4-5 and L5-S1.      Past Medical History:   Diagnosis Date   • Abdominal pain    • Anemia    • Chronic post-traumatic headache      rebound      • Depressive disorder    • Encounter for gynecological examination    • Gastroesophageal reflux disease    • Generalized anxiety disorder    • History of mammogram 08/2008    MAMMOGRAM DIAGNOSTIC BILATERAL 92504 ([facility]) (1)     • Hyperlipidemia    • Ingrown toenail    • Injury of back    • Nonruptured cerebral aneurysm    • Osteoporosis    • Posttraumatic stress disorder    • Seizure (CMS/HCC)     Psychogenic non-epileptic sz    • Viral hepatitis A           Family History   Problem Relation Age of Onset   • Constipation Mother    • Hypertension Mother    • Anxiety disorder Mother    • Heart disease Mother    • Alcohol abuse Father    • Heart disease Father    • Anxiety disorder Brother    • Kidney disease Brother    • Hypertension Brother    • Arthritis Brother    • Anxiety disorder Brother    • Kidney disease Brother    • Diabetes Brother    • Anxiety disorder Brother    • Hypertension Brother    • Breast cancer Paternal Aunt    • Heart disease Maternal Grandmother    • Clotting disorder Maternal Grandmother    • Heart disease Maternal Grandfather         Review of Systems   Constitutional: Negative for fever and unexpected weight change.   HENT: Positive for postnasal drip and rhinorrhea. Negative for congestion, sinus pressure, sinus pain and sneezing.    Eyes: Negative.    Respiratory: Negative for cough, chest tightness and shortness of breath.    Cardiovascular: Negative.  Negative for chest pain.   Gastrointestinal: Negative.  Negative for diarrhea and nausea.   Endocrine: Negative.    Genitourinary: Negative.  Negative for dysuria.   Musculoskeletal: Positive for arthralgias and back pain.   Skin: Negative.  Negative for color change,  "pallor, rash and wound.   Allergic/Immunologic: Negative.    Neurological: Negative.    Hematological: Negative.    Psychiatric/Behavioral: Negative.  Negative for sleep disturbance and suicidal ideas.   All other systems reviewed and are negative.      Objective   Vitals:    10/04/19 1052   BP: 126/84   BP Location: Left arm   Patient Position: Sitting   Cuff Size: Adult   Pulse: 97   Resp: 16   Temp: 97.3 °F (36.3 °C)   TempSrc: Tympanic   SpO2: 99%   Weight: 51.3 kg (113 lb)   Height: 157.5 cm (62.01\")   PainSc: 0-No pain     Physical Exam   Constitutional: She is oriented to person, place, and time. She appears well-developed and well-nourished.   HENT:   Head: Normocephalic and atraumatic.   Right Ear: External ear normal.   Left Ear: External ear normal.   Nose: Nose normal.   Mouth/Throat: Oropharynx is clear and moist.   Eyes: Conjunctivae are normal. Pupils are equal, round, and reactive to light.   Neck: Normal range of motion. Neck supple.   Cardiovascular: Normal rate, regular rhythm, normal heart sounds and intact distal pulses. Exam reveals no gallop and no friction rub.   No murmur heard.  Pulmonary/Chest: Effort normal and breath sounds normal. No respiratory distress. She has no wheezes. She has no rales. She exhibits no tenderness.   Abdominal: Soft. Bowel sounds are normal.   Musculoskeletal: Normal range of motion. She exhibits no edema, tenderness or deformity.   Lymphadenopathy:     She has no cervical adenopathy.   Neurological: She is alert and oriented to person, place, and time.   Skin: Skin is warm and dry. Capillary refill takes 2 to 3 seconds. No erythema. No pallor.   Psychiatric: She has a normal mood and affect. Her behavior is normal. Judgment and thought content normal.   Nursing note and vitals reviewed.      Assessment/Plan   Tricia was seen today for pain.    Diagnoses and all orders for this visit:    Chronic allergic rhinitis  -     montelukast (SINGULAIR) 10 MG tablet; Take 1 " tablet by mouth Every Night.    Compression fracture of vertebra (CMS/HCC)  Comments:  acute exacerbation of pain due to pathogenic fracture related to osteoporosis  Orders:  -     HYDROcodone-acetaminophen (NORCO) 5-325 MG per tablet; Take 1 tablet by mouth Every 8 (Eight) Hours As Needed for Moderate Pain  or Severe Pain .    Radiculopathy of thoracolumbar region  Comments:  acute exacerbation of chronic radiculopathy due to pathologic fracture of the spine  Orders:  -     HYDROcodone-acetaminophen (NORCO) 5-325 MG per tablet; Take 1 tablet by mouth Every 8 (Eight) Hours As Needed for Moderate Pain  or Severe Pain .    Chronic midline thoracic back pain  Comments:  controlled with hydrocodone, refill due today  Orders:  -     HYDROcodone-acetaminophen (NORCO) 5-325 MG per tablet; Take 1 tablet by mouth Every 8 (Eight) Hours As Needed for Moderate Pain  or Severe Pain .    Panic disorder without agoraphobia  -     clonazePAM (KLONOPIN) 0.5 MG tablet; Take 1 tablet by mouth Daily As Needed for Anxiety (panic attacks).    Acquired hypothyroidism  -     levothyroxine (SYNTHROID, LEVOTHROID) 25 MCG tablet; Take 0.5 tablets by mouth Daily.           PHQ-2/PHQ-9 Depression Screening 9/5/2019   Little interest or pleasure in doing things 0   Feeling down, depressed, or hopeless 0   Trouble falling or staying asleep, or sleeping too much 0   Feeling tired or having little energy 0   Poor appetite or overeating 0   Feeling bad about yourself - or that you are a failure or have let yourself or your family down 0   Trouble concentrating on things, such as reading the newspaper or watching television 0   Moving or speaking so slowly that other people could have noticed. Or the opposite - being so fidgety or restless that you have been moving around a lot more than usual 0   Thoughts that you would be better off dead, or of hurting yourself in some way 0   Total Score 0       UMESH Guzmán         Return in about 3  months (around 1/4/2020).    There are no Patient Instructions on file for this visit.

## 2019-10-16 ENCOUNTER — ANESTHESIA EVENT (OUTPATIENT)
Dept: GASTROENTEROLOGY | Facility: HOSPITAL | Age: 59
End: 2019-10-16

## 2019-10-16 ENCOUNTER — ANESTHESIA (OUTPATIENT)
Dept: GASTROENTEROLOGY | Facility: HOSPITAL | Age: 59
End: 2019-10-16

## 2019-10-16 ENCOUNTER — HOSPITAL ENCOUNTER (OUTPATIENT)
Facility: HOSPITAL | Age: 59
Setting detail: HOSPITAL OUTPATIENT SURGERY
Discharge: HOME | End: 2019-10-16
Attending: INTERNAL MEDICINE | Admitting: INTERNAL MEDICINE

## 2019-10-16 VITALS
RESPIRATION RATE: 17 BRPM | DIASTOLIC BLOOD PRESSURE: 58 MMHG | OXYGEN SATURATION: 99 % | HEART RATE: 63 BPM | WEIGHT: 114 LBS | HEIGHT: 62 IN | BODY MASS INDEX: 20.98 KG/M2 | TEMPERATURE: 96.9 F | SYSTOLIC BLOOD PRESSURE: 102 MMHG

## 2019-10-16 DIAGNOSIS — R10.10 PAIN OF UPPER ABDOMEN: ICD-10-CM

## 2019-10-16 DIAGNOSIS — K74.00 HEPATIC FIBROSIS: ICD-10-CM

## 2019-10-16 DIAGNOSIS — R11.0 NAUSEA: ICD-10-CM

## 2019-10-16 DIAGNOSIS — R74.8 ELEVATED LIVER ENZYMES: ICD-10-CM

## 2019-10-16 PROCEDURE — 25010000002 PROPOFOL 10 MG/ML EMULSION: Performed by: NURSE ANESTHETIST, CERTIFIED REGISTERED

## 2019-10-16 PROCEDURE — 25010000002 MIDAZOLAM PER 1 MG: Performed by: NURSE ANESTHETIST, CERTIFIED REGISTERED

## 2019-10-16 PROCEDURE — 88305 TISSUE EXAM BY PATHOLOGIST: CPT | Performed by: INTERNAL MEDICINE

## 2019-10-16 PROCEDURE — 88305 TISSUE EXAM BY PATHOLOGIST: CPT | Performed by: PATHOLOGY

## 2019-10-16 PROCEDURE — 25010000002 ONDANSETRON PER 1 MG: Performed by: NURSE ANESTHETIST, CERTIFIED REGISTERED

## 2019-10-16 PROCEDURE — 43239 EGD BIOPSY SINGLE/MULTIPLE: CPT | Performed by: INTERNAL MEDICINE

## 2019-10-16 RX ORDER — DEXTROSE AND SODIUM CHLORIDE 5; .45 G/100ML; G/100ML
30 INJECTION, SOLUTION INTRAVENOUS CONTINUOUS PRN
Status: DISCONTINUED | OUTPATIENT
Start: 2019-10-16 | End: 2019-10-16 | Stop reason: HOSPADM

## 2019-10-16 RX ORDER — PROMETHAZINE HYDROCHLORIDE 25 MG/ML
12.5 INJECTION, SOLUTION INTRAMUSCULAR; INTRAVENOUS ONCE AS NEEDED
Status: DISCONTINUED | OUTPATIENT
Start: 2019-10-16 | End: 2019-10-16 | Stop reason: HOSPADM

## 2019-10-16 RX ORDER — LIDOCAINE HYDROCHLORIDE 20 MG/ML
INJECTION, SOLUTION EPIDURAL; INFILTRATION; INTRACAUDAL; PERINEURAL AS NEEDED
Status: DISCONTINUED | OUTPATIENT
Start: 2019-10-16 | End: 2019-10-16 | Stop reason: SURG

## 2019-10-16 RX ORDER — PROMETHAZINE HYDROCHLORIDE 25 MG/1
25 SUPPOSITORY RECTAL ONCE AS NEEDED
Status: DISCONTINUED | OUTPATIENT
Start: 2019-10-16 | End: 2019-10-16 | Stop reason: SDUPTHER

## 2019-10-16 RX ORDER — ONDANSETRON 2 MG/ML
4 INJECTION INTRAMUSCULAR; INTRAVENOUS ONCE AS NEEDED
Status: COMPLETED | OUTPATIENT
Start: 2019-10-16 | End: 2019-10-16

## 2019-10-16 RX ORDER — ONDANSETRON 2 MG/ML
4 INJECTION INTRAMUSCULAR; INTRAVENOUS ONCE AS NEEDED
Status: DISCONTINUED | OUTPATIENT
Start: 2019-10-16 | End: 2019-10-16 | Stop reason: SDUPTHER

## 2019-10-16 RX ORDER — MIDAZOLAM HYDROCHLORIDE 1 MG/ML
INJECTION INTRAMUSCULAR; INTRAVENOUS AS NEEDED
Status: DISCONTINUED | OUTPATIENT
Start: 2019-10-16 | End: 2019-10-16 | Stop reason: SURG

## 2019-10-16 RX ORDER — PROMETHAZINE HYDROCHLORIDE 25 MG/1
25 TABLET ORAL ONCE AS NEEDED
Status: DISCONTINUED | OUTPATIENT
Start: 2019-10-16 | End: 2019-10-16 | Stop reason: SDUPTHER

## 2019-10-16 RX ORDER — PROMETHAZINE HYDROCHLORIDE 25 MG/1
25 TABLET ORAL ONCE AS NEEDED
Status: DISCONTINUED | OUTPATIENT
Start: 2019-10-16 | End: 2019-10-16 | Stop reason: HOSPADM

## 2019-10-16 RX ORDER — PROMETHAZINE HYDROCHLORIDE 25 MG/ML
12.5 INJECTION, SOLUTION INTRAMUSCULAR; INTRAVENOUS ONCE AS NEEDED
Status: DISCONTINUED | OUTPATIENT
Start: 2019-10-16 | End: 2019-10-16 | Stop reason: SDUPTHER

## 2019-10-16 RX ORDER — PROPOFOL 10 MG/ML
VIAL (ML) INTRAVENOUS AS NEEDED
Status: DISCONTINUED | OUTPATIENT
Start: 2019-10-16 | End: 2019-10-16 | Stop reason: SURG

## 2019-10-16 RX ORDER — PROMETHAZINE HYDROCHLORIDE 25 MG/1
25 SUPPOSITORY RECTAL ONCE AS NEEDED
Status: DISCONTINUED | OUTPATIENT
Start: 2019-10-16 | End: 2019-10-16 | Stop reason: HOSPADM

## 2019-10-16 RX ORDER — ONDANSETRON 2 MG/ML
4 INJECTION INTRAMUSCULAR; INTRAVENOUS ONCE
Status: COMPLETED | OUTPATIENT
Start: 2019-10-16 | End: 2019-10-16

## 2019-10-16 RX ADMIN — ONDANSETRON 4 MG: 2 INJECTION INTRAMUSCULAR; INTRAVENOUS at 13:16

## 2019-10-16 RX ADMIN — MIDAZOLAM HYDROCHLORIDE 2 MG: 2 INJECTION, SOLUTION INTRAMUSCULAR; INTRAVENOUS at 13:45

## 2019-10-16 RX ADMIN — ONDANSETRON 4 MG: 2 INJECTION INTRAMUSCULAR; INTRAVENOUS at 13:40

## 2019-10-16 RX ADMIN — LIDOCAINE HYDROCHLORIDE 50 MG: 20 INJECTION, SOLUTION EPIDURAL; INFILTRATION; INTRACAUDAL; PERINEURAL at 13:48

## 2019-10-16 RX ADMIN — DEXTROSE AND SODIUM CHLORIDE 30 ML/HR: 5; 450 INJECTION, SOLUTION INTRAVENOUS at 13:11

## 2019-10-16 RX ADMIN — PROPOFOL 100 MG: 10 INJECTION, EMULSION INTRAVENOUS at 13:48

## 2019-10-16 NOTE — ANESTHESIA POSTPROCEDURE EVALUATION
Patient: Tricia Loyola    Procedure Summary     Date:  10/16/19 Room / Location:  [facility] ENDOSCOPY 1 / [facility] ENDOSCOPY    Anesthesia Start:  1345 Anesthesia Stop:  1356    Procedure:  ESOPHAGOGASTRODUODENOSCOPY (N/A ) Diagnosis:       Elevated liver enzymes      Pain of upper abdomen      Hepatic fibrosis      Nausea      (Elevated liver enzymes [R74.8])      (Pain of upper abdomen [R10.10])      (Hepatic fibrosis [K74.0])      (Nausea [R11.0])    Surgeon:  Kory Muhammad MD Provider:  Leonardo Reynolds CRNA    Anesthesia Type:  MAC ASA Status:  3          Anesthesia Type: MAC  Last vitals  BP   140/66 (10/16/19 1300)   Temp   97.6 °F (36.4 °C) (10/16/19 1300)   Pulse       Resp   16 (10/16/19 1300)     SpO2         Post Anesthesia Care and Evaluation    Patient location during evaluation: bedside  Patient participation: complete - patient participated  Level of consciousness: awake and alert  Pain score: 1  Pain management: adequate  Airway patency: patent  Anesthetic complications: No anesthetic complications  PONV Status: none  Cardiovascular status: acceptable  Respiratory status: acceptable  Hydration status: acceptable  Post Neuraxial Block status: Motor and sensory function returned to baseline

## 2019-10-16 NOTE — H&P
No chief complaint on file.      ENDO PROCEDURE ORDERED: EGDwbiopsies, gerd, abd pain and burning    Subjective    Tricia Loyola is a 59 y.o. female. she is here today for follow-up.    History of Present Illness    The patient was seen on a recheck of her elevated liver enzymes, GERD, hepatic fibrosis.  Last seen 08/06/2019.  She had a previous normal liver ultrasound post cholecystectomy on 08/01/2019.  She is having a lot of burning in her stomach despite taking Dexilant, compazine, Zantac.  She complains of a lot of nausea, but denied regurgitation or dysphagia.  Bowel movements are regular without blood or mucus.  Weight is up 1 pound since last visit.  Last colonoscopy by Dr. Mcintyre showed tubular adenoma on 11/26/2018.    Laboratories on 08/28/2019 showed normal YUKO, AMA, ceruloplasmin, SMA, alpha 1 antitrypsin, AFP.  Iron studies showed slightly decreased IVC and transferring.  BOLDEN Fibrosure 0.25/F0-F1, steatosis 0.34/S0-S1, 0.25/N0.  Cholesterol 202.  AST 54, triglycerides 297.    ASSESSMENT/PLAN:  Patient with nausea, abdominal pain, possible H. pylori.  I recommended she avoid gastric irritants.  Given her persistent complaints, recommended EGD with biopsy to evaluate.  Would consider pancreatic evaluation.  Would consider further imaging if her symptoms persist.  Her weight has been stable.  We will plan further pending clinical course and the results of the above.       The following portions of the patient's history were reviewed and updated as appropriate:   Past Medical History:   Diagnosis Date   • Abdominal pain    • Anemia    • Chronic post-traumatic headache      rebound      • Depressive disorder    • Encounter for gynecological examination    • Gastroesophageal reflux disease    • Generalized anxiety disorder    • History of mammogram 08/2008    MAMMOGRAM DIAGNOSTIC BILATERAL 21449 ([facility]) (1)     • Hyperlipidemia    • Ingrown toenail    • Injury of back    • Nonruptured cerebral aneurysm     • Osteoporosis    • Posttraumatic stress disorder    • Seizure (CMS/HCC)     Psychogenic non-epileptic sz    • Viral hepatitis A      Past Surgical History:   Procedure Laterality Date   • APPENDECTOMY     • BREAST BIOPSY Left    • CHOLECYSTECTOMY     • COLONOSCOPY N/A 2018    Procedure: COLONOSCOPY;  Surgeon: Meet Mcintyre MD;  Location: [facility] ENDOSCOPY;  Service: General   • CRANIOTOMY FOR ANEURYSM     • PAP SMEAR  2012   • TONSILLECTOMY       Family History   Problem Relation Age of Onset   • Constipation Mother    • Hypertension Mother    • Anxiety disorder Mother    • Heart disease Mother    • Alcohol abuse Father    • Heart disease Father    • Anxiety disorder Brother    • Kidney disease Brother    • Hypertension Brother    • Arthritis Brother    • Anxiety disorder Brother    • Kidney disease Brother    • Diabetes Brother    • Anxiety disorder Brother    • Hypertension Brother    • Breast cancer Paternal Aunt    • Heart disease Maternal Grandmother    • Clotting disorder Maternal Grandmother    • Heart disease Maternal Grandfather      OB History      Para Term  AB Living    3 2 2   1 2    SAB TAB Ectopic Molar Multiple Live Births    1                  Allergies   Allergen Reactions   • Nitrofuran Derivatives Hives     Macrobid   • Augmentin [Amoxicillin-Pot Clavulanate]      Hives   • Ciprofloxacin      Cipro:  Rash   • Fiorinal [Butalbital-Aspirin-Caffeine]      Rapid heart rate   • Imitrex [Sumatriptan]      Rapid heart rate   • Iodine      Anaphylaxis   • Other      MIDRIN  -  Rapid heart rate   • Toradol [Ketorolac Tromethamine]      Anaphylaxis   • Ultram [Tramadol]      Rapid heart rate   • Ibuprofen Rash     Social History     Socioeconomic History   • Marital status: Legally      Spouse name: Not on file   • Number of children: Not on file   • Years of education: Not on file   • Highest education level: Not on file   Tobacco Use   • Smoking status: Light  Tobacco Smoker     Packs/day: 0.50     Years: 43.00     Pack years: 21.50     Types: Cigarettes   • Smokeless tobacco: Never Used   Substance and Sexual Activity   • Alcohol use: No   • Drug use: No   • Sexual activity: No     Current Medications:  Prior to Admission medications    Medication Sig Start Date End Date Taking? Authorizing Provider   albuterol sulfate HFA (VENTOLIN HFA) 108 (90 Base) MCG/ACT inhaler Inhale 2 puffs Every 6 (Six) Hours As Needed for Wheezing or Shortness of Air. 6/6/19  Yes Rosario Ceron APRN   amitriptyline (ELAVIL) 75 MG tablet TAKE ONE TABLET BY MOUTH AT BEDTIME. 1/29/18  Yes Sonia Mata MD   atorvastatin (LIPITOR) 20 MG tablet Take 1 tablet by mouth Daily. 6/3/19  Yes Rosario Ceron APRN   busPIRone (BUSPAR) 30 MG tablet Take 1 tablet by mouth 2 (Two) Times a Day. 3/7/18  Yes Sonia Mata MD   clonazePAM (KLONOPIN) 0.5 MG tablet Take 1 tablet by mouth Daily As Needed for Anxiety (panic attacks). 7/8/19  Yes Rosario Ceron APRN   cyclobenzaprine (FLEXERIL) 10 MG tablet Take 1 tablet by mouth 3 (Three) Times a Day As Needed for Muscle Spasms. 1/7/19  Yes Sonia Mata MD   dexlansoprazole (DEXILANT) 60 MG capsule Take 1 capsule by mouth Daily. 3/8/19  Yes Rosario Ceron APRN   HYDROcodone-acetaminophen (NORCO) 5-325 MG per tablet Take 1 tablet by mouth Every 6 (Six) Hours As Needed for Moderate Pain  or Severe Pain . Fill when due 8/6/19  Yes Rosario Ceron APRN   levothyroxine (SYNTHROID, LEVOTHROID) 25 MCG tablet Take 0.5 tablets by mouth Daily. 8/6/19  Yes Rosario Ceron APRN   lidocaine (XYLOCAINE) 2 % jelly Apply  topically to the appropriate area as directed 2 (Two) Times a Day. 4/4/19  Yes Rosario Ceron APRN   Melatonin 10 MG tablet Take 20 mg by mouth Every Night.   Yes Provider, MD Doris   metoprolol succinate XL (TOPROL-XL) 50 MG 24 hr tablet Take 1 tablet by mouth Daily. 4/29/19  Yes Rosario Ceron,  "UMESH   Nerve Stimulator (TENS THERAPY REPLACE BODY PADS) misc Apply 3 each topically to the appropriate area as directed As Needed (loose, dry or soiled pad). 4/4/19  Yes Rosario Ceron APRN   ondansetron (ZOFRAN) 4 MG tablet Take 1 tablet by mouth Every 8 (Eight) Hours As Needed for Nausea or Vomiting. 6/22/18  Yes Sonia Mata MD   OXcarbazepine (TRILEPTAL) 150 MG tablet Take 150 mg by mouth Daily. Daily at bedtime   Yes ProviderDoris MD   prochlorperazine (COMPAZINE) 10 MG tablet Take 1 tablet by mouth Every 6 (Six) Hours As Needed for Nausea or Vomiting. 10/19/18  Yes Sonia Mata MD   raNITIdine (ZANTAC) 150 MG tablet take 1 tablet by mouth once daily 4/1/19  Yes Rosario Ceron APRN   rOPINIRole (REQUIP) 3 MG tablet Take 1 tablet by mouth Every Night. Dose increase 7/8/19 may fill today 7/8/19  Yes Rosario Ceron APRN   topiramate (TOPAMAX) 50 MG tablet Take 50 mg by mouth 2 (Two) Times a Day.   Yes Provider, MD Doris   Nerve Stimulator (TENS THERAPY PAIN RELIEF) device 1 each 1 (One) Time for 1 dose. For use as prescribed for back pain 4/4/19 4/4/19  Rosario Ceron APRN   cephalexin (KEFLEX) 250 MG capsule Take 1 capsule by mouth 2 (Two) Times a Day. 4/23/19 9/3/19  Rosario Ceron APRN   doxycycline (MONODOX) 100 MG capsule Take 1 capsule by mouth 2 (Two) Times a Day. 5/10/19 9/3/19  Jonathon Miranda MD     Review of Systems  Review of Systems   Gastrointestinal: Positive for abdominal distention, abdominal pain and nausea. Negative for anal bleeding, blood in stool, constipation, diarrhea, rectal pain and vomiting.          Objective    /66   Temp 97.6 °F (36.4 °C)   Resp 16   Ht 157.5 cm (62.01\")   Wt 51.7 kg (114 lb)   BMI 20.85 kg/m²   Physical Exam   Constitutional: She is oriented to person, place, and time. She appears well-developed and well-nourished. No distress.   HENT:   Head: Normocephalic and atraumatic.   Eyes: EOM are normal. " Pupils are equal, round, and reactive to light.   Neck: Normal range of motion.   Cardiovascular: Normal rate, regular rhythm and normal heart sounds.   Pulmonary/Chest: Effort normal and breath sounds normal.   Abdominal: Soft. Bowel sounds are normal. She exhibits no shifting dullness, no distension, no abdominal bruit, no ascites and no mass. There is no hepatosplenomegaly. There is tenderness. There is no rigidity, no rebound, no guarding and no CVA tenderness. No hernia. Hernia confirmed negative in the ventral area.   mild   Musculoskeletal: Normal range of motion.   Neurological: She is alert and oriented to person, place, and time.   Skin: Skin is warm and dry.   Psychiatric: She has a normal mood and affect. Her behavior is normal. Judgment and thought content normal.   Nursing note and vitals reviewed.    Assessment/Plan      1. Elevated liver enzymes    2. Pain of upper abdomen    3. Hepatic fibrosis     4. Nausea    .   Tricia was seen today for elevated hepatic enzymes and hepatic fibrosis.    Diagnoses and all orders for this visit:    Elevated liver enzymes  -     Case Request; Standing  -     dextrose 5 % and sodium chloride 0.45 % infusion  -     Case Request    Pain of upper abdomen  -     Case Request; Standing  -     dextrose 5 % and sodium chloride 0.45 % infusion  -     Case Request    Hepatic fibrosis   -     Case Request; Standing  -     dextrose 5 % and sodium chloride 0.45 % infusion  -     Case Request    Nausea  -     Case Request; Standing  -     dextrose 5 % and sodium chloride 0.45 % infusion  -     Case Request    Other orders  -     Follow Anesthesia Guidelines / Standing Orders; Future  -     Obtain Informed Consent; Future  -     Obtain Informed Consent; Standing  -     POC Glucose Once; Standing        Orders placed during this encounter include:  Orders Placed This Encounter   Procedures   • Obtain Informed Consent     Standing Status:   Standing     Number of Occurrences:   1      Order Specific Question:   Informed Consent Given For     Answer:   ESOPHAGOGASTRODUODENOSCOPY   • POC Glucose Once     Prior to Procedure on ALL Diabetic Patients     Standing Status:   Standing     Number of Occurrences:   1   • Insert Peripheral IV     Standing Status:   Standing     Number of Occurrences:   1       Medications prescribed:  New Medications Ordered This Visit   Medications   • dextrose 5 % and sodium chloride 0.45 % infusion   • ondansetron (ZOFRAN) injection 4 mg     Discontinued Medications       Reason for Discontinue    cephalexin (KEFLEX) 250 MG capsule *Therapy completed    doxycycline (MONODOX) 100 MG capsule *Therapy completed        Requested Prescriptions      No prescriptions requested or ordered in this encounter       Review and/or summary of lab tests, radiology, procedures, medications. Review and summary of old records and obtaining of history. The risks and benefits of my recommendations, as well as other treatment options were discussed with the patient today. Questions were answered.    Follow-up: No Follow-up on file.     ESOPHAGOGASTRODUODENOSCOPY (N/A)      This document has been electronically signed by Kory Muhammad MD on October 16, 2019 1:17 PM      Results for orders placed or performed in visit on 08/06/19   BOLDEN Fibrosure   Result Value Ref Range    Fibrosis Score (References) 0.25 (H) 0.00 - 0.21    Fibrosis Stage (Reference) F0-F1     Steatosis Score (Reference) 0.34 (H) 0.00 - 0.30    Steatosis Grade (Reference) Comment     BOLDEN Score (Reference) 0.25 0.25    Bolden Grade (Reference) Comment     Height: (Reference) 62 in    Weight: (Reference) 109 LBS    Alpha 2-Macroglobulins, Qn 266 110 - 276 mg/dL    Haptoglobin 97 34 - 200 mg/dL    Apolipoprotein A-1 120 116 - 209 mg/dL    Total Bilirubin 0.2 0.0 - 1.2 mg/dL    GGT 19 0 - 60 IU/L    ALT (SGPT) 17 0 - 40 IU/L    AST (SGOT) P5P (Reference) 54 (H) 0 - 40 IU/L    Cholesterol, Total (Reference) 202 (H) 100 - 199  mg/dL    Glucose, Serum (Reference) 98 65 - 99 mg/dL    Triglycerides 297 (H) 0 - 149 mg/dL    Interpretations: (Reference) Comment     Fibrosis Scoring: Comment     Steatosis Grading (Reference) Comment     Bradley Scoring (Reference) Comment     Limitations: (Reference) Comment     Comment (Reference) Comment    Nuclear Antigen Antibody, IFA   Result Value Ref Range    YUKO Negative    Iron and TIBC   Result Value Ref Range    Iron 89 37 - 145 mcg/dL    Iron Saturation 31 20 - 50 %    Transferrin 194 (L) 200 - 360 mg/dL    TIBC 289 (L) 298 - 536 mcg/dL   Alpha - 1 - Antitrypsin   Result Value Ref Range    ALPHA -1 ANTITRYPSIN 146 90 - 200 mg/dL   Mitochondrial Antibodies, M2   Result Value Ref Range    Mitochondrial Ab <20.0 0.0 - 20.0 Units   Ceruloplasmin   Result Value Ref Range    Ceruloplasmin 23 19 - 39 mg/dL   AFP Tumor Marker   Result Value Ref Range    ALPHA-FETOPROTEIN 1.49 0 - 8.3 ng/mL   Anti-Smooth Muscle Antibody Titer   Result Value Ref Range    Smooth Muscle Ab 3 0 - 19 Units   Ferritin   Result Value Ref Range    Ferritin 71.83 13.00 - 150.00 ng/mL   Results for orders placed or performed in visit on 08/02/19   Cortisol - AM   Result Value Ref Range    Cortisol - AM 18.83 mcg/dL   Osmolality, Urine - Urine, Clean Catch   Result Value Ref Range    Osmolality, Urine 578 mOsm/kg   Prolactin   Result Value Ref Range    Prolactin 9.27 4.79 - 23.30 ng/mL   Insulin-like Growth Factor   Result Value Ref Range    Insulin-Like Growth Factor-1 119 42 - 169 ng/mL   Estradiol   Result Value Ref Range    Estradiol <5.0 pg/mL   ACTH   Result Value Ref Range    ACTH 32.1 7.2 - 63.3 pg/mL   Results for orders placed or performed in visit on 07/29/19   Testosterone (Free & Total), LC / MS   Result Value Ref Range    Testosterone, Total 15.9 7.0 - 40.0 ng/dL    Testosterone, Free 0.8 0.0 - 4.2 pg/mL   Thyroid Peroxidase Antibody   Result Value Ref Range    Thyroid Peroxidase Antibody 16 0 - 34 IU/mL   Anti-Thyroglobulin  Antibody   Result Value Ref Range    Thyroglobulin Ab <1.0 0.0 - 0.9 IU/mL   T3   Result Value Ref Range    T3, Total 103.0 80.0 - 200.0 ng/dl   TSH   Result Value Ref Range    TSH 2.480 0.270 - 4.200 mIU/mL   T4, Free   Result Value Ref Range    Free T4 0.90 (L) 0.93 - 1.70 ng/dL   Lipid Panel   Result Value Ref Range    Total Cholesterol 214 (H) 150 - 200 mg/dL    Triglycerides 250 (H) <=150 mg/dL    HDL Cholesterol 35 (L) 40 - 59 mg/dL    LDL Cholesterol  129 (H) <=100 mg/dL    VLDL Cholesterol 50 mg/dL    LDL/HDL Ratio 3.69 (H) 0.00 - 3.22   Liquid-based Pap Smear, Screening   Result Value Ref Range    Case Report       Gynecologic Cytology Report                       Case: [id number]                                  Authorizing Provider:  Rosario Ceron APRN   Collected:           07/29/2019 02:15 PM          Ordering Location:     [facility]     Received:            07/29/2019 02:56 PM                                 [facility]                                                                                  [facility]                                                                     First Screen:          Carmel Hardin                                                              Specimen:    Liquid-Based Pap, Screening, Cervix                                                        Interpretation Negative for intraepithelial lesion or malignancy      General Categorization Within normal limits     Specimen Adequacy Satisfactory for evaluation     Additional Information       Disclaimer: Cervical cytology is a screening test primarily for squamous cancer and its precursors and has associated false-negative and false-positive results.  Technologies such as liquid-based preparations may decrease but will not eliminate all false-negative results.  Follow-up of unexplained clinical signs and symptoms is recommended to minimize false-negative results. (The [place] System for Reporting  Cervical Cytology: Felipe 2015).     Results for orders placed or performed in visit on 07/10/19   Hepatitis panel, acute   Result Value Ref Range    Hepatitis B Surface Ag Non-Reactive Non-Reactive    Hep A IgM Non-Reactive Non-Reactive    Hep B C IgM Non-Reactive Non-Reactive    Hepatitis C Ab Non-Reactive Non-Reactive   T3   Result Value Ref Range    T3, Total 98.3 80.0 - 200.0 ng/dl   TSH   Result Value Ref Range    TSH 2.070 0.270 - 4.200 mIU/mL   T4, Free   Result Value Ref Range    Free T4 0.87 (L) 0.93 - 1.70 ng/dL   Results for orders placed or performed in visit on 07/08/19   T3   Result Value Ref Range    T3, Total 109.0 80.0 - 200.0 ng/dl   TSH   Result Value Ref Range    TSH 1.540 0.270 - 4.200 mIU/mL   T4, Free   Result Value Ref Range    Free T4 0.87 (L) 0.93 - 1.70 ng/dL   Comprehensive Metabolic Panel   Result Value Ref Range    Glucose 98 70 - 99 mg/dL    BUN 19 7 - 23 mg/dL    Creatinine 1.00 0.52 - 1.04 mg/dL    Sodium 145 137 - 145 mmol/L    Potassium 4.6 3.4 - 5.0 mmol/L    Chloride 109 101 - 112 mmol/L    CO2 23.0 22.0 - 30.0 mmol/L    Calcium 9.5 8.4 - 10.2 mg/dL    Total Protein 7.9 6.3 - 8.6 g/dL    Albumin 4.50 3.50 - 5.00 g/dL    ALT (SGPT) 19 <=35 U/L    AST (SGOT) 64 (H) 14 - 36 U/L    Alkaline Phosphatase 80 38 - 126 U/L    Total Bilirubin 0.2 0.2 - 1.3 mg/dL    eGFR Non  Amer 56 45 - 104 mL/min/1.73    Globulin 3.4 2.3 - 3.5 gm/dL    A/G Ratio 1.3 1.1 - 1.8 g/dL    BUN/Creatinine Ratio 19.0 7.0 - 25.0    Anion Gap 13.0 5.0 - 15.0 mmol/L   Results for orders placed or performed in visit on 06/03/19   Hepatic Function Panel   Result Value Ref Range    Total Protein 7.7 6.3 - 8.6 g/dL    Albumin 4.30 3.50 - 5.00 g/dL    ALT (SGPT) 18 <=35 U/L    AST (SGOT) 57 (H) 14 - 36 U/L    Alkaline Phosphatase 84 38 - 126 U/L    Total Bilirubin 0.2 0.2 - 1.3 mg/dL    Bilirubin, Direct <0.0 (L) 0.0 - 0.3 mg/dL    Bilirubin, Indirect 0.2 0.0 - 1.1 mg/dL   Results for orders placed or  performed in visit on 06/03/19   ToxASSURE Select 13 Discrete -   Result Value Ref Range    Report Summary FINAL     Ethanol Screen Urine (Ref) Negative     Ethanol, Urine Not Detected g/dL    Amphetamine, Urine Qual Negative     Methamphetamine, Urine Not Detected ng/mg creat    Amphetamine, Urine Not Detected ng/mg creat    MDMA URINE Not Detected ng/mg creat    MDA Not Detected ng/mg creat    Benzodiazepine Screen, Urine Negative     DIAZEPAM URINE QUANT (REF) Not Detected ng/mg creat    Desmethyldiazepam Not Detected ng/mg creat    Oxazepam, urine Not Detected ng/mg creat    Temazepam, urine Not Detected ng/mg creat    ALPRAZOLAM Not Detected ng/mg creat    ALPHA-HYDROXYALPRAZOLAM UR, QT (REF) Not Detected ng/mg creat    DESALKLFLURAZEPAM UR QUANT (REF) Not Detected ng/mg creat    LORAZEPAM URINE QUANT (REF) Not Detected ng/mg creat    Alpha-hydroxytriazolam, Urine Not Detected ng/mg creat    Clonazepam Urine Not Detected ng/mg creat    7-Aminoclonazepam Urine Not Detected ng/mg creat    MIDAZOLAM UR, QUANT (REF) Not Detected ng/mg creat    Alpha-hydroxymidazolam, Urine Not Detected ng/mg creat    Flunitrazepam Not Detected ng/mg creat    DESMETHYLFLUNITRAZEPAM Not Detected ng/mg creat    Cocaine & Metabolite Negative     Cocaine Screen, Urine Not Detected ng/mg creat    Benzoylecgonine, Urine Not Detected ng/mg creat    Cocaethylene Ur Not Detected ng/mg creat    THC, Urine Negative     Carboxy THC, Urine Not Detected ng/mg creat    6-ACETYLMORPHINE Negative     6-acetylmorphine Not Detected ng/mg creat    Opiate Class +POSITIVE+     Codeine, Urine Not Detected ng/mg creat    Morphine, Urine Not Detected ng/mg creat    normorphine Not Detected ng/mg creat    Norcodeine Ur Not Detected ng/mg creat    Hydrocodone UR 1282 ng/mg creat    Hydromorphone Urine Not Detected ng/mg creat    Dihydrocodeine, Urine 201 ng/mg creat    Opiates, Norhydrocodone, Urine 3168 ng/mg creat    Oxycodone Class Ur Negative      Oxycodone, Confirmation, Urine Not Detected ng/mg creat    Oxymorphone UR Not Detected ng/mg creat    Opiates, Noroxycodone, Urine Not Detected ng/mg creat    Noroxymorphone Not Detected ng/mg creat    Methadone Negative     Methadone, Quantitative Not Detected ng/mg creat    EDDP Not Detected ng/mg creat    Fentanyl and Analogues, Ur Negative     Fentanyl, Urine Not Detected ng/mg creat    Norfentanyl Urine Not Detected ng/mg creat    Sufentanil, Ur Not Detected ng/mg creat    Alfentanil, Ur Not Detected ng/mg creat    BUPRENORPHINE Negative     Buprenorphine, Urine Not Detected ng/mg creat    Norbuprenorphine Not Detected ng/mg creat    Tapentadol Negative     Tapentadol Ur Not Detected ng/mg creat    Opoidss other, UR Negative     Tramadol, Urine Not Detected ng/mg creat    O-desmethyltramadol, Ur Not Detected ng/mg creat    N-Desmethyltramadol, U Not Detected ng/mg creat    BARBITURATES, URINE Negative     Amobarbital, Urine Not Detected     Barbital Not Detected     Butabarbital, Ur Not Detected     Butalbital Screen, Urine Not Detected     Mephobarbital Urine Not Detected     Pentobarbital UR Not Detected     Phenobarbital Not Detected     Secobarbital, urine Not Detected     Thiopental, Ur Not Detected     Creatinine, Urine 72 mg/dL    Level of Detection: Comment      *Note: Due to a large number of results and/or encounters for the requested time period, some results have not been displayed. A complete set of results can be found in Results Review.       Some portions of this note have been dictated using voice recognition software and may contain errors and/or omissions.

## 2019-10-16 NOTE — ANESTHESIA PREPROCEDURE EVALUATION
Anesthesia Evaluation     Patient summary reviewed and Nursing notes reviewed   NPO Solid Status: > 8 hours  NPO Liquid Status: > 8 hours           Airway   No difficulty expected  Dental      Pulmonary - normal exam   (+) COPD,   Cardiovascular - normal exam    (+) hyperlipidemia,       Neuro/Psych  (+) seizures, headaches, numbness, psychiatric history,     GI/Hepatic/Renal/Endo    (+)  GERD,  hepatitis,     Musculoskeletal     Abdominal  - normal exam   Substance History      OB/GYN          Other   (+) arthritis                     Anesthesia Plan    ASA 3     MAC     intravenous induction   Anesthetic plan, all risks, benefits, and alternatives have been provided, discussed and informed consent has been obtained with: patient.    Plan discussed with CRNA.

## 2019-10-18 DIAGNOSIS — R00.2 INTERMITTENT PALPITATIONS: Primary | ICD-10-CM

## 2019-10-21 LAB
LAB AP CASE REPORT: NORMAL
PATH REPORT.FINAL DX SPEC: NORMAL
PATH REPORT.GROSS SPEC: NORMAL

## 2019-10-29 ENCOUNTER — OFFICE VISIT (OUTPATIENT)
Dept: GASTROENTEROLOGY | Facility: CLINIC | Age: 59
End: 2019-10-29

## 2019-10-29 ENCOUNTER — CLINICAL SUPPORT (OUTPATIENT)
Dept: FAMILY MEDICINE CLINIC | Facility: CLINIC | Age: 59
End: 2019-10-29

## 2019-10-29 VITALS
HEIGHT: 62 IN | WEIGHT: 114.2 LBS | BODY MASS INDEX: 21.02 KG/M2 | SYSTOLIC BLOOD PRESSURE: 134 MMHG | HEART RATE: 81 BPM | DIASTOLIC BLOOD PRESSURE: 80 MMHG

## 2019-10-29 DIAGNOSIS — R10.10 PAIN OF UPPER ABDOMEN: ICD-10-CM

## 2019-10-29 DIAGNOSIS — R74.8 ELEVATED LIVER ENZYMES: ICD-10-CM

## 2019-10-29 DIAGNOSIS — Z23 NEED FOR INFLUENZA VACCINATION: Primary | ICD-10-CM

## 2019-10-29 DIAGNOSIS — K59.00 CONSTIPATION, UNSPECIFIED CONSTIPATION TYPE: ICD-10-CM

## 2019-10-29 DIAGNOSIS — K74.00 HEPATIC FIBROSIS: ICD-10-CM

## 2019-10-29 DIAGNOSIS — K21.00 GASTROESOPHAGEAL REFLUX DISEASE WITH ESOPHAGITIS: Primary | ICD-10-CM

## 2019-10-29 PROCEDURE — 99214 OFFICE O/P EST MOD 30 MIN: CPT | Performed by: PHYSICIAN ASSISTANT

## 2019-10-29 PROCEDURE — 90674 CCIIV4 VAC NO PRSV 0.5 ML IM: CPT | Performed by: NURSE PRACTITIONER

## 2019-10-29 PROCEDURE — G0008 ADMIN INFLUENZA VIRUS VAC: HCPCS | Performed by: NURSE PRACTITIONER

## 2019-10-29 RX ORDER — MONTELUKAST SODIUM 4 MG/1
1 TABLET, CHEWABLE ORAL 2 TIMES DAILY
Qty: 60 TABLET | Refills: 1 | Status: SHIPPED | OUTPATIENT
Start: 2019-10-29 | End: 2019-11-26 | Stop reason: SINTOL

## 2019-10-31 DIAGNOSIS — R00.2 PALPITATIONS: Primary | ICD-10-CM

## 2019-11-01 ENCOUNTER — OFFICE VISIT (OUTPATIENT)
Dept: CARDIOLOGY | Facility: CLINIC | Age: 59
End: 2019-11-01

## 2019-11-01 VITALS
SYSTOLIC BLOOD PRESSURE: 126 MMHG | DIASTOLIC BLOOD PRESSURE: 78 MMHG | HEIGHT: 62 IN | BODY MASS INDEX: 20.98 KG/M2 | HEART RATE: 71 BPM | WEIGHT: 114 LBS | OXYGEN SATURATION: 99 %

## 2019-11-01 DIAGNOSIS — R07.89 OTHER CHEST PAIN: ICD-10-CM

## 2019-11-01 DIAGNOSIS — R00.2 PALPITATIONS: Primary | ICD-10-CM

## 2019-11-01 DIAGNOSIS — R00.2 PALPITATIONS: ICD-10-CM

## 2019-11-01 PROCEDURE — 99203 OFFICE O/P NEW LOW 30 MIN: CPT | Performed by: INTERNAL MEDICINE

## 2019-11-01 PROCEDURE — 93000 ELECTROCARDIOGRAM COMPLETE: CPT | Performed by: INTERNAL MEDICINE

## 2019-11-01 RX ORDER — METOPROLOL TARTRATE 100 MG/1
100 TABLET ORAL DAILY
Qty: 90 TABLET | Refills: 3 | Status: SHIPPED | OUTPATIENT
Start: 2019-11-01 | End: 2019-11-01

## 2019-11-01 RX ORDER — METOPROLOL SUCCINATE 100 MG/1
100 TABLET, EXTENDED RELEASE ORAL DAILY
Qty: 90 TABLET | Refills: 3 | Status: SHIPPED | OUTPATIENT
Start: 2019-11-01 | End: 2020-08-14 | Stop reason: SDUPTHER

## 2019-11-01 NOTE — PROGRESS NOTES
[facility] Cardiology  OFFICE CONSULT NOTE    Patient Name: Tricia Loyola  Age/Sex: 59 y.o. female  : 1960  MRN: [id number]      Referring Provider: Rosario Ceron, UMESH  [address] [place] [address]        Chief Complaint: Palpitations    History of Present Illness:  Tricia Loyola is a 59 y.o. female who complains of skipping and fast heartbeats at  time.  She says it almost happens every night.  She does not get dizzy or does not have a syncopal episode and usually only occurs at night.  She does not notice it as much during the day.  She had a Holter monitor which showed occasional PVCs mainly single these through the day though she did not really feel warm.  She does have some chest pain when they happen.  This is more of a sharp fleeting pain.    About 3 years ago she says she woke up in the middle night with a very heavy sensation on her chest that lasted an hour or 2.  She was in [facility] and did not seek any medical attention and was later to go her to a doctor at that time she might have had a heart attack.  She has baseline shortness of air that really does not change with activity.  This pain does not radiate.  She is not nauseous earlier but she does not vomit with it.    Last Echo:    Last Cath:      Past Medical History:  Past Medical History:   Diagnosis Date   • Abdominal pain    • Anemia    • Chronic post-traumatic headache      rebound      • Depressive disorder    • Encounter for gynecological examination    • Gastroesophageal reflux disease    • Generalized anxiety disorder    • History of mammogram 2008    MAMMOGRAM DIAGNOSTIC BILATERAL 67335 ([facility]) (1)     • Hyperlipidemia    • Ingrown toenail    • Injury of back    • Nonruptured cerebral aneurysm    • Osteoporosis    • Posttraumatic stress disorder    • Seizure (CMS/HCC)     Psychogenic non-epileptic sz    • Viral hepatitis A        PAST SURGERY   Past Surgical History:   Procedure  Laterality Date   • APPENDECTOMY     • BREAST BIOPSY Left    • CHOLECYSTECTOMY     • COLONOSCOPY N/A 11/26/2018    Procedure: COLONOSCOPY;  Surgeon: Meet Mcintyre MD;  Location: [facility] ENDOSCOPY;  Service: General   • CRANIOTOMY FOR ANEURYSM  2003   • ENDOSCOPY N/A 10/16/2019    Procedure: ESOPHAGOGASTRODUODENOSCOPY;  Surgeon: Kory Muhammad MD;  Location: [facility] ENDOSCOPY;  Service: Gastroenterology   • PAP SMEAR  08/16/2012   • TONSILLECTOMY     • UPPER GASTROINTESTINAL ENDOSCOPY  10/16/2019       Home Medications:      (Not in a hospital admission)    Allergies:  Allergies   Allergen Reactions   • Nitrofuran Derivatives Hives     Macrobid   • Augmentin [Amoxicillin-Pot Clavulanate]      Hives   • Ciprofloxacin      Cipro:  Rash   • Fiorinal [Butalbital-Aspirin-Caffeine]      Rapid heart rate   • Imitrex [Sumatriptan]      Rapid heart rate   • Iodine      Anaphylaxis   • Other      MIDRIN  -  Rapid heart rate   • Toradol [Ketorolac Tromethamine]      Anaphylaxis   • Ultram [Tramadol]      Rapid heart rate   • Ibuprofen Rash        Social History:  Social History     Socioeconomic History   • Marital status: Legally      Spouse name: Not on file   • Number of children: Not on file   • Years of education: Not on file   • Highest education level: Not on file   Tobacco Use   • Smoking status: Light Tobacco Smoker     Packs/day: 0.50     Years: 43.00     Pack years: 21.50     Types: Cigarettes   • Smokeless tobacco: Never Used   Substance and Sexual Activity   • Alcohol use: No   • Drug use: No   • Sexual activity: No        Family History:  Family History   Problem Relation Age of Onset   • Constipation Mother    • Hypertension Mother    • Anxiety disorder Mother    • Heart disease Mother    • Alcohol abuse Father    • Heart disease Father    • Anxiety disorder Brother    • Kidney disease Brother    • Hypertension Brother    • Arthritis Brother    • Anxiety disorder Brother    • Kidney disease Brother     • Diabetes Brother    • Anxiety disorder Brother    • Hypertension Brother    • Breast cancer Paternal Aunt    • Heart disease Maternal Grandmother    • Clotting disorder Maternal Grandmother    • Heart disease Maternal Grandfather          Review of Systems:   Constitution: No chills, no rigors, no unexplained weight loss or weight gain    Eyes:  No diplopia, no blurred vision, no loss of vision, conjunctiva is pink and sclera is anicteric    ENT:  No tinnitus, no otorrhea, no epistaxis, no sore throat   Respiratory: No cough, no hemoptysis    Cardiovascular:  As mentioned in HPI    Gastrointestinal: No nausea, no vomiting, no hematemesis, no diarrhea or constipation, no melena    Genitourinary: No frequency of dysuria no hematuria    Integument: positive for  No pruritis and  no skin rash    Hematologic / Lymphatic: No excessive bleeding, easy bruising, fatigue, lymphadenopathy and petechiae    Musculoskeletal: No joint pain, joint stiffness, joint swelling, muscle pain, muscle weakness and neck pain    Neurological: No dizziness, headaches, light headedness, seizures and vertigo or stroke    Behavioral/Psych: No depression, or bipolar disorder    Endocrine: no h/o diabetes, hyperlipidemia or thyroid problems        Vitals:    11/01/19 0907   BP: 126/78   Pulse: 71   SpO2: 99%       Body mass index is 20.85 kg/m².          Physical Exam:   General Appearance:    Alert, oriented, cooperative, in no acute distress     Head:    Normocephalic, atraumatic, without obvious abnormality     Eyes:            Lids and lashes normal, conjunctivae and sclerae normal, no   icterus, no pallor     Ears:    Ears appear intact with no abnormalities noted     Throat:   Mucous membranes pink and moist     Neck:   Supple, trachea midline, no carotid bruit, no JVD     Lungs:     Air enty equal,  Clear to auscultation, respirations regular, Unlabored. No wheezes, rales, rhonchi    Heart:    Regular rhythm and normal rate, normal S1  and S2, no            murmur, no gallop,      Abdomen:     Normal bowel sounds, no masses, liver and spleen nonpalpable, soft, non-tender, non-distended, no guarding, no rebound tenderness       Extremities:   Moves all extremities well, no edema, no cyanosis, no              Redness, no clubbing     Pulses:   Pulses palpable and equal bilaterally       Neurologic:   Alert and oriented to person, place, and time. No focal neurological deficits       Lab Review:     Admission on 10/16/2019, Discharged on 10/16/2019   Component Date Value Ref Range Status   • Case Report 10/16/2019    Final                    Value:Surgical Pathology Report                         Case: [id number]                                  Authorizing Provider:  Kory Muhammad MD        Collected:           10/16/2019 01:55 PM          Ordering Location:     [facility]             Received:            10/17/2019 07:04 AM                                 [place] [facility]                                                     Pathologist:           Apollo Paul MD                                                        Specimens:   1) - Gastric, Antrum                                                                                2) - Esophagus, Distal                                                                    • Final Diagnosis 10/16/2019    Final                    Value:This result contains rich text formatting which cannot be displayed here.   • Gross Description 10/16/2019    Final                    Value:This result contains rich text formatting which cannot be displayed here.   Office Visit on 08/06/2019   Component Date Value Ref Range Status   • Iron 08/28/2019 89  37 - 145 mcg/dL Final   • Iron Saturation 08/28/2019 31  20 - 50 % Final   • Transferrin 08/28/2019 194* 200 - 360 mg/dL Final   • TIBC 08/28/2019 289* 298 - 536 mcg/dL Final   • Ferritin 08/28/2019 71.83  13.00 - 150.00 ng/mL Final   • ALPHA-FETOPROTEIN  08/28/2019 1.49  0 - 8.3 ng/mL Final   • ALPHA -1 ANTITRYPSIN 08/28/2019 146  90 - 200 mg/dL Final   • Smooth Muscle Ab 08/28/2019 3  0 - 19 Units Final                     Negative                     0 - 19                   Weak positive               20 - 30                   Moderate to strong positive     >30   Actin Antibodies are found in 52-85% of patients with   autoimmune hepatitis or chronic active hepatitis and   in 22% of patients with primary biliary cirrhosis.   • Ceruloplasmin 08/28/2019 23  19 - 39 mg/dL Final   • Fibrosis Score (References) 08/28/2019 0.25* 0.00 - 0.21 Final   • Fibrosis Stage (Reference) 08/28/2019 F0-F1   Final   • Steatosis Score (Reference) 08/28/2019 0.34* 0.00 - 0.30 Final   • Steatosis Grade (Reference) 08/28/2019 Comment   Final               S0 - S1  No Steatosis - Minimal Steatosis   • BOLDEN Score (Reference) 08/28/2019 0.25  0.25 Final   • Bolden Grade (Reference) 08/28/2019 Comment   Final                         N0 - Not BOLDEN   • Height: (Reference) 08/28/2019 62  in Final   • Weight: (Reference) 08/28/2019 109  LBS Final   • Alpha 2-Macroglobulins, Qn 08/28/2019 266  110 - 276 mg/dL Final   • Haptoglobin 08/28/2019 97  34 - 200 mg/dL Final   • Apolipoprotein A-1 08/28/2019 120  116 - 209 mg/dL Final   • Total Bilirubin 08/28/2019 0.2  0.0 - 1.2 mg/dL Final   • GGT 08/28/2019 19  0 - 60 IU/L Final   • ALT (SGPT) 08/28/2019 17  0 - 40 IU/L Final   • AST (SGOT) P5P (Reference) 08/28/2019 54* 0 - 40 IU/L Final   • Cholesterol, Total (Reference) 08/28/2019 202* 100 - 199 mg/dL Final   • Glucose, Serum (Reference) 08/28/2019 98  65 - 99 mg/dL Final   • Triglycerides 08/28/2019 297* 0 - 149 mg/dL Final   • Interpretations: (Reference) 08/28/2019 Comment   Final    Comment: Quantitative results of 10 biochemicals in combination with  age, gender, height, and weight, are analyzed using a  computational algorithm to provide a quantitative surrogate  marker (0.0-1.0) of liver  fibrosis (Metavir F0-F4), hepatic  steatosis (0.0-1.0, S0-S3), and Non-Alcoholic Steato-  Hepatitis (BOLDEN) (0.0-0.75, N0-N2). The absence of steatosis  (S<0.38) precludes the diagnosis of BOLDEN.  Fibrosis marker:  In a study of 171 Non-Alcoholic Fatty  Liver Disease (NAFLD) patients where 23% had significant  NAFLD fibrosis (Metavir F2-F4) and 11% had cirrhosis by  liver biopsy, a fibrosis result of >0.3 yielded a  sensitivity of 83% and a specificity of 78% for the  detection of significant fibrosis(1).  Steatosis Marker:  In a population of 744 patients (583 HCV,  18 HBV, 69 NAFLD, and 74 alcoholic disease patients), where  36% had significant steatosis (>5%) on a liver biopsy, a  steatosis score >0.5 had a sensitivity of 71% and a  specificity of 72% for identification of                            significant  steatosis(2).  BOLDEN marker:  In a population of 257 NAFLD patients, where  62% had at least some BOLDEN by liver biopsy, a prediction of  BOLDEN had a sensitivity of 88% for identifying BOLDEN and a  specificity of 50%(3).   • Fibrosis Scorin2019 Comment   Final          <0.21 = Stage F0 - No fibrosis  0.21 - 0.27 = Stage F0 - F1  0.27 - 0.31 = Stage F1 - Portal fibrosis  0.31 - 0.48 = Stage F1 - F2  0.48 - 0.58 = Stage F2 - Bridging fibrosis with few septa  0.58 - 0.72 = Stage F3 - Bridging fibrosis with many septa  0.72 - 0.74 = Stage F3 - F4        >0.74 = Stage F4 - Cirrhosis   • Steatosis Grading (Reference) 2019 Comment   Final          < 0.30 = S0 - No Steatosis  0.30 to 0.38 = S0 - S1  0.38 to 0.48 = S1 - Minimal Steatosis  0.48 to 0.57 = S1 - S2  0.57 to 0.67 = S2 - Moderate Steatosis  0.67 to 0.69 = S2 - S3        > 0.69 = S3 - Marked or Severe Steatosis   • Bolden Scoring (Reference) 2019 Comment   Final    0.25 = N0 - Not BOLDEN  0.50 = N1 - Borderline or probable BOLDEN  0.75 = N2 - BOLDEN   • Limitations: (Reference) 2019 Comment   Final    BOLDEN FibroSure is recommended for  patients with suspected non-  alcoholic fatty liver disease.  It is not recommended for patients  with other liver diseases.  It is also not recommended in patients  with Gilbert Disease, acute hemolysis, acute viral hepatitis, drug  induced hepatitis, genetic liver disease, autoimmune hepatitis and/or  extra-hepatic cholestasis.  Any of these clinical situations may lead  to inaccurate quantitative predictions of fibrosis.   • Comment (Reference) 08/28/2019 Comment   Final    This test was developed and its performance characteristics determined  by [employer].  It has not been cleared or approved by the Food and Drug  Administration.  The FDA has determined that such clearance or  approval is not necessary.  For questions regarding this report please contact  customer service at [phone].  References:  1. Randi SPAIN et al. Diagnostic Value of Biochemical Markers     (FibroTest) for the prediction of Liver Fibrosis in     patients with Non-Alcoholic Fatty Liver Disease. [facility]     [facility]ology 2006; 6:6.  2. MOHSEN Arriaga et al. The Diagnostic Value of Biomarkers     (Steato Test) for the Prediction of Liver Steatosis.     Comparative Hepatol. 2005; 4:10.  3. OSIEL Arriaga, Brittany Bryan, et al. Diagnostic value     of biochemical markers (BOLDEN TEST) for the prediction of     non alcohol steato hepatitis in patients with non-     alcoholic fatty liver disease. [facility]MC Gastroenterology 2006;     6:34 doi:10.1186/4400-478D-1-34.   • YUKO 08/28/2019 Negative   Final                                         Negative   <1:80                                       Borderline  1:80                                       Positive   >1:80   • Mitochondrial Ab 08/28/2019 <20.0  0.0 - 20.0 Units Final                                    Negative    0.0 - 20.0                                  Equivocal  20.1 - 24.9                                  Positive         >24.9  Mitochondrial (M2) Antibodies are found in 90-96%  of  patients with primary biliary cirrhosis.   Lab on 08/02/2019   Component Date Value Ref Range Status   • Osmolality, Urine 08/02/2019 578  mOsm/kg Final   • ACTH 08/02/2019 32.1  7.2 - 63.3 pg/mL Final    ACTH reference interval for samples collected between 7 and 10 AM.   • Cortisol - AM 08/02/2019 18.83  mcg/dL Final   • Estradiol 08/02/2019 <5.0  pg/mL Final   • Prolactin 08/02/2019 9.27  4.79 - 23.30 ng/mL Final   • Insulin-Like Growth Factor-1 08/02/2019 119  42 - 169 ng/mL Final   Office Visit on 07/29/2019   Component Date Value Ref Range Status   • Case Report 07/29/2019    Final                    Value:Gynecologic Cytology Report                       Case: [id number]                                  Authorizing Provider:  Rosario Ceron APRN   Collected:           07/29/2019 02:15 PM          Ordering Location:     [facility]     Received:            07/29/2019 02:56 PM                                 [facility]                                                                                  [facility]                                                                     First Screen:          Carmel Hardin                                                              Specimen:    Liquid-Based Pap, Screening, Cervix                                                       • Interpretation 07/29/2019 Negative for intraepithelial lesion or malignancy    Final   • General Categorization 07/29/2019 Within normal limits   Final   • Specimen Adequacy 07/29/2019 Satisfactory for evaluation   Final   • Additional Information 07/29/2019    Final                    Value:This result contains rich text formatting which cannot be displayed here.   • Testosterone, Total 08/02/2019 15.9  7.0 - 40.0 ng/dL Final    This test was developed and its performance characteristics  determined by [facility]. It has not been cleared or approved  by the Food and Drug Administration.   • Testosterone, Free  08/02/2019 0.8  0.0 - 4.2 pg/mL Final   • Thyroglobulin Ab 08/02/2019 <1.0  0.0 - 0.9 IU/mL Final    Thyroglobulin Antibody measured by [email or web address]   • T3, Total 08/02/2019 103.0  80.0 - 200.0 ng/dl Final   • Free T4 08/02/2019 0.90* 0.93 - 1.70 ng/dL Final   • Thyroid Peroxidase Antibody 08/02/2019 16  0 - 34 IU/mL Final   • TSH 08/02/2019 2.480  0.270 - 4.200 mIU/mL Final   • Total Cholesterol 08/02/2019 214* 150 - 200 mg/dL Final   • Triglycerides 08/02/2019 250* <=150 mg/dL Final   • HDL Cholesterol 08/02/2019 35* 40 - 59 mg/dL Final   • LDL Cholesterol  08/02/2019 129* <=100 mg/dL Final   • VLDL Cholesterol 08/02/2019 50  mg/dL Final   • LDL/HDL Ratio 08/02/2019 3.69* 0.00 - 3.22 Final   Lab on 07/10/2019   Component Date Value Ref Range Status   • Hepatitis B Surface Ag 07/10/2019 Non-Reactive  Non-Reactive Final   • Hep A IgM 07/10/2019 Non-Reactive  Non-Reactive Final   • Hep B C IgM 07/10/2019 Non-Reactive  Non-Reactive Final   • Hepatitis C Ab 07/10/2019 Non-Reactive  Non-Reactive Final   • Free T4 07/10/2019 0.87* 0.93 - 1.70 ng/dL Final   • TSH 07/10/2019 2.070  0.270 - 4.200 mIU/mL Final   • T3, Total 07/10/2019 98.3  80.0 - 200.0 ng/dl Final   Office Visit on 07/08/2019   Component Date Value Ref Range Status   • Glucose 07/08/2019 98  70 - 99 mg/dL Final   • BUN 07/08/2019 19  7 - 23 mg/dL Final   • Creatinine 07/08/2019 1.00  0.52 - 1.04 mg/dL Final   • Sodium 07/08/2019 145  137 - 145 mmol/L Final   • Potassium 07/08/2019 4.6  3.4 - 5.0 mmol/L Final   • Chloride 07/08/2019 109  101 - 112 mmol/L Final   • CO2 07/08/2019 23.0  22.0 - 30.0 mmol/L Final   • Calcium 07/08/2019 9.5  8.4 - 10.2 mg/dL Final   • Total Protein 07/08/2019 7.9  6.3 - 8.6 g/dL Final   • Albumin 07/08/2019 4.50  3.50 - 5.00 g/dL Final   • ALT (SGPT) 07/08/2019 19  <=35 U/L Final   • AST (SGOT) 07/08/2019 64* 14 - 36 U/L Final   • Alkaline Phosphatase 07/08/2019 80  38 - 126 U/L Final   • Total Bilirubin 07/08/2019  0.2  0.2 - 1.3 mg/dL Final   • eGFR Non  Amer 07/08/2019 56  45 - 104 mL/min/1.73 Final   • Globulin 07/08/2019 3.4  2.3 - 3.5 gm/dL Final   • A/G Ratio 07/08/2019 1.3  1.1 - 1.8 g/dL Final   • BUN/Creatinine Ratio 07/08/2019 19.0  7.0 - 25.0 Final   • Anion Gap 07/08/2019 13.0  5.0 - 15.0 mmol/L Final   • Free T4 07/08/2019 0.87* 0.93 - 1.70 ng/dL Final   • TSH 07/08/2019 1.540  0.270 - 4.200 mIU/mL Final   • T3, Total 07/08/2019 109.0  80.0 - 200.0 ng/dl Final   Lab on 06/03/2019   Component Date Value Ref Range Status   • Total Protein 06/03/2019 7.7  6.3 - 8.6 g/dL Final   • Albumin 06/03/2019 4.30  3.50 - 5.00 g/dL Final   • ALT (SGPT) 06/03/2019 18  <=35 U/L Final   • AST (SGOT) 06/03/2019 57* 14 - 36 U/L Final   • Alkaline Phosphatase 06/03/2019 84  38 - 126 U/L Final   • Total Bilirubin 06/03/2019 0.2  0.2 - 1.3 mg/dL Final   • Bilirubin, Direct 06/03/2019 <0.0* 0.0 - 0.3 mg/dL Final   • Bilirubin, Indirect 06/03/2019 0.2  0.0 - 1.1 mg/dL Final   Office Visit on 06/03/2019   Component Date Value Ref Range Status   • Report Summary 06/03/2019 FINAL   Final    Comment: ====================================================================  TOXASSURE SELECT 13 PRACHI-DIS  ====================================================================  Test                             Result       Flag       Units  Drug Present    Hydrocodone                    1282                    ng/mg creat    Dihydrocodeine                 201                     ng/mg creat    Norhydrocodone                 3168                    ng/mg creat     Sources of hydrocodone include scheduled prescription     medications. Dihydrocodeine and norhydrocodone are expected     metabolites of hydrocodone. Dihydrocodeine is also available as a     scheduled prescription medication.  ====================================================================  Test                      Result    Flag   Units      Ref Range    Creatinine               72               mg/dL      >=20  ====================================================================  Declared Medications:   Medication list was not                            provided.  ====================================================================  For clinical consultation, please call [phone].  ====================================================================   • Ethanol Screen Urine (Ref) 06/03/2019 Negative   Final   • Ethanol, Urine 06/03/2019 Not Detected  g/dL Final   • Amphetamine, Urine Qual 06/03/2019 Negative   Final   • Methamphetamine, Urine 06/03/2019 Not Detected  ng/mg creat Final   • Amphetamine, Urine 06/03/2019 Not Detected  ng/mg creat Final   • MDMA URINE 06/03/2019 Not Detected  ng/mg creat Final   • MDA 06/03/2019 Not Detected  ng/mg creat Final   • Benzodiazepine Screen, Urine 06/03/2019 Negative   Final   • DIAZEPAM URINE QUANT (REF) 06/03/2019 Not Detected  ng/mg creat Final   • Desmethyldiazepam 06/03/2019 Not Detected  ng/mg creat Final   • Oxazepam, urine 06/03/2019 Not Detected  ng/mg creat Final   • Temazepam, urine 06/03/2019 Not Detected  ng/mg creat Final    Expected metabolism of benzodiazepine class drugs:   Parent Drug       Detected Metabolites   -----------       --------------------   Diazepam:         Desmethyldiazepam, Temazepam, Oxazepam   Chlordiazepoxide: Desmethyldiazepam, Oxazepam   Clorazepate:      Desmethyldiazepam, Oxazepam   Halazepam:        Desmethyldiazepam, Oxazepam   Temazepam:        Oxazepam   Oxazepam:         None   • ALPRAZOLAM 06/03/2019 Not Detected  ng/mg creat Final   • ALPHA-HYDROXYALPRAZOLAM UR, QT (RE* 06/03/2019 Not Detected  ng/mg creat Final   • DESALKLFLURAZEPAM UR QUANT (REF) 06/03/2019 Not Detected  ng/mg creat Final   • LORAZEPAM URINE QUANT (REF) 06/03/2019 Not Detected  ng/mg creat Final   • Alpha-hydroxytriazolam, Urine 06/03/2019 Not Detected  ng/mg creat Final   • Clonazepam Urine 06/03/2019 Not Detected  ng/mg  creat Final   • 7-Aminoclonazepam Urine 06/03/2019 Not Detected  ng/mg creat Final   • MIDAZOLAM UR, QUANT (REF) 06/03/2019 Not Detected  ng/mg creat Final   • Alpha-hydroxymidazolam, Urine 06/03/2019 Not Detected  ng/mg creat Final   • Flunitrazepam 06/03/2019 Not Detected  ng/mg creat Final   • DESMETHYLFLUNITRAZEPAM 06/03/2019 Not Detected  ng/mg creat Final   • Cocaine & Metabolite 06/03/2019 Negative   Final   • Cocaine Screen, Urine 06/03/2019 Not Detected  ng/mg creat Final   • Benzoylecgonine, Urine 06/03/2019 Not Detected  ng/mg creat Final   • Cocaethylene Ur 06/03/2019 Not Detected  ng/mg creat Final   • THC, Urine 06/03/2019 Negative   Final   • Carboxy THC, Urine 06/03/2019 Not Detected  ng/mg creat Final   • 6-ACETYLMORPHINE 06/03/2019 Negative   Final   • 6-acetylmorphine 06/03/2019 Not Detected  ng/mg creat Final   • Opiate Class 06/03/2019 +POSITIVE+   Final   • Codeine, Urine 06/03/2019 Not Detected  ng/mg creat Final   • Morphine, Urine 06/03/2019 Not Detected  ng/mg creat Final   • normorphine 06/03/2019 Not Detected  ng/mg creat Final   • Norcodeine Ur 06/03/2019 Not Detected  ng/mg creat Final   • Hydrocodone UR 06/03/2019 1282  ng/mg creat Final   • Hydromorphone Urine 06/03/2019 Not Detected  ng/mg creat Final   • Dihydrocodeine, Urine 06/03/2019 201  ng/mg creat Final   • Opiates, Norhydrocodone, Urine 06/03/2019 3168  ng/mg creat Final    Expected metabolism of opiate class drugs:  Parent Drug       Detected Metabolites   -----------       --------------------   Codeine:          Major:  Morphine, Norcodeine                     Minor:  Hydrocodone, Hydromorphone,                             Dihydrocodeine, Norhydrocodone,                             Normorphine   Morphine:         Major:  Normorphine                     Minor:  Hydromorphone   Hydrocodone:      Hydromorphone, Dihydrocodeine,                     Norhydrocodone   Hydromorphone:    None   Dihydrocodeine:   None   Heroin:            6-Acetylmorphine (if included),                     Morphine, Normorphine                     Codeine, in small amounts in comparison                      to morphine, is often detected when                      heroin is the source drug.   • Oxycodone Class Ur 06/03/2019 Negative   Final   • Oxycodone, Confirmation, Urine 06/03/2019 Not Detected  ng/mg creat Final   • Oxymorphone UR 06/03/2019 Not Detected  ng/mg creat Final   • Opiates, Noroxycodone, Urine 06/03/2019 Not Detected  ng/mg creat Final   • Noroxymorphone 06/03/2019 Not Detected  ng/mg creat Final    Expected metabolism of oxycodone class drugs:   Parent Drug       Detected Metabolites   -----------       --------------------   Oxycodone:        Oxymorphone, Noroxycodone, Noroxymorphone   Oxymorphone:      Noroxymorphone   • Methadone 06/03/2019 Negative   Final   • Methadone, Quantitative 06/03/2019 Not Detected  ng/mg creat Final   • EDDP 06/03/2019 Not Detected  ng/mg creat Final   • Fentanyl and Analogues, Ur 06/03/2019 Negative   Final   • Fentanyl, Urine 06/03/2019 Not Detected  ng/mg creat Final   • Norfentanyl Urine 06/03/2019 Not Detected  ng/mg creat Final   • Sufentanil, Ur 06/03/2019 Not Detected  ng/mg creat Final   • Alfentanil, Ur 06/03/2019 Not Detected  ng/mg creat Final   • BUPRENORPHINE 06/03/2019 Negative   Final   • Buprenorphine, Urine 06/03/2019 Not Detected  ng/mg creat Final   • Norbuprenorphine 06/03/2019 Not Detected  ng/mg creat Final   • Tapentadol 06/03/2019 Negative   Final   • Tapentadol Ur 06/03/2019 Not Detected  ng/mg creat Final   • Opoidss other, UR 06/03/2019 Negative   Final   • Tramadol, Urine 06/03/2019 Not Detected  ng/mg creat Final   • O-desmethyltramadol, Ur 06/03/2019 Not Detected  ng/mg creat Final   • N-Desmethyltramadol, U 06/03/2019 Not Detected  ng/mg creat Final   • BARBITURATES, URINE 06/03/2019 Negative   Final   • Amobarbital, Urine 06/03/2019 Not Detected   Final   • Barbital 06/03/2019 Not  Detected   Final   • Butabarbital, Ur 06/03/2019 Not Detected   Final   • Butalbital Screen, Urine 06/03/2019 Not Detected   Final   • Mephobarbital Urine 06/03/2019 Not Detected   Final   • Pentobarbital UR 06/03/2019 Not Detected   Final   • Phenobarbital 06/03/2019 Not Detected   Final   • Secobarbital, urine 06/03/2019 Not Detected   Final   • Thiopental, Ur 06/03/2019 Not Detected   Final   • Creatinine, Urine 06/03/2019 72  mg/dL Final    REFERENCE RANGE: Ref Range>=20   • Level of Detection: 06/03/2019 Comment   Final    TESTING THRESHOLDS ARE AS FOLLOWS:  AMPHETAMINES: 50 ng/mL  BENZODIAZEPINES: 20 ng/mL  COCAINE / METABOLITE: 50 ng/mL  ALCOHOL, ETHYL: 0.020 gm/dL  FENTANYL / ANALOGUES: fentanyl-1.0 ng/mL, others-5.0 ng/mL  BUPRENORPHINE: buprenorphine-1.0 ng/mL,                 norbuprenorphine-5 ng/mL  TAPENTADOL: 50 ng/mL  CANNABINOIDS: total-20 ng/mL, carboxy-THC-2 ng/mL  METHADONE: 50 ng/mL/mL  OPIATE CLASS: 50 ng/mL/mL  OXYCODONE CLASS: 50 ng/mL  BARBITURATES: 200 ng/mL  TRAMADOL: 50 ng/mL  This test was developed and its performance characteristics  determined by [facility].  It has not been cleared or approved  by the Food and Drug Administration.   Office Visit on 05/17/2019   Component Date Value Ref Range Status   • Total Protein 05/17/2019 7.3  6.3 - 8.6 g/dL Final   • Albumin 05/17/2019 4.10  3.50 - 5.00 g/dL Final   • ALT (SGPT) 05/17/2019 51* <=35 U/L Final   • AST (SGOT) 05/17/2019 87* 14 - 36 U/L Final   • Alkaline Phosphatase 05/17/2019 99  38 - 126 U/L Final   • Total Bilirubin 05/17/2019 0.3  0.2 - 1.3 mg/dL Final   • Bilirubin, Direct 05/17/2019 <0.0* 0.0 - 0.3 mg/dL Final   • Bilirubin, Indirect 05/17/2019 0.2  0.0 - 1.1 mg/dL Final   Admission on 05/09/2019, Discharged on 05/10/2019   Component Date Value Ref Range Status   • Color,  05/09/2019 Yellow  Yellow, Straw, Dark Yellow, Paige Final   • Appearance,  05/09/2019 Clear  Clear Final   • pH,  05/09/2019 7.0  5.0 - 9.0 Final   •  Specific Gravity, UA 05/09/2019 1.002* 1.003 - 1.030 Final    Result obtained by Refractometer   • Glucose, UA 05/09/2019 Negative  Negative Final   • Ketones, UA 05/09/2019 Negative  Negative Final   • Bilirubin, UA 05/09/2019 Negative  Negative Final   • Blood, UA 05/09/2019 Small (1+)* Negative Final   • Protein, UA 05/09/2019 Negative  Negative Final   • Leuk Esterase, UA 05/09/2019 Negative  Negative Final   • Nitrite, UA 05/09/2019 Negative  Negative Final   • Urobilinogen, UA 05/09/2019 0.2 E.U./dL  0.2 - 1.0 E.U./dL Final   • RBC, UA 05/09/2019 3-5* None Seen /HPF Final   • WBC, UA 05/09/2019 None Seen  None Seen, 0-2, 3-5 /HPF Final   • Bacteria, UA 05/09/2019 None Seen  None Seen /HPF Final   • Squamous Epithelial Cells, UA 05/09/2019 None Seen  None Seen, 0-2 /HPF Final   • Hyaline Casts, UA 05/09/2019 None Seen  None Seen /LPF Final   • Methodology 05/09/2019 Automated Microscopy   Final   • Glucose 05/09/2019 99  65 - 99 mg/dL Final   • BUN 05/09/2019 14  8 - 23 mg/dL Final   • Creatinine 05/09/2019 1.15* 0.57 - 1.00 mg/dL Final   • Sodium 05/09/2019 137  136 - 145 mmol/L Final   • Potassium 05/09/2019 3.9  3.5 - 5.2 mmol/L Final   • Chloride 05/09/2019 103  98 - 107 mmol/L Final   • CO2 05/09/2019 22.0  22.0 - 29.0 mmol/L Final   • Calcium 05/09/2019 9.4  8.6 - 10.5 mg/dL Final   • Total Protein 05/09/2019 7.6  6.0 - 8.5 g/dL Final   • Albumin 05/09/2019 4.10  3.50 - 5.20 g/dL Final   • ALT (SGPT) 05/09/2019 203* 1 - 33 U/L Final   • AST (SGOT) 05/09/2019 190* 1 - 32 U/L Final   • Alkaline Phosphatase 05/09/2019 161* 39 - 117 U/L Final   • Total Bilirubin 05/09/2019 <0.2* 0.2 - 1.2 mg/dL Final   • eGFR Non African Amer 05/09/2019 48* >60 mL/min/1.73 Final   • Globulin 05/09/2019 3.5  gm/dL Final   • A/G Ratio 05/09/2019 1.2  g/dL Final   • BUN/Creatinine Ratio 05/09/2019 12.2  7.0 - 25.0 Final   • Anion Gap 05/09/2019 12.0  mmol/L Final   • Lipase 05/09/2019 18  13 - 60 U/L Final   • WBC 05/09/2019  8.95  3.40 - 10.80 10*3/mm3 Final   • RBC 05/09/2019 4.27  3.77 - 5.28 10*6/mm3 Final   • Hemoglobin 05/09/2019 13.2  12.0 - 15.9 g/dL Final   • Hematocrit 05/09/2019 39.4  34.0 - 46.6 % Final   • MCV 05/09/2019 92.3  79.0 - 97.0 fL Final   • MCH 05/09/2019 30.9  26.6 - 33.0 pg Final   • MCHC 05/09/2019 33.5  31.5 - 35.7 g/dL Final   • RDW 05/09/2019 13.4  12.3 - 15.4 % Final   • RDW-SD 05/09/2019 45.6  37.0 - 54.0 fl Final   • MPV 05/09/2019 9.5  6.0 - 12.0 fL Final   • Platelets 05/09/2019 304  140 - 450 10*3/mm3 Final   • Neutrophil % 05/09/2019 55.5  42.7 - 76.0 % Final   • Lymphocyte % 05/09/2019 27.6  19.6 - 45.3 % Final   • Monocyte % 05/09/2019 12.5* 5.0 - 12.0 % Final   • Eosinophil % 05/09/2019 2.8  0.3 - 6.2 % Final   • Basophil % 05/09/2019 0.9  0.0 - 1.5 % Final   • Immature Grans % 05/09/2019 0.7* 0.0 - 0.5 % Final   • Neutrophils, Absolute 05/09/2019 4.97  1.70 - 7.00 10*3/mm3 Final   • Lymphocytes, Absolute 05/09/2019 2.47  0.70 - 3.10 10*3/mm3 Final   • Monocytes, Absolute 05/09/2019 1.12* 0.10 - 0.90 10*3/mm3 Final   • Eosinophils, Absolute 05/09/2019 0.25  0.00 - 0.40 10*3/mm3 Final   • Basophils, Absolute 05/09/2019 0.08  0.00 - 0.20 10*3/mm3 Final   • Immature Grans, Absolute 05/09/2019 0.06* 0.00 - 0.05 10*3/mm3 Final   • nRBC 05/09/2019 0.0  0.0 - 0.2 /100 WBC Final   There may be more visits with results that are not included.   ]    Assessment/Plan     1. Palpitations  Patient symptoms are mainly nighttime.  They do bother and scared her.  They sometimes wake her up from sleep.  She has had no syncope or near syncope with it.  She is on metoprolol succinate 50 mg daily.    At this time I would like to get a echo to assess her LV function since she had PVCs on her Holter monitor.  We will also get a stress test to make sure these are not ischemic induced.    In the interim we will go ahead and increase her metoprolol to 100 mg daily.      - Adult Transthoracic Echo Complete W/ Cont if  Necessary Per Protocol; Future  - Stress Test With Myocardial Perfusion One Day; Future  - metoprolol succinate XL (TOPROL XL) 100 MG 24 hr tablet; Take 1 tablet by mouth Daily.  Dispense: 90 tablet; Refill: 3    2. Other chest pain  Since good does he think he understands she had a episode of chest discomfort in [facility] about 3 years ago.  This was nighttime and was pressure-like in sensation.  The pain she is having now is more sharp.  Her ECG just does shows sinus rhythm with a right bundle branch block with a indeterminate axis.  This time we will go ahead and do a exercise nuclear and echo to assess for coronary disease and any previous MI.    - Adult Transthoracic Echo Complete W/ Cont if Necessary Per Protocol; Future  - Stress Test With Myocardial Perfusion One Day; Future    3.  Tobacco abuse-she has about 20-pack-year smoking history though she smoked for over 40 years.  Smoking cessation is recommended and she says she will think about this once we can figure out what is going on with the rest of her palpitations.  Obviously bit palpitations could be caused from the tobacco usage.        Return in about 3 months (around 2/1/2020).

## 2019-11-04 ENCOUNTER — DOCUMENTATION (OUTPATIENT)
Dept: CARDIOLOGY | Facility: CLINIC | Age: 59
End: 2019-11-04

## 2019-11-04 ENCOUNTER — OFFICE VISIT (OUTPATIENT)
Dept: FAMILY MEDICINE CLINIC | Facility: CLINIC | Age: 59
End: 2019-11-04

## 2019-11-04 VITALS
WEIGHT: 115.2 LBS | SYSTOLIC BLOOD PRESSURE: 124 MMHG | TEMPERATURE: 98 F | HEART RATE: 65 BPM | OXYGEN SATURATION: 97 % | DIASTOLIC BLOOD PRESSURE: 70 MMHG | RESPIRATION RATE: 16 BRPM | HEIGHT: 62 IN | BODY MASS INDEX: 21.2 KG/M2

## 2019-11-04 DIAGNOSIS — E03.9 ACQUIRED HYPOTHYROIDISM: Chronic | ICD-10-CM

## 2019-11-04 DIAGNOSIS — G89.29 CHRONIC MIDLINE THORACIC BACK PAIN: Chronic | ICD-10-CM

## 2019-11-04 DIAGNOSIS — M54.15 RADICULOPATHY OF THORACOLUMBAR REGION: Chronic | ICD-10-CM

## 2019-11-04 DIAGNOSIS — M48.50XA COMPRESSION FRACTURE OF VERTEBRA (HCC): Chronic | ICD-10-CM

## 2019-11-04 DIAGNOSIS — M54.6 CHRONIC MIDLINE THORACIC BACK PAIN: Chronic | ICD-10-CM

## 2019-11-04 DIAGNOSIS — M25.561 ACUTE PAIN OF RIGHT KNEE: Primary | ICD-10-CM

## 2019-11-04 PROCEDURE — 99214 OFFICE O/P EST MOD 30 MIN: CPT | Performed by: NURSE PRACTITIONER

## 2019-11-04 PROCEDURE — 96372 THER/PROPH/DIAG INJ SC/IM: CPT | Performed by: NURSE PRACTITIONER

## 2019-11-04 RX ORDER — HYDROCODONE BITARTRATE AND ACETAMINOPHEN 5; 325 MG/1; MG/1
1 TABLET ORAL EVERY 8 HOURS PRN
Qty: 90 TABLET | Refills: 0 | Status: SHIPPED | OUTPATIENT
Start: 2019-11-04 | End: 2019-11-26 | Stop reason: SDUPTHER

## 2019-11-04 RX ORDER — LEVOTHYROXINE SODIUM 0.03 MG/1
12.5 TABLET ORAL DAILY
Qty: 15 TABLET | Refills: 3 | Status: SHIPPED | OUTPATIENT
Start: 2019-11-04 | End: 2020-05-28 | Stop reason: SDUPTHER

## 2019-11-04 RX ORDER — TRIAMCINOLONE ACETONIDE 40 MG/ML
80 INJECTION, SUSPENSION INTRA-ARTICULAR; INTRAMUSCULAR ONCE
Status: COMPLETED | OUTPATIENT
Start: 2019-11-04 | End: 2019-11-04

## 2019-11-04 RX ADMIN — TRIAMCINOLONE ACETONIDE 80 MG: 40 INJECTION, SUSPENSION INTRA-ARTICULAR; INTRAMUSCULAR at 14:21

## 2019-11-04 NOTE — PROGRESS NOTES
Chief Complaint   Patient presents with   • Edema     Knee, med refills   • Back Pain     Subjective   Tricia Loyola is a 59 y.o. female who presents to the office for chronic pain management requiring opioid medication. Also continued complaints of worsening right knee pain.    The following portions of the patient's history were reviewed and updated as appropriate: allergies, current medications, past family history, past medical history, past social history, past surgical history and problem list.    History of Present Illness   UDS 6/3/19 appropriate for hydrocodone. PRN use clonazepam appropriately absent.     Fasting labs 8/2/19  TSH normal, T3 normal T4 0.9        Mammogram: 4/4/19  Colonoscopy 11/26/18  Dexa scan 4/2/19  Tetanus due 5/10/26  Influenza vaccine due 8/1/19 or when available  Medicare AWV due today.         Pain   This is a chronic problem. Pain located mid upper back, constant.  See CC. The current episode started more than 1 year ago. The problem has been waxing and waning. Pertinent negatives include no chest pain, coughing, fever, nausea or rash. The symptoms are aggravated by bending, walking, twisting and standing. She has tried acetaminophen, lying down, NSAIDs, relaxation, position changes, rest, sleep, walking and oral narcotics for the symptoms. Ineffective treatment without hydrocodone, which has provided significant relief. Pain rated as 5/10 at this time,stable at present.   Due for refill today.    Back pain:  Imaging in chart indicates mild DDD of cspine . An ED record is on file from [facility] of 10/5/18. CT of T and L spine done on that day show stable Tspine with an old compression fracture deformity of superior endplate of T11. This is thought a result of chronic osteoporosis.  Mild posterior spurring of superior endplates at T2-3 and T5-6. L spine shows mild degenerative changes at L4-5 and L5-S1.      Right knee pain, infrapatellar, bilaterally. Current episode onset 1 week ago.  It hurts all the time. Cannot tolerate NSAIDS, and steroid dose packs make her jittery, but she is ok with an IM steroid today and referral to orthopedics. Want relief so she can play with her grandson when he comes in from [place] this Thursday.     Past Medical History:   Diagnosis Date   • Abdominal pain    • Anemia    • Chronic post-traumatic headache      rebound      • Depressive disorder    • Encounter for gynecological examination    • Gastroesophageal reflux disease    • Generalized anxiety disorder    • History of mammogram 08/2008    MAMMOGRAM DIAGNOSTIC BILATERAL 86114 ([facility]) (1)     • Hyperlipidemia    • Ingrown toenail    • Injury of back    • Nonruptured cerebral aneurysm    • Osteoporosis    • Posttraumatic stress disorder    • Seizure (CMS/HCC)     Psychogenic non-epileptic sz    • Viral hepatitis A           Family History   Problem Relation Age of Onset   • Constipation Mother    • Hypertension Mother    • Anxiety disorder Mother    • Heart disease Mother    • Alcohol abuse Father    • Heart disease Father    • Anxiety disorder Brother    • Kidney disease Brother    • Hypertension Brother    • Arthritis Brother    • Anxiety disorder Brother    • Kidney disease Brother    • Diabetes Brother    • Anxiety disorder Brother    • Hypertension Brother    • Breast cancer Paternal Aunt    • Heart disease Maternal Grandmother    • Clotting disorder Maternal Grandmother    • Heart disease Maternal Grandfather         Review of Systems   Constitutional: Negative.  Negative for fever and unexpected weight change.   HENT: Negative.  Negative for congestion, postnasal drip, rhinorrhea, sinus pressure, sinus pain and sneezing.    Eyes: Negative.    Respiratory: Negative.  Negative for cough, chest tightness and shortness of breath.    Cardiovascular: Negative.  Negative for chest pain.   Gastrointestinal: Negative.  Negative for diarrhea and nausea.   Endocrine: Negative.    Genitourinary: Negative.  Negative for  "dysuria.   Musculoskeletal: Positive for arthralgias, back pain and gait problem (right knee pain).   Skin: Negative.  Negative for color change, pallor, rash and wound.   Allergic/Immunologic: Negative.    Hematological: Negative.    Psychiatric/Behavioral: Negative.  Negative for sleep disturbance and suicidal ideas.   All other systems reviewed and are negative.      Objective   Vitals:    11/04/19 1324   BP: 124/70   BP Location: Left arm   Patient Position: Sitting   Cuff Size: Adult   Pulse: 65   Resp: 16   Temp: 98 °F (36.7 °C)   TempSrc: Tympanic   SpO2: 97%   Weight: 52.3 kg (115 lb 3.2 oz)   Height: 157.5 cm (62\")   PainSc:   5   PainLoc: Back  Comment: knee     Physical Exam   Constitutional: She is oriented to person, place, and time. She appears well-developed and well-nourished.   HENT:   Head: Normocephalic and atraumatic.   Eyes: Conjunctivae are normal. Pupils are equal, round, and reactive to light.   Neck: Normal range of motion. Neck supple.   Cardiovascular: Normal rate, regular rhythm, normal heart sounds and intact distal pulses. Exam reveals no gallop and no friction rub.   No murmur heard.  Pulmonary/Chest: Effort normal and breath sounds normal. No respiratory distress. She has no wheezes. She has no rales. She exhibits no tenderness.   Abdominal: Soft. Bowel sounds are normal.   Musculoskeletal: Normal range of motion. She exhibits no edema, tenderness or deformity.   Lymphadenopathy:     She has no cervical adenopathy.   Neurological: She is alert and oriented to person, place, and time.   Skin: Skin is warm and dry. Capillary refill takes 2 to 3 seconds. No erythema. No pallor.   Psychiatric: She has a normal mood and affect. Her behavior is normal. Judgment and thought content normal.   Nursing note and vitals reviewed.      Assessment/Plan   Tricia was seen today for edema and back pain.    Diagnoses and all orders for this visit:    Acute pain of right knee  -     XR Knee 3 View " Right  -     Ambulatory Referral to Orthopedic Surgery  -     triamcinolone acetonide (KENALOG-40) injection 80 mg    Compression fracture of vertebra (CMS/HCC)  Comments:  chronic pain managed well with current hydrocodone,refill due today.  Orders:  -     HYDROcodone-acetaminophen (NORCO) 5-325 MG per tablet; Take 1 tablet by mouth Every 8 (Eight) Hours As Needed for Moderate Pain  or Severe Pain .    Radiculopathy of thoracolumbar region  -     HYDROcodone-acetaminophen (NORCO) 5-325 MG per tablet; Take 1 tablet by mouth Every 8 (Eight) Hours As Needed for Moderate Pain  or Severe Pain .    Chronic midline thoracic back pain  Comments:  controlled with hydrocodone, refill due today  Orders:  -     HYDROcodone-acetaminophen (NORCO) 5-325 MG per tablet; Take 1 tablet by mouth Every 8 (Eight) Hours As Needed for Moderate Pain  or Severe Pain .    Acquired hypothyroidism  Comments:  needs to see endocrinology. low T4 with normal T3, T4 other pituitary indicators normal. low dose levothyroxine started in October 2019 after testing.   Orders:  -     levothyroxine (SYNTHROID, LEVOTHROID) 25 MCG tablet; Take 0.5 tablets by mouth Daily.           PHQ-2/PHQ-9 Depression Screening 11/4/2019   Little interest or pleasure in doing things 0   Feeling down, depressed, or hopeless 0   Trouble falling or staying asleep, or sleeping too much 0   Feeling tired or having little energy 0   Poor appetite or overeating 0   Feeling bad about yourself - or that you are a failure or have let yourself or your family down 0   Trouble concentrating on things, such as reading the newspaper or watching television 0   Moving or speaking so slowly that other people could have noticed. Or the opposite - being so fidgety or restless that you have been moving around a lot more than usual 0   Thoughts that you would be better off dead, or of hurting yourself in some way 0   Total Score 0   Patient understands the risks associated with this  controlled medication, including tolerance and addiction.  Patient also agrees to only obtain this medication from me, and not from a another provider, unless that provider is covering for me in my absence.  Patient also agrees to be compliant in dosing, and not self adjust the dose of medication.  A signed controlled substance agreement is on file, and the patient has received a controlled substance education sheet at this a previous visit.  The patient has also signed a consent for treatment with a controlled substance as per [facility] policy. KATE was obtained.      UMESH Guzmán         Return in about 3 months (around 2/4/2020), or if symptoms worsen or fail to improve.    There are no Patient Instructions on file for this visit.

## 2019-11-11 RX ORDER — PROCHLORPERAZINE MALEATE 10 MG
10 TABLET ORAL EVERY 6 HOURS PRN
Qty: 360 TABLET | Refills: 1 | Status: SHIPPED | OUTPATIENT
Start: 2019-11-11 | End: 2021-06-26 | Stop reason: SDUPTHER

## 2019-11-13 ENCOUNTER — APPOINTMENT (OUTPATIENT)
Dept: CARDIOLOGY | Facility: HOSPITAL | Age: 59
End: 2019-11-13

## 2019-11-13 ENCOUNTER — APPOINTMENT (OUTPATIENT)
Dept: NUCLEAR MEDICINE | Facility: HOSPITAL | Age: 59
End: 2019-11-13

## 2019-11-21 DIAGNOSIS — G25.81 RESTLESS LEG SYNDROME: Chronic | ICD-10-CM

## 2019-11-21 DIAGNOSIS — E78.5 HYPERLIPIDEMIA, UNSPECIFIED HYPERLIPIDEMIA TYPE: ICD-10-CM

## 2019-11-21 RX ORDER — ROPINIROLE 3 MG/1
TABLET, FILM COATED ORAL
Qty: 150 TABLET | Refills: 0 | Status: SHIPPED | OUTPATIENT
Start: 2019-11-21 | End: 2020-05-28 | Stop reason: SDUPTHER

## 2019-11-21 RX ORDER — ATORVASTATIN CALCIUM 20 MG/1
TABLET, FILM COATED ORAL
Qty: 90 TABLET | Refills: 1 | Status: SHIPPED | OUTPATIENT
Start: 2019-11-21 | End: 2020-01-23 | Stop reason: SDUPTHER

## 2019-11-25 ENCOUNTER — HOSPITAL ENCOUNTER (OUTPATIENT)
Dept: NUCLEAR MEDICINE | Facility: HOSPITAL | Age: 59
Discharge: HOME | End: 2019-11-25

## 2019-11-25 ENCOUNTER — HOSPITAL ENCOUNTER (OUTPATIENT)
Dept: CARDIOLOGY | Facility: HOSPITAL | Age: 59
Discharge: HOME | End: 2019-11-25

## 2019-11-25 DIAGNOSIS — R07.89 OTHER CHEST PAIN: ICD-10-CM

## 2019-11-25 DIAGNOSIS — R00.2 PALPITATIONS: ICD-10-CM

## 2019-11-25 LAB
BH CV STRESS BP STAGE 1: NORMAL
BH CV STRESS BP STAGE 2: NORMAL
BH CV STRESS DURATION MIN STAGE 1: 3
BH CV STRESS DURATION MIN STAGE 2: 3
BH CV STRESS DURATION MIN STAGE 3: 0
BH CV STRESS DURATION SEC STAGE 1: 0
BH CV STRESS DURATION SEC STAGE 2: 0
BH CV STRESS DURATION SEC STAGE 3: 17
BH CV STRESS GRADE STAGE 1: 10
BH CV STRESS GRADE STAGE 2: 12
BH CV STRESS GRADE STAGE 3: 14
BH CV STRESS HR STAGE 1: 102
BH CV STRESS HR STAGE 2: 109
BH CV STRESS HR STAGE 3: 127
BH CV STRESS METS STAGE 1: 5
BH CV STRESS METS STAGE 2: 7.5
BH CV STRESS METS STAGE 3: 10
BH CV STRESS PROTOCOL 1: NORMAL
BH CV STRESS PROTOCOL 2 BP STAGE 1: NORMAL
BH CV STRESS PROTOCOL 2 COMMENTS STAGE 1: NORMAL
BH CV STRESS PROTOCOL 2 DOSE REGADENOSON STAGE 1: 0.4
BH CV STRESS PROTOCOL 2 DURATION MIN STAGE 1: 0
BH CV STRESS PROTOCOL 2 DURATION SEC STAGE 1: 10
BH CV STRESS PROTOCOL 2 HR STAGE 1: 96
BH CV STRESS PROTOCOL 2 STAGE 1: 1
BH CV STRESS PROTOCOL 2: NORMAL
BH CV STRESS RECOVERY BP: NORMAL MMHG
BH CV STRESS RECOVERY HR: 94 BPM
BH CV STRESS SPEED STAGE 1: 1.7
BH CV STRESS SPEED STAGE 2: 2.5
BH CV STRESS SPEED STAGE 3: 3.4
BH CV STRESS STAGE 1: 1
BH CV STRESS STAGE 2: 2
BH CV STRESS STAGE 3: 3
LV EF NUC BP: 52 %
MAXIMAL PREDICTED HEART RATE: 159 BPM
PERCENT MAX PREDICTED HR: 62.26 %
STRESS BASELINE BP: NORMAL MMHG
STRESS BASELINE HR: 86 BPM
STRESS PERCENT HR: 73 %
STRESS POST ESTIMATED WORKLOAD: 10.5 METS
STRESS POST EXERCISE DUR MIN: 9 MIN
STRESS POST EXERCISE DUR SEC: 16 SEC
STRESS POST PEAK BP: NORMAL MMHG
STRESS POST PEAK HR: 99 BPM
STRESS TARGET HR: 135 BPM

## 2019-11-25 PROCEDURE — 93016 CV STRESS TEST SUPVJ ONLY: CPT | Performed by: INTERNAL MEDICINE

## 2019-11-25 PROCEDURE — 25010000002 REGADENOSON 0.4 MG/5ML SOLUTION: Performed by: INTERNAL MEDICINE

## 2019-11-25 PROCEDURE — 78452 HT MUSCLE IMAGE SPECT MULT: CPT

## 2019-11-25 PROCEDURE — 93017 CV STRESS TEST TRACING ONLY: CPT

## 2019-11-25 PROCEDURE — 0 TECHNETIUM SESTAMIBI: Performed by: INTERNAL MEDICINE

## 2019-11-25 PROCEDURE — 93018 CV STRESS TEST I&R ONLY: CPT | Performed by: INTERNAL MEDICINE

## 2019-11-25 PROCEDURE — 78452 HT MUSCLE IMAGE SPECT MULT: CPT | Performed by: INTERNAL MEDICINE

## 2019-11-25 PROCEDURE — A9500 TC99M SESTAMIBI: HCPCS | Performed by: INTERNAL MEDICINE

## 2019-11-25 RX ORDER — SODIUM CHLORIDE 0.9 % (FLUSH) 0.9 %
10 SYRINGE (ML) INJECTION ONCE
Status: COMPLETED | OUTPATIENT
Start: 2019-11-25 | End: 2019-11-25

## 2019-11-25 RX ADMIN — TECHNETIUM TC 99M SESTAMIBI 1 DOSE: 1 INJECTION INTRAVENOUS at 08:07

## 2019-11-25 RX ADMIN — REGADENOSON 0.4 MG: 0.08 INJECTION, SOLUTION INTRAVENOUS at 10:01

## 2019-11-25 RX ADMIN — SODIUM CHLORIDE, PRESERVATIVE FREE 10 ML: 5 INJECTION INTRAVENOUS at 10:01

## 2019-11-25 RX ADMIN — TECHNETIUM TC 99M SESTAMIBI 1 DOSE: 1 INJECTION INTRAVENOUS at 10:01

## 2019-11-26 ENCOUNTER — OFFICE VISIT (OUTPATIENT)
Dept: GASTROENTEROLOGY | Facility: CLINIC | Age: 59
End: 2019-11-26

## 2019-11-26 VITALS
DIASTOLIC BLOOD PRESSURE: 72 MMHG | HEIGHT: 62 IN | SYSTOLIC BLOOD PRESSURE: 112 MMHG | HEART RATE: 74 BPM | BODY MASS INDEX: 21.2 KG/M2 | WEIGHT: 115.2 LBS

## 2019-11-26 DIAGNOSIS — R10.10 PAIN OF UPPER ABDOMEN: ICD-10-CM

## 2019-11-26 DIAGNOSIS — M54.15 RADICULOPATHY OF THORACOLUMBAR REGION: Chronic | ICD-10-CM

## 2019-11-26 DIAGNOSIS — K74.00 HEPATIC FIBROSIS: ICD-10-CM

## 2019-11-26 DIAGNOSIS — R74.8 ELEVATED LIVER ENZYMES: ICD-10-CM

## 2019-11-26 DIAGNOSIS — K59.00 CONSTIPATION, UNSPECIFIED CONSTIPATION TYPE: ICD-10-CM

## 2019-11-26 DIAGNOSIS — G89.29 CHRONIC MIDLINE THORACIC BACK PAIN: Chronic | ICD-10-CM

## 2019-11-26 DIAGNOSIS — M54.6 CHRONIC MIDLINE THORACIC BACK PAIN: Chronic | ICD-10-CM

## 2019-11-26 DIAGNOSIS — K21.00 GASTROESOPHAGEAL REFLUX DISEASE WITH ESOPHAGITIS: Primary | ICD-10-CM

## 2019-11-26 DIAGNOSIS — M48.50XA COMPRESSION FRACTURE OF VERTEBRA (HCC): Chronic | ICD-10-CM

## 2019-11-26 PROCEDURE — 99213 OFFICE O/P EST LOW 20 MIN: CPT | Performed by: PHYSICIAN ASSISTANT

## 2019-11-26 RX ORDER — SUCRALFATE 1 G/1
1 TABLET ORAL 2 TIMES DAILY
Qty: 60 TABLET | Refills: 2 | Status: SHIPPED | OUTPATIENT
Start: 2019-11-26 | End: 2020-10-28

## 2019-11-26 RX ORDER — HYDROCODONE BITARTRATE AND ACETAMINOPHEN 5; 325 MG/1; MG/1
1 TABLET ORAL EVERY 8 HOURS PRN
Qty: 90 TABLET | Refills: 0 | Status: SHIPPED | OUTPATIENT
Start: 2019-11-26 | End: 2019-12-20 | Stop reason: SDUPTHER

## 2019-11-26 NOTE — PATIENT INSTRUCTIONS

## 2019-11-26 NOTE — PROGRESS NOTES
Chief Complaint   Patient presents with   • Heartburn   • Hepatic Fibrosis   • Elevated Hepatic Enzymes       ENDO PROCEDURE ORDERED:    Subjective    Tricia Loyola is a 59 y.o. female. she is here today for follow-up.    History of Present Illness    The patient is seen on a recheck of her GERD, elevated liver enzymes, F0-F1, S0-S1/N0.  Last seen 10/29/2019.  She was given a trial on Colestid for possible bile reflux.  She states it did seem to help her stomach, but caused her to have severe constipation, so she could not tolerate it.  She was having to take Senna and Colace in order to have a bowel movement.  She is on the Dexilant and compazine.  She denied dysphagia.  Weight is up 1 pound since last visit.  Last EGD 10/16/2019 showed esophagitis, gastritis with a lot of bile.  Last colonoscopy 11/26/2018.  She states she had a stress test yesterday by Dr. Martinez, but did not know the results.    ASSESSMENT/PLAN:  Patient with probable bile reflux.  We will try stopping the Colestid and give her Carafate twice a day.  I cautioned her to not take this with other medications.  We will continue on the Dexilant for now.  I encouraged her to avoid gastric irritants.  We will plan followup in 6-8 weeks, further pending clinical course and the results of the above.       The following portions of the patient's history were reviewed and updated as appropriate:   Past Medical History:   Diagnosis Date   • Abdominal pain    • Anemia    • Chronic post-traumatic headache      rebound      • Depressive disorder    • Encounter for gynecological examination    • Gastroesophageal reflux disease    • Generalized anxiety disorder    • History of mammogram 08/2008    MAMMOGRAM DIAGNOSTIC BILATERAL 81786 ([facility]) (1)     • Hyperlipidemia    • Ingrown toenail    • Injury of back    • Nonruptured cerebral aneurysm    • Osteoporosis    • Posttraumatic stress disorder    • Seizure (CMS/HCC)     Psychogenic non-epileptic sz    • Viral  hepatitis A      Past Surgical History:   Procedure Laterality Date   • APPENDECTOMY     • BREAST BIOPSY Left    • CHOLECYSTECTOMY     • COLONOSCOPY N/A 2018    Procedure: COLONOSCOPY;  Surgeon: Meet Mcintyre MD;  Location: [facility] ENDOSCOPY;  Service: General   • CRANIOTOMY FOR ANEURYSM     • ENDOSCOPY N/A 10/16/2019    Procedure: ESOPHAGOGASTRODUODENOSCOPY;  Surgeon: Kory Muhammad MD;  Location: [facility] ENDOSCOPY;  Service: Gastroenterology   • PAP SMEAR  2012   • TONSILLECTOMY     • UPPER GASTROINTESTINAL ENDOSCOPY  10/16/2019     Family History   Problem Relation Age of Onset   • Constipation Mother    • Hypertension Mother    • Anxiety disorder Mother    • Heart disease Mother    • Alcohol abuse Father    • Heart disease Father    • Anxiety disorder Brother    • Kidney disease Brother    • Hypertension Brother    • Arthritis Brother    • Anxiety disorder Brother    • Kidney disease Brother    • Diabetes Brother    • Anxiety disorder Brother    • Hypertension Brother    • Breast cancer Paternal Aunt    • Heart disease Maternal Grandmother    • Clotting disorder Maternal Grandmother    • Heart disease Maternal Grandfather      OB History      Para Term  AB Living    3 2 2   1 2    SAB TAB Ectopic Molar Multiple Live Births    1                  Allergies   Allergen Reactions   • Nitrofuran Derivatives Hives     Macrobid   • Augmentin [Amoxicillin-Pot Clavulanate]      Hives   • Ciprofloxacin      Cipro:  Rash   • Fiorinal [Butalbital-Aspirin-Caffeine]      Rapid heart rate   • Imitrex [Sumatriptan]      Rapid heart rate   • Iodine      Anaphylaxis   • Other      MIDRIN  -  Rapid heart rate   • Toradol [Ketorolac Tromethamine]      Anaphylaxis   • Ultram [Tramadol]      Rapid heart rate   • Ibuprofen Rash     Social History     Socioeconomic History   • Marital status: Legally      Spouse name: Not on file   • Number of children: Not on file   • Years of education: Not  on file   • Highest education level: Not on file   Tobacco Use   • Smoking status: Light Tobacco Smoker     Packs/day: 0.50     Years: 43.00     Pack years: 21.50     Types: Cigarettes   • Smokeless tobacco: Never Used   Substance and Sexual Activity   • Alcohol use: No   • Drug use: No   • Sexual activity: No     Current Medications:  Prior to Admission medications    Medication Sig Start Date End Date Taking? Authorizing Provider   albuterol sulfate HFA (VENTOLIN HFA) 108 (90 Base) MCG/ACT inhaler Inhale 2 puffs Every 6 (Six) Hours As Needed for Wheezing or Shortness of Air. 6/6/19  Yes Rosario Ceron APRN   amitriptyline (ELAVIL) 75 MG tablet TAKE ONE TABLET BY MOUTH AT BEDTIME. 1/29/18  Yes Sonia Mata MD   atorvastatin (LIPITOR) 20 MG tablet take 1 tablet by mouth once daily 11/21/19  Yes Rosario Ceron APRN   busPIRone (BUSPAR) 30 MG tablet Take 1 tablet by mouth 2 (Two) Times a Day. 3/7/18  Yes Sonia Mata MD   clonazePAM (KLONOPIN) 0.5 MG tablet Take 1 tablet by mouth Daily As Needed for Anxiety (panic attacks). 10/4/19  Yes Rosario Ceron APRN   colestipol (COLESTID) 1 g tablet Take 1 tablet by mouth 2 (Two) Times a Day. 10/29/19  Yes Johnny Smiley PA-C   cyclobenzaprine (FLEXERIL) 10 MG tablet Take 1 tablet by mouth 3 (Three) Times a Day As Needed for Muscle Spasms. 1/7/19  Yes Sonia Mata MD   dexlansoprazole (DEXILANT) 60 MG capsule Take 1 capsule by mouth Daily. 3/8/19  Yes Rosario Ceron APRN   HYDROcodone-acetaminophen (NORCO) 5-325 MG per tablet Take 1 tablet by mouth Every 8 (Eight) Hours As Needed for Moderate Pain  or Severe Pain . 11/4/19  Yes Rosario Ceron APRN   levothyroxine (SYNTHROID, LEVOTHROID) 25 MCG tablet Take 0.5 tablets by mouth Daily. 11/4/19  Yes Rosario Ceron APRN   metoprolol succinate XL (TOPROL XL) 100 MG 24 hr tablet Take 1 tablet by mouth Daily. 11/1/19  Yes Ramin Martinez MD   ondansetron ODT (ZOFRAN-ODT)  "4 MG disintegrating tablet Take 1 tablet by mouth Every 8 (Eight) Hours As Needed for Nausea for up to 12 doses. 9/10/19  Yes Devin Duarte MD   OXcarbazepine (TRILEPTAL) 150 MG tablet Take 150 mg by mouth Daily. Daily at bedtime   Yes Provider, MD Doris   prochlorperazine (COMPAZINE) 10 MG tablet Take 1 tablet by mouth Every 6 (Six) Hours As Needed for Nausea or Vomiting. 11/11/19  Yes Ceron, UMESH Ponce   rOPINIRole (REQUIP) 3 MG tablet take 1 tablet by mouth at bedtime 11/21/19  Yes Ceron, UMESH Ponce   topiramate (TOPAMAX) 50 MG tablet Take 50 mg by mouth 2 (Two) Times a Day.   Yes Provider, MD Doris     Review of Systems  Review of Systems       Objective    /72 (BP Location: Left arm)   Pulse 74   Ht 157.5 cm (62\")   Wt 52.3 kg (115 lb 3.2 oz)   BMI 21.07 kg/m²   Physical Exam   Constitutional: She is oriented to person, place, and time. She appears well-developed and well-nourished. No distress.   HENT:   Head: Normocephalic and atraumatic.   Eyes: EOM are normal. Pupils are equal, round, and reactive to light.   Neck: Normal range of motion.   Cardiovascular: Normal rate, regular rhythm and normal heart sounds.   Pulmonary/Chest: Effort normal and breath sounds normal.   Abdominal: Soft. Bowel sounds are normal. She exhibits no shifting dullness, no distension, no abdominal bruit, no ascites and no mass. There is no hepatosplenomegaly. There is tenderness. There is no rigidity, no rebound, no guarding and no CVA tenderness. No hernia. Hernia confirmed negative in the ventral area.   mild   Musculoskeletal: Normal range of motion.   Neurological: She is alert and oriented to person, place, and time.   Skin: Skin is warm and dry.   Psychiatric: She has a normal mood and affect. Her behavior is normal. Judgment and thought content normal.   Nursing note and vitals reviewed.    Assessment/Plan      1. Gastroesophageal reflux disease with esophagitis    2. Hepatic fibrosis   "   3. Elevated liver enzymes    4. Pain of upper abdomen    5. Constipation, unspecified constipation type    .   Tricia was seen today for heartburn, hepatic fibrosis and elevated hepatic enzymes.    Diagnoses and all orders for this visit:    Gastroesophageal reflux disease with esophagitis    Hepatic fibrosis     Elevated liver enzymes    Pain of upper abdomen    Constipation, unspecified constipation type    Other orders  -     sucralfate (CARAFATE) 1 g tablet; Take 1 tablet by mouth 2 (Two) Times a Day.        Orders placed during this encounter include:  No orders of the defined types were placed in this encounter.      Medications prescribed:  New Medications Ordered This Visit   Medications   • sucralfate (CARAFATE) 1 g tablet     Sig: Take 1 tablet by mouth 2 (Two) Times a Day.     Dispense:  60 tablet     Refill:  2       Requested Prescriptions     Signed Prescriptions Disp Refills   • sucralfate (CARAFATE) 1 g tablet 60 tablet 2     Sig: Take 1 tablet by mouth 2 (Two) Times a Day.       Review and/or summary of lab tests, radiology, procedures, medications. Review and summary of old records and obtaining of history. The risks and benefits of my recommendations, as well as other treatment options were discussed with the patient today. Questions were answered.    Follow-up: Return in about 7 weeks (around 1/14/2020), or if symptoms worsen or fail to improve.     * Surgery not found *      This document has been electronically signed by Johnny Smiley PA-C on November 27, 2019 2:31 PM      Results for orders placed or performed during the hospital encounter of 11/25/19   Stress Test With Myocardial Perfusion One Day   Result Value Ref Range    BH CV STRESS PROTOCOL 1 Simba     Stage 1 1     Duration Min Stage 1 3     Duration Sec Stage 1 0     Target HR (85%) 135 bpm    Max. Pred. HR (100%) 159 bpm    HR Stage 1 102     BP Stage 1 129/65     Grade Stage 1 10     Speed Stage 1 1.7     BH CV STRESS METS  STAGE 1 5     Stage 2 2     HR Stage 2 109     BP Stage 2 152/66     Duration Min Stage 2 3     Duration Sec Stage 2 0     Grade Stage 2 12     Speed Stage 2 2.5     BH CV STRESS METS STAGE 2 7.5     Stage 3 3     HR Stage 3 127     Duration Min Stage 3 0     Duration Sec Stage 3 17     Grade Stage 3 14     Speed Stage 3 3.4     BH CV STRESS METS STAGE 3 10.0     Baseline HR 86 bpm    Baseline /82 mmHg    Peak HR 99 bpm    Percent Max Pred HR 62.26 %    Percent Target HR 73 %    Peak /82 mmHg    Recovery HR 94 bpm    Recovery /69 mmHg    Exercise duration (min) 9 min    Exercise duration (sec) 16 sec    Estimated workload 10.5 METS    BH CV STRESS PROTOCOL 2 Pharmacologic     BH CV STRESS PROTOCOL 2 STAGE 1 1     BH CV STRESS PROTOCOL 2 HR STAGE 1 96     BH CV STRESS PROTOCOL 2 BP STAGE 1 120/69     BH CV STRESS PROTOCOL 2 DURATION MIN STAGE 1 0     BH CV STRESS PROTOCOL 2 DURATION SEC STAGE 1 10     BH CV STRESS PROTOCOL 2 DOSE REGADENOSON STAGE 1 0.4     BH CV STRESS PROTOCOL 2 COMMENTS STAGE 1 10 sec bolus injection     Nuc Stress EF 52 %   Results for orders placed or performed during the hospital encounter of 10/16/19   Tissue Pathology Exam   Result Value Ref Range    Case Report       Surgical Pathology Report                         Case: [id number]                                  Authorizing Provider:  Kory Muhammad MD        Collected:           10/16/2019 01:55 PM          Ordering Location:     [facility]             Received:            10/17/2019 07:04 AM                                 [facility]                                                     Pathologist:           Apollo Paul MD                                                        Specimens:   1) - Gastric, Antrum                                                                                2) - Esophagus, Distal                                                                     Final Diagnosis        1.  GASTRIC ANTRUM, MUCOSAL BIOPSY:   MILD CHRONIC GASTRITIS.   NO EVIDENCE OF HELICOBACTER PYLORI.     2.  DISTAL ESOPHAGUS, MUCOSAL BIOPSY:   SEGMENTS OF MILDLY INFLAMED STRATIFIED SQUAMOUS EPITHELIUM.        Gross Description       In 2 containers, each of these show mucosal biopsies measuring up to 0.3 cm in greatest dimension.  Embedded accordingly:  1A antrum, 2A distal esophagus.        Results for orders placed or performed in visit on 08/06/19   BOLDEN Fibrosure   Result Value Ref Range    Fibrosis Score (References) 0.25 (H) 0.00 - 0.21    Fibrosis Stage (Reference) F0-F1     Steatosis Score (Reference) 0.34 (H) 0.00 - 0.30    Steatosis Grade (Reference) Comment     BOLDEN Score (Reference) 0.25 0.25    Bolden Grade (Reference) Comment     Height: (Reference) 62 in    Weight: (Reference) 109 LBS    Alpha 2-Macroglobulins, Qn 266 110 - 276 mg/dL    Haptoglobin 97 34 - 200 mg/dL    Apolipoprotein A-1 120 116 - 209 mg/dL    Total Bilirubin 0.2 0.0 - 1.2 mg/dL    GGT 19 0 - 60 IU/L    ALT (SGPT) 17 0 - 40 IU/L    AST (SGOT) P5P (Reference) 54 (H) 0 - 40 IU/L    Cholesterol, Total (Reference) 202 (H) 100 - 199 mg/dL    Glucose, Serum (Reference) 98 65 - 99 mg/dL    Triglycerides 297 (H) 0 - 149 mg/dL    Interpretations: (Reference) Comment     Fibrosis Scoring: Comment     Steatosis Grading (Reference) Comment     Bolden Scoring (Reference) Comment     Limitations: (Reference) Comment     Comment (Reference) Comment    Nuclear Antigen Antibody, IFA   Result Value Ref Range    YUKO Negative    Iron and TIBC   Result Value Ref Range    Iron 89 37 - 145 mcg/dL    Iron Saturation 31 20 - 50 %    Transferrin 194 (L) 200 - 360 mg/dL    TIBC 289 (L) 298 - 536 mcg/dL   Alpha - 1 - Antitrypsin   Result Value Ref Range    ALPHA -1 ANTITRYPSIN 146 90 - 200 mg/dL   Mitochondrial Antibodies, M2   Result Value Ref Range    Mitochondrial Ab <20.0 0.0 - 20.0 Units   Ceruloplasmin   Result Value Ref Range    Ceruloplasmin 23 19 -  39 mg/dL   AFP Tumor Marker   Result Value Ref Range    ALPHA-FETOPROTEIN 1.49 0 - 8.3 ng/mL   Anti-Smooth Muscle Antibody Titer   Result Value Ref Range    Smooth Muscle Ab 3 0 - 19 Units   Ferritin   Result Value Ref Range    Ferritin 71.83 13.00 - 150.00 ng/mL   Results for orders placed or performed in visit on 08/02/19   Cortisol - AM   Result Value Ref Range    Cortisol - AM 18.83 mcg/dL   Osmolality, Urine - Urine, Clean Catch   Result Value Ref Range    Osmolality, Urine 578 mOsm/kg   Prolactin   Result Value Ref Range    Prolactin 9.27 4.79 - 23.30 ng/mL   Insulin-like Growth Factor   Result Value Ref Range    Insulin-Like Growth Factor-1 119 42 - 169 ng/mL   Estradiol   Result Value Ref Range    Estradiol <5.0 pg/mL   ACTH   Result Value Ref Range    ACTH 32.1 7.2 - 63.3 pg/mL   Results for orders placed or performed in visit on 07/29/19   Testosterone (Free & Total), LC / MS   Result Value Ref Range    Testosterone, Total 15.9 7.0 - 40.0 ng/dL    Testosterone, Free 0.8 0.0 - 4.2 pg/mL   Thyroid Peroxidase Antibody   Result Value Ref Range    Thyroid Peroxidase Antibody 16 0 - 34 IU/mL   Anti-Thyroglobulin Antibody   Result Value Ref Range    Thyroglobulin Ab <1.0 0.0 - 0.9 IU/mL   T3   Result Value Ref Range    T3, Total 103.0 80.0 - 200.0 ng/dl   TSH   Result Value Ref Range    TSH 2.480 0.270 - 4.200 mIU/mL   T4, Free   Result Value Ref Range    Free T4 0.90 (L) 0.93 - 1.70 ng/dL   Lipid Panel   Result Value Ref Range    Total Cholesterol 214 (H) 150 - 200 mg/dL    Triglycerides 250 (H) <=150 mg/dL    HDL Cholesterol 35 (L) 40 - 59 mg/dL    LDL Cholesterol  129 (H) <=100 mg/dL    VLDL Cholesterol 50 mg/dL    LDL/HDL Ratio 3.69 (H) 0.00 - 3.22     *Note: Due to a large number of results and/or encounters for the requested time period, some results have not been displayed. A complete set of results can be found in Results Review.       Some portions of this note have been dictated using voice recognition  software and may contain errors and/or omissions.

## 2019-12-06 DIAGNOSIS — M81.0 AGE RELATED OSTEOPOROSIS, UNSPECIFIED PATHOLOGICAL FRACTURE PRESENCE: Primary | ICD-10-CM

## 2019-12-06 RX ORDER — SODIUM CHLORIDE 9 MG/ML
250 INJECTION, SOLUTION INTRAVENOUS ONCE
Status: CANCELLED | OUTPATIENT
Start: 2019-12-06

## 2019-12-20 ENCOUNTER — OFFICE VISIT (OUTPATIENT)
Dept: ORTHOPEDIC SURGERY | Facility: CLINIC | Age: 59
End: 2019-12-20

## 2019-12-20 VITALS — HEIGHT: 62 IN | WEIGHT: 115 LBS | BODY MASS INDEX: 21.16 KG/M2

## 2019-12-20 DIAGNOSIS — M17.11 PRIMARY OSTEOARTHRITIS OF RIGHT KNEE: ICD-10-CM

## 2019-12-20 DIAGNOSIS — M25.561 RIGHT KNEE PAIN, UNSPECIFIED CHRONICITY: Primary | ICD-10-CM

## 2019-12-20 DIAGNOSIS — M48.50XA COMPRESSION FRACTURE OF VERTEBRA (HCC): Chronic | ICD-10-CM

## 2019-12-20 DIAGNOSIS — M54.6 CHRONIC MIDLINE THORACIC BACK PAIN: Chronic | ICD-10-CM

## 2019-12-20 DIAGNOSIS — M54.15 RADICULOPATHY OF THORACOLUMBAR REGION: Chronic | ICD-10-CM

## 2019-12-20 DIAGNOSIS — G89.29 CHRONIC MIDLINE THORACIC BACK PAIN: Chronic | ICD-10-CM

## 2019-12-20 PROCEDURE — 20610 DRAIN/INJ JOINT/BURSA W/O US: CPT | Performed by: ORTHOPAEDIC SURGERY

## 2019-12-20 PROCEDURE — 99213 OFFICE O/P EST LOW 20 MIN: CPT | Performed by: ORTHOPAEDIC SURGERY

## 2019-12-20 RX ORDER — HYDROCODONE BITARTRATE AND ACETAMINOPHEN 5; 325 MG/1; MG/1
1 TABLET ORAL EVERY 8 HOURS PRN
Qty: 90 TABLET | Refills: 0 | Status: SHIPPED | OUTPATIENT
Start: 2019-12-20 | End: 2020-01-09 | Stop reason: DRUGHIGH

## 2019-12-20 RX ORDER — LIDOCAINE HYDROCHLORIDE 10 MG/ML
4 INJECTION, SOLUTION EPIDURAL; INFILTRATION; INTRACAUDAL; PERINEURAL
Status: COMPLETED | OUTPATIENT
Start: 2019-12-20 | End: 2019-12-20

## 2019-12-20 RX ORDER — TRIAMCINOLONE ACETONIDE 40 MG/ML
80 INJECTION, SUSPENSION INTRA-ARTICULAR; INTRAMUSCULAR
Status: COMPLETED | OUTPATIENT
Start: 2019-12-20 | End: 2019-12-20

## 2019-12-20 RX ADMIN — TRIAMCINOLONE ACETONIDE 80 MG: 40 INJECTION, SUSPENSION INTRA-ARTICULAR; INTRAMUSCULAR at 15:03

## 2019-12-20 RX ADMIN — LIDOCAINE HYDROCHLORIDE 4 ML: 10 INJECTION, SOLUTION EPIDURAL; INFILTRATION; INTRACAUDAL; PERINEURAL at 15:03

## 2019-12-20 NOTE — PROGRESS NOTES
Tricia Loyola is a 59 y.o. female   Primary provider:  Rosario Ceron APRN       Chief Complaint   Patient presents with   • Right Knee - Pain       HISTORY OF PRESENT ILLNESS:  New patient right knee pain, patient had XRAYS done on 11/14/19, patient states pain score is at a 7 today, is been going on now since mid November.  She had previous Baker cyst excised from her knee in the past and this seemed to help for a while.  She had a shot in her knee in the remote past and she recalls this helped for a year.  She has some pain of the anterior portion knee the left knee does not bother its worse with activity yet better with rest.    Pain   This is a new problem. The current episode started more than 1 year ago. The problem occurs constantly. Associated symptoms include headaches, joint swelling, nausea and vomiting. Pertinent negatives include no abdominal pain, chest pain, chills or fever. Associated symptoms comments: Aching . The symptoms are aggravated by walking. She has tried nothing for the symptoms. The treatment provided no relief.        CONCURRENT MEDICAL HISTORY:    Past Medical History:   Diagnosis Date   • Abdominal pain    • Anemia    • Chronic post-traumatic headache      rebound      • Depressive disorder    • Encounter for gynecological examination    • Gastroesophageal reflux disease    • Generalized anxiety disorder    • History of mammogram 08/2008    MAMMOGRAM DIAGNOSTIC BILATERAL 11591 ([facility]) (1)     • Hyperlipidemia    • Ingrown toenail    • Injury of back    • Nonruptured cerebral aneurysm    • Osteoporosis    • Posttraumatic stress disorder    • Seizure (CMS/HCC)     Psychogenic non-epileptic sz    • Viral hepatitis A        Allergies   Allergen Reactions   • Nitrofuran Derivatives Hives     Macrobid   • Augmentin [Amoxicillin-Pot Clavulanate]      Hives   • Ciprofloxacin      Cipro:  Rash   • Fiorinal [Butalbital-Aspirin-Caffeine]      Rapid heart rate   • Imitrex [Sumatriptan]       Rapid heart rate   • Iodine      Anaphylaxis   • Other      MIDRIN  -  Rapid heart rate   • Toradol [Ketorolac Tromethamine]      Anaphylaxis   • Ultram [Tramadol]      Rapid heart rate   • Ibuprofen Rash         Current Outpatient Medications:   •  albuterol sulfate HFA (VENTOLIN HFA) 108 (90 Base) MCG/ACT inhaler, Inhale 2 puffs Every 6 (Six) Hours As Needed for Wheezing or Shortness of Air., Disp: 1 inhaler, Rfl: 3  •  amitriptyline (ELAVIL) 75 MG tablet, TAKE ONE TABLET BY MOUTH AT BEDTIME., Disp: 30 tablet, Rfl: 2  •  atorvastatin (LIPITOR) 20 MG tablet, take 1 tablet by mouth once daily, Disp: 90 tablet, Rfl: 1  •  busPIRone (BUSPAR) 30 MG tablet, Take 1 tablet by mouth 2 (Two) Times a Day., Disp: 60 tablet, Rfl: 0  •  clonazePAM (KLONOPIN) 0.5 MG tablet, Take 1 tablet by mouth Daily As Needed for Anxiety (panic attacks)., Disp: 15 tablet, Rfl: 0  •  cyclobenzaprine (FLEXERIL) 10 MG tablet, Take 1 tablet by mouth 3 (Three) Times a Day As Needed for Muscle Spasms., Disp: 90 tablet, Rfl: 0  •  dexlansoprazole (DEXILANT) 60 MG capsule, Take 1 capsule by mouth Daily., Disp: 90 capsule, Rfl: 3  •  HYDROcodone-acetaminophen (NORCO) 5-325 MG per tablet, Take 1 tablet by mouth Every 8 (Eight) Hours As Needed for Moderate Pain  or Severe Pain ., Disp: 90 tablet, Rfl: 0  •  levothyroxine (SYNTHROID, LEVOTHROID) 25 MCG tablet, Take 0.5 tablets by mouth Daily., Disp: 15 tablet, Rfl: 3  •  metoprolol succinate XL (TOPROL XL) 100 MG 24 hr tablet, Take 1 tablet by mouth Daily., Disp: 90 tablet, Rfl: 3  •  ondansetron ODT (ZOFRAN-ODT) 4 MG disintegrating tablet, Take 1 tablet by mouth Every 8 (Eight) Hours As Needed for Nausea for up to 12 doses., Disp: 12 tablet, Rfl: 0  •  OXcarbazepine (TRILEPTAL) 150 MG tablet, Take 150 mg by mouth Daily. Daily at bedtime, Disp: , Rfl:   •  prochlorperazine (COMPAZINE) 10 MG tablet, Take 1 tablet by mouth Every 6 (Six) Hours As Needed for Nausea or Vomiting., Disp: 360 tablet, Rfl: 1  •   rOPINIRole (REQUIP) 3 MG tablet, take 1 tablet by mouth at bedtime, Disp: 150 tablet, Rfl: 0  •  sucralfate (CARAFATE) 1 g tablet, Take 1 tablet by mouth 2 (Two) Times a Day., Disp: 60 tablet, Rfl: 2  •  topiramate (TOPAMAX) 50 MG tablet, Take 50 mg by mouth 2 (Two) Times a Day., Disp: , Rfl:     Current Facility-Administered Medications:   •  zoledronic acid (RECLAST) infusion 5 mg, 5 mg, Intravenous, Once, Ceron, UMESH Ponce    Past Surgical History:   Procedure Laterality Date   • APPENDECTOMY     • BREAST BIOPSY Left    • CHOLECYSTECTOMY     • COLONOSCOPY N/A 11/26/2018    Procedure: COLONOSCOPY;  Surgeon: Meet Mcintyre MD;  Location: [facility] ENDOSCOPY;  Service: General   • CRANIOTOMY FOR ANEURYSM  2003   • ENDOSCOPY N/A 10/16/2019    Procedure: ESOPHAGOGASTRODUODENOSCOPY;  Surgeon: Kory Muhammad MD;  Location: [facility] ENDOSCOPY;  Service: Gastroenterology   • PAP SMEAR  08/16/2012   • TONSILLECTOMY     • UPPER GASTROINTESTINAL ENDOSCOPY  10/16/2019       Family History   Problem Relation Age of Onset   • Constipation Mother    • Hypertension Mother    • Anxiety disorder Mother    • Heart disease Mother    • Alcohol abuse Father    • Heart disease Father    • Anxiety disorder Brother    • Kidney disease Brother    • Hypertension Brother    • Arthritis Brother    • Anxiety disorder Brother    • Kidney disease Brother    • Diabetes Brother    • Anxiety disorder Brother    • Hypertension Brother    • Breast cancer Paternal Aunt    • Heart disease Maternal Grandmother    • Clotting disorder Maternal Grandmother    • Heart disease Maternal Grandfather        Social History     Socioeconomic History   • Marital status: Legally      Spouse name: Not on file   • Number of children: Not on file   • Years of education: Not on file   • Highest education level: Not on file   Tobacco Use   • Smoking status: Light Tobacco Smoker     Packs/day: 0.50     Years: 43.00     Pack years: 21.50     Types:  "Cigarettes   • Smokeless tobacco: Never Used   Substance and Sexual Activity   • Alcohol use: No   • Drug use: No   • Sexual activity: Never        Review of Systems   Constitutional: Negative for chills and fever.   HENT: Negative for facial swelling.    Respiratory: Negative for apnea and shortness of breath.    Cardiovascular: Negative for chest pain and leg swelling.   Gastrointestinal: Positive for nausea and vomiting. Negative for abdominal pain.   Genitourinary: Negative for dysuria.   Musculoskeletal: Positive for joint swelling.   Skin: Negative for color change.   Neurological: Positive for headaches. Negative for seizures and syncope.   Hematological: Negative for adenopathy.   Psychiatric/Behavioral: Positive for sleep disturbance. Negative for dysphoric mood.   All other systems reviewed and are negative.      PHYSICAL EXAMINATION:       Ht 157.5 cm (62\")   Wt 52.2 kg (115 lb)   BMI 21.03 kg/m²     Physical Exam   Constitutional: She is oriented to person, place, and time. She appears well-developed.   HENT:   Head: Normocephalic and atraumatic.   Eyes: Pupils are equal, round, and reactive to light. EOM are normal.   Neck: Neck supple.   Pulmonary/Chest: Effort normal.   Musculoskeletal: She exhibits tenderness.   Neurological: She is alert and oriented to person, place, and time.   Skin: Skin is warm and dry.   Psychiatric: She has a normal mood and affect.       GAIT:     []  Normal  []  Antalgic    Assistive device: [x]  None  []  Walker     []  Crutches  []  Cane     []  Wheelchair  []  Stretcher    Ortho Exam  Tender somewhat over the medial joint line.  Trace effusion today.  Ligaments are stable.  Motion is well-maintained.  Calf is negative.  Neurovascular intact.  No edema distally.  Good capillary refill.  Hip joint moves well without pain.  Mild tenderness posteriorly no masses noted.      No results found.  Reviewed reveal mild degenerative change.  Spurring is noted with a quad tendon " inserts.  She does have relative osteopenia.      ASSESSMENT:    Diagnoses and all orders for this visit:    Right knee pain, unspecified chronicity  -     Large Joint Arthrocentesis: R knee    Primary osteoarthritis of right knee      Large Joint Arthrocentesis: R knee  Date/Time: 12/20/2019 3:03 PM  Consent given by: patient  Site marked: site marked  Timeout: Immediately prior to procedure a time out was called to verify the correct patient, procedure, equipment, support staff and site/side marked as required   Supporting Documentation  Indications: pain   Procedure Details  Location: knee - R knee  Preparation: Patient was prepped and draped in the usual sterile fashion  Needle size: 22 G  Medications administered: 80 mg triamcinolone acetonide 40 MG/ML; 4 mL lidocaine PF 1% 1 %              PLAN I have gone over x-rays with her.  We are going to inject her knee today with mixture of Xylocaine and Kenalog.  She will ice it down follow-up in about 4 weeks.  If not better consider MRI scan.  We have also discussed her bone density if she does have osteopenia her T-scores are -2.5.  She is under treatment for that as well we will see her back in 4 weeks.  She will ice the knee down tonight.  If not improved proceed with MRI    No follow-ups on file.    Alden Herndon MD

## 2019-12-23 ENCOUNTER — APPOINTMENT (OUTPATIENT)
Dept: INFUSION THERAPY | Facility: HOSPITAL | Age: 59
End: 2019-12-23

## 2019-12-23 RX ORDER — AMLODIPINE BESYLATE 2.5 MG/1
2.5 TABLET ORAL DAILY
Qty: 30 TABLET | Refills: 11 | Status: SHIPPED | OUTPATIENT
Start: 2019-12-23 | End: 2020-02-14

## 2020-01-03 ENCOUNTER — OFFICE VISIT (OUTPATIENT)
Dept: ORTHOPEDIC SURGERY | Facility: CLINIC | Age: 60
End: 2020-01-03

## 2020-01-03 VITALS — BODY MASS INDEX: 20.98 KG/M2 | WEIGHT: 114 LBS | HEIGHT: 62 IN

## 2020-01-03 DIAGNOSIS — M25.561 RIGHT KNEE PAIN, UNSPECIFIED CHRONICITY: Primary | ICD-10-CM

## 2020-01-03 DIAGNOSIS — M48.50XA COMPRESSION FRACTURE OF VERTEBRA (HCC): Chronic | ICD-10-CM

## 2020-01-03 DIAGNOSIS — M54.6 CHRONIC MIDLINE THORACIC BACK PAIN: Chronic | ICD-10-CM

## 2020-01-03 DIAGNOSIS — G89.29 CHRONIC MIDLINE THORACIC BACK PAIN: Chronic | ICD-10-CM

## 2020-01-03 DIAGNOSIS — M54.15 RADICULOPATHY OF THORACOLUMBAR REGION: Chronic | ICD-10-CM

## 2020-01-03 DIAGNOSIS — M17.11 PRIMARY OSTEOARTHRITIS OF RIGHT KNEE: ICD-10-CM

## 2020-01-03 PROCEDURE — 99213 OFFICE O/P EST LOW 20 MIN: CPT | Performed by: ORTHOPAEDIC SURGERY

## 2020-01-03 NOTE — PROGRESS NOTES
Tricia Loyola is a 59 y.o. female returns for     Chief Complaint   Patient presents with   • Right Knee - Follow-up       HISTORY OF PRESENT ILLNESS: Patient being seen for right knee follow up. Reports recent fall on Sunday. Had x-rays on Monday. Injection given 12/20/2019 which helped with pain up until fall.  She was doing well until he fell on her knee now she has persistent weightbearing pain on the right side.  She had x-rays and was told that her x-ray showed no evidence of fracture if she continues to hurt is had mild swelling.       CONCURRENT MEDICAL HISTORY:    Past Medical History:   Diagnosis Date   • Abdominal pain    • Anemia    • Chronic post-traumatic headache      rebound      • Depressive disorder    • Encounter for gynecological examination    • Gastroesophageal reflux disease    • Generalized anxiety disorder    • History of mammogram 08/2008    MAMMOGRAM DIAGNOSTIC BILATERAL 74803 ([facility]) (1)     • Hyperlipidemia    • Ingrown toenail    • Injury of back    • Nonruptured cerebral aneurysm    • Osteoporosis    • Posttraumatic stress disorder    • Seizure (CMS/HCC)     Psychogenic non-epileptic sz    • Viral hepatitis A        Allergies   Allergen Reactions   • Nitrofuran Derivatives Hives     Macrobid   • Augmentin [Amoxicillin-Pot Clavulanate]      Hives   • Ciprofloxacin      Cipro:  Rash   • Fiorinal [Butalbital-Aspirin-Caffeine]      Rapid heart rate   • Imitrex [Sumatriptan]      Rapid heart rate   • Iodine      Anaphylaxis   • Other      MIDRIN  -  Rapid heart rate   • Toradol [Ketorolac Tromethamine]      Anaphylaxis   • Ultram [Tramadol]      Rapid heart rate   • Ibuprofen Rash         Current Outpatient Medications:   •  albuterol sulfate HFA (VENTOLIN HFA) 108 (90 Base) MCG/ACT inhaler, Inhale 2 puffs Every 6 (Six) Hours As Needed for Wheezing or Shortness of Air., Disp: 1 inhaler, Rfl: 3  •  amitriptyline (ELAVIL) 75 MG tablet, TAKE ONE TABLET BY MOUTH AT BEDTIME., Disp: 30 tablet,  Rfl: 2  •  amLODIPine (NORVASC) 2.5 MG tablet, Take 1 tablet by mouth Daily., Disp: 30 tablet, Rfl: 11  •  atorvastatin (LIPITOR) 20 MG tablet, take 1 tablet by mouth once daily, Disp: 90 tablet, Rfl: 1  •  busPIRone (BUSPAR) 30 MG tablet, Take 1 tablet by mouth 2 (Two) Times a Day., Disp: 60 tablet, Rfl: 0  •  clonazePAM (KLONOPIN) 0.5 MG tablet, Take 1 tablet by mouth Daily As Needed for Anxiety (panic attacks)., Disp: 15 tablet, Rfl: 0  •  cyclobenzaprine (FLEXERIL) 10 MG tablet, Take 1 tablet by mouth 3 (Three) Times a Day As Needed for Muscle Spasms., Disp: 90 tablet, Rfl: 0  •  dexlansoprazole (DEXILANT) 60 MG capsule, Take 1 capsule by mouth Daily., Disp: 90 capsule, Rfl: 3  •  HYDROcodone-acetaminophen (NORCO) 5-325 MG per tablet, Take 1 tablet by mouth Every 8 (Eight) Hours As Needed for Moderate Pain  or Severe Pain ., Disp: 90 tablet, Rfl: 0  •  levothyroxine (SYNTHROID, LEVOTHROID) 25 MCG tablet, Take 0.5 tablets by mouth Daily., Disp: 15 tablet, Rfl: 3  •  metoprolol succinate XL (TOPROL XL) 100 MG 24 hr tablet, Take 1 tablet by mouth Daily., Disp: 90 tablet, Rfl: 3  •  ondansetron ODT (ZOFRAN-ODT) 4 MG disintegrating tablet, Take 1 tablet by mouth Every 8 (Eight) Hours As Needed for Nausea for up to 12 doses., Disp: 12 tablet, Rfl: 0  •  OXcarbazepine (TRILEPTAL) 150 MG tablet, Take 150 mg by mouth Daily. Daily at bedtime, Disp: , Rfl:   •  prochlorperazine (COMPAZINE) 10 MG tablet, Take 1 tablet by mouth Every 6 (Six) Hours As Needed for Nausea or Vomiting., Disp: 360 tablet, Rfl: 1  •  rOPINIRole (REQUIP) 3 MG tablet, take 1 tablet by mouth at bedtime, Disp: 150 tablet, Rfl: 0  •  sucralfate (CARAFATE) 1 g tablet, Take 1 tablet by mouth 2 (Two) Times a Day., Disp: 60 tablet, Rfl: 2  •  topiramate (TOPAMAX) 50 MG tablet, Take 50 mg by mouth 2 (Two) Times a Day., Disp: , Rfl:     Current Facility-Administered Medications:   •  zoledronic acid (RECLAST) infusion 5 mg, 5 mg, Intravenous, Once, Ceron,  "Rosario Lowryn, UMESH    Past Surgical History:   Procedure Laterality Date   • APPENDECTOMY     • BREAST BIOPSY Left    • CHOLECYSTECTOMY     • COLONOSCOPY N/A 11/26/2018    Procedure: COLONOSCOPY;  Surgeon: Meet Mcintyre MD;  Location: [facility] ENDOSCOPY;  Service: General   • CRANIOTOMY FOR ANEURYSM  2003   • ENDOSCOPY N/A 10/16/2019    Procedure: ESOPHAGOGASTRODUODENOSCOPY;  Surgeon: Kory Muhammad MD;  Location: [facility] ENDOSCOPY;  Service: Gastroenterology   • PAP SMEAR  08/16/2012   • TONSILLECTOMY     • UPPER GASTROINTESTINAL ENDOSCOPY  10/16/2019           ROS: Review of systems has been updated as of today's date.  All other systems are negative except as noted previously.    PHYSICAL EXAMINATION:       Ht 157.5 cm (62\")   Wt 51.7 kg (114 lb)   BMI 20.85 kg/m²     Physical Exam   Constitutional: She is oriented to person, place, and time. She appears well-developed.   HENT:   Head: Normocephalic and atraumatic.   Eyes: Pupils are equal, round, and reactive to light. EOM are normal.   Neck: Neck supple.   Pulmonary/Chest: Effort normal.   Musculoskeletal: She exhibits tenderness.   Neurological: She is alert and oriented to person, place, and time.   Skin: Skin is warm and dry.   Psychiatric: She has a normal mood and affect.       GAIT:     [x]  Normal  []  Antalgic    Assistive device: [x]  None  []  Walker     []  Crutches  []  Cane     []  Wheelchair  []  Stretcher    Ortho Exam  Over the distal femur medially as well as the tibial plateau medially.  Decreased extension of the knee decreased flexion there is no ecchymosis.  Calf is negative neurovascular intact.    Xr Knee 1 Or 2 View Right    Result Date: 12/30/2019  Narrative: Two view right knee HISTORY: Right knee pain after falling last night AP and lateral views obtained. COMPARISON: November 14, 2019 FINDINGS: No fracture or dislocation. Small patellar spur at insertion of quadriceps tendon. No suprapatellar effusion. No other osseous or " articular abnormality.     Impression: CONCLUSION: No fracture or dislocation. Small patellar spur at insertion of quadriceps tendon. [id number] Electronically signed by:  Sourav Dahl MD  12/30/2019 1:59 PM CST Workstation: [id number]            ASSESSMENT:    Diagnoses and all orders for this visit:    Right knee pain, unspecified chronicity    Primary osteoarthritis of right knee          PLAN patient has significant low bone density with a traumatic injury.  It hurts with weightbearing at this point.  Proceed with MRI scan at this point we will see her back after MRI scan to rule out occult fracture.  I have also cautioned her to get on crutches and/or walker to unload the leg in the meantime.  We will see her back after scan which I believe is medically indicated    Patient's Body mass index is 20.85 kg/m². BMI is within normal parameters. No follow-up required..      No follow-ups on file.      This document has been electronically signed by Alyse Shah MA on January 3, 2020 3:02 PM

## 2020-01-06 ENCOUNTER — HOSPITAL ENCOUNTER (OUTPATIENT)
Dept: INFUSION THERAPY | Facility: HOSPITAL | Age: 60
Discharge: HOME | End: 2020-01-06
Admitting: NURSE PRACTITIONER

## 2020-01-06 VITALS
SYSTOLIC BLOOD PRESSURE: 120 MMHG | DIASTOLIC BLOOD PRESSURE: 63 MMHG | HEART RATE: 74 BPM | TEMPERATURE: 97.5 F | RESPIRATION RATE: 18 BRPM

## 2020-01-06 DIAGNOSIS — M81.0 AGE-RELATED OSTEOPOROSIS WITHOUT CURRENT PATHOLOGICAL FRACTURE: Primary | ICD-10-CM

## 2020-01-06 LAB
CALCIUM SPEC-SCNC: 9.1 MG/DL (ref 8.6–10.5)
CREAT BLD-MCNC: 0.95 MG/DL (ref 0.57–1)
GFR SERPL CREATININE-BSD FRML MDRD: 60 ML/MIN/1.73

## 2020-01-06 PROCEDURE — 25010000002 ZOLEDRONIC ACID 5 MG/100ML SOLUTION: Performed by: NURSE PRACTITIONER

## 2020-01-06 PROCEDURE — 82310 ASSAY OF CALCIUM: CPT | Performed by: NURSE PRACTITIONER

## 2020-01-06 PROCEDURE — 82565 ASSAY OF CREATININE: CPT | Performed by: NURSE PRACTITIONER

## 2020-01-06 PROCEDURE — 96365 THER/PROPH/DIAG IV INF INIT: CPT | Performed by: NURSE PRACTITIONER

## 2020-01-06 RX ORDER — ZOLEDRONIC ACID 5 MG/100ML
5 INJECTION, SOLUTION INTRAVENOUS ONCE
Status: CANCELLED | OUTPATIENT
Start: 2021-01-05

## 2020-01-06 RX ORDER — ZOLEDRONIC ACID 5 MG/100ML
5 INJECTION, SOLUTION INTRAVENOUS ONCE
Status: COMPLETED | OUTPATIENT
Start: 2020-01-06 | End: 2020-01-06

## 2020-01-06 RX ORDER — SODIUM CHLORIDE 9 MG/ML
250 INJECTION, SOLUTION INTRAVENOUS ONCE
Status: CANCELLED | OUTPATIENT
Start: 2021-01-05

## 2020-01-06 RX ORDER — SODIUM CHLORIDE 9 MG/ML
250 INJECTION, SOLUTION INTRAVENOUS ONCE
Status: COMPLETED | OUTPATIENT
Start: 2020-01-06 | End: 2020-01-06

## 2020-01-06 RX ADMIN — ZOLEDRONIC ACID 5 MG: 5 INJECTION, SOLUTION INTRAVENOUS at 14:37

## 2020-01-06 RX ADMIN — SODIUM CHLORIDE 250 ML: 9 INJECTION, SOLUTION INTRAVENOUS at 14:37

## 2020-01-07 RX ORDER — HYDROCODONE BITARTRATE AND ACETAMINOPHEN 5; 325 MG/1; MG/1
1 TABLET ORAL EVERY 8 HOURS PRN
Qty: 90 TABLET | Refills: 0 | OUTPATIENT
Start: 2020-01-07

## 2020-01-09 ENCOUNTER — OFFICE VISIT (OUTPATIENT)
Dept: FAMILY MEDICINE CLINIC | Facility: CLINIC | Age: 60
End: 2020-01-09

## 2020-01-09 VITALS
OXYGEN SATURATION: 98 % | WEIGHT: 113.4 LBS | TEMPERATURE: 98.6 F | SYSTOLIC BLOOD PRESSURE: 118 MMHG | HEART RATE: 89 BPM | HEIGHT: 62 IN | BODY MASS INDEX: 20.87 KG/M2 | RESPIRATION RATE: 16 BRPM | DIASTOLIC BLOOD PRESSURE: 82 MMHG

## 2020-01-09 DIAGNOSIS — G89.29 CHRONIC MIDLINE LOW BACK PAIN WITHOUT SCIATICA: Chronic | ICD-10-CM

## 2020-01-09 DIAGNOSIS — M25.561 CHRONIC PAIN OF RIGHT KNEE: ICD-10-CM

## 2020-01-09 DIAGNOSIS — G89.29 CHRONIC PAIN OF RIGHT KNEE: ICD-10-CM

## 2020-01-09 DIAGNOSIS — G89.29 CHRONIC MIDLINE THORACIC BACK PAIN: Primary | Chronic | ICD-10-CM

## 2020-01-09 DIAGNOSIS — F41.0 PANIC DISORDER WITHOUT AGORAPHOBIA: Chronic | ICD-10-CM

## 2020-01-09 DIAGNOSIS — M54.6 CHRONIC MIDLINE THORACIC BACK PAIN: Primary | Chronic | ICD-10-CM

## 2020-01-09 DIAGNOSIS — M54.50 CHRONIC MIDLINE LOW BACK PAIN WITHOUT SCIATICA: Chronic | ICD-10-CM

## 2020-01-09 PROCEDURE — 99214 OFFICE O/P EST MOD 30 MIN: CPT | Performed by: NURSE PRACTITIONER

## 2020-01-09 RX ORDER — CLONAZEPAM 0.5 MG/1
0.5 TABLET ORAL DAILY PRN
Qty: 15 TABLET | Refills: 0 | Status: SHIPPED | OUTPATIENT
Start: 2020-01-09 | End: 2020-03-20 | Stop reason: SDUPTHER

## 2020-01-09 RX ORDER — HYDROCODONE BITARTRATE AND ACETAMINOPHEN 5; 325 MG/1; MG/1
1 TABLET ORAL EVERY 6 HOURS PRN
Qty: 120 TABLET | Refills: 0 | Status: SHIPPED | OUTPATIENT
Start: 2020-01-09 | End: 2020-01-23 | Stop reason: SDUPTHER

## 2020-01-09 RX ORDER — HYDROCODONE BITARTRATE AND ACETAMINOPHEN 10; 325 MG/1; MG/1
1 TABLET ORAL EVERY 6 HOURS PRN
Qty: 120 TABLET | Refills: 0 | Status: SHIPPED | OUTPATIENT
Start: 2020-01-09 | End: 2020-01-09 | Stop reason: DRUGHIGH

## 2020-01-09 NOTE — PROGRESS NOTES
Chief Complaint   Patient presents with   • Pain     knees wants a seconf opi     Subjective   Triciakit Loyola is a 59 y.o. female who presents to the office for acute exacerbation of chronic bilateral knee pain following a fall to knees on 1/2/2020.   The following portions of the patient's history were reviewed and updated as appropriate: allergies, current medications, past family history, past medical history, past social history, past surgical history and problem list.    History of Present Illness     Bilateral Knee pain: acute exacerbation of chronic pain, right is worse than left. Being seen by Dr Herndon 1/3/2020 for acute injury pain right knee from a 1/2/2020 fall onto knees. A patellar bone spur was noted, but an MRI was ordered to rule out occult fracture. She cannot have MRI due to surgical clips in her brain, so he has a CT scheduled for tomorrow.  Current lortab 5mg TID is not managing her pain well at all. Will increase today.    HPI, ROS  and PE from most recent  Visit carried forward and updated as appropriate for current situation.  Past Medical History:   Diagnosis Date   • Abdominal pain    • Anemia    • Chronic post-traumatic headache      rebound      • Depressive disorder    • Encounter for gynecological examination    • Gastroesophageal reflux disease    • Generalized anxiety disorder    • History of mammogram 08/2008    MAMMOGRAM DIAGNOSTIC BILATERAL 62923 ([facility]) (1)     • Hyperlipidemia    • Ingrown toenail    • Injury of back    • Nonruptured cerebral aneurysm    • Osteoporosis    • Posttraumatic stress disorder    • Seizure (CMS/HCC)     Psychogenic non-epileptic sz    • Viral hepatitis A           Family History   Problem Relation Age of Onset   • Constipation Mother    • Hypertension Mother    • Anxiety disorder Mother    • Heart disease Mother    • Alcohol abuse Father    • Heart disease Father    • Anxiety disorder Brother    • Kidney disease Brother    • Hypertension Brother    •  "Arthritis Brother    • Anxiety disorder Brother    • Kidney disease Brother    • Diabetes Brother    • Anxiety disorder Brother    • Hypertension Brother    • Breast cancer Paternal Aunt    • Heart disease Maternal Grandmother    • Clotting disorder Maternal Grandmother    • Heart disease Maternal Grandfather         Review of Systems   Constitutional: Negative.  Negative for fever and unexpected weight change.   HENT: Negative.  Negative for congestion, postnasal drip, rhinorrhea, sinus pressure, sinus pain and sneezing.    Eyes: Negative.    Respiratory: Negative.  Negative for cough, chest tightness and shortness of breath.    Cardiovascular: Negative.  Negative for chest pain.   Gastrointestinal: Negative.  Negative for diarrhea and nausea.   Endocrine: Negative.    Genitourinary: Negative.  Negative for dysuria.   Musculoskeletal: Positive for arthralgias, back pain and gait problem (right knee pain).   Skin: Negative.  Negative for color change, pallor, rash and wound.   Allergic/Immunologic: Negative.    Hematological: Negative.    Psychiatric/Behavioral: Negative.  Negative for sleep disturbance and suicidal ideas.   All other systems reviewed and are negative.      Objective   Vitals:    01/09/20 1045   BP: 118/82   BP Location: Left arm   Patient Position: Sitting   Cuff Size: Adult   Pulse: 89   Resp: 16   Temp: 98.6 °F (37 °C)   TempSrc: Tympanic   SpO2: 98%   Weight: 51.4 kg (113 lb 6.4 oz)   Height: 157.5 cm (62\")   PainSc:   8   PainLoc: Knee     Physical Exam   Constitutional: She is oriented to person, place, and time. She appears well-developed and well-nourished.   HENT:   Head: Normocephalic and atraumatic.   Eyes: Pupils are equal, round, and reactive to light. Conjunctivae are normal.   Neck: Normal range of motion. Neck supple.   Cardiovascular: Normal rate, regular rhythm, normal heart sounds and intact distal pulses. Exam reveals no gallop and no friction rub.   No murmur " heard.  Pulmonary/Chest: Effort normal and breath sounds normal. No respiratory distress. She has no wheezes. She has no rales. She exhibits no tenderness.   Abdominal: Soft. Bowel sounds are normal.   Musculoskeletal: Normal range of motion. She exhibits no edema or deformity.        Right knee: She exhibits swelling. Tenderness found. Medial joint line and patellar tendon tenderness noted.   Lymphadenopathy:     She has no cervical adenopathy.   Neurological: She is alert and oriented to person, place, and time.   Skin: Skin is warm and dry. Capillary refill takes 2 to 3 seconds. No erythema. No pallor.   Psychiatric: She has a normal mood and affect. Her behavior is normal. Judgment and thought content normal.   Nursing note and vitals reviewed.      Assessment/Plan   Tricia was seen today for pain.    Diagnoses and all orders for this visit:    Chronic midline thoracic back pain  -     Discontinue: HYDROcodone-acetaminophen (NORCO)  MG per tablet; Take 1 tablet by mouth Every 6 (Six) Hours As Needed for Moderate Pain .  -     HYDROcodone-acetaminophen (NORCO) 5-325 MG per tablet; Take 1 tablet by mouth Every 6 (Six) Hours As Needed for Moderate Pain .    Panic disorder without agoraphobia  -     clonazePAM (KLONOPIN) 0.5 MG tablet; Take 1 tablet by mouth Daily As Needed for Anxiety (panic attacks).    Chronic midline low back pain without sciatica  -     Discontinue: HYDROcodone-acetaminophen (NORCO)  MG per tablet; Take 1 tablet by mouth Every 6 (Six) Hours As Needed for Moderate Pain .  -     HYDROcodone-acetaminophen (NORCO) 5-325 MG per tablet; Take 1 tablet by mouth Every 6 (Six) Hours As Needed for Moderate Pain .    Chronic pain of right knee  -     Discontinue: HYDROcodone-acetaminophen (NORCO)  MG per tablet; Take 1 tablet by mouth Every 6 (Six) Hours As Needed for Moderate Pain .  -     HYDROcodone-acetaminophen (NORCO) 5-325 MG per tablet; Take 1 tablet by mouth Every 6 (Six) Hours As  Needed for Moderate Pain .           PHQ-2/PHQ-9 Depression Screening 1/9/2020   Little interest or pleasure in doing things 0   Feeling down, depressed, or hopeless 0   Trouble falling or staying asleep, or sleeping too much 0   Feeling tired or having little energy 0   Poor appetite or overeating 0   Feeling bad about yourself - or that you are a failure or have let yourself or your family down 0   Trouble concentrating on things, such as reading the newspaper or watching television 0   Moving or speaking so slowly that other people could have noticed. Or the opposite - being so fidgety or restless that you have been moving around a lot more than usual 0   Thoughts that you would be better off dead, or of hurting yourself in some way 0   Total Score 0   Patient understands the risks associated with this controlled medication, including tolerance and addiction.  Patient also agrees to only obtain this medication from me, and not from a another provider, unless that provider is covering for me in my absence.  Patient also agrees to be compliant in dosing, and not self adjust the dose of medication.  A signed controlled substance agreement is on file, and the patient has received a controlled substance education sheet at this a previous visit.  The patient has also signed a consent for treatment with a controlled substance as per [facility] policy. KATE was obtained.      UMESH Guzmán         Return in about 3 months (around 4/9/2020).    There are no Patient Instructions on file for this visit.

## 2020-01-14 ENCOUNTER — OFFICE VISIT (OUTPATIENT)
Dept: FAMILY MEDICINE CLINIC | Facility: CLINIC | Age: 60
End: 2020-01-14

## 2020-01-14 VITALS
RESPIRATION RATE: 16 BRPM | DIASTOLIC BLOOD PRESSURE: 70 MMHG | SYSTOLIC BLOOD PRESSURE: 116 MMHG | WEIGHT: 114.7 LBS | OXYGEN SATURATION: 97 % | HEIGHT: 62 IN | HEART RATE: 68 BPM | TEMPERATURE: 98.4 F | BODY MASS INDEX: 21.11 KG/M2

## 2020-01-14 DIAGNOSIS — Z00.00 ENCOUNTER FOR MEDICARE ANNUAL WELLNESS EXAM: Primary | ICD-10-CM

## 2020-01-14 PROCEDURE — G0439 PPPS, SUBSEQ VISIT: HCPCS | Performed by: NURSE PRACTITIONER

## 2020-01-14 RX ORDER — ASPIRIN 81 MG/1
81 TABLET ORAL DAILY
Qty: 30 TABLET | Refills: 0
Start: 2020-01-14

## 2020-01-14 NOTE — PROGRESS NOTES
The ABCs of the Annual Wellness Visit  Subsequent Medicare Wellness Visit    Chief Complaint   Patient presents with   • Medicare Wellness-Initial Visit       Subjective   History of Present Illness:  Tricia Loyola is a 59 y.o. female who presents for a Subsequent Medicare Wellness Visit.    HEALTH RISK ASSESSMENT    Recent Hospitalizations:  No hospitalization(s) within the last year.    Current Medical Providers:  Patient Care Team:  Rosario Ceron APRN as PCP - General (Family Medicine)  Johnny Smiley PA-C as Physician Assistant (Gastroenterology)    Smoking Status:  Social History     Tobacco Use   Smoking Status Light Tobacco Smoker   • Packs/day: 0.50   • Years: 43.00   • Pack years: 21.50   • Types: Cigarettes   Smokeless Tobacco Never Used       Alcohol Consumption:  Social History     Substance and Sexual Activity   Alcohol Use No       Depression Screen:   PHQ-2/PHQ-9 Depression Screening 1/9/2020   Little interest or pleasure in doing things 0   Feeling down, depressed, or hopeless 0   Trouble falling or staying asleep, or sleeping too much 0   Feeling tired or having little energy 0   Poor appetite or overeating 0   Feeling bad about yourself - or that you are a failure or have let yourself or your family down 0   Trouble concentrating on things, such as reading the newspaper or watching television 0   Moving or speaking so slowly that other people could have noticed. Or the opposite - being so fidgety or restless that you have been moving around a lot more than usual 0   Thoughts that you would be better off dead, or of hurting yourself in some way 0   Total Score 0       Fall Risk Screen:  STEADI Fall Risk Assessment has not been completed.    Health Habits and Functional and Cognitive Screening:  Functional & Cognitive Status 1/14/2020   Do you have difficulty preparing food and eating? No   Do you have difficulty bathing yourself, getting dressed or grooming yourself? No   Do you have  difficulty using the toilet? No   Do you have difficulty moving around from place to place? No   Do you have trouble with steps or getting out of a bed or a chair? No   Current Diet Well Balanced Diet   Dental Exam Up to date   Eye Exam Not up to date   Exercise (times per week) 5 times per week   Current Exercise Activities Include Walking   Do you need help using the phone?  No   Are you deaf or do you have serious difficulty hearing?  No   Do you need help with transportation? No   Do you need help shopping? No   Do you need help preparing meals?  No   Do you need help with housework?  No   Do you need help with laundry? No   Do you need help taking your medications? No   Do you need help managing money? No   Do you ever drive or ride in a car without wearing a seat belt? No   Have you felt unusual stress, anger or loneliness in the last month? -   Who do you live with? -   If you need help, do you have trouble finding someone available to you? -   Have you been bothered in the last four weeks by sexual problems? -   Do you have difficulty concentrating, remembering or making decisions? -         Does the patient have evidence of cognitive impairment? No    Asprin use counseling:Start ASA 81 mg daily     Age-appropriate Screening Schedule:  Refer to the list below for future screening recommendations based on patient's age, sex and/or medical conditions. Orders for these recommended tests are listed in the plan section. The patient has been provided with a written plan.    Health Maintenance   Topic Date Due   • PNEUMOCOCCAL VACCINE (19-64 HIGHEST RISK) (2 of 3 - PCV13) 01/14/2030 (Originally 10/21/2015)   • MAMMOGRAM  04/26/2020   • LIPID PANEL  08/02/2020   • DXA SCAN  04/02/2021   • PAP SMEAR  07/29/2022   • TDAP/TD VACCINES (3 - Td) 05/10/2026   • COLONOSCOPY  11/26/2028   • INFLUENZA VACCINE  Completed   • ZOSTER VACCINE  Completed          The following portions of the patient's history were reviewed and  updated as appropriate: allergies, current medications, past family history, past medical history, past social history, past surgical history and problem list.    Outpatient Medications Prior to Visit   Medication Sig Dispense Refill   • albuterol sulfate HFA (VENTOLIN HFA) 108 (90 Base) MCG/ACT inhaler Inhale 2 puffs Every 6 (Six) Hours As Needed for Wheezing or Shortness of Air. 1 inhaler 3   • amitriptyline (ELAVIL) 75 MG tablet TAKE ONE TABLET BY MOUTH AT BEDTIME. 30 tablet 2   • amLODIPine (NORVASC) 2.5 MG tablet Take 1 tablet by mouth Daily. 30 tablet 11   • atorvastatin (LIPITOR) 20 MG tablet take 1 tablet by mouth once daily 90 tablet 1   • busPIRone (BUSPAR) 30 MG tablet Take 1 tablet by mouth 2 (Two) Times a Day. 60 tablet 0   • clonazePAM (KLONOPIN) 0.5 MG tablet Take 1 tablet by mouth Daily As Needed for Anxiety (panic attacks). 15 tablet 0   • cyclobenzaprine (FLEXERIL) 10 MG tablet Take 1 tablet by mouth 3 (Three) Times a Day As Needed for Muscle Spasms. 90 tablet 0   • dexlansoprazole (DEXILANT) 60 MG capsule Take 1 capsule by mouth Daily. 90 capsule 3   • HYDROcodone-acetaminophen (NORCO) 5-325 MG per tablet Take 1 tablet by mouth Every 6 (Six) Hours As Needed for Moderate Pain . 120 tablet 0   • levothyroxine (SYNTHROID, LEVOTHROID) 25 MCG tablet Take 0.5 tablets by mouth Daily. 15 tablet 3   • metoprolol succinate XL (TOPROL XL) 100 MG 24 hr tablet Take 1 tablet by mouth Daily. 90 tablet 3   • ondansetron ODT (ZOFRAN-ODT) 4 MG disintegrating tablet Take 1 tablet by mouth Every 8 (Eight) Hours As Needed for Nausea for up to 12 doses. 12 tablet 0   • OXcarbazepine (TRILEPTAL) 150 MG tablet Take 150 mg by mouth Daily. Daily at bedtime     • prochlorperazine (COMPAZINE) 10 MG tablet Take 1 tablet by mouth Every 6 (Six) Hours As Needed for Nausea or Vomiting. 360 tablet 1   • rOPINIRole (REQUIP) 3 MG tablet take 1 tablet by mouth at bedtime 150 tablet 0   • sucralfate (CARAFATE) 1 g tablet Take 1 tablet  by mouth 2 (Two) Times a Day. 60 tablet 2   • topiramate (TOPAMAX) 50 MG tablet Take 50 mg by mouth 2 (Two) Times a Day.       Facility-Administered Medications Prior to Visit   Medication Dose Route Frequency Provider Last Rate Last Dose   • zoledronic acid (RECLAST) infusion 5 mg  5 mg Intravenous Once Ceron, UMESH Ponce           Patient Active Problem List   Diagnosis   • Tobacco dependence syndrome   • PRISCILLA (generalized anxiety disorder)   • PTSD (post-traumatic stress disorder)   • Gastroesophageal reflux disease with esophagitis   • Hyperlipidemia   • Right knee pain   • Primary osteoarthritis of right knee   • Osteoporosis   • Sinus tachycardia   • Restless leg syndrome   • Chronic midline thoracic back pain   • Chronic midline low back pain without sciatica   • Panic disorder without agoraphobia   • Chronic migraine   • Compression fracture of vertebra (CMS/HCC)   • COPD (chronic obstructive pulmonary disease) (CMS/HCC)   • Hearing loss   • Migraine   • Radiculopathy of thoracolumbar region   • Screen for colon cancer   • Encounter for colonoscopy due to history of adenomatous colonic polyps   • Elevated liver enzymes   • Pain of upper abdomen   • Hepatic fibrosis   • Nausea   • Acquired hypothyroidism       Advanced Care Planning:  Patient does not have an advance directive - not interested in additional information    Review of Systems   Constitutional: Negative.  Negative for fever and unexpected weight change.   HENT: Negative.    Eyes: Negative.    Respiratory: Negative.  Negative for cough, chest tightness and shortness of breath.    Cardiovascular: Negative.  Negative for chest pain.   Gastrointestinal: Positive for constipation (manged with current medication regimen).   Endocrine: Negative.    Genitourinary: Negative.  Negative for dysuria.   Musculoskeletal: Positive for arthralgias and back pain.        Right knee pain   Skin: Negative.  Negative for color change, pallor, rash and wound.  "  Allergic/Immunologic: Negative.    Neurological: Negative.    Hematological: Negative.    Psychiatric/Behavioral: Negative for sleep disturbance and suicidal ideas. The patient is nervous/anxious.    All other systems reviewed and are negative.      Compared to one year ago, the patient feels her physical health is worse.  Compared to one year ago, the patient feels her mental health is better.    Reviewed chart for potential of high risk medication in the elderly: yes  Reviewed chart for potential of harmful drug interactions in the elderly:yes    Objective         Vitals:    01/14/20 1110   BP: 116/70   BP Location: Left arm   Patient Position: Sitting   Cuff Size: Adult   Pulse: 68   Resp: 16   Temp: 98.4 °F (36.9 °C)   TempSrc: Tympanic   SpO2: 97%   Weight: 52 kg (114 lb 11.2 oz)   Height: 157.5 cm (62\")       Body mass index is 20.98 kg/m².  Discussed the patient's BMI with her. The BMI is in the acceptable range.    Physical Exam   Constitutional: She is oriented to person, place, and time. She appears well-developed and well-nourished.   HENT:   Head: Normocephalic and atraumatic.   Eyes: Pupils are equal, round, and reactive to light. Conjunctivae are normal.   Neck: Normal range of motion. Neck supple.   Cardiovascular: Normal rate, regular rhythm, normal heart sounds and intact distal pulses. Exam reveals no gallop and no friction rub.   No murmur heard.  Pulmonary/Chest: Effort normal and breath sounds normal. No respiratory distress. She has no wheezes. She has no rales. She exhibits no tenderness.   Abdominal: Soft. Bowel sounds are normal.   Musculoskeletal: Normal range of motion. She exhibits no edema or deformity.        Right knee: She exhibits swelling. Tenderness found. Medial joint line and patellar tendon tenderness noted.   Lymphadenopathy:     She has no cervical adenopathy.   Neurological: She is alert and oriented to person, place, and time.   Skin: Skin is warm and dry. Capillary refill " takes 2 to 3 seconds. No erythema. No pallor.   Psychiatric: She has a normal mood and affect. Her behavior is normal. Judgment and thought content normal.   Nursing note and vitals reviewed.            Assessment/Plan   Medicare Risks and Personalized Health Plan  CMS Preventative Services Quick Reference  Chronic Pain   Tobacco Use/Dependance (use dotphrase .tobaccocessation for documentation)   Tricia Loyola  reports that she has been smoking cigarettes. She has a 21.50 pack-year smoking history. She has never used smokeless tobacco.. I have educated her on the risk of diseases from using tobacco products such as cancer, COPD and heart diease.     I advised her to quit and she is not willing to quit.    I spent 5 minutes counseling the patient.         The above risks/problems have been discussed with the patient.  Pertinent information has been shared with the patient in the After Visit Summary.  Follow up plans and orders are seen below in the Assessment/Plan Section.    Diagnoses and all orders for this visit:    1. Encounter for Medicare annual wellness exam (Primary)    Other orders  -     aspirin 81 MG EC tablet; Take 1 tablet by mouth Daily.  Dispense: 30 tablet; Refill: 0      Follow Up:  Return in about 1 week (around 1/21/2020).     An After Visit Summary and PPPS were given to the patient.

## 2020-01-14 NOTE — PATIENT INSTRUCTIONS
Tobacco Use Disorder  Tobacco use disorder (TUD) occurs when a person craves, seeks, and uses tobacco, regardless of the consequences. This disorder can cause problems with mental and physical health. It can affect your ability to have healthy relationships, and it can keep you from meeting your responsibilities at work, home, or school.  Tobacco may be:  · Smoked as a cigarette or cigar.  · Inhaled using e-cigarettes.  · Smoked in a pipe or hookah.  · Chewed as smokeless tobacco.  · Inhaled into the nostrils as snuff.  Tobacco products contain a dangerous chemical called nicotine, which is very addictive. Nicotine triggers hormones that make the body feel stimulated and works on areas of the brain that make you feel good. These effects can make it hard for people to quit nicotine.  Tobacco contains many other unsafe chemicals that can damage almost every organ in the body. Smoking tobacco also puts others in danger due to fire risk and possible health problems caused by breathing in secondhand smoke.  What are the signs or symptoms?  Symptoms of TUD may include:  · Being unable to slow down or stop your tobacco use.  · Spending an abnormal amount of time getting or using tobacco.  · Craving tobacco. Cravings may last for up to 6 months after quitting.  · Tobacco use that:  ? Interferes with your work, school, or home life.  ? Interferes with your personal and social relationships.  ? Makes you give up activities that you once enjoyed or found important.  · Using tobacco even though you know that it is:  ? Dangerous or bad for your health or someone else's health.  ? Causing problems in your life.  · Needing more and more of the substance to get the same effect (developing tolerance).  · Experiencing unpleasant symptoms if you do not use the substance (withdrawal). Withdrawal symptoms may include:  ? Depressed, anxious, or irritable mood.  ? Difficulty concentrating.  ? Increased appetite.  ? Restlessness or trouble  sleeping.  · Using the substance to avoid withdrawal.  How is this diagnosed?  This condition may be diagnosed based on:  · Your current and past tobacco use. Your health care provider may ask questions about how your tobacco use affects your life.  · A physical exam.  You may be diagnosed with TUD if you have at least two symptoms within a 12-month period.  How is this treated?  This condition is treated by stopping tobacco use. Many people are unable to quit on their own and need help. Treatment may include:  · Nicotine replacement therapy (NRT). NRT provides nicotine without the other harmful chemicals in tobacco. NRT gradually lowers the dosage of nicotine in the body and reduces withdrawal symptoms. NRT is available as:  ? Over-the-counter gums, lozenges, and skin patches.  ? Prescription mouth inhalers and nasal sprays.  · Medicine that acts on the brain to reduce cravings and withdrawal symptoms.  · A type of talk therapy that examines your triggers for tobacco use, how to avoid them, and how to cope with cravings (behavioral therapy).  · Hypnosis. This may help with withdrawal symptoms.  · Joining a support group for others coping with TUD.  The best treatment for TUD is usually a combination of medicine, talk therapy, and support groups. Recovery can be a long process. Many people start using tobacco again after stopping (relapse). If you relapse, it does not mean that treatment will not work.  Follow these instructions at home:    Lifestyle  · Do not use any products that contain nicotine or tobacco, such as cigarettes and e-cigarettes.  · Avoid things that trigger tobacco use as much as you can. Triggers include people and situations that usually cause you to use tobacco.  · Avoid drinks that contain caffeine, including coffee. These may worsen some withdrawal symptoms.  · Find ways to manage stress. Wanting to smoke may cause stress, and stress can make you want to smoke. Relaxation techniques such as  deep breathing, meditation, and yoga may help.  · Attend support groups as needed. These groups are an important part of long-term recovery for many people.  General instructions  · Take over-the-counter and prescription medicines only as told by your health care provider.  · Check with your health care provider before taking any new prescription or over-the-counter medicines.  · Decide on a friend, family member, or smoking quit-line (such as 1-800-QUIT-NOW in the [place] that you can call or text when you feel the urge to smoke or when you need help coping with cravings.  · Keep all follow-up visits as told by your health care provider and therapist. This is important.  Contact a health care provider if:  · You are not able to take your medicines as prescribed.  · Your symptoms get worse, even with treatment.  Summary  · Tobacco use disorder (TUD) occurs when a person craves, seeks, and uses tobacco regardless of the consequences.  · This condition may be diagnosed based on your current and past tobacco use and a physical exam.  · Many people are unable to quit on their own and need help. Recovery can be a long process.  · The most effective treatment for TUD is usually a combination of medicine, talk therapy, and support groups.  This information is not intended to replace advice given to you by your health care provider. Make sure you discuss any questions you have with your health care provider.  Document Released: 08/23/2005 Document Revised: 12/05/2018 Document Reviewed: 12/05/2018  [employer] Interactive Patient Education © 2019 [employer]

## 2020-01-17 ENCOUNTER — OFFICE VISIT (OUTPATIENT)
Dept: ORTHOPEDIC SURGERY | Facility: CLINIC | Age: 60
End: 2020-01-17

## 2020-01-17 VITALS — BODY MASS INDEX: 20.98 KG/M2 | HEIGHT: 62 IN | WEIGHT: 114 LBS

## 2020-01-17 DIAGNOSIS — M17.11 PRIMARY OSTEOARTHRITIS OF RIGHT KNEE: Primary | Chronic | ICD-10-CM

## 2020-01-17 DIAGNOSIS — M25.561 RIGHT KNEE PAIN, UNSPECIFIED CHRONICITY: ICD-10-CM

## 2020-01-17 DIAGNOSIS — M81.0 AGE-RELATED OSTEOPOROSIS WITHOUT CURRENT PATHOLOGICAL FRACTURE: Chronic | ICD-10-CM

## 2020-01-17 PROCEDURE — 20610 DRAIN/INJ JOINT/BURSA W/O US: CPT | Performed by: ORTHOPAEDIC SURGERY

## 2020-01-17 PROCEDURE — 99213 OFFICE O/P EST LOW 20 MIN: CPT | Performed by: ORTHOPAEDIC SURGERY

## 2020-01-17 RX ORDER — LIDOCAINE HYDROCHLORIDE 10 MG/ML
4 INJECTION, SOLUTION EPIDURAL; INFILTRATION; INTRACAUDAL; PERINEURAL
Status: COMPLETED | OUTPATIENT
Start: 2020-01-17 | End: 2020-01-17

## 2020-01-17 RX ORDER — TRIAMCINOLONE ACETONIDE 40 MG/ML
80 INJECTION, SUSPENSION INTRA-ARTICULAR; INTRAMUSCULAR
Status: COMPLETED | OUTPATIENT
Start: 2020-01-17 | End: 2020-01-17

## 2020-01-17 RX ADMIN — LIDOCAINE HYDROCHLORIDE 4 ML: 10 INJECTION, SOLUTION EPIDURAL; INFILTRATION; INTRACAUDAL; PERINEURAL at 14:58

## 2020-01-17 RX ADMIN — TRIAMCINOLONE ACETONIDE 80 MG: 40 INJECTION, SUSPENSION INTRA-ARTICULAR; INTRAMUSCULAR at 14:58

## 2020-01-17 NOTE — PROGRESS NOTES
Tricia Loyola is a 59 y.o. female returns for     Chief Complaint   Patient presents with   • Right Knee - Follow-up, Pain   • Results      01/10/20  CT knee right wo contrast      [facility]  HISTORY OF PRESENT ILLNESS:CT results right knee.  Pain scale today 6/10.  She was unable to have an MRI scan because of surgical clips from previous aneurysm surgery.  She had a CT scan that showed bone islands and a little erosion perhaps the cruciate ligament felt to be possible degenerative in nature but no definite fracture.  She is still complaining of right knee pain but also complaining of left knee pain is been hurting since her fall that reinjure her right knee.       CONCURRENT MEDICAL HISTORY:    Past Medical History:   Diagnosis Date   • Abdominal pain    • Anemia    • Chronic post-traumatic headache      rebound      • Depressive disorder    • Encounter for gynecological examination    • Gastroesophageal reflux disease    • Generalized anxiety disorder    • History of mammogram 08/2008    MAMMOGRAM DIAGNOSTIC BILATERAL 02953 ([facility]) (1)     • Hyperlipidemia    • Ingrown toenail    • Injury of back    • Nonruptured cerebral aneurysm    • Osteoporosis    • Posttraumatic stress disorder    • Seizure (CMS/HCC)     Psychogenic non-epileptic sz    • Viral hepatitis A        Allergies   Allergen Reactions   • Nitrofuran Derivatives Hives     Macrobid   • Augmentin [Amoxicillin-Pot Clavulanate]      Hives   • Ciprofloxacin      Cipro:  Rash   • Fiorinal [Butalbital-Aspirin-Caffeine]      Rapid heart rate   • Imitrex [Sumatriptan]      Rapid heart rate   • Iodine      Anaphylaxis   • Other      MIDRIN  -  Rapid heart rate   • Toradol [Ketorolac Tromethamine]      Anaphylaxis   • Ultram [Tramadol]      Rapid heart rate   • Ibuprofen Rash         Current Outpatient Medications:   •  albuterol sulfate HFA (VENTOLIN HFA) 108 (90 Base) MCG/ACT inhaler, Inhale 2 puffs Every 6 (Six) Hours As Needed for Wheezing or  Shortness of Air., Disp: 1 inhaler, Rfl: 3  •  amitriptyline (ELAVIL) 75 MG tablet, TAKE ONE TABLET BY MOUTH AT BEDTIME., Disp: 30 tablet, Rfl: 2  •  amLODIPine (NORVASC) 2.5 MG tablet, Take 1 tablet by mouth Daily., Disp: 30 tablet, Rfl: 11  •  aspirin 81 MG EC tablet, Take 1 tablet by mouth Daily., Disp: 30 tablet, Rfl: 0  •  atorvastatin (LIPITOR) 20 MG tablet, take 1 tablet by mouth once daily, Disp: 90 tablet, Rfl: 1  •  busPIRone (BUSPAR) 30 MG tablet, Take 1 tablet by mouth 2 (Two) Times a Day., Disp: 60 tablet, Rfl: 0  •  clonazePAM (KLONOPIN) 0.5 MG tablet, Take 1 tablet by mouth Daily As Needed for Anxiety (panic attacks)., Disp: 15 tablet, Rfl: 0  •  cyclobenzaprine (FLEXERIL) 10 MG tablet, Take 1 tablet by mouth 3 (Three) Times a Day As Needed for Muscle Spasms., Disp: 90 tablet, Rfl: 0  •  dexlansoprazole (DEXILANT) 60 MG capsule, Take 1 capsule by mouth Daily., Disp: 90 capsule, Rfl: 3  •  HYDROcodone-acetaminophen (NORCO) 5-325 MG per tablet, Take 1 tablet by mouth Every 6 (Six) Hours As Needed for Moderate Pain ., Disp: 120 tablet, Rfl: 0  •  levothyroxine (SYNTHROID, LEVOTHROID) 25 MCG tablet, Take 0.5 tablets by mouth Daily., Disp: 15 tablet, Rfl: 3  •  metoprolol succinate XL (TOPROL XL) 100 MG 24 hr tablet, Take 1 tablet by mouth Daily., Disp: 90 tablet, Rfl: 3  •  ondansetron ODT (ZOFRAN-ODT) 4 MG disintegrating tablet, Take 1 tablet by mouth Every 8 (Eight) Hours As Needed for Nausea for up to 12 doses., Disp: 12 tablet, Rfl: 0  •  OXcarbazepine (TRILEPTAL) 150 MG tablet, Take 150 mg by mouth Daily. Daily at bedtime, Disp: , Rfl:   •  prochlorperazine (COMPAZINE) 10 MG tablet, Take 1 tablet by mouth Every 6 (Six) Hours As Needed for Nausea or Vomiting., Disp: 360 tablet, Rfl: 1  •  rOPINIRole (REQUIP) 3 MG tablet, take 1 tablet by mouth at bedtime, Disp: 150 tablet, Rfl: 0  •  sucralfate (CARAFATE) 1 g tablet, Take 1 tablet by mouth 2 (Two) Times a Day., Disp: 60 tablet, Rfl: 2  •  topiramate  "(TOPAMAX) 50 MG tablet, Take 50 mg by mouth 2 (Two) Times a Day., Disp: , Rfl:     Current Facility-Administered Medications:   •  zoledronic acid (RECLAST) infusion 5 mg, 5 mg, Intravenous, Once, Ceron, Rosario RhodaUMESH borden    Past Surgical History:   Procedure Laterality Date   • APPENDECTOMY     • BREAST BIOPSY Left    • CHOLECYSTECTOMY     • COLONOSCOPY N/A 11/26/2018    Procedure: COLONOSCOPY;  Surgeon: Meet Micntyre MD;  Location: [facility] ENDOSCOPY;  Service: General   • CRANIOTOMY FOR ANEURYSM  2003   • ENDOSCOPY N/A 10/16/2019    Procedure: ESOPHAGOGASTRODUODENOSCOPY;  Surgeon: Kory Muhammad MD;  Location: [facility] ENDOSCOPY;  Service: Gastroenterology   • PAP SMEAR  08/16/2012   • TONSILLECTOMY     • UPPER GASTROINTESTINAL ENDOSCOPY  10/16/2019           ROS: Review of systems has been updated as of today's date.  All other systems are negative except as noted previously.  Large Joint Arthrocentesis: R knee  Date/Time: 1/17/2020 2:58 PM  Consent given by: patient  Site marked: site marked  Timeout: Immediately prior to procedure a time out was called to verify the correct patient, procedure, equipment, support staff and site/side marked as required   Supporting Documentation  Indications: pain   Procedure Details  Location: knee - R knee  Preparation: Patient was prepped and draped in the usual sterile fashion  Needle size: 22 G  Approach: anteromedial  Medications administered: 80 mg triamcinolone acetonide 40 MG/ML; 4 mL lidocaine PF 1% 1 %  Patient tolerance: patient tolerated the procedure well with no immediate complications        PHYSICAL EXAMINATION:       Ht 157.5 cm (62\")   Wt 51.7 kg (114 lb)   BMI 20.85 kg/m²     Physical Exam   Constitutional: She is oriented to person, place, and time. She appears well-developed.   HENT:   Head: Normocephalic and atraumatic.   Eyes: Pupils are equal, round, and reactive to light. EOM are normal.   Neck: Neck supple.   Pulmonary/Chest: Effort normal.   "   Musculoskeletal: She exhibits tenderness.   Neurological: She is alert and oriented to person, place, and time.   Skin: Skin is warm and dry.   Psychiatric: She has a normal mood and affect.       GAIT:     []  Normal  [x]  Antalgic    Assistive device: [x]  None  []  Walker     []  Crutches  []  Cane     []  Wheelchair  []  Stretcher    Ortho Exam  Tender medial lateral knee on the right good motion of the hip.  There is no swelling or effusion ligaments appear to be stable.  Slight decreased flexion.  She is tender medial femoral condyle lateral femoral condyle on the left side as well without effusion calf is negative she is neurovascular intact motion is well-maintained.    Xr Knee 1 Or 2 View Right    Result Date: 12/30/2019  Narrative: Two view right knee HISTORY: Right knee pain after falling last night AP and lateral views obtained. COMPARISON: November 14, 2019 FINDINGS: No fracture or dislocation. Small patellar spur at insertion of quadriceps tendon. No suprapatellar effusion. No other osseous or articular abnormality.     Impression: CONCLUSION: No fracture or dislocation. Small patellar spur at insertion of quadriceps tendon. [id number] Electronically signed by:  Sourav Dahl MD  12/30/2019 1:59 PM CST Workstation: [id number]    Ct Knee Right Wo Contrast    Result Date: 1/10/2020  Narrative: CT right knee without contrast. CLINICAL INDICATION: Right knee pain.   COMPARISON: Right knee December 30, 2019.   TECHNIQUE: Noncontrast study. Helical scanning with axial and coronal reformations. Soft tissue, lung, and bone windows reviewed. This exam was performed according to our departmental dose-optimization program, which includes automated exposure control, adjustment of the mA and/or kV according to patient size and/or use of iterative reconstruction technique. CT FINDINGS: 1. Subchondral radiolucent cystic lesion proximal tibia posterior aspect probably representing arthritic changes at the insertion of the  posterior cruciate ligament. Series 4 image 29. 2. There are two tiny sclerotic lesions posterior lateral aspect and anterior aspect distal femur probably incidental benign bone islands.  No evidence of a fracture or areas of bony destruction. 3. There is some laxity, waviness of both the quadriceps and patellar tendons. No evidence however, of any disruption. No significant joint effusion. CT right knee is otherwise unremarkable.     Impression: CONCLUSION: As above. Electronically signed by:  Nolan Mendez MD  1/10/2020 5:45 PM [facility] Workstation: [id number]    I reviewed the study and agree with these findings.  No clinical evidence of quad dysfunction        ASSESSMENT:    Diagnoses and all orders for this visit:    Primary osteoarthritis of right knee  -     Large Joint Arthrocentesis: R knee    Age-related osteoporosis without current pathological fracture  -     Large Joint Arthrocentesis: R knee    Right knee pain, unspecified chronicity  -     Large Joint Arthrocentesis: R knee          PLAN at this point after we have evidence no acute fracture.  The CT scan is not as sensitive for occult fracture especially with the microscopic fracture or stress related injury or bone bruise.  I will inject the knee again today on the right side.  We will see her back in 2 weeks consider injecting her left side if better.  If she is not better would need to proceed with bone scan at that point to make sure she does not have a stress fracture or stress reaction        Patient's Body mass index is 20.85 kg/m². BMI is within normal parameters. No follow-up required..  No follow-ups on file.      This document has been electronically signed by Alden Herndon MD on January 17, 2020 4:19 PM

## 2020-01-23 ENCOUNTER — OFFICE VISIT (OUTPATIENT)
Dept: FAMILY MEDICINE CLINIC | Facility: CLINIC | Age: 60
End: 2020-01-23

## 2020-01-23 VITALS
HEIGHT: 62 IN | WEIGHT: 114.6 LBS | BODY MASS INDEX: 21.09 KG/M2 | OXYGEN SATURATION: 99 % | HEART RATE: 76 BPM | TEMPERATURE: 98.4 F | SYSTOLIC BLOOD PRESSURE: 124 MMHG | RESPIRATION RATE: 16 BRPM | DIASTOLIC BLOOD PRESSURE: 76 MMHG

## 2020-01-23 DIAGNOSIS — M54.6 CHRONIC MIDLINE THORACIC BACK PAIN: Chronic | ICD-10-CM

## 2020-01-23 DIAGNOSIS — G89.29 CHRONIC PAIN OF RIGHT KNEE: ICD-10-CM

## 2020-01-23 DIAGNOSIS — R11.0 NAUSEA: ICD-10-CM

## 2020-01-23 DIAGNOSIS — M54.50 CHRONIC MIDLINE LOW BACK PAIN WITHOUT SCIATICA: Chronic | ICD-10-CM

## 2020-01-23 DIAGNOSIS — E78.5 HYPERLIPIDEMIA, UNSPECIFIED HYPERLIPIDEMIA TYPE: ICD-10-CM

## 2020-01-23 DIAGNOSIS — M25.551 ACUTE HIP PAIN, BILATERAL: ICD-10-CM

## 2020-01-23 DIAGNOSIS — M25.562 ACUTE PAIN OF LEFT KNEE: ICD-10-CM

## 2020-01-23 DIAGNOSIS — M25.552 ACUTE HIP PAIN, BILATERAL: ICD-10-CM
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
## 2020-01-23 DIAGNOSIS — G89.29 CHRONIC MIDLINE LOW BACK PAIN WITHOUT SCIATICA: Chronic | ICD-10-CM

## 2020-01-23 DIAGNOSIS — M25.561 CHRONIC PAIN OF RIGHT KNEE: ICD-10-CM

## 2020-01-23 DIAGNOSIS — G89.29 CHRONIC MIDLINE THORACIC BACK PAIN: Chronic | ICD-10-CM

## 2020-01-23 PROCEDURE — 99214 OFFICE O/P EST MOD 30 MIN: CPT | Performed by: NURSE PRACTITIONER

## 2020-01-23 RX ORDER — ONDANSETRON 2 MG/ML
4 INJECTION INTRAMUSCULAR; INTRAVENOUS EVERY 6 HOURS PRN
Status: DISCONTINUED | OUTPATIENT
Start: 2020-01-23 | End: 2021-03-15

## 2020-01-23 RX ORDER — ATORVASTATIN CALCIUM 20 MG/1
20 TABLET, FILM COATED ORAL DAILY
Qty: 90 TABLET | Refills: 1 | Status: SHIPPED | OUTPATIENT
Start: 2020-01-23 | End: 2020-03-06 | Stop reason: DRUGHIGH

## 2020-01-23 RX ORDER — HYDROCODONE BITARTRATE AND ACETAMINOPHEN 5; 325 MG/1; MG/1
1 TABLET ORAL EVERY 6 HOURS PRN
Qty: 120 TABLET | Refills: 0 | Status: SHIPPED | OUTPATIENT
Start: 2020-01-23 | End: 2020-02-25 | Stop reason: SDUPTHER

## 2020-01-23 NOTE — PROGRESS NOTES
Chief Complaint   Patient presents with   • Knee Pain     fell again     Subjective   Tricia Loyola is a 60 y.o. female who presents to the office for a fall onto both knees with acute worsening of knee pain with new bilateral hip pain.     The following portions of the patient's history were reviewed and updated as appropriate: allergies, current medications, past family history, past medical history, past social history, past surgical history and problem list.    History of Present Illness   Saw Dr Herndon, orthopedics on 1/17/2020 for steroid injection right knee.   Reports some relief with that but got in a hurry climbing steps at home and fell onto both knees on Monday 1/21/2020 resulting in worsening of bilateral knee pain and now has a new onset pain in both hips. She did not bruise hips, but the pain is moderately severe and constant.   She is very unnerved. She is afraid that due to her osteoporosis she may have fractured her hips.  She is scheduled to see Dr Herndon again on 2/7/2020 to have the left knee injected.      Past Medical History:   Diagnosis Date   • Abdominal pain    • Anemia    • Chronic post-traumatic headache      rebound      • Depressive disorder    • Encounter for gynecological examination    • Gastroesophageal reflux disease    • Generalized anxiety disorder    • History of mammogram 08/2008    MAMMOGRAM DIAGNOSTIC BILATERAL 86717 ([facility]) (1)     • Hyperlipidemia    • Ingrown toenail    • Injury of back    • Nonruptured cerebral aneurysm    • Osteoporosis    • Posttraumatic stress disorder    • Seizure (CMS/HCC)     Psychogenic non-epileptic sz    • Viral hepatitis A           Family History   Problem Relation Age of Onset   • Constipation Mother    • Hypertension Mother    • Anxiety disorder Mother    • Heart disease Mother    • Alcohol abuse Father    • Heart disease Father    • Anxiety disorder Brother    • Kidney disease Brother    • Hypertension Brother    • Arthritis Brother  "   • Anxiety disorder Brother    • Kidney disease Brother    • Diabetes Brother    • Anxiety disorder Brother    • Hypertension Brother    • Breast cancer Paternal Aunt    • Heart disease Maternal Grandmother    • Clotting disorder Maternal Grandmother    • Heart disease Maternal Grandfather         Review of Systems   Constitutional: Negative.  Negative for fever and unexpected weight change.   HENT: Negative.    Eyes: Negative.    Respiratory: Negative.  Negative for cough, chest tightness and shortness of breath.    Cardiovascular: Negative.  Negative for chest pain.   Gastrointestinal: Negative.    Endocrine: Negative.    Genitourinary: Negative.  Negative for dysuria.   Musculoskeletal: Positive for arthralgias, back pain and joint swelling.   Skin: Negative.  Negative for color change, pallor, rash and wound.   Allergic/Immunologic: Negative.    Neurological: Negative.    Hematological: Negative.    Psychiatric/Behavioral: Negative.  Negative for sleep disturbance and suicidal ideas.   All other systems reviewed and are negative.      Objective   Vitals:    01/23/20 1554   BP: 124/76   BP Location: Left arm   Patient Position: Sitting   Cuff Size: Adult   Pulse: 76   Resp: 16   Temp: 98.4 °F (36.9 °C)   TempSrc: Tympanic   SpO2: 99%   Weight: 52 kg (114 lb 9.6 oz)   Height: 157.5 cm (62\")   PainSc:   7   PainLoc: Knee  Comment: both     Physical Exam   Constitutional: She is oriented to person, place, and time. She appears well-developed and well-nourished.   HENT:   Head: Normocephalic and atraumatic.   Eyes: Pupils are equal, round, and reactive to light. Conjunctivae are normal.   Neck: Normal range of motion. Neck supple.   Cardiovascular: Normal rate, regular rhythm, normal heart sounds and intact distal pulses. Exam reveals no gallop and no friction rub.   No murmur heard.  Pulmonary/Chest: Effort normal and breath sounds normal. No respiratory distress. She has no wheezes. She has no rales. She exhibits " no tenderness.   Abdominal: Soft. Bowel sounds are normal.   Musculoskeletal: Normal range of motion. She exhibits no edema or deformity.        Right hip: She exhibits tenderness.        Right knee: She exhibits swelling, LCL laxity and bony tenderness. She exhibits normal meniscus and no MCL laxity. Tenderness found. Medial joint line, lateral joint line and patellar tendon tenderness noted. No MCL and no LCL tenderness noted.        Left knee: She exhibits LCL laxity. She exhibits no swelling, normal alignment, no bony tenderness, normal meniscus and no MCL laxity. Tenderness found. Medial joint line, lateral joint line and patellar tendon tenderness noted.        Legs:  Lymphadenopathy:     She has no cervical adenopathy.   Neurological: She is alert and oriented to person, place, and time.   Skin: Skin is warm and dry. Capillary refill takes 2 to 3 seconds. No erythema. No pallor.   Psychiatric: She has a normal mood and affect. Her behavior is normal. Judgment and thought content normal.   Nursing note and vitals reviewed.      Assessment/Plan   Tricia was seen today for knee pain.    Diagnoses and all orders for this visit:    Fall on stairs, initial encounter  -     XR Hips Bilateral With or Without Pelvis 2 View; Future  -     XR Knee 3 View Left    Hyperlipidemia, unspecified hyperlipidemia type  -     atorvastatin (LIPITOR) 20 MG tablet; Take 1 tablet by mouth Daily.    Acute hip pain, bilateral  -     XR Hips Bilateral With or Without Pelvis 2 View; Future    Acute pain of left knee  -     XR Knee 3 View Left    Nausea  -     ondansetron (ZOFRAN) injection 4 mg    Chronic midline thoracic back pain  -     HYDROcodone-acetaminophen (NORCO) 5-325 MG per tablet; Take 1 tablet by mouth Every 6 (Six) Hours As Needed for Moderate Pain .    Chronic midline low back pain without sciatica  -     HYDROcodone-acetaminophen (NORCO) 5-325 MG per tablet; Take 1 tablet by mouth Every 6 (Six) Hours As Needed for Moderate  Pain .    Chronic pain of right knee  -     HYDROcodone-acetaminophen (NORCO) 5-325 MG per tablet; Take 1 tablet by mouth Every 6 (Six) Hours As Needed for Moderate Pain .           PHQ-2/PHQ-9 Depression Screening 1/23/2020   Little interest or pleasure in doing things 0   Feeling down, depressed, or hopeless 0   Trouble falling or staying asleep, or sleeping too much 0   Feeling tired or having little energy 0   Poor appetite or overeating 0   Feeling bad about yourself - or that you are a failure or have let yourself or your family down 0   Trouble concentrating on things, such as reading the newspaper or watching television 0   Moving or speaking so slowly that other people could have noticed. Or the opposite - being so fidgety or restless that you have been moving around a lot more than usual 0   Thoughts that you would be better off dead, or of hurting yourself in some way 0   Total Score 0   Patient understands the risks associated with this controlled medication, including tolerance and addiction.  Patient also agrees to only obtain this medication from me, and not from a another provider, unless that provider is covering for me in my absence.  Patient also agrees to be compliant in dosing, and not self adjust the dose of medication.  A signed controlled substance agreement is on file, and the patient has received a controlled substance education sheet at this a previous visit.  The patient has also signed a consent for treatment with a controlled substance as per [facility] policy. KATE was obtained.      UMESH Guzmán         Return in about 1 month (around 2/23/2020).    There are no Patient Instructions on file for this visit.

## 2020-02-04 ENCOUNTER — OFFICE VISIT (OUTPATIENT)
Dept: GASTROENTEROLOGY | Facility: CLINIC | Age: 60
End: 2020-02-04

## 2020-02-04 VITALS
OXYGEN SATURATION: 98 % | SYSTOLIC BLOOD PRESSURE: 110 MMHG | HEART RATE: 79 BPM | HEIGHT: 62 IN | DIASTOLIC BLOOD PRESSURE: 68 MMHG | WEIGHT: 114 LBS | BODY MASS INDEX: 20.98 KG/M2

## 2020-02-04 DIAGNOSIS — K21.00 GASTROESOPHAGEAL REFLUX DISEASE WITH ESOPHAGITIS: Primary | ICD-10-CM

## 2020-02-04 DIAGNOSIS — R74.8 ELEVATED LIVER ENZYMES: ICD-10-CM

## 2020-02-04 DIAGNOSIS — R14.0 BLOATING: ICD-10-CM

## 2020-02-04 DIAGNOSIS — K74.00 HEPATIC FIBROSIS: ICD-10-CM

## 2020-02-04 DIAGNOSIS — K58.2 IRRITABLE BOWEL SYNDROME WITH BOTH CONSTIPATION AND DIARRHEA: ICD-10-CM

## 2020-02-04 PROCEDURE — 99214 OFFICE O/P EST MOD 30 MIN: CPT | Performed by: PHYSICIAN ASSISTANT

## 2020-02-04 NOTE — PATIENT INSTRUCTIONS

## 2020-02-04 NOTE — PROGRESS NOTES
Chief Complaint   Patient presents with   • Heartburn   • Hepatic Fibrosis   • Elevated Hepatic Enzymes       ENDO PROCEDURE ORDERED:    Subjective    Tricia Loyola is a 60 y.o. female. she is here today for follow-up.    History of Present Illness    Patient seen on a recheck of her GERD, abdominal pain, IBS, hepatic fibrosis. Last seen 11/26/2019. She was given Carafate. She states she is doing much better with the Dexilant and Carafate. She denied nausea, vomiting dysphagia. She states she is usually constipated. She has occasional diarrhea. She has a lot of bloating. She has tried MiraLax daily, but it does not seem to help. She was previously on Colestid, but that did not do well either. Weight is down 1 pound since last visit. Last EGD showed esophagitis, gastritis on 10/16/2019. Last colonoscopy 11/26/2018.     Laboratory 01/06/2020 showed normal calcium, creatinine 0.95, GFR 60.     A/P: Patient with chronic GERD, heartburn appears improved on current regimen. I had a long discussion with the patient about trying other medications. We will discontinue the Colestid. I suggested a trial on Xifaxan for her IBS-D. Consider food allergy testing. She would like to avoid adding more long-term medication, which I agree. We will plan follow up after the above, further pending clinical course and results of the above.        The following portions of the patient's history were reviewed and updated as appropriate:   Past Medical History:   Diagnosis Date   • Abdominal pain    • Anemia    • Chronic post-traumatic headache      rebound      • Depressive disorder    • Encounter for gynecological examination    • Gastroesophageal reflux disease    • Generalized anxiety disorder    • History of mammogram 08/2008    MAMMOGRAM DIAGNOSTIC BILATERAL 25530 ([facility]) (1)     • Hyperlipidemia    • Ingrown toenail    • Injury of back    • Nonruptured cerebral aneurysm    • Osteoporosis    • Posttraumatic stress disorder    • Seizure  (CMS/HCC)     Psychogenic non-epileptic sz    • Viral hepatitis A      Past Surgical History:   Procedure Laterality Date   • APPENDECTOMY     • BREAST BIOPSY Left    • CHOLECYSTECTOMY     • COLONOSCOPY N/A 2018    Procedure: COLONOSCOPY;  Surgeon: Meet Mcintyre MD;  Location: [facility] ENDOSCOPY;  Service: General   • CRANIOTOMY FOR ANEURYSM     • ENDOSCOPY N/A 10/16/2019    Procedure: ESOPHAGOGASTRODUODENOSCOPY;  Surgeon: Kory Muhammad MD;  Location: [facility] ENDOSCOPY;  Service: Gastroenterology   • PAP SMEAR  2012   • TONSILLECTOMY     • UPPER GASTROINTESTINAL ENDOSCOPY  10/16/2019     Family History   Problem Relation Age of Onset   • Constipation Mother    • Hypertension Mother    • Anxiety disorder Mother    • Heart disease Mother    • Alcohol abuse Father    • Heart disease Father    • Anxiety disorder Brother    • Kidney disease Brother    • Hypertension Brother    • Arthritis Brother    • Anxiety disorder Brother    • Kidney disease Brother    • Diabetes Brother    • Anxiety disorder Brother    • Hypertension Brother    • Breast cancer Paternal Aunt    • Heart disease Maternal Grandmother    • Clotting disorder Maternal Grandmother    • Heart disease Maternal Grandfather      OB History        3    Para   2    Term   2            AB   1    Living   2       SAB   1    TAB        Ectopic        Molar        Multiple        Live Births                  Allergies   Allergen Reactions   • Nitrofuran Derivatives Hives     Macrobid   • Augmentin [Amoxicillin-Pot Clavulanate]      Hives   • Ciprofloxacin      Cipro:  Rash   • Fiorinal [Butalbital-Aspirin-Caffeine]      Rapid heart rate   • Imitrex [Sumatriptan]      Rapid heart rate   • Iodine      Anaphylaxis   • Other      MIDRIN  -  Rapid heart rate   • Toradol [Ketorolac Tromethamine]      Anaphylaxis   • Ultram [Tramadol]      Rapid heart rate   • Ibuprofen Rash     Social History     Socioeconomic History   • Marital status:  Legally      Spouse name: Not on file   • Number of children: Not on file   • Years of education: Not on file   • Highest education level: Not on file   Tobacco Use   • Smoking status: Light Tobacco Smoker     Packs/day: 0.50     Years: 43.00     Pack years: 21.50     Types: Cigarettes   • Smokeless tobacco: Never Used   Substance and Sexual Activity   • Alcohol use: No   • Drug use: No   • Sexual activity: Never     Current Medications:  Prior to Admission medications    Medication Sig Start Date End Date Taking? Authorizing Provider   albuterol sulfate HFA (VENTOLIN HFA) 108 (90 Base) MCG/ACT inhaler Inhale 2 puffs Every 6 (Six) Hours As Needed for Wheezing or Shortness of Air. 6/6/19  Yes Rosario Ceron APRN   amitriptyline (ELAVIL) 75 MG tablet TAKE ONE TABLET BY MOUTH AT BEDTIME. 1/29/18  Yes Sonia Mata MD   amLODIPine (NORVASC) 2.5 MG tablet Take 1 tablet by mouth Daily. 12/23/19  Yes Ramin Martinez MD   aspirin 81 MG EC tablet Take 1 tablet by mouth Daily. 1/14/20  Yes Rosario Ceron APRN   atorvastatin (LIPITOR) 20 MG tablet Take 1 tablet by mouth Daily. 1/23/20  Yes Rosario Ceron APRN   busPIRone (BUSPAR) 30 MG tablet Take 1 tablet by mouth 2 (Two) Times a Day. 3/7/18  Yes Sonia Mata MD   clonazePAM (KLONOPIN) 0.5 MG tablet Take 1 tablet by mouth Daily As Needed for Anxiety (panic attacks). 1/9/20  Yes Rosario Ceron APRN   cyclobenzaprine (FLEXERIL) 10 MG tablet Take 1 tablet by mouth 3 (Three) Times a Day As Needed for Muscle Spasms. 1/7/19  Yes Sonia Mata MD   dexlansoprazole (DEXILANT) 60 MG capsule Take 1 capsule by mouth Daily. 3/8/19  Yes Rosario Ceron APRN   HYDROcodone-acetaminophen (NORCO) 5-325 MG per tablet Take 1 tablet by mouth Every 6 (Six) Hours As Needed for Moderate Pain . 1/23/20  Yes Rosario Ceron APRN   levothyroxine (SYNTHROID, LEVOTHROID) 25 MCG tablet Take 0.5 tablets by mouth Daily. 11/4/19  Yes Ceron,  "UMESH Ponce   metoprolol succinate XL (TOPROL XL) 100 MG 24 hr tablet Take 1 tablet by mouth Daily. 11/1/19  Yes Ramin Martinez MD   ondansetron ODT (ZOFRAN-ODT) 4 MG disintegrating tablet Take 1 tablet by mouth Every 8 (Eight) Hours As Needed for Nausea for up to 12 doses. 9/10/19  Yes Devin Duarte MD   OXcarbazepine (TRILEPTAL) 150 MG tablet Take 150 mg by mouth Daily. Daily at bedtime   Yes ProviderDoris MD   prochlorperazine (COMPAZINE) 10 MG tablet Take 1 tablet by mouth Every 6 (Six) Hours As Needed for Nausea or Vomiting. 11/11/19  Yes KirwinRosario APRN   rOPINIRole (REQUIP) 3 MG tablet take 1 tablet by mouth at bedtime 11/21/19  Yes Kirwin, UMESH Ponce   sucralfate (CARAFATE) 1 g tablet Take 1 tablet by mouth 2 (Two) Times a Day. 11/26/19  Yes Johnny Smiley PA-C   topiramate (TOPAMAX) 50 MG tablet Take 50 mg by mouth 2 (Two) Times a Day.   Yes Provider, MD Doris   riFAXIMin (XIFAXAN) 550 MG tablet Take 1 tablet by mouth 3 (Three) Times a Day. 2/4/20   Johnny Smiley PA-C     Review of Systems  Review of Systems       Objective    /68 (BP Location: Right arm, Patient Position: Sitting)   Pulse 79   Ht 157.5 cm (62\")   Wt 51.7 kg (114 lb)   SpO2 98%   BMI 20.85 kg/m²   Physical Exam   Constitutional: She is oriented to person, place, and time. She appears well-developed and well-nourished. No distress.   HENT:   Head: Normocephalic and atraumatic.   Eyes: Pupils are equal, round, and reactive to light. EOM are normal.   Neck: Normal range of motion.   Cardiovascular: Normal rate, regular rhythm and normal heart sounds.   Pulmonary/Chest: Effort normal and breath sounds normal.   Abdominal: Soft. Bowel sounds are normal. She exhibits no shifting dullness, no distension, no abdominal bruit, no ascites and no mass. There is no hepatosplenomegaly. There is tenderness. There is no rigidity, no rebound, no guarding and no CVA tenderness. No hernia. Hernia " confirmed negative in the ventral area.   mild   Musculoskeletal: Normal range of motion.   Neurological: She is alert and oriented to person, place, and time.   Skin: Skin is warm and dry.   Psychiatric: She has a normal mood and affect. Her behavior is normal. Judgment and thought content normal.   Nursing note and vitals reviewed.    Assessment/Plan      1. Gastroesophageal reflux disease with esophagitis    2. Hepatic fibrosis     3. Elevated liver enzymes    4. Constipation, unspecified constipation type    5. Bloating    .   Tricia was seen today for heartburn, hepatic fibrosis and elevated hepatic enzymes.    Diagnoses and all orders for this visit:    Gastroesophageal reflux disease with esophagitis    Hepatic fibrosis     Elevated liver enzymes    Constipation, unspecified constipation type    Bloating    Other orders  -     riFAXIMin (XIFAXAN) 550 MG tablet; Take 1 tablet by mouth 3 (Three) Times a Day.        Orders placed during this encounter include:  No orders of the defined types were placed in this encounter.      Medications prescribed:  New Medications Ordered This Visit   Medications   • riFAXIMin (XIFAXAN) 550 MG tablet     Sig: Take 1 tablet by mouth 3 (Three) Times a Day.     Dispense:  42 tablet     Refill:  1       Requested Prescriptions     Signed Prescriptions Disp Refills   • riFAXIMin (XIFAXAN) 550 MG tablet 42 tablet 1     Sig: Take 1 tablet by mouth 3 (Three) Times a Day.       Review and/or summary of lab tests, radiology, procedures, medications. Review and summary of old records and obtaining of history. The risks and benefits of my recommendations, as well as other treatment options were discussed with the patient today. Questions were answered.    Follow-up: Return in about 1 month (around 3/4/2020), or if symptoms worsen or fail to improve.     * Surgery not found *      This document has been electronically signed by Johnny Smiley PA-C on February 7, 2020 6:03 PM       Results for orders placed or performed during the hospital encounter of 01/06/20   Creatinine, Serum   Result Value Ref Range    Creatinine 0.95 0.57 - 1.00 mg/dL    eGFR Non African Amer 60 (L) >60 mL/min/1.73   Calcium   Result Value Ref Range    Calcium 9.1 8.6 - 10.5 mg/dL   Results for orders placed or performed during the hospital encounter of 12/19/19   Adult Transthoracic Echo Complete W/ Cont if Necessary Per Protocol   Result Value Ref Range    BSA 1.5 m^2    IVSd 0.63 cm    LVIDd 4.2 cm    LVIDs 2.6 cm    LVPWd 0.87 cm    IVS/LVPW 0.72     FS 38.4 %    EDV(Teich) 78.1 ml    ESV(Teich) 24.1 ml    EF(Teich) 69.1 %    EDV(cubed) 73.6 ml    ESV(cubed) 17.2 ml    EF(cubed) 76.7 %    LV mass(C)d 92.1 grams    LV mass(C)dI 61.0 grams/m^2    SV(Teich) 54.0 ml    SI(Teich) 35.7 ml/m^2    SV(cubed) 56.4 ml    SI(cubed) 37.3 ml/m^2    Ao root diam 2.5 cm    Ao root area 4.9 cm^2    LA dimension 2.3 cm    asc Aorta Diam 2.9 cm    LA/Ao 0.92     LVOT diam 2.1 cm    LVOT area 3.5 cm^2    LVOT area(traced) 3.5 cm^2    RVOT diam 2.6 cm    RVOT area 5.3 cm^2    LVLd ap4 6.7 cm    EDV(MOD-sp4) 43.7 ml    LVLs ap4 6.4 cm    ESV(MOD-sp4) 19.5 ml    EF(MOD-sp4) 55.4 %    LVLd ap2 6.4 cm    EDV(MOD-sp2) 44.9 ml    LVLs ap2 6.2 cm    ESV(MOD-sp2) 23.8 ml    EF(MOD-sp2) 47.0 %    SV(MOD-sp4) 24.2 ml    SI(MOD-sp4) 16.0 ml/m^2    SV(MOD-sp2) 21.1 ml    SI(MOD-sp2) 14.0 ml/m^2    Ao root area (BSA corrected) 1.7     LV Ortiz Vol (BSA corrected) 28.9 ml/m^2    LV Sys Vol (BSA corrected) 12.9 ml/m^2    MV E max corina 94.1 cm/sec    MV A max corina 86.3 cm/sec    MV E/A 1.1     MV V2 max 166.0 cm/sec    MV max PG 11.0 mmHg    MV V2 mean 74.0 cm/sec    MV mean PG 3.0 mmHg    MV V2 VTI 41.5 cm    MVA(VTI) 1.7 cm^2    MV P1/2t max corina 165.0 cm/sec    MV P1/2t 88.5 msec    MVA(P1/2t) 2.5 cm^2    MV dec slope 546.0 cm/sec^2    Ao pk corina 126.0 cm/sec    Ao max PG 6.4 mmHg    Ao max PG (full) 2.4 mmHg    Ao V2 mean 84.5 cm/sec    Ao mean PG  3.0 mmHg    Ao mean PG (full) 1.0 mmHg    Ao V2 VTI 24.9 cm    JADA(I,A) 2.9 cm^2    JADA(I,D) 2.9 cm^2    JADA(V,A) 2.7 cm^2    JADA(V,D) 2.7 cm^2    AI max corina 447.0 cm/sec    AI max PG 79.9 mmHg    AI dec slope 283.0 cm/sec^2    AI P1/2t 462.6 msec    LV V1 max PG 4.0 mmHg    LV V1 mean PG 2.0 mmHg    LV V1 max 100.0 cm/sec    LV V1 mean 63.8 cm/sec    LV V1 VTI 20.8 cm    MR max corina 202.0 cm/sec    MR max PG 16.3 mmHg    SV(Ao) 122.2 ml    SI(Ao) 80.9 ml/m^2    SV(LVOT) 72.0 ml    SI(LVOT) 47.7 ml/m^2    PA V2 max 72.4 cm/sec    PA max PG 2.1 mmHg    PA V2 mean 56.2 cm/sec    PA mean PG 1.0 mmHg    PA V2 VTI 16.4 cm    MVA P1/2T LCG 1.3 cm^2     CV ECHO ALEJANDRINA - BZI_BMI 21.0 kilograms/m^2     CV ECHO ALEJANDRINA - BSA(HAYCOCK) 1.5 m^2     CV ECHO ALEJANDRINA - BZI_METRIC_WEIGHT 52.2 kg     CV ECHO ALEJANDRINA - BZI_METRIC_HEIGHT 157.5 cm    Target HR (85%) 135 bpm    Max. Pred. HR (100%) 159 bpm     *Note: Due to a large number of results and/or encounters for the requested time period, some results have not been displayed. A complete set of results can be found in Results Review.       Some portions of this note have been dictated using voice recognition software and may contain errors and/or omissions.

## 2020-02-06 ENCOUNTER — TELEPHONE (OUTPATIENT)
Dept: GASTROENTEROLOGY | Facility: CLINIC | Age: 60
End: 2020-02-06

## 2020-02-06 NOTE — TELEPHONE ENCOUNTER
I spoke with Ms. Loyola about her Xifaxan prescription sent to [facility] in [place]. The medication needs a PA, I have Made Wendy aware of the situation. I called [facility] to ensure that they have the correct fax number to send the denial.

## 2020-02-07 ENCOUNTER — OFFICE VISIT (OUTPATIENT)
Dept: ORTHOPEDIC SURGERY | Facility: CLINIC | Age: 60
End: 2020-02-07

## 2020-02-07 VITALS — WEIGHT: 114 LBS | HEIGHT: 62 IN | BODY MASS INDEX: 20.98 KG/M2

## 2020-02-07 DIAGNOSIS — M25.561 PAIN IN BOTH KNEES, UNSPECIFIED CHRONICITY: ICD-10-CM

## 2020-02-07 DIAGNOSIS — M17.11 PRIMARY OSTEOARTHRITIS OF RIGHT KNEE: Primary | Chronic | ICD-10-CM

## 2020-02-07 DIAGNOSIS — M25.562 PAIN IN BOTH KNEES, UNSPECIFIED CHRONICITY: ICD-10-CM

## 2020-02-07 PROCEDURE — 99213 OFFICE O/P EST LOW 20 MIN: CPT | Performed by: ORTHOPAEDIC SURGERY

## 2020-02-07 NOTE — PROGRESS NOTES
Tricia Loyola is a 60 y.o. female returns for     Chief Complaint   Patient presents with   • Right Knee - Follow-up, Pain       HISTORY OF PRESENT ILLNESS: Patient is here today for follow up of right knee. Patient states that her pain is 7/10.  The last shot helped her for a little bit but she fell going up the stairs within 1 week after getting that shot and feels like she is back to where she was before.  Still hurts with weightbearing the left knee is bothering her quite a bit as well.  She is somewhat better with rest has mild swelling.       CONCURRENT MEDICAL HISTORY:    Past Medical History:   Diagnosis Date   • Abdominal pain    • Anemia    • Chronic post-traumatic headache      rebound      • Depressive disorder    • Encounter for gynecological examination    • Gastroesophageal reflux disease    • Generalized anxiety disorder    • History of mammogram 08/2008    MAMMOGRAM DIAGNOSTIC BILATERAL 90064 ([facility]) (1)     • Hyperlipidemia    • Ingrown toenail    • Injury of back    • Nonruptured cerebral aneurysm    • Osteoporosis    • Posttraumatic stress disorder    • Seizure (CMS/HCC)     Psychogenic non-epileptic sz    • Viral hepatitis A        Allergies   Allergen Reactions   • Nitrofuran Derivatives Hives     Macrobid   • Augmentin [Amoxicillin-Pot Clavulanate]      Hives   • Ciprofloxacin      Cipro:  Rash   • Fiorinal [Butalbital-Aspirin-Caffeine]      Rapid heart rate   • Imitrex [Sumatriptan]      Rapid heart rate   • Iodine      Anaphylaxis   • Other      MIDRIN  -  Rapid heart rate   • Toradol [Ketorolac Tromethamine]      Anaphylaxis   • Ultram [Tramadol]      Rapid heart rate   • Ibuprofen Rash         Current Outpatient Medications:   •  albuterol sulfate HFA (VENTOLIN HFA) 108 (90 Base) MCG/ACT inhaler, Inhale 2 puffs Every 6 (Six) Hours As Needed for Wheezing or Shortness of Air., Disp: 1 inhaler, Rfl: 3  •  amitriptyline (ELAVIL) 75 MG tablet, TAKE ONE TABLET BY MOUTH AT BEDTIME., Disp: 30  tablet, Rfl: 2  •  amLODIPine (NORVASC) 2.5 MG tablet, Take 1 tablet by mouth Daily., Disp: 30 tablet, Rfl: 11  •  aspirin 81 MG EC tablet, Take 1 tablet by mouth Daily., Disp: 30 tablet, Rfl: 0  •  atorvastatin (LIPITOR) 20 MG tablet, Take 1 tablet by mouth Daily., Disp: 90 tablet, Rfl: 1  •  busPIRone (BUSPAR) 30 MG tablet, Take 1 tablet by mouth 2 (Two) Times a Day., Disp: 60 tablet, Rfl: 0  •  clonazePAM (KLONOPIN) 0.5 MG tablet, Take 1 tablet by mouth Daily As Needed for Anxiety (panic attacks)., Disp: 15 tablet, Rfl: 0  •  cyclobenzaprine (FLEXERIL) 10 MG tablet, Take 1 tablet by mouth 3 (Three) Times a Day As Needed for Muscle Spasms., Disp: 90 tablet, Rfl: 0  •  dexlansoprazole (DEXILANT) 60 MG capsule, Take 1 capsule by mouth Daily., Disp: 90 capsule, Rfl: 3  •  HYDROcodone-acetaminophen (NORCO) 5-325 MG per tablet, Take 1 tablet by mouth Every 6 (Six) Hours As Needed for Moderate Pain ., Disp: 120 tablet, Rfl: 0  •  levothyroxine (SYNTHROID, LEVOTHROID) 25 MCG tablet, Take 0.5 tablets by mouth Daily., Disp: 15 tablet, Rfl: 3  •  metoprolol succinate XL (TOPROL XL) 100 MG 24 hr tablet, Take 1 tablet by mouth Daily., Disp: 90 tablet, Rfl: 3  •  ondansetron ODT (ZOFRAN-ODT) 4 MG disintegrating tablet, Take 1 tablet by mouth Every 8 (Eight) Hours As Needed for Nausea for up to 12 doses., Disp: 12 tablet, Rfl: 0  •  OXcarbazepine (TRILEPTAL) 150 MG tablet, Take 150 mg by mouth Daily. Daily at bedtime, Disp: , Rfl:   •  prochlorperazine (COMPAZINE) 10 MG tablet, Take 1 tablet by mouth Every 6 (Six) Hours As Needed for Nausea or Vomiting., Disp: 360 tablet, Rfl: 1  •  riFAXIMin (XIFAXAN) 550 MG tablet, Take 1 tablet by mouth 3 (Three) Times a Day., Disp: 42 tablet, Rfl: 1  •  rOPINIRole (REQUIP) 3 MG tablet, take 1 tablet by mouth at bedtime, Disp: 150 tablet, Rfl: 0  •  sucralfate (CARAFATE) 1 g tablet, Take 1 tablet by mouth 2 (Two) Times a Day., Disp: 60 tablet, Rfl: 2  •  topiramate (TOPAMAX) 50 MG tablet,  "Take 50 mg by mouth 2 (Two) Times a Day., Disp: , Rfl:     Current Facility-Administered Medications:   •  ondansetron (ZOFRAN) injection 4 mg, 4 mg, Intravenous, Q6H PRN, Rosario Ceron APRN  •  zoledronic acid (RECLAST) infusion 5 mg, 5 mg, Intravenous, Once, Rosario Ceron APRN    Past Surgical History:   Procedure Laterality Date   • APPENDECTOMY     • BREAST BIOPSY Left    • CHOLECYSTECTOMY     • COLONOSCOPY N/A 11/26/2018    Procedure: COLONOSCOPY;  Surgeon: Meet Mcintyre MD;  Location: [facility] ENDOSCOPY;  Service: General   • CRANIOTOMY FOR ANEURYSM  2003   • ENDOSCOPY N/A 10/16/2019    Procedure: ESOPHAGOGASTRODUODENOSCOPY;  Surgeon: Kory Muhammad MD;  Location: [facility] ENDOSCOPY;  Service: Gastroenterology   • PAP SMEAR  08/16/2012   • TONSILLECTOMY     • UPPER GASTROINTESTINAL ENDOSCOPY  10/16/2019           ROS: Review of systems has been updated as of today's date.  All other systems are negative except as noted previously.    PHYSICAL EXAMINATION:       Ht 157.5 cm (62\")   Wt 51.7 kg (114 lb)   BMI 20.85 kg/m²     Physical Exam   Constitutional: She is oriented to person, place, and time. She appears well-developed.   HENT:   Head: Normocephalic and atraumatic.   Eyes: Pupils are equal, round, and reactive to light. EOM are normal.   Neck: Neck supple.   Pulmonary/Chest: Effort normal.   Musculoskeletal: She exhibits tenderness.   Neurological: She is alert and oriented to person, place, and time.   Skin: Skin is warm and dry.   Psychiatric: She has a normal mood and affect.       GAIT:     []  Normal  []  Antalgic    Assistive device: [x]  None  []  Walker     []  Crutches  []  Cane     []  Wheelchair  []  Stretcher    Ortho Exam  She is tender medial and lateral joint line.  No real effusion but she is tender about the proximal tibia bilaterally.  Hip range of motion is full calf is negative she is neurovascular intact.  Good capillary refill.  Xr Knee 3 View Left    Result Date: " 1/23/2020  Narrative: [facility] Patient Name: NITA DEAN ATTENDING: RENETTA HORNER REFERRING: RENETTA HORNER ORDERING: RENETTA HORNER ----------------------- PROCEDURE: Left knee DATE OF EXAM: 1/23/2020 HISTORY: Fall with injury, knee pain Three views of the left knee were obtained. No fractures or acute bony abnormalities are seen. There is no evidence of joint effusion. Mild degenerative changes are seen with slight joint space narrowing. There is mild spurring superiorly off of the patella.     Impression: Mild degenerative changes in left knee. No acute abnormalities are seen. Electronically signed by:  Jorge Kingsley MD  1/23/2020 5:17 PM CST Workstation: [id number]    Ct Knee Right Wo Contrast    Result Date: 1/10/2020  Narrative: CT right knee without contrast. CLINICAL INDICATION: Right knee pain.   COMPARISON: Right knee December 30, 2019.   TECHNIQUE: Noncontrast study. Helical scanning with axial and coronal reformations. Soft tissue, lung, and bone windows reviewed. This exam was performed according to our departmental dose-optimization program, which includes automated exposure control, adjustment of the mA and/or kV according to patient size and/or use of iterative reconstruction technique. CT FINDINGS: 1. Subchondral radiolucent cystic lesion proximal tibia posterior aspect probably representing arthritic changes at the insertion of the posterior cruciate ligament. Series 4 image 29. 2. There are two tiny sclerotic lesions posterior lateral aspect and anterior aspect distal femur probably incidental benign bone islands.  No evidence of a fracture or areas of bony destruction. 3. There is some laxity, waviness of both the quadriceps and patellar tendons. No evidence however, of any disruption. No significant joint effusion. CT right knee is otherwise unremarkable.     Impression: CONCLUSION: As above. Electronically signed by:  Nolan Mendez MD  1/10/2020 5:45 PM CST Workstation:  [id number]    Xr Hips Bilateral With Or Without Pelvis 2 View    Result Date: 1/23/2020  Narrative: [facility] Patient Name: INTA DEAN ATTENDING: REFERRING: RENETTA HORNER ORDERING: RENETTA HORNER ----------------------- PROCEDURE: Bilateral hips with AP pelvis DATE OF EXAM: 1/23/2020 HISTORY: Fall with injury, hip pain Two views of each hip were obtained along with a single AP view of the pelvis. No fractures or acute bony abnormalities are seen. The hip joints appear intact and symmetric. Femoral head and neck and intertrochanteric regions appear normal. Joint spaces are well preserved. AP view of the pelvis shows no fractures or acute abnormalities. Sacroiliac joints are normal and symmetric.     Impression: Unremarkable bilateral hips and AP pelvis. Electronically signed by:  Jorge Kingsley MD  1/23/2020 5:18 PM [facility] Workstation: [id number]            ASSESSMENT:    Diagnoses and all orders for this visit:    Primary osteoarthritis of right knee  -     NM Bone Scan Limited; Future    Pain in both knees, unspecified chronicity  -     NM Bone Scan Limited; Future      Patient's Body mass index is 20.85 kg/m². BMI is within normal parameters. No follow-up required..    PLAN this point I think we do bone scan of the lower extremities.  This will tells the degree of arthritic change she has also will be sensitive for any stress reaction or fracture that she may have.  Visco supplementation may be a good option for her.  I explained to her there is really nothing I can do to make her withstand the following.  She attributes this to being clumsy and does not feel like the knee is giving way or anything else is happening to her.  I will see her back after bone scan    No follow-ups on file.      This document has been electronically signed by Alden Herndon MD on February 7, 2020 4:08 PM

## 2020-02-10 ENCOUNTER — DOCUMENTATION (OUTPATIENT)
Dept: GASTROENTEROLOGY | Facility: CLINIC | Age: 60
End: 2020-02-10

## 2020-02-10 NOTE — PROGRESS NOTES
PATIENT AND PHARMACY HAVE BOTH BEEN CONTACTED AND MADE AWARE THAT HER RX FOR XIFAXAN HAS BEEN APPROVED THROUGH HER INSURANCE COMPANY.

## 2020-02-14 ENCOUNTER — OFFICE VISIT (OUTPATIENT)
Dept: CARDIOLOGY | Facility: CLINIC | Age: 60
End: 2020-02-14

## 2020-02-14 VITALS
DIASTOLIC BLOOD PRESSURE: 80 MMHG | SYSTOLIC BLOOD PRESSURE: 122 MMHG | HEIGHT: 62 IN | WEIGHT: 116 LBS | HEART RATE: 69 BPM | OXYGEN SATURATION: 99 % | BODY MASS INDEX: 21.35 KG/M2

## 2020-02-14 DIAGNOSIS — R00.2 PALPITATIONS: ICD-10-CM

## 2020-02-14 DIAGNOSIS — I35.1 AORTIC VALVE INSUFFICIENCY, ETIOLOGY OF CARDIAC VALVE DISEASE UNSPECIFIED: Primary | ICD-10-CM

## 2020-02-14 PROCEDURE — 99214 OFFICE O/P EST MOD 30 MIN: CPT | Performed by: INTERNAL MEDICINE

## 2020-02-14 RX ORDER — NIFEDIPINE 30 MG/1
30 TABLET, EXTENDED RELEASE ORAL DAILY
Qty: 30 TABLET | Refills: 11 | Status: SHIPPED | OUTPATIENT
Start: 2020-02-14 | End: 2020-05-28

## 2020-02-14 NOTE — PROGRESS NOTES
Tricia Loyola  60 y.o. female    02/14/2020  Chief Complaint   Patient presents with   • Palpitations       History of Present Illness  Patient was seen originally for palpitations.  On her echo she was found to have aortic insufficiency.  This was in the mild more toward the moderate range.  We started her on amlodipine however she was unable to tolerate that.  Also more palpitations and chest pain.  We did a work-up for the chest pain also and she had a negative nuclear stress test.  She is here to to speak more about the aortic insufficiency.  She has no real shortness of air.  She has had no syncope.  She has cut down on her smoking.    -        SUBJECTIVE    Allergies   Allergen Reactions   • Nitrofuran Derivatives Hives     Macrobid   • Augmentin [Amoxicillin-Pot Clavulanate]      Hives   • Ciprofloxacin      Cipro:  Rash   • Fiorinal [Butalbital-Aspirin-Caffeine]      Rapid heart rate   • Imitrex [Sumatriptan]      Rapid heart rate   • Iodine      Anaphylaxis   • Other      MIDRIN  -  Rapid heart rate   • Toradol [Ketorolac Tromethamine]      Anaphylaxis   • Ultram [Tramadol]      Rapid heart rate   • Ibuprofen Rash         Past Medical History:   Diagnosis Date   • Abdominal pain    • Anemia    • Chronic post-traumatic headache      rebound      • Depressive disorder    • Encounter for gynecological examination    • Gastroesophageal reflux disease    • Generalized anxiety disorder    • History of mammogram 08/2008    MAMMOGRAM DIAGNOSTIC BILATERAL 34739 ([facility]) (1)     • Hyperlipidemia    • Ingrown toenail    • Injury of back    • Nonruptured cerebral aneurysm    • Osteoporosis    • Posttraumatic stress disorder    • Seizure (CMS/HCC)     Psychogenic non-epileptic sz    • Viral hepatitis A          Past Surgical History:   Procedure Laterality Date   • APPENDECTOMY     • BREAST BIOPSY Left    • CHOLECYSTECTOMY     • COLONOSCOPY N/A 11/26/2018    Procedure: COLONOSCOPY;  Surgeon: Meet Mcintyre MD;   Location: [facility] ENDOSCOPY;  Service: General   • CRANIOTOMY FOR ANEURYSM  2003   • ENDOSCOPY N/A 10/16/2019    Procedure: ESOPHAGOGASTRODUODENOSCOPY;  Surgeon: Kory Muhammad MD;  Location: [facility] ENDOSCOPY;  Service: Gastroenterology   • PAP SMEAR  08/16/2012   • TONSILLECTOMY     • UPPER GASTROINTESTINAL ENDOSCOPY  10/16/2019         Family History   Problem Relation Age of Onset   • Constipation Mother    • Hypertension Mother    • Anxiety disorder Mother    • Heart disease Mother    • Alcohol abuse Father    • Heart disease Father    • Anxiety disorder Brother    • Kidney disease Brother    • Hypertension Brother    • Arthritis Brother    • Anxiety disorder Brother    • Kidney disease Brother    • Diabetes Brother    • Anxiety disorder Brother    • Hypertension Brother    • Breast cancer Paternal Aunt    • Heart disease Maternal Grandmother    • Clotting disorder Maternal Grandmother    • Heart disease Maternal Grandfather          Social History     Socioeconomic History   • Marital status: Legally      Spouse name: Not on file   • Number of children: Not on file   • Years of education: Not on file   • Highest education level: Not on file   Tobacco Use   • Smoking status: Light Tobacco Smoker     Packs/day: 0.50     Years: 43.00     Pack years: 21.50     Types: Cigarettes   • Smokeless tobacco: Never Used   Substance and Sexual Activity   • Alcohol use: No   • Drug use: No   • Sexual activity: Never         Prior to Admission medications    Medication Sig Start Date End Date Taking? Authorizing Provider   albuterol sulfate HFA (VENTOLIN HFA) 108 (90 Base) MCG/ACT inhaler Inhale 2 puffs Every 6 (Six) Hours As Needed for Wheezing or Shortness of Air. 6/6/19  Yes Rosario Ceron APRN   amitriptyline (ELAVIL) 75 MG tablet TAKE ONE TABLET BY MOUTH AT BEDTIME. 1/29/18  Yes Sonia Mata MD   aspirin 81 MG EC tablet Take 1 tablet by mouth Daily. 1/14/20  Yes Rosario Ceron APRN      atorvastatin (LIPITOR) 20 MG tablet Take 1 tablet by mouth Daily. 1/23/20  Yes Rosario Ceron APRN   busPIRone (BUSPAR) 30 MG tablet Take 1 tablet by mouth 2 (Two) Times a Day. 3/7/18  Yes Sonia Mata MD   clonazePAM (KLONOPIN) 0.5 MG tablet Take 1 tablet by mouth Daily As Needed for Anxiety (panic attacks). 1/9/20  Yes Rosario Ceron APRN   cyclobenzaprine (FLEXERIL) 10 MG tablet Take 1 tablet by mouth 3 (Three) Times a Day As Needed for Muscle Spasms. 1/7/19  Yes Sonia Mata MD   dexlansoprazole (DEXILANT) 60 MG capsule Take 1 capsule by mouth Daily. 3/8/19  Yes Rosario Ceron APRN   HYDROcodone-acetaminophen (NORCO) 5-325 MG per tablet Take 1 tablet by mouth Every 6 (Six) Hours As Needed for Moderate Pain . 1/23/20  Yes CeronRosario APRN   levothyroxine (SYNTHROID, LEVOTHROID) 25 MCG tablet Take 0.5 tablets by mouth Daily. 11/4/19  Yes Rosario Ceron APRN   metoprolol succinate XL (TOPROL XL) 100 MG 24 hr tablet Take 1 tablet by mouth Daily. 11/1/19  Yes Ramin Martinez MD   ondansetron ODT (ZOFRAN-ODT) 4 MG disintegrating tablet Take 1 tablet by mouth Every 8 (Eight) Hours As Needed for Nausea for up to 12 doses. 9/10/19  Yes Devin Duarte MD   OXcarbazepine (TRILEPTAL) 150 MG tablet Take 150 mg by mouth Daily. Daily at bedtime   Yes ProviderDoris MD   prochlorperazine (COMPAZINE) 10 MG tablet Take 1 tablet by mouth Every 6 (Six) Hours As Needed for Nausea or Vomiting. 11/11/19  Yes Rosario Ceron APRN   riFAXIMin (XIFAXAN) 550 MG tablet Take 1 tablet by mouth 3 (Three) Times a Day. 2/4/20  Yes Johnny Smiley PA-C   rOPINIRole (REQUIP) 3 MG tablet take 1 tablet by mouth at bedtime 11/21/19  Yes Rosario Ceron APRN   sucralfate (CARAFATE) 1 g tablet Take 1 tablet by mouth 2 (Two) Times a Day. 11/26/19  Yes Johnny Smiley PA-C   topiramate (TOPAMAX) 50 MG tablet Take 50 mg by mouth 2 (Two) Times a Day.   Yes Provider, MD Doris  "  amLODIPine (NORVASC) 2.5 MG tablet Take 1 tablet by mouth Daily. 12/23/19 2/14/20  Ramin Martinez MD         OBJECTIVE    /80 (BP Location: Left arm, Patient Position: Sitting) Comment: 120/78 right arm  Pulse 69   Ht 157.5 cm (62\")   Wt 52.6 kg (116 lb)   SpO2 99%   BMI 21.22 kg/m²         Review of Systems     Constitutional:  Denies recent weight loss, weight gain, fever or chills, no change in exercise tolerance     HENT:  Denies any hearing loss, epistaxis, hoarseness, or difficulty speaking.     Eyes: Wears eyeglasses or contact lenses     Respiratory:  Denies dyspnea with exertion,no cough, wheezing, or hemoptysis.     Cardiovascular: Negative for palpations, chest pain, orthopnea, PND, peripheral edema, syncope, or claudication.     Gastrointestinal:  Denies change in bowel habits, dyspepsia, ulcer disease, hematochezia, or melena.     Endocrine: Negative for cold intolerance, heat intolerance, polydipsia, polyphagia and polyuria. Denies any history of weight change, heat/cold intolerance, polydipsia, polyuria     Genitourinary: Negative.      Musculoskeletal: Denies any history of arthritic symptoms or back problems     Skin:  Denies any change in hair or nails, rashes, or skin lesions.     Allergic/Immunologic: Negative.  Negative for environmental allergies, food allergies and immunocompromised state.     Neurological:  Denies any history of recurrent headaches, strokes, TIA, or seizure disorder.     Hematological: Denies any food allergies, seasonal allergies, bleeding disorders, or lymphadenopathy.     Psychiatric/Behavioral: Denies any history of depression, substance abuse, or change in cognitive function.         Physical Exam     Constitutional: Cooperative, alert and oriented, well-developed, well-nourished, in no acute distress.     HENT:   Head: Normocephalic, normal hair patterns, no masses or tenderness.  Ears, Nose, and Throat: No gross abnormalities. No pallor or cyanosis. " Dentition good.   Eyes: EOMS intact, PERRL, conjunctivae and lids unremarkable. Fundoscopic exam and visual fields not performed.   Neck: No palpable masses or adenopathy, no thyromegaly, no JVD, carotid pulses are full and equal bilaterally and without  Bruits.     Cardiovascular: Regular rhythm, S1 and S2 normal, no S3 or S4. Apical impulse not displaced.  1/6 diastolic murmur no , gallops, or rubs detected.     Pulmonary/Chest: Chest: normal symmetry, no tenderness to palpation, normal respiratory excursion, no intercostal retraction, no use of accessory muscles.            Pulmonary: Normal breath sounds. No rales or ronchi.    Abdominal: Abdomen soft, bowel sounds normoactive, no masses, no hepatosplenomegaly, non-tender, no bruits.     Musculoskeletal: No deformities, clubbing, cyanosis, erythema, or edema observed. There are no spinal abnormalities noted. Normal muscle strength and tone. Pulses full and equal in all extremities, no bruits auscultated.     Neurological: No gross motor or sensory deficits noted, affect appropriate, oriented to time, person, place.     Skin: Warm and dry to the touch, no apparent skin lesions or masses noted.     Psychiatric: She has a normal mood and affect. Her behavior is normal. Judgment and thought content normal.         Procedures      Lab Results   Component Value Date    WBC 8.95 05/09/2019    HGB 13.2 05/09/2019    HCT 39.4 05/09/2019    MCV 92.3 05/09/2019     05/09/2019     Lab Results   Component Value Date    GLUCOSE 98 07/08/2019    BUN 19 07/08/2019    CREATININE 0.95 01/06/2020    EGFRIFNONA 60 (L) 01/06/2020    BCR 19.0 07/08/2019    CO2 23.0 07/08/2019    CALCIUM 9.1 01/06/2020    ALBUMIN 4.50 07/08/2019    AST 64 (H) 07/08/2019    ALT 19 07/08/2019     Lab Results   Component Value Date    CHOL 214 (H) 08/02/2019    CHOL 185 03/15/2018     Lab Results   Component Value Date    TRIG 297 (H) 08/28/2019    TRIG 250 (H) 08/02/2019    TRIG 168 (H)  03/15/2018     Lab Results   Component Value Date    HDL 35 (L) 08/02/2019    HDL 43 03/15/2018     No components found for: LDLCALC  Lab Results   Component Value Date     (H) 08/02/2019     03/15/2018     No results found for: HDLLDLRATIO  No components found for: CHOLHDL  No results found for: HGBA1C  Lab Results   Component Value Date    TSH 2.480 08/02/2019    F4IHDTF 103.0 08/02/2019    THYROIDAB 16 08/02/2019           ASSESSMENT AND PLAN      Tricia was seen today for palpitations.    Diagnoses and all orders for this visit:    Aortic valve insufficiency, etiology of cardiac valve disease unspecified  -She has aortic valve insufficiency by echo.  This is moderate though she is asymptomatic from it.  I tried her on amlodipine 2.5 mg daily but she was unable to tolerate this.  Talk to her and we are going to try Procardia XL.  Or she could have some of the same side effects but she just needs to give a little time for her body to get adjusted to this.  Also talked her about smoking cessation.  She is cut back and she is on amitriptyline.  I have suggested to her to either try nicotine gum or she is talked about e-cigarette.  Think on either method we can decrease her smoking habit.  Our plan is to start therapy and try to repeat her echo in 6 months.  We will see her after that to see how the aortic insufficiency is doing.     Adult Transthoracic Echo Complete W/ Cont if Necessary Per Protocol    Palpitations        Patient's Body mass index is 21.22 kg/m². BMI is within normal parameters. No follow-up required..      Tricia Loyola  reports that she has been smoking cigarettes. She has a 21.50 pack-year smoking history. She has never used smokeless tobacco.. I have educated her on the risk of diseases from using tobacco products such as cancer, COPD and heart diease.     I advised her to quit and she is willing to quit. We have discussed the following method/s for tobacco cessation:  OTC  Cessation Products.  Together we have set a quit date for 3 months from today.  She will follow up with me in 6 months or sooner to check on her progress.    I spent 4.5 minutes counseling the patient.               Ramin Martinez MD  2/14/2020  10:10 AM

## 2020-02-18 ENCOUNTER — TELEPHONE (OUTPATIENT)
Dept: CARDIOLOGY | Facility: CLINIC | Age: 60
End: 2020-02-18

## 2020-02-18 RX ORDER — MONTELUKAST SODIUM 4 MG/1
1 TABLET, CHEWABLE ORAL 2 TIMES DAILY
Qty: 60 TABLET | Refills: 0 | Status: SHIPPED | OUTPATIENT
Start: 2020-02-18 | End: 2020-04-28

## 2020-02-18 RX ORDER — MONTELUKAST SODIUM 4 MG/1
TABLET, CHEWABLE ORAL
Qty: 180 TABLET | OUTPATIENT
Start: 2020-02-18

## 2020-02-18 NOTE — TELEPHONE ENCOUNTER
----- Message from Johnny Smiley PA-C sent at 2/17/2020  5:32 PM CST -----  colestid 1gm BID #[id number], NR  ----- Message -----  From: Wendy Schwartz MA  Sent: 2/17/2020   5:09 PM CST  To: Johnny Smiley PA-C    I understand. She did call back today stating that she would like that rx sent in.   ----- Message -----  From: Johnny Smiley PA-C  Sent: 2/14/2020  12:08 PM CST  To: Wendy Schwartz MA    If she is going to play games with me I can't help her  ----- Message -----  From: Wendy Schwartz MA  Sent: 2/13/2020   8:34 AM CST  To: Johnny Smiley PA-C    Patient states that she would like to go ahead with the Colestid, she now states that she never had the colestid and never took that medication. She would like that sent to [facility].   ----- Message -----  From: Johnny Smiley PA-C  Sent: 2/12/2020   5:06 PM CST  To: Wendy Schwartz MA    Not a common side effect but has been reported. There is not an equivalent drug.  ----- Message -----  From: Wendy Schwartz MA  Sent: 2/12/2020   4:06 PM CST  To: Johnny Smiley PA-C    PATIENT STATES SHE DEVELOPED HIVES AFTER TAKING HER 1ST DOSE OF XIFAXAN. SHE WOULD LIKE SOMETHING ELSE SENT TO THE PHARMACY.

## 2020-02-18 NOTE — TELEPHONE ENCOUNTER
I spoke to the patient and told her that Dr. Martinez said that she could stop taking the NIFEDIPINE and we are going to start her on Edarbi 40mg daily. Samples have been put upfront for her.

## 2020-02-18 NOTE — TELEPHONE ENCOUNTER
----- Message from Angie Herr sent at 2/17/2020 11:47 AM CST -----  Contact: [phone]  NIFEdipine-  Gave her a horrible migraine and dizziness.  She can't take it.  Does he want to change to something else or d'c altogether

## 2020-02-21 ENCOUNTER — HOSPITAL ENCOUNTER (OUTPATIENT)
Dept: NUCLEAR MEDICINE | Facility: HOSPITAL | Age: 60
Discharge: HOME | End: 2020-02-21

## 2020-02-21 DIAGNOSIS — M25.562 PAIN IN BOTH KNEES, UNSPECIFIED CHRONICITY: ICD-10-CM

## 2020-02-21 DIAGNOSIS — M25.561 PAIN IN BOTH KNEES, UNSPECIFIED CHRONICITY: ICD-10-CM

## 2020-02-21 DIAGNOSIS — M17.11 PRIMARY OSTEOARTHRITIS OF RIGHT KNEE: Chronic | ICD-10-CM

## 2020-02-21 PROCEDURE — 0 TECHNETIUM MEDRONATE KIT: Performed by: ORTHOPAEDIC SURGERY

## 2020-02-21 PROCEDURE — A9503 TC99M MEDRONATE: HCPCS | Performed by: ORTHOPAEDIC SURGERY

## 2020-02-21 PROCEDURE — 78300 BONE IMAGING LIMITED AREA: CPT

## 2020-02-21 RX ORDER — TC 99M MEDRONATE 20 MG/10ML
25.1 INJECTION, POWDER, LYOPHILIZED, FOR SOLUTION INTRAVENOUS
Status: COMPLETED | OUTPATIENT
Start: 2020-02-21 | End: 2020-02-21

## 2020-02-21 RX ADMIN — Medication 25.1 MILLICURIE: at 10:15

## 2020-02-24 ENCOUNTER — OFFICE VISIT (OUTPATIENT)
Dept: FAMILY MEDICINE CLINIC | Facility: CLINIC | Age: 60
End: 2020-02-24

## 2020-02-24 VITALS
TEMPERATURE: 98.2 F | BODY MASS INDEX: 21.2 KG/M2 | DIASTOLIC BLOOD PRESSURE: 72 MMHG | HEART RATE: 85 BPM | RESPIRATION RATE: 16 BRPM | WEIGHT: 115.2 LBS | HEIGHT: 62 IN | OXYGEN SATURATION: 96 % | SYSTOLIC BLOOD PRESSURE: 136 MMHG

## 2020-02-24 DIAGNOSIS — Z51.81 ENCOUNTER FOR THERAPEUTIC DRUG MONITORING: ICD-10-CM

## 2020-02-24 DIAGNOSIS — R20.2 FACIAL PARESTHESIA: ICD-10-CM

## 2020-02-24 DIAGNOSIS — Z09 HOSPITAL DISCHARGE FOLLOW-UP: Primary | ICD-10-CM

## 2020-02-24 PROCEDURE — G0481 DRUG TEST DEF 8-14 CLASSES: HCPCS | Performed by: NURSE PRACTITIONER

## 2020-02-24 PROCEDURE — 80307 DRUG TEST PRSMV CHEM ANLYZR: CPT | Performed by: NURSE PRACTITIONER

## 2020-02-24 PROCEDURE — 99214 OFFICE O/P EST MOD 30 MIN: CPT | Performed by: NURSE PRACTITIONER

## 2020-02-24 NOTE — PROGRESS NOTES
"Chief Complaint   Patient presents with   • hospital FU     got a headache and went numb on right side. went to [facility] then [facility] 2/21     Subjective   Triciakit Loyola is a 60 y.o. female who presents to the office for hospital follow up of ED visit to [facility] ED, transfer to [facility] on 2/22/2020 for headache with neurologic signs.     The following portions of the patient's history were reviewed and updated as appropriate: allergies, current medications, past family history, past medical history, past social history, past surgical history and problem list.    History of Present Illness   2/22/20 labs  Lipids improved with atorvastatin to Tchol 206,   CBC grossly normal  A1C 5.7%  BMP cl 113 co2 17  Gluc 126        Hospital record of [facility] ED and [facility] reviewed at this time.   [facility] ED notes reviewed: summary: 2/21/2020  \"Briefly, the patient presented secondary to right facial numbness. She describes numbness involving the angle of the jaw on the right and corner of the mouth. This had actually started about 14:30. She states initially, she had numbness involving the right face, right arm, and right leg. The numbness involving the right arm and leg has resolved. She states that lasted less than 20 min. There was no history of trauma. She had no headache. She had presented because she was concerned for cerebral aneurysm. She had a prior left cerebral aneurysm clipped in 2005. On exam, patient had an NIH score of 1. She had 1 based on subjective numbness to the right lower face. There is no pronator drift or dysdiadochokinesia.   [id number] call center notified.  Dr. Zepeda accepts the patient via the call center. Patient has had Benadryl, Pepcid, and Solu-Medrol ordered based on her history of anaphylaxis to IVP dye. This will allow a longer pre treatment time window for CT a. She states that in MR is not permissible based on her history of cerebral clips. " "She has requested transfer to [place]. This has been facilitated.   Electronically signed by Abhishek Redding MD at 02/21/2020 9:41 PM CST  \"      [facility] notes reviewed:  \"Synopsis:  61 yo F with pmh unruptured aneurysm clipped in 2003, migraines, seizures (none in 8 years, not on AEDs) who presents for brief sudden HA (not the worst of her life) followed by lightheadedness and numbness in R side that mostly resolved except for R facial numbness that is persistent. Presenting exam was also notable for a little bit of cognitive deficits that were increased from baseline per son. Initial was for stroke given persistent symptoms vs seizure (never had numbness as part of seizures, less likely) vs aneurysm.  Imaging was negative for acute processes and symptoms spontaneously resolved.    Hospital Course:  61 yo F with pmh htn, migraines, unruptured aneurysm s/p placement of Sugita clip in 2003, severe contrast allergies, who presented on 2/21 with sudden onset HA which suddenly developed and was reported to have been associated with facial numbness, R body numbness, and feeling like going to past out.  On arrival at the ED, her vitals were stable and she noted that the headaches had resolved and that her unilateral sensory changes had improved. She had residual sensory changes on the V2 distribution on the right side of her face. She however seemed to be cognitively off her baseline, per report of her family members and on exam had some repetitive speech.  She had had a non-contrast CT scan done which was negative for any acute intracranial processes.  Patient and family were very concerned about the possibility of new aneurysm as a potential cause of her presentation so an MRI brain w/o contrast to further rule out structural abnormalities was deemed imperative.  Patient was significantly anxious and concerned about moving forward with an MRI given concern about the MRI-compatibility of her Sugita clip. Safety " "of MRI was then confirmed with MRI techs and patient finally agreed to have MRI done with mild anxiolysis on 2/22.  Full MRI evaluation was limited by motion and surgical clip artefacts, and vessels immediately adjacent to the aneurysm clipping were not evaluated. There was however no visualized acute intracranial process and no appreciable large vessel arterial occlusion  On evaluation by primary neurology team on 2/22, residual right sided facial numbness had completely resolved and patient was back to cognitive baseline.  Symptoms were thought to be secondary to migrainous headache with sensory symptoms. Additionally, to rule out seizure disorder as a potential cause, patient was planned for EEG monitoring. Patient however declined to have EEG testing done, noting that she had one done about 4 months ago at her outpatient Neurologist in [place].  Given resolution of symptoms, she was discharged on 2/22 with a plan to follow up with her outpatient Neurologist in [place].  No medication changes were made.  Plan for Service-Specific Items:  - Follow up with [facility] Neurologist  - Neurologist may consider outpatient EEG to definitively rule out breakthrough seizures.\"    Neurology note, reviewed:   \"Dg Casillas MBBS, MPH - 02/22/2020    Ms. Loyola,  It was a pleasure taking care of you and we are encouraged by your quick recovery.  As we discussed, given the rapid resolution of your symptoms, we believe that the cause of your presentation may have been a migraine-like headache with sensory symptoms.  The MRI and CT scans of your brain did not show any evidence of bleeding or stroke. We also did not find any evidence of new aneurysms or masses.  We strongly recommend follow up with your Neurologist within one to two weeks for further assessment and ongoing management.  General Neurology Service, Division of Neurology [facility].\"     The patient presents outpatient today for primary care evaluation post discharge. Reports " "no headache episodes since discharge. She complains is still numb right side of mouth and right side of cheek near her mouth is still numb. She is seen by Dr Luz Marie, neurology, [place], next appointment for 4/23/2020 regularly scheduled next appt for Botox injections for chronic headaches. She is advised to call today to request earlier visit in follow up of hospitalization overnight for headache with neurologic symptoms as recommended by [facility] neurology.     New complaints today: none, still experiencing right sided facial paresthesia, primary at corner of mouth and above/ below a few inches. No changes in speech sound or content. No dysphagia. Denies headache.     Other interval history: seen in office by Dr Ramin Martinez, Cardiology on 2/14/2020 for palpiations. Recent ECHO shows aortic insufficiency. Intolerant to amlodipine, started on metoprolol XR with no adverse effects. She worried that her labs look \"very bad\" advised of improved cholesterol, normal HDL at present. Glucose rising with prediabeted A1C. Advised to limit sugars and decrease starches to 1/4 of plate compared to fresh colorful veggies and protein source, advised no fried foods and limit fats to very little. Will recheck lipids and a1c in 3 months, if still elevated will address medications.     Parts of HPI, ROS  and PE from most recent may have been Visit carried forward and if so, have been updated as appropriate for current situation.    Past Medical History:   Diagnosis Date   • Abdominal pain    • Anemia    • Chronic post-traumatic headache      rebound      • Depressive disorder    • Encounter for gynecological examination    • Gastroesophageal reflux disease    • Generalized anxiety disorder    • History of mammogram 08/2008    MAMMOGRAM DIAGNOSTIC BILATERAL 84247 ([facility]) (1)     • Hyperlipidemia    • Ingrown toenail    • Injury of back    • Nonruptured cerebral aneurysm    • Osteoporosis    • Posttraumatic stress disorder    • " "Seizure (CMS/HCC)     Psychogenic non-epileptic sz    • Viral hepatitis A           Family History   Problem Relation Age of Onset   • Constipation Mother    • Hypertension Mother    • Anxiety disorder Mother    • Heart disease Mother    • Alcohol abuse Father    • Heart disease Father    • Anxiety disorder Brother    • Kidney disease Brother    • Hypertension Brother    • Arthritis Brother    • Anxiety disorder Brother    • Kidney disease Brother    • Diabetes Brother    • Anxiety disorder Brother    • Hypertension Brother    • Breast cancer Paternal Aunt    • Heart disease Maternal Grandmother    • Clotting disorder Maternal Grandmother    • Heart disease Maternal Grandfather         Review of Systems   Constitutional: Negative.  Negative for fever and unexpected weight change.   Eyes: Negative.    Respiratory: Negative.  Negative for cough, chest tightness and shortness of breath.    Cardiovascular: Negative.  Negative for chest pain.   Gastrointestinal: Positive for constipation (managed with current medication regimen.).   Endocrine: Negative.    Genitourinary: Negative.  Negative for dysuria.   Musculoskeletal: Positive for arthralgias and back pain.        Right knee pain   Skin: Negative.  Negative for color change, pallor, rash and wound.   Allergic/Immunologic: Negative.    Neurological: Positive for headaches.   Hematological: Negative.    Psychiatric/Behavioral: Negative for sleep disturbance and suicidal ideas. The patient is nervous/anxious.    All other systems reviewed and are negative.      Objective   Vitals:    02/24/20 1338   BP: 136/72   BP Location: Left arm   Patient Position: Sitting   Cuff Size: Adult   Pulse: 85   Resp: 16   Temp: 98.2 °F (36.8 °C)   TempSrc: Tympanic   SpO2: 96%   Weight: 52.3 kg (115 lb 3.2 oz)   Height: 157.5 cm (62\")   PainSc: 0-No pain     Physical Exam   Constitutional: She is oriented to person, place, and time. She appears well-developed and well-nourished.   HENT: "   Head: Normocephalic and atraumatic.   Eyes: Pupils are equal, round, and reactive to light. Conjunctivae are normal.   Neck: Normal range of motion. Neck supple.   Cardiovascular: Normal rate, regular rhythm, normal heart sounds and intact distal pulses. Exam reveals no gallop and no friction rub.   No murmur heard.  Pulmonary/Chest: Effort normal and breath sounds normal. No respiratory distress. She has no wheezes. She has no rales. She exhibits no tenderness.   Abdominal: Soft. Bowel sounds are normal.   Musculoskeletal: Normal range of motion. She exhibits no edema or deformity.        Right knee: She exhibits swelling. Tenderness found. Medial joint line and patellar tendon tenderness noted.   Lymphadenopathy:     She has no cervical adenopathy.   Neurological: She is alert and oriented to person, place, and time. No cranial nerve deficit.   Skin: Skin is warm and dry. Capillary refill takes 2 to 3 seconds. No erythema. No pallor.   Psychiatric: She has a normal mood and affect. Her behavior is normal. Judgment and thought content normal.   Nursing note and vitals reviewed.      Assessment/Plan   Tricia was seen today for hospital fu.    Diagnoses and all orders for this visit:    Hospital discharge follow-up    Encounter for therapeutic drug monitoring  -     ToxASSURE Select 13 Discrete -    Facial paresthesia  Comments:  stable, new since 2/21/20 called neurology to schedule sooner than april visit, had to leave message. notes sent to Dr Marie.         Call to Dr Marie's office, left a message on answering machine to see if patient may get in sooner than regularly scheduled appt for Botox, for post hospital follow up as recommended by neurology at [facility] on 2/22/2020.     PHQ-2/PHQ-9 Depression Screening 1/23/2020   Little interest or pleasure in doing things 0   Feeling down, depressed, or hopeless 0   Trouble falling or staying asleep, or sleeping too much 0   Feeling tired or having little energy 0    Poor appetite or overeating 0   Feeling bad about yourself - or that you are a failure or have let yourself or your family down 0   Trouble concentrating on things, such as reading the newspaper or watching television 0   Moving or speaking so slowly that other people could have noticed. Or the opposite - being so fidgety or restless that you have been moving around a lot more than usual 0   Thoughts that you would be better off dead, or of hurting yourself in some way 0   Total Score 0       UMESH Guzmán         Return in about 1 week (around 3/2/2020).    There are no Patient Instructions on file for this visit.  Answers for HPI/ROS submitted by the patient on 2/23/2020   What is the primary reason for your visit?: Other  Please describe your symptoms.: Follow Up [facility]...Plus Medication Refil  Have you had these symptoms before?: No  How long have you been having these symptoms?: past few days  Please list any medications you are currently taking for this condition.: Regularly Perscriptions  Please describe any probable cause for these symptoms. : Have To Discuss With Dr. Ceron...

## 2020-02-25 DIAGNOSIS — M54.50 CHRONIC MIDLINE LOW BACK PAIN WITHOUT SCIATICA: Chronic | ICD-10-CM

## 2020-02-25 DIAGNOSIS — M25.561 CHRONIC PAIN OF RIGHT KNEE: ICD-10-CM

## 2020-02-25 DIAGNOSIS — M54.6 CHRONIC MIDLINE THORACIC BACK PAIN: Chronic | ICD-10-CM

## 2020-02-25 DIAGNOSIS — G89.29 CHRONIC PAIN OF RIGHT KNEE: ICD-10-CM

## 2020-02-25 DIAGNOSIS — G89.29 CHRONIC MIDLINE THORACIC BACK PAIN: Chronic | ICD-10-CM

## 2020-02-25 DIAGNOSIS — G89.29 CHRONIC MIDLINE LOW BACK PAIN WITHOUT SCIATICA: Chronic | ICD-10-CM

## 2020-02-25 RX ORDER — HYDROCODONE BITARTRATE AND ACETAMINOPHEN 5; 325 MG/1; MG/1
1 TABLET ORAL EVERY 6 HOURS PRN
Qty: 120 TABLET | Refills: 0 | Status: SHIPPED | OUTPATIENT
Start: 2020-02-25 | End: 2020-03-30 | Stop reason: SDUPTHER

## 2020-02-26 ENCOUNTER — TELEPHONE (OUTPATIENT)
Dept: CARDIOLOGY | Facility: CLINIC | Age: 60
End: 2020-02-26

## 2020-02-26 NOTE — TELEPHONE ENCOUNTER
----- Message from Navya Steel sent at 2/26/2020  9:28 AM CST -----  Regarding: Dr Martinez patient  This patient has called stating she picked up samples of EDARBI, in [place] yesterday.  She would like a call with instructions on how to take it.   Her # is [phone].  Thank you

## 2020-02-27 ENCOUNTER — TELEPHONE (OUTPATIENT)
Dept: CARDIOLOGY | Facility: CLINIC | Age: 60
End: 2020-02-27

## 2020-02-27 NOTE — TELEPHONE ENCOUNTER
I received a message to call the patient regarding medication instructions. When I called her she stated she already talked to another person this morning and she told her to take the Endarbi daily. I confirmed the dose with her by looking at her med list on the EMR.

## 2020-02-28 ENCOUNTER — TELEPHONE (OUTPATIENT)
Dept: CARDIOLOGY | Facility: CLINIC | Age: 60
End: 2020-02-28

## 2020-02-28 LAB
6-ACETYLMORPHINE: NEGATIVE
6MAM SERPLBLD-MCNC: NOT DETECTED NG/MG CREAT
7-AMINOCLONAZEPAM UR: NOT DETECTED NG/MG CREAT
A-OH ALPRAZ/CREAT UR: NOT DETECTED NG/MG CREAT
ALFENTANIL UR QL: NOT DETECTED NG/MG CREAT
ALPHA-HYDROXYMIDAZOLAM, URINE: NOT DETECTED NG/MG CREAT
ALPHA-HYDROXYTRIAZOLAM, URINE: NOT DETECTED NG/MG CREAT
AMOBARBITAL UR: NOT DETECTED
AMPHET UR QL CFM: NOT DETECTED NG/MG CREAT
AMPHETAMINES UR QL SCN: NEGATIVE
BARBITAL UR QL CFM: NOT DETECTED
BARBITURATES UR QL SCN: NEGATIVE
BENZODIAZ UR QL SCN: NORMAL
BENZOYLECGONINE UR: NOT DETECTED NG/MG CREAT
BUPRENORPHINE UR QL: NEGATIVE
BUPRENORPHINE UR QL: NOT DETECTED NG/MG CREAT
BUTABARBITAL UR QL: NOT DETECTED
BUTALBITAL UR QL: NOT DETECTED
CANNABINOIDS UR QL CFM: NEGATIVE
CLONAZEPAM UR QL: NOT DETECTED NG/MG CREAT
COCAETHYLENE UR QL CFM: NOT DETECTED NG/MG CREAT
COCAINE UR QL CFM: NEGATIVE
CODEINE UR QL: NOT DETECTED NG/MG CREAT
CONV COCAINE, UR: NOT DETECTED NG/MG CREAT
CONV REPORT SUMMARY: NORMAL
CREAT 24H UR-MCNC: 79 MG/DL
DESALKYLFLURAZ/CREAT UR: NOT DETECTED NG/MG CREAT
DESMETHYLFLUNITRAZEPAM: NOT DETECTED NG/MG CREAT
DIAZEPAM UR-MCNC: NOT DETECTED NG/MG CREAT
DIHYDROCODEINE UR: 201 NG/MG CREAT
EDDP SERPL QL: NOT DETECTED NG/MG CREAT
ETHANOL SCREEN URINE (REF): NEGATIVE
ETHANOL UR-MCNC: NOT DETECTED G/DL
FENTANYL UR QL: NEGATIVE
FENTANYL+NORFENTANYL UR QL SCN: NOT DETECTED NG/MG CREAT
FLUNITRAZEPAM SERPLBLD-MCNC: NOT DETECTED NG/MG CREAT
HYDROCODONE UR QL: 1094 NG/MG CREAT
HYDROMORPHONE UR QL: NOT DETECTED NG/MG CREAT
LEVEL OF DETECTION:: NORMAL
LORAZEPAM UR-MCNC: 86 NG/MG CREAT
LORAZEPAM/CREAT UR: NOT DETECTED NG/MG CREAT
MDA SERPLBLD-MCNC: NOT DETECTED NG/MG CREAT
MDMA UR QL SCN: NOT DETECTED NG/MG CREAT
MEPHOBARBITAL UR QL CFM: NOT DETECTED
METHADONE BLD QL SCN: NEGATIVE
METHADONE, URINE: NOT DETECTED NG/MG CREAT
METHAMPHETAMINE UR: NOT DETECTED NG/MG CREAT
MIDAZOLAM UR-MCNC: NOT DETECTED NG/MG CREAT
MORPHINE UR QL: NOT DETECTED NG/MG CREAT
N-DESMETHYLTRAMADOL, U: NOT DETECTED NG/MG CREAT
NARCOTICS UR: NEGATIVE
NORBUPRENORPHINE SERPLBLD-MCNC: NOT DETECTED NG/MG CREAT
NORCODEINE UR-MCNC: NOT DETECTED NG/MG CREAT
NORDIAZEPAM SERPL CFM-MCNC: 68 NG/MG CREAT
NORFENTANYL UR: NOT DETECTED NG/MG CREAT
NORMORPHINE: NOT DETECTED NG/MG CREAT
NOROXYMORPHONE: NOT DETECTED NG/MG CREAT
O-DESMETHYLTRAMADOL, UR: NOT DETECTED NG/MG CREAT
OPIATES UR QL CFM: NORMAL
OPIATES, URINE, NORHYDROCODONE: 3829 NG/MG CREAT
OPIATES, URINE, NOROXYCODONE: NOT DETECTED NG/MG CREAT
OXAZEPAM UR QL: 99 NG/MG CREAT
OXYCODONE UR QL: NEGATIVE
OXYCODONE UR: NOT DETECTED NG/MG CREAT
OXYMORPHONE UR: NOT DETECTED NG/MG CREAT
PENTOBARBITAL UR: NOT DETECTED
PHENOBARB UR QL: NOT DETECTED
SECOBARBITAL UR QL: NOT DETECTED
SUFENTANIL UR QL: NOT DETECTED NG/MG CREAT
TAPENTADOL SERPLBLD-MCNC: NEGATIVE NG/ML
TAPENTADOL UR-MCNC: NOT DETECTED NG/MG CREAT
TEMAZEPAM UR QL CFM: 181 NG/MG CREAT
THC UR CFM-MCNC: NOT DETECTED NG/MG CREAT
THIOPENTAL UR QL CFM: NOT DETECTED
TRAMADOL UR: NOT DETECTED NG/MG CREAT

## 2020-02-28 NOTE — TELEPHONE ENCOUNTER
The patient called and said that her blood pressure has been running low 95/58 and 80/42. I spoke to Dr. Martinez and he told me to tell her to hold the Edarbi tomorrow and then start taking it at night before bedtime. I called and told her this.

## 2020-03-03 DIAGNOSIS — Z51.81 ENCOUNTER FOR THERAPEUTIC DRUG MONITORING: Primary | ICD-10-CM

## 2020-03-06 ENCOUNTER — OFFICE VISIT (OUTPATIENT)
Dept: FAMILY MEDICINE CLINIC | Facility: CLINIC | Age: 60
End: 2020-03-06

## 2020-03-06 ENCOUNTER — OFFICE VISIT (OUTPATIENT)
Dept: ORTHOPEDIC SURGERY | Facility: CLINIC | Age: 60
End: 2020-03-06

## 2020-03-06 VITALS
DIASTOLIC BLOOD PRESSURE: 68 MMHG | BODY MASS INDEX: 20.76 KG/M2 | TEMPERATURE: 98.1 F | HEART RATE: 83 BPM | SYSTOLIC BLOOD PRESSURE: 110 MMHG | WEIGHT: 112.8 LBS | RESPIRATION RATE: 16 BRPM | OXYGEN SATURATION: 99 % | HEIGHT: 62 IN

## 2020-03-06 VITALS — HEIGHT: 62 IN | BODY MASS INDEX: 20.61 KG/M2 | WEIGHT: 112 LBS

## 2020-03-06 DIAGNOSIS — M17.12 PRIMARY OSTEOARTHRITIS OF LEFT KNEE: ICD-10-CM

## 2020-03-06 DIAGNOSIS — E78.5 HYPERLIPIDEMIA, UNSPECIFIED HYPERLIPIDEMIA TYPE: Primary | ICD-10-CM

## 2020-03-06 DIAGNOSIS — M17.11 PRIMARY OSTEOARTHRITIS OF RIGHT KNEE: Primary | ICD-10-CM

## 2020-03-06 DIAGNOSIS — M25.561 PAIN IN BOTH KNEES, UNSPECIFIED CHRONICITY: ICD-10-CM

## 2020-03-06 DIAGNOSIS — M25.562 PAIN IN BOTH KNEES, UNSPECIFIED CHRONICITY: ICD-10-CM

## 2020-03-06 PROCEDURE — 99213 OFFICE O/P EST LOW 20 MIN: CPT | Performed by: NURSE PRACTITIONER

## 2020-03-06 PROCEDURE — 99213 OFFICE O/P EST LOW 20 MIN: CPT | Performed by: ORTHOPAEDIC SURGERY

## 2020-03-06 PROCEDURE — 20610 DRAIN/INJ JOINT/BURSA W/O US: CPT | Performed by: ORTHOPAEDIC SURGERY

## 2020-03-06 RX ORDER — TRIAMCINOLONE ACETONIDE 40 MG/ML
80 INJECTION, SUSPENSION INTRA-ARTICULAR; INTRAMUSCULAR
Status: COMPLETED | OUTPATIENT
Start: 2020-03-06 | End: 2020-03-06

## 2020-03-06 RX ORDER — ATORVASTATIN CALCIUM 40 MG/1
40 TABLET, FILM COATED ORAL NIGHTLY
Qty: 90 TABLET | Refills: 1 | Status: SHIPPED | OUTPATIENT
Start: 2020-03-06 | End: 2020-08-31

## 2020-03-06 RX ORDER — LIDOCAINE HYDROCHLORIDE 10 MG/ML
4 INJECTION, SOLUTION EPIDURAL; INFILTRATION; INTRACAUDAL; PERINEURAL
Status: COMPLETED | OUTPATIENT
Start: 2020-03-06 | End: 2020-03-06

## 2020-03-06 RX ADMIN — LIDOCAINE HYDROCHLORIDE 4 ML: 10 INJECTION, SOLUTION EPIDURAL; INFILTRATION; INTRACAUDAL; PERINEURAL at 13:48

## 2020-03-06 RX ADMIN — TRIAMCINOLONE ACETONIDE 80 MG: 40 INJECTION, SUSPENSION INTRA-ARTICULAR; INTRAMUSCULAR at 13:48

## 2020-03-06 NOTE — PROGRESS NOTES
"Chief Complaint   Patient presents with   • Follow-up     1 month     Subjective   Tricia Loyola is a 60 y.o. female who presents to the office for 1 month follow up of facial paresthesia, headache versus TIA admission to [facility] 2/22/20    The following portions of the patient's history were reviewed and updated as appropriate: allergies, current medications, past family history, past medical history, past social history, past surgical history and problem list.    History of Present Illness   Interval history:   UDS collected 2/24/20: abnormal, shows ativan, which was given 2 days prior in [place] ( confirmed with records) and apparent absence of clonazepam in system, which toxicology lab verifies was present, but at levels below reportable threshold. Impression: compliant.    Interval history:   Seen by Neurology: Dr Marie on 2/25/20 who told patient recent headache with facial paresthesia and neurologic blunting leading to 2/21/20 ED to [facility] transfer admission, was a   \"small stroke:\".  Records from her office note are reviewed.  Parameters for stroke prevention outlined by Dr Marie include: · LDL goal <70, patient is at 143 · Hba1c: less than 7% - patient is at this goal. 5.7%. BP <140/90, pt below goal 110/68 today.  She is scheduled for EEG and follow up there in 3 months.    Facial paresthesia: remains, unchanged. No increase or change in symptoms. No recurrence of headache. Discussed modifications required for stroke prevention, she agrees with  Plan.  A1C at goal  BP at goal  LDL above goal, will increase atorvastatin from 20mg dailuy to 40 mg daily today.  Low dose aspirin taken daily.  She appropriately states symptoms of acute CVA for which a 911 call is appropriate.    New problems today: none.    HPI, ROS  and PE from most recent  Visit carried forward and updated as appropriate for current situation.    Past Medical History:   Diagnosis Date   • Abdominal pain    • Anemia    • Chronic " post-traumatic headache      rebound      • Depressive disorder    • Encounter for gynecological examination    • Gastroesophageal reflux disease    • Generalized anxiety disorder    • History of mammogram 08/2008    MAMMOGRAM DIAGNOSTIC BILATERAL 18799 ([facility]) (1)     • Hyperlipidemia    • Ingrown toenail    • Injury of back    • Nonruptured cerebral aneurysm    • Osteoporosis    • Posttraumatic stress disorder    • Seizure (CMS/HCC)     Psychogenic non-epileptic sz    • Viral hepatitis A           Family History   Problem Relation Age of Onset   • Constipation Mother    • Hypertension Mother    • Anxiety disorder Mother    • Heart disease Mother    • Alcohol abuse Father    • Heart disease Father    • Anxiety disorder Brother    • Kidney disease Brother    • Hypertension Brother    • Arthritis Brother    • Anxiety disorder Brother    • Kidney disease Brother    • Diabetes Brother    • Anxiety disorder Brother    • Hypertension Brother    • Breast cancer Paternal Aunt    • Heart disease Maternal Grandmother    • Clotting disorder Maternal Grandmother    • Heart disease Maternal Grandfather         Review of Systems   Constitutional: Negative.  Negative for fever and unexpected weight change.   Eyes: Negative.    Respiratory: Negative.  Negative for cough, chest tightness and shortness of breath.    Cardiovascular: Negative.  Negative for chest pain.   Gastrointestinal: Positive for constipation (managed with current medication regimen.).   Endocrine: Negative.    Genitourinary: Negative.  Negative for dysuria.   Musculoskeletal: Positive for arthralgias and back pain.        Right knee pain   Skin: Negative.  Negative for color change, pallor, rash and wound.   Allergic/Immunologic: Negative.    Neurological: Positive for headaches.   Hematological: Negative.    Psychiatric/Behavioral: Negative for sleep disturbance and suicidal ideas. The patient is nervous/anxious.    All other systems reviewed and are  "negative.      Objective   Vitals:    03/06/20 1012   BP: 110/68   BP Location: Left arm   Patient Position: Sitting   Cuff Size: Adult   Pulse: 83   Resp: 16   Temp: 98.1 °F (36.7 °C)   TempSrc: Tympanic   SpO2: 99%   Weight: 51.2 kg (112 lb 12.8 oz)   Height: 157.5 cm (62\")   PainSc:   5   PainLoc: Knee  Comment: bilateral     Physical Exam   Constitutional: She is oriented to person, place, and time. She appears well-developed and well-nourished.   HENT:   Head: Normocephalic and atraumatic.   Eyes: Pupils are equal, round, and reactive to light. Conjunctivae are normal.   Neck: Normal range of motion. Neck supple.   Cardiovascular: Normal rate, regular rhythm, normal heart sounds and intact distal pulses. Exam reveals no gallop and no friction rub.   No murmur heard.  Pulmonary/Chest: Effort normal and breath sounds normal. No respiratory distress. She has no wheezes. She has no rales. She exhibits no tenderness.   Abdominal: Soft. Bowel sounds are normal.   Musculoskeletal: Normal range of motion. She exhibits no edema or deformity.        Right knee: She exhibits swelling. Tenderness found. Medial joint line and patellar tendon tenderness noted.   Lymphadenopathy:     She has no cervical adenopathy.   Neurological: She is alert and oriented to person, place, and time. No cranial nerve deficit.   Skin: Skin is warm and dry. Capillary refill takes 2 to 3 seconds. No erythema. No pallor.   Psychiatric: She has a normal mood and affect. Her behavior is normal. Judgment and thought content normal.   Nursing note and vitals reviewed.      Assessment/Plan   Tricia was seen today for follow-up.    Diagnoses and all orders for this visit:    Hyperlipidemia, unspecified hyperlipidemia type  -     atorvastatin (LIPITOR) 40 MG tablet; Take 1 tablet by mouth Every Night. For cholesterol           PHQ-2/PHQ-9 Depression Screening 3/6/2020   Little interest or pleasure in doing things 0   Feeling down, depressed, or hopeless " 0   Trouble falling or staying asleep, or sleeping too much 0   Feeling tired or having little energy 0   Poor appetite or overeating 0   Feeling bad about yourself - or that you are a failure or have let yourself or your family down 0   Trouble concentrating on things, such as reading the newspaper or watching television 0   Moving or speaking so slowly that other people could have noticed. Or the opposite - being so fidgety or restless that you have been moving around a lot more than usual 0   Thoughts that you would be better off dead, or of hurting yourself in some way 0   Total Score 0       Crystal L Ceron, APRN         Return for Next scheduled follow up.    There are no Patient Instructions on file for this visit.    Office Visit on 02/24/2020   Component Date Value Ref Range Status   • Report Summary 02/24/2020 FINAL   Final    Comment: ====================================================================  TOXASSURE SELECT 13 PRACHI-DIS  ====================================================================  Test                             Result       Flag       Units  Drug Present    Desmethyldiazepam              68                      ng/mg creat    Oxazepam                       99                      ng/mg creat    Temazepam                      181                     ng/mg creat     Desmethyldiazepam, oxazepam, and temazepam are benzodiazepine     drugs, but may also be present as common metabolites of other     benzodiazepine drugs, including diazepam.    Lorazepam                      86                      ng/mg creat     Source of lorazepam is a scheduled prescription medication.    Hydrocodone                    1094                    ng/mg creat    Dihydrocodeine                 201                     ng/mg creat    Norhydrocodone                 3829                    ng/mg creat     Sources of                            hydrocodone include scheduled prescription     medications.  Dihydrocodeine and norhydrocodone are expected     metabolites of hydrocodone. Dihydrocodeine is also available as a     scheduled prescription medication.  ====================================================================  Test                      Result    Flag   Units      Ref Range    Creatinine              79               mg/dL      >=20  ====================================================================  Declared Medications:   Medication list was not provided.  ====================================================================  For clinical consultation, please call [phone].  ====================================================================   • Ethanol Screen Urine (Ref) 02/24/2020 Negative   Final   • Ethanol, Urine 02/24/2020 Not Detected  g/dL Final   • Amphetamine, Urine Qual 02/24/2020 Negative   Final   • Methamphetamine, Urine 02/24/2020 Not Detected  ng/mg creat Final   • Amphetamine, Urine 02/24/2020 Not Detected  ng/mg creat Final   • MDMA URINE 02/24/2020 Not Detected  ng/mg creat Final   • MDA 02/24/2020 Not Detected  ng/mg creat Final   • Benzodiazepine Screen, Urine 02/24/2020 +POSITIVE+   Final   • DIAZEPAM URINE QUANT (REF) 02/24/2020 Not Detected  ng/mg creat Final   • Desmethyldiazepam 02/24/2020 68  ng/mg creat Final   • Oxazepam, urine 02/24/2020 99  ng/mg creat Final   • Temazepam, urine 02/24/2020 181  ng/mg creat Final    Expected metabolism of benzodiazepine class drugs:   Parent Drug       Detected Metabolites   -----------       --------------------   Diazepam:         Desmethyldiazepam, Temazepam, Oxazepam   Chlordiazepoxide: Desmethyldiazepam, Oxazepam   Clorazepate:      Desmethyldiazepam, Oxazepam   Halazepam:        Desmethyldiazepam, Oxazepam   Temazepam:        Oxazepam   Oxazepam:         None   • ALPRAZOLAM 02/24/2020 Not Detected  ng/mg creat Final   • ALPHA-HYDROXYALPRAZOLAM UR, QT (RE* 02/24/2020 Not Detected  ng/mg creat Final   • DESALKLFLURAZEPAM UR  QUANT (REF) 02/24/2020 Not Detected  ng/mg creat Final   • LORAZEPAM URINE QUANT (REF) 02/24/2020 86  ng/mg creat Final   • Alpha-hydroxytriazolam, Urine 02/24/2020 Not Detected  ng/mg creat Final   • Clonazepam Urine 02/24/2020 Not Detected  ng/mg creat Final   • 7-Aminoclonazepam Urine 02/24/2020 Not Detected  ng/mg creat Final   • MIDAZOLAM UR, QUANT (REF) 02/24/2020 Not Detected  ng/mg creat Final   • Alpha-hydroxymidazolam, Urine 02/24/2020 Not Detected  ng/mg creat Final   • Flunitrazepam 02/24/2020 Not Detected  ng/mg creat Final   • DESMETHYLFLUNITRAZEPAM 02/24/2020 Not Detected  ng/mg creat Final   • Cocaine & Metabolite 02/24/2020 Negative   Final   • Cocaine Screen, Urine 02/24/2020 Not Detected  ng/mg creat Final   • Benzoylecgonine, Urine 02/24/2020 Not Detected  ng/mg creat Final   • Cocaethylene Ur 02/24/2020 Not Detected  ng/mg creat Final   • THC, Urine 02/24/2020 Negative   Final   • Carboxy THC, Urine 02/24/2020 Not Detected  ng/mg creat Final   • 6-ACETYLMORPHINE 02/24/2020 Negative   Final   • 6-acetylmorphine 02/24/2020 Not Detected  ng/mg creat Final   • Opiate Class 02/24/2020 +POSITIVE+   Final   • Codeine, Urine 02/24/2020 Not Detected  ng/mg creat Final   • Morphine, Urine 02/24/2020 Not Detected  ng/mg creat Final   • normorphine 02/24/2020 Not Detected  ng/mg creat Final   • Norcodeine Ur 02/24/2020 Not Detected  ng/mg creat Final   • Hydrocodone UR 02/24/2020 1094  ng/mg creat Final   • Hydromorphone Urine 02/24/2020 Not Detected  ng/mg creat Final   • Dihydrocodeine, Urine 02/24/2020 201  ng/mg creat Final   • Opiates, Norhydrocodone, Urine 02/24/2020 3829  ng/mg creat Final    Expected metabolism of opiate class drugs:  Parent Drug       Detected Metabolites   -----------       --------------------   Codeine:          Major:  Morphine, Norcodeine                     Minor:  Hydrocodone, Hydromorphone,                             Dihydrocodeine, Norhydrocodone,                              Normorphine   Morphine:         Major:  Normorphine                     Minor:  Hydromorphone   Hydrocodone:      Hydromorphone, Dihydrocodeine,                     Norhydrocodone   Hydromorphone:    None   Dihydrocodeine:   None   Heroin:           6-Acetylmorphine (if included),                     Morphine, Normorphine                     Codeine, in small amounts in comparison                      to morphine, is often detected when                      heroin is the source drug.   • Oxycodone Class Ur 02/24/2020 Negative   Final   • Oxycodone, Confirmation, Urine 02/24/2020 Not Detected  ng/mg creat Final   • Oxymorphone UR 02/24/2020 Not Detected  ng/mg creat Final   • Opiates, Noroxycodone, Urine 02/24/2020 Not Detected  ng/mg creat Final   • Noroxymorphone 02/24/2020 Not Detected  ng/mg creat Final    Expected metabolism of oxycodone class drugs:   Parent Drug       Detected Metabolites   -----------       --------------------   Oxycodone:        Oxymorphone, Noroxycodone, Noroxymorphone   Oxymorphone:      Noroxymorphone   • Methadone 02/24/2020 Negative   Final   • Methadone, Quantitative 02/24/2020 Not Detected  ng/mg creat Final   • EDDP 02/24/2020 Not Detected  ng/mg creat Final   • Fentanyl and Analogues, Ur 02/24/2020 Negative   Final   • Fentanyl, Urine 02/24/2020 Not Detected  ng/mg creat Final   • Norfentanyl Urine 02/24/2020 Not Detected  ng/mg creat Final   • Sufentanil, Ur 02/24/2020 Not Detected  ng/mg creat Final   • Alfentanil, Ur 02/24/2020 Not Detected  ng/mg creat Final   • BUPRENORPHINE 02/24/2020 Negative   Final   • Buprenorphine, Urine 02/24/2020 Not Detected  ng/mg creat Final   • Norbuprenorphine 02/24/2020 Not Detected  ng/mg creat Final   • Tapentadol 02/24/2020 Negative   Final   • Tapentadol Ur 02/24/2020 Not Detected  ng/mg creat Final   • Opoidss other, UR 02/24/2020 Negative   Final   • Tramadol, Urine 02/24/2020 Not Detected  ng/mg creat Final   • O-desmethyltramadol, Ur  02/24/2020 Not Detected  ng/mg creat Final   • N-Desmethyltramadol, U 02/24/2020 Not Detected  ng/mg creat Final   • BARBITURATES, URINE 02/24/2020 Negative   Final   • Amobarbital, Urine 02/24/2020 Not Detected   Final   • Barbital 02/24/2020 Not Detected   Final   • Butabarbital, Ur 02/24/2020 Not Detected   Final   • Butalbital Screen, Urine 02/24/2020 Not Detected   Final   • Mephobarbital Urine 02/24/2020 Not Detected   Final   • Pentobarbital UR 02/24/2020 Not Detected   Final   • Phenobarbital 02/24/2020 Not Detected   Final   • Secobarbital, urine 02/24/2020 Not Detected   Final   • Thiopental, Ur 02/24/2020 Not Detected   Final   • Creatinine, Urine 02/24/2020 79  mg/dL Final    REFERENCE RANGE: Ref Range>=20   • Level of Detection: 02/24/2020 Comment   Final    TESTING THRESHOLDS ARE AS FOLLOWS:  AMPHETAMINES: 50 ng/mL  BENZODIAZEPINES: 20 ng/mL  COCAINE / METABOLITE: 50 ng/mL  ALCOHOL, ETHYL: 0.020 gm/dL  CANNABINOIDS: total-20 ng/mL, carboxy-THC-2 ng/mL  6-ACETYLMORPHINE: 10 ng/mL  OPIATE CLASS: 50 ng/mL/mL  OXYCODONE CLASS: 50 ng/mL  METHADONE: 50 ng/mL/mL  BUPRENORPHINE: buprenorphine-1.0 ng/mL,                 norbuprenorphine-5 ng/mL  FENTANYL / ANALOGUES: fentanyl-1.0 ng/mL, others-5.0 ng/mL  TAPENTADOL: 50 ng/mL  TRAMADOL: 50 ng/mL  BARBITURATES: 200 ng/mL  This test was developed and its performance characteristics  determined by [facility].  It has not been cleared or approved  by the Food and Drug Administration.   Hospital Outpatient Visit on 01/06/2020   Component Date Value Ref Range Status   • Calcium 01/06/2020 9.1  8.6 - 10.5 mg/dL Final   • Creatinine 01/06/2020 0.95  0.57 - 1.00 mg/dL Final   • eGFR Non African Amer 01/06/2020 60* >60 mL/min/1.73 Final   Hospital Outpatient Visit on 12/19/2019   Component Date Value Ref Range Status   • BSA 12/19/2019 1.5  m^2 Final   • IVSd 12/19/2019 0.63  cm Final   • LVIDd 12/19/2019 4.2  cm Final   • LVIDs 12/19/2019 2.6  cm Final   • LVPWd 12/19/2019  0.87  cm Final   • IVS/LVPW 12/19/2019 0.72   Final   • FS 12/19/2019 38.4  % Final   • EDV(Teich) 12/19/2019 78.1  ml Final   • ESV(Teich) 12/19/2019 24.1  ml Final   • EF(Teich) 12/19/2019 69.1  % Final   • EDV(cubed) 12/19/2019 73.6  ml Final   • ESV(cubed) 12/19/2019 17.2  ml Final   • EF(cubed) 12/19/2019 76.7  % Final   • LV mass(C)d 12/19/2019 92.1  grams Final   • LV mass(C)dI 12/19/2019 61.0  grams/m^2 Final   • SV(Teich) 12/19/2019 54.0  ml Final   • SI(Teich) 12/19/2019 35.7  ml/m^2 Final   • SV(cubed) 12/19/2019 56.4  ml Final   • SI(cubed) 12/19/2019 37.3  ml/m^2 Final   • Ao root diam 12/19/2019 2.5  cm Final   • Ao root area 12/19/2019 4.9  cm^2 Final   • LA dimension 12/19/2019 2.3  cm Final   • asc Aorta Diam 12/19/2019 2.9  cm Final   • LA/Ao 12/19/2019 0.92   Final   • LVOT diam 12/19/2019 2.1  cm Final   • LVOT area 12/19/2019 3.5  cm^2 Final   • LVOT area(traced) 12/19/2019 3.5  cm^2 Final   • RVOT diam 12/19/2019 2.6  cm Final   • RVOT area 12/19/2019 5.3  cm^2 Final   • LVLd ap4 12/19/2019 6.7  cm Final   • EDV(MOD-sp4) 12/19/2019 43.7  ml Final   • LVLs ap4 12/19/2019 6.4  cm Final   • ESV(MOD-sp4) 12/19/2019 19.5  ml Final   • EF(MOD-sp4) 12/19/2019 55.4  % Final   • LVLd ap2 12/19/2019 6.4  cm Final   • EDV(MOD-sp2) 12/19/2019 44.9  ml Final   • LVLs ap2 12/19/2019 6.2  cm Final   • ESV(MOD-sp2) 12/19/2019 23.8  ml Final   • EF(MOD-sp2) 12/19/2019 47.0  % Final   • SV(MOD-sp4) 12/19/2019 24.2  ml Final   • SI(MOD-sp4) 12/19/2019 16.0  ml/m^2 Final   • SV(MOD-sp2) 12/19/2019 21.1  ml Final   • SI(MOD-sp2) 12/19/2019 14.0  ml/m^2 Final   • Ao root area (BSA corrected) 12/19/2019 1.7   Final   • LV Ortiz Vol (BSA corrected) 12/19/2019 28.9  ml/m^2 Final   • LV Sys Vol (BSA corrected) 12/19/2019 12.9  ml/m^2 Final   • MV E max corina 12/19/2019 94.1  cm/sec Final   • MV A max corina 12/19/2019 86.3  cm/sec Final   • MV E/A 12/19/2019 1.1   Final   • MV V2 max 12/19/2019 166.0  cm/sec Final   • MV max  PG 12/19/2019 11.0  mmHg Final   • MV V2 mean 12/19/2019 74.0  cm/sec Final   • MV mean PG 12/19/2019 3.0  mmHg Final   • MV V2 VTI 12/19/2019 41.5  cm Final   • MVA(VTI) 12/19/2019 1.7  cm^2 Final   • MV P1/2t max corina 12/19/2019 165.0  cm/sec Final   • MV P1/2t 12/19/2019 88.5  msec Final   • MVA(P1/2t) 12/19/2019 2.5  cm^2 Final   • MV dec slope 12/19/2019 546.0  cm/sec^2 Final   • Ao pk corina 12/19/2019 126.0  cm/sec Final   • Ao max PG 12/19/2019 6.4  mmHg Final   • Ao max PG (full) 12/19/2019 2.4  mmHg Final   • Ao V2 mean 12/19/2019 84.5  cm/sec Final   • Ao mean PG 12/19/2019 3.0  mmHg Final   • Ao mean PG (full) 12/19/2019 1.0  mmHg Final   • Ao V2 VTI 12/19/2019 24.9  cm Final   • JADA(I,A) 12/19/2019 2.9  cm^2 Final   • JADA(I,D) 12/19/2019 2.9  cm^2 Final   • JADA(V,A) 12/19/2019 2.7  cm^2 Final   • JADA(V,D) 12/19/2019 2.7  cm^2 Final   • AI max corina 12/19/2019 447.0  cm/sec Final   • AI max PG 12/19/2019 79.9  mmHg Final   • AI dec slope 12/19/2019 283.0  cm/sec^2 Final   • AI P1/2t 12/19/2019 462.6  msec Final   • LV V1 max PG 12/19/2019 4.0  mmHg Final   • LV V1 mean PG 12/19/2019 2.0  mmHg Final   • LV V1 max 12/19/2019 100.0  cm/sec Final   • LV V1 mean 12/19/2019 63.8  cm/sec Final   • LV V1 VTI 12/19/2019 20.8  cm Final   • MR max corina 12/19/2019 202.0  cm/sec Final   • MR max PG 12/19/2019 16.3  mmHg Final   • SV(Ao) 12/19/2019 122.2  ml Final   • SI(Ao) 12/19/2019 80.9  ml/m^2 Final   • SV(LVOT) 12/19/2019 72.0  ml Final   • SI(LVOT) 12/19/2019 47.7  ml/m^2 Final   • PA V2 max 12/19/2019 72.4  cm/sec Final   • PA max PG 12/19/2019 2.1  mmHg Final   • PA V2 mean 12/19/2019 56.2  cm/sec Final   • PA mean PG 12/19/2019 1.0  mmHg Final   • PA V2 VTI 12/19/2019 16.4  cm Final   • MVA P1/2T LCG 12/19/2019 1.3  cm^2 Final   • BH CV ECHO ALEJANDRINA - BZI_BMI 12/19/2019 21.0  kilograms/m^2 Final   • BH CV ECHO ALEJANDRINA - BSA(HAYCOCK) 12/19/2019 1.5  m^2 Final   • BH CV ECHO ALEJANDRINA - BZI_METRIC_WEIGHT 12/19/2019 52.2  kg  Final   • BH CV ECHO ALEJANDRINA - BZI_METRIC_HEIGHT 12/19/2019 157.5  cm Final   • Target HR (85%) 12/19/2019 135  bpm Final   • Max. Pred. HR (100%) 12/19/2019 159  bpm Final   Hospital Outpatient Visit on 11/25/2019   Component Date Value Ref Range Status   • BH CV STRESS PROTOCOL 1 11/25/2019 [place]   Final   • Stage 1 11/25/2019 1   Final   • Duration Min Stage 1 11/25/2019 3   Final   • Duration Sec Stage 1 11/25/2019 0   Final   • Target HR (85%) 11/25/2019 135  bpm Final   • Max. Pred. HR (100%) 11/25/2019 159  bpm Final   • HR Stage 1 11/25/2019 102   Final   • BP Stage 1 11/25/2019 129/65   Final   • Grade Stage 1 11/25/2019 10   Final   • Speed Stage 1 11/25/2019 1.7   Final   • BH CV STRESS METS STAGE 1 11/25/2019 5   Final   • Stage 2 11/25/2019 2   Final   • HR Stage 2 11/25/2019 109   Final   • BP Stage 2 11/25/2019 152/66   Final   • Duration Min Stage 2 11/25/2019 3   Final   • Duration Sec Stage 2 11/25/2019 0   Final   • Grade Stage 2 11/25/2019 12   Final   • Speed Stage 2 11/25/2019 2.5   Final   • BH CV STRESS METS STAGE 2 11/25/2019 7.5   Final   • Stage 3 11/25/2019 3   Final   • HR Stage 3 11/25/2019 127   Final   • Duration Min Stage 3 11/25/2019 0   Final   • Duration Sec Stage 3 11/25/2019 17   Final   • Grade Stage 3 11/25/2019 14   Final   • Speed Stage 3 11/25/2019 3.4   Final   • BH CV STRESS METS STAGE 3 11/25/2019 10.0   Final   • Baseline HR 11/25/2019 86  bpm Final   • Baseline BP 11/25/2019 151/82  mmHg Final   • Peak HR 11/25/2019 99  bpm Final   • Percent Max Pred HR 11/25/2019 62.26  % Final   • Percent Target HR 11/25/2019 73  % Final   • Peak BP 11/25/2019 151/82  mmHg Final   • Recovery HR 11/25/2019 94  bpm Final   • Recovery BP 11/25/2019 119/69  mmHg Final   • Exercise duration (min) 11/25/2019 9  min Final   • Exercise duration (sec) 11/25/2019 16  sec Final   • Estimated workload 11/25/2019 10.5  METS Final   •  CV STRESS PROTOCOL 2 11/25/2019 Pharmacologic   Final   •  CV  STRESS PROTOCOL 2 STAGE 1 11/25/2019 1   Final   • BH CV STRESS PROTOCOL 2 HR STAGE 1 11/25/2019 96   Final   • BH CV STRESS PROTOCOL 2 BP STAGE 1 11/25/2019 120/69   Final   • BH CV STRESS PROTOCOL 2 DURATION M* 11/25/2019 0   Final   • BH CV STRESS PROTOCOL 2 DURATION S* 11/25/2019 10   Final   • BH CV STRESS PROTOCOL 2 DOSE REGAD* 11/25/2019 0.4   Final   • BH CV STRESS PROTOCOL 2 COMMENTS S* 11/25/2019 10 sec bolus injection   Final   • Nuc Stress EF 11/25/2019 52  % Final   ]

## 2020-03-06 NOTE — PROGRESS NOTES
Tricia Loyola is a 60 y.o. female returns for     Chief Complaint   Patient presents with   • Right Knee - Follow-up   • Results     Bone Scan       HISTORY OF PRESENT ILLNESS: Patient being seen for right knee follow up. Bone scan done at , would like to discuss findings.  Till having problems in both knees are equally bothersome.  Her bone scan did show some increased activity in both tibia which is fairly symmetric it looks like more degenerative change at the joint surface rather than stress fracture.  Since we saw her she had a TIA and has some residual facial numbness.       CONCURRENT MEDICAL HISTORY:    Past Medical History:   Diagnosis Date   • Abdominal pain    • Anemia    • Chronic post-traumatic headache      rebound      • Depressive disorder    • Encounter for gynecological examination    • Gastroesophageal reflux disease    • Generalized anxiety disorder    • History of mammogram 08/2008    MAMMOGRAM DIAGNOSTIC BILATERAL 47281 ([facility]) (1)     • Hyperlipidemia    • Ingrown toenail    • Injury of back    • Nonruptured cerebral aneurysm    • Osteoporosis    • Posttraumatic stress disorder    • Seizure (CMS/HCC)     Psychogenic non-epileptic sz    • Viral hepatitis A        Allergies   Allergen Reactions   • Nitrofuran Derivatives Hives     Macrobid   • Augmentin [Amoxicillin-Pot Clavulanate]      Hives   • Ciprofloxacin      Cipro:  Rash   • Fiorinal [Butalbital-Aspirin-Caffeine]      Rapid heart rate   • Imitrex [Sumatriptan]      Rapid heart rate   • Iodine      Anaphylaxis   • Other      MIDRIN  -  Rapid heart rate   • Toradol [Ketorolac Tromethamine]      Anaphylaxis   • Ultram [Tramadol]      Rapid heart rate   • Ibuprofen Rash         Current Outpatient Medications:   •  albuterol sulfate HFA (VENTOLIN HFA) 108 (90 Base) MCG/ACT inhaler, Inhale 2 puffs Every 6 (Six) Hours As Needed for Wheezing or Shortness of Air., Disp: 1 inhaler, Rfl: 3  •  amitriptyline (ELAVIL) 75 MG tablet, TAKE ONE TABLET  BY MOUTH AT BEDTIME., Disp: 30 tablet, Rfl: 2  •  aspirin 81 MG EC tablet, Take 1 tablet by mouth Daily., Disp: 30 tablet, Rfl: 0  •  atorvastatin (LIPITOR) 40 MG tablet, Take 1 tablet by mouth Every Night. For cholesterol, Disp: 90 tablet, Rfl: 1  •  Azilsartan Medoxomil (EDARBI) 40 MG tablet, Take 40 mg by mouth Daily., Disp: 30 tablet, Rfl: 2  •  busPIRone (BUSPAR) 30 MG tablet, Take 1 tablet by mouth 2 (Two) Times a Day., Disp: 60 tablet, Rfl: 0  •  clonazePAM (KLONOPIN) 0.5 MG tablet, Take 1 tablet by mouth Daily As Needed for Anxiety (panic attacks)., Disp: 15 tablet, Rfl: 0  •  colestipol (COLESTID) 1 g tablet, Take 1 tablet by mouth 2 (Two) Times a Day., Disp: 60 tablet, Rfl: 0  •  cyclobenzaprine (FLEXERIL) 10 MG tablet, Take 1 tablet by mouth 3 (Three) Times a Day As Needed for Muscle Spasms., Disp: 90 tablet, Rfl: 0  •  dexlansoprazole (DEXILANT) 60 MG capsule, Take 1 capsule by mouth Daily., Disp: 90 capsule, Rfl: 3  •  HYDROcodone-acetaminophen (NORCO) 5-325 MG per tablet, Take 1 tablet by mouth Every 6 (Six) Hours As Needed for Moderate Pain ., Disp: 120 tablet, Rfl: 0  •  levothyroxine (SYNTHROID, LEVOTHROID) 25 MCG tablet, Take 0.5 tablets by mouth Daily., Disp: 15 tablet, Rfl: 3  •  metoprolol succinate XL (TOPROL XL) 100 MG 24 hr tablet, Take 1 tablet by mouth Daily., Disp: 90 tablet, Rfl: 3  •  NIFEdipine XL (PROCARDIA XL) 30 MG 24 hr tablet, Take 1 tablet by mouth Daily., Disp: 30 tablet, Rfl: 11  •  OnabotulinumtoxinA 200 units reconstituted solution, 200 Units., Disp: , Rfl:   •  ondansetron ODT (ZOFRAN-ODT) 4 MG disintegrating tablet, Take 1 tablet by mouth Every 8 (Eight) Hours As Needed for Nausea for up to 12 doses., Disp: 12 tablet, Rfl: 0  •  OXcarbazepine (TRILEPTAL) 150 MG tablet, Take 150 mg by mouth Daily. Daily at bedtime, Disp: , Rfl:   •  prochlorperazine (COMPAZINE) 10 MG tablet, Take 1 tablet by mouth Every 6 (Six) Hours As Needed for Nausea or Vomiting., Disp: 360 tablet, Rfl:  "1  •  riFAXIMin (XIFAXAN) 550 MG tablet, Take 1 tablet by mouth 3 (Three) Times a Day., Disp: 42 tablet, Rfl: 1  •  rOPINIRole (REQUIP) 3 MG tablet, take 1 tablet by mouth at bedtime, Disp: 150 tablet, Rfl: 0  •  sucralfate (CARAFATE) 1 g tablet, Take 1 tablet by mouth 2 (Two) Times a Day., Disp: 60 tablet, Rfl: 2  •  topiramate (TOPAMAX) 50 MG tablet, Take 50 mg by mouth 2 (Two) Times a Day., Disp: , Rfl:     Current Facility-Administered Medications:   •  ondansetron (ZOFRAN) injection 4 mg, 4 mg, Intravenous, Q6H PRN, Rosario Ceron APRN  •  zoledronic acid (RECLAST) infusion 5 mg, 5 mg, Intravenous, Once, Rosario Ceron APRN    Past Surgical History:   Procedure Laterality Date   • APPENDECTOMY     • BREAST BIOPSY Left    • CHOLECYSTECTOMY     • COLONOSCOPY N/A 11/26/2018    Procedure: COLONOSCOPY;  Surgeon: Meet Mcintyre MD;  Location: [facility] ENDOSCOPY;  Service: General   • CRANIOTOMY FOR ANEURYSM  2003   • ENDOSCOPY N/A 10/16/2019    Procedure: ESOPHAGOGASTRODUODENOSCOPY;  Surgeon: Kory Muhammad MD;  Location: [facility] ENDOSCOPY;  Service: Gastroenterology   • PAP SMEAR  08/16/2012   • TONSILLECTOMY     • UPPER GASTROINTESTINAL ENDOSCOPY  10/16/2019           ROS: Review of systems has been updated as of today's date.  All other systems are negative except as noted previously.    PHYSICAL EXAMINATION:       Ht 157.5 cm (62\")   Wt 50.8 kg (112 lb)   BMI 20.49 kg/m²     Physical Exam   Constitutional: She is oriented to person, place, and time. She appears well-developed.   HENT:   Head: Normocephalic and atraumatic.   Eyes: Pupils are equal, round, and reactive to light. EOM are normal.   Neck: Neck supple.   Pulmonary/Chest: Effort normal.   Musculoskeletal: She exhibits tenderness.   Neurological: She is alert and oriented to person, place, and time.   Skin: Skin is warm and dry.   Psychiatric: She has a normal mood and affect.       GAIT:     [x]  Normal  []  Antalgic    Assistive " device: [x]  None  []  Walker     []  Crutches  []  Cane     []  Wheelchair  []  Stretcher    Ortho Exam  Tender medial aspect of tibia bilaterally.  No effusion moves pretty well.  Calves are negative neurovascular intact    Nm Bone Scan Limited    Result Date: 2/21/2020  Narrative: Nuclear medicine bone scan limited History: Bilateral knee pain. Unilateral primary osteoarthritis right knee. Correlation: Radiographs right knee December 30, 2019 and radiographs left knee January 23, 2020. Comparison: Whole body bone scan February 6, 2014. Following the intravenous administration of 25.1 millicuries of technetium 99m MDP, delayed images of the knees obtained in the anterior, posterior and both lateral projections. FINDINGS: Minimal increased uptake in each proximal tibia, compatible with degenerative change. No evidence of fracture or bone lesion.     Impression: Conclusion: Minimal increased uptake in each proximal tibia, compatible with degenerative change. [id number] Electronically signed by:  Sourav Dahl MD  2/21/2020 7:52 PM CST Workstation: [employer]            ASSESSMENT:    Diagnoses and all orders for this visit:    Primary osteoarthritis of right knee    Pain in both knees, unspecified chronicity  -     Large Joint Arthrocentesis: L knee    Primary osteoarthritis of left knee      Large Joint Arthrocentesis: L knee  Date/Time: 3/6/2020 1:48 PM  Consent given by: patient  Site marked: site marked  Timeout: Immediately prior to procedure a time out was called to verify the correct patient, procedure, equipment, support staff and site/side marked as required   Supporting Documentation  Indications: pain   Procedure Details  Location: knee - L knee  Preparation: Patient was prepped and draped in the usual sterile fashion  Needle size: 22 G  Approach: anteromedial  Medications administered: 4 mL lidocaine PF 1% 1 %; 80 mg triamcinolone acetonide 40 MG/ML  Patient tolerance: patient tolerated the procedure well  with no immediate complications            PLAN the right knee is been injected twice it was working here she fell on it twice.  The left knee she has had nothing done.  We are going to put an injection in the left knee with mixture of Xylocaine and Kenalog.  With the right knee regard I suggested Visco supplementation which I feel is medically necessary at this point to continue her conservative treatment.  This is especially important in light of her recent CVA/TIA.  We will see her back with Visco supplementation she will ice knee down tonight    Patient's Body mass index is 20.49 kg/m². BMI is within normal parameters. No follow-up required..      No follow-ups on file.      This document has been electronically signed by Alden Herndon MD on March 6, 2020 3:17 PM

## 2020-03-10 DIAGNOSIS — M17.11 PRIMARY OSTEOARTHRITIS OF RIGHT KNEE: Primary | ICD-10-CM

## 2020-03-10 DIAGNOSIS — M25.561 PAIN IN BOTH KNEES, UNSPECIFIED CHRONICITY: ICD-10-CM

## 2020-03-10 DIAGNOSIS — M17.12 PRIMARY OSTEOARTHRITIS OF LEFT KNEE: ICD-10-CM

## 2020-03-10 DIAGNOSIS — M25.562 PAIN IN BOTH KNEES, UNSPECIFIED CHRONICITY: ICD-10-CM

## 2020-03-10 NOTE — TELEPHONE ENCOUNTER
[facility]OST[facility]       Pt CALLED STATING HER KNEE IS CAUSING HER A LOT OF PAIN AND WAS WONDERING IF YOU WOULD CALL IN LIDOCAINE PATCHES FOR HER?    Pt IS OPEN TO OTHER RECOMMENDATIONS  SHE STATES SHE HAS BEEN ICING AND ALSO TRIED HEAT WITH NO RELIEF       Pt IS REQUESTING A CALL BACK WITH RESPONSE [phone]

## 2020-03-11 RX ORDER — LIDOCAINE 50 MG/G
1 PATCH TOPICAL EVERY 24 HOURS
Qty: 30 PATCH | Refills: 1 | Status: SHIPPED | OUTPATIENT
Start: 2020-03-11 | End: 2020-04-28

## 2020-03-12 ENCOUNTER — TELEPHONE (OUTPATIENT)
Dept: ORTHOPEDIC SURGERY | Facility: CLINIC | Age: 60
End: 2020-03-12

## 2020-03-12 NOTE — TELEPHONE ENCOUNTER
RAYMOND OLIVARES is required for patients lidocaine patches. Santa faxed over request, [employer] is the patients insurance. Patient would like a call back once it has been approved. Patients call back # [phone].    Santa in [place]

## 2020-03-13 ENCOUNTER — TELEPHONE (OUTPATIENT)
Dept: ORTHOPEDIC SURGERY | Facility: CLINIC | Age: 60
End: 2020-03-13

## 2020-03-13 NOTE — TELEPHONE ENCOUNTER
ON CALL DR KENYON PT CALLED TO CHECK ON THE STATUS OF HER PA FOR LIDOCAINE PATCHES    I INFORMED Pt THIS PA HAS BEEN DENIED    Pt IS STATING SHE HAS TRIED ICING WITHOUT RELIEF     PLEASE ADVISE WHAT Pt SHOULD DO UNTIL INJECTION WITH KOSTAS ON 4/3/2020

## 2020-03-16 NOTE — TELEPHONE ENCOUNTER
Tried to call patient to let her know that at this time there is not anything else that can be done as far as the patches go. She can continue to ice and take tylenol.

## 2020-03-20 DIAGNOSIS — F41.0 PANIC DISORDER WITHOUT AGORAPHOBIA: Chronic | ICD-10-CM

## 2020-03-20 RX ORDER — CLONAZEPAM 0.5 MG/1
0.5 TABLET ORAL DAILY PRN
Qty: 15 TABLET | Refills: 0 | Status: SHIPPED | OUTPATIENT
Start: 2020-03-20 | End: 2020-05-28 | Stop reason: SDUPTHER

## 2020-03-20 RX ORDER — AZITHROMYCIN 250 MG/1
TABLET, FILM COATED ORAL
Qty: 6 TABLET | Refills: 0 | Status: SHIPPED | OUTPATIENT
Start: 2020-03-20 | End: 2020-04-28

## 2020-03-23 DIAGNOSIS — K21.00 GASTROESOPHAGEAL REFLUX DISEASE WITH ESOPHAGITIS: ICD-10-CM

## 2020-03-23 RX ORDER — DEXLANSOPRAZOLE 60 MG/1
CAPSULE, DELAYED RELEASE ORAL
Qty: 90 CAPSULE | Refills: 3 | Status: SHIPPED | OUTPATIENT
Start: 2020-03-23 | End: 2021-03-15

## 2020-03-30 ENCOUNTER — TELEPHONE (OUTPATIENT)
Dept: FAMILY MEDICINE CLINIC | Facility: CLINIC | Age: 60
End: 2020-03-30

## 2020-03-30 DIAGNOSIS — M54.50 CHRONIC MIDLINE LOW BACK PAIN WITHOUT SCIATICA: Chronic | ICD-10-CM

## 2020-03-30 DIAGNOSIS — G89.29 CHRONIC PAIN OF RIGHT KNEE: ICD-10-CM

## 2020-03-30 DIAGNOSIS — M25.561 CHRONIC PAIN OF RIGHT KNEE: ICD-10-CM

## 2020-03-30 DIAGNOSIS — M54.6 CHRONIC MIDLINE THORACIC BACK PAIN: Chronic | ICD-10-CM

## 2020-03-30 DIAGNOSIS — G89.29 CHRONIC MIDLINE LOW BACK PAIN WITHOUT SCIATICA: Chronic | ICD-10-CM

## 2020-03-30 DIAGNOSIS — G89.29 CHRONIC MIDLINE THORACIC BACK PAIN: Chronic | ICD-10-CM

## 2020-03-30 RX ORDER — HYDROCODONE BITARTRATE AND ACETAMINOPHEN 5; 325 MG/1; MG/1
1 TABLET ORAL EVERY 6 HOURS PRN
Qty: 120 TABLET | Refills: 0 | Status: SHIPPED | OUTPATIENT
Start: 2020-03-30 | End: 2020-04-16 | Stop reason: DRUGHIGH

## 2020-03-30 NOTE — TELEPHONE ENCOUNTER
Patient has chronic anxiety requiring sparing benzodiazepine use concurrently with chronic pain requiring opiate pain medication.  Patient has been educated regarding potential dangers of oversedation and accidental overdose with use of both medications concurrently. Serial assessments of patient has yielded no sign of oversedation or adverse effects of patient, and he/she is advised and aware to notify my office immediately if symptoms do occur, as well as to discontinue use of benzodiazepine medication and opiate medication immediately if that occurs, pending medical evaluation and advice. Patient has been compliant with use of medications, UDS, visits with no adverse effects noted. Patient understands the risks associated with this controlled medication, including tolerance and addiction.  Patient also agrees to only obtain this medication from me, and not from a another provider, unless that provider is covering for me in my absence.  Patient also agrees to be compliant in dosing, and not self adjust the dose of medication.  A signed controlled substance agreement is on file, and the patient has received a controlled substance education sheet at this a previous visit.  The patient has also signed a consent for treatment with a controlled substance as per [facility] policy. KATE was obtained. Refills were sent for: hydrocodone 5mg/325mg 1 po Q6 hours prn pain, #120, no refills.     This document has been electronically signed by UMESH Guzmán on March 30, 2020 11:23

## 2020-04-16 ENCOUNTER — TELEMEDICINE (OUTPATIENT)
Dept: FAMILY MEDICINE CLINIC | Facility: CLINIC | Age: 60
End: 2020-04-16

## 2020-04-16 DIAGNOSIS — G89.29 CHRONIC MIDLINE LOW BACK PAIN WITHOUT SCIATICA: ICD-10-CM

## 2020-04-16 DIAGNOSIS — G89.29 CHRONIC PAIN OF RIGHT KNEE: ICD-10-CM

## 2020-04-16 DIAGNOSIS — S80.01XA CONTUSION OF RIGHT KNEE, INITIAL ENCOUNTER: ICD-10-CM

## 2020-04-16 DIAGNOSIS — S81.851A DOG BITE OF CALF, RIGHT, INITIAL ENCOUNTER: Primary | ICD-10-CM

## 2020-04-16 DIAGNOSIS — S51.811A SKIN TEAR OF RIGHT FOREARM WITHOUT COMPLICATION, INITIAL ENCOUNTER: ICD-10-CM

## 2020-04-16 DIAGNOSIS — M54.50 CHRONIC MIDLINE LOW BACK PAIN WITHOUT SCIATICA: ICD-10-CM
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
## 2020-04-16 DIAGNOSIS — M25.561 CHRONIC PAIN OF RIGHT KNEE: ICD-10-CM

## 2020-04-16 PROCEDURE — 99214 OFFICE O/P EST MOD 30 MIN: CPT | Performed by: NURSE PRACTITIONER

## 2020-04-16 RX ORDER — CEPHALEXIN 500 MG/1
500 CAPSULE ORAL 3 TIMES DAILY
Qty: 30 CAPSULE | Refills: 0 | Status: SHIPPED | OUTPATIENT
Start: 2020-04-16 | End: 2020-05-07

## 2020-04-16 RX ORDER — CHLORHEXIDINE GLUCONATE 4 G/100ML
SOLUTION TOPICAL DAILY PRN
Qty: 120 ML | Refills: 1 | Status: SHIPPED | OUTPATIENT
Start: 2020-04-16 | End: 2020-10-28

## 2020-04-16 RX ORDER — HYDROCODONE BITARTRATE AND ACETAMINOPHEN 10; 325 MG/1; MG/1
1 TABLET ORAL EVERY 8 HOURS PRN
Qty: 90 TABLET | Refills: 0 | Status: SHIPPED | OUTPATIENT
Start: 2020-04-16 | End: 2020-05-28 | Stop reason: SDUPTHER

## 2020-04-16 NOTE — PATIENT INSTRUCTIONS
Apply chlorhexidine solution directly to wound beds and 1/2 inch surrounding skin daily until healed.wash off with nextshower.  When dry, apply mupirocin ointment to wounds and cover with bandagedaily until healed.     Bathe/ shower as usual.

## 2020-04-16 NOTE — PROGRESS NOTES
Chief Complaint   Patient presents with   • Knee Pain     stepped on dog, got bit and fell on table   • Animal Bite     right leg   • Extremity Laceration     skin tear right arm     Subjective   Tricia Loyola is a 60 y.o. female who presents to the office by video visit due to pandemic visit restrictions for a dog bite right lower leg, a fall with a skin tear of right arm.    The following portions of the patient's history were reviewed and updated as appropriate: allergies, current medications, past family history, past medical history, past social history, past surgical history and problem list.    History of Present Illness   This was an audio and video enabled telemedicine encounter.  You have chosen to receive care through a telephone visit. Do you consent to use a telephone and video visit for your medical care today? Yes    Visit initiated as video, converted to telephonic due to equipment failure on patient part.  Reports accidentally stepped on her dog Saturday, resulting in the dog biting her right calf, and a fall onto the coffee table causing a skin tear to right forearm.  She feels both wounds are looking infected despite using triple antibiotic ointment daily.   Meanwhile the fall twisted and impacted her right knee as well and it is more sore and swollen than before.  She has not been able to have the gel knee injections planned by Dr Herndon yet due to pandemic visit restriction and yet her pain is now increased.   Today pain is 9/10 in right knee on hydrocodone 5mg/325mg QID prn for pain.  I will increase mg and decrease frequency of pain medication to  Give an increase from total 20mg daily  To 30 mg daily possible hydrocodone.    No other complaints today.  HPI, ROS  and PE from most recent  Visit carried forward and updated as appropriate for current situation.   Past Medical History:   Diagnosis Date   • Abdominal pain    • Anemia    • Chronic post-traumatic headache      rebound      •  Depressive disorder    • Encounter for gynecological examination    • Gastroesophageal reflux disease    • Generalized anxiety disorder    • History of mammogram 08/2008    MAMMOGRAM DIAGNOSTIC BILATERAL 65849 ([facility]) (1)     • Hyperlipidemia    • Ingrown toenail    • Injury of back    • Nonruptured cerebral aneurysm    • Osteoporosis    • Posttraumatic stress disorder    • Seizure (CMS/HCC)     Psychogenic non-epileptic sz    • Viral hepatitis A           Family History   Problem Relation Age of Onset   • Constipation Mother    • Hypertension Mother    • Anxiety disorder Mother    • Heart disease Mother    • Alcohol abuse Father    • Heart disease Father    • Anxiety disorder Brother    • Kidney disease Brother    • Hypertension Brother    • Arthritis Brother    • Anxiety disorder Brother    • Kidney disease Brother    • Diabetes Brother    • Anxiety disorder Brother    • Hypertension Brother    • Breast cancer Paternal Aunt    • Heart disease Maternal Grandmother    • Clotting disorder Maternal Grandmother    • Heart disease Maternal Grandfather         Review of Systems   Constitutional: Negative.  Negative for fever and unexpected weight change.   Eyes: Negative.    Respiratory: Negative.  Negative for cough, chest tightness and shortness of breath.    Cardiovascular: Negative.  Negative for chest pain.   Gastrointestinal: Positive for constipation (managed with current medication regimen.).   Endocrine: Negative.    Genitourinary: Negative.  Negative for dysuria.   Musculoskeletal: Positive for arthralgias, back pain and joint swelling.        Right knee pain   Skin: Positive for wound. Negative for color change, pallor and rash.   Allergic/Immunologic: Negative.    Neurological: Positive for headaches.   Hematological: Negative.    Psychiatric/Behavioral: Negative for sleep disturbance and suicidal ideas. The patient is nervous/anxious.    All other systems reviewed and are negative.      Objective   Vitals:     04/16/20 1215   PainSc:   5   PainLoc: Leg     Physical Exam   Constitutional: She appears well-developed and well-nourished. No distress.   Eyes: Pupils are equal, round, and reactive to light. Conjunctivae and EOM are normal.   Neck: Normal range of motion.   Pulmonary/Chest: Effort normal. No stridor. No respiratory distress.   Skin: Skin is warm and dry. No rash noted. She is not diaphoretic. There is erythema. No pallor.   Skin tear right forearm, full thickness, no bleeding. No drainage at present. Surrounding skin bruised, no redness mild swelling.     Dog bite marks right lateral calf, small approx 1cm diam roughly round, covered with eschar. Surrounding skin intact, mildly swollen and red. No drainage noted.    Psychiatric: She has a normal mood and affect. Her behavior is normal. Judgment and thought content normal.       Assessment/Plan   Tricia was seen today for knee pain, animal bite and extremity laceration.    Diagnoses and all orders for this visit:    Dog bite of calf, right, initial encounter  -     mupirocin (Bactroban) 2 % ointment; Apply  topically to the appropriate area as directed 3 (Three) Times a Day.  -     chlorhexidine (HIBICLENS) 4 % external liquid; Apply  topically to the appropriate area as directed Daily As Needed for Wound Care (to open wounds until they heal).  -     cephalexin (Keflex) 500 MG capsule; Take 1 capsule by mouth 3 (Three) Times a Day.    Chronic midline low back pain without sciatica  -     HYDROcodone-acetaminophen (NORCO)  MG per tablet; Take 1 tablet by mouth Every 8 (Eight) Hours As Needed for Moderate Pain .    Chronic pain of right knee  -     HYDROcodone-acetaminophen (NORCO)  MG per tablet; Take 1 tablet by mouth Every 8 (Eight) Hours As Needed for Moderate Pain .    Skin tear of right forearm without complication, initial encounter  -     mupirocin (Bactroban) 2 % ointment; Apply  topically to the appropriate area as directed 3 (Three) Times a  Day.  -     chlorhexidine (HIBICLENS) 4 % external liquid; Apply  topically to the appropriate area as directed Daily As Needed for Wound Care (to open wounds until they heal).  -     cephalexin (Keflex) 500 MG capsule; Take 1 capsule by mouth 3 (Three) Times a Day.    Contusion of right knee, initial encounter  -     HYDROcodone-acetaminophen (NORCO)  MG per tablet; Take 1 tablet by mouth Every 8 (Eight) Hours As Needed for Moderate Pain .           PHQ-2/PHQ-9 Depression Screening 3/6/2020   Little interest or pleasure in doing things 0   Feeling down, depressed, or hopeless 0   Trouble falling or staying asleep, or sleeping too much 0   Feeling tired or having little energy 0   Poor appetite or overeating 0   Feeling bad about yourself - or that you are a failure or have let yourself or your family down 0   Trouble concentrating on things, such as reading the newspaper or watching television 0   Moving or speaking so slowly that other people could have noticed. Or the opposite - being so fidgety or restless that you have been moving around a lot more than usual 0   Thoughts that you would be better off dead, or of hurting yourself in some way 0   Total Score 0   Patient understands the risks associated with this controlled medication, including tolerance and addiction.  Patient also agrees to only obtain this medication from me, and not from a another provider, unless that provider is covering for me in my absence.  Patient also agrees to be compliant in dosing, and not self adjust the dose of medication.  A signed controlled substance agreement is on file, and the patient has received a controlled substance education sheet at this a previous visit.  The patient has also signed a consent for treatment with a controlled substance as per [facility] policy. KATE was obtained and reviewed.    UMESH Guzmán         Return in about 4 days (around 4/20/2020).    Patient Instructions   Apply  chlorhexidine solution directly to wound beds and 1/2 inch surrounding skin daily until healed.wash off with nextshower.  When dry, apply mupirocin ointment to wounds and cover with bandagedaily until healed.     Bathe/ shower as usual.

## 2020-04-20 ENCOUNTER — OFFICE VISIT (OUTPATIENT)
Dept: FAMILY MEDICINE CLINIC | Facility: CLINIC | Age: 60
End: 2020-04-20

## 2020-04-20 DIAGNOSIS — L08.9 SUPERFICIAL SKIN INFECTION: Primary | ICD-10-CM
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
## 2020-04-20 DIAGNOSIS — S80.01XD CONTUSION OF RIGHT KNEE, SUBSEQUENT ENCOUNTER: ICD-10-CM

## 2020-04-20 DIAGNOSIS — S51.811D SKIN TEAR OF RIGHT FOREARM WITHOUT COMPLICATION, SUBSEQUENT ENCOUNTER: ICD-10-CM

## 2020-04-20 DIAGNOSIS — S81.851D DOG BITE OF CALF, RIGHT, SUBSEQUENT ENCOUNTER: ICD-10-CM

## 2020-04-20 PROCEDURE — 99213 OFFICE O/P EST LOW 20 MIN: CPT | Performed by: NURSE PRACTITIONER

## 2020-04-20 NOTE — PROGRESS NOTES
Chief Complaint   Patient presents with   • Animal Bite   • Extremity Laceration     skin tear from fall   • Cellulitis     Subjective   Tricia Loyola is a 60 y.o. female who presents to the office by telephone visit for follow up of  a dog bite due to stepping on her dog, with bite to the leg and subsequent fall and skin tear of arm, with skin infection of both wounds.    The following portions of the patient's history were reviewed and updated as appropriate: allergies, current medications, past family history, past medical history, past social history, past surgical history and problem list.    History of Present Illness   You have chosen to receive care through a telephone visit. Do you consent to use a telephone visit for your medical care today? Yes    Contusion right knee: knee remains as swollen and tender as on 4/16 visit, bruising is fading.  Skin tear right forearm: keeping clean and covered, surrounding skin no longer looks infected, healing some.  Dog bite right calf: surrounding skin now still angry red for 1 inch periphery, no wound drainage or odor, doesn't look infected, but not showing any healing to her assessment yet.    She has no fever and both wounds are free of additional heat and redness.  She wants me to call back Friday to check on her again.    No new problems today.    HPI, ROS  and PE from most recent  Visit carried forward and updated as appropriate for current situation.  Past Medical History:   Diagnosis Date   • Abdominal pain    • Anemia    • Chronic post-traumatic headache      rebound      • Depressive disorder    • Encounter for gynecological examination    • Gastroesophageal reflux disease    • Generalized anxiety disorder    • History of mammogram 08/2008    MAMMOGRAM DIAGNOSTIC BILATERAL 30658 ([facility]) (1)     • Hyperlipidemia    • Ingrown toenail    • Injury of back    • Nonruptured cerebral aneurysm    • Osteoporosis    • Posttraumatic stress disorder    • Seizure (CMS/[facility])      Psychogenic non-epileptic sz    • Viral hepatitis A           Family History   Problem Relation Age of Onset   • Constipation Mother    • Hypertension Mother    • Anxiety disorder Mother    • Heart disease Mother    • Alcohol abuse Father    • Heart disease Father    • Anxiety disorder Brother    • Kidney disease Brother    • Hypertension Brother    • Arthritis Brother    • Anxiety disorder Brother    • Kidney disease Brother    • Diabetes Brother    • Anxiety disorder Brother    • Hypertension Brother    • Breast cancer Paternal Aunt    • Heart disease Maternal Grandmother    • Clotting disorder Maternal Grandmother    • Heart disease Maternal Grandfather         Review of Systems   Constitutional: Negative.  Negative for fever and unexpected weight change.   Eyes: Negative.    Respiratory: Negative.  Negative for cough, chest tightness and shortness of breath.    Cardiovascular: Negative.  Negative for chest pain.   Gastrointestinal: Positive for constipation (managed with current medication regimen.).   Endocrine: Negative.    Genitourinary: Negative.  Negative for dysuria.   Musculoskeletal: Positive for arthralgias, back pain and joint swelling.        Right knee pain   Skin: Positive for wound. Negative for color change, pallor and rash.   Allergic/Immunologic: Negative.    Neurological: Positive for headaches.   Hematological: Negative.    Psychiatric/Behavioral: Negative for sleep disturbance and suicidal ideas. The patient is nervous/anxious.    All other systems reviewed and are negative.      Objective   Vitals:    04/20/20 0942   PainSc:   4   PainLoc: Knee     Physical Exam   Constitutional: She is oriented to person, place, and time. No distress.   Pulmonary/Chest: Effort normal. No stridor. No respiratory distress.   Neurological: She is alert and oriented to person, place, and time.   Skin:   Skin tear right forearm, full thickness, no bleeding. No drainage at present. Surrounding skin bruised, no  redness mild swelling.     Dog bite marks right lateral calf, small approx 1cm diam roughly round, covered with eschar. Surrounding skin intact, still angry red for at least an inch surrounding wound.  some bruising. No drainage noted.    Psychiatric: She has a normal mood and affect. Her behavior is normal. Judgment and thought content normal.       Assessment/Plan   Tricia was seen today for animal bite, extremity laceration and cellulitis.    Diagnoses and all orders for this visit:    Superficial skin infection    Dog bite of calf, right, subsequent encounter    Contusion of right knee, subsequent encounter    Skin tear of right forearm without complication, subsequent encounter         Secured texting  of photos of wounds are assessed and deleted from device after assessment    15 minutes are spent in telephonic medical discussion today.  PHQ-2/PHQ-9 Depression Screening 3/6/2020   Little interest or pleasure in doing things 0   Feeling down, depressed, or hopeless 0   Trouble falling or staying asleep, or sleeping too much 0   Feeling tired or having little energy 0   Poor appetite or overeating 0   Feeling bad about yourself - or that you are a failure or have let yourself or your family down 0   Trouble concentrating on things, such as reading the newspaper or watching television 0   Moving or speaking so slowly that other people could have noticed. Or the opposite - being so fidgety or restless that you have been moving around a lot more than usual 0   Thoughts that you would be better off dead, or of hurting yourself in some way 0   Total Score 0       Crystal L Ceron, APRN         Return in about 4 days (around 4/24/2020), or FU dog bite/ skin tear infection.    There are no Patient Instructions on file for this visit.

## 2020-04-24 ENCOUNTER — OFFICE VISIT (OUTPATIENT)
Dept: FAMILY MEDICINE CLINIC | Facility: CLINIC | Age: 60
End: 2020-04-24

## 2020-04-24 DIAGNOSIS — S51.811D SKIN TEAR OF RIGHT FOREARM WITHOUT COMPLICATION, SUBSEQUENT ENCOUNTER: Primary | ICD-10-CM

## 2020-04-24 DIAGNOSIS — G89.29 CHRONIC PAIN OF RIGHT KNEE: ICD-10-CM

## 2020-04-24 DIAGNOSIS — M25.561 CHRONIC PAIN OF RIGHT KNEE: ICD-10-CM

## 2020-04-24 DIAGNOSIS — L08.9 SUPERFICIAL SKIN INFECTION: ICD-10-CM

## 2020-04-24 PROCEDURE — G2025 DIS SITE TELE SVCS RHC/FQHC: HCPCS | Performed by: NURSE PRACTITIONER

## 2020-04-24 NOTE — PROGRESS NOTES
Chief Complaint   Patient presents with   • Cellulitis     Subjective   Tricia Loyola is a 60 y.o. female who presents to the office by telephone visit due to pandemic for 1 week follow up of wound of right lower leg with skin infection s/p dog bite.    The following portions of the patient's history were reviewed and updated as appropriate: allergies, current medications, past family history, past medical history, past social history, past surgical history and problem list.    History of Present Illness   You have chosen to receive care through a telephone visit. Do you consent to use a telephone visit for your medical care today? Yes  15 minutes were spent in telephonic medical discussion today    Contusion right knee: knee remains as swollen and tender as on 4/16 visit, bruising is fading.  Skin tear right forearm: keeping clean and covered, surrounding skin no longer looks infected, healing some. Wound smaller now than was last week.  Dog bite right calf: surrounding skin now still a little red for 1/4 inch periphery, no wound drainage or odor, doesn't look infected, but not showing any healing to her assessment yet. She showers every other day and doesn't soak or scrub wound, scab it hard and tan. Advised to soak wound with soap and water x 15-20 minutes and gently scrub      She has no fever and both wounds are free of additional heat, drainage or redness.  She wants me to call back Friday to check on her again.    HPI, ROS  and PE from most recent  Visit carried forward and updated as appropriate for current situation.  Past Medical History:   Diagnosis Date   • Abdominal pain    • Anemia    • Chronic post-traumatic headache      rebound      • Depressive disorder    • Encounter for gynecological examination    • Gastroesophageal reflux disease    • Generalized anxiety disorder    • History of mammogram 08/2008    MAMMOGRAM DIAGNOSTIC BILATERAL 82059 ([facility]) (1)     • Hyperlipidemia    • Ingrown toenail    •  Injury of back    • Nonruptured cerebral aneurysm    • Osteoporosis    • Posttraumatic stress disorder    • Seizure (CMS/HCC)     Psychogenic non-epileptic sz    • Viral hepatitis A           Family History   Problem Relation Age of Onset   • Constipation Mother    • Hypertension Mother    • Anxiety disorder Mother    • Heart disease Mother    • Alcohol abuse Father    • Heart disease Father    • Anxiety disorder Brother    • Kidney disease Brother    • Hypertension Brother    • Arthritis Brother    • Anxiety disorder Brother    • Kidney disease Brother    • Diabetes Brother    • Anxiety disorder Brother    • Hypertension Brother    • Breast cancer Paternal Aunt    • Heart disease Maternal Grandmother    • Clotting disorder Maternal Grandmother    • Heart disease Maternal Grandfather         Review of Systems   Constitutional: Negative.  Negative for fever and unexpected weight change.   Eyes: Negative.    Respiratory: Negative.  Negative for cough, chest tightness and shortness of breath.    Cardiovascular: Negative.  Negative for chest pain.   Gastrointestinal: Positive for constipation (managed with current medication regimen.).   Endocrine: Negative.    Genitourinary: Negative.  Negative for dysuria.   Musculoskeletal: Positive for arthralgias, back pain and joint swelling.        Right knee pain   Skin: Positive for wound. Negative for color change, pallor and rash.   Allergic/Immunologic: Negative.    Neurological: Positive for headaches.   Hematological: Negative.    Psychiatric/Behavioral: Negative for sleep disturbance and suicidal ideas. The patient is nervous/anxious.    All other systems reviewed and are negative.      Objective   There were no vitals filed for this visit.  Physical Exam   Constitutional: She is oriented to person, place, and time. No distress.   Pulmonary/Chest: Effort normal. No stridor. No respiratory distress.   Neurological: She is oriented to person, place, and time.   Psychiatric:  She has a normal mood and affect. Her behavior is normal. Judgment and thought content normal.       Assessment/Plan   Tricia was seen today for cellulitis.    Diagnoses and all orders for this visit:    Skin tear of right forearm without complication, subsequent encounter    Superficial skin infection    Chronic pain of right knee         Stable and slowly improving wound right lower leg s/p dog bite. Advised gentle mechanical debridment at home and FU 1 week.   PHQ-2/PHQ-9 Depression Screening 3/6/2020   Little interest or pleasure in doing things 0   Feeling down, depressed, or hopeless 0   Trouble falling or staying asleep, or sleeping too much 0   Feeling tired or having little energy 0   Poor appetite or overeating 0   Feeling bad about yourself - or that you are a failure or have let yourself or your family down 0   Trouble concentrating on things, such as reading the newspaper or watching television 0   Moving or speaking so slowly that other people could have noticed. Or the opposite - being so fidgety or restless that you have been moving around a lot more than usual 0   Thoughts that you would be better off dead, or of hurting yourself in some way 0   Total Score 0       UMESH Guzmán         Return in about 1 week (around 5/1/2020).    There are no Patient Instructions on file for this visit.

## 2020-04-28 ENCOUNTER — OFFICE VISIT (OUTPATIENT)
Dept: GASTROENTEROLOGY | Facility: CLINIC | Age: 60
End: 2020-04-28

## 2020-04-28 VITALS
OXYGEN SATURATION: 98 % | SYSTOLIC BLOOD PRESSURE: 120 MMHG | WEIGHT: 112 LBS | HEART RATE: 80 BPM | BODY MASS INDEX: 20.61 KG/M2 | DIASTOLIC BLOOD PRESSURE: 80 MMHG | HEIGHT: 62 IN

## 2020-04-28 DIAGNOSIS — K21.00 GASTROESOPHAGEAL REFLUX DISEASE WITH ESOPHAGITIS: Primary | ICD-10-CM

## 2020-04-28 DIAGNOSIS — K74.00 HEPATIC FIBROSIS: ICD-10-CM

## 2020-04-28 DIAGNOSIS — K58.2 IRRITABLE BOWEL SYNDROME WITH BOTH CONSTIPATION AND DIARRHEA: ICD-10-CM

## 2020-04-28 DIAGNOSIS — K59.00 CONSTIPATION, UNSPECIFIED CONSTIPATION TYPE: ICD-10-CM

## 2020-04-28 DIAGNOSIS — R74.8 ELEVATED LIVER ENZYMES: ICD-10-CM

## 2020-04-28 PROCEDURE — 99213 OFFICE O/P EST LOW 20 MIN: CPT | Performed by: PHYSICIAN ASSISTANT

## 2020-05-01 ENCOUNTER — OFFICE VISIT (OUTPATIENT)
Dept: FAMILY MEDICINE CLINIC | Facility: CLINIC | Age: 60
End: 2020-05-01
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
## 2020-05-01 DIAGNOSIS — L08.9 SUPERFICIAL SKIN INFECTION: Primary | ICD-10-CM

## 2020-05-01 DIAGNOSIS — S81.851D DOG BITE OF CALF, RIGHT, SUBSEQUENT ENCOUNTER: ICD-10-CM

## 2020-05-01 PROCEDURE — G2025 DIS SITE TELE SVCS RHC/FQHC: HCPCS | Performed by: NURSE PRACTITIONER

## 2020-05-01 NOTE — PROGRESS NOTES
Chief Complaint   Patient presents with   • Animal Bite     Subjective   Tricia Loyola is a 60 y.o. female who presents to the office by telephone visit due to pandemic for follow up of lower leg dog bite wound.    The following portions of the patient's history were reviewed and updated as appropriate: allergies, current medications, past family history, past medical history, past social history, past surgical history and problem list.    History of Present Illness   You have chosen to receive care through a telephone visit. Do you consent to use a telephone visit for your medical care today? Yes  15 minutes in medical discussion.    Today reports improving Polymem dressing and doxycycline after urgent care visit on 4/29/2020 due to increased discomfort of wound.  No fever, no drainage.  Agreeable to call for appt in office for Monday or Tuesday if worsens or fails to improve.  No new complaints today.   Parts of most recent relevant visit HPI, ROS  and PE may be carried forward and all are updated as appropriate for current situation.   Past Medical History:   Diagnosis Date   • Abdominal pain    • Anemia    • Chronic post-traumatic headache      rebound      • Depressive disorder    • Encounter for gynecological examination    • Gastroesophageal reflux disease    • Generalized anxiety disorder    • History of mammogram 08/2008    MAMMOGRAM DIAGNOSTIC BILATERAL 71965 ([facility]) (1)     • Hyperlipidemia    • Ingrown toenail    • Injury of back    • Nonruptured cerebral aneurysm    • Osteoporosis    • Posttraumatic stress disorder    • Seizure (CMS/HCC)     Psychogenic non-epileptic sz    • Viral hepatitis A           Family History   Problem Relation Age of Onset   • Constipation Mother    • Hypertension Mother    • Anxiety disorder Mother    • Heart disease Mother    • Alcohol abuse Father    • Heart disease Father    • Anxiety disorder Brother    • Kidney disease Brother    • Hypertension Brother    • Arthritis  Brother    • Anxiety disorder Brother    • Kidney disease Brother    • Diabetes Brother    • Anxiety disorder Brother    • Hypertension Brother    • Breast cancer Paternal Aunt    • Heart disease Maternal Grandmother    • Clotting disorder Maternal Grandmother    • Heart disease Maternal Grandfather         Review of Systems   Constitutional: Negative.  Negative for fever and unexpected weight change.   Eyes: Negative.    Respiratory: Negative.  Negative for cough, chest tightness and shortness of breath.    Cardiovascular: Negative.  Negative for chest pain.   Gastrointestinal: Positive for constipation (managed with current medication regimen.).   Endocrine: Negative.    Genitourinary: Negative.  Negative for dysuria.   Musculoskeletal: Positive for arthralgias, back pain and joint swelling.        Right knee pain   Skin: Positive for wound. Negative for color change, pallor and rash.   Allergic/Immunologic: Negative.    Neurological: Positive for headaches.   Hematological: Negative.    Psychiatric/Behavioral: Negative for sleep disturbance and suicidal ideas. The patient is nervous/anxious.    All other systems reviewed and are negative.      Objective   There were no vitals filed for this visit.  Physical Exam   Constitutional: She is oriented to person, place, and time. No distress.   Pulmonary/Chest: Effort normal. No stridor. No respiratory distress.   Neurological: She is oriented to person, place, and time.   Psychiatric: She has a normal mood and affect. Her behavior is normal. Judgment and thought content normal.     Assessment/Plan   Tricia was seen today for animal bite.    Diagnoses and all orders for this visit:    Superficial skin infection  Comments:  improving with Polymem dressing and doxycycline after urgent care visit on 4/29/2020 due to increased discomfort of wound.    Dog bite of calf, right, subsequent encounter         Continue doxycyline as ordered, dressings as ordered, FU with me Monday  or Tuesday  PHQ-2/PHQ-9 Depression Screening 3/6/2020   Little interest or pleasure in doing things 0   Feeling down, depressed, or hopeless 0   Trouble falling or staying asleep, or sleeping too much 0   Feeling tired or having little energy 0   Poor appetite or overeating 0   Feeling bad about yourself - or that you are a failure or have let yourself or your family down 0   Trouble concentrating on things, such as reading the newspaper or watching television 0   Moving or speaking so slowly that other people could have noticed. Or the opposite - being so fidgety or restless that you have been moving around a lot more than usual 0   Thoughts that you would be better off dead, or of hurting yourself in some way 0   Total Score 0       UMESH Guzmán         Return in about 4 days (around 5/5/2020).    There are no Patient Instructions on file for this visit.

## 2020-05-07 ENCOUNTER — OFFICE VISIT (OUTPATIENT)
Dept: FAMILY MEDICINE CLINIC | Facility: CLINIC | Age: 60
End: 2020-05-07

## 2020-05-07 VITALS
HEIGHT: 62 IN | RESPIRATION RATE: 18 BRPM | BODY MASS INDEX: 20.43 KG/M2 | SYSTOLIC BLOOD PRESSURE: 118 MMHG | DIASTOLIC BLOOD PRESSURE: 76 MMHG | HEART RATE: 87 BPM | TEMPERATURE: 97.4 F | OXYGEN SATURATION: 98 % | WEIGHT: 111 LBS

## 2020-05-07 DIAGNOSIS — L08.9 SUPERFICIAL SKIN INFECTION: ICD-10-CM

## 2020-05-07 DIAGNOSIS — S81.851D DOG BITE OF CALF, RIGHT, SUBSEQUENT ENCOUNTER: Primary | ICD-10-CM
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
## 2020-05-07 PROCEDURE — 17250 CHEM CAUT OF GRANLTJ TISSUE: CPT | Performed by: NURSE PRACTITIONER

## 2020-05-07 PROCEDURE — 99213 OFFICE O/P EST LOW 20 MIN: CPT | Performed by: NURSE PRACTITIONER

## 2020-05-07 RX ORDER — DOXYCYCLINE HYCLATE 100 MG/1
100 CAPSULE ORAL
COMMUNITY
Start: 2020-04-27 | End: 2020-05-08

## 2020-05-07 NOTE — PROGRESS NOTES
Chief Complaint   Patient presents with   • Follow-up     Subjective   Tricia Loyola is a 60 y.o. female who presents to the office for wound assessment of dog bite wound posterior right lower leg.    The following portions of the patient's history were reviewed and updated as appropriate: allergies, current medications, past family history, past medical history, past social history, past surgical history and problem list.    History of Present Illness   HPI: dog bite on 4/10/20 treated initially with cephalexin on 4/16. Pain worsened after antibiotic complete and presented to urgent care for that reason on 4/29/20.  Polymem dressing and doxycycline prescribed and in use after urgent care visit on 4/29/2020 due to increased discomfort of wound.  Reports wound pain has subsided.    No new complaints today.  Parts of most recent relevant visit HPI, ROS  and PE may be carried forward and all are updated as appropriate for current situation.  Past Medical History:   Diagnosis Date   • Abdominal pain    • Anemia    • Chronic post-traumatic headache      rebound      • Depressive disorder    • Encounter for gynecological examination    • Gastroesophageal reflux disease    • Generalized anxiety disorder    • History of mammogram 08/2008    MAMMOGRAM DIAGNOSTIC BILATERAL 72188 ([facility]) (1)     • Hyperlipidemia    • Ingrown toenail    • Injury of back    • Nonruptured cerebral aneurysm    • Osteoporosis    • Posttraumatic stress disorder    • Seizure (CMS/HCC)     Psychogenic non-epileptic sz    • Viral hepatitis A           Family History   Problem Relation Age of Onset   • Constipation Mother    • Hypertension Mother    • Anxiety disorder Mother    • Heart disease Mother    • Alcohol abuse Father    • Heart disease Father    • Anxiety disorder Brother    • Kidney disease Brother    • Hypertension Brother    • Arthritis Brother    • Anxiety disorder Brother    • Kidney disease Brother    • Diabetes Brother    • Anxiety  "disorder Brother    • Hypertension Brother    • Breast cancer Paternal Aunt    • Heart disease Maternal Grandmother    • Clotting disorder Maternal Grandmother    • Heart disease Maternal Grandfather         Review of Systems   Constitutional: Negative.  Negative for fever and unexpected weight change.   Eyes: Negative.    Respiratory: Negative.  Negative for cough, chest tightness and shortness of breath.    Cardiovascular: Negative.  Negative for chest pain.   Gastrointestinal: Positive for constipation (managed with current medication regimen.).   Endocrine: Negative.    Genitourinary: Negative.  Negative for dysuria.   Musculoskeletal: Positive for arthralgias, back pain and joint swelling.        Right knee pain   Skin: Positive for wound. Negative for color change, pallor and rash.   Allergic/Immunologic: Negative.    Neurological: Positive for headaches.   Hematological: Negative.    Psychiatric/Behavioral: Negative for sleep disturbance and suicidal ideas. The patient is nervous/anxious.    All other systems reviewed and are negative.      Objective   Vitals:    05/07/20 1453   BP: 118/76   BP Location: Left arm   Patient Position: Sitting   Pulse: 87   Resp: 18   Temp: 97.4 °F (36.3 °C)   SpO2: 98%   Weight: 50.3 kg (111 lb)   Height: 157.5 cm (62.01\")   PainSc: 0-No pain     Physical Exam   Constitutional: She is oriented to person, place, and time. She appears well-developed and well-nourished. No distress.   HENT:   Head: Normocephalic and atraumatic.   Eyes: Pupils are equal, round, and reactive to light. Conjunctivae and EOM are normal.   Neck: Normal range of motion. No JVD present.   Pulmonary/Chest: Effort normal and breath sounds normal. No stridor. No respiratory distress.   Abdominal: Soft. Bowel sounds are normal. She exhibits no distension.   Musculoskeletal: Normal range of motion. She exhibits no edema or deformity.   Neurological: She is alert and oriented to person, place, and time.   Skin: " Skin is warm and dry. Capillary refill takes 2 to 3 seconds. No rash noted. She is not diaphoretic. There is erythema. No pallor.     Polymem intact on arrival, removed with minimal serousanguinous drainage noted on dressing.   Open wound right lateral calf, 0.8 cm diam roughly round, wound bed pink and moist with 10% grey adherent slough. There is retraction of skin surrounding wound with rounded stepoff to wound bed not conducive to epithelialization. Surrounding skin intact, normal temp and color. No drainage. Wound care with chlorhexidine swab then chemical cautery with silver nitrate. antibx ointment applied/ covered with telfa. To resume polymem at home.   Psychiatric: She has a normal mood and affect. Her behavior is normal. Judgment and thought content normal.       Assessment/Plan   Tricia was seen today for follow-up.    Diagnoses and all orders for this visit:    Dog bite of calf, right, subsequent encounter    Superficial skin infection           PHQ-2/PHQ-9 Depression Screening 3/6/2020   Little interest or pleasure in doing things 0   Feeling down, depressed, or hopeless 0   Trouble falling or staying asleep, or sleeping too much 0   Feeling tired or having little energy 0   Poor appetite or overeating 0   Feeling bad about yourself - or that you are a failure or have let yourself or your family down 0   Trouble concentrating on things, such as reading the newspaper or watching television 0   Moving or speaking so slowly that other people could have noticed. Or the opposite - being so fidgety or restless that you have been moving around a lot more than usual 0   Thoughts that you would be better off dead, or of hurting yourself in some way 0   Total Score 0       UMESH Guzmán         Return in about 1 week (around 5/14/2020).    There are no Patient Instructions on file for this visit.

## 2020-05-15 ENCOUNTER — DOCUMENTATION (OUTPATIENT)
Dept: GASTROENTEROLOGY | Facility: CLINIC | Age: 60
End: 2020-05-15

## 2020-05-28 ENCOUNTER — OFFICE VISIT (OUTPATIENT)
Dept: FAMILY MEDICINE CLINIC | Facility: CLINIC | Age: 60
End: 2020-05-28

## 2020-05-28 DIAGNOSIS — G25.81 RESTLESS LEG SYNDROME: Chronic | ICD-10-CM

## 2020-05-28 DIAGNOSIS — Z51.81 ENCOUNTER FOR THERAPEUTIC DRUG LEVEL MONITORING: ICD-10-CM

## 2020-05-28 DIAGNOSIS — G89.29 CHRONIC PAIN OF RIGHT KNEE: ICD-10-CM

## 2020-05-28 DIAGNOSIS — G89.29 CHRONIC MIDLINE LOW BACK PAIN WITHOUT SCIATICA: ICD-10-CM

## 2020-05-28 DIAGNOSIS — M54.50 CHRONIC MIDLINE LOW BACK PAIN WITHOUT SCIATICA: ICD-10-CM

## 2020-05-28 DIAGNOSIS — E78.2 MIXED HYPERLIPIDEMIA: ICD-10-CM

## 2020-05-28 DIAGNOSIS — G43.709 CHRONIC MIGRAINE WITHOUT AURA WITHOUT STATUS MIGRAINOSUS, NOT INTRACTABLE: Primary | ICD-10-CM

## 2020-05-28 DIAGNOSIS — E55.9 VITAMIN D DEFICIENCY: ICD-10-CM

## 2020-05-28 DIAGNOSIS — R11.0 NAUSEA: ICD-10-CM

## 2020-05-28 DIAGNOSIS — R53.83 FATIGUE, UNSPECIFIED TYPE: ICD-10-CM

## 2020-05-28 DIAGNOSIS — E03.9 ACQUIRED HYPOTHYROIDISM: Chronic | ICD-10-CM

## 2020-05-28 DIAGNOSIS — S80.01XA CONTUSION OF RIGHT KNEE, INITIAL ENCOUNTER: ICD-10-CM

## 2020-05-28 DIAGNOSIS — M25.561 CHRONIC PAIN OF RIGHT KNEE: ICD-10-CM

## 2020-05-28 DIAGNOSIS — F41.0 PANIC DISORDER WITHOUT AGORAPHOBIA: Chronic | ICD-10-CM

## 2020-05-28 PROCEDURE — G2025 DIS SITE TELE SVCS RHC/FQHC: HCPCS | Performed by: NURSE PRACTITIONER

## 2020-05-28 RX ORDER — ONDANSETRON 4 MG/1
4 TABLET, ORALLY DISINTEGRATING ORAL EVERY 8 HOURS PRN
Qty: 30 TABLET | Refills: 5 | Status: SHIPPED | OUTPATIENT
Start: 2020-05-28 | End: 2021-06-26 | Stop reason: SDUPTHER

## 2020-05-28 RX ORDER — TOPIRAMATE 100 MG/1
100 TABLET, FILM COATED ORAL 2 TIMES DAILY
Qty: 180 TABLET | Refills: 1 | Status: SHIPPED | OUTPATIENT
Start: 2020-05-28

## 2020-05-28 RX ORDER — HYDROCODONE BITARTRATE AND ACETAMINOPHEN 10; 325 MG/1; MG/1
1 TABLET ORAL EVERY 8 HOURS PRN
Qty: 90 TABLET | Refills: 0 | Status: SHIPPED | OUTPATIENT
Start: 2020-05-28 | End: 2020-06-29 | Stop reason: SDUPTHER

## 2020-05-28 RX ORDER — LEVOTHYROXINE SODIUM 0.03 MG/1
12.5 TABLET ORAL DAILY
Qty: 15 TABLET | Refills: 3 | Status: SHIPPED | OUTPATIENT
Start: 2020-05-28 | End: 2020-10-08 | Stop reason: SDUPTHER

## 2020-05-28 RX ORDER — ROPINIROLE 3 MG/1
3 TABLET, FILM COATED ORAL
Qty: 90 TABLET | Refills: 1 | Status: SHIPPED | OUTPATIENT
Start: 2020-05-28 | End: 2020-10-08 | Stop reason: SDUPTHER

## 2020-05-28 RX ORDER — CLONAZEPAM 0.5 MG/1
0.5 TABLET ORAL DAILY PRN
Qty: 15 TABLET | Refills: 0 | Status: SHIPPED | OUTPATIENT
Start: 2020-05-28 | End: 2020-07-28 | Stop reason: SDUPTHER

## 2020-05-28 NOTE — PROGRESS NOTES
Chief Complaint   Patient presents with   • Back Pain   • Knee Pain     Subjective   Tricia Loyola is a 60 y.o. female who presents to the office by telephone visit due to pandemic for routine follow up of chronic lower back and bilateral knee pain    The following portions of the patient's history were reviewed and updated as appropriate: allergies, current medications, past family history, past medical history, past social history, past surgical history and problem list.    History of Present Illness   You have chosen to receive care through a telephone visit. Do you consent to use a telephone visit for your medical care today? Yes  12 minutes medical discussion.    Pain   This is a chronic problem. Pain located mid upper back, constant.  See CC. The current episode started more than 1 year ago. The problem has been waxing and waning. Pertinent negatives include no chest pain, coughing, fever, nausea or rash. The symptoms are aggravated by bending, walking, twisting and standing. She has tried acetaminophen, lying down, NSAIDs, relaxation, position changes, rest, sleep, walking and oral narcotics for the symptoms. Ineffective treatment without hydrocodone, which has provided significant relief. Pain rated as 4/10 at this time,stable at present.   Due for refill today.    Back pain:  Imaging in chart indicates mild DDD of cspine . An ED record is on file from [facility] of 10/5/18. CT of T and L spine done on that day show stable Tspine with an old compression fracture deformity of superior endplate of T11. This is thought a result of chronic osteoporosis.  Mild posterior spurring of superior endplates at T2-3 and T5-6. L spine shows mild degenerative changes at L4-5 and L5-S1.      Chronic bilateral knee pain due to DJD, treated by Dr Herndon, is scheduled for knee injections with him on 6/5/2020.    No new problems today  Reports previous leg injury from dog bite and fall have healed.  Parts of most recent relevant  visit HPI, ROS  and PE may be carried forward and all are updated as appropriate for current situation.    Past Medical History:   Diagnosis Date   • Abdominal pain    • Anemia    • Chronic post-traumatic headache      rebound      • Depressive disorder    • Encounter for gynecological examination    • Gastroesophageal reflux disease    • Generalized anxiety disorder    • History of mammogram 08/2008    MAMMOGRAM DIAGNOSTIC BILATERAL 20766 ([facility]) (1)     • Hyperlipidemia    • Ingrown toenail    • Injury of back    • Nonruptured cerebral aneurysm    • Osteoporosis    • Posttraumatic stress disorder    • Seizure (CMS/HCC)     Psychogenic non-epileptic sz    • Viral hepatitis A           Family History   Problem Relation Age of Onset   • Constipation Mother    • Hypertension Mother    • Anxiety disorder Mother    • Heart disease Mother    • Alcohol abuse Father    • Heart disease Father    • Anxiety disorder Brother    • Kidney disease Brother    • Hypertension Brother    • Arthritis Brother    • Anxiety disorder Brother    • Kidney disease Brother    • Diabetes Brother    • Anxiety disorder Brother    • Hypertension Brother    • Breast cancer Paternal Aunt    • Heart disease Maternal Grandmother    • Clotting disorder Maternal Grandmother    • Heart disease Maternal Grandfather         Review of Systems   Constitutional: Negative.  Negative for fever and unexpected weight change.   HENT: Negative.  Negative for congestion, postnasal drip, rhinorrhea, sinus pressure, sinus pain and sneezing.    Eyes: Negative.    Respiratory: Negative.  Negative for cough, chest tightness and shortness of breath.    Cardiovascular: Negative.  Negative for chest pain.   Gastrointestinal: Negative.  Negative for diarrhea and nausea.   Endocrine: Negative.    Genitourinary: Negative.  Negative for dysuria.   Musculoskeletal: Positive for arthralgias, back pain and gait problem (right knee pain).   Skin: Negative.  Negative for color  change, pallor, rash and wound.   Allergic/Immunologic: Negative.    Hematological: Negative.    Psychiatric/Behavioral: Negative.  Negative for sleep disturbance and suicidal ideas.   All other systems reviewed and are negative.      Objective   Vitals:    05/28/20 1416   PainSc:   3   PainLoc: Knee     Physical Exam   Constitutional: She is oriented to person, place, and time. No distress.   Pulmonary/Chest: Effort normal. No stridor. No respiratory distress.   Neurological: She is oriented to person, place, and time.   Psychiatric: She has a normal mood and affect. Her behavior is normal. Judgment and thought content normal.       Assessment/Plan   Tricia was seen today for back pain and knee pain.    Diagnoses and all orders for this visit:    Chronic migraine without aura without status migrainosus, not intractable  -     topiramate (TOPAMAX) 100 MG tablet; Take 1 tablet by mouth 2 (Two) Times a Day.    Acquired hypothyroidism  Comments:  needs to see endocrinology. low T4 with normal T3, T4 other pituitary indicators normal. low dose levothyroxine started in October 2019 after testing.   Orders:  -     levothyroxine (SYNTHROID, LEVOTHROID) 25 MCG tablet; Take 0.5 tablets by mouth Daily.  -     Comprehensive Metabolic Panel; Future  -     CBC & Differential; Future  -     T4, Free; Future  -     TSH; Future  -     T3; Future    Chronic midline low back pain without sciatica  -     HYDROcodone-acetaminophen (NORCO)  MG per tablet; Take 1 tablet by mouth Every 8 (Eight) Hours As Needed for Moderate Pain .    Chronic pain of right knee  -     HYDROcodone-acetaminophen (NORCO)  MG per tablet; Take 1 tablet by mouth Every 8 (Eight) Hours As Needed for Moderate Pain .    Contusion of right knee, initial encounter  -     HYDROcodone-acetaminophen (NORCO)  MG per tablet; Take 1 tablet by mouth Every 8 (Eight) Hours As Needed for Moderate Pain .    Nausea  -     ondansetron ODT (ZOFRAN-ODT) 4 MG  disintegrating tablet; Place 1 tablet on the tongue Every 8 (Eight) Hours As Needed for Nausea.    Panic disorder without agoraphobia  -     clonazePAM (KlonoPIN) 0.5 MG tablet; Take 1 tablet by mouth Daily As Needed for Anxiety (panic attacks).    Restless leg syndrome  Comments:  worsening, needs increased dose today  Orders:  -     rOPINIRole (REQUIP) 3 MG tablet; Take 1 tablet by mouth every night at bedtime.    Encounter for therapeutic drug level monitoring  -     ToxASSURE Select 13 Discrete -    Mixed hyperlipidemia  -     Lipid Panel; Future    Vitamin D deficiency  -     Vitamin D 25 Hydroxy; Future    Fatigue, unspecified type  -     Vitamin B12 & Folate; Future  -     T4, Free; Future  -     TSH; Future  -     T3; Future         Stable chronic lower back pain and bilateral knee pain, hydrocodone refilled today.  Labs due with next visit for 8/20/20  PHQ-2/PHQ-9 Depression Screening 3/6/2020   Little interest or pleasure in doing things 0   Feeling down, depressed, or hopeless 0   Trouble falling or staying asleep, or sleeping too much 0   Feeling tired or having little energy 0   Poor appetite or overeating 0   Feeling bad about yourself - or that you are a failure or have let yourself or your family down 0   Trouble concentrating on things, such as reading the newspaper or watching television 0   Moving or speaking so slowly that other people could have noticed. Or the opposite - being so fidgety or restless that you have been moving around a lot more than usual 0   Thoughts that you would be better off dead, or of hurting yourself in some way 0   Total Score 0   Patient seen in office every 3 months for chronic pain requiring opiate pain medication. Compliant with medication, visits with no adverse effects noted. KATE and KIM current and appropriate. Patient called requesting scheduled refill at appropriate interval. Patient understands the risks associated with this controlled medication, including  tolerance and addiction.  Patient also agrees to only obtain this medication from me, and not from a another provider, unless that provider is covering for me in my absence.  Patient also agrees to be compliant in dosing, and not self adjust the dose of medication.  A signed controlled substance agreement is on file, and the patient has received a controlled substance education sheet at this a previous visit.  The patient has also signed a consent for treatment with a controlled substance as per [facility] policy. KATE was obtained.     UMESH Guzmán         Return in about 12 weeks (around 8/20/2020).    There are no Patient Instructions on file for this visit.

## 2020-05-31 DIAGNOSIS — S51.811A SKIN TEAR OF RIGHT FOREARM WITHOUT COMPLICATION, INITIAL ENCOUNTER: ICD-10-CM
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
## 2020-05-31 DIAGNOSIS — S81.851A DOG BITE OF CALF, RIGHT, INITIAL ENCOUNTER: ICD-10-CM

## 2020-06-01 RX ORDER — ALBUTEROL SULFATE 90 UG/1
AEROSOL, METERED RESPIRATORY (INHALATION)
Qty: 18 G | Refills: 0 | Status: SHIPPED | OUTPATIENT
Start: 2020-06-01 | End: 2021-02-08

## 2020-06-01 RX ORDER — ALBUTEROL SULFATE 90 UG/1
AEROSOL, METERED RESPIRATORY (INHALATION)
Qty: 54 G | OUTPATIENT
Start: 2020-06-01

## 2020-06-03 ENCOUNTER — TELEPHONE (OUTPATIENT)
Dept: GASTROENTEROLOGY | Facility: CLINIC | Age: 60
End: 2020-06-03

## 2020-06-03 NOTE — TELEPHONE ENCOUNTER
PATIENT HAS BEEN CONTACTED AND MADE AWARE THAT SHALONDA MACEDO DID AGREE TO SEND IN A NEW RX FOR LINZESS 290 MCG DAILY. PATIENT VOICED UNDERSTANDING.

## 2020-06-03 NOTE — TELEPHONE ENCOUNTER
----- Message from Johnny Smiley PA-C sent at 6/2/2020  5:12 PM CDT -----  ok  ----- Message -----  From: Wendy Schwartz MA  Sent: 6/2/2020   3:51 PM CDT  To: Johnny Smiley PA-C    PATIENT HAS BEEN ON THE LINZESS 145 DAILY SHE STATES THAT IS NOT WORKING FOR HER SHE WANTED TO KNOW IF YOU WOULD INCREASE THE DOSE  DAILY?

## 2020-06-05 ENCOUNTER — OFFICE VISIT (OUTPATIENT)
Dept: ORTHOPEDIC SURGERY | Facility: CLINIC | Age: 60
End: 2020-06-05

## 2020-06-05 VITALS — BODY MASS INDEX: 20.43 KG/M2 | HEIGHT: 62 IN | WEIGHT: 111 LBS

## 2020-06-05 DIAGNOSIS — M17.11 PRIMARY OSTEOARTHRITIS OF RIGHT KNEE: Primary | Chronic | ICD-10-CM

## 2020-06-05 PROCEDURE — 20610 DRAIN/INJ JOINT/BURSA W/O US: CPT | Performed by: ORTHOPAEDIC SURGERY

## 2020-06-05 NOTE — PROGRESS NOTES
"Knee Joint Injection      Patient: Tricia Loyola    YOB: 1960    Chief Complaint   Patient presents with   • Injections     Monovisc -Right knee       History of Present Illness:  Patient is here for an injection.  No new complaints.    Physical Exam: 60 y.o. female  General Appearance:    Alert, cooperative, in no acute distress                   Vitals:    06/05/20 1348   Weight: 50.3 kg (111 lb)   Height: 157.5 cm (62\")   PainSc:   6   PainLoc: Knee        Patient is alert and oriented, ×3 no acute distress.  Affect is normal respiratory rate is normal unlabored.  Exam and complaints are unchanged.    Procedure:  Large Joint Arthrocentesis: R knee  Date/Time: 6/5/2020 1:49 PM  Consent given by: patient  Site marked: site marked  Timeout: Immediately prior to procedure a time out was called to verify the correct patient, procedure, equipment, support staff and site/side marked as required   Supporting Documentation  Indications: pain   Procedure Details  Location: knee - R knee  Preparation: Patient was prepped and draped in the usual sterile fashion  Needle size: 22 G  Approach: anteromedial  Medications administered: 88 mg Hyaluronan 88 MG/4ML  Patient tolerance: patient tolerated the procedure well with no immediate complications          Assessment:    Diagnoses and all orders for this visit:    Primary osteoarthritis of right knee  -     Large Joint Arthrocentesis: R knee        Plan:   Slowly increase activity as tolerated.  Discussed importance of leg strengthening and general conditioning.  Discussed warning signs of injection.  Discussed that purpose of injections was symptom improvement and improved activity.  Also discussed that further treatment options depended on symptoms at followup and length of time of improvement after injections.    No follow-ups on file.        This document has been electronically signed by Alden Herndon MD on June 5, 2020 13:57    "

## 2020-06-08 ENCOUNTER — TELEPHONE (OUTPATIENT)
Dept: ORTHOPEDIC SURGERY | Facility: CLINIC | Age: 60
End: 2020-06-08

## 2020-06-08 NOTE — TELEPHONE ENCOUNTER
KOSTAS      After patients gel injection she was okay until Saturday. On Saturday she said she was unable to walk due to pain. Sunday she was okay. Today patients states her calf is stiff and hurts and her knee is swollen and she is in pain. She wants to know if this is normal please call her at [phone].

## 2020-06-10 NOTE — TELEPHONE ENCOUNTER
Spoke with patient, she is unable to come in today due to transportation. She made an appointment for Friday June 12th.

## 2020-06-10 NOTE — TELEPHONE ENCOUNTER
Called patient and spoke with her. I let her know that he was here today and that if she needed to come in that would be alright as well.

## 2020-06-12 ENCOUNTER — OFFICE VISIT (OUTPATIENT)
Dept: ORTHOPEDIC SURGERY | Facility: CLINIC | Age: 60
End: 2020-06-12

## 2020-06-12 VITALS — BODY MASS INDEX: 20.98 KG/M2 | HEIGHT: 62 IN | WEIGHT: 114 LBS

## 2020-06-12 DIAGNOSIS — G89.29 CHRONIC PAIN OF RIGHT KNEE: ICD-10-CM

## 2020-06-12 DIAGNOSIS — M25.561 CHRONIC PAIN OF RIGHT KNEE: ICD-10-CM

## 2020-06-12 DIAGNOSIS — M17.11 PRIMARY OSTEOARTHRITIS OF RIGHT KNEE: Primary | Chronic | ICD-10-CM

## 2020-06-12 PROCEDURE — 99213 OFFICE O/P EST LOW 20 MIN: CPT | Performed by: ORTHOPAEDIC SURGERY

## 2020-06-12 PROCEDURE — 96372 THER/PROPH/DIAG INJ SC/IM: CPT | Performed by: ORTHOPAEDIC SURGERY

## 2020-06-12 RX ORDER — METHYLPREDNISOLONE ACETATE 40 MG/ML
80 INJECTION, SUSPENSION INTRA-ARTICULAR; INTRALESIONAL; INTRAMUSCULAR; SOFT TISSUE ONCE
Status: COMPLETED | OUTPATIENT
Start: 2020-06-12 | End: 2020-06-12

## 2020-06-12 RX ADMIN — METHYLPREDNISOLONE ACETATE 80 MG: 40 INJECTION, SUSPENSION INTRA-ARTICULAR; INTRALESIONAL; INTRAMUSCULAR; SOFT TISSUE at 16:45

## 2020-06-12 NOTE — PROGRESS NOTES
Tricia Loyola is a 60 y.o. female returns for     Chief Complaint   Patient presents with   • Right Knee - Follow-up, Pain       HISTORY OF PRESENT ILLNESS: Patient is here today for right knee pain following her knee injection on 6/5/2020. She states that her pain is 8/10.  She had a lot of pain and eventually got better she went to [facility] and had an ultrasound which is negative for DVT but showed a Baker's cyst that measures about 1 inch x 1 inch by almost about half an inch.  The knee feels somewhat better but has the pain posterior calf.  Does have a family history of blood clots       CONCURRENT MEDICAL HISTORY:    Past Medical History:   Diagnosis Date   • Abdominal pain    • Anemia    • Chronic post-traumatic headache      rebound      • Depressive disorder    • Encounter for gynecological examination    • Gastroesophageal reflux disease    • Generalized anxiety disorder    • History of mammogram 08/2008    MAMMOGRAM DIAGNOSTIC BILATERAL 58869 ([facility]) (1)     • Hyperlipidemia    • Ingrown toenail    • Injury of back    • Nonruptured cerebral aneurysm    • Osteoporosis    • Posttraumatic stress disorder    • Seizure (CMS/HCC)     Psychogenic non-epileptic sz    • Viral hepatitis A        Allergies   Allergen Reactions   • Nitrofuran Derivatives Hives     Macrobid   • Augmentin [Amoxicillin-Pot Clavulanate]      Hives   • Ciprofloxacin      Cipro:  Rash   • Fiorinal [Butalbital-Aspirin-Caffeine]      Rapid heart rate   • Imitrex [Sumatriptan]      Rapid heart rate   • Iodine      Anaphylaxis   • Other      MIDRIN  -  Rapid heart rate   • Toradol [Ketorolac Tromethamine]      Anaphylaxis   • Ultram [Tramadol]      Rapid heart rate   • Ibuprofen Rash         Current Outpatient Medications:   •  ALBUTEROL SULFATE  (90 Base) MCG/ACT inhaler, INHALE 2 PUFFS BY MOUTH EVERY 6 HOURS IF NEEDED FOR WHEEZING OR SHORTNESS OF BREATH, Disp: 18 g, Rfl: 0  •  amitriptyline (ELAVIL) 75 MG tablet, TAKE ONE  TABLET BY MOUTH AT BEDTIME., Disp: 30 tablet, Rfl: 2  •  aspirin 81 MG EC tablet, Take 1 tablet by mouth Daily., Disp: 30 tablet, Rfl: 0  •  atorvastatin (LIPITOR) 40 MG tablet, Take 1 tablet by mouth Every Night. For cholesterol, Disp: 90 tablet, Rfl: 1  •  Azilsartan Medoxomil (EDARBI) 40 MG tablet, Take 40 mg by mouth Daily., Disp: 30 tablet, Rfl: 2  •  busPIRone (BUSPAR) 30 MG tablet, Take 1 tablet by mouth 2 (Two) Times a Day., Disp: 60 tablet, Rfl: 0  •  chlorhexidine (HIBICLENS) 4 % external liquid, Apply  topically to the appropriate area as directed Daily As Needed for Wound Care (to open wounds until they heal)., Disp: 120 mL, Rfl: 1  •  clonazePAM (KlonoPIN) 0.5 MG tablet, Take 1 tablet by mouth Daily As Needed for Anxiety (panic attacks)., Disp: 15 tablet, Rfl: 0  •  DEXILANT 60 MG capsule, TAKE 1 CAPSULE BY MOUTH ONCE DAILY, Disp: 90 capsule, Rfl: 3  •  HYDROcodone-acetaminophen (NORCO)  MG per tablet, Take 1 tablet by mouth Every 8 (Eight) Hours As Needed for Moderate Pain ., Disp: 90 tablet, Rfl: 0  •  levothyroxine (SYNTHROID, LEVOTHROID) 25 MCG tablet, Take 0.5 tablets by mouth Daily., Disp: 15 tablet, Rfl: 3  •  linaclotide (LINZESS) 290 MCG capsule capsule, Take 1 capsule by mouth Every Morning Before Breakfast., Disp: 30 capsule, Rfl: 0  •  metoprolol succinate XL (TOPROL XL) 100 MG 24 hr tablet, Take 1 tablet by mouth Daily., Disp: 90 tablet, Rfl: 3  •  mupirocin (BACTROBAN) 2 % ointment, APPLY TOPICALLY TO THE APPROPRIATE AREA AS DIRECED THREE TIMES DAILY, Disp: 22 g, Rfl: 2  •  OnabotulinumtoxinA 200 units reconstituted solution, 200 Units., Disp: , Rfl:   •  ondansetron ODT (ZOFRAN-ODT) 4 MG disintegrating tablet, Place 1 tablet on the tongue Every 8 (Eight) Hours As Needed for Nausea., Disp: 30 tablet, Rfl: 5  •  OXcarbazepine (TRILEPTAL) 150 MG tablet, Take 150 mg by mouth Daily. Daily at bedtime, Disp: , Rfl:   •  prochlorperazine (COMPAZINE) 10 MG tablet, Take 1 tablet by mouth  "Every 6 (Six) Hours As Needed for Nausea or Vomiting., Disp: 360 tablet, Rfl: 1  •  rOPINIRole (REQUIP) 3 MG tablet, Take 1 tablet by mouth every night at bedtime., Disp: 90 tablet, Rfl: 1  •  sucralfate (CARAFATE) 1 g tablet, Take 1 tablet by mouth 2 (Two) Times a Day., Disp: 60 tablet, Rfl: 2  •  topiramate (TOPAMAX) 100 MG tablet, Take 1 tablet by mouth 2 (Two) Times a Day., Disp: 180 tablet, Rfl: 1    Current Facility-Administered Medications:   •  ondansetron (ZOFRAN) injection 4 mg, 4 mg, Intravenous, Q6H PRN, Ceron, UMESH Ponce  •  zoledronic acid (RECLAST) infusion 5 mg, 5 mg, Intravenous, Once, Ceron, UMESH Ponce    Past Surgical History:   Procedure Laterality Date   • APPENDECTOMY     • BREAST BIOPSY Left    • CHOLECYSTECTOMY     • COLONOSCOPY N/A 11/26/2018    Procedure: COLONOSCOPY;  Surgeon: Meet Mcintyre MD;  Location: [facility] ENDOSCOPY;  Service: General   • CRANIOTOMY FOR ANEURYSM  2003   • ENDOSCOPY N/A 10/16/2019    Procedure: ESOPHAGOGASTRODUODENOSCOPY;  Surgeon: Kory Muhammad MD;  Location: [facility] ENDOSCOPY;  Service: Gastroenterology   • PAP SMEAR  08/16/2012   • TONSILLECTOMY     • UPPER GASTROINTESTINAL ENDOSCOPY  10/16/2019           ROS: Review of systems has been updated as of today's date.  All other systems are negative except as noted previously.    PHYSICAL EXAMINATION:       Ht 157.5 cm (62\")   Wt 51.7 kg (114 lb)   BMI 20.85 kg/m²     Physical Exam   Constitutional: She is oriented to person, place, and time. She appears well-developed.   HENT:   Head: Normocephalic and atraumatic.   Eyes: Pupils are equal, round, and reactive to light. EOM are normal.   Neck: Neck supple.   Pulmonary/Chest: Effort normal.   Musculoskeletal: She exhibits tenderness.   Neurological: She is alert and oriented to person, place, and time.   Skin: Skin is warm and dry.   Psychiatric: She has a normal mood and affect.       GAIT:     []  Normal  []  Antalgic    Assistive device: [x]  " None  []  Walker     []  Crutches  []  Cane     []  Wheelchair  []  Stretcher    Ortho Exam  Some fullness posterior tenderness to the knee.  She tender about the calf without any pitting edema Homans is negative.  Calves are fairly symmetric.  There is no swelling about the knee and the knee is less tender anteriorly.    No results found.          ASSESSMENT:    Diagnoses and all orders for this visit:    Primary osteoarthritis of right knee    Chronic pain of right knee          PLAN I reviewed her ultrasound results at [facility].  I think at this point we will trial Depo-Medrol to see if this does not settle this down we will do this I am in the right hip.  We will also schedule an aspiration of what is a fairly small cyst.  If by the time we get this scheduled and she is doing okay I would certainly not do this and I would cancel it I explained this to her that if she is better there is no need to go have this aspirated and she may indeed be better from having the Depo-Medrol.  I would see her back after the aspiration she will come with any problems.  I will be somewhat encouraged that her knee itself does feel somewhat better already    No follow-ups on file.      This document has been electronically signed by Marcelina Cheek MA on June 12, 2020 16:10

## 2020-06-18 ENCOUNTER — APPOINTMENT (OUTPATIENT)
Dept: CT IMAGING | Facility: HOSPITAL | Age: 60
End: 2020-06-18

## 2020-06-24 ENCOUNTER — APPOINTMENT (OUTPATIENT)
Dept: CT IMAGING | Facility: HOSPITAL | Age: 60
End: 2020-06-24

## 2020-06-29 ENCOUNTER — HOSPITAL ENCOUNTER (OUTPATIENT)
Dept: CT IMAGING | Facility: HOSPITAL | Age: 60
Discharge: HOME | End: 2020-06-29
Admitting: RADIOLOGY

## 2020-06-29 ENCOUNTER — TELEPHONE (OUTPATIENT)
Dept: ORTHOPEDIC SURGERY | Facility: CLINIC | Age: 60
End: 2020-06-29

## 2020-06-29 ENCOUNTER — TELEPHONE (OUTPATIENT)
Dept: FAMILY MEDICINE CLINIC | Facility: CLINIC | Age: 60
End: 2020-06-29

## 2020-06-29 VITALS
SYSTOLIC BLOOD PRESSURE: 135 MMHG | OXYGEN SATURATION: 100 % | HEART RATE: 64 BPM | RESPIRATION RATE: 20 BRPM | DIASTOLIC BLOOD PRESSURE: 63 MMHG

## 2020-06-29 DIAGNOSIS — G89.29 CHRONIC MIDLINE LOW BACK PAIN WITHOUT SCIATICA: ICD-10-CM

## 2020-06-29 DIAGNOSIS — M17.11 PRIMARY OSTEOARTHRITIS OF RIGHT KNEE: Chronic | ICD-10-CM

## 2020-06-29 DIAGNOSIS — M54.50 CHRONIC MIDLINE LOW BACK PAIN WITHOUT SCIATICA: ICD-10-CM

## 2020-06-29 DIAGNOSIS — G89.29 CHRONIC PAIN OF RIGHT KNEE: ICD-10-CM

## 2020-06-29 DIAGNOSIS — S80.01XA CONTUSION OF RIGHT KNEE, INITIAL ENCOUNTER: ICD-10-CM

## 2020-06-29 DIAGNOSIS — M25.561 CHRONIC PAIN OF RIGHT KNEE: ICD-10-CM

## 2020-06-29 PROCEDURE — 73700 CT LOWER EXTREMITY W/O DYE: CPT

## 2020-06-29 RX ORDER — MIDAZOLAM HYDROCHLORIDE 1 MG/ML
INJECTION INTRAMUSCULAR; INTRAVENOUS
Status: DISCONTINUED
Start: 2020-06-29 | End: 2020-06-29 | Stop reason: WASHOUT

## 2020-06-29 RX ORDER — HYDROCODONE BITARTRATE AND ACETAMINOPHEN 10; 325 MG/1; MG/1
1 TABLET ORAL EVERY 8 HOURS PRN
Qty: 90 TABLET | Refills: 0 | Status: SHIPPED | OUTPATIENT
Start: 2020-06-29 | End: 2020-07-28 | Stop reason: SDUPTHER

## 2020-06-29 NOTE — PRE-PROCEDURE NOTE
60 year old female referred for CT guided aspiration of right knee Baker Cyst.  Ct images show cyst is too small for aspiration and therefore procedure cancelled.

## 2020-06-29 NOTE — TELEPHONE ENCOUNTER
KSOTAS        Pt CALLED STATING SHE WENT TO THE HOSPITAL AS SCHEDULED TO HAVE HER BAKERS CYST DRAINED AND THEY CANCELED THE PROCEDURE AND TOLD HER IT WAS DUE TO NOT BEING BIG ENOUGH OR NOT ENOUGH FLUID THERE TO DRAIN AND TO CALL OUR OFFICE     PLEASE CALL Pt BACK AS SOON AS POSSIBLE

## 2020-06-29 NOTE — TELEPHONE ENCOUNTER
Patient seen in office every 3 months for chronic pain requiring opiate pain medication. Compliant with medication, visits with no adverse effects noted. KATE and UDS current and appropriate. Patient called requesting scheduled refill at appropriate interval. Patient understands the risks associated with this controlled medication, including tolerance and addiction.  Patient also agrees to only obtain this medication from me, and not from a another provider, unless that provider is covering for me in my absence.  Patient also agrees to be compliant in dosing, and not self adjust the dose of medication.  A signed controlled substance agreement is on file, and the patient has received a controlled substance education sheet at this a previous visit.  The patient has also signed a consent for treatment with a controlled substance as per [facility] policy. KATE was obtained.   Refill sent for hydrocodone.     This document has been electronically signed by UMESH Guzmán on June 29, 2020 17:23

## 2020-07-06 RX ORDER — LINACLOTIDE 290 UG/1
CAPSULE, GELATIN COATED ORAL
Qty: 30 CAPSULE | Refills: 1 | Status: SHIPPED | OUTPATIENT
Start: 2020-07-06 | End: 2020-12-08

## 2020-07-28 ENCOUNTER — TELEPHONE (OUTPATIENT)
Dept: FAMILY MEDICINE CLINIC | Facility: CLINIC | Age: 60
End: 2020-07-28

## 2020-07-28 ENCOUNTER — TELEPHONE (OUTPATIENT)
Dept: CARDIOLOGY | Facility: CLINIC | Age: 60
End: 2020-07-28

## 2020-07-28 DIAGNOSIS — S80.01XA CONTUSION OF RIGHT KNEE, INITIAL ENCOUNTER: ICD-10-CM

## 2020-07-28 DIAGNOSIS — G89.29 CHRONIC MIDLINE LOW BACK PAIN WITHOUT SCIATICA: ICD-10-CM

## 2020-07-28 DIAGNOSIS — M54.50 CHRONIC MIDLINE LOW BACK PAIN WITHOUT SCIATICA: ICD-10-CM

## 2020-07-28 DIAGNOSIS — F41.0 PANIC DISORDER WITHOUT AGORAPHOBIA: Chronic | ICD-10-CM

## 2020-07-28 DIAGNOSIS — G89.29 CHRONIC PAIN OF RIGHT KNEE: ICD-10-CM

## 2020-07-28 DIAGNOSIS — M25.561 CHRONIC PAIN OF RIGHT KNEE: ICD-10-CM

## 2020-07-28 LAB
BH CV ECHO MEAS - ACS: 2.1 CM
BH CV ECHO MEAS - AI DEC SLOPE: 358 CM/SEC^2
BH CV ECHO MEAS - AI MAX PG: 41.2 MMHG
BH CV ECHO MEAS - AI MAX VEL: 321 CM/SEC
BH CV ECHO MEAS - AI P1/2T: 262.6 MSEC
BH CV ECHO MEAS - AO ISTHMUS: 2.5 CM
BH CV ECHO MEAS - AO MAX PG (FULL): 2.2 MMHG
BH CV ECHO MEAS - AO MAX PG: 5.3 MMHG
BH CV ECHO MEAS - AO MEAN PG (FULL): 1 MMHG
BH CV ECHO MEAS - AO MEAN PG: 2 MMHG
BH CV ECHO MEAS - AO ROOT AREA (BSA CORRECTED): 2.1
BH CV ECHO MEAS - AO ROOT AREA: 7.5 CM^2
BH CV ECHO MEAS - AO ROOT DIAM: 3.1 CM
BH CV ECHO MEAS - AO V2 MAX: 115 CM/SEC
BH CV ECHO MEAS - AO V2 MEAN: 67.1 CM/SEC
BH CV ECHO MEAS - AO V2 VTI: 22.6 CM
BH CV ECHO MEAS - ASC AORTA: 2.7 CM
BH CV ECHO MEAS - AVA(I,A): 2.9 CM^2
BH CV ECHO MEAS - AVA(I,D): 2.9 CM^2
BH CV ECHO MEAS - AVA(V,A): 2.9 CM^2
BH CV ECHO MEAS - AVA(V,D): 2.9 CM^2
BH CV ECHO MEAS - BSA(HAYCOCK): 1.5 M^2
BH CV ECHO MEAS - BSA: 1.5 M^2
BH CV ECHO MEAS - BZI_BMI: 20.9 KILOGRAMS/M^2
BH CV ECHO MEAS - BZI_METRIC_HEIGHT: 157.5 CM
BH CV ECHO MEAS - BZI_METRIC_WEIGHT: 51.7 KG
BH CV ECHO MEAS - EDV(CUBED): 69.9 ML
BH CV ECHO MEAS - EDV(MOD-SP2): 39.9 ML
BH CV ECHO MEAS - EDV(MOD-SP4): 52.7 ML
BH CV ECHO MEAS - EDV(TEICH): 75.1 ML
BH CV ECHO MEAS - EF(CUBED): 69.3 %
BH CV ECHO MEAS - EF(MOD-SP2): 55.1 %
BH CV ECHO MEAS - EF(MOD-SP4): 65.7 %
BH CV ECHO MEAS - EF(TEICH): 61.3 %
BH CV ECHO MEAS - ESV(CUBED): 21.5 ML
BH CV ECHO MEAS - ESV(MOD-SP2): 17.9 ML
BH CV ECHO MEAS - ESV(MOD-SP4): 18.1 ML
BH CV ECHO MEAS - ESV(TEICH): 29 ML
BH CV ECHO MEAS - FS: 32.5 %
BH CV ECHO MEAS - IVS/LVPW: 0.8
BH CV ECHO MEAS - IVSD: 0.77 CM
BH CV ECHO MEAS - LA DIMENSION: 2.7 CM
BH CV ECHO MEAS - LA/AO: 0.87
BH CV ECHO MEAS - LV DIASTOLIC VOL/BSA (35-75): 35 ML/M^2
BH CV ECHO MEAS - LV MASS(C)D: 108.8 GRAMS
BH CV ECHO MEAS - LV MASS(C)DI: 72.3 GRAMS/M^2
BH CV ECHO MEAS - LV MAX PG: 3.1 MMHG
BH CV ECHO MEAS - LV MEAN PG: 1 MMHG
BH CV ECHO MEAS - LV SYSTOLIC VOL/BSA (12-30): 12 ML/M^2
BH CV ECHO MEAS - LV V1 MAX: 87.9 CM/SEC
BH CV ECHO MEAS - LV V1 MEAN: 54.4 CM/SEC
BH CV ECHO MEAS - LV V1 VTI: 17 CM
BH CV ECHO MEAS - LVIDD: 4.1 CM
BH CV ECHO MEAS - LVIDS: 2.8 CM
BH CV ECHO MEAS - LVLD AP2: 6.3 CM
BH CV ECHO MEAS - LVLD AP4: 7.3 CM
BH CV ECHO MEAS - LVLS AP2: 5.7 CM
BH CV ECHO MEAS - LVLS AP4: 5.2 CM
BH CV ECHO MEAS - LVOT AREA (M): 3.8 CM^2
BH CV ECHO MEAS - LVOT AREA: 3.8 CM^2
BH CV ECHO MEAS - LVOT DIAM: 2.2 CM
BH CV ECHO MEAS - LVPWD: 0.96 CM
BH CV ECHO MEAS - MR MAX PG: 7.8 MMHG
BH CV ECHO MEAS - MR MAX VEL: 140 CM/SEC
BH CV ECHO MEAS - MV A MAX VEL: 74.2 CM/SEC
BH CV ECHO MEAS - MV DEC SLOPE: 400 CM/SEC^2
BH CV ECHO MEAS - MV E MAX VEL: 66.9 CM/SEC
BH CV ECHO MEAS - MV E/A: 0.9
BH CV ECHO MEAS - MV MAX PG: 3.5 MMHG
BH CV ECHO MEAS - MV MEAN PG: 1 MMHG
BH CV ECHO MEAS - MV P1/2T MAX VEL: 94.5 CM/SEC
BH CV ECHO MEAS - MV P1/2T: 69.2 MSEC
BH CV ECHO MEAS - MV V2 MAX: 93.2 CM/SEC
BH CV ECHO MEAS - MV V2 MEAN: 45.5 CM/SEC
BH CV ECHO MEAS - MV V2 VTI: 24.8 CM
BH CV ECHO MEAS - MVA P1/2T LCG: 2.3 CM^2
BH CV ECHO MEAS - MVA(P1/2T): 3.2 CM^2
BH CV ECHO MEAS - MVA(VTI): 2.6 CM^2
BH CV ECHO MEAS - PA MAX PG: 2.1 MMHG
BH CV ECHO MEAS - PA MEAN PG: 1 MMHG
BH CV ECHO MEAS - PA V2 MAX: 71.6 CM/SEC
BH CV ECHO MEAS - PA V2 MEAN: 54.5 CM/SEC
BH CV ECHO MEAS - PA V2 VTI: 17.3 CM
BH CV ECHO MEAS - RAP SYSTOLE: 10 MMHG
BH CV ECHO MEAS - RVOT AREA: 6.2 CM^2
BH CV ECHO MEAS - RVOT DIAM: 2.8 CM
BH CV ECHO MEAS - RVSP: 14.3 MMHG
BH CV ECHO MEAS - SI(AO): 113.3 ML/M^2
BH CV ECHO MEAS - SI(CUBED): 32.2 ML/M^2
BH CV ECHO MEAS - SI(LVOT): 42.9 ML/M^2
BH CV ECHO MEAS - SI(MOD-SP2): 14.6 ML/M^2
BH CV ECHO MEAS - SI(MOD-SP4): 23 ML/M^2
BH CV ECHO MEAS - SI(TEICH): 30.6 ML/M^2
BH CV ECHO MEAS - SV(AO): 170.6 ML
BH CV ECHO MEAS - SV(CUBED): 48.4 ML
BH CV ECHO MEAS - SV(LVOT): 64.6 ML
BH CV ECHO MEAS - SV(MOD-SP2): 22 ML
BH CV ECHO MEAS - SV(MOD-SP4): 34.6 ML
BH CV ECHO MEAS - SV(TEICH): 46 ML
BH CV ECHO MEAS - TR MAX VEL: 104 CM/SEC
MAXIMAL PREDICTED HEART RATE: 158 BPM
STRESS TARGET HR: 134 BPM

## 2020-07-28 RX ORDER — CLONAZEPAM 0.5 MG/1
0.5 TABLET ORAL DAILY PRN
Qty: 15 TABLET | Refills: 0 | Status: SHIPPED | OUTPATIENT
Start: 2020-07-28 | End: 2020-08-25 | Stop reason: SDUPTHER

## 2020-07-28 RX ORDER — HYDROCODONE BITARTRATE AND ACETAMINOPHEN 10; 325 MG/1; MG/1
1 TABLET ORAL EVERY 8 HOURS PRN
Qty: 90 TABLET | Refills: 0 | Status: SHIPPED | OUTPATIENT
Start: 2020-07-28 | End: 2020-08-25 | Stop reason: SDUPTHER

## 2020-07-28 NOTE — TELEPHONE ENCOUNTER
Called patient to let her know the results, left voice mail to call office back     ----- Message from Ramin Martinez MD sent at 7/28/2020  1:10 PM CDT -----  Regarding: FU ECHO  Let her know that this echo looks the same as the one in December.  We will plan a repeat one in about 1 year.  ----- Message -----  From: Ramin Martinez MD  Sent: 7/28/2020  12:53 PM CDT  To: Ramin Martinez MD

## 2020-07-28 NOTE — TELEPHONE ENCOUNTER
Patient has chronic anxiety requiring benzodiazepine use concurrently with chronic pain requiring opiate pain medication and/ or gabapentin. Patient has been educated regarding potential dangers of oversedation and accidental overdose with use of both medications concurrently. Serial assessments of patient has yielded no sign of oversedation or adverse effects of patient, and he/she is advised and aware to notify my office immediately if symptoms do occur, as well as to discontinue use of benzodiazepine medication and opiate medication immediately if that occurs, pending medical evaluation and advice. Patient has been compliant with use of medications, UDS, visits with no adverse effects noted. Patient understands the risks associated with this controlled medication, including tolerance and addiction.  Patient also agrees to only obtain this medication from me, and not from a another provider, unless that provider is covering for me in my absence.  Patient also agrees to be compliant in dosing, and not self adjust the dose of medication.  A signed controlled substance agreement is on file, and the patient has received a controlled substance education sheet at this a previous visit.  The patient has also signed a consent for treatment with a controlled substance as per [facility] policy. KATE was obtained. Refills were sent for:  Alprazolam and hydrocodone.     This document has been electronically signed by UMESH Guzmán on July 28, 2020 14:36

## 2020-08-06 ENCOUNTER — TELEPHONE (OUTPATIENT)
Dept: FAMILY MEDICINE CLINIC | Facility: CLINIC | Age: 60
End: 2020-08-06

## 2020-08-14 ENCOUNTER — TELEPHONE (OUTPATIENT)
Dept: FAMILY MEDICINE CLINIC | Facility: CLINIC | Age: 60
End: 2020-08-14

## 2020-08-14 ENCOUNTER — OFFICE VISIT (OUTPATIENT)
Dept: CARDIOLOGY | Facility: CLINIC | Age: 60
End: 2020-08-14

## 2020-08-14 VITALS
HEART RATE: 69 BPM | BODY MASS INDEX: 20.8 KG/M2 | HEIGHT: 62 IN | WEIGHT: 113 LBS | DIASTOLIC BLOOD PRESSURE: 70 MMHG | OXYGEN SATURATION: 98 % | SYSTOLIC BLOOD PRESSURE: 130 MMHG

## 2020-08-14 DIAGNOSIS — I35.1 AORTIC VALVE INSUFFICIENCY, ETIOLOGY OF CARDIAC VALVE DISEASE UNSPECIFIED: Primary | ICD-10-CM

## 2020-08-14 DIAGNOSIS — R00.2 PALPITATIONS: ICD-10-CM

## 2020-08-14 PROCEDURE — 99214 OFFICE O/P EST MOD 30 MIN: CPT | Performed by: INTERNAL MEDICINE

## 2020-08-14 RX ORDER — METOPROLOL SUCCINATE 100 MG/1
100 TABLET, EXTENDED RELEASE ORAL DAILY
Qty: 90 TABLET | Refills: 3 | Status: SHIPPED | OUTPATIENT
Start: 2020-08-14 | End: 2020-10-28

## 2020-08-14 RX ORDER — LOSARTAN POTASSIUM 50 MG/1
50 TABLET ORAL DAILY
Qty: 30 TABLET | Refills: 11 | Status: SHIPPED | OUTPATIENT
Start: 2020-08-14 | End: 2020-10-28

## 2020-08-14 NOTE — PROGRESS NOTES
Tricia Swiftr  60 y.o. female    08/14/2020  Aortic insufficiency      History of Present Illness  Patient with aortic insufficiency    -        SUBJECTIVE  Patient had aortic insufficiency.  We actually try to start her on Edarbi to see if we can help with the afterload after Procardia and Norvasc was on tolerated.  She cannot tolerate the Edarbi.  She is doing well without any shortness of air.  She is having no chest pain.  Her repeat echo 6 months later showed moderate aortic insufficiency without change.  Allergies   Allergen Reactions   • Nitrofuran Derivatives Hives     Macrobid   • Augmentin [Amoxicillin-Pot Clavulanate]      Hives   • Ciprofloxacin      Cipro:  Rash   • Fiorinal [Butalbital-Aspirin-Caffeine]      Rapid heart rate   • Imitrex [Sumatriptan]      Rapid heart rate   • Iodine      Anaphylaxis   • Other      MIDRIN  -  Rapid heart rate   • Toradol [Ketorolac Tromethamine]      Anaphylaxis   • Ultram [Tramadol]      Rapid heart rate   • Ibuprofen Rash         Past Medical History:   Diagnosis Date   • Abdominal pain    • Anemia    • Chronic post-traumatic headache      rebound      • Depressive disorder    • Encounter for gynecological examination    • Gastroesophageal reflux disease    • Generalized anxiety disorder    • History of mammogram 08/2008    MAMMOGRAM DIAGNOSTIC BILATERAL 26939 ([facility]) (1)     • Hyperlipidemia    • Ingrown toenail    • Injury of back    • Nonruptured cerebral aneurysm    • Osteoporosis    • Posttraumatic stress disorder    • Seizure (CMS/HCC)     Psychogenic non-epileptic sz    • Viral hepatitis A          Past Surgical History:   Procedure Laterality Date   • APPENDECTOMY     • BREAST BIOPSY Left    • CHOLECYSTECTOMY     • COLONOSCOPY N/A 11/26/2018    Procedure: COLONOSCOPY;  Surgeon: Meet Mcintyre MD;  Location: [facility] ENDOSCOPY;  Service: General   • CRANIOTOMY FOR ANEURYSM  2003   • ENDOSCOPY N/A 10/16/2019    Procedure: ESOPHAGOGASTRODUODENOSCOPY;  Surgeon:  Kory Muhammad MD;  Location: [facility] ENDOSCOPY;  Service: Gastroenterology   • PAP SMEAR  08/16/2012   • TONSILLECTOMY     • UPPER GASTROINTESTINAL ENDOSCOPY  10/16/2019         Family History   Problem Relation Age of Onset   • Constipation Mother    • Hypertension Mother    • Anxiety disorder Mother    • Heart disease Mother    • Alcohol abuse Father    • Heart disease Father    • Anxiety disorder Brother    • Kidney disease Brother    • Hypertension Brother    • Arthritis Brother    • Anxiety disorder Brother    • Kidney disease Brother    • Diabetes Brother    • Anxiety disorder Brother    • Hypertension Brother    • Breast cancer Paternal Aunt    • Heart disease Maternal Grandmother    • Clotting disorder Maternal Grandmother    • Heart disease Maternal Grandfather          Social History     Socioeconomic History   • Marital status: Legally      Spouse name: Not on file   • Number of children: Not on file   • Years of education: Not on file   • Highest education level: Not on file   Tobacco Use   • Smoking status: Light Tobacco Smoker     Packs/day: 0.50     Years: 43.00     Pack years: 21.50     Types: Cigarettes   • Smokeless tobacco: Never Used   Substance and Sexual Activity   • Alcohol use: No   • Drug use: No   • Sexual activity: Never         Prior to Admission medications    Medication Sig Start Date End Date Taking? Authorizing Provider   ALBUTEROL SULFATE  (90 Base) MCG/ACT inhaler INHALE 2 PUFFS BY MOUTH EVERY 6 HOURS IF NEEDED FOR WHEEZING OR SHORTNESS OF BREATH 6/1/20  Yes Rosario Ceron APRN   amitriptyline (ELAVIL) 75 MG tablet TAKE ONE TABLET BY MOUTH AT BEDTIME. 1/29/18  Yes Sonia Mata MD   aspirin 81 MG EC tablet Take 1 tablet by mouth Daily. 1/14/20  Yes Rosario Ceron APRN   atorvastatin (LIPITOR) 40 MG tablet Take 1 tablet by mouth Every Night. For cholesterol 3/6/20  Yes Rosario Ceron APRN   busPIRone (BUSPAR) 30 MG tablet Take 1 tablet by  mouth 2 (Two) Times a Day. 3/7/18  Yes Sonia Mata MD   cephalexin (KEFLEX) 500 MG capsule Take 1 capsule by mouth 3 (Three) Times a Day for 10 days. 8/9/20 8/19/20 Yes Estefania Nichols APRN   chlorhexidine (HIBICLENS) 4 % external liquid Apply  topically to the appropriate area as directed Daily As Needed for Wound Care (to open wounds until they heal). 4/16/20  Yes Rosario Ceron APRN   clonazePAM (KlonoPIN) 0.5 MG tablet Take 1 tablet by mouth Daily As Needed for Anxiety (panic attacks). 7/28/20  Yes Rosario Ceron APRN   DEXILANT 60 MG capsule TAKE 1 CAPSULE BY MOUTH ONCE DAILY 3/23/20  Yes Rosario Ceron APRN   HYDROcodone-acetaminophen (NORCO)  MG per tablet Take 1 tablet by mouth Every 8 (Eight) Hours As Needed for Moderate Pain . 7/28/20  Yes Rosario Ceron APRN   levothyroxine (SYNTHROID, LEVOTHROID) 25 MCG tablet Take 0.5 tablets by mouth Daily. 5/28/20  Yes Rosario Ceron APRN   LINZESS 290 MCG capsule capsule TAKE 1 CAPSULE BY MOUTH EVERY MORNING BEFORE BREAKFAST 7/6/20  Yes Johnny Smiley PA-C   metoprolol succinate XL (TOPROL XL) 100 MG 24 hr tablet Take 1 tablet by mouth Daily. 11/1/19  Yes Ramin Martinez MD   mupirocin (BACTROBAN) 2 % ointment APPLY TOPICALLY TO THE APPROPRIATE AREA AS DIRECED THREE TIMES DAILY 6/1/20  Yes Rosario Ceron APRN   OnabotulinumtoxinA 200 units reconstituted solution 200 Units.   Yes ProviderDoris MD   ondansetron ODT (ZOFRAN-ODT) 4 MG disintegrating tablet Place 1 tablet on the tongue Every 8 (Eight) Hours As Needed for Nausea. 5/28/20  Yes Rosario Ceron APRN   OXcarbazepine (TRILEPTAL) 150 MG tablet Take 150 mg by mouth Daily. Daily at bedtime   Yes ProviderDoris MD   prochlorperazine (COMPAZINE) 10 MG tablet Take 1 tablet by mouth Every 6 (Six) Hours As Needed for Nausea or Vomiting. 11/11/19  Yes Ceron, UMESH Ponce   rOPINIRole (REQUIP) 3 MG tablet Take 1 tablet by mouth every night  "at bedtime. 5/28/20  Yes Ceron, Rosario LowryUMESH borden   sucralfate (CARAFATE) 1 g tablet Take 1 tablet by mouth 2 (Two) Times a Day. 11/26/19  Yes Johnny Smiley PA-C   topiramate (TOPAMAX) 100 MG tablet Take 1 tablet by mouth 2 (Two) Times a Day. 5/28/20  Yes CeronRosario UMESH Duong   Azilsartan Medoxomil (EDARBI) 40 MG tablet Take 40 mg by mouth Daily. 2/18/20 8/14/20 Yes Ramin Martinez MD   losartan (Cozaar) 50 MG tablet Take 1 tablet by mouth Daily. 8/14/20   Ramin Martinez MD         OBJECTIVE    /70   Pulse 69   Ht 157.5 cm (62\")   Wt 51.3 kg (113 lb)   SpO2 98%   BMI 20.67 kg/m²         Review of Systems     Constitutional:  Denies recent weight loss, weight gain, fever or chills, no change in exercise tolerance     HENT:  Denies any hearing loss, epistaxis, hoarseness, or difficulty speaking.     Eyes: Wears eyeglasses or contact lenses     Respiratory:  Denies dyspnea with exertion,no cough, wheezing, or hemoptysis.     Cardiovascular: Negative for palpations, chest pain, orthopnea, PND, peripheral edema, syncope, or claudication.     Gastrointestinal:  Denies change in bowel habits, dyspepsia, ulcer disease, hematochezia, or melena.     Endocrine: Negative for cold intolerance, heat intolerance, polydipsia, polyphagia and polyuria. Denies any history of weight change, heat/cold intolerance, polydipsia, polyuria     Genitourinary: Negative.      Musculoskeletal: Denies any history of arthritic symptoms or back problems     Skin:  Denies any change in hair or nails, rashes, or skin lesions.     Allergic/Immunologic: Negative.  Negative for environmental allergies, food allergies and immunocompromised state.     Neurological:  Denies any history of recurrent headaches, strokes, TIA, or seizure disorder.     Hematological: Denies any food allergies, seasonal allergies, bleeding disorders, or lymphadenopathy.     Psychiatric/Behavioral: Denies any history of depression, substance abuse, " or change in cognitive function.         Physical Exam     Constitutional: Cooperative, alert and oriented, well-developed, well-nourished, in no acute distress.     HENT:   Head: Normocephalic, normal hair patterns, no masses or tenderness.  Ears, Nose, and Throat: No gross abnormalities. No pallor or cyanosis. Dentition good.   Eyes: EOMS intact, PERRL, conjunctivae and lids unremarkable. Fundoscopic exam and visual fields not performed.   Neck: No palpable masses or adenopathy, no thyromegaly, no JVD, carotid pulses are full and equal bilaterally and without  Bruits.     Cardiovascular: Regular rhythm, S1 and S2 normal, no S3 or S4. Apical impulse not displaced. No murmurs, gallops, or rubs detected.     Pulmonary/Chest: Chest: normal symmetry, no tenderness to palpation, normal respiratory excursion, no intercostal retraction, no use of accessory muscles.            Pulmonary: Normal breath sounds. No rales or ronchi.    Abdominal: Abdomen soft, bowel sounds normoactive, no masses, no hepatosplenomegaly, non-tender, no bruits.     Musculoskeletal: No deformities, clubbing, cyanosis, erythema, or edema observed. There are no spinal abnormalities noted. Normal muscle strength and tone. Pulses full and equal in all extremities, no bruits auscultated.     Neurological: No gross motor or sensory deficits noted, affect appropriate, oriented to time, person, place.     Skin: Warm and dry to the touch, no apparent skin lesions or masses noted.     Psychiatric: She has a normal mood and affect. Her behavior is normal. Judgment and thought content normal.         Procedures      Lab Results   Component Value Date    WBC 8.95 05/09/2019    HGB 13.2 05/09/2019    HCT 39.4 05/09/2019    MCV 92.3 05/09/2019     05/09/2019     Lab Results   Component Value Date    GLUCOSE 98 07/08/2019    BUN 19 07/08/2019    CREATININE 0.95 01/06/2020    EGFRIFNONA 60 (L) 01/06/2020    BCR 19.0 07/08/2019    CO2 23.0 07/08/2019     CALCIUM 9.1 01/06/2020    ALBUMIN 4.50 07/08/2019    AST 64 (H) 07/08/2019    ALT 19 07/08/2019     Lab Results   Component Value Date    CHOL 214 (H) 08/02/2019    CHOL 185 03/15/2018     Lab Results   Component Value Date    TRIG 53 02/22/2020    TRIG 297 (H) 08/28/2019    TRIG 250 (H) 08/02/2019     Lab Results   Component Value Date    HDL 52 02/22/2020    HDL 35 (L) 08/02/2019    HDL 43 03/15/2018     No components found for: LDLCALC  Lab Results   Component Value Date     (H) 02/22/2020     (H) 08/02/2019     03/15/2018     No results found for: HDLLDLRATIO  No components found for: CHOLHDL  Lab Results   Component Value Date    HGBA1C 5.7 02/22/2020     Lab Results   Component Value Date    TSH 2.480 08/02/2019    G5USVXW 103.0 08/02/2019    THYROIDAB 16 08/02/2019           ASSESSMENT AND PLAN      Diagnoses and all orders for this visit:    Aortic valve insufficiency, etiology of cardiac valve disease unspecified  -     Adult Transthoracic Echo Complete W/ Cont if Necessary Per Protocol  Her echo 6 months apart show moderate aortic insufficiency.  She could not tolerate the Edarbi and or Norvasc and or Procardia.  I am going to try her on losartan for afterload reduction and discharge her metoprolol which she cannot tolerate.  She is going have her blood pressure check with her visit with Crystal Ceron in the end of the month.  She will let us know if there is any issues.  Other orders  -     losartan (Cozaar) 50 MG tablet; Take 1 tablet by mouth Daily.        Patient's Body mass index is 20.67 kg/m². BMI is within normal parameters. No follow-up required..      Tricia Loyola  reports that she has been smoking cigarettes. She has a 21.50 pack-year smoking history. She has never used smokeless tobacco.. I have educated her on the risk of diseases from using tobacco products such as cancer, COPD and heart diease.     I advised her to quit and she is not willing to quit.    I spent 3   minutes counseling the patient.               Ramin Martinez MD  8/14/2020  10:48

## 2020-08-18 ENCOUNTER — OFFICE VISIT (OUTPATIENT)
Dept: GASTROENTEROLOGY | Facility: CLINIC | Age: 60
End: 2020-08-18

## 2020-08-18 ENCOUNTER — LAB (OUTPATIENT)
Dept: LAB | Facility: OTHER | Age: 60
End: 2020-08-18

## 2020-08-18 VITALS
HEIGHT: 62 IN | BODY MASS INDEX: 20.72 KG/M2 | DIASTOLIC BLOOD PRESSURE: 66 MMHG | SYSTOLIC BLOOD PRESSURE: 102 MMHG | WEIGHT: 112.6 LBS | HEART RATE: 72 BPM

## 2020-08-18 DIAGNOSIS — R53.83 FATIGUE, UNSPECIFIED TYPE: ICD-10-CM

## 2020-08-18 DIAGNOSIS — K74.00 HEPATIC FIBROSIS: ICD-10-CM

## 2020-08-18 DIAGNOSIS — K58.2 IRRITABLE BOWEL SYNDROME WITH BOTH CONSTIPATION AND DIARRHEA: ICD-10-CM

## 2020-08-18 DIAGNOSIS — R11.0 NAUSEA: Primary | ICD-10-CM

## 2020-08-18 DIAGNOSIS — E55.9 VITAMIN D DEFICIENCY: ICD-10-CM

## 2020-08-18 DIAGNOSIS — E03.9 ACQUIRED HYPOTHYROIDISM: ICD-10-CM

## 2020-08-18 DIAGNOSIS — K21.00 GASTROESOPHAGEAL REFLUX DISEASE WITH ESOPHAGITIS: ICD-10-CM

## 2020-08-18 DIAGNOSIS — R74.8 ELEVATED LIVER ENZYMES: ICD-10-CM

## 2020-08-18 DIAGNOSIS — E78.2 MIXED HYPERLIPIDEMIA: ICD-10-CM

## 2020-08-18 LAB
25(OH)D3 SERPL-MCNC: 35.9 NG/ML (ref 30–100)
ALBUMIN SERPL-MCNC: 4.1 G/DL (ref 3.5–5)
ALBUMIN/GLOB SERPL: 1.4 G/DL (ref 1.1–1.8)
ALP SERPL-CCNC: 55 U/L (ref 38–126)
ALT SERPL W P-5'-P-CCNC: 23 U/L
ANION GAP SERPL CALCULATED.3IONS-SCNC: 8 MMOL/L (ref 5–15)
AST SERPL-CCNC: 64 U/L (ref 14–36)
BASOPHILS # BLD AUTO: 0.03 10*3/MM3 (ref 0–0.2)
BASOPHILS NFR BLD AUTO: 0.4 % (ref 0–1.5)
BILIRUB SERPL-MCNC: 0.4 MG/DL (ref 0.2–1.3)
BUN SERPL-MCNC: 16 MG/DL (ref 7–23)
BUN/CREAT SERPL: 16.7 (ref 7–25)
CALCIUM SPEC-SCNC: 9.3 MG/DL (ref 8.4–10.2)
CHLORIDE SERPL-SCNC: 108 MMOL/L (ref 101–112)
CHOLEST SERPL-MCNC: 156 MG/DL (ref 150–200)
CO2 SERPL-SCNC: 24 MMOL/L (ref 22–30)
CREAT SERPL-MCNC: 0.96 MG/DL (ref 0.52–1.04)
DEPRECATED RDW RBC AUTO: 48.8 FL (ref 37–54)
EOSINOPHIL # BLD AUTO: 0.11 10*3/MM3 (ref 0–0.4)
EOSINOPHIL NFR BLD AUTO: 1.4 % (ref 0.3–6.2)
ERYTHROCYTE [DISTWIDTH] IN BLOOD BY AUTOMATED COUNT: 14.2 % (ref 12.3–15.4)
FOLATE SERPL-MCNC: 2.75 NG/ML (ref 4.78–24.2)
GFR SERPL CREATININE-BSD FRML MDRD: 59 ML/MIN/1.73 (ref 45–104)
GLOBULIN UR ELPH-MCNC: 3 GM/DL (ref 2.3–3.5)
GLUCOSE SERPL-MCNC: 100 MG/DL (ref 70–99)
HCT VFR BLD AUTO: 41 % (ref 34–46.6)
HDLC SERPL-MCNC: 39 MG/DL (ref 40–59)
HGB BLD-MCNC: 13.4 G/DL (ref 12–15.9)
LDLC SERPL CALC-MCNC: 86 MG/DL
LDLC/HDLC SERPL: 2.2 {RATIO} (ref 0–3.22)
LYMPHOCYTES # BLD AUTO: 2.37 10*3/MM3 (ref 0.7–3.1)
LYMPHOCYTES NFR BLD AUTO: 31.1 % (ref 19.6–45.3)
MCH RBC QN AUTO: 31.7 PG (ref 26.6–33)
MCHC RBC AUTO-ENTMCNC: 32.7 G/DL (ref 31.5–35.7)
MCV RBC AUTO: 96.9 FL (ref 79–97)
MONOCYTES # BLD AUTO: 0.83 10*3/MM3 (ref 0.1–0.9)
MONOCYTES NFR BLD AUTO: 10.9 % (ref 5–12)
NEUTROPHILS NFR BLD AUTO: 4.27 10*3/MM3 (ref 1.7–7)
NEUTROPHILS NFR BLD AUTO: 56.2 % (ref 42.7–76)
PLATELET # BLD AUTO: 286 10*3/MM3 (ref 140–450)
PMV BLD AUTO: 9.4 FL (ref 6–12)
POTASSIUM SERPL-SCNC: 4 MMOL/L (ref 3.4–5)
PROT SERPL-MCNC: 7.1 G/DL (ref 6.3–8.6)
RBC # BLD AUTO: 4.23 10*6/MM3 (ref 3.77–5.28)
SODIUM SERPL-SCNC: 140 MMOL/L (ref 137–145)
T3 SERPL-MCNC: 97.9 NG/DL (ref 80–200)
T4 FREE SERPL-MCNC: 1.09 NG/DL (ref 0.93–1.7)
TRIGL SERPL-MCNC: 156 MG/DL
TSH SERPL DL<=0.05 MIU/L-ACNC: 1.73 UIU/ML (ref 0.27–4.2)
VIT B12 BLD-MCNC: 345 PG/ML (ref 211–946)
VLDLC SERPL-MCNC: 31.2 MG/DL
WBC # BLD AUTO: 7.61 10*3/MM3 (ref 3.4–10.8)

## 2020-08-18 PROCEDURE — 82607 VITAMIN B-12: CPT | Performed by: NURSE PRACTITIONER

## 2020-08-18 PROCEDURE — 84439 ASSAY OF FREE THYROXINE: CPT | Performed by: NURSE PRACTITIONER

## 2020-08-18 PROCEDURE — 36415 COLL VENOUS BLD VENIPUNCTURE: CPT | Performed by: NURSE PRACTITIONER

## 2020-08-18 PROCEDURE — 80061 LIPID PANEL: CPT | Performed by: NURSE PRACTITIONER

## 2020-08-18 PROCEDURE — 84480 ASSAY TRIIODOTHYRONINE (T3): CPT | Performed by: NURSE PRACTITIONER

## 2020-08-18 PROCEDURE — G0481 DRUG TEST DEF 8-14 CLASSES: HCPCS | Performed by: NURSE PRACTITIONER

## 2020-08-18 PROCEDURE — 80053 COMPREHEN METABOLIC PANEL: CPT | Performed by: NURSE PRACTITIONER

## 2020-08-18 PROCEDURE — 99213 OFFICE O/P EST LOW 20 MIN: CPT | Performed by: PHYSICIAN ASSISTANT

## 2020-08-18 PROCEDURE — 82306 VITAMIN D 25 HYDROXY: CPT | Performed by: NURSE PRACTITIONER

## 2020-08-18 PROCEDURE — 80307 DRUG TEST PRSMV CHEM ANLYZR: CPT | Performed by: NURSE PRACTITIONER

## 2020-08-18 PROCEDURE — 85025 COMPLETE CBC W/AUTO DIFF WBC: CPT | Performed by: NURSE PRACTITIONER

## 2020-08-18 PROCEDURE — 84443 ASSAY THYROID STIM HORMONE: CPT | Performed by: NURSE PRACTITIONER

## 2020-08-18 PROCEDURE — 82746 ASSAY OF FOLIC ACID SERUM: CPT | Performed by: NURSE PRACTITIONER

## 2020-08-18 NOTE — PROGRESS NOTES
Chief Complaint   Patient presents with   • Heartburn   • Hepatic Fibrosis   • Elevated Hepatic Enzymes       ENDO PROCEDURE ORDERED:    Subjective    Tricia Loyola is a 60 y.o. female. she is here today for follow-up.    History of Present Illness    The patient was seen on recheck of her GERD, hepatic fibrosis, elevated liver enzymes, abdominal pain, and constipation.  Last seen on 04/28/2020, was given Linzess.  She states it has been doing good with the 290 mcg.  She has been having increasing nausea lately, it is usually worse in the late afternoon.  She thinks it is related to stress.  She has also recently been on some Keflex.  Last EGD showed esophagitis and gastritis on 10/16/2019.  She is on Dexilant.  She has been taking Carafate and Compazine as needed.  Weight is up a half a pound since last visit.  Last colonoscopy was on 11/26/2018.  She did have moderate aortic stenosis on recent echo on 07/28/2020.  She had negative COVID-19 testing on 08/10/2020.    A/P: The patient with increased nausea, GERD, and abdominal pain.  We will try having her switch taking her Dexilant a little bit later in the day since she is having more nausea late.  She was encouraged to avoid gastric irritants.  We will continue on the Linzess for the constipation.  She will need to have more laboratories if not done prior, but will plan follow-up in 4 to 6 weeks, further pending clinical course and the results of the above.      The following portions of the patient's history were reviewed and updated as appropriate:   Past Medical History:   Diagnosis Date   • Abdominal pain    • Anemia    • Chronic post-traumatic headache      rebound      • Depressive disorder    • Encounter for gynecological examination    • Gastroesophageal reflux disease    • Generalized anxiety disorder    • History of mammogram 08/2008    MAMMOGRAM DIAGNOSTIC BILATERAL 43785 ([facility]) (1)     • Hyperlipidemia    • Ingrown toenail    • Injury of back    •  Nonruptured cerebral aneurysm    • Osteoporosis    • Posttraumatic stress disorder    • Seizure (CMS/HCC)     Psychogenic non-epileptic sz    • Viral hepatitis A      Past Surgical History:   Procedure Laterality Date   • APPENDECTOMY     • BREAST BIOPSY Left    • CHOLECYSTECTOMY     • COLONOSCOPY N/A 2018    Procedure: COLONOSCOPY;  Surgeon: Meet Mcintyre MD;  Location: [facility] ENDOSCOPY;  Service: General   • CRANIOTOMY FOR ANEURYSM     • ENDOSCOPY N/A 10/16/2019    Procedure: ESOPHAGOGASTRODUODENOSCOPY;  Surgeon: Kory Muhammad MD;  Location: [facility] ENDOSCOPY;  Service: Gastroenterology   • PAP SMEAR  2012   • TONSILLECTOMY     • UPPER GASTROINTESTINAL ENDOSCOPY  10/16/2019     Family History   Problem Relation Age of Onset   • Constipation Mother    • Hypertension Mother    • Anxiety disorder Mother    • Heart disease Mother    • Alcohol abuse Father    • Heart disease Father    • Anxiety disorder Brother    • Kidney disease Brother    • Hypertension Brother    • Arthritis Brother    • Anxiety disorder Brother    • Kidney disease Brother    • Diabetes Brother    • Anxiety disorder Brother    • Hypertension Brother    • Breast cancer Paternal Aunt    • Heart disease Maternal Grandmother    • Clotting disorder Maternal Grandmother    • Heart disease Maternal Grandfather      OB History        3    Para   2    Term   2            AB   1    Living   2       SAB   1    TAB        Ectopic        Molar        Multiple        Live Births                  Allergies   Allergen Reactions   • Nitrofuran Derivatives Hives     Macrobid   • Augmentin [Amoxicillin-Pot Clavulanate]      Hives   • Ciprofloxacin      Cipro:  Rash   • Fiorinal [Butalbital-Aspirin-Caffeine]      Rapid heart rate   • Imitrex [Sumatriptan]      Rapid heart rate   • Iodine      Anaphylaxis   • Other      MIDRIN  -  Rapid heart rate   • Toradol [Ketorolac Tromethamine]      Anaphylaxis   • Ultram [Tramadol]      Rapid  heart rate   • Ibuprofen Rash     Social History     Socioeconomic History   • Marital status: Legally      Spouse name: Not on file   • Number of children: Not on file   • Years of education: Not on file   • Highest education level: Not on file   Tobacco Use   • Smoking status: Light Tobacco Smoker     Packs/day: 0.50     Years: 43.00     Pack years: 21.50     Types: Cigarettes   • Smokeless tobacco: Never Used   Substance and Sexual Activity   • Alcohol use: No   • Drug use: No   • Sexual activity: Never     Current Medications:  Prior to Admission medications    Medication Sig Start Date End Date Taking? Authorizing Provider   ALBUTEROL SULFATE  (90 Base) MCG/ACT inhaler INHALE 2 PUFFS BY MOUTH EVERY 6 HOURS IF NEEDED FOR WHEEZING OR SHORTNESS OF BREATH 6/1/20  Yes Rosario Ceron APRN   amitriptyline (ELAVIL) 75 MG tablet TAKE ONE TABLET BY MOUTH AT BEDTIME. 1/29/18  Yes Sonia Mata MD   aspirin 81 MG EC tablet Take 1 tablet by mouth Daily. 1/14/20  Yes Rosario Ceron APRN   atorvastatin (LIPITOR) 40 MG tablet Take 1 tablet by mouth Every Night. For cholesterol 3/6/20  Yes Rosario Ceron APRN   busPIRone (BUSPAR) 30 MG tablet Take 1 tablet by mouth 2 (Two) Times a Day. 3/7/18  Yes Sonia Mata MD   cephalexin (KEFLEX) 500 MG capsule Take 1 capsule by mouth 3 (Three) Times a Day for 10 days. 8/9/20 8/19/20 Yes Estefania Nichols APRN   chlorhexidine (HIBICLENS) 4 % external liquid Apply  topically to the appropriate area as directed Daily As Needed for Wound Care (to open wounds until they heal). 4/16/20  Yes Rosario Ceron APRN   clonazePAM (KlonoPIN) 0.5 MG tablet Take 1 tablet by mouth Daily As Needed for Anxiety (panic attacks). 7/28/20  Yes Rosario Ceron APRN   DEXILANT 60 MG capsule TAKE 1 CAPSULE BY MOUTH ONCE DAILY 3/23/20  Yes Rosario Ceron APRN   HYDROcodone-acetaminophen (NORCO)  MG per tablet Take 1 tablet by mouth Every 8  "(Eight) Hours As Needed for Moderate Pain . 7/28/20  Yes Rosario Ceron APRN   levothyroxine (SYNTHROID, LEVOTHROID) 25 MCG tablet Take 0.5 tablets by mouth Daily. 5/28/20  Yes Rosario Ceron APRN   LINZESS 290 MCG capsule capsule TAKE 1 CAPSULE BY MOUTH EVERY MORNING BEFORE BREAKFAST 7/6/20  Yes Johnny Smiley PA-C   losartan (Cozaar) 50 MG tablet Take 1 tablet by mouth Daily. 8/14/20  Yes Ramin Martinez MD   metoprolol succinate XL (Toprol XL) 100 MG 24 hr tablet Take 1 tablet by mouth Daily. 8/14/20  Yes Ramin Martinez MD   mupirocin (BACTROBAN) 2 % ointment APPLY TOPICALLY TO THE APPROPRIATE AREA AS DIRECED THREE TIMES DAILY 6/1/20  Yes Rosario Ceron APRN   OnabotulinumtoxinA 200 units reconstituted solution 200 Units.   Yes Provider, MD Doris   ondansetron ODT (ZOFRAN-ODT) 4 MG disintegrating tablet Place 1 tablet on the tongue Every 8 (Eight) Hours As Needed for Nausea. 5/28/20  Yes Rosario Ceron APRN   OXcarbazepine (TRILEPTAL) 150 MG tablet Take 150 mg by mouth Daily. Daily at bedtime   Yes Provider, MD Doris   prochlorperazine (COMPAZINE) 10 MG tablet Take 1 tablet by mouth Every 6 (Six) Hours As Needed for Nausea or Vomiting. 11/11/19  Yes Rosario Ceron APRN   rOPINIRole (REQUIP) 3 MG tablet Take 1 tablet by mouth every night at bedtime. 5/28/20  Yes Rosario Ceron APRN   sucralfate (CARAFATE) 1 g tablet Take 1 tablet by mouth 2 (Two) Times a Day. 11/26/19  Yes Johnny Smiley PA-C   topiramate (TOPAMAX) 100 MG tablet Take 1 tablet by mouth 2 (Two) Times a Day. 5/28/20  Yes Rosario Ceron APRN     Review of Systems  Review of Systems       Objective    /66   Pulse 72   Ht 157.5 cm (62\")   Wt 51.1 kg (112 lb 9.6 oz)   BMI 20.59 kg/m²   Physical Exam   Constitutional: She is oriented to person, place, and time. She appears well-developed and well-nourished. No distress.   HENT:   Head: Normocephalic and atraumatic.   Eyes: Pupils " are equal, round, and reactive to light. EOM are normal.   Neck: Normal range of motion.   Cardiovascular: Normal rate, regular rhythm and normal heart sounds.   Pulmonary/Chest: Effort normal and breath sounds normal.   Abdominal: Soft. Bowel sounds are normal. She exhibits no shifting dullness, no distension, no abdominal bruit, no ascites and no mass. There is no hepatosplenomegaly. There is tenderness. There is no rigidity, no rebound, no guarding and no CVA tenderness. No hernia. Hernia confirmed negative in the ventral area.   mild   Musculoskeletal: Normal range of motion.   Neurological: She is alert and oriented to person, place, and time.   Skin: Skin is warm and dry.   Psychiatric: She has a normal mood and affect. Her behavior is normal. Judgment and thought content normal.   Nursing note and vitals reviewed.    Assessment/Plan      1. Nausea    2. Gastroesophageal reflux disease with esophagitis    3. Hepatic fibrosis     4. Elevated liver enzymes    5. Irritable bowel syndrome with both constipation and diarrhea    .   Tricia was seen today for heartburn, hepatic fibrosis and elevated hepatic enzymes.    Diagnoses and all orders for this visit:    Nausea    Gastroesophageal reflux disease with esophagitis    Hepatic fibrosis     Elevated liver enzymes    Irritable bowel syndrome with both constipation and diarrhea        Orders placed during this encounter include:  No orders of the defined types were placed in this encounter.      Medications prescribed:  No orders of the defined types were placed in this encounter.      Requested Prescriptions      No prescriptions requested or ordered in this encounter       Review and/or summary of lab tests, radiology, procedures, medications. Review and summary of old records and obtaining of history. The risks and benefits of my recommendations, as well as other treatment options were discussed with the patient today. Questions were answered.    Follow-up: Return  in about 6 weeks (around 9/29/2020), or if symptoms worsen or fail to improve.     * Surgery not found *      This document has been electronically signed by Johnny Smiley PA-C on August 28, 2020 11:37      Results for orders placed or performed in visit on 08/18/20   Vitamin B12 & Folate   Result Value Ref Range    Folate 2.75 (L) 4.78 - 24.20 ng/mL    Vitamin B-12 345 211 - 946 pg/mL   CBC Auto Differential   Result Value Ref Range    WBC 7.61 3.40 - 10.80 10*3/mm3    RBC 4.23 3.77 - 5.28 10*6/mm3    Hemoglobin 13.4 12.0 - 15.9 g/dL    Hematocrit 41.0 34.0 - 46.6 %    MCV 96.9 79.0 - 97.0 fL    MCH 31.7 26.6 - 33.0 pg    MCHC 32.7 31.5 - 35.7 g/dL    RDW 14.2 12.3 - 15.4 %    RDW-SD 48.8 37.0 - 54.0 fl    MPV 9.4 6.0 - 12.0 fL    Platelets 286 140 - 450 10*3/mm3    Neutrophil % 56.2 42.7 - 76.0 %    Lymphocyte % 31.1 19.6 - 45.3 %    Monocyte % 10.9 5.0 - 12.0 %    Eosinophil % 1.4 0.3 - 6.2 %    Basophil % 0.4 0.0 - 1.5 %    Neutrophils, Absolute 4.27 1.70 - 7.00 10*3/mm3    Lymphocytes, Absolute 2.37 0.70 - 3.10 10*3/mm3    Monocytes, Absolute 0.83 0.10 - 0.90 10*3/mm3    Eosinophils, Absolute 0.11 0.00 - 0.40 10*3/mm3    Basophils, Absolute 0.03 0.00 - 0.20 10*3/mm3   Vitamin D 25 Hydroxy   Result Value Ref Range    25 Hydroxy, Vitamin D 35.9 30.0 - 100.0 ng/ml   T3   Result Value Ref Range    T3, Total 97.9 80.0 - 200.0 ng/dl   TSH   Result Value Ref Range    TSH 1.730 0.270 - 4.200 uIU/mL   T4, Free   Result Value Ref Range    Free T4 1.09 0.93 - 1.70 ng/dL   Lipid Panel   Result Value Ref Range    Total Cholesterol 156 150 - 200 mg/dL    Triglycerides 156 (H) <=150 mg/dL    HDL Cholesterol 39 (L) 40 - 59 mg/dL    LDL Cholesterol  86 <=100 mg/dL    VLDL Cholesterol 31.2 mg/dL    LDL/HDL Ratio 2.20 0.00 - 3.22   Comprehensive Metabolic Panel   Result Value Ref Range    Glucose 100 (H) 70 - 99 mg/dL    BUN 16 7 - 23 mg/dL    Creatinine 0.96 0.52 - 1.04 mg/dL    Sodium 140 137 - 145 mmol/L    Potassium 4.0  3.4 - 5.0 mmol/L    Chloride 108 101 - 112 mmol/L    CO2 24.0 22.0 - 30.0 mmol/L    Calcium 9.3 8.4 - 10.2 mg/dL    Total Protein 7.1 6.3 - 8.6 g/dL    Albumin 4.10 3.50 - 5.00 g/dL    ALT (SGPT) 23 <=35 U/L    AST (SGOT) 64 (H) 14 - 36 U/L    Alkaline Phosphatase 55 38 - 126 U/L    Total Bilirubin 0.4 0.2 - 1.3 mg/dL    eGFR Non  Amer 59 45 - 104 mL/min/1.73    Globulin 3.0 2.3 - 3.5 gm/dL    A/G Ratio 1.4 1.1 - 1.8 g/dL    BUN/Creatinine Ratio 16.7 7.0 - 25.0    Anion Gap 8.0 5.0 - 15.0 mmol/L   Results for orders placed or performed in visit on 05/28/20   ToxASSU[employer] Select 13 Discrete -   Result Value Ref Range    Report Summary FINAL     Ethanol Screen Urine (Ref) Negative     Ethanol, Urine Not Detected g/dL    Amphetamine, Urine Qual Negative     Methamphetamine, Urine Not Detected ng/mg creat    Amphetamine, Urine Not Detected ng/mg creat    MDMA URINE Not Detected ng/mg creat    MDA Not Detected ng/mg creat    Benzodiazepine Screen, Urine +POSITIVE+     DIAZEPAM URINE QUANT (REF) Not Detected ng/mg creat    Desmethyldiazepam Not Detected ng/mg creat    Oxazepam, urine Not Detected ng/mg creat    Temazepam, urine Not Detected ng/mg creat    ALPRAZOLAM Not Detected ng/mg creat    ALPHA-HYDROXYALPRAZOLAM UR, QT (REF) Not Detected ng/mg creat    DESALKLFLURAZEPAM UR QUANT (REF) Not Detected ng/mg creat    LORAZEPAM URINE QUANT (REF) Not Detected ng/mg creat    Alpha-hydroxytriazolam, Urine Not Detected ng/mg creat    Clonazepam Urine Not Detected ng/mg creat    7-Aminoclonazepam Urine 42 ng/mg creat    MIDAZOLAM UR, QUANT (REF) Not Detected ng/mg creat    Alpha-hydroxymidazolam, Urine Not Detected ng/mg creat    Flunitrazepam Not Detected ng/mg creat    DESMETHYLFLUNITRAZEPAM Not Detected ng/mg creat    Cocaine & Metabolite Negative     Cocaine Screen, Urine Not Detected ng/mg creat    Benzoylecgonine, Urine Not Detected ng/mg creat    Cocaethylene Ur Not Detected ng/mg creat    THC, Urine Negative      Carboxy THC, Urine Not Detected ng/mg creat    6-ACETYLMORPHINE Negative     6-acetylmorphine Not Detected ng/mg creat    Opiate Class +POSITIVE+     Codeine, Urine Not Detected ng/mg creat    Morphine, Urine Not Detected ng/mg creat    normorphine Not Detected ng/mg creat    Norcodeine Ur Not Detected ng/mg creat    Hydrocodone  ng/mg creat    Hydromorphone Urine 153 ng/mg creat    Dihydrocodeine, Urine 262 ng/mg creat    Opiates, Norhydrocodone, Urine 3181 ng/mg creat    Oxycodone Class Ur Negative     Oxycodone, Confirmation, Urine Not Detected ng/mg creat    Oxymorphone UR Not Detected ng/mg creat    Opiates, Noroxycodone, Urine Not Detected ng/mg creat    Noroxymorphone Not Detected ng/mg creat    Methadone Negative     Methadone, Quantitative Not Detected ng/mg creat    EDDP Not Detected ng/mg creat    Fentanyl and Analogues, Ur Negative     Fentanyl, Urine Not Detected ng/mg creat    Norfentanyl Urine Not Detected ng/mg creat    Sufentanil, Ur Not Detected ng/mg creat    Alfentanil, Ur Not Detected ng/mg creat    BUPRENORPHINE Negative     Buprenorphine, Urine Not Detected ng/mg creat    Norbuprenorphine Not Detected ng/mg creat    Tapentadol Negative     Tapentadol Ur Not Detected ng/mg creat    Opoidss other, UR Negative     Tramadol, Urine Not Detected ng/mg creat    O-desmethyltramadol, Ur Not Detected ng/mg creat    N-Desmethyltramadol, U Not Detected ng/mg creat    BARBITURATES, URINE Negative     Amobarbital, Urine Not Detected     Barbital Not Detected     Butabarbital, Ur Not Detected     Butalbital Screen, Urine Not Detected     Mephobarbital Urine Not Detected     Pentobarbital UR Not Detected     Phenobarbital Not Detected     Secobarbital, urine Not Detected     Thiopental, Ur Not Detected     Creatinine, Urine 77 mg/dL    Level of Detection: Comment      *Note: Due to a large number of results and/or encounters for the requested time period, some results have not been displayed. A complete  set of results can be found in Results Review.       Some portions of this note have been dictated using voice recognition software and may contain errors and/or omissions.

## 2020-08-18 NOTE — PATIENT INSTRUCTIONS

## 2020-08-21 LAB
6-ACETYLMORPHINE: NEGATIVE
6MAM SERPLBLD-MCNC: NOT DETECTED NG/MG CREAT
7-AMINOCLONAZEPAM UR: 42 NG/MG CREAT
A-OH ALPRAZ/CREAT UR: NOT DETECTED NG/MG CREAT
ALFENTANIL UR QL: NOT DETECTED NG/MG CREAT
ALPHA-HYDROXYMIDAZOLAM, URINE: NOT DETECTED NG/MG CREAT
ALPHA-HYDROXYTRIAZOLAM, URINE: NOT DETECTED NG/MG CREAT
AMOBARBITAL UR: NOT DETECTED
AMPHET UR QL CFM: NOT DETECTED NG/MG CREAT
AMPHETAMINES UR QL SCN: NEGATIVE
BARBITAL UR QL CFM: NOT DETECTED
BARBITURATES UR QL SCN: NEGATIVE
BENZODIAZ UR QL SCN: NORMAL
BENZOYLECGONINE UR: NOT DETECTED NG/MG CREAT
BUPRENORPHINE UR QL: NEGATIVE
BUPRENORPHINE UR QL: NOT DETECTED NG/MG CREAT
BUTABARBITAL UR QL: NOT DETECTED
BUTALBITAL UR QL: NOT DETECTED
CANNABINOIDS UR QL CFM: NEGATIVE
CLONAZEPAM UR QL: NOT DETECTED NG/MG CREAT
COCAETHYLENE UR QL CFM: NOT DETECTED NG/MG CREAT
COCAINE UR QL CFM: NEGATIVE
CODEINE UR QL: NOT DETECTED NG/MG CREAT
CONV COCAINE, UR: NOT DETECTED NG/MG CREAT
CONV REPORT SUMMARY: NORMAL
CREAT 24H UR-MCNC: 77 MG/DL
DESALKYLFLURAZ/CREAT UR: NOT DETECTED NG/MG CREAT
DESMETHYLFLUNITRAZEPAM: NOT DETECTED NG/MG CREAT
DIAZEPAM UR-MCNC: NOT DETECTED NG/MG CREAT
DIHYDROCODEINE UR: 262 NG/MG CREAT
EDDP SERPL QL: NOT DETECTED NG/MG CREAT
ETHANOL SCREEN URINE (REF): NEGATIVE
ETHANOL UR-MCNC: NOT DETECTED G/DL
FENTANYL UR QL: NEGATIVE
FENTANYL+NORFENTANYL UR QL SCN: NOT DETECTED NG/MG CREAT
FLUNITRAZEPAM SERPLBLD-MCNC: NOT DETECTED NG/MG CREAT
HYDROCODONE UR QL: 582 NG/MG CREAT
HYDROMORPHONE UR QL: 153 NG/MG CREAT
LEVEL OF DETECTION:: NORMAL
LORAZEPAM UR-MCNC: NOT DETECTED NG/MG CREAT
LORAZEPAM/CREAT UR: NOT DETECTED NG/MG CREAT
MDA SERPLBLD-MCNC: NOT DETECTED NG/MG CREAT
MDMA UR QL SCN: NOT DETECTED NG/MG CREAT
MEPHOBARBITAL UR QL CFM: NOT DETECTED
METHADONE BLD QL SCN: NEGATIVE
METHADONE, URINE: NOT DETECTED NG/MG CREAT
METHAMPHETAMINE UR: NOT DETECTED NG/MG CREAT
MIDAZOLAM UR-MCNC: NOT DETECTED NG/MG CREAT
MORPHINE UR QL: NOT DETECTED NG/MG CREAT
N-DESMETHYLTRAMADOL, U: NOT DETECTED NG/MG CREAT
NARCOTICS UR: NEGATIVE
NORBUPRENORPHINE SERPLBLD-MCNC: NOT DETECTED NG/MG CREAT
NORCODEINE UR-MCNC: NOT DETECTED NG/MG CREAT
NORDIAZEPAM SERPL CFM-MCNC: NOT DETECTED NG/MG CREAT
NORFENTANYL UR: NOT DETECTED NG/MG CREAT
NORMORPHINE: NOT DETECTED NG/MG CREAT
NOROXYMORPHONE: NOT DETECTED NG/MG CREAT
O-DESMETHYLTRAMADOL, UR: NOT DETECTED NG/MG CREAT
OPIATES UR QL CFM: NORMAL
OPIATES, URINE, NORHYDROCODONE: 3181 NG/MG CREAT
OPIATES, URINE, NOROXYCODONE: NOT DETECTED NG/MG CREAT
OXAZEPAM UR QL: NOT DETECTED NG/MG CREAT
OXYCODONE UR QL: NEGATIVE
OXYCODONE UR: NOT DETECTED NG/MG CREAT
OXYMORPHONE UR: NOT DETECTED NG/MG CREAT
PENTOBARBITAL UR: NOT DETECTED
PHENOBARB UR QL: NOT DETECTED
SECOBARBITAL UR QL: NOT DETECTED
SUFENTANIL UR QL: NOT DETECTED NG/MG CREAT
TAPENTADOL SERPLBLD-MCNC: NEGATIVE NG/ML
TAPENTADOL UR-MCNC: NOT DETECTED NG/MG CREAT
TEMAZEPAM UR QL CFM: NOT DETECTED NG/MG CREAT
THC UR CFM-MCNC: NOT DETECTED NG/MG CREAT
THIOPENTAL UR QL CFM: NOT DETECTED
TRAMADOL UR: NOT DETECTED NG/MG CREAT

## 2020-08-25 ENCOUNTER — OFFICE VISIT (OUTPATIENT)
Dept: FAMILY MEDICINE CLINIC | Facility: CLINIC | Age: 60
End: 2020-08-25

## 2020-08-25 VITALS
SYSTOLIC BLOOD PRESSURE: 110 MMHG | WEIGHT: 112 LBS | OXYGEN SATURATION: 98 % | DIASTOLIC BLOOD PRESSURE: 66 MMHG | HEIGHT: 62 IN | RESPIRATION RATE: 16 BRPM | TEMPERATURE: 98.2 F | BODY MASS INDEX: 20.61 KG/M2 | HEART RATE: 73 BPM

## 2020-08-25 DIAGNOSIS — M54.50 CHRONIC MIDLINE LOW BACK PAIN WITHOUT SCIATICA: ICD-10-CM

## 2020-08-25 DIAGNOSIS — Z12.31 ENCOUNTER FOR SCREENING MAMMOGRAM FOR MALIGNANT NEOPLASM OF BREAST: ICD-10-CM

## 2020-08-25 DIAGNOSIS — E53.8 FOLATE DEFICIENCY: ICD-10-CM

## 2020-08-25 DIAGNOSIS — S80.01XA CONTUSION OF RIGHT KNEE, INITIAL ENCOUNTER: ICD-10-CM

## 2020-08-25 DIAGNOSIS — G89.29 CHRONIC MIDLINE LOW BACK PAIN WITHOUT SCIATICA: ICD-10-CM

## 2020-08-25 DIAGNOSIS — F41.0 PANIC DISORDER WITHOUT AGORAPHOBIA: Chronic | ICD-10-CM

## 2020-08-25 DIAGNOSIS — G89.29 CHRONIC PAIN OF RIGHT KNEE: ICD-10-CM

## 2020-08-25 DIAGNOSIS — M25.561 CHRONIC PAIN OF RIGHT KNEE: ICD-10-CM

## 2020-08-25 DIAGNOSIS — E53.8 B12 DEFICIENCY: Primary | ICD-10-CM

## 2020-08-25 PROBLEM — Z90.49 HISTORY OF APPENDECTOMY: Status: ACTIVE | Noted: 2020-04-27

## 2020-08-25 PROCEDURE — 96372 THER/PROPH/DIAG INJ SC/IM: CPT | Performed by: NURSE PRACTITIONER

## 2020-08-25 PROCEDURE — 99214 OFFICE O/P EST MOD 30 MIN: CPT | Performed by: NURSE PRACTITIONER

## 2020-08-25 RX ORDER — CLONAZEPAM 0.5 MG/1
0.5 TABLET ORAL DAILY PRN
Qty: 15 TABLET | Refills: 0 | Status: SHIPPED | OUTPATIENT
Start: 2020-08-25 | End: 2020-10-14

## 2020-08-25 RX ORDER — HYDROCODONE BITARTRATE AND ACETAMINOPHEN 10; 325 MG/1; MG/1
1 TABLET ORAL EVERY 8 HOURS PRN
Qty: 90 TABLET | Refills: 0 | Status: SHIPPED | OUTPATIENT
Start: 2020-08-25 | End: 2020-09-25 | Stop reason: SDUPTHER

## 2020-08-25 RX ORDER — UREA 10 %
800 LOTION (ML) TOPICAL DAILY
Qty: 90 TABLET | Refills: 1 | Status: SHIPPED | OUTPATIENT
Start: 2020-08-25 | End: 2020-11-20 | Stop reason: SDUPTHER

## 2020-08-25 RX ORDER — CYANOCOBALAMIN 1000 UG/ML
1000 INJECTION, SOLUTION INTRAMUSCULAR; SUBCUTANEOUS
Status: DISCONTINUED | OUTPATIENT
Start: 2020-08-25 | End: 2021-02-26

## 2020-08-25 RX ADMIN — CYANOCOBALAMIN 1000 MCG: 1000 INJECTION, SOLUTION INTRAMUSCULAR; SUBCUTANEOUS at 11:12

## 2020-08-25 NOTE — PROGRESS NOTES
Answers for HPI/ROS submitted by the patient on 8/19/2020   What is the primary reason for your visit?: Other  Please describe your symptoms.: Back And Knee Pain..Yearly Pap Smear  Have you had these symptoms before?: Yes  How long have you been having these symptoms?: Greater than 2 weeks  Please list any medications you are currently taking for this condition.: Hydrocodone  Please describe any probable cause for these symptoms. : Arthritis..Osteoporosis          Chief Complaint   Patient presents with   • Follow-up     12 wk    • Pain   • Back Pain   • Knee Pain   • Anxiety   • Results     Subjective   Tricia Loyola is a 60 y.o. female who presents to the office for routine 12 wk follow up of chronic pain of multiple joints and back, anxiety, and lab result review.  The following portions of the patient's history were reviewed and updated as appropriate: allergies, current medications, past family history, past medical history, past social history, past surgical history and problem list.    History of Present Illness   HTN: well controlled. Today in office is 110/66, HR 73.  Pain   This is a chronic problem. Pain located mid upper back, constant.  See CC. The current episode started more than 1 year ago. The problem has been waxing and waning. Pertinent negatives include no chest pain, coughing, fever, nausea or rash. The symptoms are aggravated by bending, walking, twisting and standing. She has tried acetaminophen, lying down, NSAIDs, relaxation, position changes, rest, sleep, walking and oral narcotics for the symptoms. Ineffective treatment without hydrocodone, which has provided significant relief. Pain rated as 4/10 at this time,stable at present.   Due for refill today.    Back pain:  Imaging in chart indicates mild DDD of cspine . An ED record is on file from [facility] of 10/5/18. CT of T and L spine done on that day show stable Tspine with an old compression fracture deformity of superior endplate of  T11. This is thought a result of chronic osteoporosis.  Mild posterior spurring of superior endplates at T2-3 and T5-6. L spine shows mild degenerative changes at L4-5 and L5-S1.      Chronic bilateral knee pain due to DJD, treated by Dr Herndon, has had hyaluronan injection right knee in June, but has not seen much improvement in pain yet. To follow up with him. This pain is her worst pain today, rated at 7//10. Due for hydrocodone refill today.     Chronic anxiety: managed with amitriptyline, buspirone and prn clonazepam. Due for refill clonazepam today. Compliant with medications. Reports well controlled by symptoms today.     Labs of 8/18/20 are reviewed today. Normal CBC, thyroid profile, lipids stable with mild elevation of triglycerides at 156, low HDL at 39, and LDL of 86 with no personal history of CAD. She has glucose level os 100 and AST of 64, othewise normal CMP.    No new problems today.     Parts of most recent relevant visit HPI, ROS  and PE may be carried forward and all are updated as appropriate for current situation.     Past Medical History:   Diagnosis Date   • Abdominal pain    • Anemia    • Chronic post-traumatic headache      rebound      • Depressive disorder    • Encounter for gynecological examination    • Gastroesophageal reflux disease    • Generalized anxiety disorder    • History of mammogram 08/2008    MAMMOGRAM DIAGNOSTIC BILATERAL 43386 ([facility]) (1)     • Hyperlipidemia    • Ingrown toenail    • Injury of back    • Nonruptured cerebral aneurysm    • Osteoporosis    • Posttraumatic stress disorder    • Seizure (CMS/HCC)     Psychogenic non-epileptic sz    • Viral hepatitis A           Family History   Problem Relation Age of Onset   • Constipation Mother    • Hypertension Mother    • Anxiety disorder Mother    • Heart disease Mother    • Alcohol abuse Father    • Heart disease Father    • Anxiety disorder Brother    • Kidney disease Brother    • Hypertension Brother    • Arthritis  "Brother    • Anxiety disorder Brother    • Kidney disease Brother    • Diabetes Brother    • Anxiety disorder Brother    • Hypertension Brother    • Breast cancer Paternal Aunt    • Heart disease Maternal Grandmother    • Clotting disorder Maternal Grandmother    • Heart disease Maternal Grandfather         Review of Systems   Constitutional: Negative.  Negative for fever and unexpected weight change.   HENT: Negative.  Negative for congestion, postnasal drip, rhinorrhea, sinus pressure, sinus pain and sneezing.    Eyes: Negative.    Respiratory: Negative.  Negative for cough, chest tightness and shortness of breath.    Cardiovascular: Negative.  Negative for chest pain.   Gastrointestinal: Negative.  Negative for diarrhea and nausea.   Endocrine: Negative.    Genitourinary: Negative.  Negative for dysuria.   Musculoskeletal: Positive for arthralgias, back pain and gait problem (right knee pain).   Skin: Negative.  Negative for color change, pallor, rash and wound.   Allergic/Immunologic: Negative.    Hematological: Negative.    Psychiatric/Behavioral: Negative.  Negative for sleep disturbance and suicidal ideas.   All other systems reviewed and are negative.      Objective   Vitals:    08/25/20 0941   BP: 110/66   Pulse: 73   Resp: 16   Temp: 98.2 °F (36.8 °C)   SpO2: 98%   Weight: 50.8 kg (112 lb)   Height: 157.5 cm (62\")   PainSc:   7   PainLoc: Knee  Comment: both and back     Physical Exam   Constitutional: She is oriented to person, place, and time. She appears well-developed and well-nourished. No distress.   HENT:   Head: Normocephalic and atraumatic.   Eyes: Pupils are equal, round, and reactive to light. Conjunctivae are normal.   Neck: Normal range of motion. Neck supple.   Cardiovascular: Normal rate, regular rhythm, normal heart sounds and intact distal pulses. Exam reveals no gallop and no friction rub.   No murmur heard.  Pulmonary/Chest: Effort normal and breath sounds normal. No stridor. No " respiratory distress. She has no wheezes. She has no rales. She exhibits no tenderness.   Abdominal: Soft. Bowel sounds are normal.   Musculoskeletal: Normal range of motion. She exhibits no edema, tenderness or deformity.        Right knee: She exhibits swelling.   Lymphadenopathy:     She has no cervical adenopathy.   Neurological: She is alert and oriented to person, place, and time.   Skin: Skin is warm and dry. Capillary refill takes 2 to 3 seconds. No erythema. No pallor.   Psychiatric: She has a normal mood and affect. Her behavior is normal. Judgment and thought content normal.   Nursing note and vitals reviewed.      Assessment/Plan   Tricia was seen today for follow-up, pain, back pain, knee pain, anxiety and results.    Diagnoses and all orders for this visit:    B12 deficiency  -     cyanocobalamin injection 1,000 mcg    Folate deficiency  -     folic acid (FOLVITE) 800 MCG tablet; Take 1 tablet by mouth Daily.    Encounter for screening mammogram for malignant neoplasm of breast  -     Mammo Screening Bilateral With CAD; Future    Chronic midline low back pain without sciatica  -     HYDROcodone-acetaminophen (NORCO)  MG per tablet; Take 1 tablet by mouth Every 8 (Eight) Hours As Needed for Moderate Pain .    Chronic pain of right knee  -     HYDROcodone-acetaminophen (NORCO)  MG per tablet; Take 1 tablet by mouth Every 8 (Eight) Hours As Needed for Moderate Pain .    Contusion of right knee, initial encounter  -     HYDROcodone-acetaminophen (NORCO)  MG per tablet; Take 1 tablet by mouth Every 8 (Eight) Hours As Needed for Moderate Pain .    Panic disorder without agoraphobia  -     clonazePAM (KlonoPIN) 0.5 MG tablet; Take 1 tablet by mouth Daily As Needed for Anxiety (panic attacks).         Stable anxiety, refill clonazepam today.   Stable lower back pain and worseing bilateral knee pain ( managed by Orthopedics) refill hydrocodone today.   Labs reviewed.   New diagnosis of folate  deficiency and B12 deficiency.   To begin cyanocobalomin monthly injections today, prescription sent for Folic acid 800mcg daily as well. Advised insurance may not pay but is inexpensive, to obtain in that instance, OTC and take daily.     PHQ-2/PHQ-9 Depression Screening 3/6/2020   Little interest or pleasure in doing things 0   Feeling down, depressed, or hopeless 0   Trouble falling or staying asleep, or sleeping too much 0   Feeling tired or having little energy 0   Poor appetite or overeating 0   Feeling bad about yourself - or that you are a failure or have let yourself or your family down 0   Trouble concentrating on things, such as reading the newspaper or watching television 0   Moving or speaking so slowly that other people could have noticed. Or the opposite - being so fidgety or restless that you have been moving around a lot more than usual 0   Thoughts that you would be better off dead, or of hurting yourself in some way 0   Total Score 0     Patient understands the risks associated with this controlled medication, including tolerance and addiction.  Patient also agrees to only obtain this medication from me, and not from a another provider, unless that provider is covering for me in my absence.  Patient also agrees to be compliant in dosing, and not self adjust the dose of medication.  A signed controlled substance agreement is on file, and the patient has received a controlled substance education sheet at this a previous visit.  The patient has also signed a consent for treatment with a controlled substance as per [facility] policy. KATE was obtained.    UMESH Guzmán         Return in about 12 weeks (around 11/17/2020).    There are no Patient Instructions on file for this visit.

## 2020-08-28 DIAGNOSIS — E78.5 HYPERLIPIDEMIA, UNSPECIFIED HYPERLIPIDEMIA TYPE: ICD-10-CM

## 2020-08-31 RX ORDER — ATORVASTATIN CALCIUM 40 MG/1
40 TABLET, FILM COATED ORAL NIGHTLY
Qty: 90 TABLET | Refills: 1 | Status: SHIPPED | OUTPATIENT
Start: 2020-08-31 | End: 2020-12-04

## 2020-09-14 ENCOUNTER — LAB (OUTPATIENT)
Dept: LAB | Facility: OTHER | Age: 60
End: 2020-09-14

## 2020-09-14 ENCOUNTER — OFFICE VISIT (OUTPATIENT)
Dept: FAMILY MEDICINE CLINIC | Facility: CLINIC | Age: 60
End: 2020-09-14

## 2020-09-14 VITALS
OXYGEN SATURATION: 98 % | BODY MASS INDEX: 20.43 KG/M2 | HEIGHT: 62 IN | TEMPERATURE: 98 F | RESPIRATION RATE: 16 BRPM | DIASTOLIC BLOOD PRESSURE: 70 MMHG | HEART RATE: 79 BPM | WEIGHT: 111 LBS | SYSTOLIC BLOOD PRESSURE: 114 MMHG

## 2020-09-14 DIAGNOSIS — R22.9 LOCALIZED SKIN MASS, LUMP, OR SWELLING: ICD-10-CM

## 2020-09-14 DIAGNOSIS — L81.9 PIGMENTED SKIN LESION OF UNCERTAIN NATURE: Primary | ICD-10-CM

## 2020-09-14 DIAGNOSIS — R22.2 MASS OF SUBCUTANEOUS TISSUE OF BACK: ICD-10-CM

## 2020-09-14 PROCEDURE — 99213 OFFICE O/P EST LOW 20 MIN: CPT | Performed by: NURSE PRACTITIONER

## 2020-09-14 PROCEDURE — 11300 SHAVE SKIN LESION 0.5 CM/<: CPT | Performed by: NURSE PRACTITIONER

## 2020-09-14 RX ORDER — UBROGEPANT 50 MG/1
50 TABLET ORAL ONCE AS NEEDED
COMMUNITY
Start: 2020-09-01 | End: 2021-11-30

## 2020-09-14 NOTE — PROGRESS NOTES
Chief Complaint   Patient presents with   • bump on back     Subjective   Tricia Loyola is a 60 y.o. female who presents to the office for evaluation of a bump on upper back.  The following portions of the patient's history were reviewed and updated as appropriate: allergies, current medications, past family history, past medical history, past social history, past surgical history and problem list.    History of Present Illness   Bump on upper back, noted first several months ago. Not tender, no redness or drainaged, but is concerned about its nature. This lesion is located in mid back and measures less than 1 cm diameter. Fleshtone, soft, partially mobile, non-tender to palpation.      As she is examined a much more suspicious flat multicolored lesion noted with varying pigmentation and irregular edges is noted left upper back. It needs to be biopsied asap. Discussed risks and benefits and she agrees.    No other complaints today  Past Medical History:   Diagnosis Date   • Abdominal pain    • Anemia    • Chronic post-traumatic headache      rebound      • Depressive disorder    • Encounter for gynecological examination    • Gastroesophageal reflux disease    • Generalized anxiety disorder    • History of mammogram 08/2008    MAMMOGRAM DIAGNOSTIC BILATERAL 50018 ([facility]) (1)     • Hyperlipidemia    • Ingrown toenail    • Injury of back    • Nonruptured cerebral aneurysm    • Osteoporosis    • Posttraumatic stress disorder    • Seizure (CMS/HCC)     Psychogenic non-epileptic sz    • Viral hepatitis A           Family History   Problem Relation Age of Onset   • Constipation Mother    • Hypertension Mother    • Anxiety disorder Mother    • Heart disease Mother    • Alcohol abuse Father    • Heart disease Father    • Anxiety disorder Brother    • Kidney disease Brother    • Hypertension Brother    • Arthritis Brother    • Anxiety disorder Brother    • Kidney disease Brother    • Diabetes Brother    • Anxiety disorder  "Brother    • Hypertension Brother    • Breast cancer Paternal Aunt    • Heart disease Maternal Grandmother    • Clotting disorder Maternal Grandmother    • Heart disease Maternal Grandfather         Review of Systems   Constitutional: Negative.  Negative for fever and unexpected weight change.   HENT: Negative.    Eyes: Negative.    Respiratory: Negative.  Negative for cough, chest tightness and shortness of breath.    Cardiovascular: Negative.  Negative for chest pain.   Gastrointestinal: Negative.    Endocrine: Negative.    Genitourinary: Negative.  Negative for dysuria.   Musculoskeletal: Negative.    Skin: Negative.  Negative for color change, pallor, rash and wound.        Bump mid back- here for evaluation today   Allergic/Immunologic: Negative.    Neurological: Negative.    Hematological: Negative.    Psychiatric/Behavioral: Negative.  Negative for sleep disturbance and suicidal ideas.   All other systems reviewed and are negative.      Objective   Vitals:    09/14/20 1339   BP: 114/70   BP Location: Left arm   Patient Position: Sitting   Cuff Size: Adult   Pulse: 79   Resp: 16   Temp: 98 °F (36.7 °C)   SpO2: 98%   Weight: 50.3 kg (111 lb)   Height: 157.5 cm (62\")   PainSc:   4   PainLoc: Back     Physical Exam  Vitals signs and nursing note reviewed.   Constitutional:       General: She is not in acute distress.     Appearance: She is well-developed. She is not diaphoretic.   HENT:      Head: Normocephalic and atraumatic.      Mouth/Throat:      Pharynx: No oropharyngeal exudate.   Eyes:      General: No scleral icterus.        Left eye: No discharge.      Conjunctiva/sclera: Conjunctivae normal.      Pupils: Pupils are equal, round, and reactive to light.   Neck:      Musculoskeletal: Normal range of motion and neck supple.      Thyroid: No thyromegaly.      Vascular: No JVD.      Trachea: No tracheal deviation.   Cardiovascular:      Rate and Rhythm: Normal rate and regular rhythm.      Heart sounds: " Normal heart sounds. No murmur. No friction rub. No gallop.    Pulmonary:      Effort: Pulmonary effort is normal. No respiratory distress.      Breath sounds: Normal breath sounds. No wheezing or rales.   Chest:      Chest wall: No tenderness.   Abdominal:      General: Bowel sounds are normal.      Palpations: Abdomen is soft.   Musculoskeletal: Normal range of motion.         General: No tenderness or deformity.   Lymphadenopathy:      Cervical: No cervical adenopathy.   Skin:     General: Skin is warm and dry.      Capillary Refill: Capillary refill takes 2 to 3 seconds.      Coloration: Skin is not pale.      Findings: No erythema or rash.          Neurological:      Mental Status: She is alert and oriented to person, place, and time.      Cranial Nerves: No cranial nerve deficit.   Psychiatric:         Behavior: Behavior normal.         Thought Content: Thought content normal.         Judgment: Judgment normal.       Biopsy    Date/Time: 9/14/2020 2:43 PM  Performed by: Rosario Ceron APRN  Authorized by: Rosario Ceron APRN     Procedure Details - Skin Biopsy:     Body area: trunk    Trunk location: back    Initial size (mm): 10    Final defect size (mm): 13    Malignancy: malignancy unknown      Destruction method: shave biopsy      Comments:   Risks and benefits of lesion removal and biopsy due to uneven edges and varying skin textures and colors of lesion are discussed. Agrees with biopsy/ excision for biopsy today.     Patient prepped and draped as usual in aseptic fashion. Chlorhexidine wash use/ swab.   3mL syring with 23 g needle used to inject 2 mL of lidocaine 1% with epineprine  excision site in aseptic manner.  1cm x 1.1cm x 0.3cm tan lesion with irregular edges and plaqued surface excised from left upper back in 2 pieces. First section is partially excised (98%) of whole lesion,. 2nd section excised to include residual lesion which is a grossly crescent shaped sliver from far  left/lateral edge of whole lesion, perhaps 0.2cm x 1.1cm x 0.1cm.  Hemostasis achieved with gentle pressure/ gauze pads and 1 silver nitrate stick used for chemical cauterization.   Wound dressed with bacitracin ointment and bandaid.  Advised to wash with soap and water daily, pat dry, apply mupirocin ointment and cover with bandaid daily until healed.        Assessment/Plan   Tricia was seen today for bump on back.    Diagnoses and all orders for this visit:    Pigmented skin lesion of uncertain nature  -     Tissue Pathology Exam  -     Biopsy    Localized skin mass, lump, or swelling  -     US Soft Tissue; Future    Mass of subcutaneous tissue of back  -     US Soft Tissue; Future           PHQ-2/PHQ-9 Depression Screening 3/6/2020   Little interest or pleasure in doing things 0   Feeling down, depressed, or hopeless 0   Trouble falling or staying asleep, or sleeping too much 0   Feeling tired or having little energy 0   Poor appetite or overeating 0   Feeling bad about yourself - or that you are a failure or have let yourself or your family down 0   Trouble concentrating on things, such as reading the newspaper or watching television 0   Moving or speaking so slowly that other people could have noticed. Or the opposite - being so fidgety or restless that you have been moving around a lot more than usual 0   Thoughts that you would be better off dead, or of hurting yourself in some way 0   Total Score 0       Rosario Ceron, UMESH         Return if symptoms worsen or fail to improve, for Next scheduled follow up.    There are no Patient Instructions on file for this visit.

## 2020-09-16 LAB
LAB AP CASE REPORT: NORMAL
LAB AP CLINICAL INFORMATION: NORMAL
PATH REPORT.FINAL DX SPEC: NORMAL

## 2020-09-17 ENCOUNTER — TELEPHONE (OUTPATIENT)
Dept: FAMILY MEDICINE CLINIC | Facility: CLINIC | Age: 60
End: 2020-09-17

## 2020-09-17 DIAGNOSIS — L81.9 PIGMENTED SKIN LESION OF UNCERTAIN NATURE: Primary | ICD-10-CM

## 2020-09-17 DIAGNOSIS — R89.7 ABNORMAL BIOPSY RESULT: ICD-10-CM

## 2020-09-22 RX ORDER — CYANOCOBALAMIN 1000 UG/ML
1000 INJECTION, SOLUTION INTRAMUSCULAR; SUBCUTANEOUS
Qty: 3 ML | Refills: 1 | Status: SHIPPED | OUTPATIENT
Start: 2020-09-22 | End: 2021-02-26

## 2020-09-25 ENCOUNTER — TELEPHONE (OUTPATIENT)
Dept: FAMILY MEDICINE CLINIC | Facility: CLINIC | Age: 60
End: 2020-09-25

## 2020-09-25 DIAGNOSIS — G89.29 CHRONIC MIDLINE LOW BACK PAIN WITHOUT SCIATICA: ICD-10-CM

## 2020-09-25 DIAGNOSIS — G89.29 CHRONIC PAIN OF RIGHT KNEE: ICD-10-CM

## 2020-09-25 DIAGNOSIS — S80.01XA CONTUSION OF RIGHT KNEE, INITIAL ENCOUNTER: ICD-10-CM

## 2020-09-25 DIAGNOSIS — M25.561 CHRONIC PAIN OF RIGHT KNEE: ICD-10-CM

## 2020-09-25 DIAGNOSIS — M54.50 CHRONIC MIDLINE LOW BACK PAIN WITHOUT SCIATICA: ICD-10-CM

## 2020-09-25 RX ORDER — HYDROCODONE BITARTRATE AND ACETAMINOPHEN 10; 325 MG/1; MG/1
1 TABLET ORAL EVERY 8 HOURS PRN
Qty: 90 TABLET | Refills: 0 | Status: SHIPPED | OUTPATIENT
Start: 2020-09-25 | End: 2020-10-29 | Stop reason: SDUPTHER

## 2020-09-26 NOTE — TELEPHONE ENCOUNTER
Patient seen in office every 3 months for chronic pain requiring opiate pain medication. Compliant with medication, visits with no adverse effects noted. KATE and UDS current and appropriate. Patient called requesting scheduled refill at appropriate interval. Patient understands the risks associated with this controlled medication, including tolerance and addiction.  Patient also agrees to only obtain this medication from me, and not from a another provider, unless that provider is covering for me in my absence.  Patient also agrees to be compliant in dosing, and not self adjust the dose of medication.  A signed controlled substance agreement is on file, and the patient has received a controlled substance education sheet at this a previous visit.  The patient has also signed a consent for treatment with a controlled substance as per [facility] policy. KATE was obtained.   Refill sent for hydrocodone.     This document has been electronically signed by UMESH Guzmán on September 25, 2020 21:19 CDT

## 2020-09-28 ENCOUNTER — TELEPHONE (OUTPATIENT)
Dept: FAMILY MEDICINE CLINIC | Facility: CLINIC | Age: 60
End: 2020-09-28

## 2020-10-08 ENCOUNTER — OFFICE VISIT (OUTPATIENT)
Dept: FAMILY MEDICINE CLINIC | Facility: CLINIC | Age: 60
End: 2020-10-08

## 2020-10-08 VITALS
SYSTOLIC BLOOD PRESSURE: 104 MMHG | HEART RATE: 73 BPM | RESPIRATION RATE: 16 BRPM | HEIGHT: 62 IN | OXYGEN SATURATION: 98 % | WEIGHT: 113.3 LBS | BODY MASS INDEX: 20.85 KG/M2 | DIASTOLIC BLOOD PRESSURE: 70 MMHG | TEMPERATURE: 98 F

## 2020-10-08 DIAGNOSIS — G25.81 RESTLESS LEG SYNDROME: Chronic | ICD-10-CM

## 2020-10-08 DIAGNOSIS — M54.50 CHRONIC MIDLINE LOW BACK PAIN WITHOUT SCIATICA: Primary | ICD-10-CM

## 2020-10-08 DIAGNOSIS — G25.81 RESTLESS LEG SYNDROME: ICD-10-CM

## 2020-10-08 DIAGNOSIS — G89.29 CHRONIC MIDLINE LOW BACK PAIN WITHOUT SCIATICA: Primary | ICD-10-CM

## 2020-10-08 DIAGNOSIS — E03.9 ACQUIRED HYPOTHYROIDISM: Chronic | ICD-10-CM

## 2020-10-08 PROCEDURE — 99214 OFFICE O/P EST MOD 30 MIN: CPT | Performed by: NURSE PRACTITIONER

## 2020-10-08 RX ORDER — LEVOTHYROXINE SODIUM 0.03 MG/1
12.5 TABLET ORAL DAILY
Qty: 15 TABLET | Refills: 3 | Status: SHIPPED | OUTPATIENT
Start: 2020-10-08 | End: 2021-01-14

## 2020-10-08 RX ORDER — GABAPENTIN 100 MG/1
100 CAPSULE ORAL NIGHTLY PRN
Qty: 30 CAPSULE | Refills: 0 | Status: SHIPPED | OUTPATIENT
Start: 2020-10-08 | End: 2020-10-28

## 2020-10-08 RX ORDER — ROPINIROLE 3 MG/1
3 TABLET, FILM COATED ORAL
Qty: 90 TABLET | Refills: 1 | Status: SHIPPED | OUTPATIENT
Start: 2020-10-08 | End: 2020-11-20 | Stop reason: SDUPTHER

## 2020-10-08 NOTE — PROGRESS NOTES
Chief Complaint   Patient presents with   • Follow-up     on removal of basal cell carcinoma      Subjective   Tricia Loyola is a 60 y.o. female who presents to the office for follow up of chronic anxiety, exacerbated by concern over basal cell lesion of her back.   The following portions of the patient's history were reviewed and updated as appropriate: allergies, current medications, past family history, past medical history, past social history, past surgical history and problem list.    History of Present Illness   Saw PA for Dr Donovan, dermatology on 9/28/20. Is now scheduled for 10/23/20 for the physician for excision of the basal cell lesion on her left upper back.   She is extremely anxious by nature and this is in exacerbation now.     She reports worsening RLS and Insomnia. Also with increased burning pain at approximately the thoracolumar junction, site of previous compressoin fracture. Managed with hydrocodone, the current med plus current amitritpyline are not helping the burning pain. Due to comorbid RLS and previous oversedation with 300mg gabapentin, will try a prn 100mg capsule trial.     No other complaints today .  Past Medical History:   Diagnosis Date   • Abdominal pain    • Anemia    • Chronic post-traumatic headache      rebound      • Depressive disorder    • Encounter for gynecological examination    • Gastroesophageal reflux disease    • Generalized anxiety disorder    • History of mammogram 08/2008    MAMMOGRAM DIAGNOSTIC BILATERAL 10695 ([facility]) (1)     • Hyperlipidemia    • Ingrown toenail    • Injury of back    • Nonruptured cerebral aneurysm    • Osteoporosis    • Posttraumatic stress disorder    • Seizure (CMS/HCC)     Psychogenic non-epileptic sz    • Viral hepatitis A           Family History   Problem Relation Age of Onset   • Constipation Mother    • Hypertension Mother    • Anxiety disorder Mother    • Heart disease Mother    • Alcohol abuse Father    • Heart disease Father    •  "Anxiety disorder Brother    • Kidney disease Brother    • Hypertension Brother    • Arthritis Brother    • Anxiety disorder Brother    • Kidney disease Brother    • Diabetes Brother    • Anxiety disorder Brother    • Hypertension Brother    • Breast cancer Paternal Aunt    • Heart disease Maternal Grandmother    • Clotting disorder Maternal Grandmother    • Heart disease Maternal Grandfather         Review of Systems   Constitutional: Negative.  Negative for fever and unexpected weight change.   HENT: Negative.    Eyes: Negative.    Respiratory: Negative.  Negative for cough, chest tightness and shortness of breath.    Cardiovascular: Negative.  Negative for chest pain.   Gastrointestinal: Negative.    Endocrine: Negative.    Genitourinary: Negative.  Negative for dysuria.   Musculoskeletal: Positive for arthralgias and back pain.   Skin: Negative.  Negative for color change, pallor, rash and wound.   Allergic/Immunologic: Negative.    Neurological: Negative.    Hematological: Negative.    Psychiatric/Behavioral: Negative.  Negative for sleep disturbance and suicidal ideas.   All other systems reviewed and are negative.      Objective   Vitals:    10/08/20 1434   BP: 104/70   Pulse: 73   Resp: 16   Temp: 98 °F (36.7 °C)   SpO2: 98%   Weight: 51.4 kg (113 lb 4.8 oz)   Height: 157.5 cm (62\")   PainSc:   7   PainLoc: Back     Physical Exam  Vitals signs and nursing note reviewed.   Constitutional:       General: She is not in acute distress.     Appearance: She is well-developed. She is not diaphoretic.   HENT:      Head: Normocephalic and atraumatic.      Right Ear: External ear normal.      Left Ear: External ear normal.      Nose: Nose normal.      Mouth/Throat:      Pharynx: No oropharyngeal exudate.   Eyes:      General: No scleral icterus.        Right eye: No discharge.         Left eye: No discharge.      Conjunctiva/sclera: Conjunctivae normal.      Pupils: Pupils are equal, round, and reactive to light. "   Neck:      Musculoskeletal: Normal range of motion and neck supple.      Thyroid: No thyromegaly.      Vascular: No JVD.      Trachea: No tracheal deviation.   Cardiovascular:      Rate and Rhythm: Normal rate and regular rhythm.      Heart sounds: Normal heart sounds. No murmur. No friction rub. No gallop.    Pulmonary:      Effort: Pulmonary effort is normal. No respiratory distress.      Breath sounds: Normal breath sounds. No stridor. No wheezing or rales.   Chest:      Chest wall: No tenderness.   Abdominal:      General: Bowel sounds are normal. There is no distension.      Palpations: Abdomen is soft.      Tenderness: There is no abdominal tenderness. There is no guarding or rebound.      Hernia: No hernia is present.   Musculoskeletal: Normal range of motion.         General: No tenderness or deformity.        Back:    Lymphadenopathy:      Cervical: No cervical adenopathy.   Skin:     General: Skin is warm and dry.      Capillary Refill: Capillary refill takes 2 to 3 seconds.      Coloration: Skin is not pale.      Findings: No erythema or rash.   Neurological:      Mental Status: She is alert and oriented to person, place, and time.      Cranial Nerves: No cranial nerve deficit.      Sensory: No sensory deficit.      Motor: No abnormal muscle tone.      Coordination: Coordination normal.      Deep Tendon Reflexes: Reflexes normal.   Psychiatric:         Behavior: Behavior normal.         Thought Content: Thought content normal.         Judgment: Judgment normal.         Assessment/Plan   Tricia was seen today for follow-up.    Diagnoses and all orders for this visit:    Chronic midline low back pain without sciatica  -     gabapentin (NEURONTIN) 100 MG capsule; Take 1 capsule by mouth At Night As Needed (leg cramps related to sleep and lower back burning pain.).    Restless leg syndrome  -     gabapentin (NEURONTIN) 100 MG capsule; Take 1 capsule by mouth At Night As Needed (leg cramps related to sleep  and lower back burning pain.).  -     rOPINIRole (REQUIP) 3 MG tablet; Take 1 tablet by mouth every night at bedtime.    Restless leg syndrome  Comments:  worsening, needs increased dose today  Orders:  -     gabapentin (NEURONTIN) 100 MG capsule; Take 1 capsule by mouth At Night As Needed (leg cramps related to sleep and lower back burning pain.).  -     rOPINIRole (REQUIP) 3 MG tablet; Take 1 tablet by mouth every night at bedtime.    Acquired hypothyroidism  Comments:  needs to see endocrinology. low T4 with normal T3, T4 other pituitary indicators normal. low dose levothyroxine started in October 2019 after testing.   Orders:  -     levothyroxine (SYNTHROID, LEVOTHROID) 25 MCG tablet; Take 0.5 tablets by mouth Daily.           PHQ-2/PHQ-9 Depression Screening 10/8/2020   Little interest or pleasure in doing things 0   Feeling down, depressed, or hopeless 0   Trouble falling or staying asleep, or sleeping too much 2   Feeling tired or having little energy 0   Poor appetite or overeating 0   Feeling bad about yourself - or that you are a failure or have let yourself or your family down 0   Trouble concentrating on things, such as reading the newspaper or watching television 0   Moving or speaking so slowly that other people could have noticed. Or the opposite - being so fidgety or restless that you have been moving around a lot more than usual 0   Thoughts that you would be better off dead, or of hurting yourself in some way 0   Total Score 2   Patient understands the risks associated with this controlled medication, including tolerance and addiction.  Patient also agrees to only obtain this medication from me, and not from a another provider, unless that provider is covering for me in my absence.  Patient also agrees to be compliant in dosing, and not self adjust the dose of medication.  A signed controlled substance agreement is on file, and the patient has received a controlled substance education sheet at this  a previous visit.  The patient has also signed a consent for treatment with a controlled substance as per [facility] policy. KATE was obtained.      UMESH Guzmán         Return in about 1 week (around 10/15/2020) for follow up of burning back pain and RLS/ gabapentin effectiveness.    There are no Patient Instructions on file for this visit.

## 2020-10-13 DIAGNOSIS — F41.0 PANIC DISORDER WITHOUT AGORAPHOBIA: Chronic | ICD-10-CM

## 2020-10-14 ENCOUNTER — TELEPHONE (OUTPATIENT)
Dept: FAMILY MEDICINE CLINIC | Facility: CLINIC | Age: 60
End: 2020-10-14

## 2020-10-14 RX ORDER — CLONAZEPAM 0.5 MG/1
TABLET ORAL
Qty: 15 TABLET | Refills: 0 | Status: SHIPPED | OUTPATIENT
Start: 2020-10-14 | End: 2020-10-28

## 2020-10-14 NOTE — TELEPHONE ENCOUNTER
Patient has chronic anxiety requiring benzodiazepine use concurrently with chronic pain requiring opiate pain medication and/ or gabapentin. Patient has been educated regarding potential dangers of oversedation and accidental overdose with use of both medications concurrently. Serial assessments of patient has yielded no sign of oversedation or adverse effects of patient, and he/she is advised and aware to notify my office immediately if symptoms do occur, as well as to discontinue use of benzodiazepine medication and opiate medication immediately if that occurs, pending medical evaluation and advice. Patient has been compliant with use of medications, UDS, visits with no adverse effects noted. Patient understands the risks associated with this controlled medication, including tolerance and addiction.  Patient also agrees to only obtain this medication from me, and not from a another provider, unless that provider is covering for me in my absence.  Patient also agrees to be compliant in dosing, and not self adjust the dose of medication.  A signed controlled substance agreement is on file, and the patient has received a controlled substance education sheet at this a previous visit.  The patient has also signed a consent for treatment with a controlled substance as per [facility] policy. KATE was obtained. Refills were sent for: clonazepam.     This document has been electronically signed by UMESH Guzmán on October 14, 2020 12:22 CDT

## 2020-10-15 DIAGNOSIS — R22.2 MASS OF SUBCUTANEOUS TISSUE OF BACK: ICD-10-CM

## 2020-10-15 DIAGNOSIS — R93.89 ABNORMAL ULTRASOUND: ICD-10-CM

## 2020-10-15 DIAGNOSIS — R22.9 LOCALIZED SKIN MASS, LUMP, OR SWELLING: Primary | ICD-10-CM

## 2020-10-22 ENCOUNTER — OFFICE VISIT (OUTPATIENT)
Dept: FAMILY MEDICINE CLINIC | Facility: CLINIC | Age: 60
End: 2020-10-22

## 2020-10-22 VITALS
BODY MASS INDEX: 21.35 KG/M2 | HEIGHT: 62 IN | SYSTOLIC BLOOD PRESSURE: 142 MMHG | HEART RATE: 70 BPM | OXYGEN SATURATION: 99 % | TEMPERATURE: 98 F | RESPIRATION RATE: 16 BRPM | WEIGHT: 116 LBS | DIASTOLIC BLOOD PRESSURE: 76 MMHG

## 2020-10-22 DIAGNOSIS — S30.0XXD: ICD-10-CM
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
## 2020-10-22 DIAGNOSIS — M53.3 COCCYGEAL PAIN: Primary | ICD-10-CM

## 2020-10-22 DIAGNOSIS — Z23 NEED FOR VACCINATION: ICD-10-CM

## 2020-10-22 PROCEDURE — 99213 OFFICE O/P EST LOW 20 MIN: CPT | Performed by: NURSE PRACTITIONER

## 2020-10-22 NOTE — PROGRESS NOTES
"Chief Complaint   Patient presents with   • Back Pain     Subjective   Tricia Loyola is a 60 y.o. female who presents to the office for follow up of pain at coccyx s/p fall.     The following portions of the patient's history were reviewed and updated as appropriate: allergies, current medications, past family history, past medical history, past social history, past surgical history and problem list.    History of Present Illness   Patient had a trip/ fall when cleaning her closet on 10/14/20. Reports fell onto a tote landing on her coccyx. Immediately experienced severe pain of the cocyyx area and went to urgent care for assessment/ treatment. Was seen by Marlene Mcknight PA-C, no laceration noted. Coccyx xray was negative and patient discharged home. Patient is here today as pain has not diminished and she is certain the coccyx must be broken.   Her mother told her her \"rectum\" was \"all black and blue\".  Extremely painful with sitting and walking.   No other complaints today.     Past Medical History:   Diagnosis Date   • Abdominal pain    • Anemia    • Anxiety and depression    • Chronic post-traumatic headache      rebound      • Depressive disorder    • Disease of thyroid gland    • Encounter for gynecological examination    • Gastroesophageal reflux disease    • Generalized anxiety disorder    • History of mammogram 08/2008    MAMMOGRAM DIAGNOSTIC BILATERAL 46677 ([facility]) (1)     • Hyperlipidemia    • Ingrown toenail    • Injury of back    • Leaky heart valve    • Nonruptured cerebral aneurysm    • Osteoporosis    • Osteoporosis    • Posttraumatic stress disorder    • PTSD (post-traumatic stress disorder)    • Seizure (CMS/HCC)     Psychogenic non-epileptic sz    • Viral hepatitis A    • Wears dentures     uppers only   • Wears glasses           Family History   Problem Relation Age of Onset   • Constipation Mother    • Hypertension Mother    • Anxiety disorder Mother    • Heart disease Mother    • Alcohol abuse " "Father    • Heart disease Father    • Anxiety disorder Brother    • Kidney disease Brother    • Hypertension Brother    • Arthritis Brother    • Anxiety disorder Brother    • Kidney disease Brother    • Diabetes Brother    • Anxiety disorder Brother    • Hypertension Brother    • Breast cancer Paternal Aunt    • Heart disease Maternal Grandmother    • Clotting disorder Maternal Grandmother    • Heart disease Maternal Grandfather         Review of Systems   Constitutional: Negative.  Negative for fever and unexpected weight change.   HENT: Negative.    Eyes: Negative.    Respiratory: Negative.  Negative for cough, chest tightness and shortness of breath.    Cardiovascular: Negative.  Negative for chest pain.   Gastrointestinal: Negative.    Endocrine: Negative.    Genitourinary: Negative.  Negative for dysuria.   Musculoskeletal: Positive for arthralgias and back pain.        Reports severe coccyx pain since a fall last week.    Skin: Negative.  Negative for color change, pallor, rash and wound.   Allergic/Immunologic: Negative.    Neurological: Negative.    Hematological: Negative.    Psychiatric/Behavioral: Negative.  Negative for sleep disturbance and suicidal ideas.   All other systems reviewed and are negative.      Objective   Vitals:    10/22/20 1551   BP: 142/76   Pulse: 70   Resp: 16   Temp: 98 °F (36.7 °C)   SpO2: 99%   Weight: 52.6 kg (116 lb)   Height: 157.5 cm (62\")   PainSc: 10-Worst pain ever   PainLoc: Buttocks  Comment: tailbone     Physical Exam  Vitals signs and nursing note reviewed.   Constitutional:       General: She is not in acute distress.     Appearance: She is well-developed. She is not diaphoretic.   HENT:      Head: Normocephalic and atraumatic.      Right Ear: External ear normal.      Left Ear: External ear normal.      Nose: Nose normal.   Eyes:      General: No scleral icterus.        Right eye: No discharge.         Left eye: No discharge.      Conjunctiva/sclera: Conjunctivae " normal.      Pupils: Pupils are equal, round, and reactive to light.   Neck:      Musculoskeletal: Normal range of motion and neck supple.      Thyroid: No thyromegaly.      Vascular: No JVD.      Trachea: No tracheal deviation.   Cardiovascular:      Rate and Rhythm: Normal rate and regular rhythm.      Pulses: Normal pulses.      Heart sounds: Normal heart sounds. No murmur. No friction rub. No gallop.    Pulmonary:      Effort: Pulmonary effort is normal. No respiratory distress.      Breath sounds: Normal breath sounds. No stridor. No wheezing or rales.   Chest:      Chest wall: No tenderness.   Abdominal:      General: Bowel sounds are normal. There is no distension.      Palpations: Abdomen is soft.      Tenderness: There is no abdominal tenderness. There is no guarding or rebound.      Hernia: No hernia is present.   Musculoskeletal: Normal range of motion.         General: No tenderness or deformity.        Back:    Lymphadenopathy:      Cervical: No cervical adenopathy.   Skin:     General: Skin is warm and dry.      Capillary Refill: Capillary refill takes 2 to 3 seconds.      Coloration: Skin is not pale.      Findings: No bruising, erythema or rash.   Neurological:      General: No focal deficit present.      Mental Status: She is alert and oriented to person, place, and time.      Cranial Nerves: No cranial nerve deficit.      Sensory: No sensory deficit.      Motor: No abnormal muscle tone.      Coordination: Coordination normal.      Deep Tendon Reflexes: Reflexes normal.   Psychiatric:         Mood and Affect: Mood normal.         Behavior: Behavior normal.         Thought Content: Thought content normal.         Judgment: Judgment normal.     extreme tenderness over sacral/ coccyx area. No bruising noted.     Assessment/Plan   Diagnoses and all orders for this visit:    1. Coccygeal pain (Primary)    2. Need for vaccination    3. Falls, subsequent encounter    4. Coccygeal contusion, subsequent  encounter    Other orders  -     Cancel: CT lumbar spine wo contrast; Future  -     Cancel: Fluarix/Fluzone/Afluria Quad>6 Months         Flu vaccine given. CT lumbar with attention to sacrum /coccyx ordered.  PHQ-2/PHQ-9 Depression Screening 10/8/2020   Little interest or pleasure in doing things 0   Feeling down, depressed, or hopeless 0   Trouble falling or staying asleep, or sleeping too much 2   Feeling tired or having little energy 0   Poor appetite or overeating 0   Feeling bad about yourself - or that you are a failure or have let yourself or your family down 0   Trouble concentrating on things, such as reading the newspaper or watching television 0   Moving or speaking so slowly that other people could have noticed. Or the opposite - being so fidgety or restless that you have been moving around a lot more than usual 0   Thoughts that you would be better off dead, or of hurting yourself in some way 0   Total Score 2   Patient understands the risks associated with this controlled medication, including tolerance and addiction.  Patient also agrees to only obtain this medication from me, and not from a another provider, unless that provider is covering for me in my absence.  Patient also agrees to be compliant in dosing, and not self adjust the dose of medication.  A signed controlled substance agreement is on file, and the patient has received a controlled substance education sheet at this a previous visit.  The patient has also signed a consent for treatment with a controlled substance as per [facility] policy. KATE was obtained.      UMESH Guzmán         Return in about 2 weeks (around 11/5/2020), or if symptoms worsen or fail to improve.    There are no Patient Instructions on file for this visit.

## 2020-10-23 DIAGNOSIS — S30.0XXD: ICD-10-CM

## 2020-10-23 DIAGNOSIS — M53.3 ACUTE COCCYGEAL PAIN: ICD-10-CM
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
## 2020-10-28 ENCOUNTER — APPOINTMENT (OUTPATIENT)
Dept: PREADMISSION TESTING | Facility: HOSPITAL | Age: 60
End: 2020-10-28

## 2020-10-28 ENCOUNTER — OFFICE VISIT (OUTPATIENT)
Dept: SURGERY | Facility: CLINIC | Age: 60
End: 2020-10-28

## 2020-10-28 VITALS
RESPIRATION RATE: 14 BRPM | DIASTOLIC BLOOD PRESSURE: 72 MMHG | SYSTOLIC BLOOD PRESSURE: 120 MMHG | OXYGEN SATURATION: 97 % | HEART RATE: 71 BPM | BODY MASS INDEX: 21.35 KG/M2 | HEIGHT: 62 IN | WEIGHT: 116 LBS

## 2020-10-28 VITALS
BODY MASS INDEX: 21.2 KG/M2 | HEART RATE: 78 BPM | HEIGHT: 62 IN | SYSTOLIC BLOOD PRESSURE: 122 MMHG | DIASTOLIC BLOOD PRESSURE: 70 MMHG | WEIGHT: 115.2 LBS | TEMPERATURE: 98.3 F

## 2020-10-28 DIAGNOSIS — M79.89 SOFT TISSUE MASS: Primary | ICD-10-CM

## 2020-10-28 LAB
QT INTERVAL: 406 MS
QTC INTERVAL: 441 MS

## 2020-10-28 PROCEDURE — 93005 ELECTROCARDIOGRAM TRACING: CPT

## 2020-10-28 PROCEDURE — 99214 OFFICE O/P EST MOD 30 MIN: CPT | Performed by: SURGERY

## 2020-10-28 PROCEDURE — 93010 ELECTROCARDIOGRAM REPORT: CPT | Performed by: INTERNAL MEDICINE

## 2020-10-28 RX ORDER — LOSARTAN POTASSIUM 50 MG/1
50 TABLET ORAL NIGHTLY
COMMUNITY
End: 2021-05-04

## 2020-10-28 RX ORDER — CLONAZEPAM 0.5 MG/1
0.5 TABLET ORAL DAILY PRN
COMMUNITY
End: 2020-11-20 | Stop reason: SDUPTHER

## 2020-10-28 RX ORDER — SODIUM CHLORIDE, SODIUM GLUCONATE, SODIUM ACETATE, POTASSIUM CHLORIDE, AND MAGNESIUM CHLORIDE 526; 502; 368; 37; 30 MG/100ML; MG/100ML; MG/100ML; MG/100ML; MG/100ML
1000 INJECTION, SOLUTION INTRAVENOUS CONTINUOUS
Status: CANCELLED | OUTPATIENT
Start: 2020-11-05

## 2020-10-28 RX ORDER — SODIUM CHLORIDE 0.9 % (FLUSH) 0.9 %
10 SYRINGE (ML) INJECTION AS NEEDED
Status: CANCELLED | OUTPATIENT
Start: 2020-11-05

## 2020-10-28 RX ORDER — SODIUM CHLORIDE 0.9 % (FLUSH) 0.9 %
3 SYRINGE (ML) INJECTION EVERY 12 HOURS SCHEDULED
Status: CANCELLED | OUTPATIENT
Start: 2020-11-05

## 2020-10-28 RX ORDER — METOPROLOL SUCCINATE 100 MG/1
100 TABLET, EXTENDED RELEASE ORAL EVERY EVENING
COMMUNITY
End: 2021-10-11

## 2020-10-28 NOTE — PROGRESS NOTES
Subjective   Tricia Loyola is a 60 y.o. female     Chief Complaint: Tender soft tissue mass right posterior flank    History of Present Illness describes a 2-month history of a tender mass and/or what she calls tumor right posterior flank.  Denies any history of trauma or specific denies any history of being stuck with a pencil or needle or any other type of injury.  No history of any similar lesions in the past.  Site is tender to palpation otherwise asymptomatic from this.  Patient is very concerned and wishes to have this excised.  Her primary care provider had obtained an ultrasound which describes back of neck area which I think is a misprint but also demonstrates 3 different lesions.  Each of these lesions are probably larger than what is palpable so not sure that this is talking about the area of concern.  Patient had a basal cell cancer on the other posterior flank that is been removed by dermatologist in [place].  Patient tells me that this is completely different from that lesion.  Patient's been reading a report from the ultrasound and her primary provider wishes to have this excised as well.    Review of Systems   HENT: Positive for hearing loss.    Eyes: Negative.    Respiratory: Negative.    Cardiovascular: Negative.    Gastrointestinal: Positive for constipation and nausea.        Hemorrhoids   Endocrine: Negative.    Genitourinary: Negative.    Musculoskeletal: Negative.    Skin: Negative.    Allergic/Immunologic: Negative.    Hematological: Negative.    Psychiatric/Behavioral: Negative.      Past Medical History:   Diagnosis Date   • Abdominal pain    • Anemia    • Chronic post-traumatic headache      rebound      • Depressive disorder    • Encounter for gynecological examination    • Gastroesophageal reflux disease    • Generalized anxiety disorder    • History of mammogram 08/2008    MAMMOGRAM DIAGNOSTIC BILATERAL 61038 ([facility]) (1)     • Hyperlipidemia    • Ingrown toenail    • Injury of back     • Nonruptured cerebral aneurysm    • Osteoporosis    • Posttraumatic stress disorder    • Seizure (CMS/HCC)     Psychogenic non-epileptic sz    • Viral hepatitis A      Past Surgical History:   Procedure Laterality Date   • APPENDECTOMY     • BREAST BIOPSY Left    • CHOLECYSTECTOMY     • COLONOSCOPY N/A 11/26/2018    Procedure: COLONOSCOPY;  Surgeon: Meet Mcintyre MD;  Location: [facility] ENDOSCOPY;  Service: General   • CRANIOTOMY FOR ANEURYSM  2003   • ENDOSCOPY N/A 10/16/2019    Procedure: ESOPHAGOGASTRODUODENOSCOPY;  Surgeon: Kory Muhammad MD;  Location: [facility] ENDOSCOPY;  Service: Gastroenterology   • PAP SMEAR  08/16/2012   • TONSILLECTOMY     • UPPER GASTROINTESTINAL ENDOSCOPY  10/16/2019     Family History   Problem Relation Age of Onset   • Constipation Mother    • Hypertension Mother    • Anxiety disorder Mother    • Heart disease Mother    • Alcohol abuse Father    • Heart disease Father    • Anxiety disorder Brother    • Kidney disease Brother    • Hypertension Brother    • Arthritis Brother    • Anxiety disorder Brother    • Kidney disease Brother    • Diabetes Brother    • Anxiety disorder Brother    • Hypertension Brother    • Breast cancer Paternal Aunt    • Heart disease Maternal Grandmother    • Clotting disorder Maternal Grandmother    • Heart disease Maternal Grandfather      Social History     Socioeconomic History   • Marital status: Legally      Spouse name: Not on file   • Number of children: Not on file   • Years of education: Not on file   • Highest education level: Not on file   Tobacco Use   • Smoking status: Light Tobacco Smoker     Packs/day: 0.50     Years: 43.00     Pack years: 21.50     Types: Cigarettes   • Smokeless tobacco: Never Used   Substance and Sexual Activity   • Alcohol use: No   • Drug use: No   • Sexual activity: Never     Allergies   Allergen Reactions   • Nitrofuran Derivatives Hives     Macrobid   • Cleocin [Clindamycin Hcl] Hives and Other (See  Comments)     Turned red all over body and hot and eyes swelled    • Augmentin [Amoxicillin-Pot Clavulanate]      Hives   • Ciprofloxacin      Cipro:  Rash   • Fiorinal [Butalbital-Aspirin-Caffeine]      Rapid heart rate   • Imitrex [Sumatriptan]      Rapid heart rate   • Iodine      Anaphylaxis   • Other      MIDRIN  -  Rapid heart rate   • Toradol [Ketorolac Tromethamine]      Anaphylaxis   • Ultram [Tramadol]      Rapid heart rate   • Ibuprofen Rash   • Latex Rash     Vitals:    10/28/20 1347   BP: 122/70   Pulse: 78   Temp: 98.3 °F (36.8 °C)       Home Medications:  Prior to Admission medications    Medication Sig Start Date End Date Taking? Authorizing Provider   ALBUTEROL SULFATE  (90 Base) MCG/ACT inhaler INHALE 2 PUFFS BY MOUTH EVERY 6 HOURS IF NEEDED FOR WHEEZING OR SHORTNESS OF BREATH 6/1/20  Yes Rosario Ceron APRN   amitriptyline (ELAVIL) 75 MG tablet TAKE ONE TABLET BY MOUTH AT BEDTIME. 1/29/18  Yes Sonia Mata MD   aspirin 81 MG EC tablet Take 1 tablet by mouth Daily. 1/14/20  Yes Rosario Ceron APRN   atorvastatin (LIPITOR) 40 MG tablet TAKE 1 TABLET BY MOUTH EVERY NIGHT FOR CHOLESTEROL 8/31/20  Yes Rosario Ceron APRN   busPIRone (BUSPAR) 30 MG tablet Take 1 tablet by mouth 2 (Two) Times a Day. 3/7/18  Yes Sonia Mata MD   chlorhexidine (HIBICLENS) 4 % external liquid Apply  topically to the appropriate area as directed Daily As Needed for Wound Care (to open wounds until they heal). 4/16/20  Yes Rosario Ceron APRN   clonazePAM (KlonoPIN) 0.5 MG tablet TAKE 1 TABLET BY MOUTH EVERY DAY 10/14/20  Yes Rosario Ceron APRN   cyanocobalamin 1000 MCG/ML injection Inject 1 mL into the appropriate muscle as directed by prescriber Every 28 (Twenty-Eight) Days. 9/22/20  Yes Rosario Ceron APRN   DEXILANT 60 MG capsule TAKE 1 CAPSULE BY MOUTH ONCE DAILY 3/23/20  Yes Rosario Ceron APRN   folic acid (FOLVITE) 800 MCG tablet Take 1 tablet by mouth  "Daily. 8/25/20  Yes [place], UMESH Ponce   HYDROcodone-acetaminophen (NORCO)  MG per tablet Take 1 tablet by mouth Every 8 (Eight) Hours As Needed for Moderate Pain . 9/25/20  Yes [place], UMESH Ponce   Hydrocortisone, Perianal, (PROCTOCORT) 1 % cream rectal cream Insert  into the rectum 2 (Two) Times a Day. 10/14/20  Yes Marlene Mcknight PA-C   levothyroxine (SYNTHROID, LEVOTHROID) 25 MCG tablet Take 0.5 tablets by mouth Daily. 10/8/20  Yes [place], UMESH Ponce   LINZESS 290 MCG capsule capsule TAKE 1 CAPSULE BY MOUTH EVERY MORNING BEFORE BREAKFAST 7/6/20  Yes Johnny Smiley PA-C   losartan (Cozaar) 50 MG tablet Take 1 tablet by mouth Daily. 8/14/20  Yes Ramin Martinez MD   metoprolol succinate XL (Toprol XL) 100 MG 24 hr tablet Take 1 tablet by mouth Daily. 8/14/20  Yes Ramin Martinez MD   mupirocin (BACTROBAN) 2 % ointment APPLY TOPICALLY TO THE APPROPRIATE AREA AS DIRECED THREE TIMES DAILY 6/1/20  Yes Rosario Ceron APRN   ondansetron ODT (ZOFRAN-ODT) 4 MG disintegrating tablet Place 1 tablet on the tongue Every 8 (Eight) Hours As Needed for Nausea. 5/28/20  Yes [place], UMESH Ponce   OXcarbazepine (TRILEPTAL) 150 MG tablet Take 150 mg by mouth Daily. Daily at bedtime   Yes Provider, MD Doris   prochlorperazine (COMPAZINE) 10 MG tablet Take 1 tablet by mouth Every 6 (Six) Hours As Needed for Nausea or Vomiting. 11/11/19  Yes CeronRosario APRN   rOPINIRole (REQUIP) 3 MG tablet Take 1 tablet by mouth every night at bedtime. 10/8/20  Yes Ceron, UMESH Ponce   Syringe/Needle, Disp, 25G X 5/8\" 3 ML misc 1 each Every 28 (Twenty-Eight) Days. 9/22/20  Yes [place] APRN   topiramate (TOPAMAX) 100 MG tablet Take 1 tablet by mouth 2 (Two) Times a Day. 5/28/20  Yes Ceron, UMESH Ponce   gabapentin (NEURONTIN) 100 MG capsule Take 1 capsule by mouth At Night As Needed (leg cramps related to sleep and lower back burning pain.). 10/8/20   CeronRosario " UMESH Duong   OnabotulinumtoxinA 200 units reconstituted solution 200 Units.    Provider, MD Doris   sucralfate (CARAFATE) 1 g tablet Take 1 tablet by mouth 2 (Two) Times a Day. 11/26/19   Johnny Smiley PA-C   ubrogepant tablet TK 1 T PO AT ONSET OF MIGRAINE. MAY REPEAT IN 2 HOURS AS NEEDED. MAX OF 2 TABLETS IN 24 HOURS. THIS IS A 30 DAY SCRIPT 9/1/20   Provider, MD Doris       Objective   Physical Exam  Vitals signs reviewed.   Constitutional:       General: She is not in acute distress.     Appearance: She is well-developed.   HENT:      Head: Normocephalic and atraumatic.      Nose: Nose normal.   Eyes:      Conjunctiva/sclera: Conjunctivae normal.   Neck:      Musculoskeletal: Normal range of motion.      Thyroid: No thyromegaly.      Trachea: No tracheal deviation.   Cardiovascular:      Rate and Rhythm: Normal rate and regular rhythm.      Heart sounds: Normal heart sounds. No murmur.   Pulmonary:      Effort: Pulmonary effort is normal. No respiratory distress.      Breath sounds: Normal breath sounds. No wheezing or rales.   Chest:      Chest wall: No tenderness.   Abdominal:      General: There is no distension.      Palpations: Abdomen is soft.      Tenderness: There is no abdominal tenderness. There is no guarding or rebound.      Hernia: No hernia is present.   Musculoskeletal:         General: No tenderness or deformity.   Skin:     General: Skin is warm and dry.      Findings: No rash.   Neurological:      Mental Status: She is alert and oriented to person, place, and time.   Psychiatric:         Behavior: Behavior normal.         Thought Content: Thought content normal.         Judgment: Judgment normal.     Bluish almost bruise-like lesion in question right posterior flank approximately 5 x 6 mm in size and feels like it is in the superficial subcutaneous area.  Is not fixed any underlying structures.  No sinus no redness no signs of infection.    Dressing on left flank  wound    Assessment/Plan Soft tissue mass right flank is tender the patient she is very concerned and clearly wished to have this excised.  I reviewed her ultrasound and unclear if the ultrasound evaluated this area or not.  Clearly she has a palpable mass and is not a hematoma and also on expected to have gotten better over the 2-month period.  Patient wished to have it removed.  Discussed doing that in the operating room versus in the office under local.  She wished to have it done in the operating room.  Fully discussed excising this soft tissue mass as well as alternatives risk benefits that she clearly understands and wishes to proceed      The encounter diagnosis was Soft tissue mass.                     This document has been electronically signed by Meet Mcintyre MD on October 28, 2020 14:48 CDT

## 2020-10-29 DIAGNOSIS — G89.29 CHRONIC PAIN OF RIGHT KNEE: ICD-10-CM

## 2020-10-29 DIAGNOSIS — M25.561 CHRONIC PAIN OF RIGHT KNEE: ICD-10-CM

## 2020-10-29 DIAGNOSIS — S80.01XA CONTUSION OF RIGHT KNEE, INITIAL ENCOUNTER: ICD-10-CM

## 2020-10-29 DIAGNOSIS — M54.50 CHRONIC MIDLINE LOW BACK PAIN WITHOUT SCIATICA: ICD-10-CM

## 2020-10-29 DIAGNOSIS — G89.29 CHRONIC MIDLINE LOW BACK PAIN WITHOUT SCIATICA: ICD-10-CM

## 2020-10-29 RX ORDER — HYDROCODONE BITARTRATE AND ACETAMINOPHEN 10; 325 MG/1; MG/1
1 TABLET ORAL EVERY 8 HOURS PRN
Qty: 90 TABLET | Refills: 0 | Status: SHIPPED | OUTPATIENT
Start: 2020-10-29 | End: 2020-11-30 | Stop reason: SDUPTHER

## 2020-11-02 ENCOUNTER — LAB (OUTPATIENT)
Dept: LAB | Facility: HOSPITAL | Age: 60
End: 2020-11-02

## 2020-11-02 DIAGNOSIS — Z01.818 PREOP TESTING: Primary | ICD-10-CM

## 2020-11-02 DIAGNOSIS — Z23 NEED FOR VACCINATION: Primary | ICD-10-CM

## 2020-11-02 PROCEDURE — C9803 HOPD COVID-19 SPEC COLLECT: HCPCS

## 2020-11-02 PROCEDURE — 90686 IIV4 VACC NO PRSV 0.5 ML IM: CPT | Performed by: NURSE PRACTITIONER

## 2020-11-02 PROCEDURE — U0003 INFECTIOUS AGENT DETECTION BY NUCLEIC ACID (DNA OR RNA); SEVERE ACUTE RESPIRATORY SYNDROME CORONAVIRUS 2 (SARS-COV-2) (CORONAVIRUS DISEASE [COVID-19]), AMPLIFIED PROBE TECHNIQUE, MAKING USE OF HIGH THROUGHPUT TECHNOLOGIES AS DESCRIBED BY CMS-2020-01-R: HCPCS

## 2020-11-02 PROCEDURE — 90471 IMMUNIZATION ADMIN: CPT | Performed by: NURSE PRACTITIONER

## 2020-11-03 ENCOUNTER — OFFICE VISIT (OUTPATIENT)
Dept: FAMILY MEDICINE CLINIC | Facility: CLINIC | Age: 60
End: 2020-11-03

## 2020-11-03 VITALS — BODY MASS INDEX: 21.35 KG/M2 | WEIGHT: 116 LBS | HEIGHT: 62 IN

## 2020-11-03 DIAGNOSIS — M53.3 COCCYGEAL PAIN: Primary | ICD-10-CM

## 2020-11-03 LAB
COVID LABCORP PRIORITY: NORMAL
SARS-COV-2 RNA RESP QL NAA+PROBE: NOT DETECTED

## 2020-11-03 PROCEDURE — G2025 DIS SITE TELE SVCS RHC/FQHC: HCPCS | Performed by: NURSE PRACTITIONER

## 2020-11-04 ENCOUNTER — ANESTHESIA EVENT (OUTPATIENT)
Dept: PERIOP | Facility: HOSPITAL | Age: 60
End: 2020-11-04

## 2020-11-05 ENCOUNTER — ANESTHESIA (OUTPATIENT)
Dept: PERIOP | Facility: HOSPITAL | Age: 60
End: 2020-11-05

## 2020-11-05 ENCOUNTER — HOSPITAL ENCOUNTER (OUTPATIENT)
Facility: HOSPITAL | Age: 60
Setting detail: HOSPITAL OUTPATIENT SURGERY
Discharge: HOME | End: 2020-11-05
Attending: SURGERY | Admitting: SURGERY

## 2020-11-05 VITALS
OXYGEN SATURATION: 99 % | SYSTOLIC BLOOD PRESSURE: 106 MMHG | HEIGHT: 62 IN | HEART RATE: 82 BPM | TEMPERATURE: 97.4 F | BODY MASS INDEX: 21.18 KG/M2 | RESPIRATION RATE: 18 BRPM | WEIGHT: 115.08 LBS | DIASTOLIC BLOOD PRESSURE: 54 MMHG

## 2020-11-05 DIAGNOSIS — M79.89 SOFT TISSUE MASS: ICD-10-CM

## 2020-11-05 PROCEDURE — 25010000002 HYDROMORPHONE 1 MG/ML SOLUTION: Performed by: NURSE ANESTHETIST, CERTIFIED REGISTERED

## 2020-11-05 PROCEDURE — 25010000002 PROPOFOL 10 MG/ML EMULSION: Performed by: NURSE ANESTHETIST, CERTIFIED REGISTERED

## 2020-11-05 PROCEDURE — 88307 TISSUE EXAM BY PATHOLOGIST: CPT

## 2020-11-05 PROCEDURE — 25010000002 PHENYLEPHRINE PER 1 ML: Performed by: NURSE ANESTHETIST, CERTIFIED REGISTERED

## 2020-11-05 PROCEDURE — 25010000002 DIPHENHYDRAMINE PER 50 MG: Performed by: NURSE ANESTHETIST, CERTIFIED REGISTERED

## 2020-11-05 PROCEDURE — 25010000002 MIDAZOLAM PER 1 MG: Performed by: NURSE ANESTHETIST, CERTIFIED REGISTERED

## 2020-11-05 PROCEDURE — 25010000002 DEXAMETHASONE PER 1 MG: Performed by: NURSE ANESTHETIST, CERTIFIED REGISTERED

## 2020-11-05 PROCEDURE — 21931 EXC BACK LES SC 3 CM/>: CPT | Performed by: SURGERY

## 2020-11-05 RX ORDER — DEXAMETHASONE SODIUM PHOSPHATE 4 MG/ML
INJECTION, SOLUTION INTRA-ARTICULAR; INTRALESIONAL; INTRAMUSCULAR; INTRAVENOUS; SOFT TISSUE AS NEEDED
Status: DISCONTINUED | OUTPATIENT
Start: 2020-11-05 | End: 2020-11-05 | Stop reason: SURG

## 2020-11-05 RX ORDER — PROPOFOL 10 MG/ML
VIAL (ML) INTRAVENOUS AS NEEDED
Status: DISCONTINUED | OUTPATIENT
Start: 2020-11-05 | End: 2020-11-05 | Stop reason: SURG

## 2020-11-05 RX ORDER — MIDAZOLAM HYDROCHLORIDE 1 MG/ML
INJECTION INTRAMUSCULAR; INTRAVENOUS AS NEEDED
Status: DISCONTINUED | OUTPATIENT
Start: 2020-11-05 | End: 2020-11-05 | Stop reason: SURG

## 2020-11-05 RX ORDER — SODIUM CHLORIDE 0.9 % (FLUSH) 0.9 %
3 SYRINGE (ML) INJECTION EVERY 12 HOURS SCHEDULED
Status: DISCONTINUED | OUTPATIENT
Start: 2020-11-05 | End: 2020-11-05 | Stop reason: HOSPADM

## 2020-11-05 RX ORDER — LIDOCAINE HYDROCHLORIDE AND EPINEPHRINE 10; 10 MG/ML; UG/ML
INJECTION, SOLUTION INFILTRATION; PERINEURAL AS NEEDED
Status: DISCONTINUED | OUTPATIENT
Start: 2020-11-05 | End: 2020-11-05 | Stop reason: HOSPADM

## 2020-11-05 RX ORDER — DIPHENHYDRAMINE HYDROCHLORIDE 50 MG/ML
INJECTION INTRAMUSCULAR; INTRAVENOUS AS NEEDED
Status: DISCONTINUED | OUTPATIENT
Start: 2020-11-05 | End: 2020-11-05 | Stop reason: SURG

## 2020-11-05 RX ORDER — SODIUM CHLORIDE 0.9 % (FLUSH) 0.9 %
10 SYRINGE (ML) INJECTION AS NEEDED
Status: DISCONTINUED | OUTPATIENT
Start: 2020-11-05 | End: 2020-11-05 | Stop reason: HOSPADM

## 2020-11-05 RX ORDER — EPHEDRINE SULFATE 50 MG/ML
INJECTION, SOLUTION INTRAVENOUS AS NEEDED
Status: DISCONTINUED | OUTPATIENT
Start: 2020-11-05 | End: 2020-11-05 | Stop reason: SURG

## 2020-11-05 RX ORDER — MEPERIDINE HYDROCHLORIDE 25 MG/ML
12.5 INJECTION INTRAMUSCULAR; INTRAVENOUS; SUBCUTANEOUS
Status: DISCONTINUED | OUTPATIENT
Start: 2020-11-05 | End: 2020-11-05 | Stop reason: HOSPADM

## 2020-11-05 RX ORDER — SODIUM CHLORIDE, SODIUM GLUCONATE, SODIUM ACETATE, POTASSIUM CHLORIDE, AND MAGNESIUM CHLORIDE 526; 502; 368; 37; 30 MG/100ML; MG/100ML; MG/100ML; MG/100ML; MG/100ML
1000 INJECTION, SOLUTION INTRAVENOUS CONTINUOUS
Status: DISCONTINUED | OUTPATIENT
Start: 2020-11-05 | End: 2020-11-05 | Stop reason: HOSPADM

## 2020-11-05 RX ADMIN — PROPOFOL 150 MG: 10 INJECTION, EMULSION INTRAVENOUS at 12:39

## 2020-11-05 RX ADMIN — MIDAZOLAM HYDROCHLORIDE 2 MG: 2 INJECTION, SOLUTION INTRAMUSCULAR; INTRAVENOUS at 12:33

## 2020-11-05 RX ADMIN — EPHEDRINE SULFATE 10 MG: 50 INJECTION INTRAVENOUS at 12:43

## 2020-11-05 RX ADMIN — DIPHENHYDRAMINE HYDROCHLORIDE 12.5 MG: 50 INJECTION INTRAMUSCULAR; INTRAVENOUS at 12:59

## 2020-11-05 RX ADMIN — PHENYLEPHRINE HYDROCHLORIDE 100 MCG: 10 INJECTION INTRAVENOUS at 12:50

## 2020-11-05 RX ADMIN — DEXAMETHASONE SODIUM PHOSPHATE 4 MG: 4 INJECTION, SOLUTION INTRAMUSCULAR; INTRAVENOUS at 12:42

## 2020-11-05 RX ADMIN — GLYCOPYRROLATE 0.4 MG: 0.2 INJECTION, SOLUTION INTRAMUSCULAR; INTRAVITREAL at 12:50

## 2020-11-05 RX ADMIN — HYDROMORPHONE HYDROCHLORIDE 0.25 MG: 1 INJECTION, SOLUTION INTRAMUSCULAR; INTRAVENOUS; SUBCUTANEOUS at 13:39

## 2020-11-05 RX ADMIN — EPHEDRINE SULFATE 15 MG: 50 INJECTION INTRAVENOUS at 12:50

## 2020-11-05 RX ADMIN — SODIUM CHLORIDE, SODIUM GLUCONATE, SODIUM ACETATE, POTASSIUM CHLORIDE, AND MAGNESIUM CHLORIDE 1000 ML: 526; 502; 368; 37; 30 INJECTION, SOLUTION INTRAVENOUS at 11:55

## 2020-11-05 RX ADMIN — PHENYLEPHRINE HYDROCHLORIDE 100 MCG: 10 INJECTION INTRAVENOUS at 12:58

## 2020-11-05 RX ADMIN — PHENYLEPHRINE HYDROCHLORIDE 100 MCG: 10 INJECTION INTRAVENOUS at 12:55

## 2020-11-05 NOTE — DISCHARGE INSTRUCTIONS
Incision Care, Adult  An incision is a surgical cut that is made through your skin. Most incisions are closed after surgery. Your incision may be closed with stitches (sutures), staples, skin glue, or adhesive strips. You may need to return to your health care provider to have sutures or staples removed. This may occur several days to several weeks after your surgery. The incision needs to be cared for properly to prevent infection.  How to care for your incision  Incision care    · Follow instructions from your health care provider about how to take care of your incision. Make sure you:  ? Wash your hands with soap and water before you change the bandage (dressing). If soap and water are not available, use hand .  ? Change your dressing as told by your health care provider.  ? Leave sutures, skin glue, or adhesive strips in place. These skin closures may need to stay in place for 2 weeks or longer. If adhesive strip edges start to loosen and curl up, you may trim the loose edges. Do not remove adhesive strips completely unless your health care provider tells you to do that.  · Check your incision area every day for signs of infection. Check for:  ? More redness, swelling, or pain.  ? More fluid or blood.  ? Warmth.  ? Pus or a bad smell.  · Ask your health care provider how to clean the incision. This may include:  ? Using mild soap and water.  ? Using a clean towel to pat the incision dry after cleaning it.  ? Applying a cream or ointment. Do this only as told by your health care provider.  ? Covering the incision with a clean dressing.  · Ask your health care provider when you can leave the incision uncovered.  · Do not take baths, swim, or use a hot tub until your health care provider approves. Ask your health care provider if you can take showers. You may only be allowed to take sponge baths for bathing.  Medicines  · If you were prescribed an antibiotic medicine, cream, or ointment, take or apply the  antibiotic as told by your health care provider. Do not stop taking or applying the antibiotic even if your condition improves.  · Take over-the-counter and prescription medicines only as told by your health care provider.  General instructions  · Limit movement around your incision to improve healing.  ? Avoid straining, lifting, or exercise for the first month, or for as long as told by your health care provider.  ? Follow instructions from your health care provider about returning to your normal activities.  ? Ask your health care provider what activities are safe.  · Protect your incision from the sun when you are outside for the first 6 months, or for as long as told by your health care provider. Apply sunscreen around the scar or cover it up.  · Keep all follow-up visits as told by your health care provider. This is important.  Contact a health care provider if:  · Your have more redness, swelling, or pain around the incision.  · You have more fluid or blood coming from the incision.  · Your incision feels warm to the touch.  · You have pus or a bad smell coming from the incision.  · You have a fever or shaking chills.  · You are nauseous or you vomit.  · You are dizzy.  · Your sutures or staples come undone.  Get help right away if:  · You have a red streak coming from your incision.  · Your incision bleeds through the dressing and the bleeding does not stop with gentle pressure.  · The edges of your incision open up and separate.  · You have severe pain.  · You have a rash.  · You are confused.  · You faint.  · You have trouble breathing and a fast heartbeat.  This information is not intended to replace advice given to you by your health care provider. Make sure you discuss any questions you have with your health care provider.  Document Released: 07/07/2006 Document Revised: 12/20/2019 Document Reviewed: 07/05/2017  Elsevier Patient Education © 2020 [employer]  General Anesthesia, Adult, Care After  This  sheet gives you information about how to care for yourself after your procedure. Your health care provider may also give you more specific instructions. If you have problems or questions, contact your health care provider.  What can I expect after the procedure?  After the procedure, the following side effects are common:  · Pain or discomfort at the IV site.  · Nausea.  · Vomiting.  · Sore throat.  · Trouble concentrating.  · Feeling cold or chills.  · Weak or tired.  · Sleepiness and fatigue.  · Soreness and body aches. These side effects can affect parts of the body that were not involved in surgery.  Follow these instructions at home:    For at least 24 hours after the procedure:  · Have a responsible adult stay with you. It is important to have someone help care for you until you are awake and alert.  · Rest as needed.  · Do not:  ? Participate in activities in which you could fall or become injured.  ? Drive.  ? Use heavy machinery.  ? Drink alcohol.  ? Take sleeping pills or medicines that cause drowsiness.  ? Make important decisions or sign legal documents.  ? Take care of children on your own.  Eating and drinking  · Follow any instructions from your health care provider about eating or drinking restrictions.  · When you feel hungry, start by eating small amounts of foods that are soft and easy to digest (bland), such as toast. Gradually return to your regular diet.  · Drink enough fluid to keep your urine pale yellow.  · If you vomit, rehydrate by drinking water, juice, or clear broth.  General instructions  · If you have sleep apnea, surgery and certain medicines can increase your risk for breathing problems. Follow instructions from your health care provider about wearing your sleep device:  ? Anytime you are sleeping, including during daytime naps.  ? While taking prescription pain medicines, sleeping medicines, or medicines that make you drowsy.  · Return to your normal activities as told by your  health care provider. Ask your health care provider what activities are safe for you.  · Take over-the-counter and prescription medicines only as told by your health care provider.  · If you smoke, do not smoke without supervision.  · Keep all follow-up visits as told by your health care provider. This is important.  Contact a health care provider if:  · You have nausea or vomiting that does not get better with medicine.  · You cannot eat or drink without vomiting.  · You have pain that does not get better with medicine.  · You are unable to pass urine.  · You develop a skin rash.  · You have a fever.  · You have redness around your IV site that gets worse.  Get help right away if:  · You have difficulty breathing.  · You have chest pain.  · You have blood in your urine or stool, or you vomit blood.  Summary  · After the procedure, it is common to have a sore throat or nausea. It is also common to feel tired.  · Have a responsible adult stay with you for the first 24 hours after general anesthesia. It is important to have someone help care for you until you are awake and alert.  · When you feel hungry, start by eating small amounts of foods that are soft and easy to digest (bland), such as toast. Gradually return to your regular diet.  · Drink enough fluid to keep your urine pale yellow.  · Return to your normal activities as told by your health care provider. Ask your health care provider what activities are safe for you.  This information is not intended to replace advice given to you by your health care provider. Make sure you discuss any questions you have with your health care provider.  Document Released: 03/26/2002 Document Revised: 12/21/2018 Document Reviewed: 08/03/2018  Elsevier Patient Education © 2020 Elsevier Inc[employer]

## 2020-11-05 NOTE — OP NOTE
MASS SOFT TISSUE EXCISION  Procedure Note    Tricia Loyola  11/5/2020    Pre-op Diagnosis:   Soft tissue mass [M79.89]    Post-op Diagnosis:     Post-Op Diagnosis Codes:     * Soft tissue mass [M79.89]    Procedure/CPT® Codes:      Procedure(s):  EXCISE SOFT TISSUE MASS RIGHT POSTERIOR FLANK                 (latex allergy)    Surgeon(s):  Meet Mcintyre MD    Anesthesia: Choice    Staff:   Circulator: Sherry Gonzalez RN  Scrub Person: Tiffany Parkinson  Assistant: Carrol Ramirez CSA    Assistant: Carrol Ramirez CSA was responsible for performing the following activities: Retraction, suction, dressing placement and their skilled assistance was necessary for the success of this case.     Estimated Blood Loss: none    Specimens:                ID Type Source Tests Collected by Time   A (Not marked as sent) : Right Posterior Flank Sub Q Mass Tissue Flank, right TISSUE PATHOLOGY EXAM Meet Mcintyre MD 11/5/2020 1301         Drains: * No LDAs found *    Indications: Tender soft tissue mass right posterior flank.  Ultrasound demonstrates this to be in the superficial subcutaneous tissue just under the skin appears to be made up of 3 cystic lesions.  No prior history of skin cancer.  No obvious history of trauma at the site.  Patient wished to have this excised for therapeutic as well as diagnostic reasons.    Findings: Hemorrhagic cystic lesion completely excised in the superficial subcutaneous tissue just under the skin.  Skin incision was 3 cm at the completion    Complications: None    Procedure:  The patient was brought to the operating room where she was placed under general anesthesia without any difficulty. The patient had SCDs in place. She was placed left side down right side up with exposure of this area and right posterior flank. Prepped the area with Betadine. Appropriate timeout was taken with everyone in agreement. We infiltrated a total of 20 mL of local. We then made our skin incision.  Mapped out an ellipse of skin around this lesion in a transverse orientation. We made the skin incision sharply, we followed it down through the subcutaneous tissue and completely around the lesion and excised it through normal fat planes. Once it was removed we opened it on the back table and it appeared to be a small hemorrhagic cystic looking lesion. That was sent for permanent pathology. It was felt that we completely removed the entire lesion grossly. We then closed the wound with interrupted 3-0 Vicryl and running 4-0 Monocryl subcuticular stitch. Glue was used as final skin closure. The patient was awakened, extubated and transferred to recovery in awake and stable condition.          Disposition: Transfer to recovery room in stable condition        Meet Mcintyre MD     Date: 11/5/2020  Time: 13:11 CST

## 2020-11-05 NOTE — ANESTHESIA PREPROCEDURE EVALUATION
Anesthesia Evaluation     Patient summary reviewed and Nursing notes reviewed   NPO Solid Status: > 8 hours  NPO Liquid Status: > 8 hours           Airway   Mallampati: II  TM distance: <3 FB  Neck ROM: full  Anterior  Dental    (+) poor dentition    Pulmonary     breath sounds clear to auscultation  (+) a smoker, COPD,   Cardiovascular   Exercise tolerance: poor (<4 METS)    NYHA Classification: III  ECG reviewed    (+) MONIQUE, murmur, hyperlipidemia,       Neuro/Psych  (+) seizures, headaches, numbness, psychiatric history Anxiety and Depression,     GI/Hepatic/Renal/Endo    (+)  GERD,  hepatitis A, liver disease fatty liver disease,     Musculoskeletal     (+) back pain, chronic pain, gait problem, neck pain,   Abdominal  - normal exam   Substance History      OB/GYN          Other   arthritis,                        Anesthesia Plan    ASA 3     general   (Small superficial skin lesion about one cm on right side flank; she does have anxiety and chronic pain for which she takes vicodin.  )  intravenous induction     Anesthetic plan, all risks, benefits, and alternatives have been provided, discussed and informed consent has been obtained with: patient.

## 2020-11-05 NOTE — ANESTHESIA PROCEDURE NOTES
Airway  Urgency: elective    Date/Time: 11/5/2020 12:40 PM  Airway not difficult    General Information and Staff    Patient location during procedure: OR  CRNA: Teddy Dozier CRNA    Indications and Patient Condition  Indications for airway management: airway protection    Preoxygenated: yes  Mask difficulty assessment: 0 - not attempted    Final Airway Details  Final airway type: supraglottic airway      Successful airway: LMA  Size 4    Cormack-Lehane Classification: grade IIa - partial view of glottis  Number of attempts at approach: 1  Assessment: lips, teeth, and gum same as pre-op and atraumatic intubation    Additional Comments  Teeth checked after intubation.  No damage noted.

## 2020-11-05 NOTE — ANESTHESIA POSTPROCEDURE EVALUATION
Patient: Tricia Loyola    Procedure Summary     Date: 11/05/20 Room / Location: [facility] OR 09 / [facility] OR    Anesthesia Start: 1233 Anesthesia Stop:     Procedure: EXCISE SOFT TISSUE MASS RIGHT POSTERIOR FLANK                 (latex allergy) (Right ) Diagnosis:       Soft tissue mass      (Soft tissue mass [M79.89])    Surgeon: Meet Mcintyre MD Provider: Robert Culver MD    Anesthesia Type: general ASA Status: 3          Anesthesia Type: general    Vitals  No vitals data found for the desired time range.          Post Anesthesia Care and Evaluation    Patient location during evaluation: bedside  Patient participation: complete - patient cannot participate  Level of consciousness: awake  Pain score: 0  Pain management: adequate  Airway patency: patent  Anesthetic complications: No anesthetic complications  PONV Status: none  Cardiovascular status: acceptable  Respiratory status: acceptable  Hydration status: acceptable

## 2020-11-10 ENCOUNTER — OFFICE VISIT (OUTPATIENT)
Dept: GASTROENTEROLOGY | Facility: CLINIC | Age: 60
End: 2020-11-10

## 2020-11-10 VITALS
DIASTOLIC BLOOD PRESSURE: 66 MMHG | BODY MASS INDEX: 21.42 KG/M2 | HEART RATE: 82 BPM | WEIGHT: 116.4 LBS | SYSTOLIC BLOOD PRESSURE: 117 MMHG | HEIGHT: 62 IN

## 2020-11-10 DIAGNOSIS — K58.2 IRRITABLE BOWEL SYNDROME WITH BOTH CONSTIPATION AND DIARRHEA: ICD-10-CM

## 2020-11-10 DIAGNOSIS — R74.8 ELEVATED LIVER ENZYMES: ICD-10-CM

## 2020-11-10 DIAGNOSIS — K74.00 HEPATIC FIBROSIS: ICD-10-CM

## 2020-11-10 DIAGNOSIS — K21.00 GASTROESOPHAGEAL REFLUX DISEASE WITH ESOPHAGITIS WITHOUT HEMORRHAGE: Primary | ICD-10-CM

## 2020-11-10 LAB
LAB AP CASE REPORT: NORMAL
PATH REPORT.FINAL DX SPEC: NORMAL

## 2020-11-10 PROCEDURE — 99213 OFFICE O/P EST LOW 20 MIN: CPT | Performed by: PHYSICIAN ASSISTANT

## 2020-11-10 RX ORDER — FAMOTIDINE 40 MG/1
40 TABLET, FILM COATED ORAL DAILY
Qty: 30 TABLET | Refills: 3 | Status: SHIPPED | OUTPATIENT
Start: 2020-11-10 | End: 2021-02-23

## 2020-11-10 NOTE — PROGRESS NOTES
Chief Complaint   Patient presents with   • Nausea   • Heartburn   • Irritable Bowel Syndrome   • Hepatic Fibrosis       ENDO PROCEDURE ORDERED:    Subjective    Tricia Loyola is a 60 y.o. female. she is here today for follow-up.    History of Present Illness    Patient is seen on a recheck of her nausea, GERD, IBS, hepatic fibrosis. Last seen on 08/18/2020. Laboratories at that time showed normal TSH, T3, T4. Folate was low at 2.75 with B12 low at 345. She states UMESH Barahona had started her on folic acid and B12 since then. Vitamin D was normal. CBC normal. Cholesterol panel showed triglycerides 156. CMP showed glucose 100, AST 64; otherwise normal. Patient states she has had 2 surgeries on her back. She states she had a basal cell carcinoma removed. She is still having breakthrough reflux despite taking the Dexilant 60 mg daily for chronic heartburn. Previously she was on Zantac and did well with that combination. She denied dysphagia. She is on Linzess 290 mcg for IBS-C. She is taking the folic acid and B12. She does take Elavil at night. Weight is up 3.5 pounds since last visit. Last EGD showed esophagitis, gastritis on 10/16/2019. Last colonoscopy 11/26/2018.     Patient had ultrasound of soft tissue of multiple areas on 10/13/2020. X-ray of the sacrum was negative on 10/14/2020. CT of lumbar on 10/29/2020 showed degenerative changes with stone in the left kidney.     ASSESSMENT/PLAN: Patient with chronic GERD. We will continue on the Dexilant. Encouraged to avoid gastric irritants. We will try adding Pepcid 40 mg daily for her breakthrough reflux. Slight elevation in LFTs. Folate deficiency, she was encouraged to continue supplementation. We will continue on the Linzess for the IBS-C. I did talk to my supervisor and director about the no-show letter that she got. She was upset about it and concerned with.    We will plan followup in 1 month. Further pending clinical course and the results of the  above.      The following portions of the patient's history were reviewed and updated as appropriate:   Past Medical History:   Diagnosis Date   • Abdominal pain    • Anemia    • Anxiety and depression    • Chronic post-traumatic headache      rebound      • Depressive disorder    • Disease of thyroid gland    • Encounter for gynecological examination    • Gastroesophageal reflux disease    • Generalized anxiety disorder    • History of mammogram 08/2008    MAMMOGRAM DIAGNOSTIC BILATERAL 98112 ([facility]) (1)     • Hyperlipidemia    • Ingrown toenail    • Injury of back    • Leaky heart valve    • Nonruptured cerebral aneurysm    • Osteoporosis    • Osteoporosis    • Posttraumatic stress disorder    • PTSD (post-traumatic stress disorder)    • Seizure (CMS/HCC)     Psychogenic non-epileptic sz    • Viral hepatitis A    • Wears dentures     uppers only   • Wears glasses      Past Surgical History:   Procedure Laterality Date   • APPENDECTOMY     • BREAST BIOPSY Left    • CHOLECYSTECTOMY     • COLONOSCOPY N/A 11/26/2018    Procedure: COLONOSCOPY;  Surgeon: Meet Mcintyre MD;  Location: [facility] ENDOSCOPY;  Service: General   • CRANIOTOMY FOR ANEURYSM  2003   • ENDOSCOPY N/A 10/16/2019    Procedure: ESOPHAGOGASTRODUODENOSCOPY;  Surgeon: Kory Muhammad MD;  Location: [facility] ENDOSCOPY;  Service: Gastroenterology   • MASS EXCISION Right 11/5/2020    Procedure: EXCISE SOFT TISSUE MASS RIGHT POSTERIOR FLANK                 (latex allergy);  Surgeon: Meet Mcintyre MD;  Location: [facility] OR;  Service: General;  Laterality: Right;   • PAP SMEAR  08/16/2012   • SKIN CANCER EXCISION      on back   • SKIN LESION EXCISION     • TONSILLECTOMY     • UPPER GASTROINTESTINAL ENDOSCOPY  10/16/2019     Family History   Problem Relation Age of Onset   • Constipation Mother    • Hypertension Mother    • Anxiety disorder Mother    • Heart disease Mother    • Alcohol abuse Father    • Heart disease Father    • Anxiety disorder Brother    •  Kidney disease Brother    • Hypertension Brother    • Arthritis Brother    • Anxiety disorder Brother    • Kidney disease Brother    • Diabetes Brother    • Anxiety disorder Brother    • Hypertension Brother    • Breast cancer Paternal Aunt    • Heart disease Maternal Grandmother    • Clotting disorder Maternal Grandmother    • Heart disease Maternal Grandfather      OB History        3    Para   2    Term   2            AB   1    Living   2       SAB   1    TAB        Ectopic        Molar        Multiple        Live Births                  Allergies   Allergen Reactions   • Nitrofuran Derivatives Hives     Macrobid   • Bactrim [Sulfamethoxazole-Trimethoprim] Other (See Comments)     Turns skin all red and eyes swell    • Cleocin [Clindamycin Hcl] Hives and Other (See Comments)     Turned red all over body and hot and eyes swelled    • Augmentin [Amoxicillin-Pot Clavulanate]      Hives   • Ciprofloxacin      Cipro:  Rash   • Fiorinal [Butalbital-Aspirin-Caffeine]      Rapid heart rate   • Imitrex [Sumatriptan]      Rapid heart rate   • Iodine      Anaphylaxis   • Toradol [Ketorolac Tromethamine]      Anaphylaxis   • Ultram [Tramadol]      Rapid heart rate   • Ibuprofen Rash   • Latex Rash   • Other Rash     MIDRIN  -  Rapid heart rate  pREGO SPAGHETTI SAUCE CAUSES A RASH  c diff 2009     Social History     Socioeconomic History   • Marital status: Legally      Spouse name: Not on file   • Number of children: Not on file   • Years of education: Not on file   • Highest education level: Not on file   Tobacco Use   • Smoking status: Light Tobacco Smoker     Packs/day: 0.50     Years: 43.00     Pack years: 21.50     Types: Cigarettes   • Smokeless tobacco: Never Used   Substance and Sexual Activity   • Alcohol use: No   • Drug use: No   • Sexual activity: Never     Current Medications:  Prior to Admission medications    Medication Sig Start Date End Date Taking? Authorizing Provider   ALBUTEROL  SULFATE  (90 Base) MCG/ACT inhaler INHALE 2 PUFFS BY MOUTH EVERY 6 HOURS IF NEEDED FOR WHEEZING OR SHORTNESS OF BREATH 6/1/20  Yes Rosario Ceron APRN   amitriptyline (ELAVIL) 75 MG tablet TAKE ONE TABLET BY MOUTH AT BEDTIME. 1/29/18  Yes Sonia Mata MD   aspirin 81 MG EC tablet Take 1 tablet by mouth Daily. 1/14/20  Yes CeronRosario APRN   atorvastatin (LIPITOR) 40 MG tablet TAKE 1 TABLET BY MOUTH EVERY NIGHT FOR CHOLESTEROL 8/31/20  Yes Rosario Ceron APRN   busPIRone (BUSPAR) 30 MG tablet Take 1 tablet by mouth 2 (Two) Times a Day. 3/7/18  Yes Sonia Mata MD   clonazePAM (KlonoPIN) 0.5 MG tablet Take 0.5 mg by mouth Daily As Needed. Takes 0.25 tablet/ sometimes takes a whole pill   Yes Provider, MD Doris   cyanocobalamin 1000 MCG/ML injection Inject 1 mL into the appropriate muscle as directed by prescriber Every 28 (Twenty-Eight) Days. 9/22/20  Yes Rosario Ceron APRN   DEXILANT 60 MG capsule TAKE 1 CAPSULE BY MOUTH ONCE DAILY 3/23/20  Yes CeronRosario APRN   folic acid (FOLVITE) 800 MCG tablet Take 1 tablet by mouth Daily. 8/25/20  Yes [place] APRN   HYDROcodone-acetaminophen (NORCO)  MG per tablet Take 1 tablet by mouth Every 8 (Eight) Hours As Needed for Moderate Pain . 10/29/20  Yes Carolina Baird MD   Hydrocortisone, Perianal, (PROCTOCORT) 1 % cream rectal cream Insert  into the rectum 2 (Two) Times a Day. 10/14/20  Yes Marlene Mcknight PA-C   levothyroxine (SYNTHROID, LEVOTHROID) 25 MCG tablet Take 0.5 tablets by mouth Daily. 10/8/20  Yes CeronRosario APRN   LINZESS 290 MCG capsule capsule TAKE 1 CAPSULE BY MOUTH EVERY MORNING BEFORE BREAKFAST 7/6/20  Yes Johnny Smiley PA-C   losartan (COZAAR) 50 MG tablet Take 50 mg by mouth Every Night.   Yes Provider, MD Doris   metoprolol succinate XL (TOPROL-XL) 100 MG 24 hr tablet Take 100 mg by mouth Every Evening.   Yes Provider, Historical, MD   mupirocin  "(BACTROBAN) 2 % ointment APPLY TOPICALLY TO THE APPROPRIATE AREA AS DIRECED THREE TIMES DAILY 6/1/20  Yes Ceron, UMESH Ponce   OnabotulinumtoxinA 200 units reconstituted solution 200 Units.   Yes ProviderDoris MD   ondansetron ODT (ZOFRAN-ODT) 4 MG disintegrating tablet Place 1 tablet on the tongue Every 8 (Eight) Hours As Needed for Nausea. 5/28/20  Yes Ceron, UMESH Ponce   OXcarbazepine (TRILEPTAL) 150 MG tablet Take 150 mg by mouth Daily. Daily at bedtime   Yes ProviderDoris MD   prochlorperazine (COMPAZINE) 10 MG tablet Take 1 tablet by mouth Every 6 (Six) Hours As Needed for Nausea or Vomiting. 11/11/19  Yes Ceron, UMESH Ponce   rOPINIRole (REQUIP) 3 MG tablet Take 1 tablet by mouth every night at bedtime. 10/8/20  Yes Ceron, UMESH Ponce   Syringe/Needle, Disp, 25G X 5/8\" 3 ML misc 1 each Every 28 (Twenty-Eight) Days. 9/22/20  Yes Ceron, UMESH Ponce   topiramate (TOPAMAX) 100 MG tablet Take 1 tablet by mouth 2 (Two) Times a Day. 5/28/20  Yes Ceron, UMESH Ponce   ubrogepant tablet TK 1 T PO AT ONSET OF MIGRAINE. MAY REPEAT IN 2 HOURS AS NEEDED. MAX OF 2 TABLETS IN 24 HOURS. THIS IS A 30 DAY SCRIPT 9/1/20  Yes ProviderDoris MD     Review of Systems  Review of Systems       Objective    /66   Pulse 82   Ht 157.5 cm (62\")   Wt 52.8 kg (116 lb 6.4 oz)   BMI 21.29 kg/m²   Physical Exam  Vitals signs and nursing note reviewed.   Constitutional:       General: She is not in acute distress.     Appearance: She is well-developed.   HENT:      Head: Normocephalic and atraumatic.   Eyes:      Pupils: Pupils are equal, round, and reactive to light.   Neck:      Musculoskeletal: Normal range of motion.   Cardiovascular:      Rate and Rhythm: Normal rate and regular rhythm.      Heart sounds: Normal heart sounds.   Pulmonary:      Effort: Pulmonary effort is normal.      Breath sounds: Normal breath sounds.   Abdominal:      General: Bowel sounds are " normal. There is no distension or abdominal bruit.      Palpations: Abdomen is soft. Abdomen is not rigid. There is no shifting dullness or mass.      Tenderness: There is abdominal tenderness. There is no guarding or rebound.      Hernia: No hernia is present. There is no hernia in the ventral area.      Comments: mild   Musculoskeletal: Normal range of motion.   Skin:     General: Skin is warm and dry.   Neurological:      Mental Status: She is alert and oriented to person, place, and time.   Psychiatric:         Behavior: Behavior normal.         Thought Content: Thought content normal.         Judgment: Judgment normal.       Assessment/Plan      1. Gastroesophageal reflux disease with esophagitis without hemorrhage    2. Hepatic fibrosis     3. Elevated liver enzymes    4. Irritable bowel syndrome with both constipation and diarrhea    .   Diagnoses and all orders for this visit:    1. Gastroesophageal reflux disease with esophagitis without hemorrhage (Primary)    2. Hepatic fibrosis     3. Elevated liver enzymes    4. Irritable bowel syndrome with both constipation and diarrhea    Other orders  -     famotidine (Pepcid) 40 MG tablet; Take 1 tablet by mouth Daily.  Dispense: 30 tablet; Refill: 3        Orders placed during this encounter include:  No orders of the defined types were placed in this encounter.      Medications prescribed:  New Medications Ordered This Visit   Medications   • famotidine (Pepcid) 40 MG tablet     Sig: Take 1 tablet by mouth Daily.     Dispense:  30 tablet     Refill:  3       Requested Prescriptions     Signed Prescriptions Disp Refills   • famotidine (Pepcid) 40 MG tablet 30 tablet 3     Sig: Take 1 tablet by mouth Daily.       Review and/or summary of lab tests, radiology, procedures, medications. Review and summary of old records and obtaining of history. The risks and benefits of my recommendations, as well as other treatment options were discussed with the patient today.  Questions were answered.    Follow-up: Return in about 1 month (around 12/10/2020), or if symptoms worsen or fail to improve.     * Surgery not found *      This document has been electronically signed by Johnny Smiley PA-C on November 23, 2020 18:09 CST      Results for orders placed or performed during the hospital encounter of 11/05/20   Tissue Pathology Exam    Specimen: Flank, right; Tissue   Result Value Ref Range    Case Report       Surgical Pathology Report                         Case: [id number]                                  Authorizing Provider:  Meet Mcintyre MD      Collected:           11/05/2020 01:01 PM          Ordering Location:     [facility]             Received:            11/05/2020 01:48 PM                                 [place]                                                              Pathologist:           Susie Penn MD                                                   Specimen:    Flank, right, Right Posterior Flank Sub Q Mass                                             Final Diagnosis       See Scanned Report       Results for orders placed or performed in visit on 11/02/20   COVID [facility] Priority - Swab, Nasopharynx    Specimen: Nasopharynx; Swab   Result Value Ref Range    COVID [facility] PRIORITY Comment    COVID-19,[facility] ROUTINE, NP/OP SWAB IN TRANSPORT MEDIA OR ESWAB 72 HR TAT - Swab, Nasopharynx    Specimen: Nasopharynx; Swab   Result Value Ref Range    SARS-CoV-2, [facility] Not Detected Not Detected   Results for orders placed or performed during the hospital encounter of 10/30/20   POC Urinalysis Dipstick, Multipro (Automated dipstick)    Specimen: Urine   Result Value Ref Range    Color Yellow Yellow, Straw, Dark Yellow, Paige    Clarity, UA Clear Clear    Glucose, UA Negative Negative, 1000 mg/dL (3+) mg/dL    Bilirubin Negative Negative    Ketones, UA Negative Negative    Specific Gravity  1.020 1.005 - 1.030    Blood, UA Negative Negative     pH, Urine 5.5 5.0 - 8.0    Protein, POC Negative Negative mg/dL    Urobilinogen, UA Normal Normal    Nitrite, UA Negative Negative    Leukocytes Negative Negative   Results for orders placed or performed in visit on 10/28/20   ECG 12 Lead   Result Value Ref Range    QT Interval 406 ms    QTC Interval 441 ms   Results for orders placed or performed in visit on 09/14/20   Tissue Pathology Exam    Specimen: Back, Upper; Tissue   Result Value Ref Range    Case Report       Surgical Pathology Report                         Case: [id number]                                  Authorizing Provider:  Rosario Ceron APRN   Collected:           09/14/2020 02:30 PM          Ordering Location:     [facility]     Received:            09/14/2020 03:06 PM                                 [facility]                                                                                  [place]                                                                     Pathologist:           Manav Matias MD                                                       Specimen:    Back, Upper                                                                                Clinical Information       1cm x 1.1cm x 0.3cm tan lesion with irregular edges and plaqued surface excised from left upper back in 2 pieces. First section is partially excised (98%) of whole lesion,. 2nd section excised to include residual lesion which is a grossly crescent shaped sliver from far left/lateral edge of whole lesion, perhaps 0.2cm x 1.1cm x 0.1cm.      Final Diagnosis       SEE SCANNED REPORT       Results for orders placed or performed in visit on 08/18/20   Vitamin B12 & Folate    Specimen: Blood   Result Value Ref Range    Folate 2.75 (L) 4.78 - 24.20 ng/mL    Vitamin B-12 345 211 - 946 pg/mL   CBC Auto Differential    Specimen: Blood   Result Value Ref Range    WBC 7.61 3.40 - 10.80 10*3/mm3    RBC 4.23 3.77 - 5.28 10*6/mm3    Hemoglobin 13.4  12.0 - 15.9 g/dL    Hematocrit 41.0 34.0 - 46.6 %    MCV 96.9 79.0 - 97.0 fL    MCH 31.7 26.6 - 33.0 pg    MCHC 32.7 31.5 - 35.7 g/dL    RDW 14.2 12.3 - 15.4 %    RDW-SD 48.8 37.0 - 54.0 fl    MPV 9.4 6.0 - 12.0 fL    Platelets 286 140 - 450 10*3/mm3    Neutrophil % 56.2 42.7 - 76.0 %    Lymphocyte % 31.1 19.6 - 45.3 %    Monocyte % 10.9 5.0 - 12.0 %    Eosinophil % 1.4 0.3 - 6.2 %    Basophil % 0.4 0.0 - 1.5 %    Neutrophils, Absolute 4.27 1.70 - 7.00 10*3/mm3    Lymphocytes, Absolute 2.37 0.70 - 3.10 10*3/mm3    Monocytes, Absolute 0.83 0.10 - 0.90 10*3/mm3    Eosinophils, Absolute 0.11 0.00 - 0.40 10*3/mm3    Basophils, Absolute 0.03 0.00 - 0.20 10*3/mm3   Vitamin D 25 Hydroxy    Specimen: Blood   Result Value Ref Range    25 Hydroxy, Vitamin D 35.9 30.0 - 100.0 ng/ml   T3    Specimen: Blood   Result Value Ref Range    T3, Total 97.9 80.0 - 200.0 ng/dl   TSH    Specimen: Blood   Result Value Ref Range    TSH 1.730 0.270 - 4.200 uIU/mL   T4, Free    Specimen: Blood   Result Value Ref Range    Free T4 1.09 0.93 - 1.70 ng/dL   Lipid Panel    Specimen: Blood   Result Value Ref Range    Total Cholesterol 156 150 - 200 mg/dL    Triglycerides 156 (H) <=150 mg/dL    HDL Cholesterol 39 (L) 40 - 59 mg/dL    LDL Cholesterol  86 <=100 mg/dL    VLDL Cholesterol 31.2 mg/dL    LDL/HDL Ratio 2.20 0.00 - 3.22   Comprehensive Metabolic Panel    Specimen: Blood   Result Value Ref Range    Glucose 100 (H) 70 - 99 mg/dL    BUN 16 7 - 23 mg/dL    Creatinine 0.96 0.52 - 1.04 mg/dL    Sodium 140 137 - 145 mmol/L    Potassium 4.0 3.4 - 5.0 mmol/L    Chloride 108 101 - 112 mmol/L    CO2 24.0 22.0 - 30.0 mmol/L    Calcium 9.3 8.4 - 10.2 mg/dL    Total Protein 7.1 6.3 - 8.6 g/dL    Albumin 4.10 3.50 - 5.00 g/dL    ALT (SGPT) 23 <=35 U/L    AST (SGOT) 64 (H) 14 - 36 U/L    Alkaline Phosphatase 55 38 - 126 U/L    Total Bilirubin 0.4 0.2 - 1.3 mg/dL    eGFR Non  Amer 59 45 - 104 mL/min/1.73    Globulin 3.0 2.3 - 3.5 gm/dL    A/G Ratio  1.4 1.1 - 1.8 g/dL    BUN/Creatinine Ratio 16.7 7.0 - 25.0    Anion Gap 8.0 5.0 - 15.0 mmol/L   Results for orders placed or performed in visit on 05/28/20   ToxASSURE Select 13 Discrete -    Specimen: Urine   Result Value Ref Range    Report Summary FINAL     Ethanol Screen Urine (Ref) Negative     Ethanol, Urine Not Detected g/dL    Amphetamine, Urine Qual Negative     Methamphetamine, Urine Not Detected ng/mg creat    Amphetamine, Urine Not Detected ng/mg creat    MDMA URINE Not Detected ng/mg creat    MDA Not Detected ng/mg creat    Benzodiazepine Screen, Urine +POSITIVE+     DIAZEPAM URINE QUANT (REF) Not Detected ng/mg creat    Desmethyldiazepam Not Detected ng/mg creat    Oxazepam, urine Not Detected ng/mg creat    Temazepam, urine Not Detected ng/mg creat    ALPRAZOLAM Not Detected ng/mg creat    ALPHA-HYDROXYALPRAZOLAM UR, QT (REF) Not Detected ng/mg creat    DESALKLFLURAZEPAM UR QUANT (REF) Not Detected ng/mg creat    LORAZEPAM URINE QUANT (REF) Not Detected ng/mg creat    Alpha-hydroxytriazolam, Urine Not Detected ng/mg creat    Clonazepam Urine Not Detected ng/mg creat    7-Aminoclonazepam Urine 42 ng/mg creat    MIDAZOLAM UR, QUANT (REF) Not Detected ng/mg creat    Alpha-hydroxymidazolam, Urine Not Detected ng/mg creat    Flunitrazepam Not Detected ng/mg creat    DESMETHYLFLUNITRAZEPAM Not Detected ng/mg creat    Cocaine & Metabolite Negative     Cocaine Screen, Urine Not Detected ng/mg creat    Benzoylecgonine, Urine Not Detected ng/mg creat    Cocaethylene Ur Not Detected ng/mg creat    THC, Urine Negative     Carboxy THC, Urine Not Detected ng/mg creat    6-ACETYLMORPHINE Negative     6-acetylmorphine Not Detected ng/mg creat    Opiate Class +POSITIVE+     Codeine, Urine Not Detected ng/mg creat    Morphine, Urine Not Detected ng/mg creat    normorphine Not Detected ng/mg creat    Norcodeine Ur Not Detected ng/mg creat    Hydrocodone  ng/mg creat    Hydromorphone Urine 153 ng/mg creat     Dihydrocodeine, Urine 262 ng/mg creat    Opiates, Norhydrocodone, Urine 3181 ng/mg creat    Oxycodone Class Ur Negative     Oxycodone, Confirmation, Urine Not Detected ng/mg creat    Oxymorphone UR Not Detected ng/mg creat    Opiates, Noroxycodone, Urine Not Detected ng/mg creat    Noroxymorphone Not Detected ng/mg creat    Methadone Negative     Methadone, Quantitative Not Detected ng/mg creat    EDDP Not Detected ng/mg creat    Fentanyl and Analogues, Ur Negative     Fentanyl, Urine Not Detected ng/mg creat    Norfentanyl Urine Not Detected ng/mg creat    Sufentanil, Ur Not Detected ng/mg creat    Alfentanil, Ur Not Detected ng/mg creat    BUPRENORPHINE Negative     Buprenorphine, Urine Not Detected ng/mg creat    Norbuprenorphine Not Detected ng/mg creat    Tapentadol Negative     Tapentadol Ur Not Detected ng/mg creat    Opoidss other, UR Negative     Tramadol, Urine Not Detected ng/mg creat    O-desmethyltramadol, Ur Not Detected ng/mg creat    N-Desmethyltramadol, U Not Detected ng/mg creat    BARBITURATES, URINE Negative     Amobarbital, Urine Not Detected     Barbital Not Detected     Butabarbital, Ur Not Detected     Butalbital Screen, Urine Not Detected     Mephobarbital Urine Not Detected     Pentobarbital UR Not Detected     Phenobarbital Not Detected     Secobarbital, urine Not Detected     Thiopental, Ur Not Detected     Creatinine, Urine 77 mg/dL    Level of Detection: Comment      *Note: Due to a large number of results and/or encounters for the requested time period, some results have not been displayed. A complete set of results can be found in Results Review.       Some portions of this note have been dictated using voice recognition software and may contain errors and/or omissions.

## 2020-11-10 NOTE — PATIENT INSTRUCTIONS
"BMI for Adults  What is BMI?  Body mass index (BMI) is a number that is calculated from a person's weight and height. BMI can help estimate how much of a person's weight is composed of fat. BMI does not measure body fat directly. Rather, it is an alternative to procedures that directly measure body fat, which can be difficult and expensive.  BMI can help identify people who may be at higher risk for certain medical problems.  What are BMI measurements used for?  BMI is used as a screening tool to identify possible weight problems. It helps determine whether a person is obese, overweight, a healthy weight, or underweight.  BMI is useful for:  · Identifying a weight problem that may be related to a medical condition or may increase the risk for medical problems.  · Promoting changes, such as changes in diet and exercise, to help reach a healthy weight. BMI screening can be repeated to see if these changes are working.  How is BMI calculated?  BMI involves measuring your weight in relation to your height. Both height and weight are measured, and the BMI is calculated from those numbers. This can be done either in English (U.S[place] or metric measurements. Note that charts and online BMI calculators are available to help you find your BMI quickly and easily without having to do these calculations yourself.  To calculate your BMI in English (U.S.) measurements:    1. Measure your weight in pounds (lb).  2. Multiply the number of pounds by 703.  ? For example, for a person who weighs 180 lb, multiply that number by 703, which equals 126,540.  3. Measure your height in inches. Then multiply that number by itself to get a measurement called \"inches squared.\"  ? For example, for a person who is 70 inches tall, the \"inches squared\" measurement is 70 inches x 70 inches, which equals 4,900 inches squared.  4. Divide the total from step 2 (number of lb x 703) by the total from step 3 (inches squared): 126,540 ÷ 4,900 = 25.8. This is " "your BMI.  To calculate your BMI in metric measurements:  1. Measure your weight in kilograms (kg).  2. Measure your height in meters (m). Then multiply that number by itself to get a measurement called \"meters squared.\"  ? For example, for a person who is 1.75 m tall, the \"meters squared\" measurement is 1.75 m x 1.75 m, which is equal to 3.1 meters squared.  3. Divide the number of kilograms (your weight) by the meters squared number. In this example: 70 ÷ 3.1 = 22.6. This is your BMI.  What do the results mean?  BMI charts are used to identify whether you are underweight, normal weight, overweight, or obese. The following guidelines will be used:  · Underweight: BMI less than 18.5.  · Normal weight: BMI between 18.5 and 24.9.  · Overweight: BMI between 25 and 29.9.  · Obese: BMI of 30 or above.  Keep these notes in mind:  · Weight includes both fat and muscle, so someone with a muscular build, such as an athlete, may have a BMI that is higher than 24.9. In cases like these, BMI is not an accurate measure of body fat.  · To determine if excess body fat is the cause of a BMI of 25 or higher, further assessments may need to be done by a health care provider.  · BMI is usually interpreted in the same way for men and women.  Where to find more information  For more information about BMI, including tools to quickly calculate your BMI, go to these websites:  · Centers for Disease Control and Prevention: www.cdc.gov  · American Heart Association: www.heart.org  · National Heart, Lung, and Blood [place]: www.nhlbi.nih.gov  Summary  · Body mass index (BMI) is a number that is calculated from a person's weight and height.  · BMI may help estimate how much of a person's weight is composed of fat. BMI can help identify those who may be at higher risk for certain medical problems.  · BMI can be measured using English measurements or metric measurements.  · BMI charts are used to identify whether you are underweight, normal " weight, overweight, or obese.  This information is not intended to replace advice given to you by your health care provider. Make sure you discuss any questions you have with your health care provider.  Document Released: 08/29/2005 Document Revised: 09/09/2020 Document Reviewed: 07/17/2020  Elsevier Patient Education © 2020 [employer].

## 2020-11-13 ENCOUNTER — TELEPHONE (OUTPATIENT)
Dept: SURGERY | Facility: CLINIC | Age: 60
End: 2020-11-13

## 2020-11-20 ENCOUNTER — OFFICE VISIT (OUTPATIENT)
Dept: FAMILY MEDICINE CLINIC | Facility: CLINIC | Age: 60
End: 2020-11-20

## 2020-11-20 DIAGNOSIS — G25.81 RESTLESS LEG SYNDROME: Chronic | ICD-10-CM

## 2020-11-20 DIAGNOSIS — E53.8 FOLATE DEFICIENCY: ICD-10-CM

## 2020-11-20 DIAGNOSIS — N39.0 URINARY TRACT INFECTION WITHOUT HEMATURIA, SITE UNSPECIFIED: Primary | ICD-10-CM

## 2020-11-20 DIAGNOSIS — F41.1 GAD (GENERALIZED ANXIETY DISORDER): ICD-10-CM

## 2020-11-20 PROCEDURE — G2025 DIS SITE TELE SVCS RHC/FQHC: HCPCS | Performed by: NURSE PRACTITIONER

## 2020-11-20 RX ORDER — PREDNISONE 10 MG/1
10 TABLET ORAL DAILY
Qty: 7 TABLET | Refills: 0 | Status: SHIPPED | OUTPATIENT
Start: 2020-11-20 | End: 2021-02-02

## 2020-11-20 RX ORDER — UREA 10 %
800 LOTION (ML) TOPICAL DAILY
Qty: 90 TABLET | Refills: 1 | Status: SHIPPED | OUTPATIENT
Start: 2020-11-20 | End: 2021-03-15

## 2020-11-20 RX ORDER — CEFDINIR 300 MG/1
300 CAPSULE ORAL 2 TIMES DAILY
Qty: 20 CAPSULE | Refills: 0 | Status: SHIPPED | OUTPATIENT
Start: 2020-11-20 | End: 2020-11-30

## 2020-11-20 RX ORDER — CLONAZEPAM 0.5 MG/1
0.5 TABLET ORAL DAILY PRN
Qty: 30 TABLET | Refills: 0 | Status: SHIPPED | OUTPATIENT
Start: 2020-11-20 | End: 2021-01-29 | Stop reason: SDUPTHER

## 2020-11-20 RX ORDER — ROPINIROLE 3 MG/1
3 TABLET, FILM COATED ORAL
Qty: 90 TABLET | Refills: 1 | Status: SHIPPED | OUTPATIENT
Start: 2020-11-20 | End: 2021-03-15 | Stop reason: SDUPTHER

## 2020-11-20 NOTE — PATIENT INSTRUCTIONS
Increase fluid intake and rest.  Tylenol as needed for fever or pain.  Follow package directions.  Have urine specimen collected today, then may begin antibiotic treatment.  Return to clinic or call Monday if symptoms worsen or do not improve, or if there is any antibiotic intolerance or allergy.

## 2020-11-20 NOTE — PROGRESS NOTES
No chief complaint on file.  Answers for HPI/ROS submitted by the patient on 11/17/2020   What is the primary reason for your visit?: Other  Please describe your symptoms.: Follow Up Surgery....Bladder Problem  Have you had these symptoms before?: Yes  How long have you been having these symptoms?: 1-2 weeks  Please list any medications you are currently taking for this condition.: Was Taking Sulfameth/trimethoprim 800mg Skin Got Hot And Red...Took Bendryl And Discontinued...  Please describe any probable cause for these symptoms. : ?    Subjective   Tricia Loyola is a 60 y.o. female who presents to the office for telephone visit due to Covid pandemic for routine 12-week follow-up chronic back pain and anxiety requiring hydrocodone and benzodiazepines.  Also with continued urinary tract symptoms onset last October.   The following portions of the patient's history were reviewed and updated as appropriate: allergies, current medications, past family history, past medical history, past social history, past surgical history and problem list.    History of Present Illness   You have chosen to receive care through a telephone visit. Do you consent to use a telephone visit for your medical care today? Yes  14 minutes medical discussion.   Back pain:  Imaging in chart indicates mild DDD of cspine . An ED record is on file from [facility] of 10/5/18. CT of T and L spine done on that day show stable Tspine with an old compression fracture deformity of superior endplate of T11. This is thought a result of chronic osteoporosis.  Mild posterior spurring of superior endplates at T2-3 and T5-6. L spine shows mild degenerative changes at L4-5 and L5-S1.  Pain is managed with hydrocodone with good results.  Compliant with use.  Denies adverse effects of medication.  Not due for refill today.     Chronic bilateral knee pain due to DJD, treated by Dr Herndon, has had hyaluronan injection right knee in June, but has not seen much  improvement in pain yet. To follow up with him. This pain is her worst pain today, rated at 5//10. Not due for hydrocodone refill today.      Chronic anxiety: managed with amitriptyline, buspirone and prn clonazepam. Due for refill clonazepam today. Compliant with medications. Reports well controlled by symptoms today.  Denies oversedation or adverse effects of medication.  Compliant with use.  Due for refill today.    New complaints today: Urinary tract symptoms onset prior to 10/30/2020.  Reports was seen by Dr. Devin Duarte in urgent care at the end of October for this and prescribed Bactrim for a UTI.  She took 2 or 3 days worth and then her skin turned all red and itchy and she had to stop it but she did not return for medical care until now.  Reports since then she continues to have dysuria, no flank pain fevers chills or nausea and vomiting.  She has urinary urgency and frequency with hesitancy, inadequate emptying requiring multiple voids per attempt.  The urine starts and stops.  She has multiple medication allergies complicating antibiotic choice.    No other complaints today.      Parts of most recent relevant visit HPI, ROS  and PE may be carried forward and all are updated as appropriate for current situation.    Past Medical History:   Diagnosis Date   • Abdominal pain    • Anemia    • Anxiety and depression    • Chronic post-traumatic headache      rebound      • Depressive disorder    • Disease of thyroid gland    • Encounter for gynecological examination    • Gastroesophageal reflux disease    • Generalized anxiety disorder    • History of mammogram 08/2008    MAMMOGRAM DIAGNOSTIC BILATERAL 30846 ([facility]) (1)     • Hyperlipidemia    • Ingrown toenail    • Injury of back    • Leaky heart valve    • Nonruptured cerebral aneurysm    • Osteoporosis    • Osteoporosis    • Posttraumatic stress disorder    • PTSD (post-traumatic stress disorder)    • Seizure ([facility])     Psychogenic non-epileptic sz    •  Viral hepatitis A    • Wears dentures     uppers only   • Wears glasses           Family History   Problem Relation Age of Onset   • Constipation Mother    • Hypertension Mother    • Anxiety disorder Mother    • Heart disease Mother    • Alcohol abuse Father    • Heart disease Father    • Anxiety disorder Brother    • Kidney disease Brother    • Hypertension Brother    • Arthritis Brother    • Anxiety disorder Brother    • Kidney disease Brother    • Diabetes Brother    • Anxiety disorder Brother    • Hypertension Brother    • Breast cancer Paternal Aunt    • Heart disease Maternal Grandmother    • Clotting disorder Maternal Grandmother    • Heart disease Maternal Grandfather         Review of Systems   Constitutional: Negative.  Negative for chills, fever and unexpected weight change.   HENT: Negative.  Negative for congestion, postnasal drip, rhinorrhea, sinus pressure, sinus pain and sneezing.    Eyes: Negative.    Respiratory: Negative.  Negative for cough, chest tightness and shortness of breath.    Cardiovascular: Negative.  Negative for chest pain.   Gastrointestinal: Negative.  Negative for diarrhea and nausea.   Endocrine: Negative.    Genitourinary: Positive for decreased urine volume, difficulty urinating, dysuria, frequency and urgency. Negative for flank pain.   Musculoskeletal: Positive for arthralgias, back pain and gait problem (right knee pain).   Skin: Negative.  Negative for color change, pallor, rash and wound.   Allergic/Immunologic: Negative.    Hematological: Negative.    Psychiatric/Behavioral: Negative.  Negative for sleep disturbance and suicidal ideas.   All other systems reviewed and are negative.      Objective   There were no vitals filed for this visit.  Physical Exam  Vitals signs and nursing note reviewed.   Constitutional:       General: She is not in acute distress.  Pulmonary:      Effort: Pulmonary effort is normal. No respiratory distress.      Breath sounds: No stridor.       Comments: Able to speak in full sentences without breathlessness.  No spontaneous cough during conversation.  Neurological:      Mental Status: She is alert and oriented to person, place, and time.   Psychiatric:         Behavior: Behavior normal.         Thought Content: Thought content normal.         Judgment: Judgment normal.         Assessment/Plan   Diagnoses and all orders for this visit:    1. Urinary tract infection without hematuria, site unspecified (Primary)  -     cefdinir (OMNICEF) 300 MG capsule; Take 1 capsule by mouth 2 (Two) Times a Day for 10 days.  Dispense: 20 capsule; Refill: 0  -     Urinalysis With Microscopic - Urine, Clean Catch; Future  -     Urine Culture - Urine, Urine, Clean Catch; Future    2. Folate deficiency  -     folic acid (FOLVITE) 800 MCG tablet; Take 1 tablet by mouth Daily.  Dispense: 90 tablet; Refill: 1    3. Restless leg syndrome  Comments:  worsening, needs increased dose today  Orders:  -     rOPINIRole (REQUIP) 3 MG tablet; Take 1 tablet by mouth every night at bedtime.  Dispense: 90 tablet; Refill: 1    4. PRISCILLA (generalized anxiety disorder)  -     clonazePAM (KlonoPIN) 0.5 MG tablet; Take 1 tablet by mouth Daily As Needed for Anxiety.  Dispense: 30 tablet; Refill: 0    Other orders  -     predniSONE (DELTASONE) 10 MG tablet; Take 1 tablet by mouth Daily.  Dispense: 7 tablet; Refill: 0         Able chronic back and bilateral knee pain related to osteoarthritic changes with good control provided by hydrocodone not due for refill today.  Stable chronic anxiety requires refill of clonazepam today.  Persistent urinary tract symptoms consistent with urinary tract infection and new allergy listed to Bactrim.  Prescription sent for cefdinir to begin after urine specimen is collected.  Orders placed for urinalysis with microscopic exam and urine culture.  PHQ-2/PHQ-9 Depression Screening 10/8/2020   Little interest or pleasure in doing things 0   Feeling down, depressed, or  hopeless 0   Trouble falling or staying asleep, or sleeping too much 2   Feeling tired or having little energy 0   Poor appetite or overeating 0   Feeling bad about yourself - or that you are a failure or have let yourself or your family down 0   Trouble concentrating on things, such as reading the newspaper or watching television 0   Moving or speaking so slowly that other people could have noticed. Or the opposite - being so fidgety or restless that you have been moving around a lot more than usual 0   Thoughts that you would be better off dead, or of hurting yourself in some way 0   Total Score 2   Patient understands the risks associated with this controlled medication, including tolerance and addiction.  Patient also agrees to only obtain this medication from me, and not from a another provider, unless that provider is covering for me in my absence.  Patient also agrees to be compliant in dosing, and not self adjust the dose of medication.  A signed controlled substance agreement is on file, and the patient has received a controlled substance education sheet at this a previous visit.  The patient has also signed a consent for treatment with a controlled substance as per [facility] policy. KATE was obtained.      UMESH Guzmán         Return in about 12 weeks (around 2/12/2021), or if symptoms worsen or fail to improve.    Patient Instructions   Increase fluid intake and rest.  Tylenol as needed for fever or pain.  Follow package directions.  Have urine specimen collected today, then may begin antibiotic treatment.  Return to clinic or call Monday if symptoms worsen or do not improve, or if there is any antibiotic intolerance or allergy.

## 2020-11-24 ENCOUNTER — OFFICE VISIT (OUTPATIENT)
Dept: FAMILY MEDICINE CLINIC | Facility: CLINIC | Age: 60
End: 2020-11-24

## 2020-11-24 VITALS — HEIGHT: 62 IN | BODY MASS INDEX: 21.35 KG/M2 | WEIGHT: 116 LBS

## 2020-11-24 DIAGNOSIS — U07.1 COVID-19 VIRUS INFECTION: Primary | ICD-10-CM

## 2020-11-24 PROCEDURE — G2025 DIS SITE TELE SVCS RHC/FQHC: HCPCS | Performed by: NURSE PRACTITIONER

## 2020-11-25 DIAGNOSIS — E78.5 HYPERLIPIDEMIA, UNSPECIFIED HYPERLIPIDEMIA TYPE: ICD-10-CM

## 2020-11-30 DIAGNOSIS — M54.50 CHRONIC MIDLINE LOW BACK PAIN WITHOUT SCIATICA: ICD-10-CM

## 2020-11-30 DIAGNOSIS — G89.29 CHRONIC PAIN OF RIGHT KNEE: ICD-10-CM

## 2020-11-30 DIAGNOSIS — G89.29 CHRONIC MIDLINE LOW BACK PAIN WITHOUT SCIATICA: ICD-10-CM

## 2020-11-30 DIAGNOSIS — S80.01XA CONTUSION OF RIGHT KNEE, INITIAL ENCOUNTER: ICD-10-CM

## 2020-11-30 DIAGNOSIS — M25.561 CHRONIC PAIN OF RIGHT KNEE: ICD-10-CM

## 2020-12-01 ENCOUNTER — TELEPHONE (OUTPATIENT)
Dept: FAMILY MEDICINE CLINIC | Facility: CLINIC | Age: 60
End: 2020-12-01

## 2020-12-01 RX ORDER — HYDROCODONE BITARTRATE AND ACETAMINOPHEN 10; 325 MG/1; MG/1
1 TABLET ORAL EVERY 8 HOURS PRN
Qty: 90 TABLET | Refills: 0 | Status: SHIPPED | OUTPATIENT
Start: 2020-12-01 | End: 2020-12-21 | Stop reason: SDUPTHER

## 2020-12-02 NOTE — TELEPHONE ENCOUNTER
Patient has insomnia requiring benzodiazepine use concurrently with chronic pain requiring opiate pain medication and/ or gabapentin. Patient has been education regarding potential dangers of oversedation and accidental overdose with use of both medications concurrently. Serial assessments of patient has yielded no sign of oversedation or adverse effects of patient, and he/she is advised and aware to notify my office immediately if symptoms do occur, as well as to discontinue use of benzodiazepine medication and opiate medication immediately if that occurs, pending medical evaluation and advice. Patient has been compliant with use of medications, UDS, visits with no adverse effects noted. Patient understands the risks associated with this controlled medication, including tolerance and addiction.  Patient also agrees to only obtain this medication from me, and not from a another provider, unless that provider is covering for me in my absence.  Patient also agrees to be compliant in dosing, and not self adjust the dose of medication.  A signed controlled substance agreement is on file, and the patient has received a controlled substance education sheet at this a previous visit.  The patient has also signed a consent for treatment with a controlled substance as per [facility] policy. KATE was obtained. Refills were sent for: .hydrocodone.     This document has been electronically signed by UMESH Guzmán on December 1, 2020 18:01 CST

## 2020-12-04 RX ORDER — ATORVASTATIN CALCIUM 40 MG/1
40 TABLET, FILM COATED ORAL NIGHTLY
Qty: 90 TABLET | Refills: 1 | Status: SHIPPED | OUTPATIENT
Start: 2020-12-04 | End: 2021-02-18

## 2020-12-07 ENCOUNTER — OFFICE VISIT (OUTPATIENT)
Dept: SURGERY | Facility: CLINIC | Age: 60
End: 2020-12-07

## 2020-12-07 ENCOUNTER — LAB (OUTPATIENT)
Dept: LAB | Facility: OTHER | Age: 60
End: 2020-12-07

## 2020-12-07 VITALS — WEIGHT: 118 LBS | BODY MASS INDEX: 21.71 KG/M2 | HEIGHT: 62 IN | HEART RATE: 68 BPM | TEMPERATURE: 97.4 F

## 2020-12-07 DIAGNOSIS — M79.89 SOFT TISSUE MASS: Primary | ICD-10-CM

## 2020-12-07 DIAGNOSIS — N39.0 URINARY TRACT INFECTION WITHOUT HEMATURIA, SITE UNSPECIFIED: ICD-10-CM

## 2020-12-07 LAB
BACTERIA UR QL AUTO: ABNORMAL /HPF
BILIRUB UR QL STRIP: NEGATIVE
CLARITY UR: CLEAR
COLOR UR: YELLOW
GLUCOSE UR STRIP-MCNC: NEGATIVE MG/DL
HGB UR QL STRIP.AUTO: NEGATIVE
HYALINE CASTS UR QL AUTO: ABNORMAL /LPF
KETONES UR QL STRIP: NEGATIVE
LEUKOCYTE ESTERASE UR QL STRIP.AUTO: NEGATIVE
NITRITE UR QL STRIP: NEGATIVE
PH UR STRIP.AUTO: 7 [PH] (ref 5.5–8)
PROT UR QL STRIP: NEGATIVE
RBC # UR: ABNORMAL /HPF
REF LAB TEST METHOD: ABNORMAL
SP GR UR STRIP: 1.01 (ref 1–1.03)
SQUAMOUS #/AREA URNS HPF: ABNORMAL /HPF
UROBILINOGEN UR QL STRIP: NORMAL
WBC UR QL AUTO: ABNORMAL /HPF

## 2020-12-07 PROCEDURE — 87086 URINE CULTURE/COLONY COUNT: CPT | Performed by: NURSE PRACTITIONER

## 2020-12-07 PROCEDURE — 81001 URINALYSIS AUTO W/SCOPE: CPT | Performed by: NURSE PRACTITIONER

## 2020-12-07 PROCEDURE — 99024 POSTOP FOLLOW-UP VISIT: CPT | Performed by: SURGERY

## 2020-12-07 NOTE — PROGRESS NOTES
60-year-old female who is now 1 month status post excision of a right flank mass that turned out to be a hemangioma.  No atypia or malignant signs.  Patient's wound is healing nicely.  I gone over the pathology with her already and answered all of her questions.  Patient is doing well from her incision.  She will follow-up with us on apparent basis

## 2020-12-08 ENCOUNTER — OFFICE VISIT (OUTPATIENT)
Dept: GASTROENTEROLOGY | Facility: CLINIC | Age: 60
End: 2020-12-08

## 2020-12-08 VITALS
HEIGHT: 62 IN | SYSTOLIC BLOOD PRESSURE: 105 MMHG | HEART RATE: 74 BPM | DIASTOLIC BLOOD PRESSURE: 62 MMHG | WEIGHT: 119.2 LBS | BODY MASS INDEX: 21.94 KG/M2

## 2020-12-08 DIAGNOSIS — K58.2 IRRITABLE BOWEL SYNDROME WITH BOTH CONSTIPATION AND DIARRHEA: ICD-10-CM

## 2020-12-08 DIAGNOSIS — R10.10 PAIN OF UPPER ABDOMEN: Primary | ICD-10-CM

## 2020-12-08 DIAGNOSIS — K74.00 HEPATIC FIBROSIS: ICD-10-CM

## 2020-12-08 DIAGNOSIS — R14.0 BLOATING: ICD-10-CM

## 2020-12-08 DIAGNOSIS — K21.00 GASTROESOPHAGEAL REFLUX DISEASE WITH ESOPHAGITIS WITHOUT HEMORRHAGE: ICD-10-CM

## 2020-12-08 LAB — BACTERIA SPEC AEROBE CULT: NO GROWTH

## 2020-12-08 PROCEDURE — 99213 OFFICE O/P EST LOW 20 MIN: CPT | Performed by: PHYSICIAN ASSISTANT

## 2020-12-08 RX ORDER — SUCRALFATE 1 G/1
1 TABLET ORAL 4 TIMES DAILY
Qty: 120 TABLET | Refills: 1 | Status: SHIPPED | OUTPATIENT
Start: 2020-12-08 | End: 2020-12-08

## 2020-12-08 RX ORDER — SUCRALFATE 1 G/1
TABLET ORAL
Qty: 360 TABLET | Refills: 0 | Status: SHIPPED | OUTPATIENT
Start: 2020-12-08 | End: 2021-03-15

## 2020-12-08 NOTE — PATIENT INSTRUCTIONS
"BMI for Adults  What is BMI?  Body mass index (BMI) is a number that is calculated from a person's weight and height. BMI can help estimate how much of a person's weight is composed of fat. BMI does not measure body fat directly. Rather, it is an alternative to procedures that directly measure body fat, which can be difficult and expensive.  BMI can help identify people who may be at higher risk for certain medical problems.  What are BMI measurements used for?  BMI is used as a screening tool to identify possible weight problems. It helps determine whether a person is obese, overweight, a healthy weight, or underweight.  BMI is useful for:  · Identifying a weight problem that may be related to a medical condition or may increase the risk for medical problems.  · Promoting changes, such as changes in diet and exercise, to help reach a healthy weight. BMI screening can be repeated to see if these changes are working.  How is BMI calculated?  BMI involves measuring your weight in relation to your height. Both height and weight are measured, and the BMI is calculated from those numbers. This can be done either in English (U.S.) or metric measurements. Note that charts and online BMI calculators are available to help you find your BMI quickly and easily without having to do these calculations yourself.  To calculate your BMI in English (U.S[place] measurements:    1. Measure your weight in pounds (lb).  2. Multiply the number of pounds by 703.  ? For example, for a person who weighs 180 lb, multiply that number by 703, which equals 126,540.  3. Measure your height in inches. Then multiply that number by itself to get a measurement called \"inches squared.\"  ? For example, for a person who is 70 inches tall, the \"inches squared\" measurement is 70 inches x 70 inches, which equals 4,900 inches squared.  4. Divide the total from step 2 (number of lb x 703) by the total from step 3 (inches squared): 126,540 ÷ 4,900 = 25.8. This is " "your BMI.  To calculate your BMI in metric measurements:  1. Measure your weight in kilograms (kg).  2. Measure your height in meters (m). Then multiply that number by itself to get a measurement called \"meters squared.\"  ? For example, for a person who is 1.75 m tall, the \"meters squared\" measurement is 1.75 m x 1.75 m, which is equal to 3.1 meters squared.  3. Divide the number of kilograms (your weight) by the meters squared number. In this example: 70 ÷ 3.1 = 22.6. This is your BMI.  What do the results mean?  BMI charts are used to identify whether you are underweight, normal weight, overweight, or obese. The following guidelines will be used:  · Underweight: BMI less than 18.5.  · Normal weight: BMI between 18.5 and 24.9.  · Overweight: BMI between 25 and 29.9.  · Obese: BMI of 30 or above.  Keep these notes in mind:  · Weight includes both fat and muscle, so someone with a muscular build, such as an athlete, may have a BMI that is higher than 24.9. In cases like these, BMI is not an accurate measure of body fat.  · To determine if excess body fat is the cause of a BMI of 25 or higher, further assessments may need to be done by a health care provider.  · BMI is usually interpreted in the same way for men and women.  Where to find more information  For more information about BMI, including tools to quickly calculate your BMI, go to these websites:  · Centers for Disease Control and Prevention: www.cdc.gov  · American Heart Association: www.heart.org  · National Heart, Lung, and Blood [place]: www.nhlbi.nih.gov  Summary  · Body mass index (BMI) is a number that is calculated from a person's weight and height.  · BMI may help estimate how much of a person's weight is composed of fat. BMI can help identify those who may be at higher risk for certain medical problems.  · BMI can be measured using English measurements or metric measurements.  · BMI charts are used to identify whether you are underweight, normal " weight, overweight, or obese.  This information is not intended to replace advice given to you by your health care provider. Make sure you discuss any questions you have with your health care provider.  Document Revised: 09/09/2020 Document Reviewed: 07/17/2020  Elsevier Patient Education © 2020 [employer].

## 2020-12-08 NOTE — PROGRESS NOTES
Chief Complaint   Patient presents with   • Heartburn   • Hepatic fibrosis   • Elevated Hepatic Enzymes       ENDO PROCEDURE ORDERED:    Subjective    Tricia Loyola is a 60 y.o. female. she is here today for follow-up.    History of Present Illness    The patient is seen on a recheck of her GERD, elevated liver enzymes, and hepatic fibrosis. Last seen 11/10/2020. Patient had tested positive for COVID. She was given Pepcid for chronic heartburn. She states it really did not help. She is still having a lot of burning in her abdomen. She is on Dexilant 60 mg daily for chronic GERD.  She complains of 7 out of 10 abdominal pain. She does not think the Linzess 290 mcg is working. She states she is having worse constipation and bloating. No blood or mucus in her stool. Weight is up 3 pounds since last visit. Last colonoscopy 11/26/2018.     The patient saw Dr. Mcintyre and had hemangioma removed on 12/07/2020. She had negative urinalysis with negative culture at that time.     A/P: Patient with persistent abdominal pain, GERD, and IBS-C. Will discontinue the Linzess and try switching her to Zelnorm 6 mg b.i.d. Will continue on the Dexilant 60 mg. Will try adding Carafate 1 g up to q.i.d. Encouraged to avoid gastric irritants. May need to consider further studies depending on the results of the above. Will plan followup at 1 month, further pending clinical course and the results of the above.       The following portions of the patient's history were reviewed and updated as appropriate:   Past Medical History:   Diagnosis Date   • Abdominal pain    • Anemia    • Anxiety and depression    • Chronic post-traumatic headache      rebound      • Depressive disorder    • Disease of thyroid gland    • Encounter for gynecological examination    • Gastroesophageal reflux disease    • Generalized anxiety disorder    • History of mammogram 08/2008    MAMMOGRAM DIAGNOSTIC BILATERAL 24676 ([facility]) (1)     • Hyperlipidemia    • Ingrown  toenail    • Injury of back    • Leaky heart valve    • Nonruptured cerebral aneurysm    • Osteoporosis    • Osteoporosis    • Posttraumatic stress disorder    • PTSD (post-traumatic stress disorder)    • Seizure (CMS/HCC)     Psychogenic non-epileptic sz    • Viral hepatitis A    • Wears dentures     uppers only   • Wears glasses      Past Surgical History:   Procedure Laterality Date   • APPENDECTOMY     • BREAST BIOPSY Left    • CHOLECYSTECTOMY     • COLONOSCOPY N/A 2018    Procedure: COLONOSCOPY;  Surgeon: Meet Mcintyre MD;  Location: [facility] ENDOSCOPY;  Service: General   • CRANIOTOMY FOR ANEURYSM  2003   • ENDOSCOPY N/A 10/16/2019    Procedure: ESOPHAGOGASTRODUODENOSCOPY;  Surgeon: Kory Muhammad MD;  Location: [facility] ENDOSCOPY;  Service: Gastroenterology   • MASS EXCISION Right 2020    Procedure: EXCISE SOFT TISSUE MASS RIGHT POSTERIOR FLANK                 (latex allergy);  Surgeon: Meet Mcintyre MD;  Location: [facility] OR;  Service: General;  Laterality: Right;   • PAP SMEAR  2012   • SKIN CANCER EXCISION      on back   • SKIN LESION EXCISION     • TONSILLECTOMY     • UPPER GASTROINTESTINAL ENDOSCOPY  10/16/2019     Family History   Problem Relation Age of Onset   • Constipation Mother    • Hypertension Mother    • Anxiety disorder Mother    • Heart disease Mother    • Alcohol abuse Father    • Heart disease Father    • Anxiety disorder Brother    • Kidney disease Brother    • Hypertension Brother    • Arthritis Brother    • Anxiety disorder Brother    • Kidney disease Brother    • Diabetes Brother    • Anxiety disorder Brother    • Hypertension Brother    • Breast cancer Paternal Aunt    • Heart disease Maternal Grandmother    • Clotting disorder Maternal Grandmother    • Heart disease Maternal Grandfather      OB History        3    Para   2    Term   2            AB   1    Living   2       SAB   1    TAB        Ectopic        Molar        Multiple        Live  Births                  Allergies   Allergen Reactions   • Nitrofuran Derivatives Hives     Macrobid   • Bactrim [Sulfamethoxazole-Trimethoprim] Other (See Comments)     Turns skin all red and eyes swell    • Cleocin [Clindamycin Hcl] Hives and Other (See Comments)     Turned red all over body and hot and eyes swelled    • Augmentin [Amoxicillin-Pot Clavulanate]      Hives   • Ciprofloxacin      Cipro:  Rash   • Fiorinal [Butalbital-Aspirin-Caffeine]      Rapid heart rate   • Imitrex [Sumatriptan]      Rapid heart rate   • Iodine      Anaphylaxis   • Toradol [Ketorolac Tromethamine]      Anaphylaxis   • Ultram [Tramadol]      Rapid heart rate   • Ibuprofen Rash   • Latex Rash   • Other Rash     MIDRIN  -  Rapid heart rate  pREGO SPAGHETTI SAUCE CAUSES A RASH  c diff 2009     Social History     Socioeconomic History   • Marital status: Legally      Spouse name: Not on file   • Number of children: Not on file   • Years of education: Not on file   • Highest education level: Not on file   Tobacco Use   • Smoking status: Light Tobacco Smoker     Packs/day: 0.50     Years: 43.00     Pack years: 21.50     Types: Cigarettes   • Smokeless tobacco: Never Used   Substance and Sexual Activity   • Alcohol use: No   • Drug use: No   • Sexual activity: Never     Current Medications:  Prior to Admission medications    Medication Sig Start Date End Date Taking? Authorizing Provider   ALBUTEROL SULFATE  (90 Base) MCG/ACT inhaler INHALE 2 PUFFS BY MOUTH EVERY 6 HOURS IF NEEDED FOR WHEEZING OR SHORTNESS OF BREATH 6/1/20  Yes Rosario Ceron APRN   amitriptyline (ELAVIL) 75 MG tablet TAKE ONE TABLET BY MOUTH AT BEDTIME. 1/29/18  Yes Sonia Mata MD   aspirin 81 MG EC tablet Take 1 tablet by mouth Daily. 1/14/20  Yes Rosario Ceron APRN   atorvastatin (LIPITOR) 40 MG tablet TAKE 1 TABLET BY MOUTH EVERY NIGHT FOR CHOLESTEROL 12/4/20  Yes Rosario Ceron APRN   busPIRone (BUSPAR) 30 MG tablet Take 1  tablet by mouth 2 (Two) Times a Day. 3/7/18  Yes Sonia Mata MD   clonazePAM (KlonoPIN) 0.5 MG tablet Take 1 tablet by mouth Daily As Needed for Anxiety. 11/20/20  Yes Rosario Ceron APRN   cyanocobalamin 1000 MCG/ML injection Inject 1 mL into the appropriate muscle as directed by prescriber Every 28 (Twenty-Eight) Days. 9/22/20  Yes Rosario Ceron APRN   DEXILANT 60 MG capsule TAKE 1 CAPSULE BY MOUTH ONCE DAILY 3/23/20  Yes Rosario Ceron APRN   famotidine (Pepcid) 40 MG tablet Take 1 tablet by mouth Daily. 11/10/20  Yes Johnny Smiley PA-C   folic acid (FOLVITE) 800 MCG tablet Take 1 tablet by mouth Daily. 11/20/20  Yes Rosario Ceron APRN   HYDROcodone-acetaminophen (NORCO)  MG per tablet Take 1 tablet by mouth Every 8 (Eight) Hours As Needed for Moderate Pain . 12/1/20  Yes Rosario Ceron APRN   Hydrocortisone, Perianal, (PROCTOCORT) 1 % cream rectal cream Insert  into the rectum 2 (Two) Times a Day. 10/14/20  Yes Marlene Mcknight PA-C   levothyroxine (SYNTHROID, LEVOTHROID) 25 MCG tablet Take 0.5 tablets by mouth Daily. 10/8/20  Yes Rosario Ceron APRN   LINZESS 290 MCG capsule capsule TAKE 1 CAPSULE BY MOUTH EVERY MORNING BEFORE BREAKFAST 7/6/20  Yes Johnny Smiley PA-C   losartan (COZAAR) 50 MG tablet Take 50 mg by mouth Every Night.   Yes ProviderDoris MD   metoprolol succinate XL (TOPROL-XL) 100 MG 24 hr tablet Take 100 mg by mouth Every Evening.   Yes ProviderDoris MD   mupirocin (BACTROBAN) 2 % ointment APPLY TOPICALLY TO THE APPROPRIATE AREA AS DIRECED THREE TIMES DAILY 6/1/20  Yes Rosario Ceron APRN   OnabotulinumtoxinA 200 units reconstituted solution 200 Units.   Yes ProviderDoris MD   ondansetron ODT (ZOFRAN-ODT) 4 MG disintegrating tablet Place 1 tablet on the tongue Every 8 (Eight) Hours As Needed for Nausea. 5/28/20  Yes Ceron, UMESH Ponce   OXcarbazepine (TRILEPTAL) 150 MG tablet Take 150 mg by mouth Daily.  "Daily at bedtime   Yes Provider, MD Doris   predniSONE (DELTASONE) 10 MG tablet Take 1 tablet by mouth Daily. 11/20/20  Yes Ceron, UMESH Ponce   prochlorperazine (COMPAZINE) 10 MG tablet Take 1 tablet by mouth Every 6 (Six) Hours As Needed for Nausea or Vomiting. 11/11/19  Yes Ceron, UMESH Ponce   rOPINIRole (REQUIP) 3 MG tablet Take 1 tablet by mouth every night at bedtime. 11/20/20  Yes Ceron, UMESH Ponce   Syringe/Needle, Disp, 25G X 5/8\" 3 ML misc 1 each Every 28 (Twenty-Eight) Days. 9/22/20  Yes Rosario Ceron APRN   topiramate (TOPAMAX) 100 MG tablet Take 1 tablet by mouth 2 (Two) Times a Day. 5/28/20  Yes Ceron, UMESH Ponce   ubrogepant tablet TK 1 T PO AT ONSET OF MIGRAINE. MAY REPEAT IN 2 HOURS AS NEEDED. MAX OF 2 TABLETS IN 24 HOURS. THIS IS A 30 DAY SCRIPT 9/1/20  Yes Provider, MD Doris   vitamin d (CHOLECALIFEROL) 125 MCG (5000 UT) capsule Take 1 capsule by mouth Daily for 14 days. 11/24/20 12/8/20 Yes Ceron, UMESH Ponce     Review of Systems  Review of Systems       Objective    /62   Pulse 74   Ht 157.5 cm (62\")   Wt 54.1 kg (119 lb 3.2 oz)   BMI 21.80 kg/m²   Physical Exam  Vitals signs and nursing note reviewed.   Constitutional:       General: She is not in acute distress.     Appearance: She is well-developed.   HENT:      Head: Normocephalic and atraumatic.   Eyes:      Pupils: Pupils are equal, round, and reactive to light.   Neck:      Musculoskeletal: Normal range of motion.   Cardiovascular:      Rate and Rhythm: Normal rate and regular rhythm.      Heart sounds: Normal heart sounds.   Pulmonary:      Effort: Pulmonary effort is normal.      Breath sounds: Normal breath sounds.   Abdominal:      General: Bowel sounds are normal. There is distension. There is no abdominal bruit.      Palpations: Abdomen is soft. Abdomen is not rigid. There is no shifting dullness or mass.      Tenderness: There is abdominal tenderness. There is no " guarding or rebound.      Hernia: No hernia is present. There is no hernia in the ventral area.      Comments: Mild diffuse   Musculoskeletal: Normal range of motion.   Skin:     General: Skin is warm and dry.   Neurological:      Mental Status: She is alert and oriented to person, place, and time.   Psychiatric:         Behavior: Behavior normal.         Thought Content: Thought content normal.         Judgment: Judgment normal.       Assessment/Plan      1. Pain of upper abdomen    2. Gastroesophageal reflux disease with esophagitis without hemorrhage    3. Hepatic fibrosis     4. Irritable bowel syndrome with both constipation and diarrhea    5. Bloating    .   Diagnoses and all orders for this visit:    1. Pain of upper abdomen (Primary)    2. Gastroesophageal reflux disease with esophagitis without hemorrhage    3. Hepatic fibrosis     4. Irritable bowel syndrome with both constipation and diarrhea    5. Bloating    Other orders  -     tegaserod (Zelnorm) 6 MG tablet; Take 1 tablet by mouth 2 (Two) Times a Day Before Meals for 30 days.  Dispense: 60 tablet; Refill: 3  -     Discontinue: sucralfate (Carafate) 1 g tablet; Take 1 tablet by mouth 4 (Four) Times a Day.  Dispense: 120 tablet; Refill: 1        Orders placed during this encounter include:  No orders of the defined types were placed in this encounter.      Medications prescribed:  New Medications Ordered This Visit   Medications   • tegaserod (Zelnorm) 6 MG tablet     Sig: Take 1 tablet by mouth 2 (Two) Times a Day Before Meals for 30 days.     Dispense:  60 tablet     Refill:  3       Requested Prescriptions     Signed Prescriptions Disp Refills   • tegaserod (Zelnorm) 6 MG tablet 60 tablet 3     Sig: Take 1 tablet by mouth 2 (Two) Times a Day Before Meals for 30 days.       Review and/or summary of lab tests, radiology, procedures, medications. Review and summary of old records and obtaining of history. The risks and benefits of my recommendations, as  well as other treatment options were discussed with the patient today. Questions were answered.    Follow-up: Return in about 1 month (around 1/8/2021), or if symptoms worsen or fail to improve.     * Surgery not found *      This document has been electronically signed by Johnny Smiley PA-C on December 17, 2020 18:35 CST      Results for orders placed or performed in visit on 12/07/20   Urinalysis, Microscopic Only - Urine, Clean Catch    Specimen: Urine, Clean Catch   Result Value Ref Range    RBC, UA None Seen None Seen /HPF    WBC, UA 0-2 (A) None Seen /HPF    Bacteria, UA None Seen None Seen /HPF    Squamous Epithelial Cells, UA 0-2 None Seen, 0-2 /HPF    Hyaline Casts, UA None Seen None Seen /LPF    Methodology Manual Light Microscopy    Urinalysis without microscopic (no culture) - Urine, Clean Catch    Specimen: Urine, Clean Catch   Result Value Ref Range    Color, UA Yellow Yellow, Straw    Appearance, UA Clear Clear    pH, UA 7.0 5.5 - 8.0    Specific Gravity, UA 1.015 1.005 - 1.030    Glucose, UA Negative Negative    Ketones, UA Negative Negative    Bilirubin, UA Negative Negative    Blood, UA Negative Negative    Protein, UA Negative Negative    Leuk Esterase, UA Negative Negative    Nitrite, UA Negative Negative    Urobilinogen, UA 0.2 E.U./dL 0.2 - 1.0 E.U./dL   Urine Culture - Urine, Urine, Clean Catch    Specimen: Urine, Clean Catch   Result Value Ref Range    Urine Culture No growth    Results for orders placed or performed during the hospital encounter of 11/05/20   Tissue Pathology Exam    Specimen: Flank, right; Tissue   Result Value Ref Range    Case Report       Surgical Pathology Report                         Case: [id number]                                  Authorizing Provider:  Meet Mcintyre MD      Collected:           11/05/2020 01:01 PM          Ordering Location:     [facility]             Received:            11/05/2020 01:48 PM                                  [place]                                                              Pathologist:           Susie Penn MD                                                   Specimen:    Flank, right, Right Posterior Flank Sub Q Mass                                             Final Diagnosis       See Scanned Report       Results for orders placed or performed in visit on 11/02/20   COVID [facility] Priority - Swab, Nasopharynx    Specimen: Nasopharynx; Swab   Result Value Ref Range    COVID [facility] PRIORITY Comment    COVID-19,[facility] ROUTINE, NP/OP SWAB IN TRANSPORT MEDIA OR ESWAB 72 HR TAT - Swab, Nasopharynx    Specimen: Nasopharynx; Swab   Result Value Ref Range    SARS-CoV-2, [facility] Not Detected Not Detected   Results for orders placed or performed during the hospital encounter of 10/30/20   POC Urinalysis Dipstick, Multipro (Automated dipstick)    Specimen: Urine   Result Value Ref Range    Color Yellow Yellow, Straw, Dark Yellow, Paige    Clarity, UA Clear Clear    Glucose, UA Negative Negative, 1000 mg/dL (3+) mg/dL    Bilirubin Negative Negative    Ketones, UA Negative Negative    Specific Gravity  1.020 1.005 - 1.030    Blood, UA Negative Negative    pH, Urine 5.5 5.0 - 8.0    Protein, POC Negative Negative mg/dL    Urobilinogen, UA Normal Normal    Nitrite, UA Negative Negative    Leukocytes Negative Negative   Results for orders placed or performed in visit on 10/28/20   ECG 12 Lead   Result Value Ref Range    QT Interval 406 ms    QTC Interval 441 ms   Results for orders placed or performed in visit on 09/14/20   Tissue Pathology Exam    Specimen: Back, Upper; Tissue   Result Value Ref Range    Case Report       Surgical Pathology Report                         Case: [id number]                                  Authorizing Provider:  Rosario Ceron APRN   Collected:           09/14/2020 02:30 PM          Ordering Location:     [facility]     Received:            09/14/2020 03:06 PM                                  [facility]                                                                                  [place]                                                                     Pathologist:           Manav Matias MD                                                       Specimen:    Back, Upper                                                                                Clinical Information       1cm x 1.1cm x 0.3cm tan lesion with irregular edges and plaqued surface excised from left upper back in 2 pieces. First section is partially excised (98%) of whole lesion,. 2nd section excised to include residual lesion which is a grossly crescent shaped sliver from far left/lateral edge of whole lesion, perhaps 0.2cm x 1.1cm x 0.1cm.      Final Diagnosis       SEE SCANNED REPORT       Results for orders placed or performed in visit on 08/18/20   Vitamin B12 & Folate    Specimen: Blood   Result Value Ref Range    Folate 2.75 (L) 4.78 - 24.20 ng/mL    Vitamin B-12 345 211 - 946 pg/mL   CBC Auto Differential    Specimen: Blood   Result Value Ref Range    WBC 7.61 3.40 - 10.80 10*3/mm3    RBC 4.23 3.77 - 5.28 10*6/mm3    Hemoglobin 13.4 12.0 - 15.9 g/dL    Hematocrit 41.0 34.0 - 46.6 %    MCV 96.9 79.0 - 97.0 fL    MCH 31.7 26.6 - 33.0 pg    MCHC 32.7 31.5 - 35.7 g/dL    RDW 14.2 12.3 - 15.4 %    RDW-SD 48.8 37.0 - 54.0 fl    MPV 9.4 6.0 - 12.0 fL    Platelets 286 140 - 450 10*3/mm3    Neutrophil % 56.2 42.7 - 76.0 %    Lymphocyte % 31.1 19.6 - 45.3 %    Monocyte % 10.9 5.0 - 12.0 %    Eosinophil % 1.4 0.3 - 6.2 %    Basophil % 0.4 0.0 - 1.5 %    Neutrophils, Absolute 4.27 1.70 - 7.00 10*3/mm3    Lymphocytes, Absolute 2.37 0.70 - 3.10 10*3/mm3    Monocytes, Absolute 0.83 0.10 - 0.90 10*3/mm3    Eosinophils, Absolute 0.11 0.00 - 0.40 10*3/mm3    Basophils, Absolute 0.03 0.00 - 0.20 10*3/mm3   Vitamin D 25 Hydroxy    Specimen: Blood   Result Value Ref Range    25 Hydroxy, Vitamin D 35.9 30.0 -  100.0 ng/ml   T3    Specimen: Blood   Result Value Ref Range    T3, Total 97.9 80.0 - 200.0 ng/dl   TSH    Specimen: Blood   Result Value Ref Range    TSH 1.730 0.270 - 4.200 uIU/mL   T4, Free    Specimen: Blood   Result Value Ref Range    Free T4 1.09 0.93 - 1.70 ng/dL   Lipid Panel    Specimen: Blood   Result Value Ref Range    Total Cholesterol 156 150 - 200 mg/dL    Triglycerides 156 (H) <=150 mg/dL    HDL Cholesterol 39 (L) 40 - 59 mg/dL    LDL Cholesterol  86 <=100 mg/dL    VLDL Cholesterol 31.2 mg/dL    LDL/HDL Ratio 2.20 0.00 - 3.22   Comprehensive Metabolic Panel    Specimen: Blood   Result Value Ref Range    Glucose 100 (H) 70 - 99 mg/dL    BUN 16 7 - 23 mg/dL    Creatinine 0.96 0.52 - 1.04 mg/dL    Sodium 140 137 - 145 mmol/L    Potassium 4.0 3.4 - 5.0 mmol/L    Chloride 108 101 - 112 mmol/L    CO2 24.0 22.0 - 30.0 mmol/L    Calcium 9.3 8.4 - 10.2 mg/dL    Total Protein 7.1 6.3 - 8.6 g/dL    Albumin 4.10 3.50 - 5.00 g/dL    ALT (SGPT) 23 <=35 U/L    AST (SGOT) 64 (H) 14 - 36 U/L    Alkaline Phosphatase 55 38 - 126 U/L    Total Bilirubin 0.4 0.2 - 1.3 mg/dL    eGFR Non  Amer 59 45 - 104 mL/min/1.73    Globulin 3.0 2.3 - 3.5 gm/dL    A/G Ratio 1.4 1.1 - 1.8 g/dL    BUN/Creatinine Ratio 16.7 7.0 - 25.0    Anion Gap 8.0 5.0 - 15.0 mmol/L   Results for orders placed or performed in visit on 05/28/20   ToxASSU[employer] Select 13 Discrete -    Specimen: Urine   Result Value Ref Range    Report Summary FINAL     Ethanol Screen Urine (Ref) Negative     Ethanol, Urine Not Detected g/dL    Amphetamine, Urine Qual Negative     Methamphetamine, Urine Not Detected ng/mg creat    Amphetamine, Urine Not Detected ng/mg creat    MDMA URINE Not Detected ng/mg creat    MDA Not Detected ng/mg creat    Benzodiazepine Screen, Urine +POSITIVE+     DIAZEPAM URINE QUANT (REF) Not Detected ng/mg creat    Desmethyldiazepam Not Detected ng/mg creat    Oxazepam, urine Not Detected ng/mg creat    Temazepam, urine Not Detected ng/mg  creat    ALPRAZOLAM Not Detected ng/mg creat    ALPHA-HYDROXYALPRAZOLAM UR, QT (REF) Not Detected ng/mg creat    DESALKLFLURAZEPAM UR QUANT (REF) Not Detected ng/mg creat    LORAZEPAM URINE QUANT (REF) Not Detected ng/mg creat    Alpha-hydroxytriazolam, Urine Not Detected ng/mg creat    Clonazepam Urine Not Detected ng/mg creat    7-Aminoclonazepam Urine 42 ng/mg creat    MIDAZOLAM UR, QUANT (REF) Not Detected ng/mg creat    Alpha-hydroxymidazolam, Urine Not Detected ng/mg creat    Flunitrazepam Not Detected ng/mg creat    DESMETHYLFLUNITRAZEPAM Not Detected ng/mg creat    Cocaine & Metabolite Negative     Cocaine Screen, Urine Not Detected ng/mg creat    Benzoylecgonine, Urine Not Detected ng/mg creat    Cocaethylene Ur Not Detected ng/mg creat    THC, Urine Negative     Carboxy THC, Urine Not Detected ng/mg creat    6-ACETYLMORPHINE Negative     6-acetylmorphine Not Detected ng/mg creat    Opiate Class +POSITIVE+     Codeine, Urine Not Detected ng/mg creat    Morphine, Urine Not Detected ng/mg creat    normorphine Not Detected ng/mg creat    Norcodeine Ur Not Detected ng/mg creat    Hydrocodone  ng/mg creat    Hydromorphone Urine 153 ng/mg creat    Dihydrocodeine, Urine 262 ng/mg creat    Opiates, Norhydrocodone, Urine 3181 ng/mg creat    Oxycodone Class Ur Negative     Oxycodone, Confirmation, Urine Not Detected ng/mg creat    Oxymorphone UR Not Detected ng/mg creat    Opiates, Noroxycodone, Urine Not Detected ng/mg creat    Noroxymorphone Not Detected ng/mg creat    Methadone Negative     Methadone, Quantitative Not Detected ng/mg creat    EDDP Not Detected ng/mg creat    Fentanyl and Analogues, Ur Negative     Fentanyl, Urine Not Detected ng/mg creat    Norfentanyl Urine Not Detected ng/mg creat    Sufentanil, Ur Not Detected ng/mg creat    Alfentanil, Ur Not Detected ng/mg creat    BUPRENORPHINE Negative     Buprenorphine, Urine Not Detected ng/mg creat    Norbuprenorphine Not Detected ng/mg creat     Tapentadol Negative     Tapentadol Ur Not Detected ng/mg creat    Opoidss other, UR Negative     Tramadol, Urine Not Detected ng/mg creat    O-desmethyltramadol, Ur Not Detected ng/mg creat    N-Desmethyltramadol, U Not Detected ng/mg creat    BARBITURATES, URINE Negative     Amobarbital, Urine Not Detected     Barbital Not Detected     Butabarbital, Ur Not Detected     Butalbital Screen, Urine Not Detected     Mephobarbital Urine Not Detected     Pentobarbital UR Not Detected     Phenobarbital Not Detected     Secobarbital, urine Not Detected     Thiopental, Ur Not Detected     Creatinine, Urine 77 mg/dL    Level of Detection: Comment      *Note: Due to a large number of results and/or encounters for the requested time period, some results have not been displayed. A complete set of results can be found in Results Review.       Some portions of this note have been dictated using voice recognition software and may contain errors and/or omissions.

## 2020-12-11 ENCOUNTER — OFFICE VISIT (OUTPATIENT)
Dept: FAMILY MEDICINE CLINIC | Facility: CLINIC | Age: 60
End: 2020-12-11

## 2020-12-11 DIAGNOSIS — R39.11 URINARY HESITANCY: Primary | ICD-10-CM

## 2020-12-11 DIAGNOSIS — Z78.9 NORMAL URINALYSIS: ICD-10-CM

## 2020-12-11 DIAGNOSIS — R39.198 DIFFICULTY IN URINATION: ICD-10-CM

## 2020-12-11 DIAGNOSIS — G93.31 POSTVIRAL FATIGUE SYNDROME: ICD-10-CM

## 2020-12-11 DIAGNOSIS — F17.210 CIGARETTE NICOTINE DEPENDENCE WITHOUT COMPLICATION: ICD-10-CM

## 2020-12-11 PROCEDURE — G2025 DIS SITE TELE SVCS RHC/FQHC: HCPCS | Performed by: NURSE PRACTITIONER

## 2020-12-11 RX ORDER — TAMSULOSIN HYDROCHLORIDE 0.4 MG/1
1 CAPSULE ORAL DAILY
Qty: 30 CAPSULE | Refills: 2 | Status: SHIPPED | OUTPATIENT
Start: 2020-12-11 | End: 2021-09-15

## 2020-12-11 NOTE — PROGRESS NOTES
Chief Complaint   Patient presents with   • Difficulty Urinating   • Fatigue     Subjective   Tricia Loyola is a 60 y.o. female who presents to the office by telephone visit due to Covid pandemic for post covid 99 viral infection symptoms.    The following portions of the patient's history were reviewed and updated as appropriate: allergies, current medications, past family history, past medical history, past social history, past surgical history and problem list.    History of Present Illness   You have chosen to receive care through a telephone visit. Do you consent to use a telephone visit for your medical care today? Yes  35 minutes medical discussion.     Tested on 11/19/20 and positive results conveyed to her 11/23/20, quarantined from 11/23/20 until 12/4/20 by the [facility].   She has been without fever, chills, SOB or cough for over a week.  She has persistent weakness, fatigue, throbbing headache pounding all over, very dissimilar to her migraine headaches.. the headaches are intermittent. She still has body aches all over. Advised to increase oral fluids, rest. Advised to take OTC vitamin D2 2-3,000 units daily to help fight the Covid and to take about  Zinc daily.    Urinary difficulty. Some decreased urinary flow, with urinary hesitancy, starting and stopping of flow. This began about 3 weeks ago, before tested for Covid. A UA/ micro and culture were negative. Agreeable with flomax rx, renal US and referral to urology.    No other complaints today.    Parts of most recent relevant visit HPI, ROS  and PE may be carried forward and all are updated as appropriate for current situation.    Past Medical History:   Diagnosis Date   • Abdominal pain    • Anemia    • Anxiety and depression    • Chronic post-traumatic headache      rebound      • Depressive disorder    • Disease of thyroid gland    • Encounter for gynecological examination    • Gastroesophageal reflux disease    •  Generalized anxiety disorder    • History of mammogram 08/2008    MAMMOGRAM DIAGNOSTIC BILATERAL 56827 ([facility]) (1)     • Hyperlipidemia    • Ingrown toenail    • Injury of back    • Leaky heart valve    • Nonruptured cerebral aneurysm    • Osteoporosis    • Osteoporosis    • Posttraumatic stress disorder    • PTSD (post-traumatic stress disorder)    • Seizure (CMS/HCC)     Psychogenic non-epileptic sz    • Viral hepatitis A    • Wears dentures     uppers only   • Wears glasses           Family History   Problem Relation Age of Onset   • Constipation Mother    • Hypertension Mother    • Anxiety disorder Mother    • Heart disease Mother    • Alcohol abuse Father    • Heart disease Father    • Anxiety disorder Brother    • Kidney disease Brother    • Hypertension Brother    • Arthritis Brother    • Anxiety disorder Brother    • Kidney disease Brother    • Diabetes Brother    • Anxiety disorder Brother    • Hypertension Brother    • Breast cancer Paternal Aunt    • Heart disease Maternal Grandmother    • Clotting disorder Maternal Grandmother    • Heart disease Maternal Grandfather         Review of Systems   Constitutional: Positive for fatigue. Negative for chills, fever and unexpected weight change.   HENT: Negative.  Negative for congestion, postnasal drip, rhinorrhea, sinus pressure, sinus pain and sneezing.    Eyes: Negative.    Respiratory: Negative.  Negative for cough, chest tightness and shortness of breath.    Cardiovascular: Negative.  Negative for chest pain.   Gastrointestinal: Negative.  Negative for diarrhea and nausea.   Endocrine: Negative.    Genitourinary: Positive for difficulty urinating. Negative for decreased urine volume, dysuria, flank pain, frequency and urgency.   Musculoskeletal: Positive for arthralgias, back pain, gait problem (right knee pain) and myalgias.   Skin: Negative.  Negative for color change, pallor, rash and wound.   Allergic/Immunologic: Negative.    Hematological: Negative.     Psychiatric/Behavioral: Negative.  Negative for sleep disturbance and suicidal ideas.   All other systems reviewed and are negative.      Objective   Vitals:    12/11/20 1457   PainSc:   6   PainLoc: Generalized     Physical Exam  Vitals signs and nursing note reviewed.   Constitutional:       General: She is not in acute distress.  Pulmonary:      Effort: Pulmonary effort is normal. No respiratory distress.      Breath sounds: No stridor.   Neurological:      Mental Status: She is alert and oriented to person, place, and time.   Psychiatric:         Mood and Affect: Mood normal.         Behavior: Behavior normal.         Thought Content: Thought content normal.         Judgment: Judgment normal.         Assessment/Plan   Diagnoses and all orders for this visit:    1. Urinary hesitancy (Primary)  -     tamsulosin (FLOMAX) 0.4 MG capsule 24 hr capsule; Take 1 capsule by mouth Daily.  Dispense: 30 capsule; Refill: 2  -     US Renal Bilateral  -     Ambulatory Referral to Urology    2. Difficulty in urination  -     tamsulosin (FLOMAX) 0.4 MG capsule 24 hr capsule; Take 1 capsule by mouth Daily.  Dispense: 30 capsule; Refill: 2  -     US Renal Bilateral  -     Ambulatory Referral to Urology    3. Normal urinalysis  -     Ambulatory Referral to Urology    4. Cigarette nicotine dependence without complication    5. Postviral fatigue syndrome           PHQ-2/PHQ-9 Depression Screening 10/8/2020   Little interest or pleasure in doing things 0   Feeling down, depressed, or hopeless 0   Trouble falling or staying asleep, or sleeping too much 2   Feeling tired or having little energy 0   Poor appetite or overeating 0   Feeling bad about yourself - or that you are a failure or have let yourself or your family down 0   Trouble concentrating on things, such as reading the newspaper or watching television 0   Moving or speaking so slowly that other people could have noticed. Or the opposite - being so fidgety or restless that  you have been moving around a lot more than usual 0   Thoughts that you would be better off dead, or of hurting yourself in some way 0   Total Score 2   Patient understands the risks associated with this controlled medication, including tolerance and addiction.  Patient also agrees to only obtain this medication from me, and not from a another provider, unless that provider is covering for me in my absence.  Patient also agrees to be compliant in dosing, and not self adjust the dose of medication.  A signed controlled substance agreement is on file, and the patient has received a controlled substance education sheet at this a previous visit.  The patient has also signed a consent for treatment with a controlled substance as per [facility] policy. KATE was obtained.        UMESH Guzmán         Return in about 2 weeks (around 12/25/2020), or if symptoms worsen or fail to improve.    There are no Patient Instructions on file for this visit.

## 2020-12-21 ENCOUNTER — OFFICE VISIT (OUTPATIENT)
Dept: FAMILY MEDICINE CLINIC | Facility: CLINIC | Age: 60
End: 2020-12-21

## 2020-12-21 DIAGNOSIS — S80.01XA CONTUSION OF RIGHT KNEE, INITIAL ENCOUNTER: ICD-10-CM

## 2020-12-21 DIAGNOSIS — G89.29 CHRONIC MIDLINE LOW BACK PAIN WITHOUT SCIATICA: ICD-10-CM

## 2020-12-21 DIAGNOSIS — G89.29 CHRONIC PAIN OF RIGHT KNEE: ICD-10-CM

## 2020-12-21 DIAGNOSIS — M54.50 CHRONIC MIDLINE LOW BACK PAIN WITHOUT SCIATICA: ICD-10-CM

## 2020-12-21 DIAGNOSIS — M25.561 CHRONIC PAIN OF RIGHT KNEE: ICD-10-CM

## 2020-12-21 PROCEDURE — G2025 DIS SITE TELE SVCS RHC/FQHC: HCPCS | Performed by: NURSE PRACTITIONER

## 2020-12-21 RX ORDER — HYDROCODONE BITARTRATE AND ACETAMINOPHEN 10; 325 MG/1; MG/1
1 TABLET ORAL EVERY 8 HOURS PRN
Qty: 90 TABLET | Refills: 0 | Status: SHIPPED | OUTPATIENT
Start: 2020-12-21 | End: 2021-01-28 | Stop reason: SDUPTHER

## 2020-12-21 NOTE — PROGRESS NOTES
Subjective   Tricia Loyola is a 60 y.o. female.   You have chosen to receive care through a telephone visit. Do you consent to use a telephone visit for your medical care today? Yes  20 minutes medical discussion.     No chief complaint on file.       History of Present Illness     Here for follow up of covid 10 respiratory infection post quarantine release, and for results of renal ultrasound.     Today reports fatigue is her only remaining symtpom of Covid, she does not need anything from this standpoint currently. Is just taking her daily routine slower and sleeping more.   No fevers, cough, chills, body or muscle aches outside of her usual. Is not feeling ill.     Renal ultrasound results given, no stones seen, a benign cyst in upper pole left kidney is seen and a mild amt of hydronephrosis noted right kidney. There is no scheduled appt yet with Dr Graham, urology, but referral placed previously. Advised to follow up with him as to any significance this mild hydronephrosis may bear, my only guess is that perhaps there had been an obstructing stone, which is now gone, and there are no others present.  She is agreeable with this plan.      Chronic midline lower back pain owing to DDD lumbar spine, stable. Managed with hydrocodone. This pain medication allows her to remain active and independent of ADLS.  Compliant with use, denies adverse side effects of medication. Due for refill on about 1/1/21. Refill sent today to get ahead of holiday pharmacy closures.     Chronic right knee pain due to OA with DJD, stable. Also managed with hydrocodone, due for refill today. Reports adequate relief with medication, dose and frequency currently prescribed.      No new complaints today.     Parts of most recent relevant visit HPI, ROS  and PE may be carried forward and all are updated as appropriate for current situation.    Past Surgical History:   Procedure Laterality Date   • APPENDECTOMY     • BREAST BIOPSY Left    •  CHOLECYSTECTOMY     • COLONOSCOPY N/A 11/26/2018    Procedure: COLONOSCOPY;  Surgeon: Meet Mcintyre MD;  Location: [facility] ENDOSCOPY;  Service: General   • CRANIOTOMY FOR ANEURYSM  2003   • ENDOSCOPY N/A 10/16/2019    Procedure: ESOPHAGOGASTRODUODENOSCOPY;  Surgeon: Kory Muhammad MD;  Location: [facility] ENDOSCOPY;  Service: Gastroenterology   • MASS EXCISION Right 11/5/2020    Procedure: EXCISE SOFT TISSUE MASS RIGHT POSTERIOR FLANK                 (latex allergy);  Surgeon: Meet Mcintyre MD;  Location: [facility] OR;  Service: General;  Laterality: Right;   • PAP SMEAR  08/16/2012   • SKIN CANCER EXCISION      on back   • SKIN LESION EXCISION     • TONSILLECTOMY     • UPPER GASTROINTESTINAL ENDOSCOPY  10/16/2019      Social History     Socioeconomic History   • Marital status: Legally      Spouse name: Not on file   • Number of children: Not on file   • Years of education: Not on file   • Highest education level: Not on file   Tobacco Use   • Smoking status: Light Tobacco Smoker     Packs/day: 0.50     Years: 43.00     Pack years: 21.50     Types: Cigarettes   • Smokeless tobacco: Never Used   Substance and Sexual Activity   • Alcohol use: No   • Drug use: No   • Sexual activity: Never      The following portions of the patient's history were reviewed and updated as appropriate: She  has a past medical history of Abdominal pain, Anemia, Anxiety and depression, Chronic post-traumatic headache, Depressive disorder, Disease of thyroid gland, Encounter for gynecological examination, Gastroesophageal reflux disease, Generalized anxiety disorder, History of mammogram (08/2008), Hyperlipidemia, Ingrown toenail, Injury of back, Leaky heart valve, Nonruptured cerebral aneurysm, Osteoporosis, Osteoporosis, Posttraumatic stress disorder, PTSD (post-traumatic stress disorder), Seizure (CMS/HCC), Viral hepatitis A, Wears dentures, and Wears glasses..    Review of Systems   Constitutional: Negative.    HENT:  Negative.  Negative for congestion.    Eyes: Negative.  Negative for blurred vision, double vision, photophobia and visual disturbance.   Respiratory: Negative for cough, shortness of breath and stridor.    Gastrointestinal: Negative for abdominal distention, abdominal pain, blood in stool, constipation, diarrhea, nausea, vomiting, GERD and indigestion.   Endocrine: Negative.  Negative for cold intolerance and heat intolerance.   Genitourinary: Negative.  Negative for dysuria, flank pain, frequency and urinary incontinence.   Musculoskeletal: Positive for arthralgias and back pain.   Allergic/Immunologic: Negative.  Negative for immunocompromised state.   Neurological: Negative.    Hematological: Negative.    Psychiatric/Behavioral: Positive for sleep disturbance. Negative for agitation, behavioral problems, decreased concentration, dysphoric mood, hallucinations, self-injury, suicidal ideas, negative for hyperactivity, depressed mood and stress. The patient is nervous/anxious.    All other systems reviewed and are negative.    PHQ-9 Depression Screening  Little interest or pleasure in doing things?     Feeling down, depressed, or hopeless?     Trouble falling or staying asleep, or sleeping too much?     Feeling tired or having little energy?     Poor appetite or overeating?     Feeling bad about yourself - or that you are a failure or have let yourself or your family down?     Trouble concentrating on things, such as reading the newspaper or watching television?     Moving or speaking so slowly that other people could have noticed? Or the opposite - being so fidgety or restless that you have been moving around a lot more than usual?     Thoughts that you would be better off dead, or of hurting yourself in some way?     PHQ-9 Total Score     If you checked off any problems, how difficult have these problems made it for you to do your work, take care of things at home, or get along with other people?      Patient  understands the risks associated with this controlled medication, including tolerance and addiction.  Patient also agrees to only obtain this medication from me, and not from a another provider, unless that provider is covering for me in my absence.  Patient also agrees to be compliant in dosing, and not self adjust the dose of medication.  A signed controlled substance agreement is on file, and the patient has received a controlled substance education sheet at this a previous visit.  The patient has also signed a consent for treatment with a controlled substance as per [facility] policy. KATE was obtained.    Objective   Physical Exam  Vitals signs and nursing note reviewed.   Constitutional:       General: She is not in acute distress.  Pulmonary:      Effort: Pulmonary effort is normal. No respiratory distress.      Breath sounds: No stridor.      Comments: Able to speak in full sentences without breathlessness. No spontaneous cough during conversation.   Neurological:      Mental Status: She is alert and oriented to person, place, and time.   Psychiatric:         Behavior: Behavior normal.         Thought Content: Thought content normal.         Judgment: Judgment normal.           Assessment/Plan   Diagnoses and all orders for this visit:    1. Chronic midline low back pain without sciatica  -     HYDROcodone-acetaminophen (NORCO)  MG per tablet; Take 1 tablet by mouth Every 8 (Eight) Hours As Needed for Moderate Pain .  Dispense: 90 tablet; Refill: 0    2. Chronic pain of right knee  -     HYDROcodone-acetaminophen (NORCO)  MG per tablet; Take 1 tablet by mouth Every 8 (Eight) Hours As Needed for Moderate Pain .  Dispense: 90 tablet; Refill: 0    3. Contusion of right knee, initial encounter                   This document has been electronically signed by UMESH Guzmán on December 21, 2020 15:54 CST

## 2021-01-04 ENCOUNTER — TELEPHONE (OUTPATIENT)
Dept: FAMILY MEDICINE CLINIC | Facility: CLINIC | Age: 61
End: 2021-01-04

## 2021-01-05 RX ORDER — AZITHROMYCIN 250 MG/1
TABLET, FILM COATED ORAL
Qty: 6 TABLET | Refills: 0 | Status: SHIPPED | OUTPATIENT
Start: 2021-01-05 | End: 2021-02-02

## 2021-01-05 RX ORDER — DEXTROMETHORPHAN HYDROBROMIDE AND PROMETHAZINE HYDROCHLORIDE 15; 6.25 MG/5ML; MG/5ML
5 SYRUP ORAL 4 TIMES DAILY PRN
Qty: 180 ML | Refills: 1 | Status: SHIPPED | OUTPATIENT
Start: 2021-01-05 | End: 2021-02-02

## 2021-01-07 RX ORDER — SODIUM CHLORIDE 9 MG/ML
250 INJECTION, SOLUTION INTRAVENOUS ONCE
OUTPATIENT
Start: 2021-01-07

## 2021-01-08 ENCOUNTER — APPOINTMENT (OUTPATIENT)
Dept: ONCOLOGY | Facility: HOSPITAL | Age: 61
End: 2021-01-08

## 2021-01-12 ENCOUNTER — APPOINTMENT (OUTPATIENT)
Dept: ONCOLOGY | Facility: HOSPITAL | Age: 61
End: 2021-01-12

## 2021-01-14 DIAGNOSIS — E03.9 ACQUIRED HYPOTHYROIDISM: Chronic | ICD-10-CM

## 2021-01-14 RX ORDER — LEVOTHYROXINE SODIUM 0.03 MG/1
TABLET ORAL
Qty: 15 TABLET | Refills: 3 | Status: SHIPPED | OUTPATIENT
Start: 2021-01-14 | End: 2021-03-15

## 2021-01-14 NOTE — INTERVAL H&P NOTE
H&P reviewed. The patient was examined and there are no changes to the H&P.      Temp:  [97.4 °F (36.3 °C)] 97.4 °F (36.3 °C)  Heart Rate:  [74] 74  Resp:  [18] 18  BP: (136)/(66) 136/66     Yes

## 2021-01-28 DIAGNOSIS — M54.50 CHRONIC MIDLINE LOW BACK PAIN WITHOUT SCIATICA: ICD-10-CM

## 2021-01-28 DIAGNOSIS — M25.561 CHRONIC PAIN OF RIGHT KNEE: ICD-10-CM

## 2021-01-28 DIAGNOSIS — G89.29 CHRONIC PAIN OF RIGHT KNEE: ICD-10-CM

## 2021-01-28 DIAGNOSIS — G89.29 CHRONIC MIDLINE LOW BACK PAIN WITHOUT SCIATICA: ICD-10-CM

## 2021-01-29 DIAGNOSIS — M54.50 CHRONIC MIDLINE LOW BACK PAIN WITHOUT SCIATICA: ICD-10-CM

## 2021-01-29 DIAGNOSIS — G89.29 CHRONIC PAIN OF RIGHT KNEE: ICD-10-CM

## 2021-01-29 DIAGNOSIS — G89.29 CHRONIC MIDLINE LOW BACK PAIN WITHOUT SCIATICA: ICD-10-CM

## 2021-01-29 DIAGNOSIS — M25.561 CHRONIC PAIN OF RIGHT KNEE: ICD-10-CM

## 2021-01-29 DIAGNOSIS — F41.1 GAD (GENERALIZED ANXIETY DISORDER): ICD-10-CM

## 2021-01-31 ENCOUNTER — TELEPHONE (OUTPATIENT)
Dept: FAMILY MEDICINE CLINIC | Facility: CLINIC | Age: 61
End: 2021-01-31

## 2021-01-31 RX ORDER — HYDROCODONE BITARTRATE AND ACETAMINOPHEN 10; 325 MG/1; MG/1
1 TABLET ORAL EVERY 8 HOURS PRN
Qty: 90 TABLET | Refills: 0 | Status: SHIPPED | OUTPATIENT
Start: 2021-01-31 | End: 2021-02-26 | Stop reason: SDUPTHER

## 2021-01-31 RX ORDER — HYDROCODONE BITARTRATE AND ACETAMINOPHEN 10; 325 MG/1; MG/1
1 TABLET ORAL EVERY 8 HOURS PRN
Qty: 90 TABLET | Refills: 0 | OUTPATIENT
Start: 2021-01-31

## 2021-01-31 RX ORDER — CLONAZEPAM 0.5 MG/1
0.5 TABLET ORAL DAILY PRN
Qty: 30 TABLET | Refills: 0 | Status: SHIPPED | OUTPATIENT
Start: 2021-01-31 | End: 2021-03-30 | Stop reason: SDUPTHER

## 2021-01-31 NOTE — TELEPHONE ENCOUNTER
Patient has chronic anxiety requiring benzodiazepine use concurrently with chronic pain requiring opiate pain medication and/ or gabapentin. Patient has been educated regarding potential dangers of oversedation and accidental overdose with use of both medications concurrently. Serial assessments of patient has yielded no sign of oversedation or adverse effects of patient, and he/she is advised and aware to notify my office immediately if symptoms do occur, as well as to discontinue use of benzodiazepine medication and opiate medication immediately if that occurs, pending medical evaluation and advice. Patient has been compliant with use of medications, UDS, visits with no adverse effects noted. Patient understands the risks associated with this controlled medication, including tolerance and addiction.  Patient also agrees to only obtain this medication from me, and not from a another provider, unless that provider is covering for me in my absence.  Patient also agrees to be compliant in dosing, and not self adjust the dose of medication.  A signed controlled substance agreement is on file, and the patient has received a controlled substance education sheet at this a previous visit.  The patient has also signed a consent for treatment with a controlled substance as per [facility] policy. KATE was obtained. Refills were sent for:  Clonazepam.     This document has been electronically signed by UMESH Guzmán on January 31, 2021 17:55 CST

## 2021-01-31 NOTE — TELEPHONE ENCOUNTER
Patient seen in office every 3 months for chronic pain requiring opiate pain medication. Compliant with medication, visits with no adverse effects noted. KATE and UDS current and appropriate. Patient called requesting scheduled refill at appropriate interval. Patient understands the risks associated with this controlled medication, including tolerance and addiction.  Patient also agrees to only obtain this medication from me, and not from a another provider, unless that provider is covering for me in my absence.  Patient also agrees to be compliant in dosing, and not self adjust the dose of medication.  A signed controlled substance agreement is on file, and the patient has received a controlled substance education sheet at this a previous visit.  The patient has also signed a consent for treatment with a controlled substance as per [facility] policy. KATE was obtained.   Refill sent for hydrocodone.     This document has been electronically signed by UMESH Guzmán on January 31, 2021 17:25 CST

## 2021-02-02 ENCOUNTER — OFFICE VISIT (OUTPATIENT)
Dept: GASTROENTEROLOGY | Facility: CLINIC | Age: 61
End: 2021-02-02

## 2021-02-02 VITALS
HEIGHT: 62 IN | SYSTOLIC BLOOD PRESSURE: 124 MMHG | HEART RATE: 73 BPM | WEIGHT: 117.4 LBS | BODY MASS INDEX: 21.6 KG/M2 | DIASTOLIC BLOOD PRESSURE: 68 MMHG

## 2021-02-02 DIAGNOSIS — R74.8 ELEVATED LIVER ENZYMES: ICD-10-CM

## 2021-02-02 DIAGNOSIS — R10.10 PAIN OF UPPER ABDOMEN: Primary | ICD-10-CM

## 2021-02-02 DIAGNOSIS — K21.00 GASTROESOPHAGEAL REFLUX DISEASE WITH ESOPHAGITIS WITHOUT HEMORRHAGE: ICD-10-CM

## 2021-02-02 DIAGNOSIS — K74.00 HEPATIC FIBROSIS: ICD-10-CM

## 2021-02-02 PROCEDURE — 99214 OFFICE O/P EST MOD 30 MIN: CPT | Performed by: PHYSICIAN ASSISTANT

## 2021-02-02 RX ORDER — DEXTROSE AND SODIUM CHLORIDE 5; .45 G/100ML; G/100ML
30 INJECTION, SOLUTION INTRAVENOUS CONTINUOUS PRN
Status: CANCELLED | OUTPATIENT
Start: 2021-03-16

## 2021-02-02 NOTE — PROGRESS NOTES
Chief Complaint   Patient presents with   • Heartburn   • Hepatic fibrosis   • Irritable Bowel Syndrome   • Bloated       ENDO PROCEDURE ORDERED:    Subjective    Tricia Loyola is a 61 y.o. female. she is here today for follow-up.    History of Present Illness    The patient is seen on a recheck of her GERD, hepatic fibrosis, IBS, and  abdominal pain. Last seen 12/08/2020. Tried to give her Zelnorm but her insurance denied. She has gotten the Carafate. She is taking the Dexilant, Pepcid and Carafate every day. She states she has constant burning all day long. She is under a lot of stress. She states her constipation actually has gotten better without any other medication. Weight is down 2 pounds since last visit. Last EGD 10/16/2019 showed esophagitis/gastritis. Last colonoscopy 11/26/2018 showed a polyp.     Patient had renal ultrasound on 12/21/2020 that showed a cyst in the left kidney and mild hydronephrosis on the right. Last laboratory 01/10/2021: CBC normal, normal magnesium, CMP showed AST of 48, sodium 135, otherwise normal.     A/P: Patient with persistent GERD symptoms, abdominal pain and burning. Recommend EGD. Will do biopsies to evaluate for H. pylori or other causes of her abdominal pain. She denies NSAIDs. Would consider further studies depending on the results of the above. Will continue on current medications. Her symptoms seem to be out of proportion to her current medication usage. Will plan followup in 1 month, further pending clinical course and the results of the above.       The following portions of the patient's history were reviewed and updated as appropriate:   Past Medical History:   Diagnosis Date   • Abdominal pain    • Anemia    • Anxiety and depression    • Chronic post-traumatic headache      rebound      • Depressive disorder    • Disease of thyroid gland    • Encounter for gynecological examination    • Gastroesophageal reflux disease    • Generalized anxiety disorder    •  History of mammogram 2008    MAMMOGRAM DIAGNOSTIC BILATERAL 62332 ([facility]) (1)     • Hyperlipidemia    • Ingrown toenail    • Injury of back    • Leaky heart valve    • Nonruptured cerebral aneurysm    • Osteoporosis    • Osteoporosis    • Posttraumatic stress disorder    • PTSD (post-traumatic stress disorder)    • Seizure (CMS/HCC)     Psychogenic non-epileptic sz    • Viral hepatitis A    • Wears dentures     uppers only   • Wears glasses      Past Surgical History:   Procedure Laterality Date   • APPENDECTOMY     • BREAST BIOPSY Left    • CHOLECYSTECTOMY     • COLONOSCOPY N/A 2018    Procedure: COLONOSCOPY;  Surgeon: Meet Mcintyre MD;  Location: [facility] ENDOSCOPY;  Service: General   • CRANIOTOMY FOR ANEURYSM     • ENDOSCOPY N/A 10/16/2019    Procedure: ESOPHAGOGASTRODUODENOSCOPY;  Surgeon: Kory Muhammad MD;  Location: [facility] ENDOSCOPY;  Service: Gastroenterology   • MASS EXCISION Right 2020    Procedure: EXCISE SOFT TISSUE MASS RIGHT POSTERIOR FLANK                 (latex allergy);  Surgeon: Meet Mcintyre MD;  Location: [facility] OR;  Service: General;  Laterality: Right;   • PAP SMEAR  2012   • SKIN CANCER EXCISION      on back   • SKIN LESION EXCISION     • TONSILLECTOMY     • UPPER GASTROINTESTINAL ENDOSCOPY  10/16/2019     Family History   Problem Relation Age of Onset   • Constipation Mother    • Hypertension Mother    • Anxiety disorder Mother    • Heart disease Mother    • Alcohol abuse Father    • Heart disease Father    • Anxiety disorder Brother    • Kidney disease Brother    • Hypertension Brother    • Arthritis Brother    • Anxiety disorder Brother    • Kidney disease Brother    • Diabetes Brother    • Anxiety disorder Brother    • Hypertension Brother    • Breast cancer Paternal Aunt    • Heart disease Maternal Grandmother    • Clotting disorder Maternal Grandmother    • Heart disease Maternal Grandfather      OB History        3    Para   2    Term   2             AB   1    Living   2       SAB   1    TAB        Ectopic        Molar        Multiple        Live Births                  Allergies   Allergen Reactions   • Nitrofuran Derivatives Hives     Macrobid   • Bactrim [Sulfamethoxazole-Trimethoprim] Other (See Comments)     Turns skin all red and eyes swell    • Cleocin [Clindamycin Hcl] Hives and Other (See Comments)     Turned red all over body and hot and eyes swelled    • Augmentin [Amoxicillin-Pot Clavulanate]      Hives   • Ciprofloxacin      Cipro:  Rash   • Fiorinal [Butalbital-Aspirin-Caffeine]      Rapid heart rate   • Imitrex [Sumatriptan]      Rapid heart rate   • Iodine      Anaphylaxis   • Toradol [Ketorolac Tromethamine]      Anaphylaxis   • Ultram [Tramadol]      Rapid heart rate   • Ibuprofen Rash   • Latex Rash   • Other Rash     MIDRIN  -  Rapid heart rate  pREGO SPAGHETTI SAUCE CAUSES A RASH  c diff      Social History     Socioeconomic History   • Marital status: Legally      Spouse name: Not on file   • Number of children: Not on file   • Years of education: Not on file   • Highest education level: Not on file   Tobacco Use   • Smoking status: Light Tobacco Smoker     Packs/day: 0.50     Years: 43.00     Pack years: 21.50     Types: Cigarettes   • Smokeless tobacco: Never Used   Substance and Sexual Activity   • Alcohol use: No   • Drug use: No   • Sexual activity: Never     Current Medications:  Prior to Admission medications    Medication Sig Start Date End Date Taking? Authorizing Provider   ALBUTEROL SULFATE  (90 Base) MCG/ACT inhaler INHALE 2 PUFFS BY MOUTH EVERY 6 HOURS IF NEEDED FOR WHEEZING OR SHORTNESS OF BREATH 20  Yes Rosario Ceron APRN   amitriptyline (ELAVIL) 75 MG tablet TAKE ONE TABLET BY MOUTH AT BEDTIME. 18  Yes Sonia Mata MD   aspirin 81 MG EC tablet Take 1 tablet by mouth Daily. 20  Yes Rosario Ceron APRN   atorvastatin (LIPITOR) 40 MG tablet TAKE 1 TABLET BY  MOUTH EVERY NIGHT FOR CHOLESTEROL 12/4/20  Yes Rosario Ceron APRN   busPIRone (BUSPAR) 30 MG tablet Take 1 tablet by mouth 2 (Two) Times a Day. 3/7/18  Yes Sonia Mata MD   clonazePAM (KlonoPIN) 0.5 MG tablet Take 1 tablet by mouth Daily As Needed for Anxiety. 1/31/21  Yes Rosario Ceron APRN   cyanocobalamin 1000 MCG/ML injection Inject 1 mL into the appropriate muscle as directed by prescriber Every 28 (Twenty-Eight) Days. 9/22/20  Yes Rosario Ceron APRN   DEXILANT 60 MG capsule TAKE 1 CAPSULE BY MOUTH ONCE DAILY 3/23/20  Yes Rosario Ceron APRN   famotidine (Pepcid) 40 MG tablet Take 1 tablet by mouth Daily. 11/10/20  Yes Johnny Smiley PA-C   folic acid (FOLVITE) 800 MCG tablet Take 1 tablet by mouth Daily. 11/20/20  Yes Rosario Ceron APRN   HYDROcodone-acetaminophen (NORCO)  MG per tablet Take 1 tablet by mouth Every 8 (Eight) Hours As Needed for Moderate Pain . 1/31/21  Yes Rosario Ceron APRN   Hydrocortisone, Perianal, (PROCTOCORT) 1 % cream rectal cream Insert  into the rectum 2 (Two) Times a Day. 10/14/20  Yes Marlene Mcknight PA-C   levothyroxine (SYNTHROID, LEVOTHROID) 25 MCG tablet TAKE 1/2 TABLET BY MOUTH DAILY 1/14/21  Yes Rosario Ceron APRN   losartan (COZAAR) 50 MG tablet Take 50 mg by mouth Every Night.   Yes Doris Alejo MD   metoprolol succinate XL (TOPROL-XL) 100 MG 24 hr tablet Take 100 mg by mouth Every Evening.   Yes Doris Alejo MD   mupirocin (BACTROBAN) 2 % ointment APPLY TOPICALLY TO THE APPROPRIATE AREA AS DIRECED THREE TIMES DAILY 6/1/20  Yes Rosario Ceron APRN   OnabotulinumtoxinA 200 units reconstituted solution 200 Units.   Yes Doris Alejo MD   ondansetron ODT (ZOFRAN-ODT) 4 MG disintegrating tablet Place 1 tablet on the tongue Every 8 (Eight) Hours As Needed for Nausea. 5/28/20  Yes Ceron, UMESH Ponce   OXcarbazepine (TRILEPTAL) 150 MG tablet Take 150 mg by mouth Daily. Daily at bedtime  "  Yes Provider, MD Doris   prochlorperazine (COMPAZINE) 10 MG tablet Take 1 tablet by mouth Every 6 (Six) Hours As Needed for Nausea or Vomiting. 11/11/19  Yes Ceron, UMESH Ponce   rOPINIRole (REQUIP) 3 MG tablet Take 1 tablet by mouth every night at bedtime. 11/20/20  Yes Rosario Ceron APRN   sucralfate (CARAFATE) 1 g tablet TAKE 1 TABLET BY MOUTH FOUR TIMES DAILY 12/8/20  Yes Johnny Smiley PA-C   Syringe/Needle, Disp, 25G X 5/8\" 3 ML misc 1 each Every 28 (Twenty-Eight) Days. 9/22/20  Yes Rosario Ceron APRN   tamsulosin (FLOMAX) 0.4 MG capsule 24 hr capsule Take 1 capsule by mouth Daily. 12/11/20  Yes Rosario Ceron APRN   topiramate (TOPAMAX) 100 MG tablet Take 1 tablet by mouth 2 (Two) Times a Day. 5/28/20  Yes Ceron, UMESH Ponce   ubrogepant tablet TK 1 T PO AT ONSET OF MIGRAINE. MAY REPEAT IN 2 HOURS AS NEEDED. MAX OF 2 TABLETS IN 24 HOURS. THIS IS A 30 DAY SCRIPT 9/1/20  Yes Provider, MD Doris   azithromycin (ZITHROMAX) 250 MG tablet Take 2 tablets the first day, then 1 tablet daily for 4 days. 1/5/21 2/2/21  Rosario Ceron APRN   predniSONE (DELTASONE) 10 MG tablet Take 1 tablet by mouth Daily. 11/20/20 2/2/21  Osmany, UMESH Ponce   promethazine-dextromethorphan (PROMETHAZINE-DM) 6.25-15 MG/5ML syrup Take 5 mL by mouth 4 (Four) Times a Day As Needed for Cough. 1/5/21 2/2/21  Osmany, UMESH Ponce     Review of Systems  Review of Systems       Objective    /68   Pulse 73   Ht 157.5 cm (62\")   Wt 53.3 kg (117 lb 6.4 oz)   BMI 21.47 kg/m²   Physical Exam  Vitals signs and nursing note reviewed.   Constitutional:       General: She is not in acute distress.     Appearance: She is well-developed.   HENT:      Head: Normocephalic and atraumatic.   Eyes:      Pupils: Pupils are equal, round, and reactive to light.   Neck:      Musculoskeletal: Normal range of motion.   Cardiovascular:      Rate and Rhythm: Normal rate and regular rhythm.      Heart " sounds: Normal heart sounds.   Pulmonary:      Effort: Pulmonary effort is normal.      Breath sounds: Normal breath sounds.   Abdominal:      General: Bowel sounds are normal. There is no distension or abdominal bruit.      Palpations: Abdomen is soft. Abdomen is not rigid. There is no shifting dullness or mass.      Tenderness: There is abdominal tenderness. There is no guarding or rebound.      Hernia: No hernia is present. There is no hernia in the ventral area.   Musculoskeletal: Normal range of motion.   Skin:     General: Skin is warm and dry.   Neurological:      Mental Status: She is alert and oriented to person, place, and time.   Psychiatric:         Behavior: Behavior normal.         Thought Content: Thought content normal.         Judgment: Judgment normal.       Assessment/Plan      1. Pain of upper abdomen    2. Gastroesophageal reflux disease with esophagitis without hemorrhage    3. Hepatic fibrosis     4. Elevated liver enzymes    .   Diagnoses and all orders for this visit:    1. Pain of upper abdomen (Primary)  -     Case Request; Standing  -     dextrose 5 % and sodium chloride 0.45 % infusion  -     Case Request    2. Gastroesophageal reflux disease with esophagitis without hemorrhage  -     Case Request; Standing  -     dextrose 5 % and sodium chloride 0.45 % infusion  -     Case Request    3. Hepatic fibrosis     4. Elevated liver enzymes    Other orders  -     Follow Anesthesia Guidelines / Standing Orders; Future  -     Obtain Informed Consent; Future  -     Obtain Informed Consent; Standing  -     POC Glucose Once; Standing        Orders placed during this encounter include:  Orders Placed This Encounter   Procedures   • Follow Anesthesia Guidelines / Standing Orders     Standing Status:   Future   • Obtain Informed Consent     Standing Status:   Future     Order Specific Question:   Informed Consent Given For     Answer:   ESOPHAGOGASTRODUODENOSCOPY       Medications prescribed:  No  orders of the defined types were placed in this encounter.    Discontinued Medications       Reason for Discontinue     promethazine-dextromethorphan (PROMETHAZINE-DM) 6.25-15 MG/5ML syrup    *Therapy completed     predniSONE (DELTASONE) 10 MG tablet    *Therapy completed     azithromycin (ZITHROMAX) 250 MG tablet    *Therapy completed        Requested Prescriptions      No prescriptions requested or ordered in this encounter       Review and/or summary of lab tests, radiology, procedures, medications. Review and summary of old records and obtaining of history. The risks and benefits of my recommendations, as well as other treatment options were discussed with the patient today. Questions were answered.    Follow-up: Return in about 1 month (around 3/2/2021).     ESOPHAGOGASTRODUODENOSCOPY (N/A)      This document has been electronically signed by Johnny Smiley PA-C on February 12, 2021 12:42 CST      Results for orders placed or performed in visit on 12/07/20   Urinalysis, Microscopic Only - Urine, Clean Catch    Specimen: Urine, Clean Catch   Result Value Ref Range    RBC, UA None Seen None Seen /HPF    WBC, UA 0-2 (A) None Seen /HPF    Bacteria, UA None Seen None Seen /HPF    Squamous Epithelial Cells, UA 0-2 None Seen, 0-2 /HPF    Hyaline Casts, UA None Seen None Seen /LPF    Methodology Manual Light Microscopy    Urinalysis without microscopic (no culture) - Urine, Clean Catch    Specimen: Urine, Clean Catch   Result Value Ref Range    Color, UA Yellow Yellow, Straw    Appearance, UA Clear Clear    pH, UA 7.0 5.5 - 8.0    Specific Gravity, UA 1.015 1.005 - 1.030    Glucose, UA Negative Negative    Ketones, UA Negative Negative    Bilirubin, UA Negative Negative    Blood, UA Negative Negative    Protein, UA Negative Negative    Leuk Esterase, UA Negative Negative    Nitrite, UA Negative Negative    Urobilinogen, UA 0.2 E.U./dL 0.2 - 1.0 E.U./dL   Urine Culture - Urine, Urine, Clean Catch    Specimen: Urine,  Clean Catch   Result Value Ref Range    Urine Culture No growth    Results for orders placed or performed during the hospital encounter of 11/05/20   Tissue Pathology Exam    Specimen: Flank, right; Tissue   Result Value Ref Range    Case Report       Surgical Pathology Report                         Case: [id number]                                  Authorizing Provider:  Meet Mcintyre MD      Collected:           11/05/2020 01:01 PM          Ordering Location:     [facility]             Received:            11/05/2020 01:48 PM                                 [facility]                                                              Pathologist:           Susie Penn MD                                                   Specimen:    Flank, right, Right Posterior Flank Sub Q Mass                                             Final Diagnosis       See Scanned Report       Results for orders placed or performed in visit on 11/02/20   COVID [facility] Priority - Swab, Nasopharynx    Specimen: Nasopharynx; Swab   Result Value Ref Range    COVID [facility] PRIORITY Comment    COVID-19,[facility] ROUTINE, NP/OP SWAB IN TRANSPORT MEDIA OR ESWAB 72 HR TAT - Swab, Nasopharynx    Specimen: Nasopharynx; Swab   Result Value Ref Range    SARS-CoV-2, [facility] Not Detected Not Detected   Results for orders placed or performed during the hospital encounter of 10/30/20   POC Urinalysis Dipstick, Multipro (Automated dipstick)    Specimen: Urine   Result Value Ref Range    Color Yellow Yellow, Straw, Dark Yellow, Paige    Clarity, UA Clear Clear    Glucose, UA Negative Negative, 1000 mg/dL (3+) mg/dL    Bilirubin Negative Negative    Ketones, UA Negative Negative    Specific Gravity  1.020 1.005 - 1.030    Blood, UA Negative Negative    pH, Urine 5.5 5.0 - 8.0    Protein, POC Negative Negative mg/dL    Urobilinogen, UA Normal Normal    Nitrite, UA Negative Negative    Leukocytes Negative Negative   Results for orders placed or  performed in visit on 10/28/20   ECG 12 Lead   Result Value Ref Range    QT Interval 406 ms    QTC Interval 441 ms   Results for orders placed or performed in visit on 09/14/20   Tissue Pathology Exam    Specimen: Back, Upper; Tissue   Result Value Ref Range    Case Report       Surgical Pathology Report                         Case: [id number]                                  Authorizing Provider:  Rosario Ceron APRN   Collected:           09/14/2020 02:30 PM          Ordering Location:     [facility]     Received:            09/14/2020 03:06 PM                                 [facility]                                                                                  [facility]                                                                     Pathologist:           Manav Matias MD                                                       Specimen:    Back, Upper                                                                                Clinical Information       1cm x 1.1cm x 0.3cm tan lesion with irregular edges and plaqued surface excised from left upper back in 2 pieces. First section is partially excised (98%) of whole lesion,. 2nd section excised to include residual lesion which is a grossly crescent shaped sliver from far left/lateral edge of whole lesion, perhaps 0.2cm x 1.1cm x 0.1cm.      Final Diagnosis       SEE SCANNED REPORT       Results for orders placed or performed in visit on 08/18/20   Vitamin B12 & Folate    Specimen: Blood   Result Value Ref Range    Folate 2.75 (L) 4.78 - 24.20 ng/mL    Vitamin B-12 345 211 - 946 pg/mL   CBC Auto Differential    Specimen: Blood   Result Value Ref Range    WBC 7.61 3.40 - 10.80 10*3/mm3    RBC 4.23 3.77 - 5.28 10*6/mm3    Hemoglobin 13.4 12.0 - 15.9 g/dL    Hematocrit 41.0 34.0 - 46.6 %    MCV 96.9 79.0 - 97.0 fL    MCH 31.7 26.6 - 33.0 pg    MCHC 32.7 31.5 - 35.7 g/dL    RDW 14.2 12.3 - 15.4 %    RDW-SD 48.8 37.0 - 54.0 fl     MPV 9.4 6.0 - 12.0 fL    Platelets 286 140 - 450 10*3/mm3    Neutrophil % 56.2 42.7 - 76.0 %    Lymphocyte % 31.1 19.6 - 45.3 %    Monocyte % 10.9 5.0 - 12.0 %    Eosinophil % 1.4 0.3 - 6.2 %    Basophil % 0.4 0.0 - 1.5 %    Neutrophils, Absolute 4.27 1.70 - 7.00 10*3/mm3    Lymphocytes, Absolute 2.37 0.70 - 3.10 10*3/mm3    Monocytes, Absolute 0.83 0.10 - 0.90 10*3/mm3    Eosinophils, Absolute 0.11 0.00 - 0.40 10*3/mm3    Basophils, Absolute 0.03 0.00 - 0.20 10*3/mm3   Vitamin D 25 Hydroxy    Specimen: Blood   Result Value Ref Range    25 Hydroxy, Vitamin D 35.9 30.0 - 100.0 ng/ml   T3    Specimen: Blood   Result Value Ref Range    T3, Total 97.9 80.0 - 200.0 ng/dl   TSH    Specimen: Blood   Result Value Ref Range    TSH 1.730 0.270 - 4.200 uIU/mL   T4, Free    Specimen: Blood   Result Value Ref Range    Free T4 1.09 0.93 - 1.70 ng/dL   Lipid Panel    Specimen: Blood   Result Value Ref Range    Total Cholesterol 156 150 - 200 mg/dL    Triglycerides 156 (H) <=150 mg/dL    HDL Cholesterol 39 (L) 40 - 59 mg/dL    LDL Cholesterol  86 <=100 mg/dL    VLDL Cholesterol 31.2 mg/dL    LDL/HDL Ratio 2.20 0.00 - 3.22   Comprehensive Metabolic Panel    Specimen: Blood   Result Value Ref Range    Glucose 100 (H) 70 - 99 mg/dL    BUN 16 7 - 23 mg/dL    Creatinine 0.96 0.52 - 1.04 mg/dL    Sodium 140 137 - 145 mmol/L    Potassium 4.0 3.4 - 5.0 mmol/L    Chloride 108 101 - 112 mmol/L    CO2 24.0 22.0 - 30.0 mmol/L    Calcium 9.3 8.4 - 10.2 mg/dL    Total Protein 7.1 6.3 - 8.6 g/dL    Albumin 4.10 3.50 - 5.00 g/dL    ALT (SGPT) 23 <=35 U/L    AST (SGOT) 64 (H) 14 - 36 U/L    Alkaline Phosphatase 55 38 - 126 U/L    Total Bilirubin 0.4 0.2 - 1.3 mg/dL    eGFR Non  Amer 59 45 - 104 mL/min/1.73    Globulin 3.0 2.3 - 3.5 gm/dL    A/G Ratio 1.4 1.1 - 1.8 g/dL    BUN/Creatinine Ratio 16.7 7.0 - 25.0    Anion Gap 8.0 5.0 - 15.0 mmol/L   Results for orders placed or performed in visit on 05/28/20   ToxASSURE Select 13 Discrete -     Specimen: Urine   Result Value Ref Range    Report Summary FINAL     Ethanol Screen Urine (Ref) Negative     Ethanol, Urine Not Detected g/dL    Amphetamine, Urine Qual Negative     Methamphetamine, Urine Not Detected ng/mg creat    Amphetamine, Urine Not Detected ng/mg creat    MDMA URINE Not Detected ng/mg creat    MDA Not Detected ng/mg creat    Benzodiazepine Screen, Urine +POSITIVE+     DIAZEPAM URINE QUANT (REF) Not Detected ng/mg creat    Desmethyldiazepam Not Detected ng/mg creat    Oxazepam, urine Not Detected ng/mg creat    Temazepam, urine Not Detected ng/mg creat    ALPRAZOLAM Not Detected ng/mg creat    ALPHA-HYDROXYALPRAZOLAM UR, QT (REF) Not Detected ng/mg creat    DESALKLFLURAZEPAM UR QUANT (REF) Not Detected ng/mg creat    LORAZEPAM URINE QUANT (REF) Not Detected ng/mg creat    Alpha-hydroxytriazolam, Urine Not Detected ng/mg creat    Clonazepam Urine Not Detected ng/mg creat    7-Aminoclonazepam Urine 42 ng/mg creat    MIDAZOLAM UR, QUANT (REF) Not Detected ng/mg creat    Alpha-hydroxymidazolam, Urine Not Detected ng/mg creat    Flunitrazepam Not Detected ng/mg creat    DESMETHYLFLUNITRAZEPAM Not Detected ng/mg creat    Cocaine & Metabolite Negative     Cocaine Screen, Urine Not Detected ng/mg creat    Benzoylecgonine, Urine Not Detected ng/mg creat    Cocaethylene Ur Not Detected ng/mg creat    THC, Urine Negative     Carboxy THC, Urine Not Detected ng/mg creat    6-ACETYLMORPHINE Negative     6-acetylmorphine Not Detected ng/mg creat    Opiate Class +POSITIVE+     Codeine, Urine Not Detected ng/mg creat    Morphine, Urine Not Detected ng/mg creat    normorphine Not Detected ng/mg creat    Norcodeine Ur Not Detected ng/mg creat    Hydrocodone  ng/mg creat    Hydromorphone Urine 153 ng/mg creat    Dihydrocodeine, Urine 262 ng/mg creat    Opiates, Norhydrocodone, Urine 3181 ng/mg creat    Oxycodone Class Ur Negative     Oxycodone, Confirmation, Urine Not Detected ng/mg creat    Oxymorphone UR  Not Detected ng/mg creat    Opiates, Noroxycodone, Urine Not Detected ng/mg creat    Noroxymorphone Not Detected ng/mg creat    Methadone Negative     Methadone, Quantitative Not Detected ng/mg creat    EDDP Not Detected ng/mg creat    Fentanyl and Analogues, Ur Negative     Fentanyl, Urine Not Detected ng/mg creat    Norfentanyl Urine Not Detected ng/mg creat    Sufentanil, Ur Not Detected ng/mg creat    Alfentanil, Ur Not Detected ng/mg creat    BUPRENORPHINE Negative     Buprenorphine, Urine Not Detected ng/mg creat    Norbuprenorphine Not Detected ng/mg creat    Tapentadol Negative     Tapentadol Ur Not Detected ng/mg creat    Opoidss other, UR Negative     Tramadol, Urine Not Detected ng/mg creat    O-desmethyltramadol, Ur Not Detected ng/mg creat    N-Desmethyltramadol, U Not Detected ng/mg creat    BARBITURATES, URINE Negative     Amobarbital, Urine Not Detected     Barbital Not Detected     Butabarbital, Ur Not Detected     Butalbital Screen, Urine Not Detected     Mephobarbital Urine Not Detected     Pentobarbital UR Not Detected     Phenobarbital Not Detected     Secobarbital, urine Not Detected     Thiopental, Ur Not Detected     Creatinine, Urine 77 mg/dL    Level of Detection: Comment      *Note: Due to a large number of results and/or encounters for the requested time period, some results have not been displayed. A complete set of results can be found in Results Review.       Some portions of this note have been dictated using voice recognition software and may contain errors and/or omissions.

## 2021-02-02 NOTE — PATIENT INSTRUCTIONS
"BMI for Adults  What is BMI?  Body mass index (BMI) is a number that is calculated from a person's weight and height. BMI can help estimate how much of a person's weight is composed of fat. BMI does not measure body fat directly. Rather, it is an alternative to procedures that directly measure body fat, which can be difficult and expensive.  BMI can help identify people who may be at higher risk for certain medical problems.  What are BMI measurements used for?  BMI is used as a screening tool to identify possible weight problems. It helps determine whether a person is obese, overweight, a healthy weight, or underweight.  BMI is useful for:  · Identifying a weight problem that may be related to a medical condition or may increase the risk for medical problems.  · Promoting changes, such as changes in diet and exercise, to help reach a healthy weight. BMI screening can be repeated to see if these changes are working.  How is BMI calculated?  BMI involves measuring your weight in relation to your height. Both height and weight are measured, and the BMI is calculated from those numbers. This can be done either in English (U[place] or metric measurements. Note that charts and online BMI calculators are available to help you find your BMI quickly and easily without having to do these calculations yourself.  To calculate your BMI in English ([place] measurements:    1. Measure your weight in pounds (lb).  2. Multiply the number of pounds by 703.  ? For example, for a person who weighs 180 lb, multiply that number by 703, which equals 126,540.  3. Measure your height in inches. Then multiply that number by itself to get a measurement called \"inches squared.\"  ? For example, for a person who is 70 inches tall, the \"inches squared\" measurement is 70 inches x 70 inches, which equals 4,900 inches squared.  4. Divide the total from step 2 (number of lb x 703) by the total from step 3 (inches squared): 126,540 ÷ 4,900 = 25.8. This is " "your BMI.  To calculate your BMI in metric measurements:  1. Measure your weight in kilograms (kg).  2. Measure your height in meters (m). Then multiply that number by itself to get a measurement called \"meters squared.\"  ? For example, for a person who is 1.75 m tall, the \"meters squared\" measurement is 1.75 m x 1.75 m, which is equal to 3.1 meters squared.  3. Divide the number of kilograms (your weight) by the meters squared number. In this example: 70 ÷ 3.1 = 22.6. This is your BMI.  What do the results mean?  BMI charts are used to identify whether you are underweight, normal weight, overweight, or obese. The following guidelines will be used:  · Underweight: BMI less than 18.5.  · Normal weight: BMI between 18.5 and 24.9.  · Overweight: BMI between 25 and 29.9.  · Obese: BMI of 30 or above.  Keep these notes in mind:  · Weight includes both fat and muscle, so someone with a muscular build, such as an athlete, may have a BMI that is higher than 24.9. In cases like these, BMI is not an accurate measure of body fat.  · To determine if excess body fat is the cause of a BMI of 25 or higher, further assessments may need to be done by a health care provider.  · BMI is usually interpreted in the same way for men and women.  Where to find more information  For more information about BMI, including tools to quickly calculate your BMI, go to these websites:  · Centers for Disease Control and Prevention: www.cdc.gov  · American Heart Association: www.heart.org  · National Heart, Lung, and Blood [place]: www.nhlbi.nih.gov  Summary  · Body mass index (BMI) is a number that is calculated from a person's weight and height.  · BMI may help estimate how much of a person's weight is composed of fat. BMI can help identify those who may be at higher risk for certain medical problems.  · BMI can be measured using English measurements or metric measurements.  · BMI charts are used to identify whether you are underweight, normal " weight, overweight, or obese.  This information is not intended to replace advice given to you by your health care provider. Make sure you discuss any questions you have with your health care provider.  Document Revised: 09/09/2020 Document Reviewed: 07/17/2020  Elsevier Patient Education © 2020 [employer]

## 2021-02-08 RX ORDER — ALBUTEROL SULFATE 90 UG/1
AEROSOL, METERED RESPIRATORY (INHALATION)
Qty: 18 G | Refills: 5 | Status: SHIPPED | OUTPATIENT
Start: 2021-02-08 | End: 2021-03-15

## 2021-02-09 ENCOUNTER — TELEMEDICINE (OUTPATIENT)
Dept: FAMILY MEDICINE CLINIC | Facility: CLINIC | Age: 61
End: 2021-02-09

## 2021-02-09 DIAGNOSIS — J44.9 CHRONIC OBSTRUCTIVE PULMONARY DISEASE, UNSPECIFIED COPD TYPE (HCC): Chronic | ICD-10-CM

## 2021-02-09 DIAGNOSIS — H53.8 BLURRED VISION, LEFT EYE: Primary | ICD-10-CM

## 2021-02-09 PROCEDURE — 99214 OFFICE O/P EST MOD 30 MIN: CPT | Performed by: NURSE PRACTITIONER

## 2021-02-10 RX ORDER — LINACLOTIDE 290 UG/1
CAPSULE, GELATIN COATED ORAL
Qty: 1 CAPSULE | Refills: 0 | OUTPATIENT
Start: 2021-02-10

## 2021-02-17 DIAGNOSIS — E78.5 HYPERLIPIDEMIA, UNSPECIFIED HYPERLIPIDEMIA TYPE: ICD-10-CM

## 2021-02-18 DIAGNOSIS — E78.5 HYPERLIPIDEMIA, UNSPECIFIED HYPERLIPIDEMIA TYPE: ICD-10-CM

## 2021-02-18 RX ORDER — ATORVASTATIN CALCIUM 20 MG/1
20 TABLET, FILM COATED ORAL DAILY
Qty: 90 TABLET | Refills: 1 | OUTPATIENT
Start: 2021-02-18

## 2021-02-18 RX ORDER — ATORVASTATIN CALCIUM 40 MG/1
40 TABLET, FILM COATED ORAL NIGHTLY
Qty: 90 TABLET | Refills: 1 | Status: SHIPPED | OUTPATIENT
Start: 2021-02-18 | End: 2021-08-12 | Stop reason: SDUPTHER

## 2021-02-23 RX ORDER — FAMOTIDINE 40 MG/1
40 TABLET, FILM COATED ORAL DAILY
Qty: 30 TABLET | Refills: 1 | Status: SHIPPED | OUTPATIENT
Start: 2021-02-23 | End: 2021-04-22

## 2021-02-26 ENCOUNTER — TELEPHONE (OUTPATIENT)
Dept: FAMILY MEDICINE CLINIC | Facility: CLINIC | Age: 61
End: 2021-02-26

## 2021-02-26 DIAGNOSIS — G89.29 CHRONIC MIDLINE LOW BACK PAIN WITHOUT SCIATICA: ICD-10-CM

## 2021-02-26 DIAGNOSIS — M54.50 CHRONIC MIDLINE LOW BACK PAIN WITHOUT SCIATICA: ICD-10-CM

## 2021-02-26 DIAGNOSIS — G89.29 CHRONIC PAIN OF RIGHT KNEE: ICD-10-CM

## 2021-02-26 DIAGNOSIS — M25.561 CHRONIC PAIN OF RIGHT KNEE: ICD-10-CM

## 2021-02-26 RX ORDER — CYANOCOBALAMIN 1000 UG/ML
INJECTION, SOLUTION INTRAMUSCULAR; SUBCUTANEOUS
Qty: 3 ML | Refills: 1 | Status: SHIPPED | OUTPATIENT
Start: 2021-02-26 | End: 2021-03-15

## 2021-02-26 RX ORDER — HYDROCODONE BITARTRATE AND ACETAMINOPHEN 10; 325 MG/1; MG/1
1 TABLET ORAL EVERY 8 HOURS PRN
Qty: 90 TABLET | Refills: 0 | Status: SHIPPED | OUTPATIENT
Start: 2021-02-26 | End: 2021-03-30 | Stop reason: SDUPTHER

## 2021-02-27 NOTE — TELEPHONE ENCOUNTER
Patient seen in office every 3 months for chronic pain requiring opiate pain medication. Compliant with medication, visits with no adverse effects noted. KATE and UDS current and appropriate. Patient called requesting scheduled refill at appropriate interval. Patient understands the risks associated with this controlled medication, including tolerance and addiction.  Patient also agrees to only obtain this medication from me, and not from a another provider, unless that provider is covering for me in my absence.  Patient also agrees to be compliant in dosing, and not self adjust the dose of medication.  A signed controlled substance agreement is on file, and the patient has received a controlled substance education sheet at this a previous visit.  The patient has also signed a consent for treatment with a controlled substance as per [facility] policy. KATE was obtained.   Refill sent for hydrocodone.     This document has been electronically signed by UMESH Guzmán on February 26, 2021 18:41 CST

## 2021-03-02 ENCOUNTER — TELEPHONE (OUTPATIENT)
Dept: FAMILY MEDICINE CLINIC | Facility: CLINIC | Age: 61
End: 2021-03-02

## 2021-03-09 ENCOUNTER — IMMUNIZATION (OUTPATIENT)
Dept: VACCINE CLINIC | Facility: HOSPITAL | Age: 61
End: 2021-03-09

## 2021-03-09 PROCEDURE — 0001A: CPT | Performed by: NURSE PRACTITIONER

## 2021-03-09 PROCEDURE — 91300 HC SARSCOV02 VAC 30MCG/0.3ML IM: CPT | Performed by: NURSE PRACTITIONER

## 2021-03-10 DIAGNOSIS — M81.0 AGE-RELATED OSTEOPOROSIS WITHOUT CURRENT PATHOLOGICAL FRACTURE: Primary | ICD-10-CM

## 2021-03-11 DIAGNOSIS — E53.8 FOLATE DEFICIENCY: ICD-10-CM

## 2021-03-13 ENCOUNTER — LAB (OUTPATIENT)
Dept: LAB | Facility: HOSPITAL | Age: 61
End: 2021-03-13

## 2021-03-13 DIAGNOSIS — Z01.818 PREOP TESTING: Primary | ICD-10-CM

## 2021-03-13 PROCEDURE — U0005 INFEC AGEN DETEC AMPLI PROBE: HCPCS

## 2021-03-13 PROCEDURE — C9803 HOPD COVID-19 SPEC COLLECT: HCPCS

## 2021-03-13 PROCEDURE — U0004 COV-19 TEST NON-CDC HGH THRU: HCPCS

## 2021-03-14 LAB — SARS-COV-2 ORF1AB RESP QL NAA+PROBE: NOT DETECTED

## 2021-03-15 ENCOUNTER — OFFICE VISIT (OUTPATIENT)
Dept: FAMILY MEDICINE CLINIC | Facility: CLINIC | Age: 61
End: 2021-03-15

## 2021-03-15 VITALS
SYSTOLIC BLOOD PRESSURE: 129 MMHG | HEIGHT: 62 IN | WEIGHT: 117 LBS | BODY MASS INDEX: 21.53 KG/M2 | DIASTOLIC BLOOD PRESSURE: 80 MMHG

## 2021-03-15 DIAGNOSIS — G25.81 RESTLESS LEG SYNDROME: Chronic | ICD-10-CM

## 2021-03-15 DIAGNOSIS — K21.9 GASTROESOPHAGEAL REFLUX DISEASE, UNSPECIFIED WHETHER ESOPHAGITIS PRESENT: Chronic | ICD-10-CM

## 2021-03-15 DIAGNOSIS — F17.210 CIGARETTE NICOTINE DEPENDENCE WITHOUT COMPLICATION: Chronic | ICD-10-CM

## 2021-03-15 DIAGNOSIS — Z00.00 MEDICARE ANNUAL WELLNESS VISIT, SUBSEQUENT: Primary | ICD-10-CM

## 2021-03-15 PROCEDURE — G0439 PPPS, SUBSEQ VISIT: HCPCS | Performed by: NURSE PRACTITIONER

## 2021-03-15 RX ORDER — DEXLANSOPRAZOLE 60 MG/1
60 CAPSULE, DELAYED RELEASE ORAL DAILY
COMMUNITY
End: 2021-03-15 | Stop reason: SDUPTHER

## 2021-03-15 RX ORDER — AMITRIPTYLINE HYDROCHLORIDE 75 MG/1
75 TABLET, FILM COATED ORAL NIGHTLY
COMMUNITY
End: 2022-06-03 | Stop reason: ALTCHOICE

## 2021-03-15 RX ORDER — ROPINIROLE 3 MG/1
3 TABLET, FILM COATED ORAL
Qty: 90 TABLET | Refills: 1 | Status: SHIPPED | OUTPATIENT
Start: 2021-03-15 | End: 2021-11-11

## 2021-03-15 RX ORDER — ALBUTEROL SULFATE 90 UG/1
2 AEROSOL, METERED RESPIRATORY (INHALATION) EVERY 4 HOURS PRN
COMMUNITY
End: 2021-03-28 | Stop reason: SDUPTHER

## 2021-03-15 RX ORDER — UREA 10 %
800 LOTION (ML) TOPICAL DAILY
Qty: 90 TABLET | Refills: 1 | Status: SHIPPED | OUTPATIENT
Start: 2021-03-15 | End: 2021-06-26 | Stop reason: SDUPTHER

## 2021-03-15 RX ORDER — LEVOTHYROXINE SODIUM 0.03 MG/1
25 TABLET ORAL DAILY
COMMUNITY
End: 2021-04-12

## 2021-03-15 RX ORDER — DEXLANSOPRAZOLE 60 MG/1
60 CAPSULE, DELAYED RELEASE ORAL DAILY
Qty: 90 CAPSULE | Refills: 1 | Status: SHIPPED | OUTPATIENT
Start: 2021-03-15 | End: 2021-03-30

## 2021-03-15 NOTE — PROGRESS NOTES
The ABCs of the Annual Wellness Visit  Subsequent Medicare Wellness Visit    Chief Complaint   Patient presents with   • Medicare Wellness-subsequent       Subjective   History of Present Illness:  You have chosen to receive care through a telephone visit. Do you consent to use a telephone visit for your medical care today? Yes  15 minutes spend in medical discussion.  Tricia Loyola is a 61 y.o. female who presents for a Subsequent Medicare Wellness Visit.  Primary reason for visit today is Medicare wellness visit annual subsequent.  This is a video visit and patient provides a blood pressure which is 129/80 today current weight of 116 pounds and height of 62 inches.    She also wishes to discuss some medication adjustments at this time.  Specifically: concerned with timing of her controlled prescriptions in May, when she is taking a 1 month trip to [place] to assist after birth of her grandchild. We discussed adjustments to be made in May as she is leaving after it will be due for refill. No other needs today, other than refill of two chronic meds without changes.     No other complaints today.    She provides her weight today and her height as well as an am BP reading taken at home this morning as entered.     Parts of most recent relevant visit HPI, ROS  and PE may be carried forward and all are updated as appropriate for current situation  HEALTH RISK ASSESSMENT    Recent Hospitalizations:  No hospitalization(s) within the last year.    Current Medical Providers:  Patient Care Team:  Rosario Ceron APRN as PCP - General (Family Medicine)  Johnny Smiley PA-C as Physician Assistant (Gastroenterology)    Smoking Status:  Social History     Tobacco Use   Smoking Status Current Every Day Smoker   • Packs/day: 0.50   • Years: 43.00   • Pack years: 21.50   • Types: Cigarettes   Smokeless Tobacco Never Used       Alcohol Consumption:  Social History     Substance and Sexual Activity   Alcohol Use No        Depression Screen:   PHQ-2/PHQ-9 Depression Screening 3/15/2021   Little interest or pleasure in doing things 0   Feeling down, depressed, or hopeless 0   Trouble falling or staying asleep, or sleeping too much -   Feeling tired or having little energy -   Poor appetite or overeating -   Feeling bad about yourself - or that you are a failure or have let yourself or your family down -   Trouble concentrating on things, such as reading the newspaper or watching television -   Moving or speaking so slowly that other people could have noticed. Or the opposite - being so fidgety or restless that you have been moving around a lot more than usual -   Thoughts that you would be better off dead, or of hurting yourself in some way -   Total Score 0       Fall Risk Screen:  CALVIN Fall Risk Assessment has not been completed.    Health Habits and Functional and Cognitive Screening:  Functional & Cognitive Status 3/15/2021   Do you have difficulty preparing food and eating? No   Do you have difficulty bathing yourself, getting dressed or grooming yourself? No   Do you have difficulty using the toilet? No   Do you have difficulty moving around from place to place? No   Do you have trouble with steps or getting out of a bed or a chair? No   Current Diet Well Balanced Diet   Dental Exam Up to date   Eye Exam Up to date   Exercise (times per week) 0 times per week   Current Exercises Include No Regular Exercise   Current Exercise Activities Include -   Do you need help using the phone?  No   Are you deaf or do you have serious difficulty hearing?  No   Do you need help with transportation? No   Do you need help shopping? No   Do you need help preparing meals?  No   Do you need help with housework?  No   Do you need help with laundry? No   Do you need help taking your medications? No   Do you need help managing money? No   Do you ever drive or ride in a car without wearing a seat belt? No   Have you felt unusual stress, anger  or loneliness in the last month? No   Who do you live with? Other   If you need help, do you have trouble finding someone available to you? No   Have you been bothered in the last four weeks by sexual problems? No   Do you have difficulty concentrating, remembering or making decisions? No         Does the patient have evidence of cognitive impairment? No    Asprin use counseling:Taking ASA appropriately as indicated    Age-appropriate Screening Schedule:  Refer to the list below for future screening recommendations based on patient's age, sex and/or medical conditions. Orders for these recommended tests are listed in the plan section. The patient has been provided with a written plan.    Health Maintenance   Topic Date Due   • DXA SCAN  04/02/2021   • LIPID PANEL  08/18/2021   • MAMMOGRAM  10/09/2021   • PAP SMEAR  07/29/2022   • TDAP/TD VACCINES (3 - Td) 05/10/2026   • COLONOSCOPY  11/26/2028   • INFLUENZA VACCINE  Completed   • ZOSTER VACCINE  Completed          The following portions of the patient's history were reviewed and updated as appropriate: She  has a past medical history of Abdominal pain, Anemia, Anxiety and depression, Chronic post-traumatic headache, Depressive disorder, Disease of thyroid gland, Encounter for gynecological examination, Gastroesophageal reflux disease, Generalized anxiety disorder, History of mammogram (08/2008), Hyperlipidemia, Ingrown toenail, Injury of back, Leaky heart valve, Migraine, Nonruptured cerebral aneurysm, Osteoporosis, Posttraumatic stress disorder, PTSD (post-traumatic stress disorder), Seizure (CMS/HCC), Skin cancer, Viral hepatitis A, Wears dentures, and Wears glasses..    Outpatient Medications Prior to Visit   Medication Sig Dispense Refill   • aspirin 81 MG EC tablet Take 1 tablet by mouth Daily. 30 tablet 0   • atorvastatin (LIPITOR) 40 MG tablet TAKE 1 TABLET BY MOUTH EVERY NIGHT FOR CHOLESTEROL 90 tablet 1   • busPIRone (BUSPAR) 30 MG tablet Take 1 tablet by  "mouth 2 (Two) Times a Day. 60 tablet 0   • clonazePAM (KlonoPIN) 0.5 MG tablet Take 1 tablet by mouth Daily As Needed for Anxiety. 30 tablet 0   • famotidine (PEPCID) 40 MG tablet TAKE 1 TABLET BY MOUTH DAILY 30 tablet 1   • folic acid (FOLVITE) 800 MCG tablet Take 1 tablet by mouth Daily. 90 tablet 1   • HYDROcodone-acetaminophen (NORCO)  MG per tablet Take 1 tablet by mouth Every 8 (Eight) Hours As Needed for Moderate Pain . 90 tablet 0   • Hydrocortisone, Perianal, (PROCTOCORT) 1 % cream rectal cream Insert  into the rectum 2 (Two) Times a Day. 28.4 g 0   • losartan (COZAAR) 50 MG tablet Take 50 mg by mouth Every Night.     • metoprolol succinate XL (TOPROL-XL) 100 MG 24 hr tablet Take 100 mg by mouth Every Evening.     • ondansetron ODT (ZOFRAN-ODT) 4 MG disintegrating tablet Place 1 tablet on the tongue Every 8 (Eight) Hours As Needed for Nausea. 30 tablet 5   • OXcarbazepine (TRILEPTAL) 150 MG tablet Take 150 mg by mouth Every Night. Daily at bedtime      • prochlorperazine (COMPAZINE) 10 MG tablet Take 1 tablet by mouth Every 6 (Six) Hours As Needed for Nausea or Vomiting. 360 tablet 1   • Syringe/Needle, Disp, 25G X 5/8\" 3 ML misc 1 each Every 28 (Twenty-Eight) Days. 3 each 1   • tamsulosin (FLOMAX) 0.4 MG capsule 24 hr capsule Take 1 capsule by mouth Daily. 30 capsule 2   • topiramate (TOPAMAX) 100 MG tablet Take 1 tablet by mouth 2 (Two) Times a Day. (Patient taking differently: Take 100 mg by mouth 2 (Two) Times a Day. Noon and 3 pm) 180 tablet 1   • ubrogepant tablet Take 50 mg by mouth 1 (One) Time As Needed (onset of migraine).     • albuterol sulfate  (90 Base) MCG/ACT inhaler INHALE 2 PUFFS BY MOUTH EVERY 6 HOURS AS NEEDED FOR WHEEZING OR SHORTNESS OF BREATH (Patient taking differently: Inhale 2 puffs Every 4 (Four) Hours As Needed.) 18 g 5   • amitriptyline (ELAVIL) 75 MG tablet TAKE ONE TABLET BY MOUTH AT BEDTIME. (Patient taking differently: Take 75 mg by mouth Every Night.) 30 tablet " 2   • cyanocobalamin 1000 MCG/ML injection INJECT 1 ML INTO THE APPROPRIATE MUSCLE AS DIRECTED BY PRESCRIBER EVERY 28 DAYS (Patient taking differently: Every 28 (Twenty-Eight) Days.) 3 mL 1   • DEXILANT 60 MG capsule TAKE 1 CAPSULE BY MOUTH ONCE DAILY (Patient taking differently: Take 60 mg by mouth Daily.) 90 capsule 3   • levothyroxine (SYNTHROID, LEVOTHROID) 25 MCG tablet TAKE 1/2 TABLET BY MOUTH DAILY (Patient taking differently: Take 25 mcg by mouth Daily.) 15 tablet 3   • mupirocin (BACTROBAN) 2 % ointment APPLY TOPICALLY TO THE APPROPRIATE AREA AS DIRECED THREE TIMES DAILY 22 g 2   • OnabotulinumtoxinA 200 units reconstituted solution 200 Units.     • rOPINIRole (REQUIP) 3 MG tablet Take 1 tablet by mouth every night at bedtime. 90 tablet 1   • sucralfate (CARAFATE) 1 g tablet TAKE 1 TABLET BY MOUTH FOUR TIMES DAILY (Patient taking differently: Take 1 g by mouth 4 (Four) Times a Day.) 360 tablet 0     Facility-Administered Medications Prior to Visit   Medication Dose Route Frequency Provider Last Rate Last Admin   • zoledronic acid (RECLAST) infusion 5 mg  5 mg Intravenous Once Osmany, UMESH Ponce       • ondansetron (ZOFRAN) injection 4 mg  4 mg Intravenous Q6H PRN Osmany, UMESH Ponce           Patient Active Problem List   Diagnosis   • Tobacco dependence syndrome   • PRISCILLA (generalized anxiety disorder)   • PTSD (post-traumatic stress disorder)   • Gastroesophageal reflux disease with esophagitis   • Hyperlipidemia   • Right knee pain   • Primary osteoarthritis of right knee   • Osteoporosis   • Sinus tachycardia   • Restless leg syndrome   • Chronic midline thoracic back pain   • Chronic midline low back pain without sciatica   • Panic disorder without agoraphobia   • Chronic migraine   • Compression fracture of vertebra (CMS/HCC)   • COPD (chronic obstructive pulmonary disease) (CMS/HCC)   • Hearing loss   • Migraine   • Radiculopathy of thoracolumbar region   • Screen for colon cancer   •  Encounter for colonoscopy due to history of adenomatous colonic polyps   • Elevated liver enzymes   • Pain of upper abdomen   • Hepatic fibrosis   • Nausea   • Acquired hypothyroidism   • Primary osteoarthritis of left knee   • History of appendectomy   • Soft tissue mass   • Gastroesophageal reflux disease with esophagitis without hemorrhage       Advanced Care Planning:  ACP discussion was held with the patient during this visit. Patient does not have an advance directive, declines further assistance.    Review of Systems   Constitutional: Negative.  Negative for chills, fatigue, fever and unexpected weight change.   HENT: Negative.  Negative for congestion, postnasal drip, rhinorrhea, sinus pressure, sinus pain and sneezing.    Eyes: Negative.    Respiratory: Negative.  Negative for cough, chest tightness and shortness of breath.    Cardiovascular: Negative.  Negative for chest pain, palpitations and leg swelling.   Gastrointestinal: Negative.  Negative for diarrhea and nausea.   Endocrine: Negative.    Genitourinary: Negative.  Negative for decreased urine volume, difficulty urinating, dysuria, flank pain, frequency and urgency.   Musculoskeletal: Positive for arthralgias, back pain and myalgias. Gait problem: right knee pain.   Skin: Negative.  Negative for color change, pallor, rash and wound.   Allergic/Immunologic: Negative.    Neurological: Negative.    Hematological: Negative.    Psychiatric/Behavioral: Negative.  Negative for sleep disturbance and suicidal ideas.   All other systems reviewed and are negative.      Compared to one year ago, the patient feels her physical health is the same.  Compared to one year ago, the patient feels her mental health is the same.    Reviewed chart for potential of high risk medication in the elderly: yes  Reviewed chart for potential of harmful drug interactions in the elderly:yes    Objective         Vitals:    03/15/21 0954   BP: 129/80  Comment: per pt   Weight: 53.1  "kg (117 lb)   Height: 157.5 cm (62\")       Body mass index is 21.4 kg/m².  Discussed the patient's BMI with her. The BMI is in the acceptable range.    Physical Exam  Vitals and nursing note reviewed.   Constitutional:       General: She is not in acute distress.     Appearance: She is normal weight. She is not ill-appearing ( Phonic visit presents with no symptoms of illness).   Pulmonary:      Effort: Pulmonary effort is normal. No respiratory distress.      Breath sounds: No stridor.      Comments: Speaks in full sentences without dyspnea no spontaneous cough.  Neurological:      Mental Status: She is alert and oriented to person, place, and time. Mental status is at baseline.   Psychiatric:         Mood and Affect: Mood normal.         Behavior: Behavior normal.         Thought Content: Thought content normal.         Judgment: Judgment normal.               Assessment/Plan   Medicare Risks and Personalized Health Plan  CMS Preventative Services Quick Reference  Tobacco Use/Dependance (use dotphrase .tobaccocessation for documentation)  Tricia Loyola  reports that she has been smoking cigarettes. She has a 21.50 pack-year smoking history. She has never used smokeless tobacco.. I have educated her on the risk of diseases from using tobacco products such as cancer, COPD, heart disease and cataracts.     I advised her to quit and she is not willing to quit.    I spent 3  minutes counseling the patient.       The above risks/problems have been discussed with the patient.  Pertinent information has been shared with the patient in the After Visit Summary.  Follow up plans and orders are seen below in the Assessment/Plan Section.    Diagnoses and all orders for this visit:    1. Medicare annual wellness visit, subsequent (Primary)    2. Cigarette nicotine dependence without complication    3. Restless leg syndrome  Comments:  worsening, needs increased dose today  Orders:  -     rOPINIRole (REQUIP) 3 MG tablet; Take " 1 tablet by mouth every night at bedtime.  Dispense: 90 tablet; Refill: 1    4. Gastroesophageal reflux disease, unspecified whether esophagitis present  -     dexlansoprazole (DEXILANT) 60 MG capsule; Take 1 capsule by mouth Daily.  Dispense: 90 capsule; Refill: 1      Follow Up:  Return in about 12 weeks (around 6/7/2021), or 1 year for Medicare annual subsequent wellness visit..     An After Visit Summary and PPPS were given to the patient.

## 2021-03-15 NOTE — PROGRESS NOTES
Subjective   Tricia Loyola is a 61 y.o. female.   You have chosen to receive care through a telehealth visit.  Do you consent to use a video/audio connection for your medical care today? Yes  This was an audio and video enabled telemedicine encounter.        Chief Complaint   Patient presents with   • Medicare Wellness-subsequent        History of Present Illness     Primary reason for visit today is Medicare wellness visit annual subsequent.  This is a video visit and patient provides a blood pressure which is 129/80 today current weight of 116 pounds and height of 62 inches.    She also wishes to discuss some medication adjustments at this time.  Specifically:***.    No other complaints today.    Parts of most recent relevant visit HPI, ROS  and PE may be carried forward and all are updated as appropriate for current situation.    Past Surgical History:   Procedure Laterality Date   • APPENDECTOMY     • BREAST BIOPSY Left    • CHOLECYSTECTOMY     • COLONOSCOPY N/A 11/26/2018    Procedure: COLONOSCOPY;  Surgeon: Meet Mcintyre MD;  Location: [facility] ENDOSCOPY;  Service: General   • CRANIOTOMY FOR ANEURYSM  2003   • ENDOSCOPY N/A 10/16/2019    Procedure: ESOPHAGOGASTRODUODENOSCOPY;  Surgeon: Kory Muhammad MD;  Location: [facility] ENDOSCOPY;  Service: Gastroenterology   • MASS EXCISION Right 11/5/2020    Procedure: EXCISE SOFT TISSUE MASS RIGHT POSTERIOR FLANK                 (latex allergy);  Surgeon: Meet Mcintyre MD;  Location: [facility] OR;  Service: General;  Laterality: Right;   • PAP SMEAR  08/16/2012   • TONSILLECTOMY     • UPPER GASTROINTESTINAL ENDOSCOPY  10/16/2019      Social History     Socioeconomic History   • Marital status: Legally      Spouse name: Not on file   • Number of children: Not on file   • Years of education: Not on file   • Highest education level: Not on file   Tobacco Use   • Smoking status: Current Every Day Smoker     Packs/day: 0.50     Years: 43.00     Pack years:  21.50     Types: Cigarettes   • Smokeless tobacco: Never Used   Vaping Use   • Vaping Use: Never used   Substance and Sexual Activity   • Alcohol use: No   • Drug use: No   • Sexual activity: Defer      The following portions of the patient's history were reviewed and updated as appropriate: She  has a past medical history of Abdominal pain, Anemia, Anxiety and depression, Chronic post-traumatic headache, Depressive disorder, Disease of thyroid gland, Encounter for gynecological examination, Gastroesophageal reflux disease, Generalized anxiety disorder, History of mammogram (08/2008), Hyperlipidemia, Ingrown toenail, Injury of back, Leaky heart valve, Migraine, Nonruptured cerebral aneurysm, Osteoporosis, Posttraumatic stress disorder, PTSD (post-traumatic stress disorder), Seizure (CMS/HCC), Skin cancer, Viral hepatitis A, Wears dentures, and Wears glasses..    Review of Systems   Constitutional: Negative.    HENT: Negative.  Negative for congestion.    Eyes: Negative.  Negative for blurred vision, double vision, photophobia and visual disturbance.   Respiratory: Negative.  Negative for cough, shortness of breath and stridor.    Cardiovascular: Negative.  Negative for chest pain, palpitations and leg swelling.   Gastrointestinal: Negative.  Negative for abdominal distention, abdominal pain, blood in stool, constipation, diarrhea, nausea, vomiting, GERD and indigestion.   Endocrine: Negative.  Negative for cold intolerance and heat intolerance.   Genitourinary: Negative.  Negative for dysuria, flank pain, frequency and urinary incontinence.   Musculoskeletal: Positive for arthralgias and back pain.   Skin: Negative.    Allergic/Immunologic: Negative.  Negative for immunocompromised state.   Neurological: Negative.    Hematological: Negative.    Psychiatric/Behavioral: Negative for agitation, behavioral problems, decreased concentration, dysphoric mood, hallucinations, self-injury, sleep disturbance, suicidal ideas,  "negative for hyperactivity, depressed mood and stress. The patient is nervous/anxious.    All other systems reviewed and are negative.    PHQ-9 Depression Screening  Little interest or pleasure in doing things? 0   Feeling down, depressed, or hopeless? 0   Trouble falling or staying asleep, or sleeping too much?     Feeling tired or having little energy?     Poor appetite or overeating?     Feeling bad about yourself - or that you are a failure or have let yourself or your family down?     Trouble concentrating on things, such as reading the newspaper or watching television?     Moving or speaking so slowly that other people could have noticed? Or the opposite - being so fidgety or restless that you have been moving around a lot more than usual?     Thoughts that you would be better off dead, or of hurting yourself in some way?     PHQ-9 Total Score 0   If you checked off any problems, how difficult have these problems made it for you to do your work, take care of things at home, or get along with other people?      Patient understands the risks associated with this controlled medication, including tolerance and addiction.  Patient also agrees to only obtain this medication from me, and not from a another provider, unless that provider is covering for me in my absence.  Patient also agrees to be compliant in dosing, and not self adjust the dose of medication.  A signed controlled substance agreement is on file, and the patient has received a controlled substance education sheet at this a previous visit.  The patient has also signed a consent for treatment with a controlled substance as per [facility] policy. KATE was obtained.   Objective    Vitals:    03/15/21 0954   BP: 129/80  Comment: per pt   Weight: 53.1 kg (117 lb)   Height: 157.5 cm (62\")     Physical Exam  Vitals and nursing note reviewed.   Constitutional:       General: She is not in acute distress.     Appearance: She is normal weight. She is not " ill-appearing (.Telephonic visit presents with no signs of illness.).   Pulmonary:      Effort: Pulmonary effort is normal. No respiratory distress.      Breath sounds: No stridor.      Comments: Speaks in full sentences without dyspnea or spontaneous cough.  Neurological:      Mental Status: She is alert and oriented to person, place, and time. Mental status is at baseline.   Psychiatric:         Mood and Affect: Mood normal.         Behavior: Behavior normal.         Thought Content: Thought content normal.         Judgment: Judgment normal.           Assessment/Plan   There are no diagnoses linked to this encounter.    Return in about 12 weeks (around 6/7/2021).             This document has been electronically signed by UMESH Guzmán on March 15, 2021 12:10 CDT

## 2021-03-16 ENCOUNTER — ANESTHESIA (OUTPATIENT)
Dept: GASTROENTEROLOGY | Facility: HOSPITAL | Age: 61
End: 2021-03-16

## 2021-03-16 ENCOUNTER — HOSPITAL ENCOUNTER (OUTPATIENT)
Facility: HOSPITAL | Age: 61
Setting detail: HOSPITAL OUTPATIENT SURGERY
Discharge: HOME | End: 2021-03-16
Attending: INTERNAL MEDICINE | Admitting: INTERNAL MEDICINE

## 2021-03-16 ENCOUNTER — ANESTHESIA EVENT (OUTPATIENT)
Dept: GASTROENTEROLOGY | Facility: HOSPITAL | Age: 61
End: 2021-03-16

## 2021-03-16 VITALS
DIASTOLIC BLOOD PRESSURE: 57 MMHG | RESPIRATION RATE: 16 BRPM | OXYGEN SATURATION: 99 % | HEART RATE: 70 BPM | BODY MASS INDEX: 21.1 KG/M2 | HEIGHT: 62 IN | TEMPERATURE: 96.6 F | SYSTOLIC BLOOD PRESSURE: 104 MMHG | WEIGHT: 114.64 LBS

## 2021-03-16 DIAGNOSIS — K21.00 GASTROESOPHAGEAL REFLUX DISEASE WITH ESOPHAGITIS WITHOUT HEMORRHAGE: ICD-10-CM

## 2021-03-16 DIAGNOSIS — R10.10 PAIN OF UPPER ABDOMEN: ICD-10-CM

## 2021-03-16 PROCEDURE — 88342 IMHCHEM/IMCYTCHM 1ST ANTB: CPT

## 2021-03-16 PROCEDURE — 25010000002 MIDAZOLAM PER 1 MG: Performed by: NURSE ANESTHETIST, CERTIFIED REGISTERED

## 2021-03-16 PROCEDURE — 43239 EGD BIOPSY SINGLE/MULTIPLE: CPT | Performed by: INTERNAL MEDICINE

## 2021-03-16 PROCEDURE — 88305 TISSUE EXAM BY PATHOLOGIST: CPT

## 2021-03-16 PROCEDURE — 25010000002 PROPOFOL 10 MG/ML EMULSION: Performed by: NURSE ANESTHETIST, CERTIFIED REGISTERED

## 2021-03-16 RX ORDER — PROMETHAZINE HYDROCHLORIDE 25 MG/1
25 SUPPOSITORY RECTAL ONCE AS NEEDED
Status: DISCONTINUED | OUTPATIENT
Start: 2021-03-16 | End: 2021-03-16 | Stop reason: HOSPADM

## 2021-03-16 RX ORDER — PROMETHAZINE HYDROCHLORIDE 25 MG/1
25 TABLET ORAL ONCE AS NEEDED
Status: DISCONTINUED | OUTPATIENT
Start: 2021-03-16 | End: 2021-03-16 | Stop reason: HOSPADM

## 2021-03-16 RX ORDER — LIDOCAINE HYDROCHLORIDE 20 MG/ML
INJECTION, SOLUTION EPIDURAL; INFILTRATION; INTRACAUDAL; PERINEURAL AS NEEDED
Status: DISCONTINUED | OUTPATIENT
Start: 2021-03-16 | End: 2021-03-16 | Stop reason: SURG

## 2021-03-16 RX ORDER — MIDAZOLAM HYDROCHLORIDE 1 MG/ML
INJECTION INTRAMUSCULAR; INTRAVENOUS AS NEEDED
Status: DISCONTINUED | OUTPATIENT
Start: 2021-03-16 | End: 2021-03-16 | Stop reason: SURG

## 2021-03-16 RX ORDER — MEPERIDINE HYDROCHLORIDE 25 MG/ML
12.5 INJECTION INTRAMUSCULAR; INTRAVENOUS; SUBCUTANEOUS
Status: DISCONTINUED | OUTPATIENT
Start: 2021-03-16 | End: 2021-03-16 | Stop reason: HOSPADM

## 2021-03-16 RX ORDER — PROPOFOL 10 MG/ML
VIAL (ML) INTRAVENOUS AS NEEDED
Status: DISCONTINUED | OUTPATIENT
Start: 2021-03-16 | End: 2021-03-16 | Stop reason: SURG

## 2021-03-16 RX ORDER — DEXTROSE AND SODIUM CHLORIDE 5; .45 G/100ML; G/100ML
30 INJECTION, SOLUTION INTRAVENOUS CONTINUOUS PRN
Status: DISCONTINUED | OUTPATIENT
Start: 2021-03-16 | End: 2021-03-16 | Stop reason: HOSPADM

## 2021-03-16 RX ORDER — SODIUM CHLORIDE, SODIUM GLUCONATE, SODIUM ACETATE, POTASSIUM CHLORIDE, AND MAGNESIUM CHLORIDE 526; 502; 368; 37; 30 MG/100ML; MG/100ML; MG/100ML; MG/100ML; MG/100ML
INJECTION, SOLUTION INTRAVENOUS CONTINUOUS PRN
Status: DISCONTINUED | OUTPATIENT
Start: 2021-03-16 | End: 2021-03-16 | Stop reason: SURG

## 2021-03-16 RX ORDER — ONDANSETRON 2 MG/ML
4 INJECTION INTRAMUSCULAR; INTRAVENOUS ONCE AS NEEDED
Status: DISCONTINUED | OUTPATIENT
Start: 2021-03-16 | End: 2021-03-16 | Stop reason: HOSPADM

## 2021-03-16 RX ADMIN — MIDAZOLAM HYDROCHLORIDE 2 MG: 2 INJECTION, SOLUTION INTRAMUSCULAR; INTRAVENOUS at 09:54

## 2021-03-16 RX ADMIN — DEXTROSE AND SODIUM CHLORIDE 30 ML/HR: 5; 450 INJECTION, SOLUTION INTRAVENOUS at 09:08

## 2021-03-16 RX ADMIN — LIDOCAINE HYDROCHLORIDE 50 MG: 20 INJECTION, SOLUTION EPIDURAL; INFILTRATION; INTRACAUDAL; PERINEURAL at 09:54

## 2021-03-16 RX ADMIN — SODIUM CHLORIDE, SODIUM GLUCONATE, SODIUM ACETATE, POTASSIUM CHLORIDE, AND MAGNESIUM CHLORIDE: 526; 502; 368; 37; 30 INJECTION, SOLUTION INTRAVENOUS at 09:57

## 2021-03-16 RX ADMIN — PROPOFOL 100 MG: 10 INJECTION, EMULSION INTRAVENOUS at 09:54

## 2021-03-16 NOTE — ANESTHESIA PREPROCEDURE EVALUATION
Anesthesia Evaluation     Patient summary reviewed and Nursing notes reviewed                Airway   No difficulty expected  Dental      Pulmonary - normal exam   (+) COPD,   Cardiovascular - normal exam    (+) hyperlipidemia,       Neuro/Psych  (+) seizures, headaches, numbness, psychiatric history,     GI/Hepatic/Renal/Endo    (+)  GERD,  hepatitis, liver disease,     Musculoskeletal     Abdominal  - normal exam   Substance History      OB/GYN          Other   arthritis,    history of cancer                    Anesthesia Plan    ASA 3     MAC     intravenous induction     Anesthetic plan, all risks, benefits, and alternatives have been provided, discussed and informed consent has been obtained with: patient.    Plan discussed with CRNA.

## 2021-03-16 NOTE — H&P
No chief complaint on file.      ENDO PROCEDURE ORDERED:    Subjective    Tricia Loyola is a 61 y.o. female. she is here today for follow-up.    History of Present Illness    The patient is seen on a recheck of her GERD, hepatic fibrosis, IBS, and  abdominal pain. Last seen 12/08/2020. Tried to give her Zelnorm but her insurance denied. She has gotten the Carafate. She is taking the Dexilant, Pepcid and Carafate every day. She states she has constant burning all day long. She is under a lot of stress. She states her constipation actually has gotten better without any other medication. Weight is down 2 pounds since last visit. Last EGD 10/16/2019 showed esophagitis/gastritis. Last colonoscopy 11/26/2018 showed a polyp.     Patient had renal ultrasound on 12/21/2020 that showed a cyst in the left kidney and mild hydronephrosis on the right. Last laboratory 01/10/2021: CBC normal, normal magnesium, CMP showed AST of 48, sodium 135, otherwise normal.     A/P: Patient with persistent GERD symptoms, abdominal pain and burning. Recommend EGD. Will do biopsies to evaluate for H. pylori or other causes of her abdominal pain. She denies NSAIDs. Would consider further studies depending on the results of the above. Will continue on current medications. Her symptoms seem to be out of proportion to her current medication usage. Will plan followup in 1 month, further pending clinical course and the results of the above.       The following portions of the patient's history were reviewed and updated as appropriate:   Past Medical History:   Diagnosis Date   • Abdominal pain    • Anemia    • Anxiety and depression    • Chronic post-traumatic headache      rebound      • Depressive disorder    • Disease of thyroid gland    • Encounter for gynecological examination    • Gastroesophageal reflux disease    • Generalized anxiety disorder    • History of mammogram 08/2008    MAMMOGRAM DIAGNOSTIC BILATERAL 67110 ([facility]) (1)     •  Hyperlipidemia    • Ingrown toenail    • Injury of back    • Leaky heart valve    • Migraine    • Nonruptured cerebral aneurysm    • Osteoporosis    • Posttraumatic stress disorder    • PTSD (post-traumatic stress disorder)    • Seizure (CMS/HCC)     last seizure approx 5 years.  no meds at this time   • Skin cancer     basal cell on back   • Viral hepatitis A    • Wears dentures     uppers only   • Wears glasses      Past Surgical History:   Procedure Laterality Date   • APPENDECTOMY     • BREAST BIOPSY Left    • CHOLECYSTECTOMY     • COLONOSCOPY N/A 2018    Procedure: COLONOSCOPY;  Surgeon: Meet Mcintyre MD;  Location: [facility] ENDOSCOPY;  Service: General   • CRANIOTOMY FOR ANEURYSM     • ENDOSCOPY N/A 10/16/2019    Procedure: ESOPHAGOGASTRODUODENOSCOPY;  Surgeon: Kory Muhammad MD;  Location: [facility] ENDOSCOPY;  Service: Gastroenterology   • MASS EXCISION Right 2020    Procedure: EXCISE SOFT TISSUE MASS RIGHT POSTERIOR FLANK                 (latex allergy);  Surgeon: Meet Mcintyre MD;  Location: [facility] OR;  Service: General;  Laterality: Right;   • PAP SMEAR  2012   • TONSILLECTOMY     • UPPER GASTROINTESTINAL ENDOSCOPY  10/16/2019     Family History   Problem Relation Age of Onset   • Constipation Mother    • Hypertension Mother    • Anxiety disorder Mother    • Heart disease Mother    • Alcohol abuse Father    • Heart disease Father    • Anxiety disorder Brother    • Kidney disease Brother    • Hypertension Brother    • Arthritis Brother    • Anxiety disorder Brother    • Kidney disease Brother    • Diabetes Brother    • Anxiety disorder Brother    • Hypertension Brother    • Breast cancer Paternal Aunt    • Heart disease Maternal Grandmother    • Clotting disorder Maternal Grandmother    • Heart disease Maternal Grandfather      OB History        3    Para   2    Term   2            AB   1    Living   2       SAB   1    TAB        Ectopic        Molar         Multiple        Live Births                  Allergies   Allergen Reactions   • Iodine Anaphylaxis   • Nitrofuran Derivatives Hives   • Toradol [Ketorolac Tromethamine] Anaphylaxis   • Bactrim [Sulfamethoxazole-Trimethoprim] Swelling     eyes   • Cleocin [Clindamycin Hcl] Hives and Swelling     Swelling of eyes and hives   • Augmentin [Amoxicillin-Pot Clavulanate] Hives   • Ciprofloxacin Rash   • Fiorinal [Butalbital-Aspirin-Caffeine] Palpitations   • Ibuprofen Rash   • Imitrex [Sumatriptan] Palpitations   • Latex Rash   • Other Rash     prego spaghetti sauce    Midrin causes tachycardia   • Ultram [Tramadol] Palpitations     Social History     Socioeconomic History   • Marital status: Legally      Spouse name: Not on file   • Number of children: Not on file   • Years of education: Not on file   • Highest education level: Not on file   Tobacco Use   • Smoking status: Current Every Day Smoker     Packs/day: 0.50     Years: 43.00     Pack years: 21.50     Types: Cigarettes   • Smokeless tobacco: Never Used   Vaping Use   • Vaping Use: Never used   Substance and Sexual Activity   • Alcohol use: No   • Drug use: No   • Sexual activity: Defer     Current Medications:  Prior to Admission medications    Medication Sig Start Date End Date Taking? Authorizing Provider   ALBUTEROL SULFATE  (90 Base) MCG/ACT inhaler INHALE 2 PUFFS BY MOUTH EVERY 6 HOURS IF NEEDED FOR WHEEZING OR SHORTNESS OF BREATH 6/1/20  Yes Rosario Ceron APRN   amitriptyline (ELAVIL) 75 MG tablet TAKE ONE TABLET BY MOUTH AT BEDTIME. 1/29/18  Yes Sonia Mata MD   aspirin 81 MG EC tablet Take 1 tablet by mouth Daily. 1/14/20  Yes Rosario Ceron APRN   atorvastatin (LIPITOR) 40 MG tablet TAKE 1 TABLET BY MOUTH EVERY NIGHT FOR CHOLESTEROL 12/4/20  Yes Rosario Ceron APRN   busPIRone (BUSPAR) 30 MG tablet Take 1 tablet by mouth 2 (Two) Times a Day. 3/7/18  Yes Sonia Mata MD   clonazePAM (KlonoPIN) 0.5 MG  tablet Take 1 tablet by mouth Daily As Needed for Anxiety. 1/31/21  Yes Rosario Ceron APRN   cyanocobalamin 1000 MCG/ML injection Inject 1 mL into the appropriate muscle as directed by prescriber Every 28 (Twenty-Eight) Days. 9/22/20  Yes Rosario Ceron APRN   DEXILANT 60 MG capsule TAKE 1 CAPSULE BY MOUTH ONCE DAILY 3/23/20  Yes Rosario Ceron APRN   famotidine (Pepcid) 40 MG tablet Take 1 tablet by mouth Daily. 11/10/20  Yes Johnny Smiley PA-C   folic acid (FOLVITE) 800 MCG tablet Take 1 tablet by mouth Daily. 11/20/20  Yes Rosario Ceron APRN   HYDROcodone-acetaminophen (NORCO)  MG per tablet Take 1 tablet by mouth Every 8 (Eight) Hours As Needed for Moderate Pain . 1/31/21  Yes Rosario Ceron APRN   Hydrocortisone, Perianal, (PROCTOCORT) 1 % cream rectal cream Insert  into the rectum 2 (Two) Times a Day. 10/14/20  Yes Marlene Mcknight PA-C   levothyroxine (SYNTHROID, LEVOTHROID) 25 MCG tablet TAKE 1/2 TABLET BY MOUTH DAILY 1/14/21  Yes Rosario Ceron APRN   losartan (COZAAR) 50 MG tablet Take 50 mg by mouth Every Night.   Yes ProviderDoris MD   metoprolol succinate XL (TOPROL-XL) 100 MG 24 hr tablet Take 100 mg by mouth Every Evening.   Yes ProviderDoris MD   mupirocin (BACTROBAN) 2 % ointment APPLY TOPICALLY TO THE APPROPRIATE AREA AS DIRECED THREE TIMES DAILY 6/1/20  Yes Rosario Ceron APRN   OnabotulinumtoxinA 200 units reconstituted solution 200 Units.   Yes ProviderDoris MD   ondansetron ODT (ZOFRAN-ODT) 4 MG disintegrating tablet Place 1 tablet on the tongue Every 8 (Eight) Hours As Needed for Nausea. 5/28/20  Yes Rosario Ceron APRN   OXcarbazepine (TRILEPTAL) 150 MG tablet Take 150 mg by mouth Daily. Daily at bedtime   Yes ProviderDoris MD   prochlorperazine (COMPAZINE) 10 MG tablet Take 1 tablet by mouth Every 6 (Six) Hours As Needed for Nausea or Vomiting. 11/11/19  Yes Ceron, UMESH Ponce   rOPINIRole (REQUIP) 3 MG  "tablet Take 1 tablet by mouth every night at bedtime. 11/20/20  Yes Ceron, UMESH Ponce   sucralfate (CARAFATE) 1 g tablet TAKE 1 TABLET BY MOUTH FOUR TIMES DAILY 12/8/20  Yes Johnny Smiley PA-C   Syringe/Needle, Disp, 25G X 5/8\" 3 ML misc 1 each Every 28 (Twenty-Eight) Days. 9/22/20  Yes Osmany, UMESH Ponce   tamsulosin (FLOMAX) 0.4 MG capsule 24 hr capsule Take 1 capsule by mouth Daily. 12/11/20  Yes Ceron, UMESH Ponce   topiramate (TOPAMAX) 100 MG tablet Take 1 tablet by mouth 2 (Two) Times a Day. 5/28/20  Yes Ceron, UMESH Ponce   ubrogepant tablet TK 1 T PO AT ONSET OF MIGRAINE. MAY REPEAT IN 2 HOURS AS NEEDED. MAX OF 2 TABLETS IN 24 HOURS. THIS IS A 30 DAY SCRIPT 9/1/20  Yes Provider, MD Doris   azithromycin (ZITHROMAX) 250 MG tablet Take 2 tablets the first day, then 1 tablet daily for 4 days. 1/5/21 2/2/21  Ceron, UMESH Ponce   predniSONE (DELTASONE) 10 MG tablet Take 1 tablet by mouth Daily. 11/20/20 2/2/21  Ceron, UMESH Ponce   promethazine-dextromethorphan (PROMETHAZINE-DM) 6.25-15 MG/5ML syrup Take 5 mL by mouth 4 (Four) Times a Day As Needed for Cough. 1/5/21 2/2/21  Ceron, UMESH Ponce     Review of Systems  Review of Systems       Objective    /71   Pulse 75   Temp 97.4 °F (36.3 °C)   Resp 16   Ht 157.5 cm (62\")   Wt 52 kg (114 lb 10.2 oz)   SpO2 98%   BMI 20.97 kg/m²   Physical Exam  Vitals and nursing note reviewed.   Constitutional:       General: She is not in acute distress.     Appearance: She is well-developed.   HENT:      Head: Normocephalic and atraumatic.   Eyes:      Pupils: Pupils are equal, round, and reactive to light.   Cardiovascular:      Rate and Rhythm: Normal rate and regular rhythm.      Heart sounds: Normal heart sounds.   Pulmonary:      Effort: Pulmonary effort is normal.      Breath sounds: Normal breath sounds.   Abdominal:      General: Bowel sounds are normal. There is no distension or abdominal bruit.      " Palpations: Abdomen is soft. Abdomen is not rigid. There is no shifting dullness or mass.      Tenderness: There is abdominal tenderness. There is no guarding or rebound.      Hernia: No hernia is present. There is no hernia in the ventral area.   Musculoskeletal:         General: Normal range of motion.      Cervical back: Normal range of motion.   Skin:     General: Skin is warm and dry.   Neurological:      Mental Status: She is alert and oriented to person, place, and time.   Psychiatric:         Behavior: Behavior normal.         Thought Content: Thought content normal.         Judgment: Judgment normal.       Assessment/Plan      1. Pain of upper abdomen    2. Gastroesophageal reflux disease with esophagitis without hemorrhage    .   Diagnoses and all orders for this visit:    1. Pain of upper abdomen  -     dextrose 5 % and sodium chloride 0.45 % infusion    2. Gastroesophageal reflux disease with esophagitis without hemorrhage  -     dextrose 5 % and sodium chloride 0.45 % infusion    Other orders  -     Insert Peripheral IV; Standing  -     POC Glucose Once; Standing  -     Obtain Informed Consent; Standing  -     POC Glucose Once  -     Obtain Informed Consent        Orders placed during this encounter include:  Orders Placed This Encounter   Procedures   • Obtain Informed Consent     Standing Status:   Standing     Number of Occurrences:   1     Order Specific Question:   Informed Consent Given For     Answer:   ESOPHAGOGASTRODUODENOSCOPY   • POC Glucose Once     Prior to Procedure on ALL Diabetic Patients     Standing Status:   Standing     Number of Occurrences:   1       Medications prescribed:  New Medications Ordered This Visit   Medications   • dextrose 5 % and sodium chloride 0.45 % infusion     Discontinued Medications       Reason for Discontinue     promethazine-dextromethorphan (PROMETHAZINE-DM) 6.25-15 MG/5ML syrup    *Therapy completed     predniSONE (DELTASONE) 10 MG tablet    *Therapy  completed     azithromycin (ZITHROMAX) 250 MG tablet    *Therapy completed        Requested Prescriptions      No prescriptions requested or ordered in this encounter       Review and/or summary of lab tests, radiology, procedures, medications. Review and summary of old records and obtaining of history. The risks and benefits of my recommendations, as well as other treatment options were discussed with the patient today. Questions were answered.    Follow-up: No follow-ups on file.     ESOPHAGOGASTRODUODENOSCOPY (N/A)      This document has been electronically signed by Kory Muhammad MD on March 16, 2021 09:47 CDT      Results for orders placed or performed in visit on 03/13/21   COVID-19,[facility]THER,[facility] IN-HOUSE, NP/OP SWAB IN UTM/VTM/[facility] TRANSPORT MEDIA,24 HR TAT - Swab, Nasopharynx    Specimen: Nasopharynx; Swab   Result Value Ref Range    COVID19 Not Detected Not Detected - Ref. Range   Results for orders placed or performed in visit on 12/07/20   Urinalysis, Microscopic Only - Urine, Clean Catch    Specimen: Urine, Clean Catch   Result Value Ref Range    RBC, UA None Seen None Seen /HPF    WBC, UA 0-2 (A) None Seen /HPF    Bacteria, UA None Seen None Seen /HPF    Squamous Epithelial Cells, UA 0-2 None Seen, 0-2 /HPF    Hyaline Casts, UA None Seen None Seen /LPF    Methodology Manual Light Microscopy    Urinalysis without microscopic (no culture) - Urine, Clean Catch    Specimen: Urine, Clean Catch   Result Value Ref Range    Color, UA Yellow Yellow, Straw    Appearance, UA Clear Clear    pH, UA 7.0 5.5 - 8.0    Specific Gravity, UA 1.015 1.005 - 1.030    Glucose, UA Negative Negative    Ketones, UA Negative Negative    Bilirubin, UA Negative Negative    Blood, UA Negative Negative    Protein, UA Negative Negative    Leuk Esterase, UA Negative Negative    Nitrite, UA Negative Negative    Urobilinogen, UA 0.2 E.U./dL 0.2 - 1.0 E.U./dL   Urine Culture - Urine, Urine, Clean Catch    Specimen: Urine, Clean  Catch   Result Value Ref Range    Urine Culture No growth    Results for orders placed or performed during the hospital encounter of 11/05/20   Tissue Pathology Exam    Specimen: Flank, right; Tissue   Result Value Ref Range    Case Report       Surgical Pathology Report                         Case: [id number]                                  Authorizing Provider:  Meet Mcintyre MD      Collected:           11/05/2020 01:01 PM          Ordering Location:     [facility]             Received:            11/05/2020 01:48 PM                                 [place]                                                              Pathologist:           Susie Penn MD                                                   Specimen:    Flank, right, Right Posterior Flank Sub Q Mass                                             Final Diagnosis       See Scanned Report       Results for orders placed or performed in visit on 11/02/20   COVID [facility] Priority - Swab, Nasopharynx    Specimen: Nasopharynx; Swab   Result Value Ref Range    COVID [facility] PRIORITY Comment    COVID-19,[facility] ROUTINE, NP/OP SWAB IN TRANSPORT MEDIA OR ESWAB 72 HR TAT - Swab, Nasopharynx    Specimen: Nasopharynx; Swab   Result Value Ref Range    SARS-CoV-2, NAHUM Not Detected Not Detected   Results for orders placed or performed during the hospital encounter of 10/30/20   POC Urinalysis Dipstick, Multipro (Automated dipstick)    Specimen: Urine   Result Value Ref Range    Color Yellow Yellow, Straw, Dark Yellow, Paige    Clarity, UA Clear Clear    Glucose, UA Negative Negative, 1000 mg/dL (3+) mg/dL    Bilirubin Negative Negative    Ketones, UA Negative Negative    Specific Gravity  1.020 1.005 - 1.030    Blood, UA Negative Negative    pH, Urine 5.5 5.0 - 8.0    Protein, POC Negative Negative mg/dL    Urobilinogen, UA Normal Normal    Nitrite, UA Negative Negative    Leukocytes Negative Negative   Results for orders placed or  performed in visit on 10/28/20   ECG 12 Lead   Result Value Ref Range    QT Interval 406 ms    QTC Interval 441 ms   Results for orders placed or performed in visit on 09/14/20   Tissue Pathology Exam    Specimen: Back, Upper; Tissue   Result Value Ref Range    Case Report       Surgical Pathology Report                         Case: [id number]                                  Authorizing Provider:  Rosario Ceron APRN   Collected:           09/14/2020 02:30 PM          Ordering Location:     [facility]     Received:            09/14/2020 03:06 PM                                 [facility]                                                                                  [place]                                                                     Pathologist:           Manav Matias MD                                                       Specimen:    Back, Upper                                                                                Clinical Information       1cm x 1.1cm x 0.3cm tan lesion with irregular edges and plaqued surface excised from left upper back in 2 pieces. First section is partially excised (98%) of whole lesion,. 2nd section excised to include residual lesion which is a grossly crescent shaped sliver from far left/lateral edge of whole lesion, perhaps 0.2cm x 1.1cm x 0.1cm.      Final Diagnosis       SEE SCANNED REPORT       Results for orders placed or performed in visit on 08/18/20   Vitamin B12 & Folate    Specimen: Blood   Result Value Ref Range    Folate 2.75 (L) 4.78 - 24.20 ng/mL    Vitamin B-12 345 211 - 946 pg/mL   CBC Auto Differential    Specimen: Blood   Result Value Ref Range    WBC 7.61 3.40 - 10.80 10*3/mm3    RBC 4.23 3.77 - 5.28 10*6/mm3    Hemoglobin 13.4 12.0 - 15.9 g/dL    Hematocrit 41.0 34.0 - 46.6 %    MCV 96.9 79.0 - 97.0 fL    MCH 31.7 26.6 - 33.0 pg    MCHC 32.7 31.5 - 35.7 g/dL    RDW 14.2 12.3 - 15.4 %    RDW-SD 48.8 37.0 - 54.0 fl     MPV 9.4 6.0 - 12.0 fL    Platelets 286 140 - 450 10*3/mm3    Neutrophil % 56.2 42.7 - 76.0 %    Lymphocyte % 31.1 19.6 - 45.3 %    Monocyte % 10.9 5.0 - 12.0 %    Eosinophil % 1.4 0.3 - 6.2 %    Basophil % 0.4 0.0 - 1.5 %    Neutrophils, Absolute 4.27 1.70 - 7.00 10*3/mm3    Lymphocytes, Absolute 2.37 0.70 - 3.10 10*3/mm3    Monocytes, Absolute 0.83 0.10 - 0.90 10*3/mm3    Eosinophils, Absolute 0.11 0.00 - 0.40 10*3/mm3    Basophils, Absolute 0.03 0.00 - 0.20 10*3/mm3   Vitamin D 25 Hydroxy    Specimen: Blood   Result Value Ref Range    25 Hydroxy, Vitamin D 35.9 30.0 - 100.0 ng/ml   T3    Specimen: Blood   Result Value Ref Range    T3, Total 97.9 80.0 - 200.0 ng/dl   TSH    Specimen: Blood   Result Value Ref Range    TSH 1.730 0.270 - 4.200 uIU/mL   T4, Free    Specimen: Blood   Result Value Ref Range    Free T4 1.09 0.93 - 1.70 ng/dL   Lipid Panel    Specimen: Blood   Result Value Ref Range    Total Cholesterol 156 150 - 200 mg/dL    Triglycerides 156 (H) <=150 mg/dL    HDL Cholesterol 39 (L) 40 - 59 mg/dL    LDL Cholesterol  86 <=100 mg/dL    VLDL Cholesterol 31.2 mg/dL    LDL/HDL Ratio 2.20 0.00 - 3.22   Comprehensive Metabolic Panel    Specimen: Blood   Result Value Ref Range    Glucose 100 (H) 70 - 99 mg/dL    BUN 16 7 - 23 mg/dL    Creatinine 0.96 0.52 - 1.04 mg/dL    Sodium 140 137 - 145 mmol/L    Potassium 4.0 3.4 - 5.0 mmol/L    Chloride 108 101 - 112 mmol/L    CO2 24.0 22.0 - 30.0 mmol/L    Calcium 9.3 8.4 - 10.2 mg/dL    Total Protein 7.1 6.3 - 8.6 g/dL    Albumin 4.10 3.50 - 5.00 g/dL    ALT (SGPT) 23 <=35 U/L    AST (SGOT) 64 (H) 14 - 36 U/L    Alkaline Phosphatase 55 38 - 126 U/L    Total Bilirubin 0.4 0.2 - 1.3 mg/dL    eGFR Non  Amer 59 45 - 104 mL/min/1.73    Globulin 3.0 2.3 - 3.5 gm/dL    A/G Ratio 1.4 1.1 - 1.8 g/dL    BUN/Creatinine Ratio 16.7 7.0 - 25.0    Anion Gap 8.0 5.0 - 15.0 mmol/L   Results for orders placed or performed in visit on 05/28/20   ToxASSURE Select 13 Discrete -     Specimen: Urine   Result Value Ref Range    Report Summary FINAL     Ethanol Screen Urine (Ref) Negative     Ethanol, Urine Not Detected g/dL    Amphetamine, Urine Qual Negative     Methamphetamine, Urine Not Detected ng/mg creat    Amphetamine, Urine Not Detected ng/mg creat    MDMA URINE Not Detected ng/mg creat    MDA Not Detected ng/mg creat    Benzodiazepine Screen, Urine +POSITIVE+     DIAZEPAM URINE QUANT (REF) Not Detected ng/mg creat    Desmethyldiazepam Not Detected ng/mg creat    Oxazepam, urine Not Detected ng/mg creat    Temazepam, urine Not Detected ng/mg creat    ALPRAZOLAM Not Detected ng/mg creat    ALPHA-HYDROXYALPRAZOLAM UR, QT (REF) Not Detected ng/mg creat    DESALKLFLURAZEPAM UR QUANT (REF) Not Detected ng/mg creat    LORAZEPAM URINE QUANT (REF) Not Detected ng/mg creat    Alpha-hydroxytriazolam, Urine Not Detected ng/mg creat    Clonazepam Urine Not Detected ng/mg creat    7-Aminoclonazepam Urine 42 ng/mg creat    MIDAZOLAM UR, QUANT (REF) Not Detected ng/mg creat    Alpha-hydroxymidazolam, Urine Not Detected ng/mg creat    Flunitrazepam Not Detected ng/mg creat    DESMETHYLFLUNITRAZEPAM Not Detected ng/mg creat    Cocaine & Metabolite Negative     Cocaine Screen, Urine Not Detected ng/mg creat    Benzoylecgonine, Urine Not Detected ng/mg creat    Cocaethylene Ur Not Detected ng/mg creat    THC, Urine Negative     Carboxy THC, Urine Not Detected ng/mg creat    6-ACETYLMORPHINE Negative     6-acetylmorphine Not Detected ng/mg creat    Opiate Class +POSITIVE+     Codeine, Urine Not Detected ng/mg creat    Morphine, Urine Not Detected ng/mg creat    normorphine Not Detected ng/mg creat    Norcodeine Ur Not Detected ng/mg creat    Hydrocodone  ng/mg creat    Hydromorphone Urine 153 ng/mg creat    Dihydrocodeine, Urine 262 ng/mg creat    Opiates, Norhydrocodone, Urine 3181 ng/mg creat    Oxycodone Class Ur Negative     Oxycodone, Confirmation, Urine Not Detected ng/mg creat    Oxymorphone UR  Not Detected ng/mg creat    Opiates, Noroxycodone, Urine Not Detected ng/mg creat    Noroxymorphone Not Detected ng/mg creat    Methadone Negative     Methadone, Quantitative Not Detected ng/mg creat    EDDP Not Detected ng/mg creat    Fentanyl and Analogues, Ur Negative     Fentanyl, Urine Not Detected ng/mg creat    Norfentanyl Urine Not Detected ng/mg creat    Sufentanil, Ur Not Detected ng/mg creat    Alfentanil, Ur Not Detected ng/mg creat    BUPRENORPHINE Negative     Buprenorphine, Urine Not Detected ng/mg creat    Norbuprenorphine Not Detected ng/mg creat    Tapentadol Negative     Tapentadol Ur Not Detected ng/mg creat    Opoidss other, UR Negative     Tramadol, Urine Not Detected ng/mg creat    O-desmethyltramadol, Ur Not Detected ng/mg creat    N-Desmethyltramadol, U Not Detected ng/mg creat    BARBITURATES, URINE Negative     Amobarbital, Urine Not Detected     Barbital Not Detected     Butabarbital, Ur Not Detected     Butalbital Screen, Urine Not Detected     Mephobarbital Urine Not Detected     Pentobarbital UR Not Detected     Phenobarbital Not Detected     Secobarbital, urine Not Detected     Thiopental, Ur Not Detected     Creatinine, Urine 77 mg/dL    Level of Detection: Comment      *Note: Due to a large number of results and/or encounters for the requested time period, some results have not been displayed. A complete set of results can be found in Results Review.       Some portions of this note have been dictated using voice recognition software and may contain errors and/or omissions.

## 2021-03-16 NOTE — ANESTHESIA POSTPROCEDURE EVALUATION
Patient: Tricia Loyola    Procedure Summary     Date: 03/16/21 Room / Location: [facility] ENDOSCOPY 1 / [facility] ENDOSCOPY    Anesthesia Start: 0954 Anesthesia Stop: 1002    Procedure: ESOPHAGOGASTRODUODENOSCOPY (N/A ) Diagnosis:       Pain of upper abdomen      Gastroesophageal reflux disease with esophagitis without hemorrhage      (Pain of upper abdomen [R10.10])      (Gastroesophageal reflux disease with esophagitis without hemorrhage [K21.00])    Surgeons: Kory Muhammad MD Provider: Leonardo Reynolds CRNA    Anesthesia Type: MAC ASA Status: 3          Anesthesia Type: MAC    Vitals  No vitals data found for the desired time range.          Post Anesthesia Care and Evaluation    Patient location during evaluation: PACU  Patient participation: complete - patient participated  Level of consciousness: awake and alert  Pain score: 1  Pain management: adequate  Airway patency: patent  Anesthetic complications: No anesthetic complications  PONV Status: none  Cardiovascular status: acceptable  Respiratory status: acceptable  Hydration status: acceptable  Post Neuraxial Block status: Motor and sensory function returned to baseline

## 2021-03-19 LAB
LAB AP CASE REPORT: NORMAL
PATH REPORT.FINAL DX SPEC: NORMAL

## 2021-03-23 NOTE — PROGRESS NOTES
Subjective   Tricia Loyola is a 61 y.o. female.   You have chosen to receive care through a telehealth visit.  Do you consent to use a video/audio connection for your medical care today? Yes    Chief Complaint   Patient presents with   • Eye Problem     left eye blurry   • Back Pain   • Knee Pain        History of Present Illness     Patient presents today for a problem of blurred vision on the left eye.  She reports she had similar blurred vision prior to a left frontal temporal craniotomy with clipping of anterior communicating artery aneurysm performed 10/3/2003 at [facility] in [facility] by Dr. NEETA Ortega and attended by Dr. JAVI Charles.  She is concerned that this means she has an aneurysm again.  She reports that she had previously been maintained with a yearly CT of the brain with angiography.  She has been told that she cannot have an MRI because of her surgical aneurysm clips.  She is also been told that she can no longer have angiography because of the previous post angiography bump in her BUN and creatinine.     There are no other positive neural signs other than the blurred vision.  She is advised to follow-up with ophthalmology for complete evaluation and get back with me for referral to neurosurgery if the problem is not ophthalmologic.    She is also here for routine follow-up of chronic midline low back pain without sciatica related to osteoarthritic change of the spine with degenerative disc disease.  Initial onset of pain over 1 year ago.  She has also has an old pathologic compression fracture of the T12 vertebrae related to osteoporosis.  Her osteoporosis is no treated with Reclast infusions.  Pain is managed with hydrocodone.  She reports good control with current medication dose and frequency.  Compliant with use.  Adverse effects medication not reported. Not due for refill today.    Chronic pain of the right knee secondary to osteoarthritic changes and degenerative joint  disease of the knee.  DJD is not serious enough at this point to warrant joint replacement.  She has undergone joint injections in the past without much relief.  This chronic pain is also managed with hydrocodone.  She reports adequate pain relief currently with medication as long as she does not overuse the knee.  Not due for refill today.    COPD, chronic-stable.  Denies current or recent exacerbations.  No hospitalizations this year for COPD exacerbation.  Reports no increase over usual shortness of breath cough or sputum production.  She does however and unfortunately continue to smoke cigarettes.  Discussed health risks of continued smoking.  She is advised to stop.  She is not ready.  3 minutes spent in consultation regarding smoking.  She does need refill on albuterol inhaler today.    No other complaints today.    Parts of most recent relevant visit HPI, ROS  and PE may be carried forward and all are updated as appropriate for current situation.      Past Surgical History:   Procedure Laterality Date   • APPENDECTOMY     • BREAST BIOPSY Left    • CHOLECYSTECTOMY     • COLONOSCOPY N/A 11/26/2018    Procedure: COLONOSCOPY;  Surgeon: Meet Mcintyre MD;  Location: [facility] ENDOSCOPY;  Service: General   • CRANIOTOMY FOR ANEURYSM  2003   • ENDOSCOPY N/A 10/16/2019    Procedure: ESOPHAGOGASTRODUODENOSCOPY;  Surgeon: Kory Muhammad MD;  Location: [facility] ENDOSCOPY;  Service: Gastroenterology   • ENDOSCOPY N/A 3/16/2021    Procedure: ESOPHAGOGASTRODUODENOSCOPY;  Surgeon: Kory Muhammad MD;  Location: [facility] ENDOSCOPY;  Service: Gastroenterology;  Laterality: N/A;   • MASS EXCISION Right 11/5/2020    Procedure: EXCISE SOFT TISSUE MASS RIGHT POSTERIOR FLANK                 (latex allergy);  Surgeon: Meet Mcintyre MD;  Location: [facility] OR;  Service: General;  Laterality: Right;   • PAP SMEAR  08/16/2012   • TONSILLECTOMY     • UPPER GASTROINTESTINAL ENDOSCOPY  10/16/2019      Social History     Socioeconomic  History   • Marital status: Legally      Spouse name: Not on file   • Number of children: Not on file   • Years of education: Not on file   • Highest education level: Not on file   Tobacco Use   • Smoking status: Current Every Day Smoker     Packs/day: 0.50     Years: 43.00     Pack years: 21.50     Types: Cigarettes   • Smokeless tobacco: Never Used   Vaping Use   • Vaping Use: Never used   Substance and Sexual Activity   • Alcohol use: No   • Drug use: No   • Sexual activity: Defer      The following portions of the patient's history were reviewed and updated as appropriate: She  has a past medical history of Abdominal pain, Anemia, Anxiety and depression, Chronic post-traumatic headache, Depressive disorder, Disease of thyroid gland, Encounter for gynecological examination, Gastroesophageal reflux disease, Generalized anxiety disorder, History of mammogram (08/2008), Hyperlipidemia, Ingrown toenail, Injury of back, Leaky heart valve, Migraine, Nonruptured cerebral aneurysm, Osteoporosis, Posttraumatic stress disorder, PTSD (post-traumatic stress disorder), Seizure (CMS/HCC), Skin cancer, Viral hepatitis A, Wears dentures, and Wears glasses..    Review of Systems   Constitutional: Negative.    HENT: Negative.  Negative for congestion.    Eyes: Positive for blurred vision. Negative for double vision, photophobia and visual disturbance.   Respiratory: Negative.  Negative for cough, shortness of breath, wheezing and stridor.    Cardiovascular: Negative.  Negative for chest pain, palpitations and leg swelling.   Gastrointestinal: Negative.  Negative for abdominal distention, abdominal pain, blood in stool, constipation, diarrhea, nausea, vomiting, GERD and indigestion.   Endocrine: Negative.  Negative for cold intolerance and heat intolerance.   Genitourinary: Negative.  Negative for dysuria, flank pain, frequency and urinary incontinence.   Musculoskeletal: Positive for arthralgias and back pain.   Skin:  Negative.    Allergic/Immunologic: Negative.  Negative for immunocompromised state.   Neurological: Negative.    Hematological: Negative.    Psychiatric/Behavioral: Negative for agitation, behavioral problems, decreased concentration, dysphoric mood, hallucinations, self-injury, sleep disturbance, suicidal ideas, negative for hyperactivity, depressed mood and stress. The patient is nervous/anxious.    All other systems reviewed and are negative.    PHQ-9 Depression Screening  Little interest or pleasure in doing things?     Feeling down, depressed, or hopeless?     Trouble falling or staying asleep, or sleeping too much?     Feeling tired or having little energy?     Poor appetite or overeating?     Feeling bad about yourself - or that you are a failure or have let yourself or your family down?     Trouble concentrating on things, such as reading the newspaper or watching television?     Moving or speaking so slowly that other people could have noticed? Or the opposite - being so fidgety or restless that you have been moving around a lot more than usual?     Thoughts that you would be better off dead, or of hurting yourself in some way?     PHQ-9 Total Score     If you checked off any problems, how difficult have these problems made it for you to do your work, take care of things at home, or get along with other people?        Objective    Vitals:    03/28/21 1903   PainSc:   6   PainLoc: Knee       Physical Exam  Vitals and nursing note reviewed.   Constitutional:       General: She is not in acute distress.     Appearance: Normal appearance. She is well-developed. She is not ill-appearing or diaphoretic.   HENT:      Head: Normocephalic and atraumatic.   Eyes:      General: No scleral icterus.        Right eye: No discharge.         Left eye: No discharge.      Conjunctiva/sclera: Conjunctivae normal.      Pupils: Pupils are equal, round, and reactive to light.   Neck:      Trachea: No tracheal deviation.    Pulmonary:      Effort: Pulmonary effort is normal. No respiratory distress.      Breath sounds: No stridor. No wheezing.   Musculoskeletal:         General: No tenderness or deformity. Normal range of motion.      Cervical back: Normal range of motion and neck supple.   Skin:     General: Skin is dry.      Coloration: Skin is not pale.      Findings: No erythema or rash.   Neurological:      General: No focal deficit present.      Mental Status: She is alert and oriented to person, place, and time. Mental status is at baseline.      GCS: GCS eye subscore is 4. GCS verbal subscore is 5. GCS motor subscore is 6.      Cranial Nerves: No cranial nerve deficit, dysarthria or facial asymmetry.      Motor: Motor function is intact.      Coordination: Coordination is intact.   Psychiatric:         Behavior: Behavior normal.         Thought Content: Thought content normal.         Judgment: Judgment normal.           Assessment/Plan   Diagnoses and all orders for this visit:    1. Blurred vision, left eye (Primary)    2. Chronic obstructive pulmonary disease, unspecified COPD type (CMS/HCC)  -     albuterol sulfate  (90 Base) MCG/ACT inhaler; Inhale 2 puffs Every 4 (Four) Hours As Needed for Wheezing.  Dispense: 18 g; Refill: 5    Return in about 4 weeks (around 3/9/2021), or if symptoms worsen or fail to improve.  There are no Patient Instructions on file for this visit.                 This document has been electronically signed by UMESH Guzmán on March 28, 2021 19:13 CDT

## 2021-03-28 RX ORDER — ALBUTEROL SULFATE 90 UG/1
2 AEROSOL, METERED RESPIRATORY (INHALATION) EVERY 4 HOURS PRN
Qty: 18 G | Refills: 5 | Status: SHIPPED | OUTPATIENT
Start: 2021-03-28 | End: 2022-02-04 | Stop reason: SDUPTHER

## 2021-03-30 ENCOUNTER — IMMUNIZATION (OUTPATIENT)
Dept: VACCINE CLINIC | Facility: HOSPITAL | Age: 61
End: 2021-03-30

## 2021-03-30 ENCOUNTER — OFFICE VISIT (OUTPATIENT)
Dept: GASTROENTEROLOGY | Facility: CLINIC | Age: 61
End: 2021-03-30

## 2021-03-30 VITALS
WEIGHT: 116.8 LBS | BODY MASS INDEX: 21.49 KG/M2 | DIASTOLIC BLOOD PRESSURE: 60 MMHG | SYSTOLIC BLOOD PRESSURE: 107 MMHG | HEIGHT: 62 IN | HEART RATE: 85 BPM

## 2021-03-30 DIAGNOSIS — R74.8 ELEVATED LIVER ENZYMES: ICD-10-CM

## 2021-03-30 DIAGNOSIS — K21.00 GASTROESOPHAGEAL REFLUX DISEASE WITH ESOPHAGITIS WITHOUT HEMORRHAGE: Primary | ICD-10-CM

## 2021-03-30 DIAGNOSIS — G89.29 CHRONIC PAIN OF RIGHT KNEE: ICD-10-CM

## 2021-03-30 DIAGNOSIS — M54.50 CHRONIC MIDLINE LOW BACK PAIN WITHOUT SCIATICA: ICD-10-CM

## 2021-03-30 DIAGNOSIS — M25.561 CHRONIC PAIN OF RIGHT KNEE: ICD-10-CM

## 2021-03-30 DIAGNOSIS — G89.29 CHRONIC MIDLINE LOW BACK PAIN WITHOUT SCIATICA: ICD-10-CM

## 2021-03-30 DIAGNOSIS — F41.1 GAD (GENERALIZED ANXIETY DISORDER): ICD-10-CM

## 2021-03-30 DIAGNOSIS — R10.10 PAIN OF UPPER ABDOMEN: ICD-10-CM

## 2021-03-30 DIAGNOSIS — K58.2 IRRITABLE BOWEL SYNDROME WITH BOTH CONSTIPATION AND DIARRHEA: ICD-10-CM

## 2021-03-30 PROCEDURE — 99213 OFFICE O/P EST LOW 20 MIN: CPT | Performed by: PHYSICIAN ASSISTANT

## 2021-03-30 PROCEDURE — 91300 HC SARSCOV02 VAC 30MCG/0.3ML IM: CPT | Performed by: THORACIC SURGERY (CARDIOTHORACIC VASCULAR SURGERY)

## 2021-03-30 PROCEDURE — 0002A: CPT | Performed by: THORACIC SURGERY (CARDIOTHORACIC VASCULAR SURGERY)

## 2021-03-30 RX ORDER — ESOMEPRAZOLE MAGNESIUM 40 MG/1
40 CAPSULE, DELAYED RELEASE ORAL 2 TIMES DAILY
Qty: 60 CAPSULE | Refills: 3 | Status: SHIPPED | OUTPATIENT
Start: 2021-03-30 | End: 2021-07-06 | Stop reason: ALTCHOICE

## 2021-03-30 RX ORDER — LINACLOTIDE 290 UG/1
290 CAPSULE, GELATIN COATED ORAL
COMMUNITY
Start: 2021-02-09 | End: 2022-05-31 | Stop reason: SDUPTHER

## 2021-03-31 ENCOUNTER — TELEPHONE (OUTPATIENT)
Dept: FAMILY MEDICINE CLINIC | Facility: CLINIC | Age: 61
End: 2021-03-31

## 2021-03-31 DIAGNOSIS — Z51.81 ENCOUNTER FOR THERAPEUTIC DRUG LEVEL MONITORING: ICD-10-CM

## 2021-03-31 DIAGNOSIS — E03.9 ACQUIRED HYPOTHYROIDISM: ICD-10-CM

## 2021-03-31 DIAGNOSIS — E53.8 B12 DEFICIENCY: ICD-10-CM

## 2021-03-31 DIAGNOSIS — E78.5 HYPERLIPIDEMIA, UNSPECIFIED HYPERLIPIDEMIA TYPE: ICD-10-CM

## 2021-03-31 DIAGNOSIS — Z79.899 LONG-TERM CURRENT USE OF HIGH RISK MEDICATION OTHER THAN ANTICOAGULANT: ICD-10-CM

## 2021-03-31 DIAGNOSIS — M81.0 AGE-RELATED OSTEOPOROSIS WITHOUT CURRENT PATHOLOGICAL FRACTURE: ICD-10-CM

## 2021-03-31 DIAGNOSIS — R74.8 ELEVATED LIVER ENZYMES: ICD-10-CM

## 2021-03-31 DIAGNOSIS — I10 ESSENTIAL HYPERTENSION: ICD-10-CM

## 2021-03-31 DIAGNOSIS — E53.8 FOLATE DEFICIENCY: Primary | ICD-10-CM

## 2021-03-31 RX ORDER — HYDROCODONE BITARTRATE AND ACETAMINOPHEN 10; 325 MG/1; MG/1
1 TABLET ORAL EVERY 8 HOURS PRN
Qty: 90 TABLET | Refills: 0 | Status: SHIPPED | OUTPATIENT
Start: 2021-03-31 | End: 2021-04-30 | Stop reason: SDUPTHER

## 2021-03-31 RX ORDER — CLONAZEPAM 0.5 MG/1
0.5 TABLET ORAL DAILY PRN
Qty: 30 TABLET | Refills: 0 | Status: SHIPPED | OUTPATIENT
Start: 2021-03-31 | End: 2021-04-30 | Stop reason: SDUPTHER

## 2021-03-31 NOTE — TELEPHONE ENCOUNTER
Patient has chronic anxiety requiring benzodiazepine use concurrently with chronic pain requiring opiate pain medication and/ or gabapentin. Patient has been educated regarding potential dangers of oversedation and accidental overdose with use of both medications concurrently. Serial assessments of patient has yielded no sign of oversedation or adverse effects of patient, and he/she is advised and aware to notify my office immediately if symptoms do occur, as well as to discontinue use of benzodiazepine medication and opiate medication immediately if that occurs, pending medical evaluation and advice. Patient has been compliant with use of medications, UDS, visits with no adverse effects noted. Patient understands the risks associated with this controlled medication, including tolerance and addiction.  Patient also agrees to only obtain this medication from me, and not from a another provider, unless that provider is covering for me in my absence.  Patient also agrees to be compliant in dosing, and not self adjust the dose of medication.  A signed controlled substance agreement is on file, and the patient has received a controlled substance education sheet at this a previous visit.  The patient has also signed a consent for treatment with a controlled substance as per [facility] policy. KATE was obtained. Refills were sent for: hydrocodone and clonazepam.     This document has been electronically signed by UMESH Guzmán on March 31, 2021 15:52 CDT

## 2021-03-31 NOTE — TELEPHONE ENCOUNTER
Patient has chronic anxiety requiring benzodiazepine use concurrently with chronic pain requiring opiate pain medication and/ or gabapentin. Patient has been educated regarding potential dangers of oversedation and accidental overdose with use of both medications concurrently. Serial assessments of patient has yielded no sign of oversedation or adverse effects of patient, and he/she is advised and aware to notify my office immediately if symptoms do occur, as well as to discontinue use of benzodiazepine medication and opiate medication immediately if that occurs, pending medical evaluation and advice. Patient has been compliant with use of medications, UDS, visits with no adverse effects noted. Patient understands the risks associated with this controlled medication, including tolerance and addiction.  Patient also agrees to only obtain this medication from me, and not from a another provider, unless that provider is covering for me in my absence.  Patient also agrees to be compliant in dosing, and not self adjust the dose of medication.  A signed controlled substance agreement is on file, and the patient has received a controlled substance education sheet at this a previous visit.  The patient has also signed a consent for treatment with a controlled substance as per [facility] policy. KATE was obtained. Refills were sent for:  Hydrocodone and clonazepam.     This document has been electronically signed by UMESH Guzmán on March 31, 2021 15:40 CDT

## 2021-04-01 DIAGNOSIS — Z01.00 NORMAL EYE EXAM: ICD-10-CM

## 2021-04-01 DIAGNOSIS — Z98.890 HISTORY OF SURGERY FOR CEREBRAL ANEURYSM: Primary | ICD-10-CM

## 2021-04-01 DIAGNOSIS — H53.8 BLURRED VISION, LEFT EYE: ICD-10-CM

## 2021-04-12 RX ORDER — LEVOTHYROXINE SODIUM 0.03 MG/1
TABLET ORAL
Qty: 15 TABLET | Refills: 1 | Status: SHIPPED | OUTPATIENT
Start: 2021-04-12 | End: 2021-07-15

## 2021-04-22 RX ORDER — FAMOTIDINE 40 MG/1
40 TABLET, FILM COATED ORAL DAILY
Qty: 30 TABLET | Refills: 1 | Status: SHIPPED | OUTPATIENT
Start: 2021-04-22 | End: 2021-06-15

## 2021-04-29 ENCOUNTER — TELEPHONE (OUTPATIENT)
Dept: CARDIOLOGY | Facility: CLINIC | Age: 61
End: 2021-04-29

## 2021-04-29 DIAGNOSIS — R00.2 PALPITATIONS: Primary | ICD-10-CM

## 2021-04-29 NOTE — TELEPHONE ENCOUNTER
Per Dr. Martinez, patient notified that Have her echo moved up to mid May when she returns from her trip.  Patient explained that she would be out of town until 6/6/21.  Her ECHO has been moved to 6/15/21 @ patient verbally acknowledged the change in the date and time.          ----- Message from Ramin Martinez MD sent at 4/29/2021  3:33 PM CDT -----  Regarding: RE: ECHO  When she returns  Tell her aortic stenosis does not change quickly  ----- Message -----  From: Jana Scherer CSA  Sent: 4/29/2021   1:40 PM CDT  To: Ramin Martinez MD  Subject: FW: ECHO                                         Mrs. Loyola is leaving on  May 8th and will return on June 6th,  she is staying until her grand baby is born.   When would you like me to have her echo scheduled?          ----- Message -----  From: Ramin Martinez MD  Sent: 4/29/2021   1:05 PM CDT  To: Jana Scherer CSA  Subject: ECHO                                             Have her echo moved up to mid May when she returns from her trip

## 2021-04-30 ENCOUNTER — TELEPHONE (OUTPATIENT)
Dept: CARDIOLOGY | Facility: CLINIC | Age: 61
End: 2021-04-30

## 2021-04-30 ENCOUNTER — TELEPHONE (OUTPATIENT)
Dept: FAMILY MEDICINE CLINIC | Facility: CLINIC | Age: 61
End: 2021-04-30

## 2021-04-30 DIAGNOSIS — M25.561 CHRONIC PAIN OF RIGHT KNEE: ICD-10-CM

## 2021-04-30 DIAGNOSIS — G89.29 CHRONIC MIDLINE LOW BACK PAIN WITHOUT SCIATICA: ICD-10-CM

## 2021-04-30 DIAGNOSIS — F41.1 GAD (GENERALIZED ANXIETY DISORDER): ICD-10-CM

## 2021-04-30 DIAGNOSIS — M54.50 CHRONIC MIDLINE LOW BACK PAIN WITHOUT SCIATICA: ICD-10-CM

## 2021-04-30 DIAGNOSIS — G89.29 CHRONIC PAIN OF RIGHT KNEE: ICD-10-CM

## 2021-04-30 RX ORDER — CLONAZEPAM 0.5 MG/1
0.5 TABLET ORAL DAILY PRN
Qty: 30 TABLET | Refills: 0 | Status: SHIPPED | OUTPATIENT
Start: 2021-04-30 | End: 2021-04-30 | Stop reason: SDUPTHER

## 2021-04-30 RX ORDER — HYDROCODONE BITARTRATE AND ACETAMINOPHEN 10; 325 MG/1; MG/1
1 TABLET ORAL EVERY 8 HOURS PRN
Qty: 10 TABLET | Refills: 0 | Status: SHIPPED | OUTPATIENT
Start: 2021-04-30 | End: 2021-06-04 | Stop reason: SDUPTHER

## 2021-04-30 RX ORDER — HYDROCODONE BITARTRATE AND ACETAMINOPHEN 10; 325 MG/1; MG/1
1 TABLET ORAL EVERY 8 HOURS PRN
Qty: 90 TABLET | Refills: 0 | Status: SHIPPED | OUTPATIENT
Start: 2021-04-30 | End: 2021-04-30 | Stop reason: SDUPTHER

## 2021-04-30 RX ORDER — CLONAZEPAM 0.5 MG/1
0.5 TABLET ORAL DAILY PRN
Qty: 3 TABLET | Refills: 0 | Status: SHIPPED | OUTPATIENT
Start: 2021-04-30 | End: 2021-07-06 | Stop reason: SDUPTHER

## 2021-04-30 NOTE — TELEPHONE ENCOUNTER
Per Dr. Martinez, patient notified that he would like her to wear a holter monitor for 3 weeks.  She is agreeable and will come to the [place] off the have the monitor placed today.          ----- Message from Ramin Martinez MD sent at 4/29/2021  5:14 PM CDT -----  Regarding: answer  First we need to know what is going on so we will put a 3 week mct on her and can figure out what is happening to her hr.  ----- Message -----  From: Jana Scherer CSA  Sent: 4/29/2021   4:12 PM CDT  To: Ramin Martinez MD    Mrs. Loyola is very concerned about her chest pains and her heart racing.  She would like to know what she can do to stop this.  A new medicine or something.  She stated over and over she is scared and with her going to be out of town she does not want to go to the ER there.  Her ECHO has been moved to 6/15 and she does know about the change of date and time for her ECHO.  Please advise

## 2021-04-30 NOTE — TELEPHONE ENCOUNTER
"Refill for full month prescriptions for hydrocodone and clonazepam are canceled (rxs sent today that is) as patient needs to \"reset\" her refill dates due to upcoming vacation for which she will leave on Friday 5/8/21 and be gone to [place] for a full month. So, a new rx for 3 days each is sent, which will be filled Tuesday 3/5/21 and then she will need a full month prescription for each sent in which can be filled on Friday 5/8/21 later next week.     This document has been electronically signed by UMESH Guzmán on April 30, 2021 18:54 CDT    .  "

## 2021-04-30 NOTE — TELEPHONE ENCOUNTER
Patient has chronic anxiety requiring benzodiazepine use concurrently with chronic pain requiring opiate pain medication and/ or gabapentin. Patient has been educated regarding potential dangers of oversedation and accidental overdose with use of both medications concurrently. Serial assessments of patient has yielded no sign of oversedation or adverse effects of patient, and he/she is advised and aware to notify my office immediately if symptoms do occur, as well as to discontinue use of benzodiazepine medication and opiate medication immediately if that occurs, pending medical evaluation and advice. Patient has been compliant with use of medications, UDS, visits with no adverse effects noted. Patient understands the risks associated with this controlled medication, including tolerance and addiction.  Patient also agrees to only obtain this medication from me, and not from a another provider, unless that provider is covering for me in my absence.  Patient also agrees to be compliant in dosing, and not self adjust the dose of medication.  A signed controlled substance agreement is on file, and the patient has received a controlled substance education sheet at this a previous visit.  The patient has also signed a consent for treatment with a controlled substance as per [facility] policy. KATE was obtained. Refills were sent for:  Hydrocodone and clonazepam.    This document has been electronically signed by UMESH Guzmán on April 30, 2021 16:40 CDT

## 2021-05-04 RX ORDER — LOSARTAN POTASSIUM 50 MG/1
TABLET ORAL
Qty: 30 TABLET | Refills: 0 | Status: SHIPPED | OUTPATIENT
Start: 2021-05-04 | End: 2021-07-29

## 2021-05-13 ENCOUNTER — TELEMEDICINE (OUTPATIENT)
Dept: FAMILY MEDICINE CLINIC | Facility: CLINIC | Age: 61
End: 2021-05-13

## 2021-05-13 VITALS — HEIGHT: 62 IN | WEIGHT: 115 LBS | BODY MASS INDEX: 21.16 KG/M2

## 2021-05-13 DIAGNOSIS — J30.89 CHRONIC NON-SEASONAL ALLERGIC RHINITIS: Chronic | ICD-10-CM

## 2021-05-13 DIAGNOSIS — J01.10 ACUTE NON-RECURRENT FRONTAL SINUSITIS: Primary | ICD-10-CM

## 2021-05-13 DIAGNOSIS — H92.03 ACUTE OTALGIA, BILATERAL: ICD-10-CM

## 2021-05-13 PROCEDURE — 99213 OFFICE O/P EST LOW 20 MIN: CPT | Performed by: NURSE PRACTITIONER

## 2021-05-13 RX ORDER — FLUTICASONE PROPIONATE 50 MCG
2 SPRAY, SUSPENSION (ML) NASAL DAILY
Qty: 18.2 ML | Refills: 0 | Status: SHIPPED | OUTPATIENT
Start: 2021-05-13 | End: 2021-06-15

## 2021-05-13 RX ORDER — ONABOTULINUMTOXINA 200 [USP'U]/1
200 INJECTION, POWDER, LYOPHILIZED, FOR SOLUTION INTRADERMAL; INTRAMUSCULAR
COMMUNITY
Start: 2021-04-26

## 2021-05-13 RX ORDER — PREDNISONE 20 MG/1
20 TABLET ORAL 2 TIMES DAILY
Qty: 14 TABLET | Refills: 0 | Status: SHIPPED | OUTPATIENT
Start: 2021-05-13 | End: 2021-05-20

## 2021-05-13 RX ORDER — AZITHROMYCIN 250 MG/1
TABLET, FILM COATED ORAL
Qty: 6 TABLET | Refills: 0 | Status: SHIPPED | OUTPATIENT
Start: 2021-05-13 | End: 2021-08-31

## 2021-05-13 NOTE — PROGRESS NOTES
Subjective   Tricia Loyola is a 61 y.o. female.   You have chosen to receive care through a telehealth visit.  Do you consent to use a video/audio connection for your medical care today? Yes  This was an audio and video enabled telemedicine encounter.          Chief Complaint   Patient presents with   • Sinusitis     STARTED 5/12/21   • Earache        History of Present Illness     Frontal Sinus pressure, bilateral otalgia, nasal congestion. Mild sore throat. No postnasal drainage. No fevers or chills. Feels miserable, feels sick. Otalgia woke her in the night. Reports no contact with known or suspected ill persons. claritin taken with no relief. Onset of symptoms 3 days ago, worsening each day. Thinks maybe her allergies were exacerbated with recent travel from [place] to [place] where she currently anxiously awaits the birth of her next grandchild, due any day.    No other complaints.    Parts of most recent relevant visit HPI, ROS  and PE may be carried forward and all are updated as appropriate for current situation.        Past Surgical History:   Procedure Laterality Date   • APPENDECTOMY     • BREAST BIOPSY Left    • CHOLECYSTECTOMY     • COLONOSCOPY N/A 11/26/2018    Procedure: COLONOSCOPY;  Surgeon: Meet Mcintyre MD;  Location: [facility] ENDOSCOPY;  Service: General   • CRANIOTOMY FOR ANEURYSM  2003   • ENDOSCOPY N/A 10/16/2019    Procedure: ESOPHAGOGASTRODUODENOSCOPY;  Surgeon: Kory Muhammad MD;  Location: [facility] ENDOSCOPY;  Service: Gastroenterology   • ENDOSCOPY N/A 3/16/2021    Procedure: ESOPHAGOGASTRODUODENOSCOPY;  Surgeon: Kory Muhammad MD;  Location: [facility] ENDOSCOPY;  Service: Gastroenterology;  Laterality: N/A;   • MASS EXCISION Right 11/5/2020    Procedure: EXCISE SOFT TISSUE MASS RIGHT POSTERIOR FLANK                 (latex allergy);  Surgeon: Meet Mcintyre MD;  Location: [facility] OR;  Service: General;  Laterality: Right;   • PAP SMEAR  08/16/2012   • TONSILLECTOMY     • UPPER  GASTROINTESTINAL ENDOSCOPY  10/16/2019      Social History     Socioeconomic History   • Marital status: Legally      Spouse name: Not on file   • Number of children: Not on file   • Years of education: Not on file   • Highest education level: Not on file   Tobacco Use   • Smoking status: Current Every Day Smoker     Packs/day: 0.50     Years: 43.00     Pack years: 21.50     Types: Cigarettes   • Smokeless tobacco: Never Used   Vaping Use   • Vaping Use: Never used   Substance and Sexual Activity   • Alcohol use: No   • Drug use: No   • Sexual activity: Defer      The following portions of the patient's history were reviewed and updated as appropriate: She  has a past medical history of Abdominal pain, Anemia, Anxiety and depression, Chronic post-traumatic headache, Depressive disorder, Disease of thyroid gland, Encounter for gynecological examination, Gastroesophageal reflux disease, Generalized anxiety disorder, History of mammogram (08/2008), Hyperlipidemia, Ingrown toenail, Injury of back, Leaky heart valve, Migraine, Nonruptured cerebral aneurysm, Osteoporosis, Posttraumatic stress disorder, PTSD (post-traumatic stress disorder), Seizure (CMS/HCC), Skin cancer, Viral hepatitis A, Wears dentures, and Wears glasses..    Review of Systems   Constitutional: Negative.    HENT: Positive for congestion, ear pain, sinus pressure, sneezing and sore throat. Negative for ear discharge, postnasal drip, rhinorrhea, trouble swallowing and voice change.    Eyes: Negative.  Negative for blurred vision, double vision, photophobia and visual disturbance.   Respiratory: Negative.  Negative for cough, choking, shortness of breath, wheezing and stridor.    Cardiovascular: Negative.  Negative for chest pain, palpitations and leg swelling.   Gastrointestinal: Negative.  Negative for abdominal distention, abdominal pain, blood in stool, constipation, diarrhea, nausea, vomiting, GERD and indigestion.   Endocrine: Negative.   Negative for cold intolerance and heat intolerance.   Genitourinary: Negative.  Negative for dysuria, flank pain, frequency and urinary incontinence.   Musculoskeletal: Positive for arthralgias and back pain.   Skin: Negative.    Allergic/Immunologic: Negative.  Negative for immunocompromised state.   Neurological: Negative.    Hematological: Negative.    Psychiatric/Behavioral: Negative for agitation, behavioral problems, decreased concentration, dysphoric mood, hallucinations, self-injury, sleep disturbance, suicidal ideas, negative for hyperactivity, depressed mood and stress. The patient is nervous/anxious.    All other systems reviewed and are negative.    PHQ-9 Depression Screening  Little interest or pleasure in doing things?     Feeling down, depressed, or hopeless?     Trouble falling or staying asleep, or sleeping too much?     Feeling tired or having little energy?     Poor appetite or overeating?     Feeling bad about yourself - or that you are a failure or have let yourself or your family down?     Trouble concentrating on things, such as reading the newspaper or watching television?     Moving or speaking so slowly that other people could have noticed? Or the opposite - being so fidgety or restless that you have been moving around a lot more than usual?     Thoughts that you would be better off dead, or of hurting yourself in some way?     PHQ-9 Total Score     If you checked off any problems, how difficult have these problems made it for you to do your work, take care of things at home, or get along with other people?      Patient understands the risks associated with this controlled medication, including tolerance and addiction.  Patient also agrees to only obtain this medication from me, and not from a another provider, unless that provider is covering for me in my absence.  Patient also agrees to be compliant in dosing, and not self adjust the dose of medication.  A signed controlled substance  "agreement is on file, and the patient has received a controlled substance education sheet at this a previous visit.  The patient has also signed a consent for treatment with a controlled substance as per [facility] policy. KATE was obtained.    Objective    Vitals:    05/13/21 1103   Weight: 52.2 kg (115 lb)   Height: 157.5 cm (62.01\")   PainSc:   6       Physical Exam  Vitals and nursing note reviewed.   Constitutional:       General: She is not in acute distress.     Appearance: Normal appearance. She is well-developed. She is ill-appearing. She is not diaphoretic.   HENT:      Head: Normocephalic and atraumatic.      Nose: Congestion present.      Mouth/Throat:      Mouth: Mucous membranes are moist.      Pharynx: Oropharynx is clear.   Eyes:      General: No scleral icterus.        Right eye: No discharge.         Left eye: No discharge.      Conjunctiva/sclera: Conjunctivae normal.      Pupils: Pupils are equal, round, and reactive to light.   Neck:      Trachea: No tracheal deviation.   Pulmonary:      Effort: Pulmonary effort is normal. No respiratory distress.      Breath sounds: No wheezing.   Musculoskeletal:         General: No tenderness or deformity. Normal range of motion.      Cervical back: Normal range of motion and neck supple. No rigidity or tenderness.      Right lower leg: No edema.      Left lower leg: No edema.   Skin:     General: Skin is dry.      Coloration: Skin is not pale.      Findings: No bruising, erythema, lesion or rash.   Neurological:      General: No focal deficit present.      Mental Status: She is alert and oriented to person, place, and time. Mental status is at baseline.      Cranial Nerves: No cranial nerve deficit.   Psychiatric:         Mood and Affect: Mood normal.         Behavior: Behavior normal.         Thought Content: Thought content normal.         Judgment: Judgment normal.           Assessment/Plan   Diagnoses and all orders for this visit:    1. Acute " non-recurrent frontal sinusitis (Primary)  -     azithromycin (ZITHROMAX) 250 MG tablet; Take 2 tablets the first day, then 1 tablet daily for 4 days.  Dispense: 6 tablet; Refill: 0  -     predniSONE (DELTASONE) 20 MG tablet; Take 1 tablet by mouth 2 (Two) Times a Day for 7 days.  Dispense: 14 tablet; Refill: 0  -     fluticasone (Flonase) 50 MCG/ACT nasal spray; 2 sprays into the nostril(s) as directed by provider Daily.  Dispense: 18.2 mL; Refill: 0    2. Acute otalgia, bilateral  -     azithromycin (ZITHROMAX) 250 MG tablet; Take 2 tablets the first day, then 1 tablet daily for 4 days.  Dispense: 6 tablet; Refill: 0  -     predniSONE (DELTASONE) 20 MG tablet; Take 1 tablet by mouth 2 (Two) Times a Day for 7 days.  Dispense: 14 tablet; Refill: 0  -     fluticasone (Flonase) 50 MCG/ACT nasal spray; 2 sprays into the nostril(s) as directed by provider Daily.  Dispense: 18.2 mL; Refill: 0    3. Chronic non-seasonal allergic rhinitis  -     fluticasone (Flonase) 50 MCG/ACT nasal spray; 2 sprays into the nostril(s) as directed by provider Daily.  Dispense: 18.2 mL; Refill: 0    Return in about 2 days (around 5/15/2021), or if symptoms worsen or fail to improve.                 This document has been electronically signed by UMESH Guzmán on May 13, 2021 17:10 CDT

## 2021-06-01 ENCOUNTER — TELEPHONE (OUTPATIENT)
Dept: CARDIOLOGY | Facility: CLINIC | Age: 61
End: 2021-06-01

## 2021-06-01 NOTE — TELEPHONE ENCOUNTER
Tried to contact patient, man stated she is out of town unsure when she will return.  Needing to go over her test results.  They are as follows:  Her holter showed No significant arrhythmias.

## 2021-06-04 ENCOUNTER — TELEPHONE (OUTPATIENT)
Dept: FAMILY MEDICINE CLINIC | Facility: CLINIC | Age: 61
End: 2021-06-04

## 2021-06-04 DIAGNOSIS — G89.29 CHRONIC MIDLINE LOW BACK PAIN WITHOUT SCIATICA: ICD-10-CM

## 2021-06-04 DIAGNOSIS — G89.29 CHRONIC PAIN OF RIGHT KNEE: ICD-10-CM

## 2021-06-04 DIAGNOSIS — M54.50 CHRONIC MIDLINE LOW BACK PAIN WITHOUT SCIATICA: ICD-10-CM

## 2021-06-04 DIAGNOSIS — M25.561 CHRONIC PAIN OF RIGHT KNEE: ICD-10-CM

## 2021-06-04 RX ORDER — HYDROCODONE BITARTRATE AND ACETAMINOPHEN 10; 325 MG/1; MG/1
1 TABLET ORAL EVERY 8 HOURS PRN
Qty: 10 TABLET | Refills: 0 | Status: SHIPPED | OUTPATIENT
Start: 2021-06-04 | End: 2021-06-08 | Stop reason: SDUPTHER

## 2021-06-05 NOTE — TELEPHONE ENCOUNTER
Patient seen in office every 3 months for chronic pain requiring opiate pain medication. Compliant with medication, visits with no adverse effects noted. KATE and UDS current and appropriate. Patient called requesting scheduled refill at appropriate interval. Patient understands the risks associated with this controlled medication, including tolerance and addiction.  Patient also agrees to only obtain this medication from me, and not from a another provider, unless that provider is covering for me in my absence.  Patient also agrees to be compliant in dosing, and not self adjust the dose of medication.  A signed controlled substance agreement is on file, and the patient has received a controlled substance education sheet at this a previous visit.  The patient has also signed a consent for treatment with a controlled substance as per [facility] policy. KATE was obtained.   Refill sent for hydrocodone.     This document has been electronically signed by UMESH Guzmán on June 4, 2021 20:37 CDT

## 2021-06-07 ENCOUNTER — TELEPHONE (OUTPATIENT)
Dept: PULMONOLOGY | Facility: CLINIC | Age: 61
End: 2021-06-07

## 2021-06-07 ENCOUNTER — TELEPHONE (OUTPATIENT)
Dept: FAMILY MEDICINE CLINIC | Facility: CLINIC | Age: 61
End: 2021-06-07

## 2021-06-07 NOTE — TELEPHONE ENCOUNTER
Pt cancelled the Norco that was sent in because it was for only 10 pills shes due for UDS and bloodwork so this needs to be recent in [facility] wont fill since it was cancelled

## 2021-06-08 ENCOUNTER — LAB (OUTPATIENT)
Dept: LAB | Facility: OTHER | Age: 61
End: 2021-06-08

## 2021-06-08 DIAGNOSIS — G89.29 CHRONIC PAIN OF RIGHT KNEE: ICD-10-CM

## 2021-06-08 DIAGNOSIS — M54.50 CHRONIC MIDLINE LOW BACK PAIN WITHOUT SCIATICA: ICD-10-CM

## 2021-06-08 DIAGNOSIS — M25.561 CHRONIC PAIN OF RIGHT KNEE: ICD-10-CM

## 2021-06-08 DIAGNOSIS — G89.29 CHRONIC MIDLINE LOW BACK PAIN WITHOUT SCIATICA: ICD-10-CM

## 2021-06-08 DIAGNOSIS — K64.9 HEMORRHOIDS, UNSPECIFIED HEMORRHOID TYPE: ICD-10-CM

## 2021-06-08 LAB
25(OH)D3 SERPL-MCNC: 38.7 NG/ML (ref 30–100)
ALBUMIN SERPL-MCNC: 4.2 G/DL (ref 3.5–5.2)
ALBUMIN UR-MCNC: <1.2 MG/DL
ALBUMIN/GLOB SERPL: 1.4 G/DL
ALP SERPL-CCNC: 61 U/L (ref 39–117)
ALT SERPL W P-5'-P-CCNC: 16 U/L (ref 1–33)
ANION GAP SERPL CALCULATED.3IONS-SCNC: 5 MMOL/L (ref 5–15)
AST SERPL-CCNC: 50 U/L (ref 1–32)
BASOPHILS # BLD AUTO: 0.03 10*3/MM3 (ref 0–0.2)
BASOPHILS NFR BLD AUTO: 0.3 % (ref 0–1.5)
BILIRUB SERPL-MCNC: 0.2 MG/DL (ref 0–1.2)
BUN SERPL-MCNC: 17 MG/DL (ref 8–23)
BUN/CREAT SERPL: 16.7 (ref 7–25)
CALCIUM SPEC-SCNC: 9.3 MG/DL (ref 8.6–10.5)
CHLORIDE SERPL-SCNC: 110 MMOL/L (ref 98–107)
CHOLEST SERPL-MCNC: 161 MG/DL (ref 0–200)
CO2 SERPL-SCNC: 25 MMOL/L (ref 22–29)
CREAT SERPL-MCNC: 1.02 MG/DL (ref 0.57–1)
CREAT UR-MCNC: 83 MG/DL
DEPRECATED RDW RBC AUTO: 47.1 FL (ref 37–54)
EOSINOPHIL # BLD AUTO: 0.15 10*3/MM3 (ref 0–0.4)
EOSINOPHIL NFR BLD AUTO: 1.7 % (ref 0.3–6.2)
ERYTHROCYTE [DISTWIDTH] IN BLOOD BY AUTOMATED COUNT: 13.7 % (ref 12.3–15.4)
FOLATE SERPL-MCNC: >20 NG/ML (ref 4.78–24.2)
GFR SERPL CREATININE-BSD FRML MDRD: 55 ML/MIN/1.73
GLOBULIN UR ELPH-MCNC: 3.1 GM/DL
GLUCOSE SERPL-MCNC: 115 MG/DL (ref 65–99)
HCT VFR BLD AUTO: 39 % (ref 34–46.6)
HDLC SERPL-MCNC: 39 MG/DL (ref 40–60)
HGB BLD-MCNC: 13.1 G/DL (ref 12–15.9)
LDLC SERPL CALC-MCNC: 99 MG/DL (ref 0–100)
LDLC/HDLC SERPL: 2.47 {RATIO}
LYMPHOCYTES # BLD AUTO: 3.29 10*3/MM3 (ref 0.7–3.1)
LYMPHOCYTES NFR BLD AUTO: 36.4 % (ref 19.6–45.3)
MCH RBC QN AUTO: 32.5 PG (ref 26.6–33)
MCHC RBC AUTO-ENTMCNC: 33.6 G/DL (ref 31.5–35.7)
MCV RBC AUTO: 96.8 FL (ref 79–97)
MICROALBUMIN/CREAT UR: NORMAL MG/G{CREAT}
MONOCYTES # BLD AUTO: 1.15 10*3/MM3 (ref 0.1–0.9)
MONOCYTES NFR BLD AUTO: 12.7 % (ref 5–12)
NEUTROPHILS NFR BLD AUTO: 4.41 10*3/MM3 (ref 1.7–7)
NEUTROPHILS NFR BLD AUTO: 48.9 % (ref 42.7–76)
PLATELET # BLD AUTO: 258 10*3/MM3 (ref 140–450)
PMV BLD AUTO: 9.5 FL (ref 6–12)
POTASSIUM SERPL-SCNC: 4.3 MMOL/L (ref 3.5–5.2)
PROT SERPL-MCNC: 7.3 G/DL (ref 6–8.5)
RBC # BLD AUTO: 4.03 10*6/MM3 (ref 3.77–5.28)
SODIUM SERPL-SCNC: 140 MMOL/L (ref 136–145)
T3 SERPL-MCNC: 84.2 NG/DL (ref 80–200)
T4 FREE SERPL-MCNC: 1 NG/DL (ref 0.93–1.7)
TRIGL SERPL-MCNC: 128 MG/DL (ref 0–150)
TSH SERPL DL<=0.05 MIU/L-ACNC: 1.83 UIU/ML (ref 0.27–4.2)
VIT B12 BLD-MCNC: 528 PG/ML (ref 211–946)
VLDLC SERPL-MCNC: 23 MG/DL (ref 5–40)
WBC # BLD AUTO: 9.03 10*3/MM3 (ref 3.4–10.8)

## 2021-06-08 PROCEDURE — 84480 ASSAY TRIIODOTHYRONINE (T3): CPT | Performed by: NURSE PRACTITIONER

## 2021-06-08 PROCEDURE — 82570 ASSAY OF URINE CREATININE: CPT | Performed by: NURSE PRACTITIONER

## 2021-06-08 PROCEDURE — 80061 LIPID PANEL: CPT | Performed by: NURSE PRACTITIONER

## 2021-06-08 PROCEDURE — 80307 DRUG TEST PRSMV CHEM ANLYZR: CPT | Performed by: NURSE PRACTITIONER

## 2021-06-08 PROCEDURE — 82746 ASSAY OF FOLIC ACID SERUM: CPT | Performed by: NURSE PRACTITIONER

## 2021-06-08 PROCEDURE — G0481 DRUG TEST DEF 8-14 CLASSES: HCPCS | Performed by: NURSE PRACTITIONER

## 2021-06-08 PROCEDURE — 82607 VITAMIN B-12: CPT | Performed by: NURSE PRACTITIONER

## 2021-06-08 PROCEDURE — 85025 COMPLETE CBC W/AUTO DIFF WBC: CPT | Performed by: NURSE PRACTITIONER

## 2021-06-08 PROCEDURE — 36415 COLL VENOUS BLD VENIPUNCTURE: CPT | Performed by: NURSE PRACTITIONER

## 2021-06-08 PROCEDURE — 84439 ASSAY OF FREE THYROXINE: CPT | Performed by: NURSE PRACTITIONER

## 2021-06-08 PROCEDURE — 82306 VITAMIN D 25 HYDROXY: CPT | Performed by: NURSE PRACTITIONER

## 2021-06-08 PROCEDURE — 82043 UR ALBUMIN QUANTITATIVE: CPT | Performed by: NURSE PRACTITIONER

## 2021-06-08 PROCEDURE — 80053 COMPREHEN METABOLIC PANEL: CPT | Performed by: NURSE PRACTITIONER

## 2021-06-08 PROCEDURE — 84443 ASSAY THYROID STIM HORMONE: CPT | Performed by: NURSE PRACTITIONER

## 2021-06-08 RX ORDER — HYDROCODONE BITARTRATE AND ACETAMINOPHEN 10; 325 MG/1; MG/1
1 TABLET ORAL EVERY 8 HOURS PRN
Qty: 90 TABLET | Refills: 0 | Status: SHIPPED | OUTPATIENT
Start: 2021-06-08 | End: 2021-07-06 | Stop reason: SDUPTHER

## 2021-06-08 RX ORDER — HYDROCORTISONE 10 MG/G
CREAM TOPICAL 2 TIMES DAILY
Qty: 28.4 G | Refills: 0 | Status: SHIPPED | OUTPATIENT
Start: 2021-06-08 | End: 2021-09-04 | Stop reason: SDUPTHER

## 2021-06-10 DIAGNOSIS — N28.9 ABNORMAL RENAL FUNCTION FINDING: Primary | ICD-10-CM

## 2021-06-13 LAB
6MAM UR QL CFM: NEGATIVE
6MAM/CREAT UR: NOT DETECTED NG/MG CREAT
7AMINOCLONAZEPAM/CREAT UR: NOT DETECTED NG/MG CREAT
A-OH ALPRAZ/CREAT UR: NOT DETECTED NG/MG CREAT
A-OH-TRIAZOLAM/CREAT UR CFM: NOT DETECTED NG/MG CREAT
ALFENTANIL/CREAT UR CFM: NOT DETECTED NG/MG CREAT
ALPHA-HYDROXYMIDAZOLAM, URINE: NOT DETECTED NG/MG CREAT
ALPRAZ/CREAT UR CFM: NOT DETECTED NG/MG CREAT
AMOBARBITAL UR QL CFM: NOT DETECTED
AMPHET/CREAT UR: NOT DETECTED NG/MG CREAT
AMPHETAMINES UR QL CFM: NEGATIVE
BARBITAL UR QL CFM: NOT DETECTED
BARBITURATES UR QL CFM: NEGATIVE
BENZODIAZ UR QL CFM: NEGATIVE
BUPRENORPHINE UR QL CFM: NEGATIVE
BUPRENORPHINE/CREAT UR: NOT DETECTED NG/MG CREAT
BUTABARBITAL UR QL CFM: NOT DETECTED
BUTALBITAL UR QL CFM: NOT DETECTED
BZE/CREAT UR: NOT DETECTED NG/MG CREAT
CANNABINOIDS UR QL CFM: NEGATIVE
CARBOXYTHC/CREAT UR: NOT DETECTED NG/MG CREAT
CLONAZEPAM/CREAT UR CFM: NOT DETECTED NG/MG CREAT
COCAETHYLENE/CREAT UR CFM: NOT DETECTED NG/MG CREAT
COCAINE UR QL CFM: NEGATIVE
COCAINE/CREAT UR CFM: NOT DETECTED NG/MG CREAT
CODEINE/CREAT UR: NOT DETECTED NG/MG CREAT
CREAT UR-MCNC: 92 MG/DL
DESALKYLFLURAZ/CREAT UR: NOT DETECTED NG/MG CREAT
DESMETHYLFLUNITRAZEPAM: NOT DETECTED NG/MG CREAT
DHC/CREAT UR: 282 NG/MG CREAT
DIAZEPAM/CREAT UR: NOT DETECTED NG/MG CREAT
DRUGS UR: NORMAL
EDDP/CREAT UR: NOT DETECTED NG/MG CREAT
ETHANOL UR CFM-MCNC: NOT DETECTED G/DL
ETHANOL UR QL CFM: NEGATIVE
FENTANYL UR QL CFM: NEGATIVE
FENTANYL/CREAT UR: NOT DETECTED NG/MG CREAT
FLUNITRAZEPAM UR QL CFM: NOT DETECTED NG/MG CREAT
HYDROCODONE/CREAT UR: 188 NG/MG CREAT
HYDROMORPHONE/CREAT UR: 89 NG/MG CREAT
LEVEL OF DETECTION:: NORMAL
LORAZEPAM/CREAT UR: NOT DETECTED NG/MG CREAT
MDA/CREAT UR: NOT DETECTED NG/MG CREAT
MDMA/CREAT UR: NOT DETECTED NG/MG CREAT
MEPHOBARBITAL UR QL CFM: NOT DETECTED
METHADONE UR QL CFM: NEGATIVE
METHADONE/CREAT UR: NOT DETECTED NG/MG CREAT
METHAMPHET/CREAT UR: NOT DETECTED NG/MG CREAT
MIDAZOLAM/CREAT UR CFM: NOT DETECTED NG/MG CREAT
MORPHINE/CREAT UR: NOT DETECTED NG/MG CREAT
N-NORTRAMADOL/CREAT UR CFM: NOT DETECTED NG/MG CREAT
NARCOTICS UR: NEGATIVE
NORBUPRENORPHINE/CREAT UR: NOT DETECTED NG/MG CREAT
NORCODEINE/CREAT UR CFM: NOT DETECTED NG/MG CREAT
NORDIAZEPAM/CREAT UR: NOT DETECTED NG/MG CREAT
NORFENTANYL/CREAT UR: NOT DETECTED NG/MG CREAT
NORHYDROCODONE/CREAT UR: 2138 NG/MG CREAT
NORMORPHINE UR-MCNC: NOT DETECTED NG/MG CREAT
NOROXYCODONE/CREAT UR: NOT DETECTED NG/MG CREAT
NOROXYMORPHONE/CREAT UR CFM: NOT DETECTED NG/MG CREAT
O-NORTRAMADOL UR CFM-MCNC: NOT DETECTED NG/MG CREAT
OPIATES UR QL CFM: NORMAL
OXAZEPAM/CREAT UR: NOT DETECTED NG/MG CREAT
OXYCODONE UR QL CFM: NEGATIVE
OXYCODONE/CREAT UR: NOT DETECTED NG/MG CREAT
OXYMORPHONE/CREAT UR: NOT DETECTED NG/MG CREAT
PENTOBARB UR QL CFM: NOT DETECTED
PHENOBARB UR QL CFM: NOT DETECTED
SECOBARBITAL UR QL CFM: NOT DETECTED
SUFENTANIL/CREAT UR CFM: NOT DETECTED NG/MG CREAT
TAPENTADOL UR QL CFM: NEGATIVE
TAPENTADOL/CREAT UR: NOT DETECTED NG/MG CREAT
TEMAZEPAM/CREAT UR: NOT DETECTED NG/MG CREAT
THIOPENTAL UR QL CFM: NOT DETECTED
TRAMADOL UR QL CFM: NOT DETECTED NG/MG CREAT

## 2021-06-15 DIAGNOSIS — H92.03 ACUTE OTALGIA, BILATERAL: ICD-10-CM

## 2021-06-15 DIAGNOSIS — J01.10 ACUTE NON-RECURRENT FRONTAL SINUSITIS: ICD-10-CM

## 2021-06-15 DIAGNOSIS — J30.89 CHRONIC NON-SEASONAL ALLERGIC RHINITIS: Chronic | ICD-10-CM

## 2021-06-15 RX ORDER — FLUTICASONE PROPIONATE 50 MCG
SPRAY, SUSPENSION (ML) NASAL
Qty: 16 G | Refills: 11 | Status: SHIPPED | OUTPATIENT
Start: 2021-06-15 | End: 2021-11-30

## 2021-06-15 RX ORDER — FAMOTIDINE 40 MG/1
40 TABLET, FILM COATED ORAL DAILY
Qty: 30 TABLET | Refills: 0 | Status: SHIPPED | OUTPATIENT
Start: 2021-06-15 | End: 2021-06-16

## 2021-06-16 LAB
BH CV ECHO MEAS - ACS: 2 CM
BH CV ECHO MEAS - AI DEC SLOPE: 430 CM/SEC^2
BH CV ECHO MEAS - AI MAX PG: 66.6 MMHG
BH CV ECHO MEAS - AI MAX VEL: 408 CM/SEC
BH CV ECHO MEAS - AI P1/2T: 277.9 MSEC
BH CV ECHO MEAS - AO MAX PG (FULL): 7.3 MMHG
BH CV ECHO MEAS - AO MAX PG: 11 MMHG
BH CV ECHO MEAS - AO MEAN PG (FULL): 3 MMHG
BH CV ECHO MEAS - AO MEAN PG: 5 MMHG
BH CV ECHO MEAS - AO ROOT AREA (BSA CORRECTED): 1.9
BH CV ECHO MEAS - AO ROOT AREA: 6.6 CM^2
BH CV ECHO MEAS - AO ROOT DIAM: 2.9 CM
BH CV ECHO MEAS - AO V2 MAX: 166 CM/SEC
BH CV ECHO MEAS - AO V2 MEAN: 98 CM/SEC
BH CV ECHO MEAS - AO V2 VTI: 39.6 CM
BH CV ECHO MEAS - ASC AORTA: 2.7 CM
BH CV ECHO MEAS - AVA(I,A): 2.9 CM^2
BH CV ECHO MEAS - AVA(I,D): 2.9 CM^2
BH CV ECHO MEAS - AVA(V,A): 3.1 CM^2
BH CV ECHO MEAS - AVA(V,D): 3.1 CM^2
BH CV ECHO MEAS - BSA(HAYCOCK): 1.5 M^2
BH CV ECHO MEAS - BSA: 1.5 M^2
BH CV ECHO MEAS - BZI_BMI: 21 KILOGRAMS/M^2
BH CV ECHO MEAS - BZI_METRIC_HEIGHT: 157.5 CM
BH CV ECHO MEAS - BZI_METRIC_WEIGHT: 52.2 KG
BH CV ECHO MEAS - EDV(CUBED): 73 ML
BH CV ECHO MEAS - EDV(MOD-SP2): 47.2 ML
BH CV ECHO MEAS - EDV(MOD-SP4): 52.6 ML
BH CV ECHO MEAS - EDV(TEICH): 77.7 ML
BH CV ECHO MEAS - EF(CUBED): 69.6 %
BH CV ECHO MEAS - EF(MOD-SP2): 68.4 %
BH CV ECHO MEAS - EF(MOD-SP4): 76.6 %
BH CV ECHO MEAS - EF(TEICH): 61.6 %
BH CV ECHO MEAS - ESV(CUBED): 22.2 ML
BH CV ECHO MEAS - ESV(MOD-SP2): 14.9 ML
BH CV ECHO MEAS - ESV(MOD-SP4): 12.3 ML
BH CV ECHO MEAS - ESV(TEICH): 29.8 ML
BH CV ECHO MEAS - FS: 32.8 %
BH CV ECHO MEAS - IVS/LVPW: 0.59
BH CV ECHO MEAS - IVSD: 0.45 CM
BH CV ECHO MEAS - LA DIMENSION: 2.8 CM
BH CV ECHO MEAS - LA/AO: 0.97
BH CV ECHO MEAS - LV DIASTOLIC VOL/BSA (35-75): 34.8 ML/M^2
BH CV ECHO MEAS - LV MASS(C)D: 70 GRAMS
BH CV ECHO MEAS - LV MASS(C)DI: 46.3 GRAMS/M^2
BH CV ECHO MEAS - LV MAX PG: 3.7 MMHG
BH CV ECHO MEAS - LV MEAN PG: 2 MMHG
BH CV ECHO MEAS - LV SYSTOLIC VOL/BSA (12-30): 8.1 ML/M^2
BH CV ECHO MEAS - LV V1 MAX: 96.8 CM/SEC
BH CV ECHO MEAS - LV V1 MEAN: 68 CM/SEC
BH CV ECHO MEAS - LV V1 VTI: 21.8 CM
BH CV ECHO MEAS - LVIDD: 4.2 CM
BH CV ECHO MEAS - LVIDS: 2.8 CM
BH CV ECHO MEAS - LVLD AP2: 6.4 CM
BH CV ECHO MEAS - LVLD AP4: 7.1 CM
BH CV ECHO MEAS - LVLS AP2: 5.9 CM
BH CV ECHO MEAS - LVLS AP4: 6.2 CM
BH CV ECHO MEAS - LVOT AREA (M): 5.3 CM^2
BH CV ECHO MEAS - LVOT AREA: 5.3 CM^2
BH CV ECHO MEAS - LVOT DIAM: 2.6 CM
BH CV ECHO MEAS - LVPWD: 0.76 CM
BH CV ECHO MEAS - MR MAX PG: 26.6 MMHG
BH CV ECHO MEAS - MR MAX VEL: 258 CM/SEC
BH CV ECHO MEAS - MV A MAX VEL: 93.6 CM/SEC
BH CV ECHO MEAS - MV DEC SLOPE: 318 CM/SEC^2
BH CV ECHO MEAS - MV E MAX VEL: 85.9 CM/SEC
BH CV ECHO MEAS - MV E/A: 0.92
BH CV ECHO MEAS - MV MAX PG: 3.7 MMHG
BH CV ECHO MEAS - MV MEAN PG: 1 MMHG
BH CV ECHO MEAS - MV P1/2T MAX VEL: 81.7 CM/SEC
BH CV ECHO MEAS - MV P1/2T: 75.2 MSEC
BH CV ECHO MEAS - MV V2 MAX: 95.7 CM/SEC
BH CV ECHO MEAS - MV V2 MEAN: 50.3 CM/SEC
BH CV ECHO MEAS - MV V2 VTI: 23.8 CM
BH CV ECHO MEAS - MVA P1/2T LCG: 2.7 CM^2
BH CV ECHO MEAS - MVA(P1/2T): 2.9 CM^2
BH CV ECHO MEAS - MVA(VTI): 4.9 CM^2
BH CV ECHO MEAS - PA MAX PG: 2.9 MMHG
BH CV ECHO MEAS - PA MEAN PG: 1 MMHG
BH CV ECHO MEAS - PA V2 MAX: 84.5 CM/SEC
BH CV ECHO MEAS - PA V2 MEAN: 52.4 CM/SEC
BH CV ECHO MEAS - PA V2 VTI: 16.6 CM
BH CV ECHO MEAS - RVOT AREA: 5.3 CM^2
BH CV ECHO MEAS - RVOT DIAM: 2.6 CM
BH CV ECHO MEAS - SI(AO): 173.1 ML/M^2
BH CV ECHO MEAS - SI(CUBED): 33.7 ML/M^2
BH CV ECHO MEAS - SI(LVOT): 76.6 ML/M^2
BH CV ECHO MEAS - SI(MOD-SP2): 21.4 ML/M^2
BH CV ECHO MEAS - SI(MOD-SP4): 26.7 ML/M^2
BH CV ECHO MEAS - SI(TEICH): 31.7 ML/M^2
BH CV ECHO MEAS - SV(AO): 261.6 ML
BH CV ECHO MEAS - SV(CUBED): 50.8 ML
BH CV ECHO MEAS - SV(LVOT): 115.7 ML
BH CV ECHO MEAS - SV(MOD-SP2): 32.3 ML
BH CV ECHO MEAS - SV(MOD-SP4): 40.3 ML
BH CV ECHO MEAS - SV(TEICH): 47.9 ML
MAXIMAL PREDICTED HEART RATE: 157 BPM
STRESS TARGET HR: 133 BPM

## 2021-06-16 RX ORDER — FAMOTIDINE 40 MG/1
40 TABLET, FILM COATED ORAL DAILY
Qty: 90 TABLET | Refills: 0 | Status: SHIPPED | OUTPATIENT
Start: 2021-06-16 | End: 2021-09-15 | Stop reason: SDUPTHER

## 2021-06-22 ENCOUNTER — LAB (OUTPATIENT)
Dept: LAB | Facility: OTHER | Age: 61
End: 2021-06-22

## 2021-06-22 ENCOUNTER — OFFICE VISIT (OUTPATIENT)
Dept: GASTROENTEROLOGY | Facility: CLINIC | Age: 61
End: 2021-06-22

## 2021-06-22 VITALS
DIASTOLIC BLOOD PRESSURE: 87 MMHG | WEIGHT: 113.6 LBS | HEART RATE: 66 BPM | BODY MASS INDEX: 20.91 KG/M2 | SYSTOLIC BLOOD PRESSURE: 145 MMHG | HEIGHT: 62 IN

## 2021-06-22 DIAGNOSIS — K74.00 HEPATIC FIBROSIS: ICD-10-CM

## 2021-06-22 DIAGNOSIS — K21.00 GASTROESOPHAGEAL REFLUX DISEASE WITH ESOPHAGITIS WITHOUT HEMORRHAGE: ICD-10-CM

## 2021-06-22 DIAGNOSIS — R10.84 GENERALIZED ABDOMINAL PAIN: ICD-10-CM

## 2021-06-22 DIAGNOSIS — N28.9 ABNORMAL RENAL FUNCTION FINDING: ICD-10-CM

## 2021-06-22 DIAGNOSIS — R74.8 ELEVATED LIVER ENZYMES: ICD-10-CM

## 2021-06-22 DIAGNOSIS — K58.2 IRRITABLE BOWEL SYNDROME WITH BOTH CONSTIPATION AND DIARRHEA: ICD-10-CM

## 2021-06-22 DIAGNOSIS — K59.00 CONSTIPATION, UNSPECIFIED CONSTIPATION TYPE: Primary | ICD-10-CM

## 2021-06-22 LAB
ANION GAP SERPL CALCULATED.3IONS-SCNC: 6 MMOL/L (ref 5–15)
BUN SERPL-MCNC: 16 MG/DL (ref 7–23)
BUN/CREAT SERPL: 18 (ref 7–25)
CALCIUM SPEC-SCNC: 9.2 MG/DL (ref 8.4–10.2)
CHLORIDE SERPL-SCNC: 107 MMOL/L (ref 101–112)
CO2 SERPL-SCNC: 26 MMOL/L (ref 22–30)
CREAT SERPL-MCNC: 0.89 MG/DL (ref 0.52–1.04)
GFR SERPL CREATININE-BSD FRML MDRD: 64 ML/MIN/1.73 (ref 45–104)
GLUCOSE SERPL-MCNC: 116 MG/DL (ref 70–99)
POTASSIUM SERPL-SCNC: 4.6 MMOL/L (ref 3.4–5)
SODIUM SERPL-SCNC: 139 MMOL/L (ref 137–145)

## 2021-06-22 PROCEDURE — 99213 OFFICE O/P EST LOW 20 MIN: CPT | Performed by: PHYSICIAN ASSISTANT

## 2021-06-22 PROCEDURE — 80048 BASIC METABOLIC PNL TOTAL CA: CPT | Performed by: NURSE PRACTITIONER

## 2021-06-22 PROCEDURE — 36415 COLL VENOUS BLD VENIPUNCTURE: CPT | Performed by: NURSE PRACTITIONER

## 2021-06-22 RX ORDER — NALOXEGOL OXALATE 25 MG/1
25 TABLET, FILM COATED ORAL EVERY MORNING
Qty: 30 TABLET | Refills: 2 | Status: SHIPPED | OUTPATIENT
Start: 2021-06-22 | End: 2021-10-11

## 2021-06-26 DIAGNOSIS — R11.0 NAUSEA: ICD-10-CM

## 2021-06-26 DIAGNOSIS — E53.8 FOLATE DEFICIENCY: ICD-10-CM

## 2021-06-28 RX ORDER — UREA 10 %
800 LOTION (ML) TOPICAL DAILY
Qty: 90 TABLET | Refills: 1 | Status: SHIPPED | OUTPATIENT
Start: 2021-06-28

## 2021-06-28 RX ORDER — ONDANSETRON 4 MG/1
4 TABLET, ORALLY DISINTEGRATING ORAL EVERY 8 HOURS PRN
Qty: 30 TABLET | Refills: 5 | Status: SHIPPED | OUTPATIENT
Start: 2021-06-28

## 2021-06-28 RX ORDER — PROCHLORPERAZINE MALEATE 10 MG
10 TABLET ORAL EVERY 6 HOURS PRN
Qty: 360 TABLET | Refills: 1 | Status: SHIPPED | OUTPATIENT
Start: 2021-06-28 | End: 2023-01-16 | Stop reason: SDUPTHER

## 2021-06-29 ENCOUNTER — TELEPHONE (OUTPATIENT)
Dept: PULMONOLOGY | Facility: CLINIC | Age: 61
End: 2021-06-29

## 2021-06-29 NOTE — TELEPHONE ENCOUNTER
Per Dr. Martinez, patient notified that her Echo  has remain stable from the last one

## 2021-07-06 ENCOUNTER — TELEPHONE (OUTPATIENT)
Dept: FAMILY MEDICINE CLINIC | Facility: CLINIC | Age: 61
End: 2021-07-06

## 2021-07-06 DIAGNOSIS — G89.29 CHRONIC MIDLINE LOW BACK PAIN WITHOUT SCIATICA: ICD-10-CM

## 2021-07-06 DIAGNOSIS — M25.561 CHRONIC PAIN OF RIGHT KNEE: ICD-10-CM

## 2021-07-06 DIAGNOSIS — F41.1 GAD (GENERALIZED ANXIETY DISORDER): ICD-10-CM

## 2021-07-06 DIAGNOSIS — M54.50 CHRONIC MIDLINE LOW BACK PAIN WITHOUT SCIATICA: ICD-10-CM

## 2021-07-06 DIAGNOSIS — G89.29 CHRONIC PAIN OF RIGHT KNEE: ICD-10-CM

## 2021-07-06 PROBLEM — G43.019 INTRACTABLE MIGRAINE WITHOUT AURA AND WITHOUT STATUS MIGRAINOSUS: Status: ACTIVE | Noted: 2018-03-28

## 2021-07-06 RX ORDER — CLONAZEPAM 0.5 MG/1
0.5 TABLET ORAL DAILY PRN
Qty: 30 TABLET | Refills: 0 | Status: SHIPPED | OUTPATIENT
Start: 2021-07-06 | End: 2021-11-11 | Stop reason: SDUPTHER

## 2021-07-06 RX ORDER — CLONAZEPAM 0.5 MG/1
0.5 TABLET ORAL DAILY PRN
Qty: 3 TABLET | Refills: 0 | Status: SHIPPED | OUTPATIENT
Start: 2021-07-06 | End: 2021-07-06 | Stop reason: SDUPTHER

## 2021-07-06 RX ORDER — DEXLANSOPRAZOLE 60 MG/1
1 CAPSULE, DELAYED RELEASE ORAL DAILY
COMMUNITY
Start: 2021-06-17 | End: 2021-09-09

## 2021-07-06 RX ORDER — HYDROCODONE BITARTRATE AND ACETAMINOPHEN 10; 325 MG/1; MG/1
1 TABLET ORAL EVERY 8 HOURS PRN
Qty: 90 TABLET | Refills: 0 | Status: SHIPPED | OUTPATIENT
Start: 2021-07-06 | End: 2021-08-05 | Stop reason: SDUPTHER

## 2021-07-06 NOTE — TELEPHONE ENCOUNTER
Patient has chronic anxiety requiring benzodiazepine use concurrently with chronic pain requiring opiate pain medication and/ or gabapentin. Patient has been educated regarding potential dangers of oversedation and accidental overdose with use of both medications concurrently. Serial assessments of patient has yielded no sign of oversedation or adverse effects of patient, and he/she is advised and aware to notify my office immediately if symptoms do occur, as well as to discontinue use of benzodiazepine medication and opiate medication immediately if that occurs, pending medical evaluation and advice. Patient has been compliant with use of medications, UDS, visits with no adverse effects noted. Patient understands the risks associated with this controlled medication, including tolerance and addiction.  Patient also agrees to only obtain this medication from me, and not from a another provider, unless that provider is covering for me in my absence.  Patient also agrees to be compliant in dosing, and not self adjust the dose of medication.  A signed controlled substance agreement is on file, and the patient has received a controlled substance education sheet at this a previous visit.  The patient has also signed a consent for treatment with a controlled substance as per [facility] policy. KATE was obtained. Refills were sent for:   Hydrocodone and clonazepam.     This document has been electronically signed by UMESH Guzmán on July 6, 2021 10:59 CDT

## 2021-07-15 RX ORDER — LEVOTHYROXINE SODIUM 0.03 MG/1
TABLET ORAL
Qty: 15 TABLET | Refills: 1 | Status: SHIPPED | OUTPATIENT
Start: 2021-07-15 | End: 2021-09-15 | Stop reason: SDUPTHER

## 2021-07-16 RX ORDER — LEVOTHYROXINE SODIUM 0.03 MG/1
TABLET ORAL
Qty: 45 TABLET | OUTPATIENT
Start: 2021-07-16

## 2021-07-29 RX ORDER — LOSARTAN POTASSIUM 50 MG/1
TABLET ORAL
Qty: 30 TABLET | Refills: 5 | Status: SHIPPED | OUTPATIENT
Start: 2021-07-29 | End: 2021-08-13

## 2021-08-05 ENCOUNTER — TELEPHONE (OUTPATIENT)
Dept: FAMILY MEDICINE CLINIC | Facility: CLINIC | Age: 61
End: 2021-08-05

## 2021-08-05 DIAGNOSIS — M25.561 CHRONIC PAIN OF RIGHT KNEE: ICD-10-CM

## 2021-08-05 DIAGNOSIS — G89.29 CHRONIC MIDLINE LOW BACK PAIN WITHOUT SCIATICA: ICD-10-CM

## 2021-08-05 DIAGNOSIS — G89.29 CHRONIC PAIN OF RIGHT KNEE: ICD-10-CM

## 2021-08-05 DIAGNOSIS — M54.50 CHRONIC MIDLINE LOW BACK PAIN WITHOUT SCIATICA: ICD-10-CM

## 2021-08-05 RX ORDER — HYDROCODONE BITARTRATE AND ACETAMINOPHEN 10; 325 MG/1; MG/1
1 TABLET ORAL EVERY 8 HOURS PRN
Qty: 90 TABLET | Refills: 0 | Status: SHIPPED | OUTPATIENT
Start: 2021-08-05 | End: 2021-09-07 | Stop reason: SDUPTHER

## 2021-08-05 NOTE — TELEPHONE ENCOUNTER
Patient seen in office every 3 months for chronic pain requiring opiate pain medication. Compliant with medication, visits with no adverse effects noted. KATE and UDS current and appropriate. Patient called requesting scheduled refill at appropriate interval. Patient understands the risks associated with this controlled medication, including tolerance and addiction.  Patient also agrees to only obtain this medication from me, and not from a another provider, unless that provider is covering for me in my absence.  Patient also agrees to be compliant in dosing, and not self adjust the dose of medication.  A signed controlled substance agreement is on file, and the patient has received a controlled substance education sheet at this a previous visit.  The patient has also signed a consent for treatment with a controlled substance as per [facility] policy. KATE was obtained.   Refill sent for hydrocodone.     This document has been electronically signed by UMESH Guzmán on August 5, 2021 12:40 CDT

## 2021-08-12 ENCOUNTER — TELEMEDICINE (OUTPATIENT)
Dept: FAMILY MEDICINE CLINIC | Facility: CLINIC | Age: 61
End: 2021-08-12

## 2021-08-12 DIAGNOSIS — E78.5 HYPERLIPIDEMIA, UNSPECIFIED HYPERLIPIDEMIA TYPE: ICD-10-CM

## 2021-08-12 DIAGNOSIS — N90.9 LESION OF FEMALE PERINEUM: ICD-10-CM

## 2021-08-12 DIAGNOSIS — M67.431 GANGLION CYST OF DORSUM OF RIGHT WRIST: Primary | ICD-10-CM

## 2021-08-12 PROCEDURE — 99214 OFFICE O/P EST MOD 30 MIN: CPT | Performed by: NURSE PRACTITIONER

## 2021-08-12 RX ORDER — ATORVASTATIN CALCIUM 40 MG/1
40 TABLET, FILM COATED ORAL NIGHTLY
Qty: 90 TABLET | Refills: 1 | Status: SHIPPED | OUTPATIENT
Start: 2021-08-12 | End: 2022-02-11

## 2021-08-12 NOTE — PROGRESS NOTES
"Subjective   Tricia Loyola is a 61 y.o. female.   You have chosen to receive care through a telehealth visit.  Do you consent to use a video/audio connection for your medical care today? Yes  This was an audio and video enabled telemedicine encounter.        Chief Complaint   Patient presents with   • Cyst     right wrist   • personal stuff        History of Present Illness     Patient complains of worsening right wrist ganglion cyst, recurrence of a previous problems noted aver 5 years ago. Has been present this time for about 5 weeks and growing more painful and larger. Requests ortho consult for intervention.     Second complaint today is a painful/tender \"growth\" located between her rectum and her vagina. The thinks may be a hemorrhoid, but not sure it communicates with rectal opening.  Is also overdue for annual gyn exam, so is in agreement with referral to GYN who can determine if this is skin or rectal lesion. This is not viewable today due to video visit.   This has been present for about 10 days.     No other complaints today.     Parts of most recent relevant visit HPI, ROS  and PE may be carried forward and all are updated as appropriate for current situation.    Past Surgical History:   Procedure Laterality Date   • APPENDECTOMY     • BREAST BIOPSY Left    • CHOLECYSTECTOMY     • COLONOSCOPY N/A 11/26/2018    Procedure: COLONOSCOPY;  Surgeon: Meet Mcintyre MD;  Location: [facility] ENDOSCOPY;  Service: General   • CRANIOTOMY FOR ANEURYSM  2003   • ENDOSCOPY N/A 10/16/2019    Procedure: ESOPHAGOGASTRODUODENOSCOPY;  Surgeon: Kory Muhammad MD;  Location: [facility] ENDOSCOPY;  Service: Gastroenterology   • ENDOSCOPY N/A 3/16/2021    Procedure: ESOPHAGOGASTRODUODENOSCOPY;  Surgeon: Kory Muhammad MD;  Location: [facility] ENDOSCOPY;  Service: Gastroenterology;  Laterality: N/A;   • MASS EXCISION Right 11/5/2020    Procedure: EXCISE SOFT TISSUE MASS RIGHT POSTERIOR FLANK                 (latex allergy);  " Surgeon: Meet Mcintyre MD;  Location: [facility];  Service: General;  Laterality: Right;   • PAP SMEAR  08/16/2012   • TONSILLECTOMY     • UPPER GASTROINTESTINAL ENDOSCOPY  10/16/2019      Social History     Socioeconomic History   • Marital status: Legally      Spouse name: Not on file   • Number of children: Not on file   • Years of education: Not on file   • Highest education level: Not on file   Tobacco Use   • Smoking status: Current Some Day Smoker     Packs/day: 0.50     Years: 43.00     Pack years: 21.50     Types: Cigarettes   • Smokeless tobacco: Never Used   Vaping Use   • Vaping Use: Some days   • Substances: Nicotine, Flavoring   • Devices: Disposable   Substance and Sexual Activity   • Alcohol use: No   • Drug use: No   • Sexual activity: Defer      The following portions of the patient's history were reviewed and updated as appropriate: She  has a past medical history of Abdominal pain, Anemia, Anxiety and depression, Chronic post-traumatic headache, Depressive disorder, Disease of thyroid gland, Encounter for gynecological examination, Gastroesophageal reflux disease, Generalized anxiety disorder, History of mammogram (08/2008), Hyperlipidemia, Ingrown toenail, Injury of back, Leaky heart valve, Migraine, Nonruptured cerebral aneurysm, Osteoporosis, Posttraumatic stress disorder, PTSD (post-traumatic stress disorder), Seizure (CMS/HCC), Skin cancer, Viral hepatitis A, Wears dentures, and Wears glasses..    Review of Systems   Constitutional: Negative.    HENT: Positive for sinus pressure, sneezing and sore throat. Negative for congestion, ear discharge, ear pain, postnasal drip, rhinorrhea, trouble swallowing and voice change.    Eyes: Negative.  Negative for blurred vision, double vision, photophobia and visual disturbance.   Respiratory: Negative.  Negative for cough, choking, shortness of breath, wheezing and stridor.    Cardiovascular: Negative.  Negative for chest pain, palpitations  and leg swelling.   Gastrointestinal: Negative.  Negative for abdominal distention, abdominal pain, blood in stool, constipation, diarrhea, nausea, vomiting, GERD and indigestion.   Endocrine: Negative.  Negative for cold intolerance and heat intolerance.   Genitourinary: Negative.  Negative for dysuria, flank pain, frequency and urinary incontinence.   Musculoskeletal: Positive for arthralgias and back pain.   Skin: Positive for skin lesions.   Allergic/Immunologic: Negative.  Negative for immunocompromised state.   Neurological: Negative.    Hematological: Negative.    Psychiatric/Behavioral: Negative for agitation, behavioral problems, decreased concentration, dysphoric mood, hallucinations, self-injury, sleep disturbance, suicidal ideas, negative for hyperactivity, depressed mood and stress. The patient is nervous/anxious.    All other systems reviewed and are negative.    PHQ-9 Depression Screening  Little interest or pleasure in doing things?     Feeling down, depressed, or hopeless?     Trouble falling or staying asleep, or sleeping too much?     Feeling tired or having little energy?     Poor appetite or overeating?     Feeling bad about yourself - or that you are a failure or have let yourself or your family down?     Trouble concentrating on things, such as reading the newspaper or watching television?     Moving or speaking so slowly that other people could have noticed? Or the opposite - being so fidgety or restless that you have been moving around a lot more than usual?     Thoughts that you would be better off dead, or of hurting yourself in some way?     PHQ-9 Total Score     If you checked off any problems, how difficult have these problems made it for you to do your work, take care of things at home, or get along with other people?      Patient understands the risks associated with this controlled medication, including tolerance and addiction.  Patient also agrees to only obtain this medication from  me, and not from a another provider, unless that provider is covering for me in my absence.  Patient also agrees to be compliant in dosing, and not self adjust the dose of medication.  A signed controlled substance agreement is on file, and the patient has received a controlled substance education sheet at this a previous visit.  The patient has also signed a consent for treatment with a controlled substance as per [facility] policy. KATE was obtained.   Objective    There were no vitals filed for this visit.    Physical Exam  Constitutional:       General: She is not in acute distress.     Appearance: Normal appearance. She is well-developed. She is not diaphoretic.   HENT:      Head: Normocephalic and atraumatic.   Eyes:      General: No scleral icterus.        Right eye: No discharge.         Left eye: No discharge.      Conjunctiva/sclera: Conjunctivae normal.      Pupils: Pupils are equal, round, and reactive to light.   Neck:      Trachea: No tracheal deviation.   Pulmonary:      Effort: Pulmonary effort is normal. No respiratory distress.      Breath sounds: No wheezing.   Musculoskeletal:         General: No deformity. Normal range of motion.      Right wrist: Bony tenderness (mass right radial  side consistent with ganglion cyst) present.      Cervical back: Normal range of motion and neck supple.   Skin:     General: Skin is dry.      Coloration: Skin is not pale.      Findings: No erythema or rash.   Neurological:      Mental Status: She is alert and oriented to person, place, and time.      Cranial Nerves: No cranial nerve deficit.   Psychiatric:         Behavior: Behavior normal.         Thought Content: Thought content normal.       Revurrence, new episode of right radial ganglion cyst wrist, referral ortho.  New onset tender lesion of perineum, requires visual exam, due for pelvic, referral to GYN for both.     Assessment/Plan   Problems Addressed this Visit        Cardiac and Vasculature     Hyperlipidemia (Chronic)    Relevant Medications    atorvastatin (LIPITOR) 40 MG tablet      Other Visit Diagnoses     Ganglion cyst of dorsum of right wrist    -  Primary    Relevant Orders    Ambulatory Referral to Orthopedic Surgery    Ambulatory Referral to Gynecology    Lesion of female perineum        Relevant Orders    Ambulatory Referral to Gynecology      Diagnoses       Codes Comments    Ganglion cyst of dorsum of right wrist    -  Primary ICD-10-CM: M67.431  ICD-9-CM: 727.41     Hyperlipidemia, unspecified hyperlipidemia type     ICD-10-CM: E78.5  ICD-9-CM: 272.4     Lesion of female perineum     ICD-10-CM: N90.9  ICD-9-CM: 624.9         No follow-ups on file.               This document has been electronically signed by UMESH Guzmán on August 12, 2021 16:08 CDT

## 2021-08-13 ENCOUNTER — OFFICE VISIT (OUTPATIENT)
Dept: CARDIOLOGY | Facility: CLINIC | Age: 61
End: 2021-08-13

## 2021-08-13 VITALS
SYSTOLIC BLOOD PRESSURE: 142 MMHG | WEIGHT: 109 LBS | BODY MASS INDEX: 20.06 KG/M2 | DIASTOLIC BLOOD PRESSURE: 90 MMHG | TEMPERATURE: 96.9 F | HEIGHT: 62 IN | OXYGEN SATURATION: 99 % | HEART RATE: 69 BPM

## 2021-08-13 DIAGNOSIS — I10 BENIGN ESSENTIAL HTN: ICD-10-CM

## 2021-08-13 DIAGNOSIS — R00.2 PALPITATIONS: ICD-10-CM

## 2021-08-13 DIAGNOSIS — I35.1 AORTIC VALVE INSUFFICIENCY, ETIOLOGY OF CARDIAC VALVE DISEASE UNSPECIFIED: Primary | ICD-10-CM

## 2021-08-13 PROCEDURE — 99213 OFFICE O/P EST LOW 20 MIN: CPT | Performed by: INTERNAL MEDICINE

## 2021-08-13 RX ORDER — LOSARTAN POTASSIUM 100 MG/1
100 TABLET ORAL DAILY
Qty: 90 TABLET | Refills: 3 | Status: SHIPPED | OUTPATIENT
Start: 2021-08-13 | End: 2021-11-30

## 2021-08-13 NOTE — PROGRESS NOTES
Tricia Loyola  61 y.o. female    2021  Chief Complaint   Patient presents with   • Aortic valve insufficiency       History of Present Illness  Patient has history of aortic regurgitation    -        SUBJECTIVE  Patient is overall doing well.  She said she has feeling well with no shortness of air.  She had some palpitations which MCT showed no significant abnormality and she said actually went away after she had visited her  grandchild and [place].  During that time she cut down on her cigarettes due to the inability to smoke in their house.    She has seen urology and a CT scan showed greater than average atrophy.  She has a known aneurysm clipping done at [facility] in .  A MRI according to the neurology note is pending.  The patient did say she was told she had small vessel disease and another reason to decrease or stop smoking.  Allergies   Allergen Reactions   • Iodine Anaphylaxis   • Nitrofuran Derivatives Hives   • Toradol [Ketorolac Tromethamine] Anaphylaxis   • Bactrim [Sulfamethoxazole-Trimethoprim] Swelling     eyes   • Cleocin [Clindamycin Hcl] Hives and Swelling     Swelling of eyes and hives   • Augmentin [Amoxicillin-Pot Clavulanate] Hives   • Ciprofloxacin Rash   • Fiorinal [Butalbital-Aspirin-Caffeine] Palpitations   • Ibuprofen Rash   • Imitrex [Sumatriptan] Palpitations   • Latex Rash   • Other Rash     prego spaghetti sauce    Midrin causes tachycardia   • Ultram [Tramadol] Palpitations         Past Medical History:   Diagnosis Date   • Abdominal pain    • Anemia    • Anxiety and depression    • Chronic post-traumatic headache      rebound      • Depressive disorder    • Disease of thyroid gland    • Encounter for gynecological examination    • Gastroesophageal reflux disease    • Generalized anxiety disorder    • History of mammogram 2008    MAMMOGRAM DIAGNOSTIC BILATERAL 61109 ([facility]) (1)     • Hyperlipidemia    • Ingrown toenail    • Injury of back    • Leaky heart valve     • Migraine    • Nonruptured cerebral aneurysm    • Osteoporosis    • Posttraumatic stress disorder    • PTSD (post-traumatic stress disorder)    • Seizure (CMS/HCC)     last seizure approx 5 years.  no meds at this time   • Skin cancer     basal cell on back   • Viral hepatitis A    • Wears dentures     uppers only   • Wears glasses          Past Surgical History:   Procedure Laterality Date   • APPENDECTOMY     • BREAST BIOPSY Left    • CHOLECYSTECTOMY     • COLONOSCOPY N/A 11/26/2018    Procedure: COLONOSCOPY;  Surgeon: Meet Mcintyre MD;  Location: [facility] ENDOSCOPY;  Service: General   • CRANIOTOMY FOR ANEURYSM  2003   • ENDOSCOPY N/A 10/16/2019    Procedure: ESOPHAGOGASTRODUODENOSCOPY;  Surgeon: Kory Muhammad MD;  Location: [facility] ENDOSCOPY;  Service: Gastroenterology   • ENDOSCOPY N/A 3/16/2021    Procedure: ESOPHAGOGASTRODUODENOSCOPY;  Surgeon: Kory Muhammad MD;  Location: [facility] ENDOSCOPY;  Service: Gastroenterology;  Laterality: N/A;   • MASS EXCISION Right 11/5/2020    Procedure: EXCISE SOFT TISSUE MASS RIGHT POSTERIOR FLANK                 (latex allergy);  Surgeon: Meet Mcintyre MD;  Location: [facility] OR;  Service: General;  Laterality: Right;   • PAP SMEAR  08/16/2012   • TONSILLECTOMY     • UPPER GASTROINTESTINAL ENDOSCOPY  10/16/2019         Family History   Problem Relation Age of Onset   • Constipation Mother    • Hypertension Mother    • Anxiety disorder Mother    • Heart disease Mother    • Alcohol abuse Father    • Heart disease Father    • Anxiety disorder Brother    • Kidney disease Brother    • Hypertension Brother    • Arthritis Brother    • Anxiety disorder Brother    • Kidney disease Brother    • Diabetes Brother    • Anxiety disorder Brother    • Hypertension Brother    • Breast cancer Paternal Aunt    • Heart disease Maternal Grandmother    • Clotting disorder Maternal Grandmother    • Heart disease Maternal Grandfather          Social History     Socioeconomic History   •  Marital status: Legally      Spouse name: Not on file   • Number of children: Not on file   • Years of education: Not on file   • Highest education level: Not on file   Tobacco Use   • Smoking status: Current Some Day Smoker     Packs/day: 0.50     Years: 43.00     Pack years: 21.50     Types: Cigarettes   • Smokeless tobacco: Never Used   Vaping Use   • Vaping Use: Some days   • Substances: Nicotine, Flavoring   • Devices: Disposable   Substance and Sexual Activity   • Alcohol use: No   • Drug use: No   • Sexual activity: Defer         Prior to Admission medications    Medication Sig Start Date End Date Taking? Authorizing Provider   albuterol sulfate  (90 Base) MCG/ACT inhaler Inhale 2 puffs Every 4 (Four) Hours As Needed for Wheezing. 3/28/21  Yes Rosario Ceron APRN   amitriptyline (ELAVIL) 75 MG tablet Take  by mouth Every Night.   Yes Doris Alejo MD   aspirin 81 MG EC tablet Take 1 tablet by mouth Daily. 1/14/20  Yes Rosario Ceron APRN   atorvastatin (LIPITOR) 40 MG tablet Take 1 tablet by mouth Every Night. For cholesterol 8/12/21  Yes Rosario Ceron APRN   Botox 200 units reconstituted solution INJECT 200 UNITS INTO THE HEAD AND NECK MUSCLES EVERY 12 WEEKS 4/26/21  Yes Doris Alejo MD   busPIRone (BUSPAR) 30 MG tablet Take 1 tablet by mouth 2 (Two) Times a Day. 3/7/18  Yes Sonia Mata MD   clonazePAM (KlonoPIN) 0.5 MG tablet Take 1 tablet by mouth Daily As Needed for Anxiety. 7/6/21  Yes Rosario Ceron APRN   Cyanocobalamin 1000 MCG/ML kit Inject  as directed Every 30 (Thirty) Days.   Yes Doris Alejo MD   Dexilant 60 MG capsule Take 1 capsule by mouth Daily. 6/17/21  Yes Doris Alejo MD   famotidine (PEPCID) 40 MG tablet TAKE 1 TABLET BY MOUTH DAILY 6/16/21  Yes Johnny Smiley PA-C   fluticasone (FLONASE) 50 MCG/ACT nasal spray INSTILL 2 SPRAYS INTO THE NOSTRILS AS DIRECTED DAILY 6/15/21  Yes Rosario Ceron APRN  "  folic acid (FOLVITE) 800 MCG tablet Take 1 tablet by mouth Daily. 6/28/21  Yes Rosario Ceron APRN   HYDROcodone-acetaminophen (NORCO)  MG per tablet Take 1 tablet by mouth Every 8 (Eight) Hours As Needed for Moderate Pain . 8/5/21  Yes Ceron, UMESH Ponce   Hydrocortisone, Perianal, (PROCTOCORT) 1 % cream rectal cream Insert  into the rectum 2 (Two) Times a Day. 6/8/21  Yes Ceron, UMESH Ponce   levothyroxine (SYNTHROID, LEVOTHROID) 25 MCG tablet TAKE 1/2 TABLET BY MOUTH DAILY 7/15/21  Yes Ceron, UMESH Ponce   Linzess 290 MCG capsule capsule Take 290 mcg by mouth Every Morning Before Breakfast. 2/9/21  Yes ProviderDoris MD   losartan (COZAAR) 50 MG tablet TAKE 1 TABLET BY MOUTH DAILY 7/29/21  Yes Ramin Martinez MD   metoprolol succinate XL (TOPROL-XL) 100 MG 24 hr tablet Take 100 mg by mouth Every Evening.   Yes Provider, MD Doris   Naloxegol Oxalate (Movantik) 25 MG tablet Take 1 tablet by mouth Every Morning. 6/22/21  Yes Johnny Smiley PA-C   ondansetron ODT (ZOFRAN-ODT) 4 MG disintegrating tablet Place 1 tablet on the tongue Every 8 (Eight) Hours As Needed for Nausea. 6/28/21  Yes Ceron, UMESH Ponce   OXcarbazepine (TRILEPTAL) 150 MG tablet Take 150 mg by mouth Every Night. Daily at bedtime    Yes Provider, MD Doris   prochlorperazine (COMPAZINE) 10 MG tablet Take 1 tablet by mouth Every 6 (Six) Hours As Needed for Nausea or Vomiting. 6/28/21  Yes Rosario Ceron APRN   rOPINIRole (REQUIP) 3 MG tablet Take 1 tablet by mouth every night at bedtime. 3/15/21  Yes Osmany, UMESH Ponce   Syringe/Needle, Disp, 25G X 5/8\" 3 ML misc 1 each Every 28 (Twenty-Eight) Days. 9/22/20  Yes Rosario Ceron APRN   tamsulosin (FLOMAX) 0.4 MG capsule 24 hr capsule Take 1 capsule by mouth Daily. 12/11/20  Yes Ceron, UMESH Ponce   topiramate (TOPAMAX) 100 MG tablet Take 1 tablet by mouth 2 (Two) Times a Day.  Patient taking differently: Take 100 mg by " "mouth 2 (Two) Times a Day. Noon and 3 pm 5/28/20  Yes Ceron, Rosario Lowrysuha UMESH   ubrogepant tablet Take 50 mg by mouth 1 (One) Time As Needed (onset of migraine). 9/1/20  Yes Provider, MD Doris   azithromycin (ZITHROMAX) 250 MG tablet Take 2 tablets the first day, then 1 tablet daily for 4 days. 5/13/21   Ceron, UMESH Ponce         OBJECTIVE    /90   Pulse 69   Temp 96.9 °F (36.1 °C)   Ht 157.5 cm (62\")   Wt 49.4 kg (109 lb)   SpO2 99%   BMI 19.94 kg/m²         Review of Systems     Constitutional:  Denies recent weight loss, weight gain, fever or chills, no change in exercise tolerance     HENT:  Denies any hearing loss, epistaxis, hoarseness, or difficulty speaking.     Eyes: Wears eyeglasses or contact lenses     Respiratory:  Denies dyspnea with exertion,no cough, wheezing, or hemoptysis.     Cardiovascular: Negative for palpations, chest pain, orthopnea, PND, peripheral edema, syncope, or claudication.     Gastrointestinal:  Denies change in bowel habits, dyspepsia, ulcer disease, hematochezia, or melena.     Endocrine: Negative for cold intolerance, heat intolerance, polydipsia, polyphagia and polyuria. Denies any history of weight change, heat/cold intolerance, polydipsia, polyuria     Genitourinary: Negative.      Musculoskeletal: Denies any history of arthritic symptoms or back problems     Skin:  Denies any change in hair or nails, rashes, or skin lesions.     Allergic/Immunologic: Negative.  Negative for environmental allergies, food allergies and immunocompromised state.     Neurological:  Denies any history of recurrent headaches, strokes, TIA, or seizure disorder.     Hematological: Denies any food allergies, seasonal allergies, bleeding disorders, or lymphadenopathy.     Psychiatric/Behavioral: Denies any history of depression, substance abuse, or change in cognitive function.         Physical Exam     Constitutional: Cooperative, alert and oriented, well-developed, " well-nourished, in no acute distress.     HENT:   Head: Normocephalic, normal hair patterns, no masses or tenderness.  Ears, Nose, and Throat: No gross abnormalities. No pallor or cyanosis. Dentition good.   Eyes: EOMS intact, PERRL, conjunctivae and lids unremarkable. Fundoscopic exam and visual fields not performed.   Neck: No palpable masses or adenopathy, no thyromegaly, no JVD, carotid pulses are full and equal bilaterally and without  Bruits.     Cardiovascular: Regular rhythm, S1 and S2 normal, no S3 or S4. Apical impulse not displaced.  2/6 diastolic murmurs, no gallops, or rubs detected.     Pulmonary/Chest: Chest: normal symmetry, no tenderness to palpation, normal respiratory excursion, no intercostal retraction, no use of accessory muscles.            Pulmonary: Normal breath sounds. No rales or ronchi.    Abdominal: Abdomen soft, bowel sounds normoactive, no masses, no hepatosplenomegaly, non-tender, no bruits.     Musculoskeletal: No deformities, clubbing, cyanosis, erythema, or edema observed. There are no spinal abnormalities noted. Normal muscle strength and tone. Pulses full and equal in all extremities, no bruits auscultated.     Neurological: No gross motor or sensory deficits noted, affect appropriate, oriented to time, person, place.     Skin: Warm and dry to the touch, no apparent skin lesions or masses noted.     Psychiatric: She has a normal mood and affect. Her behavior is normal. Judgment and thought content normal.         Procedures      Lab Results   Component Value Date    WBC 9.03 06/08/2021    HGB 13.1 06/08/2021    HCT 39.0 06/08/2021    MCV 96.8 06/08/2021     06/08/2021     Lab Results   Component Value Date    GLUCOSE 116 (H) 06/22/2021    BUN 16 06/22/2021    CREATININE 0.89 06/22/2021    EGFRIFNONA 64 06/22/2021    BCR 18.0 06/22/2021    CO2 26.0 06/22/2021    CALCIUM 9.2 06/22/2021    ALBUMIN 4.20 06/08/2021    AST 50 (H) 06/08/2021    ALT 16 06/08/2021     Lab Results    Component Value Date    CHOL 161 06/08/2021    CHOL 156 08/18/2020    CHOL 214 (H) 08/02/2019     Lab Results   Component Value Date    TRIG 128 06/08/2021    TRIG 156 (H) 08/18/2020    TRIG 53 02/22/2020     Lab Results   Component Value Date    HDL 39 (L) 06/08/2021    HDL 39 (L) 08/18/2020    HDL 52 02/22/2020     No components found for: LDLCALC  Lab Results   Component Value Date    LDL 99 06/08/2021    LDL 86 08/18/2020     (H) 02/22/2020     No results found for: HDLLDLRATIO  No components found for: CHOLHDL  Lab Results   Component Value Date    HGBA1C 5.7 02/22/2020     Lab Results   Component Value Date    TSH 1.830 06/08/2021    D0TQYYG 84.2 06/08/2021    THYROIDAB 16 08/02/2019           ASSESSMENT AND PLAN      Diagnoses and all orders for this visit:    1. Aortic valve insufficiency, etiology of cardiac valve disease unspecified (Primary)    2. Palpitations    3. Benign essential HTN  Cardiac wise she is doing better.  Her palpitations are not as bad.  Her blood pressure is elevated and to help treat the aortic insufficiency better we will increase her Cozaar  from 50 to 100 mg daily.  Other orders  -     losartan (COZAAR) 100 MG tablet; Take 1 tablet by mouth Daily.  Dispense: 90 tablet; Refill: 3        Patient's Body mass index is 19.94 kg/m². indicating that she is within normal range (BMI 18.5-24.9). No BMI management plan needed..      Tricia Loyola  reports that she has been smoking cigarettes. She has a 21.50 pack-year smoking history. She has never used smokeless tobacco.. I have educated her on the risk of diseases from using tobacco products such as cancer, COPD and heart disease.     I advised her to quit and she is willing to quit. We have discussed the following method/s for tobacco cessation:  OTC Cessation Products.  Together we have set a quit date for Several months.  She will follow up with me in several months or sooner to check on her progress.    I spent 4.5 minutes  counseling the patient.               Ramin Martinez MD  8/13/2021  10:46 CDT

## 2021-08-31 ENCOUNTER — OFFICE VISIT (OUTPATIENT)
Dept: GASTROENTEROLOGY | Facility: CLINIC | Age: 61
End: 2021-08-31

## 2021-08-31 ENCOUNTER — OFFICE VISIT (OUTPATIENT)
Dept: OBSTETRICS AND GYNECOLOGY | Facility: CLINIC | Age: 61
End: 2021-08-31

## 2021-08-31 VITALS
HEIGHT: 62 IN | HEART RATE: 79 BPM | SYSTOLIC BLOOD PRESSURE: 118 MMHG | DIASTOLIC BLOOD PRESSURE: 65 MMHG | WEIGHT: 108 LBS | BODY MASS INDEX: 19.88 KG/M2

## 2021-08-31 VITALS
BODY MASS INDEX: 19.88 KG/M2 | SYSTOLIC BLOOD PRESSURE: 118 MMHG | HEIGHT: 62 IN | HEART RATE: 79 BPM | WEIGHT: 108 LBS | DIASTOLIC BLOOD PRESSURE: 65 MMHG

## 2021-08-31 DIAGNOSIS — K58.2 IRRITABLE BOWEL SYNDROME WITH BOTH CONSTIPATION AND DIARRHEA: ICD-10-CM

## 2021-08-31 DIAGNOSIS — R10.84 GENERALIZED ABDOMINAL PAIN: ICD-10-CM

## 2021-08-31 DIAGNOSIS — K21.00 GASTROESOPHAGEAL REFLUX DISEASE WITH ESOPHAGITIS WITHOUT HEMORRHAGE: ICD-10-CM

## 2021-08-31 DIAGNOSIS — K59.00 CONSTIPATION, UNSPECIFIED CONSTIPATION TYPE: Primary | ICD-10-CM

## 2021-08-31 DIAGNOSIS — R74.8 ELEVATED LIVER ENZYMES: ICD-10-CM

## 2021-08-31 DIAGNOSIS — R11.0 NAUSEA: ICD-10-CM

## 2021-08-31 DIAGNOSIS — K64.4 EXTERNAL HEMORRHOIDS: Primary | ICD-10-CM

## 2021-08-31 PROCEDURE — 99213 OFFICE O/P EST LOW 20 MIN: CPT | Performed by: NURSE PRACTITIONER

## 2021-08-31 PROCEDURE — 99213 OFFICE O/P EST LOW 20 MIN: CPT | Performed by: PHYSICIAN ASSISTANT

## 2021-09-01 NOTE — PROGRESS NOTES
"Subjective   Tricia Chayito Loyola is a 61 y.o. female.     History of Present Illness   Pt presents for concerns about a \"perineal lesion\". Pt is unsure if it is a cyst or a hemorrhoid. Present for 6 months. Seems to be enlarging. Irritated and painful with wiping, sometimes with sitting. Has hx of hemorrhoids requiring surgery approx 5 years ago and struggles with chronic constipation. Just saw GI today and is being started on Movantik. Denies it ever draining or bleeding. Denies blood in her stools or with wiping after BMs. She has tried hemorrhoid creams in the past without relief.     The following portions of the patient's history were reviewed and updated as appropriate: allergies, current medications, past family history, past medical history, past social history, past surgical history and problem list.    Review of Systems   Constitutional: Negative.  Negative for chills and fever.   Gastrointestinal: Positive for constipation and rectal pain. Negative for anal bleeding, blood in stool, diarrhea, nausea and vomiting.   Genitourinary: Negative for frequency, genital sores (possibly in the perineum ), hematuria, urgency, vaginal bleeding, vaginal discharge and vaginal pain.       Objective   Physical Exam  Vitals reviewed. Exam conducted with a chaperone present.   Constitutional:       Appearance: Normal appearance. She is normal weight.   Pulmonary:      Effort: Pulmonary effort is normal.   Genitourinary:     General: Normal vulva.      Labia:         Right: No rash, tenderness, lesion or injury.         Left: No rash, tenderness, lesion or injury.       Vagina: Normal.      Cervix: Normal.      Uterus: Normal.       Rectum: Tenderness and external hemorrhoid present.       Neurological:      Mental Status: She is alert and oriented to person, place, and time.           Assessment/Plan   Diagnoses and all orders for this visit:    1. External hemorrhoids (Primary)  -     witch hazel-glycerin (TUCKS) pad; Insert  " into the rectum As Needed for Hemorrhoids.  Dispense: 40 each; Refill: 1  -     Ambulatory Referral to General Surgery      I discussed findings of hemorrhoid on exam. There is nothing originating from the vagina that is of concern. I recommend she try Tucks pads for some relief. Pt wants a surgical consultation. Talked with GI provider DENNIS Smiley who suggested she be referred to Dr. Mcintyre for further evaluation and management PRN. Referral placed.     Pt is due for WWE. She will FU in the next couple of weeks for this. Pt agrees with this plan of care.

## 2021-09-02 RX ORDER — NALOXONE HYDROCHLORIDE 4 MG/.1ML
1 SPRAY NASAL AS NEEDED
Qty: 1 EACH | Refills: 2 | Status: SHIPPED | OUTPATIENT
Start: 2021-09-02 | End: 2021-09-15

## 2021-09-04 DIAGNOSIS — M25.561 CHRONIC PAIN OF RIGHT KNEE: ICD-10-CM

## 2021-09-04 DIAGNOSIS — G89.29 CHRONIC PAIN OF RIGHT KNEE: ICD-10-CM

## 2021-09-04 DIAGNOSIS — G89.29 CHRONIC MIDLINE LOW BACK PAIN WITHOUT SCIATICA: ICD-10-CM

## 2021-09-04 DIAGNOSIS — M54.50 CHRONIC MIDLINE LOW BACK PAIN WITHOUT SCIATICA: ICD-10-CM

## 2021-09-04 DIAGNOSIS — K64.9 HEMORRHOIDS, UNSPECIFIED HEMORRHOID TYPE: ICD-10-CM

## 2021-09-04 RX ORDER — HYDROCODONE BITARTRATE AND ACETAMINOPHEN 10; 325 MG/1; MG/1
1 TABLET ORAL EVERY 8 HOURS PRN
Qty: 90 TABLET | Refills: 0 | Status: CANCELLED | OUTPATIENT
Start: 2021-09-04

## 2021-09-06 DIAGNOSIS — G89.29 CHRONIC PAIN OF RIGHT KNEE: ICD-10-CM

## 2021-09-06 DIAGNOSIS — G89.29 CHRONIC MIDLINE LOW BACK PAIN WITHOUT SCIATICA: ICD-10-CM

## 2021-09-06 DIAGNOSIS — M25.561 CHRONIC PAIN OF RIGHT KNEE: ICD-10-CM

## 2021-09-06 DIAGNOSIS — M54.50 CHRONIC MIDLINE LOW BACK PAIN WITHOUT SCIATICA: ICD-10-CM

## 2021-09-06 RX ORDER — HYDROCODONE BITARTRATE AND ACETAMINOPHEN 10; 325 MG/1; MG/1
1 TABLET ORAL EVERY 8 HOURS PRN
Qty: 90 TABLET | Refills: 0 | Status: CANCELLED | OUTPATIENT
Start: 2021-09-06

## 2021-09-07 ENCOUNTER — TELEPHONE (OUTPATIENT)
Dept: FAMILY MEDICINE CLINIC | Facility: CLINIC | Age: 61
End: 2021-09-07

## 2021-09-07 DIAGNOSIS — M25.561 CHRONIC PAIN OF RIGHT KNEE: ICD-10-CM

## 2021-09-07 DIAGNOSIS — M54.50 CHRONIC MIDLINE LOW BACK PAIN WITHOUT SCIATICA: ICD-10-CM

## 2021-09-07 DIAGNOSIS — G89.29 CHRONIC PAIN OF RIGHT KNEE: ICD-10-CM

## 2021-09-07 DIAGNOSIS — G89.29 CHRONIC MIDLINE LOW BACK PAIN WITHOUT SCIATICA: ICD-10-CM

## 2021-09-07 RX ORDER — HYDROCORTISONE 10 MG/G
CREAM TOPICAL 2 TIMES DAILY
Qty: 28.4 G | Refills: 0 | Status: SHIPPED | OUTPATIENT
Start: 2021-09-07 | End: 2022-02-12 | Stop reason: SDUPTHER

## 2021-09-07 RX ORDER — HYDROCODONE BITARTRATE AND ACETAMINOPHEN 10; 325 MG/1; MG/1
1 TABLET ORAL EVERY 8 HOURS PRN
Qty: 90 TABLET | Refills: 0 | Status: SHIPPED | OUTPATIENT
Start: 2021-09-07 | End: 2021-09-24 | Stop reason: SDUPTHER

## 2021-09-07 NOTE — TELEPHONE ENCOUNTER
Incoming Refill Request      Medication requested (name and dose):   HYDROcodone-acetaminophen (NORCO)  MG per tablet         Pharmacy where request should be sent:   [facility]    Additional details provided by patient:   DUE TODAY     Best call back number:     Does the patient have less than a 3 day supply:  [x] Yes  [] No    Carmen Salamanca  09/07/21, 08:32 CDT

## 2021-09-07 NOTE — TELEPHONE ENCOUNTER
Patient seen in office every 3 months for chronic pain requiring opiate pain medication. Compliant with medication, visits with no adverse effects noted. KATE and UDS current and appropriate. Patient called requesting scheduled refill at appropriate interval. Patient understands the risks associated with this controlled medication, including tolerance and addiction.  Patient also agrees to only obtain this medication from me, and not from a another provider, unless that provider is covering for me in my absence.  Patient also agrees to be compliant in dosing, and not self adjust the dose of medication.  A signed controlled substance agreement is on file, and the patient has received a controlled substance education sheet at this a previous visit.  The patient has also signed a consent for treatment with a controlled substance as per [facility] policy. KATE was obtained.   Refill sent for hydrocodone.     This document has been electronically signed by UMESH Guzmán on September 7, 2021 11:23 CDT

## 2021-09-09 RX ORDER — DEXLANSOPRAZOLE 60 MG/1
1 CAPSULE, DELAYED RELEASE ORAL DAILY
Qty: 90 CAPSULE | Refills: 0 | Status: SHIPPED | OUTPATIENT
Start: 2021-09-09 | End: 2021-12-06

## 2021-09-15 ENCOUNTER — OFFICE VISIT (OUTPATIENT)
Dept: OBSTETRICS AND GYNECOLOGY | Facility: CLINIC | Age: 61
End: 2021-09-15

## 2021-09-15 ENCOUNTER — LAB (OUTPATIENT)
Dept: LAB | Facility: OTHER | Age: 61
End: 2021-09-15

## 2021-09-15 VITALS
WEIGHT: 108 LBS | HEART RATE: 69 BPM | HEIGHT: 62 IN | SYSTOLIC BLOOD PRESSURE: 110 MMHG | BODY MASS INDEX: 19.88 KG/M2 | DIASTOLIC BLOOD PRESSURE: 62 MMHG

## 2021-09-15 DIAGNOSIS — Z01.419 ENCOUNTER FOR GYNECOLOGICAL EXAMINATION WITH PAPANICOLAOU SMEAR OF CERVIX: Primary | ICD-10-CM

## 2021-09-15 DIAGNOSIS — Z12.31 SCREENING MAMMOGRAM, ENCOUNTER FOR: ICD-10-CM

## 2021-09-15 PROCEDURE — G0101 CA SCREEN;PELVIC/BREAST EXAM: HCPCS | Performed by: NURSE PRACTITIONER

## 2021-09-15 RX ORDER — FAMOTIDINE 40 MG/1
40 TABLET, FILM COATED ORAL DAILY
Qty: 90 TABLET | Refills: 0 | Status: SHIPPED | OUTPATIENT
Start: 2021-09-15 | End: 2021-12-06

## 2021-09-15 NOTE — PROGRESS NOTES
Subjective   Chief Complaint   Patient presents with   • Gynecologic Exam     WWEMELI     Tricia Loyola is a 61 y.o. year old  presenting to be seen for her annual exam.  Has no GYN complaints. Is struggling with sciatica and will FU with PCP regarding this.     She is not sexually active.  In the past 12 months there has not been new sexual partners.  Condoms are not typically used.  She would not like to be screened for STD's at today's exam.     She exercises regularly: no.  She wears her seat belt:yes.  She has concerns about domestic violence: no.  She is taking Vit D and Calcium:no  Last colonoscopy: 16  Last DEXA: 25, osteoporosis. On Reclast  Last MMG: 10/9/2020  Last PAP: 19  Hx of abnormal pap: no      NATURAL MENOPAUSE  LMP: early 40's     OB History        3    Para   2    Term   2            AB   1    Living   2       SAB   1    TAB        Ectopic        Molar        Multiple        Live Births                    No Additional Complaints Reported    The following portions of the patient's history were reviewed and updated as appropriate:problem list, current medications, allergies, past family history, past medical history, past social history and past surgical history.    Social History    Tobacco Use      Smoking status: Current Some Day Smoker        Packs/day: 0.50        Years: 43.00        Pack years: 21.5        Types: Cigarettes      Smokeless tobacco: Never Used    Review of Systems   Constitutional: Negative.  Negative for chills and fever.   Respiratory: Negative.    Cardiovascular: Negative.    Gastrointestinal: Positive for constipation (chronic) and rectal pain (hemorrhoids). Negative for abdominal distention, abdominal pain, nausea and vomiting.   Endocrine: Negative.    Genitourinary: Negative.  Negative for dyspareunia, dysuria, frequency, pelvic pain, vaginal bleeding, vaginal discharge and vaginal pain.   Musculoskeletal: Positive for back pain and  "gait problem.   Skin: Negative.    Neurological: Negative for dizziness, syncope, light-headedness and headaches.   Psychiatric/Behavioral: Negative for suicidal ideas.        Anxiety and PTSD well managed          Objective   /62   Pulse 69   Ht 157.5 cm (62\")   Wt 49 kg (108 lb)   Breastfeeding No   BMI 19.75 kg/m²     General:  well developed; well nourished  no acute distress  very thin   Skin:  No suspicious lesions seen   Cardiac: Heart sounds are normal.  Regular rate and rhythm without murmur, gallop or rub.   Resp:  Normal expansion.  Clear to auscultation.  No rales, rhonchi, or wheezing.   Thyroid: not examined   Breasts:  Examined in supine position  Symmetric without masses or skin dimpling  Nipples normal without inversion, lesions or discharge  There are no palpable axillary nodes  Fibrocystic changes are present both breasts without a discrete mass   Abdomen: soft, non-tender; no masses  no umbilical or inguinal hernias are present  no hepato-splenomegaly  Normal findings: bowel sounds normal   Psych: alert,oriented, in NAD with a full range of affect, normal behavior and no psychotic features   Pelvis: Clinical staff was present for exam  External genitalia:  normal appearance of the external genitalia including Bartholin's and [place]'s glands.  :  urethral meatus normal;  Vaginal:  atrophic mucosal changes are present;  Cervix:  normal appearance.  Uterus:  normal size, shape and consistency.  Adnexa:  normal bimanual exam of the adnexa.  Rectal:  digital rectal exam not performed; external hemorrhoids present;     Lab Review   CBC, CMP and TSH    Imaging  DEXA  Mammogram report       Diagnoses and all orders for this visit:    Encounter for gynecological examination with Papanicolaou smear of cervix  -     Liquid-based Pap Smear, Screening    Screening mammogram, encounter for  -     Mammo Screening Digital Tomosynthesis Bilateral With CAD; Future    Pap results: I will send card in " mail or call if abnormal. RTC q 2 years for well woman exams per Medicare guidelines.     SBE encouraged monthly. Pt is performing this. Hx of fibrocystic breasts. MMG recommended annually and pt is up to date but due after 10/9/21. Order placed and pt will schedule after today's visit.     DEXA up to date. She is taking Vit D and gets reclast infusions.     Colonoscopy is up to date.     She has had COVID vaccine and is up to date with labs and visit with PCP. She will follow up with PCP or UC for her back pain.     Keep appt with Dr. Astorga for hemorrhoid concerns.     This note was electronically signed.    Sarah Willett, APRN  September 15, 2021

## 2021-09-17 RX ORDER — LEVOTHYROXINE SODIUM 0.03 MG/1
12.5 TABLET ORAL DAILY
Qty: 15 TABLET | Refills: 1 | Status: SHIPPED | OUTPATIENT
Start: 2021-09-17 | End: 2021-11-18 | Stop reason: SDUPTHER

## 2021-09-20 LAB
LAB AP CASE REPORT: NORMAL
PATH INTERP SPEC-IMP: NORMAL

## 2021-09-21 DIAGNOSIS — M25.531 RIGHT WRIST PAIN: Primary | ICD-10-CM

## 2021-09-22 ENCOUNTER — OFFICE VISIT (OUTPATIENT)
Dept: ORTHOPEDIC SURGERY | Facility: CLINIC | Age: 61
End: 2021-09-22

## 2021-09-22 VITALS — WEIGHT: 108 LBS | BODY MASS INDEX: 19.88 KG/M2 | HEIGHT: 62 IN

## 2021-09-22 DIAGNOSIS — M25.531 RIGHT WRIST PAIN: Primary | ICD-10-CM

## 2021-09-22 DIAGNOSIS — M67.431 GANGLION CYST OF WRIST, RIGHT: ICD-10-CM

## 2021-09-22 PROCEDURE — 99214 OFFICE O/P EST MOD 30 MIN: CPT | Performed by: NURSE PRACTITIONER

## 2021-09-22 NOTE — PROGRESS NOTES
Answers for HPI/ROS submitted by the patient on 9/16/2021  Please describe your symptoms.: Cyst On Right Wrist  Have you had these symptoms before?: Yes  How long have you been having these symptoms?: Greater than 2 weeks  Please list any medications you are currently taking for this condition.: None  Please describe any probable cause for these symptoms. : Pain In Hand And In Arm  What is the primary reason for your visit?: Other    Tricia Loyola is a 61 y.o. female   Primary provider:  Rosario Ceron APRN       Chief Complaint   Patient presents with   • Right Wrist - Ganglion Cyst, Pain     HISTORY OF PRESENT ILLNESS:    61-year-old female patient presents to office for evaluation of chronic right wrist pain and a cystic mass to the right wrist that is painful and tender.  Onset occurred several months ago.  Patient states the cyst has grown larger and become more painful.  Patient states she had the cyst a few years ago and was evaluated/treated with aspiration of the cyst, which she states resolved her pain and symptoms for a few years but now it has returned.  Patient denies any known injury to her right wrist.  Pain is described as constant and mild in severity.  Pain is described as aching in nature with associated swelling and a tender mass/cyst to the volar aspect of her wrist.  Patient also reports some limitations with use and range of motion of the right wrist.  Pain is worse with movement/use of the right wrist.  Pain improves mildly with rest.  X-rays are performed in office today.  Pain scale today is 4/10.    Cyst  This is a chronic problem. The current episode started more than 1 month ago (Onset several months ago). The problem occurs intermittently. The problem has been gradually worsening. Associated symptoms include arthralgias, headaches and joint swelling. Associated symptoms comments: Aching pain right wrist with swelling and cystic mass to the volar wrist, limited ROM. Exacerbated  by: Movement/use of the right wrist. She has tried rest (Previous aspiration a few years ago with temporary improvement) for the symptoms. The treatment provided mild relief.     CONCURRENT MEDICAL HISTORY:    Past Medical History:   Diagnosis Date   • Abdominal pain    • Anemia    • Anxiety and depression    • Chronic post-traumatic headache      rebound      • Depressive disorder    • Disease of thyroid gland    • Encounter for gynecological examination    • Endometriosis    • Gastroesophageal reflux disease    • Generalized anxiety disorder    • History of mammogram 08/2008    MAMMOGRAM DIAGNOSTIC BILATERAL 11197 ([facility]) (1)     • Hyperlipidemia    • Ingrown toenail    • Injury of back    • Leaky heart valve    • Migraine    • Nonruptured cerebral aneurysm    • Osteoporosis    • Posttraumatic stress disorder    • PTSD (post-traumatic stress disorder)    • Seizure (CMS/HCC)     last seizure approx 5 years.  no meds at this time   • Skin cancer     basal cell on back   • Viral hepatitis A    • Wears dentures     uppers only   • Wears glasses        Allergies   Allergen Reactions   • Iodine Anaphylaxis   • Nitrofuran Derivatives Hives   • Toradol [Ketorolac Tromethamine] Anaphylaxis   • Bactrim [Sulfamethoxazole-Trimethoprim] Swelling     eyes   • Cleocin [Clindamycin Hcl] Hives and Swelling     Swelling of eyes and hives   • Augmentin [Amoxicillin-Pot Clavulanate] Hives   • Ciprofloxacin Rash   • Fiorinal [Butalbital-Aspirin-Caffeine] Palpitations   • Ibuprofen Rash   • Imitrex [Sumatriptan] Palpitations   • Latex Rash   • Other Rash     prego spaghetti sauce    Midrin causes tachycardia   • Ultram [Tramadol] Palpitations         Current Outpatient Medications:   •  albuterol sulfate  (90 Base) MCG/ACT inhaler, Inhale 2 puffs Every 4 (Four) Hours As Needed for Wheezing., Disp: 18 g, Rfl: 5  •  amitriptyline (ELAVIL) 75 MG tablet, Take  by mouth Every Night., Disp: , Rfl:   •  aspirin 81 MG EC tablet, Take 1  tablet by mouth Daily., Disp: 30 tablet, Rfl: 0  •  atorvastatin (LIPITOR) 40 MG tablet, Take 1 tablet by mouth Every Night. For cholesterol, Disp: 90 tablet, Rfl: 1  •  Botox 200 units reconstituted solution, INJECT 200 UNITS INTO THE HEAD AND NECK MUSCLES EVERY 12 WEEKS, Disp: , Rfl:   •  busPIRone (BUSPAR) 30 MG tablet, Take 1 tablet by mouth 2 (Two) Times a Day., Disp: 60 tablet, Rfl: 0  •  clonazePAM (KlonoPIN) 0.5 MG tablet, Take 1 tablet by mouth Daily As Needed for Anxiety., Disp: 30 tablet, Rfl: 0  •  Cyanocobalamin 1000 MCG/ML kit, Inject  as directed Every 30 (Thirty) Days., Disp: , Rfl:   •  cyclobenzaprine (FLEXERIL) 10 MG tablet, Take 1 tablet by mouth 3 (Three) Times a Day As Needed for Muscle Spasms., Disp: 15 tablet, Rfl: 0  •  Dexilant 60 MG capsule, TAKE 1 CAPSULE BY MOUTH DAILY, Disp: 90 capsule, Rfl: 0  •  famotidine (PEPCID) 40 MG tablet, Take 1 tablet by mouth Daily., Disp: 90 tablet, Rfl: 0  •  fluticasone (FLONASE) 50 MCG/ACT nasal spray, INSTILL 2 SPRAYS INTO THE NOSTRILS AS DIRECTED DAILY, Disp: 16 g, Rfl: 11  •  folic acid (FOLVITE) 800 MCG tablet, Take 1 tablet by mouth Daily., Disp: 90 tablet, Rfl: 1  •  HYDROcodone-acetaminophen (NORCO)  MG per tablet, Take 1 tablet by mouth Every 8 (Eight) Hours As Needed for Moderate Pain ., Disp: 90 tablet, Rfl: 0  •  Hydrocortisone, Perianal, (PROCTOCORT) 1 % cream rectal cream, Insert  into the rectum 2 (Two) Times a Day., Disp: 28.4 g, Rfl: 0  •  levothyroxine (SYNTHROID, LEVOTHROID) 25 MCG tablet, Take 0.5 tablets by mouth Daily., Disp: 15 tablet, Rfl: 1  •  Linzess 290 MCG capsule capsule, Take 290 mcg by mouth Every Morning Before Breakfast., Disp: , Rfl:   •  losartan (COZAAR) 100 MG tablet, Take 1 tablet by mouth Daily., Disp: 90 tablet, Rfl: 3  •  metoprolol succinate XL (TOPROL-XL) 100 MG 24 hr tablet, Take 100 mg by mouth Every Evening., Disp: , Rfl:   •  Naloxegol Oxalate (Movantik) 25 MG tablet, Take 1 tablet by mouth Every  "Morning., Disp: 30 tablet, Rfl: 2  •  ondansetron ODT (ZOFRAN-ODT) 4 MG disintegrating tablet, Place 1 tablet on the tongue Every 8 (Eight) Hours As Needed for Nausea., Disp: 30 tablet, Rfl: 5  •  OXcarbazepine (TRILEPTAL) 150 MG tablet, Take 150 mg by mouth Every Night. Daily at bedtime , Disp: , Rfl:   •  prochlorperazine (COMPAZINE) 10 MG tablet, Take 1 tablet by mouth Every 6 (Six) Hours As Needed for Nausea or Vomiting., Disp: 360 tablet, Rfl: 1  •  rOPINIRole (REQUIP) 3 MG tablet, Take 1 tablet by mouth every night at bedtime., Disp: 90 tablet, Rfl: 1  •  Syringe/Needle, Disp, 25G X 5/8\" 3 ML misc, 1 each Every 28 (Twenty-Eight) Days., Disp: 3 each, Rfl: 1  •  topiramate (TOPAMAX) 100 MG tablet, Take 1 tablet by mouth 2 (Two) Times a Day. (Patient taking differently: Take 100 mg by mouth 2 (Two) Times a Day. Noon and 3 pm), Disp: 180 tablet, Rfl: 1  •  ubrogepant tablet, Take 50 mg by mouth 1 (One) Time As Needed (onset of migraine)., Disp: , Rfl:   •  witch hazel-glycerin (TUCKS) pad, Insert  into the rectum As Needed for Hemorrhoids., Disp: 40 each, Rfl: 1    Current Facility-Administered Medications:   •  zoledronic acid (RECLAST) infusion 5 mg, 5 mg, Intravenous, Once, Ceron, UMESH Ponce    Past Surgical History:   Procedure Laterality Date   • APPENDECTOMY     • BREAST BIOPSY Left    • CHOLECYSTECTOMY     • COLONOSCOPY N/A 11/26/2018    Procedure: COLONOSCOPY;  Surgeon: Meet Mcintyre MD;  Location: [facility] ENDOSCOPY;  Service: General   • CRANIOTOMY FOR ANEURYSM  2003   • ENDOSCOPY N/A 10/16/2019    Procedure: ESOPHAGOGASTRODUODENOSCOPY;  Surgeon: Kory Muhammad MD;  Location: [facility] ENDOSCOPY;  Service: Gastroenterology   • ENDOSCOPY N/A 3/16/2021    Procedure: ESOPHAGOGASTRODUODENOSCOPY;  Surgeon: Kory Muhammad MD;  Location: [facility] ENDOSCOPY;  Service: Gastroenterology;  Laterality: N/A;   • MASS EXCISION Right 11/5/2020    Procedure: EXCISE SOFT TISSUE MASS RIGHT POSTERIOR FLANK         "         (latex allergy);  Surgeon: Meet Mcintyre MD;  Location: [facility];  Service: General;  Laterality: Right;   • PAP SMEAR  08/16/2012   • TONSILLECTOMY     • UPPER GASTROINTESTINAL ENDOSCOPY  10/16/2019       Family History   Problem Relation Age of Onset   • Constipation Mother    • Hypertension Mother    • Anxiety disorder Mother    • Heart disease Mother    • Osteoporosis Mother         Also Myself I   • Alcohol abuse Father    • Heart disease Father    • Anxiety disorder Brother    • Kidney disease Brother    • Hypertension Brother    • Arthritis Brother    • Anxiety disorder Brother    • Kidney disease Brother    • Diabetes Brother    • Anxiety disorder Brother    • Hypertension Brother    • Breast cancer Paternal Aunt    • Heart disease Maternal Grandmother    • Clotting disorder Maternal Grandmother         Multiple Blood Clots In The Legs   • Heart disease Maternal Grandfather         Social History     Socioeconomic History   • Marital status: Legally      Spouse name: Not on file   • Number of children: Not on file   • Years of education: Not on file   • Highest education level: Not on file   Tobacco Use   • Smoking status: Light Tobacco Smoker     Packs/day: 0.25     Years: 20.00     Pack years: 5.00     Types: Cigarettes, Electronic Cigarette   • Smokeless tobacco: Never Used   Vaping Use   • Vaping Use: Some days   • Substances: Nicotine, Flavoring   • Devices: Disposable   Substance and Sexual Activity   • Alcohol use: No   • Drug use: No   • Sexual activity: Not Currently     Partners: Male     Birth control/protection: Condom, Post-menopausal, None        Review of Systems   Constitutional: Negative.    HENT: Positive for hearing loss.    Eyes: Negative.    Respiratory: Negative.    Cardiovascular: Negative.    Gastrointestinal: Negative.    Endocrine: Negative.    Genitourinary: Negative.    Musculoskeletal: Positive for arthralgias and joint swelling.        Right wrist pain.   "Cystic mass to the volar right wrist.   Skin: Negative.    Allergic/Immunologic: Negative.    Neurological: Positive for headaches.   Hematological: Negative.    Psychiatric/Behavioral: Negative.        PHYSICAL EXAMINATION:       Ht 157.5 cm (62\")   Wt 49 kg (108 lb)   BMI 19.75 kg/m²     Physical Exam  Vitals reviewed.   Constitutional:       General: She is not in acute distress.     Appearance: She is well-developed. She is not ill-appearing.   HENT:      Head: Normocephalic.   Pulmonary:      Effort: Pulmonary effort is normal. No respiratory distress.   Abdominal:      General: There is no distension.      Palpations: Abdomen is soft.   Musculoskeletal:         General: Swelling (Right wrist) and tenderness (Right wrist) present. No deformity or signs of injury.   Skin:     General: Skin is warm and dry.      Capillary Refill: Capillary refill takes less than 2 seconds.      Findings: No erythema.   Neurological:      Mental Status: She is alert and oriented to person, place, and time.      GCS: GCS eye subscore is 4. GCS verbal subscore is 5. GCS motor subscore is 6.   Psychiatric:         Speech: Speech normal.         Behavior: Behavior normal.         Thought Content: Thought content normal.         Judgment: Judgment normal.         GAIT:     [x]  Normal  []  Antalgic    Assistive device: [x]  None  []  Walker     []  Crutches  []  Cane     []  Wheelchair  []  Stretcher    Right Hand Exam     Tenderness   Right hand tenderness location: volar wrist/ganglion cyst.    Range of Motion   The patient has normal right wrist ROM.     Muscle Strength   Right wrist normal muscle strength: deferred.    Other   Erythema: absent  Sensation: normal  Pulse: present    Comments:  A tender mass is present to the radial aspect of the volar wrist that is consistent with a ganglion cyst.  It is approximated to be 1.5 cm in size based on what is visible/palpable.  Overall good motion of the wrist joint.  Patient has " increased pain at the end range of flexion and extension.  No erythema.  Skin is intact.  Neurovascularly intact.            XR Wrist 3+ View Right    Result Date: 9/23/2021  Narrative: EXAM DESCRIPTION:  XR WRIST 3+ VW RIGHT CLINICAL HISTORY: 61 years  Female  PAIN, M25.531 Pain in right wrist COMPARISON: None available TECHNIQUE: Three views-AP, oblique, and lateral radiographs of the right wrist FINDINGS: There is no evidence of an acute fracture or malalignment. No articular abnormality is are identified. There is no significant soft tissue swelling.     Impression: 1. Unremarkable exam of the right wrist without evidence of an acute fracture or significant degenerative change. Electronically signed by:  Amita Angel MD  9/23/2021 11:23 AM CDT Workstation: [id number]    ASSESSMENT:    Diagnoses and all orders for this visit:    Right wrist pain  -     Miscellaneous DME    Ganglion cyst of wrist, right  -     Miscellaneous DME    PLAN    X-rays of the right wrist performed in office today reviewed with no acute findings noted.  No significant degenerative changes are noted.  Subjective complaints and physical exam are consistent with ganglion cyst of the right wrist.  The cyst is tender to palpation and estimated to be approximately 1-1/2 cm in size, although it may be larger as it is not fully visualized.  The cyst is located along the radial aspect of the volar wrist.  Patient reports that she has had this cyst aspirated a few years ago, which was successful for a few years but now the cyst has returned in the past few months and has been growing larger.  We discussed that I can aspirate the cyst again but the recurrence rate is very high and I am surprised that previous aspiration resolved her symptoms as long as it did.  Patient states today that she is not interested in repeat aspiration and wants to proceed with surgical excision.  She states the cyst has grown larger and is tender and it is limiting use of  "her right wrist for daily activities due to the pain.  This is her dominant hand.  We discussed that with the location of the ganglion cyst to be in along the volar wrist, the orthopedic surgeon I work with typically wants to proceed with MRI imaging for surgical planning.  However, the patient states she cannot have an MRI due to \"clips in her brain\".  We discussed that I will speak with the surgeon regarding diagnostic studies in preparation for surgical planning and what his preference would be.  She would also be limited with any type of CT contrast as she is allergic to iodine.  We discussed that our office will notify her of Dr. Vargas' recommendations regarding how to proceed for surgical planning.    Recommend a Velcro wrist splint for support and immobilization to help with her symptoms.  We attempted to fit her with one in office today but the patient was concerned about cost and requested a prescription to take to a medical supply company instead and this is provided today.  Recommend ice therapy to the right wrist as needed to minimize pain/swelling/inflammation.    Recommend Tylenol as needed for pain/discomfort.  Patient cannot take oral NSAIDs due to documented allergies to both Toradol and ibuprofen with severe reaction listed to Toradol of anaphylaxis.  Due to this reason, we will continue to avoid other oral NSAIDs.  Patient already takes Norco 10 mg 3 times daily for chronic pain as prescribed by her PCP.    Follow-up in office with Dr. Vargas in [place] for surgical consult regarding ganglion cyst excision.    EMR Dragon/Transciption Disclaimer: Some of this note may be an electronic transcription/translation of spoken language to printed text.  The electronic translation of spoken language may permit erroneous, or at times, nonsensical words or phrases to be inadvertently transcribed. Although I have reviewed the note for such errors, some may still exist.     Return for Follow-up with  " Sam for surgical consult.        This document has been electronically signed by UMESH Arzola on September 23, 2021 11:26 CDT      UMESH Arzola

## 2021-09-24 ENCOUNTER — OFFICE VISIT (OUTPATIENT)
Dept: FAMILY MEDICINE CLINIC | Facility: CLINIC | Age: 61
End: 2021-09-24

## 2021-09-24 VITALS
HEART RATE: 69 BPM | OXYGEN SATURATION: 99 % | WEIGHT: 108.5 LBS | DIASTOLIC BLOOD PRESSURE: 78 MMHG | TEMPERATURE: 98.6 F | HEIGHT: 62 IN | BODY MASS INDEX: 19.96 KG/M2 | RESPIRATION RATE: 16 BRPM | SYSTOLIC BLOOD PRESSURE: 127 MMHG

## 2021-09-24 DIAGNOSIS — G89.29 CHRONIC MIDLINE LOW BACK PAIN WITHOUT SCIATICA: ICD-10-CM

## 2021-09-24 DIAGNOSIS — M54.41 ACUTE RIGHT-SIDED LOW BACK PAIN WITH RIGHT-SIDED SCIATICA: Primary | ICD-10-CM

## 2021-09-24 DIAGNOSIS — E53.8 B12 DEFICIENCY: ICD-10-CM

## 2021-09-24 DIAGNOSIS — M54.50 CHRONIC MIDLINE LOW BACK PAIN WITHOUT SCIATICA: ICD-10-CM

## 2021-09-24 DIAGNOSIS — G89.29 CHRONIC PAIN OF RIGHT KNEE: ICD-10-CM

## 2021-09-24 DIAGNOSIS — M25.561 CHRONIC PAIN OF RIGHT KNEE: ICD-10-CM

## 2021-09-24 PROCEDURE — 99214 OFFICE O/P EST MOD 30 MIN: CPT | Performed by: NURSE PRACTITIONER

## 2021-09-24 RX ORDER — HYDROCODONE BITARTRATE AND ACETAMINOPHEN 10; 325 MG/1; MG/1
1 TABLET ORAL EVERY 6 HOURS PRN
Qty: 100 TABLET | Refills: 0 | Status: SHIPPED | OUTPATIENT
Start: 2021-09-24 | End: 2021-11-04 | Stop reason: SDUPTHER

## 2021-09-24 RX ORDER — PREDNISONE 20 MG/1
20 TABLET ORAL 2 TIMES DAILY
Qty: 14 TABLET | Refills: 0 | Status: SHIPPED | OUTPATIENT
Start: 2021-09-24 | End: 2021-10-01

## 2021-09-24 NOTE — PROGRESS NOTES
Subjective   Tricia Loyola is a 61 y.o. female.       Chief Complaint   Patient presents with   • Back Pain        History of Present Illness     Tricia is here today because she pulled her bryson sized bed out, moving it, and strained her right lower lumbar back with acute pain there radiating down right buttock to posterior upper right thigh like a toothache and constant. This was on 9/15/21. She went to urgent care for relief and they gave her a steroid injection and flexeril. This did give her some relief for a few days, (the steroid injection) but the flexeril while it helped, made her so nauseated she couldn't take them. She is here today because the pain will not stop. Wants to know if she could take 1/2 tablet of her usual hydrocodone (taken TID ) in the am when she gets up, as this is when the pain is at the worst. She hasnt done this, but is requesting.    No other complaints today.  Parts of most recent relevant visit HPI, ROS  and PE may be carried forward and all are updated as appropriate for current situation.    Past Surgical History:   Procedure Laterality Date   • APPENDECTOMY     • BREAST BIOPSY Left    • CHOLECYSTECTOMY     • COLONOSCOPY N/A 11/26/2018    Procedure: COLONOSCOPY;  Surgeon: Meet Mcintyre MD;  Location: [facility] ENDOSCOPY;  Service: General   • CRANIOTOMY FOR ANEURYSM  2003   • ENDOSCOPY N/A 10/16/2019    Procedure: ESOPHAGOGASTRODUODENOSCOPY;  Surgeon: Kory Muhammad MD;  Location: [facility] ENDOSCOPY;  Service: Gastroenterology   • ENDOSCOPY N/A 3/16/2021    Procedure: ESOPHAGOGASTRODUODENOSCOPY;  Surgeon: Kory Muhammad MD;  Location: [facility] ENDOSCOPY;  Service: Gastroenterology;  Laterality: N/A;   • MASS EXCISION Right 11/5/2020    Procedure: EXCISE SOFT TISSUE MASS RIGHT POSTERIOR FLANK                 (latex allergy);  Surgeon: Meet Mcintyre MD;  Location: [facility] OR;  Service: General;  Laterality: Right;   • PAP SMEAR  08/16/2012   • TONSILLECTOMY     • UPPER  GASTROINTESTINAL ENDOSCOPY  10/16/2019      Social History     Socioeconomic History   • Marital status: Legally      Spouse name: Not on file   • Number of children: Not on file   • Years of education: Not on file   • Highest education level: Not on file   Tobacco Use   • Smoking status: Light Tobacco Smoker     Packs/day: 0.25     Years: 20.00     Pack years: 5.00     Types: Cigarettes, Electronic Cigarette   • Smokeless tobacco: Never Used   Vaping Use   • Vaping Use: Some days   • Substances: Nicotine, Flavoring   • Devices: Disposable   Substance and Sexual Activity   • Alcohol use: No   • Drug use: No   • Sexual activity: Not Currently     Partners: Male     Birth control/protection: Condom, Post-menopausal, None      The following portions of the patient's history were reviewed and updated as appropriate: She  has a past medical history of Abdominal pain, Anemia, Anxiety and depression, Chronic post-traumatic headache, Depressive disorder, Disease of thyroid gland, Encounter for gynecological examination, Endometriosis, Gastroesophageal reflux disease, Generalized anxiety disorder, History of mammogram (08/2008), Hyperlipidemia, Ingrown toenail, Injury of back, Leaky heart valve, Migraine, Nonruptured cerebral aneurysm, Osteoporosis, Posttraumatic stress disorder, PTSD (post-traumatic stress disorder), Seizure (CMS/HCC), Skin cancer, Viral hepatitis A, Wears dentures, and Wears glasses..    Review of Systems   Constitutional: Negative.    HENT: Negative.  Negative for congestion.    Eyes: Negative.  Negative for blurred vision, double vision, photophobia and visual disturbance.   Respiratory: Negative.  Negative for cough, shortness of breath, wheezing and stridor.    Cardiovascular: Negative.  Negative for chest pain, palpitations and leg swelling.   Gastrointestinal: Negative.  Negative for abdominal distention, abdominal pain, blood in stool, constipation, diarrhea, nausea, vomiting, GERD and  indigestion.   Endocrine: Negative.  Negative for cold intolerance and heat intolerance.   Genitourinary: Negative.  Negative for dysuria, flank pain, frequency and urinary incontinence.   Musculoskeletal: Positive for back pain. Negative for arthralgias.   Skin: Negative.    Allergic/Immunologic: Negative.  Negative for immunocompromised state.   Neurological: Negative.    Hematological: Negative.    Psychiatric/Behavioral: Negative for agitation, behavioral problems, decreased concentration, dysphoric mood, hallucinations, self-injury, sleep disturbance, suicidal ideas, negative for hyperactivity, depressed mood and stress. The patient is nervous/anxious.    All other systems reviewed and are negative.    PHQ-9 Depression Screening  Little interest or pleasure in doing things?     Feeling down, depressed, or hopeless?     Trouble falling or staying asleep, or sleeping too much?     Feeling tired or having little energy?     Poor appetite or overeating?     Feeling bad about yourself - or that you are a failure or have let yourself or your family down?     Trouble concentrating on things, such as reading the newspaper or watching television?     Moving or speaking so slowly that other people could have noticed? Or the opposite - being so fidgety or restless that you have been moving around a lot more than usual?     Thoughts that you would be better off dead, or of hurting yourself in some way?     PHQ-9 Total Score     If you checked off any problems, how difficult have these problems made it for you to do your work, take care of things at home, or get along with other people?      Patient understands the risks associated with this controlled medication, including tolerance and addiction.  Patient also agrees to only obtain this medication from me, and not from a another provider, unless that provider is covering for me in my absence.  Patient also agrees to be compliant in dosing, and not self adjust the dose of  "medication.  A signed controlled substance agreement is on file, and the patient has received a controlled substance education sheet at this a previous visit.  The patient has also signed a consent for treatment with a controlled substance as per [facility] policy. KATE was obtained.    Objective    Vitals:    09/24/21 1006   BP: 127/78   BP Location: Left arm   Patient Position: Sitting   Cuff Size: Adult   Pulse: 69   Resp: 16   Temp: 98.6 °F (37 °C)   SpO2: 99%   Weight: 49.2 kg (108 lb 8 oz)   Height: 157.5 cm (62\")   PainSc:   7   PainLoc: Back       Physical Exam  Vitals and nursing note reviewed.   Constitutional:       General: She is not in acute distress.     Appearance: Normal appearance. She is well-developed and normal weight. She is not ill-appearing or diaphoretic.   HENT:      Head: Normocephalic and atraumatic.   Eyes:      General: No scleral icterus.        Right eye: No discharge.         Left eye: No discharge.      Conjunctiva/sclera: Conjunctivae normal.      Pupils: Pupils are equal, round, and reactive to light.   Neck:      Thyroid: No thyromegaly.      Vascular: No carotid bruit or JVD.      Trachea: No tracheal deviation.   Cardiovascular:      Rate and Rhythm: Normal rate and regular rhythm.      Pulses: Normal pulses.      Heart sounds: Normal heart sounds. No murmur heard.   No friction rub. No gallop.    Pulmonary:      Effort: Pulmonary effort is normal. No respiratory distress.      Breath sounds: Normal breath sounds. No stridor. No wheezing, rhonchi or rales.   Chest:      Chest wall: No tenderness.   Abdominal:      General: Bowel sounds are normal.      Palpations: Abdomen is soft.   Musculoskeletal:         General: No tenderness or deformity. Normal range of motion.      Cervical back: Normal range of motion and neck supple. No rigidity or tenderness.      Right lower leg: No edema.      Left lower leg: No edema.   Lymphadenopathy:      Cervical: No cervical adenopathy. "   Skin:     General: Skin is warm and dry.      Capillary Refill: Capillary refill takes 2 to 3 seconds.      Coloration: Skin is not jaundiced or pale.      Findings: No bruising, erythema, lesion or rash.   Neurological:      General: No focal deficit present.      Mental Status: She is alert and oriented to person, place, and time. Mental status is at baseline.      Motor: No weakness.      Gait: Gait normal.   Psychiatric:         Mood and Affect: Mood normal.         Behavior: Behavior normal.         Thought Content: Thought content normal.         Judgment: Judgment normal.       Acute right sided lumbar strain on 9/15/21 with sciatica R, unremitting. Increase in hydrocodone to allow 10 extra tablets this month for acute pain, and prednisone BID x 7 days prescribed as this is moderately steroid responsive, and she cannot take NSAIDS due to severe allergies.    Assessment/Plan   Diagnoses and all orders for this visit:    1. Acute right-sided low back pain with right-sided sciatica (Primary)  -     predniSONE (DELTASONE) 20 MG tablet; Take 1 tablet by mouth 2 (Two) Times a Day for 7 days.  Dispense: 14 tablet; Refill: 0    2. Chronic midline low back pain without sciatica  -     HYDROcodone-acetaminophen (NORCO)  MG per tablet; Take 1 tablet by mouth Every 6 (Six) Hours As Needed for Moderate Pain . Will require earlier refill than usual due to increase prob around 10/4/21  Dispense: 100 tablet; Refill: 0    3. Chronic pain of right knee  -     HYDROcodone-acetaminophen (NORCO)  MG per tablet; Take 1 tablet by mouth Every 6 (Six) Hours As Needed for Moderate Pain . Will require earlier refill than usual due to increase prob around 10/4/21  Dispense: 100 tablet; Refill: 0    4. B12 deficiency  -     Cyanocobalamin 1000 MCG/ML kit; Inject 1,000 mL as directed Every 30 (Thirty) Days.  Dispense: 1 kit; Refill: 5      Return in about 1 week (around 10/1/2021), or if symptoms worsen or fail to  improve.             This document has been electronically signed by UMESH Guzmán on September 24, 2021 10:56 CDT

## 2021-10-11 ENCOUNTER — TELEMEDICINE (OUTPATIENT)
Dept: FAMILY MEDICINE CLINIC | Facility: CLINIC | Age: 61
End: 2021-10-11

## 2021-10-11 DIAGNOSIS — J06.9 ACUTE URI OF MULTIPLE SITES: Primary | ICD-10-CM

## 2021-10-11 DIAGNOSIS — J30.9 CHRONIC ALLERGIC RHINITIS: ICD-10-CM

## 2021-10-11 PROCEDURE — 99214 OFFICE O/P EST MOD 30 MIN: CPT | Performed by: NURSE PRACTITIONER

## 2021-10-11 RX ORDER — AZITHROMYCIN 250 MG/1
TABLET, FILM COATED ORAL
Qty: 6 TABLET | Refills: 0 | Status: SHIPPED | OUTPATIENT
Start: 2021-10-11 | End: 2021-11-01

## 2021-10-11 RX ORDER — MONTELUKAST SODIUM 10 MG/1
10 TABLET ORAL NIGHTLY
Qty: 30 TABLET | Refills: 5 | Status: SHIPPED | OUTPATIENT
Start: 2021-10-11 | End: 2022-09-15

## 2021-10-11 RX ORDER — METOPROLOL SUCCINATE 100 MG/1
TABLET, EXTENDED RELEASE ORAL
Qty: 90 TABLET | Refills: 3 | Status: SHIPPED | OUTPATIENT
Start: 2021-10-11 | End: 2021-11-30

## 2021-10-11 RX ORDER — METHYLPREDNISOLONE 4 MG/1
TABLET ORAL
Qty: 1 EACH | Refills: 0 | Status: SHIPPED | OUTPATIENT
Start: 2021-10-11 | End: 2021-11-01

## 2021-10-11 NOTE — PROGRESS NOTES
Subjective   Tricia Loyola is a 61 y.o. female.   You have chosen to receive care through a telehealth visit.  Do you consent to use a video/audio connection for your medical care today? Yes  This was an audio and video enabled telemedicine encounter.        Chief Complaint   Patient presents with   • Allergies        History of Present Illness   Here today for 7 day history of allergy exacerbation with rhinorrhea, nasal congestion, sneezing, watery itchy eyes, and itchy, full ears. Reports maxillary sinus pressure and otalgia developed in past 3 days now thinks she has a sinus infection and maybe ear infections bilaterally. No fever or chills, increased nasal congestion and postnasal drainage. No other symptoms.     Parts of most recent relevant visit HPI, ROS  and PE may be carried forward and all are updated as appropriate for current situation.      Past Surgical History:   Procedure Laterality Date   • APPENDECTOMY     • BREAST BIOPSY Left    • CHOLECYSTECTOMY     • COLONOSCOPY N/A 11/26/2018    Procedure: COLONOSCOPY;  Surgeon: Meet Mcintyre MD;  Location: [facility] ENDOSCOPY;  Service: General   • CRANIOTOMY FOR ANEURYSM  2003   • ENDOSCOPY N/A 10/16/2019    Procedure: ESOPHAGOGASTRODUODENOSCOPY;  Surgeon: Kory Muhammad MD;  Location: [facility] ENDOSCOPY;  Service: Gastroenterology   • ENDOSCOPY N/A 3/16/2021    Procedure: ESOPHAGOGASTRODUODENOSCOPY;  Surgeon: Kory Muhammad MD;  Location: [facility] ENDOSCOPY;  Service: Gastroenterology;  Laterality: N/A;   • MASS EXCISION Right 11/5/2020    Procedure: EXCISE SOFT TISSUE MASS RIGHT POSTERIOR FLANK                 (latex allergy);  Surgeon: Meet Mcintyre MD;  Location: [facility] OR;  Service: General;  Laterality: Right;   • PAP SMEAR  08/16/2012   • TONSILLECTOMY     • UPPER GASTROINTESTINAL ENDOSCOPY  10/16/2019      Social History     Socioeconomic History   • Marital status: Legally    Tobacco Use   • Smoking status: Light Tobacco Smoker      Packs/day: 0.25     Years: 20.00     Pack years: 5.00     Types: Cigarettes, Electronic Cigarette   • Smokeless tobacco: Never Used   Vaping Use   • Vaping Use: Some days   • Substances: Nicotine, Flavoring   • Devices: Disposable   Substance and Sexual Activity   • Alcohol use: No   • Drug use: No   • Sexual activity: Not Currently     Partners: Male     Birth control/protection: Condom, Post-menopausal, None      The following portions of the patient's history were reviewed and updated as appropriate: allergies, current medications, past family history, past medical history, past social history, past surgical history and problem list.    Review of Systems   Constitutional: Negative.    HENT: Positive for congestion, postnasal drip, rhinorrhea, sinus pressure and sneezing. Negative for sore throat, swollen glands and trouble swallowing.    Eyes: Positive for itching. Negative for blurred vision, double vision, photophobia and visual disturbance.   Respiratory: Negative.  Negative for cough, shortness of breath, wheezing and stridor.    Cardiovascular: Negative.  Negative for chest pain, palpitations and leg swelling.   Gastrointestinal: Negative.  Negative for abdominal distention, abdominal pain, blood in stool, constipation, diarrhea, nausea, vomiting, GERD and indigestion.   Endocrine: Negative.  Negative for cold intolerance and heat intolerance.   Genitourinary: Negative.  Negative for dysuria, flank pain, frequency and urinary incontinence.   Musculoskeletal: Positive for back pain. Negative for arthralgias.   Skin: Negative.    Allergic/Immunologic: Positive for environmental allergies. Negative for immunocompromised state.   Neurological: Negative.    Hematological: Negative.    Psychiatric/Behavioral: Negative.  Negative for agitation, behavioral problems, decreased concentration, dysphoric mood, hallucinations, self-injury, sleep disturbance, suicidal ideas, negative for hyperactivity, depressed mood and  stress. The patient is not nervous/anxious.    All other systems reviewed and are negative.    PHQ-9 Depression Screening  Little interest or pleasure in doing things?     Feeling down, depressed, or hopeless?     Trouble falling or staying asleep, or sleeping too much?     Feeling tired or having little energy?     Poor appetite or overeating?     Feeling bad about yourself - or that you are a failure or have let yourself or your family down?     Trouble concentrating on things, such as reading the newspaper or watching television?     Moving or speaking so slowly that other people could have noticed? Or the opposite - being so fidgety or restless that you have been moving around a lot more than usual?     Thoughts that you would be better off dead, or of hurting yourself in some way?     PHQ-9 Total Score     If you checked off any problems, how difficult have these problems made it for you to do your work, take care of things at home, or get along with other people?      Patient understands the risks associated with this controlled medication, including tolerance and addiction.  Patient also agrees to only obtain this medication from me, and not from a another provider, unless that provider is covering for me in my absence.  Patient also agrees to be compliant in dosing, and not self adjust the dose of medication.  A signed controlled substance agreement is on file, and the patient has received a controlled substance education sheet at this a previous visit.  The patient has also signed a consent for treatment with a controlled substance as per [facility] policy. KATE was obtained.   Objective    There were no vitals filed for this visit.    Physical Exam  Vitals and nursing note reviewed.   Constitutional:       General: She is not in acute distress.     Appearance: Normal appearance. She is well-developed. She is ill-appearing. She is not diaphoretic.   HENT:      Head: Normocephalic and atraumatic.       Nose: Congestion and rhinorrhea present.   Eyes:      General: No scleral icterus.        Right eye: No discharge.         Left eye: No discharge.      Conjunctiva/sclera: Conjunctivae normal.      Pupils: Pupils are equal, round, and reactive to light.   Neck:      Trachea: No tracheal deviation.   Pulmonary:      Effort: Pulmonary effort is normal. No respiratory distress.      Breath sounds: No stridor. No wheezing.   Musculoskeletal:         General: No tenderness or deformity. Normal range of motion.      Cervical back: Normal range of motion and neck supple.   Skin:     General: Skin is dry.      Coloration: Skin is not pale.      Findings: No erythema or rash.   Neurological:      Mental Status: She is alert and oriented to person, place, and time. Mental status is at baseline.      Cranial Nerves: No cranial nerve deficit.   Psychiatric:         Mood and Affect: Mood normal.         Behavior: Behavior normal.         Thought Content: Thought content normal.         Judgment: Judgment normal.           Assessment/Plan   Diagnoses and all orders for this visit:    1. Acute URI of multiple sites (Primary)  -     methylPREDNISolone (MEDROL) 4 MG dose pack; Take as directed on package instructions.  Dispense: 1 each; Refill: 0  -     azithromycin (ZITHROMAX) 250 MG tablet; Take 2 tablets the first day, then 1 tablet daily for 4 days.  Dispense: 6 tablet; Refill: 0    2. Chronic allergic rhinitis  -     montelukast (SINGULAIR) 10 MG tablet; Take 1 tablet by mouth Every Night.  Dispense: 30 tablet; Refill: 5      Return in about 4 days (around 10/15/2021), or if symptoms worsen or fail to improve.               This document has been electronically signed by UMESH Guzmán on October 11, 2021 17:21 CDT

## 2021-10-12 ENCOUNTER — OFFICE VISIT (OUTPATIENT)
Dept: GASTROENTEROLOGY | Facility: CLINIC | Age: 61
End: 2021-10-12

## 2021-10-12 VITALS
BODY MASS INDEX: 19.32 KG/M2 | HEIGHT: 62 IN | SYSTOLIC BLOOD PRESSURE: 126 MMHG | WEIGHT: 105 LBS | HEART RATE: 66 BPM | DIASTOLIC BLOOD PRESSURE: 91 MMHG

## 2021-10-12 DIAGNOSIS — R53.83 MALAISE AND FATIGUE: ICD-10-CM

## 2021-10-12 DIAGNOSIS — R63.4 WEIGHT LOSS: ICD-10-CM

## 2021-10-12 DIAGNOSIS — R53.81 MALAISE AND FATIGUE: ICD-10-CM

## 2021-10-12 DIAGNOSIS — K58.2 IRRITABLE BOWEL SYNDROME WITH BOTH CONSTIPATION AND DIARRHEA: ICD-10-CM

## 2021-10-12 DIAGNOSIS — R10.33 PERIUMBILICAL ABDOMINAL PAIN: Primary | ICD-10-CM

## 2021-10-12 DIAGNOSIS — K21.00 GASTROESOPHAGEAL REFLUX DISEASE WITH ESOPHAGITIS WITHOUT HEMORRHAGE: ICD-10-CM

## 2021-10-12 PROCEDURE — 99214 OFFICE O/P EST MOD 30 MIN: CPT | Performed by: PHYSICIAN ASSISTANT

## 2021-10-12 NOTE — PATIENT INSTRUCTIONS
MyPlate from USDA    MyPlate is an outline of a general healthy diet based on the 2010 Dietary Guidelines for Americans, from the [place] Department of Agriculture (USDA). It sets guidelines for how much food you should eat from each food group based on your age, sex, and level of physical activity.  What are tips for following MyPlate?  To follow MyPlate recommendations:  · Eat a wide variety of fruits and vegetables, grains, and protein foods.  · Serve smaller portions and eat less food throughout the day.  · Limit portion sizes to avoid overeating.  · Enjoy your food.  · Get at least 150 minutes of exercise every week. This is about 30 minutes each day, 5 or more days per week.  It can be difficult to have every meal look like MyPlate. Think about MyPlate as eating guidelines for an entire day, rather than each individual meal.  Fruits and vegetables  · Make half of your plate fruits and vegetables.  · Eat many different colors of fruits and vegetables each day.  · For a 2,000 calorie daily food plan, eat:  ? 2½ cups of vegetables every day.  ? 2 cups of fruit every day.  · 1 cup is equal to:  ? 1 cup raw or cooked vegetables.  ? 1 cup raw fruit.  ? 1 medium-sized orange, apple, or banana.  ? 1 cup 100% fruit or vegetable juice.  ? 2 cups raw leafy greens, such as lettuce, spinach, or kale.  ? ½ cup dried fruit.  Grains  · One fourth of your plate should be grains.  · Make at least half of the grains you eat each day whole grains.  · For a 2,000 calorie daily food plan, eat 6 oz of grains every day.  · 1 oz is equal to:  ? 1 slice bread.  ? 1 cup cereal.  ? ½ cup cooked rice, cereal, or pasta.  Protein  · One fourth of your plate should be protein.  · Eat a wide variety of protein foods, including meat, poultry, fish, eggs, beans, nuts, and tofu.  · For a 2,000 calorie daily food plan, eat 5½ oz of protein every day.  · 1 oz is equal to:  ? 1 oz meat, poultry, or fish.  ? ¼ cup cooked beans.  ? 1 egg.  ? ½ oz nuts  or seeds.  ? 1 Tbsp peanut butter.  Dairy  · Drink fat-free or low-fat (1%) milk.  · Eat or drink dairy as a side to meals.  · For a 2,000 calorie daily food plan, eat or drink 3 cups of dairy every day.  · 1 cup is equal to:  ? 1 cup milk, yogurt, cottage cheese, or soy milk (soy beverage).  ? 2 oz processed cheese.  ? 1½ oz natural cheese.  Fats, oils, salt, and sugars  · Only small amounts of oils are recommended.  · Avoid foods that are high in calories and low in nutritional value (empty calories), like foods high in fat or added sugars.  · Choose foods that are low in salt (sodium). Choose foods that have less than 140 milligrams (mg) of sodium per serving.  · Drink water instead of sugary drinks. Drink enough water each day to keep your urine pale yellow.  Where to find support  · Work with your health care provider or a nutrition specialist (dietitian) to develop a customized eating plan that is right for you.  · Download an holly (mobile application) to help you track your daily food intake.  Where to find more information  · Go to ChooseMyPlate.gov for more information.  Summary  · MyPlate is a general guideline for healthy eating from the USDA. It is based on the 2010 Dietary Guidelines for Americans.  · In general, fruits and vegetables should take up ½ of your plate, grains should take up ¼ of your plate, and protein should take up ¼ of your plate.  This information is not intended to replace advice given to you by your health care provider. Make sure you discuss any questions you have with your health care provider.  Document Revised: 05/21/2020 Document Reviewed: 03/19/2018  [employer]vier Patient Education © 2021 [employer]  BMI for Adults  What is BMI?  Body mass index (BMI) is a number that is calculated from a person's weight and height. BMI can help estimate how much of a person's weight is composed of fat. BMI does not measure body fat directly. Rather, it is an alternative to procedures that  "directly measure body fat, which can be difficult and expensive.  BMI can help identify people who may be at higher risk for certain medical problems.  What are BMI measurements used for?  BMI is used as a screening tool to identify possible weight problems. It helps determine whether a person is obese, overweight, a healthy weight, or underweight.  BMI is useful for:  · Identifying a weight problem that may be related to a medical condition or may increase the risk for medical problems.  · Promoting changes, such as changes in diet and exercise, to help reach a healthy weight. BMI screening can be repeated to see if these changes are working.  How is BMI calculated?  BMI involves measuring your weight in relation to your height. Both height and weight are measured, and the BMI is calculated from those numbers. This can be done either in English ([place] or metric measurements. Note that charts and online BMI calculators are available to help you find your BMI quickly and easily without having to do these calculations yourself.  To calculate your BMI in English ([place] measurements:    1. Measure your weight in pounds (lb).  2. Multiply the number of pounds by 703.  ? For example, for a person who weighs 180 lb, multiply that number by 703, which equals 126,540.  3. Measure your height in inches. Then multiply that number by itself to get a measurement called \"inches squared.\"  ? For example, for a person who is 70 inches tall, the \"inches squared\" measurement is 70 inches x 70 inches, which equals 4,900 inches squared.  4. Divide the total from step 2 (number of lb x 703) by the total from step 3 (inches squared): 126,540 ÷ 4,900 = 25.8. This is your BMI.    To calculate your BMI in metric measurements:  1. Measure your weight in kilograms (kg).  2. Measure your height in meters (m). Then multiply that number by itself to get a measurement called \"meters squared.\"  ? For example, for a person who is 1.75 m tall, the " "\"meters squared\" measurement is 1.75 m x 1.75 m, which is equal to 3.1 meters squared.  3. Divide the number of kilograms (your weight) by the meters squared number. In this example: 70 ÷ 3.1 = 22.6. This is your BMI.  What do the results mean?  BMI charts are used to identify whether you are underweight, normal weight, overweight, or obese. The following guidelines will be used:  · Underweight: BMI less than 18.5.  · Normal weight: BMI between 18.5 and 24.9.  · Overweight: BMI between 25 and 29.9.  · Obese: BMI of 30 or above.  Keep these notes in mind:  · Weight includes both fat and muscle, so someone with a muscular build, such as an athlete, may have a BMI that is higher than 24.9. In cases like these, BMI is not an accurate measure of body fat.  · To determine if excess body fat is the cause of a BMI of 25 or higher, further assessments may need to be done by a health care provider.  · BMI is usually interpreted in the same way for men and women.  Where to find more information  For more information about BMI, including tools to quickly calculate your BMI, go to these websites:  · Centers for Disease Control and Prevention: www.cdc.gov  · American Heart Association: www.heart.org  · National Heart, Lung, and [employer] [place]: www.nhlbi.nih.gov  Summary  · Body mass index (BMI) is a number that is calculated from a person's weight and height.  · BMI may help estimate how much of a person's weight is composed of fat. BMI can help identify those who may be at higher risk for certain medical problems.  · BMI can be measured using English measurements or metric measurements.  · BMI charts are used to identify whether you are underweight, normal weight, overweight, or obese.  This information is not intended to replace advice given to you by your health care provider. Make sure you discuss any questions you have with your health care provider.  Document Revised: 09/09/2020 Document Reviewed: 07/17/2020  Chris " Patient Education © 2021 [employer]

## 2021-10-12 NOTE — PROGRESS NOTES
Chief Complaint   Patient presents with   • Constipation   • Irritable Bowel Syndrome   • Heartburn   • Abdominal Pain       ENDO PROCEDURE ORDERED:    Subjective    Tricia Loyola is a 61 y.o. female. she is here today for follow-up.    History of Present Illness    The patient is seen on a recheck of her GERD, IBS, and abdominal pain. Last seen 08/31/2021. She was given a trial on Movantik. She states it did not work. She has been taking Linzess 290 mcg as needed. She is mainly complaining of fatigue. She states she is getting burning pain in the mid to lower abdomen. She does not think it is a gas pain. She denies fever and chills. She is complaining of 7 out of 10 abdominal pain. She is having frequent nausea and has to take Zofran and Compazine regularly. She is on Dexilant and Pepcid for chronic heartburn. Weight is down 3 pounds since last visit. Last EGD showed esophagitis/gastritis 03/16/2021. Last colonoscopy showed a polyp 11/26/2018.     Laboratories 06/08/2021: Thyroid studies were okay. Folate, B12 and vitamin D were normal. Laboratory on 06/30/2021: CBC showed slight anemia with hemoglobin 12.4, hematocrit 38.6. Normal sed rate. CMP showed chloride 108, AST 53, otherwise normal.     A/P: Patient is moderately tender in the upper abdomen with increasing nausea, abdominal pain, constipation, and mild anemia. Would consider repeat endoscopic evaluation. I have recommended CT scan abdomen and pelvis given her severe complaints. Will check a CBC, CMP, YUKO, sed rate, CRP, amylase and lipase given her persistent complaints. Will plan followup in 2 weeks, further pending clinical course and the results of the above.       The following portions of the patient's history were reviewed and updated as appropriate:   Past Medical History:   Diagnosis Date   • Abdominal pain    • Anemia    • Anxiety and depression    • Chronic post-traumatic headache      rebound      • Depressive disorder    • Disease of thyroid  gland    • Encounter for gynecological examination    • Endometriosis    • Gastroesophageal reflux disease    • Generalized anxiety disorder    • History of mammogram 08/2008    MAMMOGRAM DIAGNOSTIC BILATERAL 49430 ([facility]) (1)     • Hyperlipidemia    • Ingrown toenail    • Injury of back    • Leaky heart valve    • Migraine    • Nonruptured cerebral aneurysm    • Osteoporosis    • Posttraumatic stress disorder    • Primary osteoarthritis of left knee    • Primary osteoarthritis of right knee    • PTSD (post-traumatic stress disorder)    • Seizure (HCC)     last seizure approx 5 years.  no meds at this time   • Skin cancer     basal cell on back   • Viral hepatitis A    • Wears dentures     uppers only   • Wears glasses      Past Surgical History:   Procedure Laterality Date   • APPENDECTOMY     • BREAST BIOPSY Left    • CHOLECYSTECTOMY     • COLONOSCOPY N/A 11/26/2018    Procedure: COLONOSCOPY;  Surgeon: Meet Mcintyre MD;  Location: [facility] ENDOSCOPY;  Service: General   • CRANIOTOMY FOR ANEURYSM  2003   • ENDOSCOPY N/A 10/16/2019    Procedure: ESOPHAGOGASTRODUODENOSCOPY;  Surgeon: Kory Muhammad MD;  Location: [facility] ENDOSCOPY;  Service: Gastroenterology   • ENDOSCOPY N/A 3/16/2021    Procedure: ESOPHAGOGASTRODUODENOSCOPY;  Surgeon: Kory Muhammad MD;  Location: [facility] ENDOSCOPY;  Service: Gastroenterology;  Laterality: N/A;   • MASS EXCISION Right 11/5/2020    Procedure: EXCISE SOFT TISSUE MASS RIGHT POSTERIOR FLANK                 (latex allergy);  Surgeon: Meet Mcintyre MD;  Location: [facility] OR;  Service: General;  Laterality: Right;   • PAP SMEAR  08/16/2012   • TONSILLECTOMY     • UPPER GASTROINTESTINAL ENDOSCOPY  10/16/2019     Family History   Problem Relation Age of Onset   • Constipation Mother    • Hypertension Mother    • Anxiety disorder Mother    • Heart disease Mother    • Osteoporosis Mother         Also Myself I   • Alcohol abuse Father    • Heart disease Father    • Anxiety disorder  Brother    • Kidney disease Brother    • Hypertension Brother    • Arthritis Brother    • Anxiety disorder Brother    • Kidney disease Brother    • Diabetes Brother    • Anxiety disorder Brother    • Hypertension Brother    • Breast cancer Paternal Aunt    • Heart disease Maternal Grandmother    • Clotting disorder Maternal Grandmother         Multiple Blood Clots In The Legs   • Heart disease Maternal Grandfather      OB History        3    Para   2    Term   2            AB   1    Living   2       SAB   1    IAB        Ectopic        Molar        Multiple        Live Births                  Allergies   Allergen Reactions   • Iodine Anaphylaxis   • Nitrofuran Derivatives Hives   • Toradol [Ketorolac Tromethamine] Anaphylaxis   • Bactrim [Sulfamethoxazole-Trimethoprim] Swelling     eyes   • Cleocin [Clindamycin Hcl] Hives and Swelling     Swelling of eyes and hives   • Augmentin [Amoxicillin-Pot Clavulanate] Hives   • Ciprofloxacin Rash   • Fiorinal [Butalbital-Aspirin-Caffeine] Palpitations   • Ibuprofen Rash   • Imitrex [Sumatriptan] Palpitations   • Latex Rash   • Other Rash     prego spaghetti sauce    Midrin causes tachycardia   • Ultram [Tramadol] Palpitations     Social History     Socioeconomic History   • Marital status: Legally    Tobacco Use   • Smoking status: Light Tobacco Smoker     Packs/day: 0.25     Years: 20.00     Pack years: 5.00     Types: Cigarettes, Electronic Cigarette   • Smokeless tobacco: Never Used   Vaping Use   • Vaping Use: Some days   • Substances: Nicotine, Flavoring   • Devices: Disposable   Substance and Sexual Activity   • Alcohol use: No   • Drug use: No   • Sexual activity: Not Currently     Partners: Male     Birth control/protection: Condom, Post-menopausal, None     Current Medications:  Prior to Admission medications    Medication Sig Start Date End Date Taking? Authorizing Provider   albuterol sulfate  (90 Base) MCG/ACT inhaler Inhale 2 puffs  Every 4 (Four) Hours As Needed for Wheezing. 3/28/21  Yes Rosario Ceron APRN   amitriptyline (ELAVIL) 75 MG tablet Take  by mouth Every Night.   Yes Provider, MD Doris   aspirin 81 MG EC tablet Take 1 tablet by mouth Daily. 1/14/20  Yes Rosario Ceron APRN   atorvastatin (LIPITOR) 40 MG tablet Take 1 tablet by mouth Every Night. For cholesterol 8/12/21  Yes Rosario Ceron APRN   azithromycin (ZITHROMAX) 250 MG tablet Take 2 tablets the first day, then 1 tablet daily for 4 days. 10/11/21  Yes Rosario Ceron APRN   Botox 200 units reconstituted solution INJECT 200 UNITS INTO THE HEAD AND NECK MUSCLES EVERY 12 WEEKS 4/26/21  Yes Provider, MD Doris   busPIRone (BUSPAR) 30 MG tablet Take 1 tablet by mouth 2 (Two) Times a Day. 3/7/18  Yes Sonia Mata MD   clonazePAM (KlonoPIN) 0.5 MG tablet Take 1 tablet by mouth Daily As Needed for Anxiety. 7/6/21  Yes Rosario Ceron APRN   Cyanocobalamin 1000 MCG/ML kit Inject 1,000 mL as directed Every 30 (Thirty) Days. 9/24/21  Yes CeronRosario APRN   Dexilant 60 MG capsule TAKE 1 CAPSULE BY MOUTH DAILY 9/9/21  Yes Rosario Ceron APRN   famotidine (PEPCID) 40 MG tablet Take 1 tablet by mouth Daily. 9/15/21  Yes Johnny Smiley PA-C   fluticasone (FLONASE) 50 MCG/ACT nasal spray INSTILL 2 SPRAYS INTO THE NOSTRILS AS DIRECTED DAILY 6/15/21  Yes Rosario Ceron APRN   folic acid (FOLVITE) 800 MCG tablet Take 1 tablet by mouth Daily. 6/28/21  Yes Rosario Ceron APRN   HYDROcodone-acetaminophen (NORCO)  MG per tablet Take 1 tablet by mouth Every 6 (Six) Hours As Needed for Moderate Pain . Will require earlier refill than usual due to increase prob around 10/4/21 9/24/21  Yes Rosario Ceron APRN   Hydrocortisone, Perianal, (PROCTOCORT) 1 % cream rectal cream Insert  into the rectum 2 (Two) Times a Day. 9/7/21  Yes Ceron, UMESH Ponce   levothyroxine (SYNTHROID, LEVOTHROID) 25 MCG tablet Take 0.5 tablets  "by mouth Daily. 9/17/21  Yes Ceron, UMESH Ponce   Linzess 290 MCG capsule capsule Take 290 mcg by mouth Every Morning Before Breakfast. 2/9/21  Yes Doris Alejo MD   losartan (COZAAR) 100 MG tablet Take 1 tablet by mouth Daily. 8/13/21  Yes Ramin Martinez MD   methylPREDNISolone (MEDROL) 4 MG dose pack Take as directed on package instructions. 10/11/21  Yes Ceron, UMESH Ponce   metoprolol succinate XL (TOPROL-XL) 100 MG 24 hr tablet TAKE 1 TABLET BY MOUTH DAILY 10/11/21  Yes Ramin Martinez MD   montelukast (SINGULAIR) 10 MG tablet Take 1 tablet by mouth Every Night. 10/11/21  Yes Ceron, UMESH Ponce   ondansetron ODT (ZOFRAN-ODT) 4 MG disintegrating tablet Place 1 tablet on the tongue Every 8 (Eight) Hours As Needed for Nausea. 6/28/21  Yes CeronRosario APRN   OXcarbazepine (TRILEPTAL) 150 MG tablet Take 150 mg by mouth Every Night. Daily at bedtime    Yes Provider, MD Doris   prochlorperazine (COMPAZINE) 10 MG tablet Take 1 tablet by mouth Every 6 (Six) Hours As Needed for Nausea or Vomiting. 6/28/21  Yes Ceron, UEMSH Ponce   rOPINIRole (REQUIP) 3 MG tablet Take 1 tablet by mouth every night at bedtime. 3/15/21  Yes Ceron, UMESH Ponce   Syringe/Needle, Disp, 25G X 5/8\" 3 ML misc 1 each Every 28 (Twenty-Eight) Days. 9/22/20  Yes Ceron, UMESH Ponce   topiramate (TOPAMAX) 100 MG tablet Take 1 tablet by mouth 2 (Two) Times a Day.  Patient taking differently: Take 100 mg by mouth 2 (Two) Times a Day. Noon and 3 pm 5/28/20  Yes CeronRosario APRN   ubrogepant tablet Take 50 mg by mouth 1 (One) Time As Needed (onset of migraine). 9/1/20  Yes Provider, Historical, MD   witch hazel-glycerin (TUCKS) pad Insert  into the rectum As Needed for Hemorrhoids. 8/31/21  Yes Sarah Willett, APRN     Review of Systems  Review of Systems       Objective    /91   Pulse 66   Ht 157.5 cm (62\")   Wt 47.6 kg (105 lb)   BMI 19.20 kg/m² "   Physical Exam  Vitals and nursing note reviewed.   Constitutional:       General: She is not in acute distress.     Appearance: She is well-developed.   HENT:      Head: Normocephalic and atraumatic.   Eyes:      Pupils: Pupils are equal, round, and reactive to light.   Cardiovascular:      Rate and Rhythm: Normal rate and regular rhythm.      Heart sounds: Normal heart sounds.   Pulmonary:      Effort: Pulmonary effort is normal.      Breath sounds: Normal breath sounds.   Abdominal:      General: Bowel sounds are normal. There is no distension or abdominal bruit.      Palpations: Abdomen is soft. Abdomen is not rigid. There is no shifting dullness or mass.      Tenderness: There is abdominal tenderness. There is no guarding or rebound.      Hernia: No hernia is present. There is no hernia in the ventral area.   Musculoskeletal:         General: Normal range of motion.      Cervical back: Normal range of motion.   Skin:     General: Skin is warm and dry.   Neurological:      Mental Status: She is alert and oriented to person, place, and time.   Psychiatric:         Behavior: Behavior normal.         Thought Content: Thought content normal.         Judgment: Judgment normal.       Assessment/Plan      1. Periumbilical abdominal pain    2. Irritable bowel syndrome with both constipation and diarrhea    3. Gastroesophageal reflux disease with esophagitis without hemorrhage    4. Weight loss    5. Malaise and fatigue    .   Diagnoses and all orders for this visit:    1. Periumbilical abdominal pain (Primary)  -     CBC & Differential  -     Comprehensive Metabolic Panel  -     Amylase  -     Lipase  -     Nuclear Antigen Antibody, IFA  -     C-reactive Protein  -     Sedimentation Rate  -     CT Abdomen Pelvis Without Contrast    2. Irritable bowel syndrome with both constipation and diarrhea  -     CBC & Differential  -     Comprehensive Metabolic Panel  -     Amylase  -     Lipase  -     Nuclear Antigen Antibody,  IFA  -     C-reactive Protein  -     Sedimentation Rate  -     CT Abdomen Pelvis Without Contrast    3. Gastroesophageal reflux disease with esophagitis without hemorrhage  -     CBC & Differential  -     Comprehensive Metabolic Panel  -     Amylase  -     Lipase  -     Nuclear Antigen Antibody, IFA  -     C-reactive Protein  -     Sedimentation Rate  -     CT Abdomen Pelvis Without Contrast    4. Weight loss  -     CBC & Differential  -     Comprehensive Metabolic Panel  -     Amylase  -     Lipase  -     Nuclear Antigen Antibody, IFA  -     C-reactive Protein  -     Sedimentation Rate  -     CT Abdomen Pelvis Without Contrast    5. Malaise and fatigue  -     CBC & Differential  -     Comprehensive Metabolic Panel  -     Amylase  -     Lipase  -     Nuclear Antigen Antibody, IFA  -     C-reactive Protein  -     Sedimentation Rate  -     CT Abdomen Pelvis Without Contrast        Orders placed during this encounter include:  Orders Placed This Encounter   Procedures   • CT Abdomen Pelvis Without Contrast     Order Specific Question:   Will Oral Contrast be needed for this procedure?     Answer:   Yes     Order Specific Question:   Release to patient     Answer:   Immediate   • Comprehensive Metabolic Panel     Order Specific Question:   Release to patient     Answer:   Immediate   • Amylase     Order Specific Question:   Release to patient     Answer:   Immediate   • Lipase     Order Specific Question:   Release to patient     Answer:   Immediate   • Nuclear Antigen Antibody, IFA     Order Specific Question:   Release to patient     Answer:   Immediate   • C-reactive Protein     Order Specific Question:   Release to patient     Answer:   Immediate   • Sedimentation Rate     Order Specific Question:   Release to patient     Answer:   Immediate   • CBC & Differential     Order Specific Question:   Manual Differential     Answer:   No       Medications prescribed:  No orders of the defined types were placed in this  encounter.      Requested Prescriptions      No prescriptions requested or ordered in this encounter       Review and/or summary of lab tests, radiology, procedures, medications. Review and summary of old records and obtaining of history. The risks and benefits of my recommendations, as well as other treatment options were discussed with the patient today. Questions were answered.    Follow-up: Return in about 2 weeks (around 10/26/2021), or if symptoms worsen or fail to improve.     * Surgery not found *      This document has been electronically signed by Johnny Smiley PA-C on October 25, 2021 18:15 CDT      Results for orders placed or performed in visit on 09/15/21   Liquid-based Pap Smear, Screening    Specimen: Cervix; ThinPrep Vial   Result Value Ref Range    Case Report       Gynecologic Cytology Report                       Case: [id number]                                  Authorizing Provider:  Sarah Willett    Collected:           09/15/2021 01:34 PM                                 UMESH Loza                                                                  Ordering Location:     [facility]   Received:            09/15/2021 03:20 PM                                 [facility]                                                         First Screen:          Victor Hugo Curry                                                              Specimen:    Sendout to [facility], Cervix                                                                     Interpretation See attached report    Results for orders placed or performed in visit on 06/22/21   Basic Metabolic Panel    Specimen: Blood   Result Value Ref Range    Glucose 116 (H) 70 - 99 mg/dL    BUN 16 7 - 23 mg/dL    Creatinine 0.89 0.52 - 1.04 mg/dL    Sodium 139 137 - 145 mmol/L    Potassium 4.6 3.4 - 5.0 mmol/L    Chloride 107 101 - 112 mmol/L    CO2 26.0 22.0 - 30.0 mmol/L    Calcium 9.2 8.4 - 10.2 mg/dL    eGFR Non African Amer  64 45 - 104 mL/min/1.73    BUN/Creatinine Ratio 18.0 7.0 - 25.0    Anion Gap 6.0 5.0 - 15.0 mmol/L   Results for orders placed or performed in visit on 04/30/21   Mobile Cardiac Outpatient Telemetry   Result Value Ref Range    Target HR (85%) 133 bpm    Max. Pred. HR (100%) 157 bpm   Results for orders placed or performed in visit on 03/31/21   [employer] Select 13 Discrete -    Specimen: Urine   Result Value Ref Range    Report Summary FINAL     Ethanol Screen Urine (Ref) Negative     Ethanol, Urine Not Detected g/dL    Amphetamine, Urine Qual Negative     Methamphetamine, Urine Not Detected ng/mg creat    Amphetamine, Urine Not Detected ng/mg creat    MDMA URINE Not Detected ng/mg creat    MDA Not Detected ng/mg creat    Benzodiazepine Screen, Urine Negative     DIAZEPAM URINE QUANT (REF) Not Detected ng/mg creat    Desmethyldiazepam Not Detected ng/mg creat    Oxazepam, urine Not Detected ng/mg creat    Temazepam, urine Not Detected ng/mg creat    ALPRAZOLAM Not Detected ng/mg creat    ALPHA-HYDROXYALPRAZOLAM UR, QT (REF) Not Detected ng/mg creat    DESALKLFLURAZEPAM UR QUANT (REF) Not Detected ng/mg creat    LORAZEPAM URINE QUANT (REF) Not Detected ng/mg creat    Alpha-hydroxytriazolam, Urine Not Detected ng/mg creat    Clonazepam Urine Not Detected ng/mg creat    7-Aminoclonazepam Urine Not Detected ng/mg creat    MIDAZOLAM UR, QUANT (REF) Not Detected ng/mg creat    Alpha-hydroxymidazolam, Urine Not Detected ng/mg creat    Flunitrazepam Not Detected ng/mg creat    DESMETHYLFLUNITRAZEPAM Not Detected ng/mg creat    Cocaine & Metabolite Negative     Cocaine Screen, Urine Not Detected ng/mg creat    Benzoylecgonine, Urine Not Detected ng/mg creat    Cocaethylene Ur Not Detected ng/mg creat    THC, Urine Negative     Carboxy THC, Urine Not Detected ng/mg creat    6-ACETYLMORPHINE Negative     6-acetylmorphine Not Detected ng/mg creat    Opiate Class +POSITIVE+     Codeine, Urine Not Detected ng/mg creat     Morphine, Urine Not Detected ng/mg creat    normorphine Not Detected ng/mg creat    Norcodeine Ur Not Detected ng/mg creat    Hydrocodone  ng/mg creat    Hydromorphone Urine 89 ng/mg creat    Dihydrocodeine, Urine 282 ng/mg creat    Opiates, Norhydrocodone, Urine 2138 ng/mg creat    Oxycodone Class Ur Negative     Oxycodone, Confirmation, Urine Not Detected ng/mg creat    Oxymorphone UR Not Detected ng/mg creat    Opiates, Noroxycodone, Urine Not Detected ng/mg creat    Noroxymorphone Not Detected ng/mg creat    Methadone Negative     Methadone, Quantitative Not Detected ng/mg creat    EDDP Not Detected ng/mg creat    Fentanyl and Analogues, Ur Negative     Fentanyl, Urine Not Detected ng/mg creat    Norfentanyl Urine Not Detected ng/mg creat    Sufentanil, Ur Not Detected ng/mg creat    Alfentanil, Ur Not Detected ng/mg creat    BUPRENORPHINE Negative     Buprenorphine, Urine Not Detected ng/mg creat    Norbuprenorphine Not Detected ng/mg creat    Tapentadol Negative     Tapentadol Ur Not Detected ng/mg creat    Opoidss other, UR Negative     Tramadol, Urine Not Detected ng/mg creat    O-desmethyltramadol, Ur Not Detected ng/mg creat    N-Desmethyltramadol, U Not Detected ng/mg creat    BARBITURATES, URINE Negative     Amobarbital, Urine Not Detected     Barbital Not Detected     Butabarbital, Ur Not Detected     Butalbital Screen, Urine Not Detected     Mephobarbital Urine Not Detected     Pentobarbital UR Not Detected     Phenobarbital Not Detected     Secobarbital, urine Not Detected     Thiopental, Ur Not Detected     Creatinine, Urine 92 mg/dL    Level of Detection: Comment    Vitamin B12 & Folate    Specimen: Blood   Result Value Ref Range    Folate >20.00 4.78 - 24.20 ng/mL    Vitamin B-12 528 211 - 946 pg/mL   CBC Auto Differential    Specimen: Blood   Result Value Ref Range    WBC 9.03 3.40 - 10.80 10*3/mm3    RBC 4.03 3.77 - 5.28 10*6/mm3    Hemoglobin 13.1 12.0 - 15.9 g/dL    Hematocrit 39.0 34.0 -  46.6 %    MCV 96.8 79.0 - 97.0 fL    MCH 32.5 26.6 - 33.0 pg    MCHC 33.6 31.5 - 35.7 g/dL    RDW 13.7 12.3 - 15.4 %    RDW-SD 47.1 37.0 - 54.0 fl    MPV 9.5 6.0 - 12.0 fL    Platelets 258 140 - 450 10*3/mm3    Neutrophil % 48.9 42.7 - 76.0 %    Lymphocyte % 36.4 19.6 - 45.3 %    Monocyte % 12.7 (H) 5.0 - 12.0 %    Eosinophil % 1.7 0.3 - 6.2 %    Basophil % 0.3 0.0 - 1.5 %    Neutrophils, Absolute 4.41 1.70 - 7.00 10*3/mm3    Lymphocytes, Absolute 3.29 (H) 0.70 - 3.10 10*3/mm3    Monocytes, Absolute 1.15 (H) 0.10 - 0.90 10*3/mm3    Eosinophils, Absolute 0.15 0.00 - 0.40 10*3/mm3    Basophils, Absolute 0.03 0.00 - 0.20 10*3/mm3     *Note: Due to a large number of results and/or encounters for the requested time period, some results have not been displayed. A complete set of results can be found in Results Review.

## 2021-10-18 ENCOUNTER — HOSPITAL ENCOUNTER (OUTPATIENT)
Dept: CT IMAGING | Facility: HOSPITAL | Age: 61
Discharge: HOME | End: 2021-10-18
Admitting: PHYSICIAN ASSISTANT

## 2021-10-18 PROCEDURE — 74176 CT ABD & PELVIS W/O CONTRAST: CPT

## 2021-10-26 ENCOUNTER — OFFICE VISIT (OUTPATIENT)
Dept: GASTROENTEROLOGY | Facility: CLINIC | Age: 61
End: 2021-10-26

## 2021-10-26 ENCOUNTER — CLINICAL SUPPORT (OUTPATIENT)
Dept: FAMILY MEDICINE CLINIC | Facility: CLINIC | Age: 61
End: 2021-10-26

## 2021-10-26 ENCOUNTER — LAB (OUTPATIENT)
Dept: LAB | Facility: OTHER | Age: 61
End: 2021-10-26

## 2021-10-26 VITALS
HEIGHT: 62 IN | HEART RATE: 73 BPM | BODY MASS INDEX: 19.88 KG/M2 | DIASTOLIC BLOOD PRESSURE: 68 MMHG | WEIGHT: 108 LBS | SYSTOLIC BLOOD PRESSURE: 129 MMHG

## 2021-10-26 DIAGNOSIS — K21.00 GASTROESOPHAGEAL REFLUX DISEASE WITH ESOPHAGITIS WITHOUT HEMORRHAGE: ICD-10-CM

## 2021-10-26 DIAGNOSIS — K58.2 IRRITABLE BOWEL SYNDROME WITH BOTH CONSTIPATION AND DIARRHEA: ICD-10-CM

## 2021-10-26 DIAGNOSIS — Z86.010 HISTORY OF COLON POLYPS: ICD-10-CM

## 2021-10-26 DIAGNOSIS — Z23 NEED FOR VACCINATION: ICD-10-CM

## 2021-10-26 DIAGNOSIS — R10.33 PERIUMBILICAL ABDOMINAL PAIN: Primary | ICD-10-CM

## 2021-10-26 DIAGNOSIS — R11.0 NAUSEA: ICD-10-CM

## 2021-10-26 DIAGNOSIS — R53.83 MALAISE AND FATIGUE: ICD-10-CM

## 2021-10-26 DIAGNOSIS — R53.81 MALAISE AND FATIGUE: ICD-10-CM

## 2021-10-26 DIAGNOSIS — Z80.0 FAMILY HISTORY OF COLON CANCER: ICD-10-CM

## 2021-10-26 DIAGNOSIS — R63.4 WEIGHT LOSS: ICD-10-CM

## 2021-10-26 LAB
ALBUMIN SERPL-MCNC: 4.5 G/DL (ref 3.5–5)
ALBUMIN/GLOB SERPL: 1.5 G/DL (ref 1.1–1.8)
ALP SERPL-CCNC: 65 U/L (ref 38–126)
ALT SERPL W P-5'-P-CCNC: 22 U/L
AMYLASE SERPL-CCNC: 72 U/L (ref 30–110)
ANION GAP SERPL CALCULATED.3IONS-SCNC: 8 MMOL/L (ref 5–15)
AST SERPL-CCNC: 61 U/L (ref 14–36)
BASOPHILS # BLD AUTO: 0.03 10*3/MM3 (ref 0–0.2)
BASOPHILS NFR BLD AUTO: 0.4 % (ref 0–1.5)
BILIRUB SERPL-MCNC: 0.2 MG/DL (ref 0.2–1.3)
BUN SERPL-MCNC: 15 MG/DL (ref 7–23)
BUN/CREAT SERPL: 16.7 (ref 7–25)
CALCIUM SPEC-SCNC: 9.3 MG/DL (ref 8.4–10.2)
CHLORIDE SERPL-SCNC: 106 MMOL/L (ref 101–112)
CO2 SERPL-SCNC: 24 MMOL/L (ref 22–30)
CREAT SERPL-MCNC: 0.9 MG/DL (ref 0.52–1.04)
DEPRECATED RDW RBC AUTO: 48.8 FL (ref 37–54)
EOSINOPHIL # BLD AUTO: 0.06 10*3/MM3 (ref 0–0.4)
EOSINOPHIL NFR BLD AUTO: 0.9 % (ref 0.3–6.2)
ERYTHROCYTE [DISTWIDTH] IN BLOOD BY AUTOMATED COUNT: 14.2 % (ref 12.3–15.4)
ERYTHROCYTE [SEDIMENTATION RATE] IN BLOOD: 8 MM/HR (ref 0–20)
GFR SERPL CREATININE-BSD FRML MDRD: 63 ML/MIN/1.73 (ref 45–104)
GLOBULIN UR ELPH-MCNC: 3.1 GM/DL (ref 2.3–3.5)
GLUCOSE SERPL-MCNC: 94 MG/DL (ref 70–99)
HCT VFR BLD AUTO: 39.4 % (ref 34–46.6)
HGB BLD-MCNC: 12.8 G/DL (ref 12–15.9)
LYMPHOCYTES # BLD AUTO: 2.8 10*3/MM3 (ref 0.7–3.1)
LYMPHOCYTES NFR BLD AUTO: 42 % (ref 19.6–45.3)
MCH RBC QN AUTO: 31.4 PG (ref 26.6–33)
MCHC RBC AUTO-ENTMCNC: 32.5 G/DL (ref 31.5–35.7)
MCV RBC AUTO: 96.6 FL (ref 79–97)
MONOCYTES # BLD AUTO: 0.62 10*3/MM3 (ref 0.1–0.9)
MONOCYTES NFR BLD AUTO: 9.3 % (ref 5–12)
NEUTROPHILS NFR BLD AUTO: 3.16 10*3/MM3 (ref 1.7–7)
NEUTROPHILS NFR BLD AUTO: 47.4 % (ref 42.7–76)
PLATELET # BLD AUTO: 267 10*3/MM3 (ref 140–450)
PMV BLD AUTO: 9.4 FL (ref 6–12)
POTASSIUM SERPL-SCNC: 4.2 MMOL/L (ref 3.4–5)
PROT SERPL-MCNC: 7.6 G/DL (ref 6.3–8.6)
RBC # BLD AUTO: 4.08 10*6/MM3 (ref 3.77–5.28)
SODIUM SERPL-SCNC: 138 MMOL/L (ref 137–145)
WBC # BLD AUTO: 6.67 10*3/MM3 (ref 3.4–10.8)

## 2021-10-26 PROCEDURE — 90686 IIV4 VACC NO PRSV 0.5 ML IM: CPT | Performed by: NURSE PRACTITIONER

## 2021-10-26 PROCEDURE — 85025 COMPLETE CBC W/AUTO DIFF WBC: CPT | Performed by: INTERNAL MEDICINE

## 2021-10-26 PROCEDURE — 36415 COLL VENOUS BLD VENIPUNCTURE: CPT | Performed by: PHYSICIAN ASSISTANT

## 2021-10-26 PROCEDURE — 80053 COMPREHEN METABOLIC PANEL: CPT | Performed by: INTERNAL MEDICINE

## 2021-10-26 PROCEDURE — G0008 ADMIN INFLUENZA VIRUS VAC: HCPCS | Performed by: NURSE PRACTITIONER

## 2021-10-26 PROCEDURE — 83690 ASSAY OF LIPASE: CPT | Performed by: PHYSICIAN ASSISTANT

## 2021-10-26 PROCEDURE — 99214 OFFICE O/P EST MOD 30 MIN: CPT | Performed by: PHYSICIAN ASSISTANT

## 2021-10-26 PROCEDURE — 86140 C-REACTIVE PROTEIN: CPT | Performed by: PHYSICIAN ASSISTANT

## 2021-10-26 PROCEDURE — 86038 ANTINUCLEAR ANTIBODIES: CPT | Performed by: PHYSICIAN ASSISTANT

## 2021-10-26 PROCEDURE — 82150 ASSAY OF AMYLASE: CPT | Performed by: INTERNAL MEDICINE

## 2021-10-26 PROCEDURE — 85651 RBC SED RATE NONAUTOMATED: CPT | Performed by: INTERNAL MEDICINE

## 2021-10-26 RX ORDER — SODIUM, POTASSIUM,MAG SULFATES 17.5-3.13G
SOLUTION, RECONSTITUTED, ORAL ORAL
Qty: 354 ML | Refills: 0 | Status: SHIPPED | OUTPATIENT
Start: 2021-10-26 | End: 2022-03-25

## 2021-10-26 RX ORDER — DEXTROSE AND SODIUM CHLORIDE 5; .45 G/100ML; G/100ML
30 INJECTION, SOLUTION INTRAVENOUS CONTINUOUS PRN
Status: CANCELLED | OUTPATIENT
Start: 2021-10-26

## 2021-10-27 LAB
CRP SERPL-MCNC: <0.3 MG/DL (ref 0–0.5)
LIPASE SERPL-CCNC: 14 U/L (ref 13–60)

## 2021-10-29 ENCOUNTER — OFFICE VISIT (OUTPATIENT)
Dept: ORTHOPEDIC SURGERY | Facility: CLINIC | Age: 61
End: 2021-10-29

## 2021-10-29 VITALS — HEIGHT: 62 IN | BODY MASS INDEX: 20.06 KG/M2 | WEIGHT: 109 LBS

## 2021-10-29 DIAGNOSIS — M67.431 GANGLION CYST OF VOLAR ASPECT OF RIGHT WRIST: Primary | ICD-10-CM

## 2021-10-29 DIAGNOSIS — K21.00 GASTROESOPHAGEAL REFLUX DISEASE WITH ESOPHAGITIS WITHOUT HEMORRHAGE: ICD-10-CM

## 2021-10-29 DIAGNOSIS — M25.531 RIGHT WRIST PAIN: ICD-10-CM

## 2021-10-29 DIAGNOSIS — Z86.79 HISTORY OF CEREBRAL ANEURYSM: ICD-10-CM

## 2021-10-29 DIAGNOSIS — J44.9 CHRONIC OBSTRUCTIVE PULMONARY DISEASE, UNSPECIFIED COPD TYPE (HCC): ICD-10-CM

## 2021-10-29 LAB — ANA TITR SER IF: NEGATIVE {TITER}

## 2021-10-29 PROCEDURE — 99214 OFFICE O/P EST MOD 30 MIN: CPT | Performed by: ORTHOPAEDIC SURGERY

## 2021-10-29 NOTE — PROGRESS NOTES
Tricia Loyola is a 61 y.o. female returns for     Chief Complaint   Patient presents with   • Right Wrist - Follow-up, Ganglion Cyst       HISTORY OF PRESENT ILLNESS:  F/u right wrist ganglion cyst, patient would like to discuss surgery options.   Patient has had a long history of a ganglion cyst on the volar aspect of her right wrist.  She has pain with activity.  She has tried wearing a brace.  It has intermittently gotten smaller but has been consistently present and painful recently.  No numbness or tingling.  She does have difficulty using her arm to take care of her parents and to perform activities of daily living.       CONCURRENT MEDICAL HISTORY:    Past Medical History:   Diagnosis Date   • Abdominal pain    • Anemia    • Anxiety and depression    • Chronic post-traumatic headache      rebound      • Depressive disorder    • Disease of thyroid gland    • Encounter for gynecological examination    • Endometriosis    • Gastroesophageal reflux disease    • Generalized anxiety disorder    • History of mammogram 08/2008    MAMMOGRAM DIAGNOSTIC BILATERAL 96597 ([facility]) (1)     • Hyperlipidemia    • Ingrown toenail    • Injury of back    • Leaky heart valve    • Migraine    • Nonruptured cerebral aneurysm    • Osteoporosis    • Posttraumatic stress disorder    • Primary osteoarthritis of left knee    • Primary osteoarthritis of right knee    • PTSD (post-traumatic stress disorder)    • Seizure (HCC)     last seizure approx 5 years.  no meds at this time   • Skin cancer     basal cell on back   • Viral hepatitis A    • Wears dentures     uppers only   • Wears glasses        Allergies   Allergen Reactions   • Iodine Anaphylaxis   • Nitrofuran Derivatives Hives   • Toradol [Ketorolac Tromethamine] Anaphylaxis   • Bactrim [Sulfamethoxazole-Trimethoprim] Swelling     eyes   • Cleocin [Clindamycin Hcl] Hives and Swelling     Swelling of eyes and hives   • Augmentin [Amoxicillin-Pot Clavulanate] Hives   • Ciprofloxacin  Rash   • Fiorinal [Butalbital-Aspirin-Caffeine] Palpitations   • Ibuprofen Rash   • Imitrex [Sumatriptan] Palpitations   • Latex Rash   • Other Rash     prego spaghetti sauce    Midrin causes tachycardia   • Ultram [Tramadol] Palpitations       Current Outpatient Medications on File Prior to Visit   Medication Sig   • albuterol sulfate  (90 Base) MCG/ACT inhaler Inhale 2 puffs Every 4 (Four) Hours As Needed for Wheezing.   • amitriptyline (ELAVIL) 75 MG tablet Take  by mouth Every Night.   • aspirin 81 MG EC tablet Take 1 tablet by mouth Daily.   • atorvastatin (LIPITOR) 40 MG tablet Take 1 tablet by mouth Every Night. For cholesterol   • azithromycin (ZITHROMAX) 250 MG tablet Take 2 tablets the first day, then 1 tablet daily for 4 days.   • Botox 200 units reconstituted solution INJECT 200 UNITS INTO THE HEAD AND NECK MUSCLES EVERY 12 WEEKS   • busPIRone (BUSPAR) 30 MG tablet Take 1 tablet by mouth 2 (Two) Times a Day.   • clonazePAM (KlonoPIN) 0.5 MG tablet Take 1 tablet by mouth Daily As Needed for Anxiety.   • Cyanocobalamin 1000 MCG/ML kit Inject 1,000 mL as directed Every 30 (Thirty) Days.   • Dexilant 60 MG capsule TAKE 1 CAPSULE BY MOUTH DAILY   • famotidine (PEPCID) 40 MG tablet Take 1 tablet by mouth Daily.   • fluticasone (FLONASE) 50 MCG/ACT nasal spray INSTILL 2 SPRAYS INTO THE NOSTRILS AS DIRECTED DAILY   • folic acid (FOLVITE) 800 MCG tablet Take 1 tablet by mouth Daily.   • HYDROcodone-acetaminophen (NORCO)  MG per tablet Take 1 tablet by mouth Every 6 (Six) Hours As Needed for Moderate Pain . Will require earlier refill than usual due to increase prob around 10/4/21   • Hydrocortisone, Perianal, (PROCTOCORT) 1 % cream rectal cream Insert  into the rectum 2 (Two) Times a Day.   • levothyroxine (SYNTHROID, LEVOTHROID) 25 MCG tablet Take 0.5 tablets by mouth Daily.   • Linzess 290 MCG capsule capsule Take 290 mcg by mouth Every Morning Before Breakfast.   • losartan (COZAAR) 100 MG tablet  "Take 1 tablet by mouth Daily.   • methylPREDNISolone (MEDROL) 4 MG dose pack Take as directed on package instructions.   • metoprolol succinate XL (TOPROL-XL) 100 MG 24 hr tablet TAKE 1 TABLET BY MOUTH DAILY   • montelukast (SINGULAIR) 10 MG tablet Take 1 tablet by mouth Every Night.   • ondansetron ODT (ZOFRAN-ODT) 4 MG disintegrating tablet Place 1 tablet on the tongue Every 8 (Eight) Hours As Needed for Nausea.   • OXcarbazepine (TRILEPTAL) 150 MG tablet Take 150 mg by mouth Every Night. Daily at bedtime    • prochlorperazine (COMPAZINE) 10 MG tablet Take 1 tablet by mouth Every 6 (Six) Hours As Needed for Nausea or Vomiting.   • rOPINIRole (REQUIP) 3 MG tablet Take 1 tablet by mouth every night at bedtime.   • sodium-potassium-magnesium sulfates (Suprep Bowel Prep Kit) 17.5-3.13-1.6 GM/177ML solution oral solution As directed per instruction sheet for colonoscopy   • Syringe/Needle, Disp, 25G X 5/8\" 3 ML misc 1 each Every 28 (Twenty-Eight) Days.   • topiramate (TOPAMAX) 100 MG tablet Take 1 tablet by mouth 2 (Two) Times a Day. (Patient taking differently: Take 100 mg by mouth 2 (Two) Times a Day. Noon and 3 pm)   • ubrogepant tablet Take 50 mg by mouth 1 (One) Time As Needed (onset of migraine).   • witch hazel-glycerin (TUCKS) pad Insert  into the rectum As Needed for Hemorrhoids.     Current Facility-Administered Medications on File Prior to Visit   Medication   • zoledronic acid (RECLAST) infusion 5 mg       Past Surgical History:   Procedure Laterality Date   • APPENDECTOMY     • BREAST BIOPSY Left    • CHOLECYSTECTOMY     • COLONOSCOPY N/A 11/26/2018    Procedure: COLONOSCOPY;  Surgeon: Meet Mcintyre MD;  Location: [facility] ENDOSCOPY;  Service: General   • CRANIOTOMY FOR ANEURYSM  2003   • ENDOSCOPY N/A 10/16/2019    Procedure: ESOPHAGOGASTRODUODENOSCOPY;  Surgeon: Kory Muhammad MD;  Location: [facility] ENDOSCOPY;  Service: Gastroenterology   • ENDOSCOPY N/A 3/16/2021    Procedure: " "ESOPHAGOGASTRODUODENOSCOPY;  Surgeon: Kory Muhammad MD;  Location: [facility] ENDOSCOPY;  Service: Gastroenterology;  Laterality: N/A;   • MASS EXCISION Right 11/5/2020    Procedure: EXCISE SOFT TISSUE MASS RIGHT POSTERIOR FLANK                 (latex allergy);  Surgeon: Meet Mcintyre MD;  Location: [facility] OR;  Service: General;  Laterality: Right;   • PAP SMEAR  08/16/2012   • TONSILLECTOMY     • UPPER GASTROINTESTINAL ENDOSCOPY  10/16/2019       Family History   Problem Relation Age of Onset   • Constipation Mother    • Hypertension Mother    • Anxiety disorder Mother    • Heart disease Mother    • Osteoporosis Mother         Also Myself I   • Alcohol abuse Father    • Heart disease Father    • Anxiety disorder Brother    • Kidney disease Brother    • Hypertension Brother    • Arthritis Brother    • Anxiety disorder Brother    • Kidney disease Brother    • Diabetes Brother    • Anxiety disorder Brother    • Hypertension Brother    • Breast cancer Paternal Aunt    • Heart disease Maternal Grandmother    • Clotting disorder Maternal Grandmother         Multiple Blood Clots In The Legs   • Heart disease Maternal Grandfather        Social History     Socioeconomic History   • Marital status: Legally    Tobacco Use   • Smoking status: Light Tobacco Smoker     Packs/day: 0.25     Years: 20.00     Pack years: 5.00     Types: Cigarettes, Electronic Cigarette   • Smokeless tobacco: Never Used   Vaping Use   • Vaping Use: Some days   • Substances: Nicotine, Flavoring   • Devices: Disposable   Substance and Sexual Activity   • Alcohol use: No   • Drug use: No   • Sexual activity: Not Currently     Partners: Male     Birth control/protection: Condom, Post-menopausal, None         ROS  No fevers or chills.  No chest pain or shortness of air.  No GI or  disturbances.  Other than right wrist pain, all other systems reviewed as negative.    PHYSICAL EXAMINATION:       Ht 157.5 cm (62\")   Wt 49.4 kg (109 lb)   BMI " 19.94 kg/m²     Physical Exam  Vitals reviewed.   Constitutional:       General: She is not in acute distress.     Appearance: Normal appearance. She is well-developed.   Cardiovascular:      Rate and Rhythm: Normal rate and regular rhythm.      Heart sounds: Normal heart sounds.   Pulmonary:      Effort: Pulmonary effort is normal.      Breath sounds: Normal breath sounds.   Abdominal:      General: Bowel sounds are normal.      Palpations: Abdomen is soft.   Neurological:      Mental Status: She is alert and oriented to person, place, and time.   Psychiatric:         Behavior: Behavior normal.         Thought Content: Thought content normal.         Judgment: Judgment normal.         GAIT:     [x]  Normal  []  Antalgic    Assistive device: [x]  None  []  Walker     []  Crutches  []  Cane     []  Wheelchair  []  Stretcher    Right Hand Exam     Comments:  She has a volar ganglion cyst overlying the radial aspect of the wrist.  Radial pulse is palpated proximal and distal to the cyst.  No erythema.  She has good finger motion and good  formation.  Cyst is tender.              EXAM DESCRIPTION:      XR WRIST 3+ VW RIGHT     CLINICAL HISTORY:      61 years  Female  PAIN, M25.531 Pain in right wrist     COMPARISON:      None available     TECHNIQUE:      Three views-AP, oblique, and lateral radiographs of the right  wrist     FINDINGS:      There is no evidence of an acute fracture or malalignment. No  articular abnormality is are identified. There is no significant  soft tissue swelling.     IMPRESSION:     1. Unremarkable exam of the right wrist without evidence of an  acute fracture or significant degenerative change.     Electronically signed by:  Amita Angel MD  9/23/2021 11:23 AM  CDT Workstation: [id number]          ASSESSMENT:    Diagnoses and all orders for this visit:    Ganglion cyst of volar aspect of right wrist  -     Case Request; Standing  -     COVID PRE-OP / PRE-PROCEDURE SCREENING ORDER (NO  ISOLATION) - Swab, Nasopharynx; Future  -     Case Request    Right wrist pain  -     Case Request; Standing  -     COVID PRE-OP / PRE-PROCEDURE SCREENING ORDER (NO ISOLATION) - Swab, Nasopharynx; Future  -     Case Request    Gastroesophageal reflux disease with esophagitis without hemorrhage  -     Case Request; Standing  -     COVID PRE-OP / PRE-PROCEDURE SCREENING ORDER (NO ISOLATION) - Swab, Nasopharynx; Future  -     Case Request    Chronic obstructive pulmonary disease, unspecified COPD type (HCC)  -     Case Request; Standing  -     COVID PRE-OP / PRE-PROCEDURE SCREENING ORDER (NO ISOLATION) - Swab, Nasopharynx; Future  -     Case Request    History of cerebral aneurysm  -     Case Request; Standing  -     COVID PRE-OP / PRE-PROCEDURE SCREENING ORDER (NO ISOLATION) - Swab, Nasopharynx; Future  -     Case Request    Other orders  -     Follow Anesthesia Guidelines / Standing Orders; Future  -     Provide instructions to patient regarding NPO status; Future  -     Provide Patient With ERAS Hydration Instructions          PLAN    She has a volar ganglion cyst that has been resistant to nonoperative management.  She has pain with activities of daily living.  She is interested in definitive treatment.    The patient voiced understanding of the risks, benefits, and alternative forms of treatment that were discussed and the patient consents to proceed with surgery.  All risks, benefits and alternatives were discussed.  Risks include, but not exclusive to anesthetic complications, including death, MI, CVA, infection, bleeding DVT, fracture, residual pain and need for future surgery.    This discussion was held with the patient by Leonardo Vargas MD and all questions were answered.    Plan excision of volar ganglion cyst right wrist    Return for Post-operative eval.    Leonardo Vargas MD

## 2021-10-30 RX ORDER — BUPIVACAINE HCL/0.9 % NACL/PF 0.1 %
2 PLASTIC BAG, INJECTION (ML) EPIDURAL ONCE
Status: CANCELLED | OUTPATIENT
Start: 2021-12-03 | End: 2021-10-30

## 2021-11-01 ENCOUNTER — OFFICE VISIT (OUTPATIENT)
Dept: FAMILY MEDICINE CLINIC | Facility: CLINIC | Age: 61
End: 2021-11-01

## 2021-11-01 VITALS
WEIGHT: 106 LBS | RESPIRATION RATE: 16 BRPM | SYSTOLIC BLOOD PRESSURE: 110 MMHG | HEIGHT: 62 IN | HEART RATE: 78 BPM | TEMPERATURE: 98.1 F | BODY MASS INDEX: 19.51 KG/M2 | OXYGEN SATURATION: 98 % | DIASTOLIC BLOOD PRESSURE: 70 MMHG

## 2021-11-01 DIAGNOSIS — K13.79 SORE MOUTH: Primary | ICD-10-CM

## 2021-11-01 PROCEDURE — 99213 OFFICE O/P EST LOW 20 MIN: CPT | Performed by: NURSE PRACTITIONER

## 2021-11-01 NOTE — PROGRESS NOTES
Subjective   Tricia Loyola is a 61 y.o. female.       Chief Complaint   Patient presents with   • Mouth Lesions        History of Present Illness     Presents today with chief complaint of oral sore. States started using a vape to reduce her smoking and shortly thereafter she developed a sore spot in the inner lower lip near the junction of the gums in the lip. States she got some over-the-counter medicine for canker sores, and it has improved singificantly. It was still quite sore when she called to make this appointment. Also, she is concerned about some red bumps on underside of her tongue. These do not hurt but she doesn't remember seeing them before and wants to have them checked out.       Other complaints today: none.   Parts of most recent relevant visit HPI, ROS  and PE may be carried forward and all are updated as appropriate for current situation.    Past Surgical History:   Procedure Laterality Date   • APPENDECTOMY     • BREAST BIOPSY Left    • CHOLECYSTECTOMY     • COLONOSCOPY N/A 11/26/2018    Procedure: COLONOSCOPY;  Surgeon: Meet Mcintyre MD;  Location: [facility] ENDOSCOPY;  Service: General   • CRANIOTOMY FOR ANEURYSM  2003   • ENDOSCOPY N/A 10/16/2019    Procedure: ESOPHAGOGASTRODUODENOSCOPY;  Surgeon: Kory Muhammad MD;  Location: [facility] ENDOSCOPY;  Service: Gastroenterology   • ENDOSCOPY N/A 3/16/2021    Procedure: ESOPHAGOGASTRODUODENOSCOPY;  Surgeon: Kory Muhammad MD;  Location: [facility] ENDOSCOPY;  Service: Gastroenterology;  Laterality: N/A;   • MASS EXCISION Right 11/5/2020    Procedure: EXCISE SOFT TISSUE MASS RIGHT POSTERIOR FLANK                 (latex allergy);  Surgeon: Meet Mcintyre MD;  Location: [facility] OR;  Service: General;  Laterality: Right;   • PAP SMEAR  08/16/2012   • TONSILLECTOMY     • UPPER GASTROINTESTINAL ENDOSCOPY  10/16/2019      Social History     Socioeconomic History   • Marital status: Legally    Tobacco Use   • Smoking status: Light Tobacco  Smoker     Packs/day: 0.25     Years: 20.00     Pack years: 5.00     Types: Cigarettes, Electronic Cigarette   • Smokeless tobacco: Never Used   Vaping Use   • Vaping Use: Some days   • Substances: Nicotine, Flavoring   • Devices: Disposable   Substance and Sexual Activity   • Alcohol use: No   • Drug use: No   • Sexual activity: Not Currently     Partners: Male     Birth control/protection: Condom, Post-menopausal, None      The following portions of the patient's history were reviewed and updated as appropriate: allergies, current medications, past family history, past medical history, past social history, past surgical history and problem list.    Review of Systems   Constitutional: Negative.    HENT: Positive for mouth sores. Negative for congestion.    Eyes: Negative.  Negative for blurred vision, double vision, photophobia and visual disturbance.   Respiratory: Negative.  Negative for cough, shortness of breath, wheezing and stridor.    Cardiovascular: Negative.  Negative for chest pain, palpitations and leg swelling.   Gastrointestinal: Negative.  Negative for abdominal distention, abdominal pain, blood in stool, constipation, diarrhea, nausea, vomiting, GERD and indigestion.   Endocrine: Negative.  Negative for cold intolerance and heat intolerance.   Genitourinary: Negative.  Negative for dysuria, flank pain, frequency and urinary incontinence.   Musculoskeletal: Positive for back pain. Negative for arthralgias.   Skin: Negative.    Allergic/Immunologic: Negative.  Negative for immunocompromised state.   Neurological: Negative.    Hematological: Negative.    Psychiatric/Behavioral: Negative for agitation, behavioral problems, decreased concentration, dysphoric mood, hallucinations, self-injury, sleep disturbance, suicidal ideas, negative for hyperactivity, depressed mood and stress. The patient is nervous/anxious.    All other systems reviewed and are negative.    PHQ-9 Depression Screening  Little interest  "or pleasure in doing things?     Feeling down, depressed, or hopeless?     Trouble falling or staying asleep, or sleeping too much?     Feeling tired or having little energy?     Poor appetite or overeating?     Feeling bad about yourself - or that you are a failure or have let yourself or your family down?     Trouble concentrating on things, such as reading the newspaper or watching television?     Moving or speaking so slowly that other people could have noticed? Or the opposite - being so fidgety or restless that you have been moving around a lot more than usual?     Thoughts that you would be better off dead, or of hurting yourself in some way?     PHQ-9 Total Score     If you checked off any problems, how difficult have these problems made it for you to do your work, take care of things at home, or get along with other people?      Patient understands the risks associated with this controlled medication, including tolerance and addiction.  Patient also agrees to only obtain this medication from me, and not from a another provider, unless that provider is covering for me in my absence.  Patient also agrees to be compliant in dosing, and not self adjust the dose of medication.  A signed controlled substance agreement is on file, and the patient has received a controlled substance education sheet at this a previous visit.  The patient has also signed a consent for treatment with a controlled substance as per [facility] policy. KATE was obtained.    Objective    Vitals:    11/01/21 1449   BP: 110/70   Pulse: 78   Resp: 16   Temp: 98.1 °F (36.7 °C)   SpO2: 98%   Weight: 48.1 kg (106 lb)   Height: 157.5 cm (62\")   PainSc:   7   PainLoc: Back     Body mass index is 19.39 kg/m².    Physical Exam  Vitals and nursing note reviewed.   Constitutional:       General: She is not in acute distress.     Appearance: Normal appearance. She is well-developed. She is not ill-appearing or diaphoretic.   HENT:      Head: " Normocephalic and atraumatic.      Mouth/Throat:      Lips: Pink.      Mouth: Mucous membranes are moist. No oral lesions or angioedema.      Dentition: No gingival swelling.      Tongue: No lesions.      Pharynx: Oropharynx is clear.      Comments: There is no open area noted, no redness in area she indicates has been sore on inner lower lip. She has a normal underside to the tongue on inspection as well.   Eyes:      General: No scleral icterus.        Right eye: No discharge.         Left eye: No discharge.      Conjunctiva/sclera: Conjunctivae normal.      Pupils: Pupils are equal, round, and reactive to light.   Neck:      Thyroid: No thyromegaly.      Vascular: No carotid bruit or JVD.      Trachea: No tracheal deviation.   Cardiovascular:      Rate and Rhythm: Normal rate and regular rhythm.      Pulses: Normal pulses.      Heart sounds: Normal heart sounds. No murmur heard.  No friction rub. No gallop.    Pulmonary:      Effort: Pulmonary effort is normal. No respiratory distress.      Breath sounds: Normal breath sounds. No stridor. No wheezing, rhonchi or rales.   Chest:      Chest wall: No tenderness.   Abdominal:      General: Bowel sounds are normal.      Palpations: Abdomen is soft.   Musculoskeletal:         General: No tenderness or deformity. Normal range of motion.      Cervical back: Normal range of motion and neck supple. No rigidity or tenderness.      Right lower leg: No edema.      Left lower leg: No edema.   Lymphadenopathy:      Cervical: No cervical adenopathy.   Skin:     General: Skin is warm and dry.      Capillary Refill: Capillary refill takes 2 to 3 seconds.      Coloration: Skin is not jaundiced or pale.      Findings: No bruising, erythema, lesion or rash.   Neurological:      General: No focal deficit present.      Mental Status: She is alert and oriented to person, place, and time. Mental status is at baseline.      Motor: No weakness.      Gait: Gait normal.   Psychiatric:          Mood and Affect: Mood normal.         Behavior: Behavior normal.         Thought Content: Thought content normal.         Judgment: Judgment normal.     no remaining lesion noted in mouth where she has been sore. Advised to obtain and take a daily B complex vitamin and if the soreness doesn't resolve with that, to see me again. Tongue is normal in appearance.      Assessment/Plan   Diagnoses and all orders for this visit:    1. Sore mouth (Primary)          Return in about 1 week (around 11/8/2021).           This document has been electronically signed by UMESH Guzmán on November 1, 2021 15:30 CDT

## 2021-11-02 PROBLEM — Z86.79 HISTORY OF CEREBRAL ANEURYSM: Status: ACTIVE | Noted: 2021-11-02

## 2021-11-02 PROBLEM — K21.00 GASTROESOPHAGEAL REFLUX DISEASE WITH ESOPHAGITIS WITHOUT HEMORRHAGE: Status: ACTIVE | Noted: 2021-02-02

## 2021-11-04 ENCOUNTER — TELEPHONE (OUTPATIENT)
Dept: FAMILY MEDICINE CLINIC | Facility: CLINIC | Age: 61
End: 2021-11-04

## 2021-11-04 DIAGNOSIS — M25.561 CHRONIC PAIN OF RIGHT KNEE: ICD-10-CM

## 2021-11-04 DIAGNOSIS — G89.29 CHRONIC MIDLINE LOW BACK PAIN WITHOUT SCIATICA: ICD-10-CM

## 2021-11-04 DIAGNOSIS — M54.50 CHRONIC MIDLINE LOW BACK PAIN WITHOUT SCIATICA: ICD-10-CM

## 2021-11-04 DIAGNOSIS — G89.29 CHRONIC PAIN OF RIGHT KNEE: ICD-10-CM

## 2021-11-04 RX ORDER — HYDROCODONE BITARTRATE AND ACETAMINOPHEN 10; 325 MG/1; MG/1
1 TABLET ORAL EVERY 6 HOURS PRN
Qty: 100 TABLET | Refills: 0 | Status: SHIPPED | OUTPATIENT
Start: 2021-11-04 | End: 2021-12-03 | Stop reason: HOSPADM

## 2021-11-04 NOTE — TELEPHONE ENCOUNTER
Patient seen in office every 3 months for chronic pain requiring opiate pain medication. Compliant with medication, visits with no adverse effects noted. KATE and UDS current and appropriate. Patient called requesting scheduled refill at appropriate interval. Patient understands the risks associated with this controlled medication, including tolerance and addiction.  Patient also agrees to only obtain this medication from me, and not from a another provider, unless that provider is covering for me in my absence.  Patient also agrees to be compliant in dosing, and not self adjust the dose of medication.  A signed controlled substance agreement is on file, and the patient has received a controlled substance education sheet at this a previous visit.  The patient has also signed a consent for treatment with a controlled substance as per [facility] policy. KATE was obtained.   Refill sent for hydrocodone.     This document has been electronically signed by UMESH Guzmán on November 4, 2021 17:59 CDT

## 2021-11-04 NOTE — TELEPHONE ENCOUNTER
Incoming Refill Request      Medication requested (name and dose):   HYDROcodone-acetaminophen (NORCO)  MG per tablet     [facility]     Additional details provided by patient:     Best call back number:     Does the patient have less than a 3 day supply:  [] Yes  [] No    Carmen Salamanca  11/04/21, 10:44 CDT

## 2021-11-05 ENCOUNTER — TELEPHONE (OUTPATIENT)
Dept: GASTROENTEROLOGY | Facility: CLINIC | Age: 61
End: 2021-11-05

## 2021-11-05 NOTE — TELEPHONE ENCOUNTER
Patient has been contacted and made aware that a copy of her bowel prep instructions have been placed in the mail for her today. Patient voiced understanding.

## 2021-11-05 NOTE — TELEPHONE ENCOUNTER
----- Message from Erum Bowles sent at 11/4/2021  4:06 PM CDT -----  Contact: [phone]  Patient is needing bowel prep instruction states that she has changed her endo appointment,

## 2021-11-10 DIAGNOSIS — G25.81 RESTLESS LEG SYNDROME: Chronic | ICD-10-CM

## 2021-11-11 ENCOUNTER — TELEPHONE (OUTPATIENT)
Dept: FAMILY MEDICINE CLINIC | Facility: CLINIC | Age: 61
End: 2021-11-11

## 2021-11-11 DIAGNOSIS — F41.1 GAD (GENERALIZED ANXIETY DISORDER): ICD-10-CM

## 2021-11-11 RX ORDER — CLONAZEPAM 0.5 MG/1
0.5 TABLET ORAL DAILY PRN
Qty: 30 TABLET | Refills: 0 | Status: SHIPPED | OUTPATIENT
Start: 2021-11-11 | End: 2022-03-25 | Stop reason: SDUPTHER

## 2021-11-11 RX ORDER — ROPINIROLE 3 MG/1
3 TABLET, FILM COATED ORAL
Qty: 90 TABLET | Refills: 1 | Status: SHIPPED | OUTPATIENT
Start: 2021-11-11 | End: 2022-05-11

## 2021-11-12 ENCOUNTER — TELEMEDICINE (OUTPATIENT)
Dept: FAMILY MEDICINE CLINIC | Facility: CLINIC | Age: 61
End: 2021-11-12

## 2021-11-12 DIAGNOSIS — R06.02 SOB (SHORTNESS OF BREATH): ICD-10-CM

## 2021-11-12 DIAGNOSIS — J01.00 ACUTE NON-RECURRENT MAXILLARY SINUSITIS: Primary | ICD-10-CM

## 2021-11-12 PROCEDURE — 99214 OFFICE O/P EST MOD 30 MIN: CPT | Performed by: NURSE PRACTITIONER

## 2021-11-12 RX ORDER — PREDNISONE 20 MG/1
20 TABLET ORAL 2 TIMES DAILY
Qty: 14 TABLET | Refills: 0 | Status: SHIPPED | OUTPATIENT
Start: 2021-11-12 | End: 2021-11-19

## 2021-11-12 RX ORDER — AZITHROMYCIN 250 MG/1
TABLET, FILM COATED ORAL
Qty: 6 TABLET | Refills: 0 | Status: SHIPPED | OUTPATIENT
Start: 2021-11-12 | End: 2021-11-30

## 2021-11-12 RX ORDER — ALBUTEROL SULFATE 1.25 MG/3ML
1 SOLUTION RESPIRATORY (INHALATION) EVERY 4 HOURS PRN
Qty: 100 EACH | Refills: 5 | Status: SHIPPED | OUTPATIENT
Start: 2021-11-12 | End: 2021-11-15 | Stop reason: SDUPTHER

## 2021-11-12 NOTE — PROGRESS NOTES
Subjective   Tricia Loyola is a 61 y.o. female.   You have chosen to receive care through a telephone visit. Do you consent to use a telephone visit for your medical care today? Yes  27 minutes medical discussion.           Chief Complaint   Patient presents with   • URI     cough, sorethroat, congestion         History of Present Illness     Exposure to suspected covid 19 respiratory infected person, Friday, Saturday and every day of the week since then until yesterday when he began having body aches symptoms and a home test for covid 19 was positive x 2 yesterday.  This has not been confirmed by medical provider. Patient is advised to go for covid testing asap. She is to quarantine at home for 10 days.  Her symptoms began on Monday, and include stuffy nose, sinus pressure, sore throat, bilateral ear pain, cough, non productive. Feels bad, no fever or chills, no loss of smell or taste, no abd pain or nausea, no diarrhea.    No other complaint today.   Parts of most recent relevant visit HPI, ROS  and PE may be carried forward and all are updated as appropriate for current situation.    Past Surgical History:   Procedure Laterality Date   • APPENDECTOMY     • BREAST BIOPSY Left    • CHOLECYSTECTOMY     • COLONOSCOPY N/A 11/26/2018    Procedure: COLONOSCOPY;  Surgeon: Meet Mcintyre MD;  Location: [facility] ENDOSCOPY;  Service: General   • CRANIOTOMY FOR ANEURYSM  2003   • ENDOSCOPY N/A 10/16/2019    Procedure: ESOPHAGOGASTRODUODENOSCOPY;  Surgeon: Kory Muhammad MD;  Location: [facility] ENDOSCOPY;  Service: Gastroenterology   • ENDOSCOPY N/A 3/16/2021    Procedure: ESOPHAGOGASTRODUODENOSCOPY;  Surgeon: Kory Muhammad MD;  Location: [facility] ENDOSCOPY;  Service: Gastroenterology;  Laterality: N/A;   • MASS EXCISION Right 11/5/2020    Procedure: EXCISE SOFT TISSUE MASS RIGHT POSTERIOR FLANK                 (latex allergy);  Surgeon: Meet Mcintyre MD;  Location: [facility] OR;  Service: General;  Laterality: Right;    • PAP SMEAR  08/16/2012   • TONSILLECTOMY     • UPPER GASTROINTESTINAL ENDOSCOPY  10/16/2019      Social History     Socioeconomic History   • Marital status: Legally    Tobacco Use   • Smoking status: Light Tobacco Smoker     Packs/day: 0.25     Years: 20.00     Pack years: 5.00     Types: Cigarettes, Electronic Cigarette   • Smokeless tobacco: Never Used   Vaping Use   • Vaping Use: Some days   • Substances: Nicotine, Flavoring   • Devices: Disposable   Substance and Sexual Activity   • Alcohol use: No   • Drug use: No   • Sexual activity: Not Currently     Partners: Male     Birth control/protection: Condom, Post-menopausal, None      The following portions of the patient's history were reviewed and updated as appropriate: allergies, current medications, past family history, past medical history, past social history, past surgical history and problem list.    Review of Systems   Constitutional: Positive for fatigue. Negative for appetite change, chills and fever.   HENT: Positive for congestion, postnasal drip, rhinorrhea, sinus pressure and sore throat.    Eyes: Negative.  Negative for blurred vision, double vision, photophobia and visual disturbance.   Respiratory: Positive for cough and shortness of breath. Negative for wheezing and stridor.    Cardiovascular: Negative.  Negative for chest pain, palpitations and leg swelling.   Gastrointestinal: Negative.  Negative for abdominal distention, abdominal pain, blood in stool, constipation, diarrhea, nausea, vomiting, GERD and indigestion.   Endocrine: Negative.  Negative for cold intolerance and heat intolerance.   Genitourinary: Negative.  Negative for dysuria, flank pain, frequency and urinary incontinence.   Musculoskeletal: Negative.  Negative for arthralgias and back pain.   Skin: Negative.    Allergic/Immunologic: Negative.  Negative for immunocompromised state.   Neurological: Negative.    Hematological: Negative.    Psychiatric/Behavioral:  Negative.  Negative for agitation, behavioral problems, decreased concentration, dysphoric mood, hallucinations, self-injury, sleep disturbance, suicidal ideas, negative for hyperactivity, depressed mood and stress. The patient is not nervous/anxious.    All other systems reviewed and are negative.    PHQ-9 Depression Screening  Little interest or pleasure in doing things?     Feeling down, depressed, or hopeless?     Trouble falling or staying asleep, or sleeping too much?     Feeling tired or having little energy?     Poor appetite or overeating?     Feeling bad about yourself - or that you are a failure or have let yourself or your family down?     Trouble concentrating on things, such as reading the newspaper or watching television?     Moving or speaking so slowly that other people could have noticed? Or the opposite - being so fidgety or restless that you have been moving around a lot more than usual?     Thoughts that you would be better off dead, or of hurting yourself in some way?     PHQ-9 Total Score     If you checked off any problems, how difficult have these problems made it for you to do your work, take care of things at home, or get along with other people?        Objective    There were no vitals filed for this visit.  There is no height or weight on file to calculate BMI.    Physical Exam  Vitals and nursing note reviewed.   Constitutional:       General: She is not in acute distress.     Appearance: Normal appearance. She is well-developed. She is not ill-appearing or diaphoretic.   HENT:      Head: Normocephalic and atraumatic.   Eyes:      General: No scleral icterus.        Right eye: No discharge.         Left eye: No discharge.      Conjunctiva/sclera: Conjunctivae normal.      Pupils: Pupils are equal, round, and reactive to light.   Neck:      Thyroid: No thyromegaly.      Vascular: No carotid bruit or JVD.      Trachea: No tracheal deviation.   Cardiovascular:      Rate and Rhythm: Normal  rate and regular rhythm.      Pulses: Normal pulses.      Heart sounds: Normal heart sounds. No murmur heard.  No friction rub. No gallop.    Pulmonary:      Effort: Pulmonary effort is normal. No respiratory distress.      Breath sounds: Normal breath sounds. No stridor. No wheezing, rhonchi or rales.   Chest:      Chest wall: No tenderness.   Abdominal:      General: Bowel sounds are normal.      Palpations: Abdomen is soft.   Musculoskeletal:         General: No tenderness or deformity. Normal range of motion.      Cervical back: Normal range of motion and neck supple. No rigidity or tenderness.      Right lower leg: No edema.      Left lower leg: No edema.   Lymphadenopathy:      Cervical: No cervical adenopathy.   Skin:     General: Skin is warm and dry.      Capillary Refill: Capillary refill takes 2 to 3 seconds.      Coloration: Skin is not jaundiced or pale.      Findings: No bruising, erythema, lesion or rash.   Neurological:      General: No focal deficit present.      Mental Status: She is alert and oriented to person, place, and time. Mental status is at baseline.      Motor: No weakness.      Gait: Gait normal.   Psychiatric:         Mood and Affect: Mood normal.         Behavior: Behavior normal.         Thought Content: Thought content normal.         Judgment: Judgment normal.           Assessment/Plan   Diagnoses and all orders for this visit:    1. Acute non-recurrent maxillary sinusitis (Primary)  -     azithromycin (ZITHROMAX) 250 MG tablet; Take 2 tablets the first day, then 1 tablet daily for 4 days.  Dispense: 6 tablet; Refill: 0  -     predniSONE (DELTASONE) 20 MG tablet; Take 1 tablet by mouth 2 (Two) Times a Day for 7 days.  Dispense: 14 tablet; Refill: 0    2. SOB (shortness of breath)  -     albuterol (ACCUNEB) 1.25 MG/3ML nebulizer solution; Take 3 mL by nebulization Every 4 (Four) Hours As Needed for Wheezing or Shortness of Air.  Dispense: 100 each; Refill: 5    Return in about 4 days  (around 11/16/2021), or if symptoms worsen or fail to improve.                 This document has been electronically signed by UMESH Guzmán on November 12, 2021 16:28 CST

## 2021-11-12 NOTE — TELEPHONE ENCOUNTER
Patient seen in office every 3 months for chronic anxiety requiring benzodiazepine anxiolytic. Compliant with medication, visits with no adverse effects noted. KATE and UDS current and appropriate. Patient called requesting scheduled refill at appropriate interval. Patient understands the risks associated with this controlled medication, including tolerance and addiction.  Patient also agrees to only obtain this medication from me, and not from a another provider, unless that provider is covering for me in my absence.  Patient also agrees to be compliant in dosing, and not self adjust the dose of medication.  A signed controlled substance agreement is on file, and the patient has received a controlled substance education sheet at this a previous visit.  The patient has also signed a consent for treatment with a controlled substance as per [facility] policy. KATE was obtained. Refill sent for clonazepam.     This document has been electronically signed by UMESH Guzmán on November 11, 2021 18:09 CST

## 2021-11-12 NOTE — PROGRESS NOTES
The ABCs of the Annual Wellness Visit  Subsequent Medicare Wellness Visit    Chief Complaint   Patient presents with   • URI     cough, sorethroat, congestion       Subjective    History of Present Illness:  Tricia Loyola is a 61 y.o. female who presents for a Subsequent Medicare Wellness Visit.    The following portions of the patient's history were reviewed and   updated as appropriate: allergies, current medications, past family history, past medical history, past social history, past surgical history and problem list.    Compared to one year ago, the patient feels her physical   health is {better worse same:83217}.    Compared to one year ago, the patient feels her mental   health is {better worse same:43901}.    Recent Hospitalizations:  {Hospital Admission Status in the last 365 days:39014}      Current Medical Providers:  Patient Care Team:  Rosario Ceron APRN as PCP - General (Family Medicine)  Johnny Simley PA-C as Physician Assistant (Gastroenterology)    Outpatient Medications Prior to Visit   Medication Sig Dispense Refill   • albuterol sulfate  (90 Base) MCG/ACT inhaler Inhale 2 puffs Every 4 (Four) Hours As Needed for Wheezing. 18 g 5   • amitriptyline (ELAVIL) 75 MG tablet Take  by mouth Every Night.     • aspirin 81 MG EC tablet Take 1 tablet by mouth Daily. 30 tablet 0   • atorvastatin (LIPITOR) 40 MG tablet Take 1 tablet by mouth Every Night. For cholesterol 90 tablet 1   • Botox 200 units reconstituted solution INJECT 200 UNITS INTO THE HEAD AND NECK MUSCLES EVERY 12 WEEKS     • busPIRone (BUSPAR) 30 MG tablet Take 1 tablet by mouth 2 (Two) Times a Day. 60 tablet 0   • clonazePAM (KlonoPIN) 0.5 MG tablet Take 1 tablet by mouth Daily As Needed for Anxiety. 30 tablet 0   • Cyanocobalamin 1000 MCG/ML kit Inject 1,000 mL as directed Every 30 (Thirty) Days. 1 kit 5   • Dexilant 60 MG capsule TAKE 1 CAPSULE BY MOUTH DAILY 90 capsule 0   • famotidine (PEPCID) 40 MG tablet Take 1 tablet  "by mouth Daily. 90 tablet 0   • fluticasone (FLONASE) 50 MCG/ACT nasal spray INSTILL 2 SPRAYS INTO THE NOSTRILS AS DIRECTED DAILY 16 g 11   • folic acid (FOLVITE) 800 MCG tablet Take 1 tablet by mouth Daily. 90 tablet 1   • HYDROcodone-acetaminophen (NORCO)  MG per tablet Take 1 tablet by mouth Every 6 (Six) Hours As Needed for Moderate Pain . 100 tablet 0   • Hydrocortisone, Perianal, (PROCTOCORT) 1 % cream rectal cream Insert  into the rectum 2 (Two) Times a Day. 28.4 g 0   • levothyroxine (SYNTHROID, LEVOTHROID) 25 MCG tablet Take 0.5 tablets by mouth Daily. 15 tablet 1   • Linzess 290 MCG capsule capsule Take 290 mcg by mouth Every Morning Before Breakfast.     • losartan (COZAAR) 100 MG tablet Take 1 tablet by mouth Daily. 90 tablet 3   • metoprolol succinate XL (TOPROL-XL) 100 MG 24 hr tablet TAKE 1 TABLET BY MOUTH DAILY 90 tablet 3   • montelukast (SINGULAIR) 10 MG tablet Take 1 tablet by mouth Every Night. 30 tablet 5   • ondansetron ODT (ZOFRAN-ODT) 4 MG disintegrating tablet Place 1 tablet on the tongue Every 8 (Eight) Hours As Needed for Nausea. 30 tablet 5   • OXcarbazepine (TRILEPTAL) 150 MG tablet Take 150 mg by mouth Every Night. Daily at bedtime      • prochlorperazine (COMPAZINE) 10 MG tablet Take 1 tablet by mouth Every 6 (Six) Hours As Needed for Nausea or Vomiting. 360 tablet 1   • rOPINIRole (REQUIP) 3 MG tablet TAKE 1 TABLET BY MOUTH EVERY NIGHT AT BEDTIME 90 tablet 1   • sodium-potassium-magnesium sulfates (Suprep Bowel Prep Kit) 17.5-3.13-1.6 GM/177ML solution oral solution As directed per instruction sheet for colonoscopy 354 mL 0   • Syringe/Needle, Disp, 25G X 5/8\" 3 ML misc 1 each Every 28 (Twenty-Eight) Days. 3 each 1   • topiramate (TOPAMAX) 100 MG tablet Take 1 tablet by mouth 2 (Two) Times a Day. (Patient taking differently: Take 100 mg by mouth 2 (Two) Times a Day. Noon and 3 pm) 180 tablet 1   • ubrogepant tablet Take 50 mg by mouth 1 (One) Time As Needed (onset of migraine).  "    • witch hazel-glycerin (TUCKS) pad Insert  into the rectum As Needed for Hemorrhoids. 40 each 1     Facility-Administered Medications Prior to Visit   Medication Dose Route Frequency Provider Last Rate Last Admin   • zoledronic acid (RECLAST) infusion 5 mg  5 mg Intravenous Once Ceron, UMESH Ponce           Opioid medication/s are on active medication list.  and I have evaluated her active treatment plan and pain score trends (see table).  There were no vitals filed for this visit.  I have reviewed the chart for potential of high risk medication and harmful drug interactions in the elderly.            Aspirin is on active medication list. Aspirin use is indicated based on review of current medical condition/s. Pros and cons of this therapy have been discussed today. Benefits of this medication outweigh potential harm.  Patient has been encouraged to continue taking this medication.  .      Patient Active Problem List   Diagnosis   • Tobacco dependence syndrome   • PRISCILLA (generalized anxiety disorder)   • PTSD (post-traumatic stress disorder)   • Gastroesophageal reflux disease with esophagitis without hemorrhage   • Hyperlipidemia   • Right knee pain   • Primary osteoarthritis of right knee   • Osteoporosis   • Sinus tachycardia   • Restless leg syndrome   • Chronic midline thoracic back pain   • Chronic midline low back pain without sciatica   • Panic disorder without agoraphobia   • Chronic migraine   • Compression fracture of vertebra (HCC)   • COPD (chronic obstructive pulmonary disease) (HCC)   • Hearing loss   • Migraine   • Radiculopathy of thoracolumbar region   • Screen for colon cancer   • Encounter for colonoscopy due to history of adenomatous colonic polyps   • Elevated liver enzymes   • Pain of upper abdomen   • Hepatic fibrosis   • Nausea   • Acquired hypothyroidism   • Primary osteoarthritis of left knee   • History of appendectomy   • Soft tissue mass   • Gastroesophageal reflux disease with  esophagitis without hemorrhage   • Intractable migraine without aura and without status migrainosus   • Right wrist pain   • Ganglion cyst of wrist, right   • Periumbilical abdominal pain   • Weight loss   • Malaise and fatigue   • Family history of colon cancer   • History of cerebral aneurysm     Advance Care Planning  Advance Directive is not on file.  {ACP Discussion, Advance Directive not in EMR:78791}    Review of Systems   Constitutional: Negative.    HENT: Negative.    Eyes: Negative.    Respiratory: Negative.  Negative for shortness of breath, wheezing and stridor.    Cardiovascular: Negative.  Negative for chest pain, palpitations and leg swelling.   Gastrointestinal: Negative.    Endocrine: Negative.    Genitourinary: Negative.    Musculoskeletal: Positive for back pain.   Skin: Negative.    Allergic/Immunologic: Negative.    Neurological: Negative.    Hematological: Negative.    Psychiatric/Behavioral: The patient is nervous/anxious.    All other systems reviewed and are negative.       Objective    There were no vitals filed for this visit.  BMI Readings from Last 1 Encounters:   11/01/21 19.39 kg/m²   BMI is within normal parameters. No follow-up required.    Does the patient have evidence of cognitive impairment? No    Physical Exam  Vitals and nursing note reviewed.   Constitutional:       General: She is not in acute distress.     Appearance: Normal appearance. She is well-developed and normal weight. She is not ill-appearing or diaphoretic.   HENT:      Head: Normocephalic and atraumatic.   Eyes:      General: No scleral icterus.        Right eye: No discharge.         Left eye: No discharge.      Conjunctiva/sclera: Conjunctivae normal.      Pupils: Pupils are equal, round, and reactive to light.   Neck:      Trachea: No tracheal deviation.   Pulmonary:      Effort: Pulmonary effort is normal. No respiratory distress.      Breath sounds: No stridor. No wheezing.   Musculoskeletal:         General:  No tenderness or deformity. Normal range of motion.      Cervical back: Normal range of motion and neck supple.   Skin:     General: Skin is dry.      Coloration: Skin is not pale.      Findings: No erythema or rash.   Neurological:      General: No focal deficit present.      Mental Status: She is alert and oriented to person, place, and time. Mental status is at baseline.      Cranial Nerves: No cranial nerve deficit.   Psychiatric:         Mood and Affect: Mood normal.         Behavior: Behavior normal.         Thought Content: Thought content normal.         Judgment: Judgment normal.                 HEALTH RISK ASSESSMENT    Smoking Status:  Social History     Tobacco Use   Smoking Status Light Tobacco Smoker   • Packs/day: 0.25   • Years: 20.00   • Pack years: 5.00   • Types: Cigarettes, Electronic Cigarette   Smokeless Tobacco Never Used     Alcohol Consumption:  Social History     Substance and Sexual Activity   Alcohol Use No     Fall Risk Screen:    [facility] not [facility].    Depression Screening:  PHQ-2/PHQ-9 Depression Screening 3/15/2021   Little interest or pleasure in doing things 0   Feeling down, depressed, or hopeless 0   Trouble falling or staying asleep, or sleeping too much -   Feeling tired or having little energy -   Poor appetite or overeating -   Feeling bad about yourself - or that you are a failure or have let yourself or your family down -   Trouble concentrating on things, such as reading the newspaper or watching television -   Moving or speaking so slowly that other people could have noticed. Or the opposite - being so fidgety or restless that you have been moving around a lot more than usual -   Thoughts that you would be better off dead, or of hurting yourself in some way -   Total Score 0       Health Habits and Functional and Cognitive Screening:  Functional & Cognitive Status 3/15/2021   Do you have difficulty preparing food and eating? No   Do you have  difficulty bathing yourself, getting dressed or grooming yourself? No   Do you have difficulty using the toilet? No   Do you have difficulty moving around from place to place? No   Do you have trouble with steps or getting out of a bed or a chair? No   Current Diet Well Balanced Diet   Dental Exam Up to date   Eye Exam Up to date   Exercise (times per week) 0 times per week   Current Exercises Include No Regular Exercise   Current Exercise Activities Include -   Do you need help using the phone?  No   Are you deaf or do you have serious difficulty hearing?  No   Do you need help with transportation? No   Do you need help shopping? No   Do you need help preparing meals?  No   Do you need help with housework?  No   Do you need help with laundry? No   Do you need help taking your medications? No   Do you need help managing money? No   Do you ever drive or ride in a car without wearing a seat belt? No   Have you felt unusual stress, anger or loneliness in the last month? No   Who do you live with? Other   If you need help, do you have trouble finding someone available to you? No   Have you been bothered in the last four weeks by sexual problems? No   Do you have difficulty concentrating, remembering or making decisions? No       Age-appropriate Screening Schedule:  Refer to the list below for future screening recommendations based on patient's age, sex and/or medical conditions. Orders for these recommended tests are listed in the plan section. The patient has been provided with a written plan.    Health Maintenance   Topic Date Due   • MAMMOGRAM  10/09/2021   • LIPID PANEL  06/08/2022   • DXA SCAN  04/07/2023   • PAP SMEAR  09/15/2024   • TDAP/TD VACCINES (3 - Td or Tdap) 05/10/2026   • INFLUENZA VACCINE  Completed   • ZOSTER VACCINE  Completed              Assessment/Plan   CMS Preventative Services Quick Reference  Risk Factors Identified During Encounter  Tobacco Use/Dependance (use dotphrase .tobaccocessation for  documentation)   Tricia Loyola  reports that she has been smoking cigarettes and electronic cigarette. She has a 5.00 pack-year smoking history. She has never used smokeless tobacco.. I have educated her on the risk of diseases from using tobacco products such as cancer, COPD, heart disease and cataracts.     I advised her to quit and she is not willing to quit.    I spent 3  minutes counseling the patient.       The above risks/problems have been discussed with the patient.  Follow up actions/plans if indicated are seen below in the Assessment/Plan Section.  Pertinent information has been shared with the patient in the After Visit Summary.    Diagnoses and all orders for this visit:    1. Medicare annual wellness visit, subsequent (Primary)        Follow Up:   Return in about 1 year (around 11/12/2022) for Medicare Wellness.     An After Visit Summary and PPPS were made available to the patient.

## 2021-11-15 ENCOUNTER — TELEPHONE (OUTPATIENT)
Dept: FAMILY MEDICINE CLINIC | Facility: CLINIC | Age: 61
End: 2021-11-15

## 2021-11-15 ENCOUNTER — OFFICE VISIT (OUTPATIENT)
Dept: FAMILY MEDICINE CLINIC | Facility: CLINIC | Age: 61
End: 2021-11-15

## 2021-11-15 DIAGNOSIS — R06.02 SOB (SHORTNESS OF BREATH): ICD-10-CM

## 2021-11-15 DIAGNOSIS — J44.9 CHRONIC OBSTRUCTIVE PULMONARY DISEASE, UNSPECIFIED COPD TYPE (HCC): Chronic | ICD-10-CM

## 2021-11-15 PROCEDURE — G2025 DIS SITE TELE SVCS RHC/FQHC: HCPCS | Performed by: NURSE PRACTITIONER

## 2021-11-15 RX ORDER — ALBUTEROL SULFATE 1.25 MG/3ML
1 SOLUTION RESPIRATORY (INHALATION) EVERY 4 HOURS PRN
Qty: 100 EACH | Refills: 5 | Status: SHIPPED | OUTPATIENT
Start: 2021-11-15

## 2021-11-15 NOTE — PROGRESS NOTES
Subjective   Tricia Loyola is a 61 y.o. female.   You have chosen to receive care through a telephone visit. Do you consent to use a telephone visit for your medical care today? Yes  20 minutes medical discussion.     Chief Complaint   Patient presents with   • URI     not any better covid test where neg and the flu        History of Present Illness   Reports covid test was negative and was unable to obtain albuterol neb solution due to a coding issue. Will resubmit. Also is asking if can take her mother's promethazine DM syrup which is an unopened bottle her mother never used. This is approved. Reports nasal congestion and cough worsening. No other new symptoms.    No other complaints today.     Parts of most recent relevant visit HPI, ROS  and PE may be carried forward and all are updated as appropriate for current situation.        Past Surgical History:   Procedure Laterality Date   • APPENDECTOMY     • BREAST BIOPSY Left    • CHOLECYSTECTOMY     • COLONOSCOPY N/A 11/26/2018    Procedure: COLONOSCOPY;  Surgeon: Meet Mcintyre MD;  Location: [facility] ENDOSCOPY;  Service: General   • CRANIOTOMY FOR ANEURYSM  2003   • ENDOSCOPY N/A 10/16/2019    Procedure: ESOPHAGOGASTRODUODENOSCOPY;  Surgeon: Kory Muhammad MD;  Location: [facility] ENDOSCOPY;  Service: Gastroenterology   • ENDOSCOPY N/A 3/16/2021    Procedure: ESOPHAGOGASTRODUODENOSCOPY;  Surgeon: Kory Muhammad MD;  Location: [facility] ENDOSCOPY;  Service: Gastroenterology;  Laterality: N/A;   • MASS EXCISION Right 11/5/2020    Procedure: EXCISE SOFT TISSUE MASS RIGHT POSTERIOR FLANK                 (latex allergy);  Surgeon: Meet Mcintyre MD;  Location: [facility] OR;  Service: General;  Laterality: Right;   • PAP SMEAR  08/16/2012   • TONSILLECTOMY     • UPPER GASTROINTESTINAL ENDOSCOPY  10/16/2019      Social History     Socioeconomic History   • Marital status: Legally    Tobacco Use   • Smoking status: Light Tobacco Smoker     Packs/day: 0.25      Years: 20.00     Pack years: 5.00     Types: Cigarettes, Electronic Cigarette   • Smokeless tobacco: Never Used   Vaping Use   • Vaping Use: Some days   • Substances: Nicotine, Flavoring   • Devices: Disposable   Substance and Sexual Activity   • Alcohol use: No   • Drug use: No   • Sexual activity: Not Currently     Partners: Male     Birth control/protection: Condom, Post-menopausal, None      The following portions of the patient's history were reviewed and updated as appropriate: allergies, current medications, past family history, past medical history, past social history, past surgical history and problem list.    Review of Systems   Constitutional: Positive for fatigue. Negative for appetite change, chills and fever.   HENT: Positive for congestion, postnasal drip, rhinorrhea, sinus pressure and sore throat.    Eyes: Negative.  Negative for blurred vision, double vision, photophobia and visual disturbance.   Respiratory: Positive for cough and shortness of breath. Negative for wheezing and stridor.    Cardiovascular: Negative.  Negative for chest pain, palpitations and leg swelling.   Gastrointestinal: Negative.  Negative for abdominal distention, abdominal pain, blood in stool, constipation, diarrhea, nausea, vomiting, GERD and indigestion.   Endocrine: Negative.  Negative for cold intolerance and heat intolerance.   Genitourinary: Negative.  Negative for dysuria, flank pain, frequency and urinary incontinence.   Musculoskeletal: Negative.  Negative for arthralgias and back pain.   Skin: Negative.    Allergic/Immunologic: Negative.  Negative for immunocompromised state.   Neurological: Negative.    Hematological: Negative.    Psychiatric/Behavioral: Negative.  Negative for agitation, behavioral problems, decreased concentration, dysphoric mood, hallucinations, self-injury, sleep disturbance, suicidal ideas, negative for hyperactivity, depressed mood and stress. The patient is not nervous/anxious.    All  other systems reviewed and are negative.    PHQ-9 Depression Screening  Little interest or pleasure in doing things?     Feeling down, depressed, or hopeless?     Trouble falling or staying asleep, or sleeping too much?     Feeling tired or having little energy?     Poor appetite or overeating?     Feeling bad about yourself - or that you are a failure or have let yourself or your family down?     Trouble concentrating on things, such as reading the newspaper or watching television?     Moving or speaking so slowly that other people could have noticed? Or the opposite - being so fidgety or restless that you have been moving around a lot more than usual?     Thoughts that you would be better off dead, or of hurting yourself in some way?     PHQ-9 Total Score     If you checked off any problems, how difficult have these problems made it for you to do your work, take care of things at home, or get along with other people?        Objective    There were no vitals filed for this visit.  There is no height or weight on file to calculate BMI.  Patient unable to check vitals at home, none to report.  Physical Exam  Vitals and nursing note reviewed.   Constitutional:       General: She is not in acute distress.  Pulmonary:      Effort: Pulmonary effort is normal. No respiratory distress.      Breath sounds: No stridor.   Neurological:      Mental Status: She is alert and oriented to person, place, and time.   Psychiatric:         Behavior: Behavior normal.         Thought Content: Thought content normal.         Judgment: Judgment normal.       No improvement yet with symptoms, advised drainage may increase as sinuses empty with prednisone, but should feel better overall. However, without nebulize albuterol and continued smoking, may not feel better yet despite effectiveness of oral meds. To collect the albuterol today, ok to take the promethazine DM syrup she has on hand. Will talk to me Wednesday, given my cell #.      Assessment/Plan   Diagnoses and all orders for this visit:    1. SOB (shortness of breath)  -     albuterol (ACCUNEB) 1.25 MG/3ML nebulizer solution; Take 3 mL by nebulization Every 4 (Four) Hours As Needed for Wheezing or Shortness of Air.  Dispense: 100 each; Refill: 5    2. Chronic obstructive pulmonary disease, unspecified COPD type (HCC)  -     albuterol (ACCUNEB) 1.25 MG/3ML nebulizer solution; Take 3 mL by nebulization Every 4 (Four) Hours As Needed for Wheezing or Shortness of Air.  Dispense: 100 each; Refill: 5        Return in about 3 days (around 11/18/2021), or if symptoms worsen or fail to improve.             This document has been electronically signed by UMESH Guzmán on November 15, 2021 13:27 CST

## 2021-11-18 ENCOUNTER — OFFICE VISIT (OUTPATIENT)
Dept: FAMILY MEDICINE CLINIC | Facility: CLINIC | Age: 61
End: 2021-11-18

## 2021-11-18 DIAGNOSIS — J06.9 ACUTE URI OF MULTIPLE SITES: ICD-10-CM

## 2021-11-18 DIAGNOSIS — E03.9 ACQUIRED HYPOTHYROIDISM: ICD-10-CM

## 2021-11-18 DIAGNOSIS — R11.0 NAUSEA: ICD-10-CM

## 2021-11-18 DIAGNOSIS — R19.7 DIARRHEA, UNSPECIFIED TYPE: Primary | ICD-10-CM

## 2021-11-18 PROCEDURE — 99213 OFFICE O/P EST LOW 20 MIN: CPT | Performed by: NURSE PRACTITIONER

## 2021-11-18 RX ORDER — LEVOTHYROXINE SODIUM 0.03 MG/1
12.5 TABLET ORAL DAILY
Qty: 15 TABLET | Refills: 5 | Status: SHIPPED | OUTPATIENT
Start: 2021-11-18 | End: 2022-05-11

## 2021-11-18 NOTE — PROGRESS NOTES
Subjective   Tricia Loyola is a 61 y.o. female.   You have chosen to receive care through a telehealth visit.  Do you consent to use a video/audio connection for your medical care today? Yes  This was an audio and video enabled telemedicine encounter.        Chief Complaint   Patient presents with   • poss food posion   • Diarrhea        History of Present Illness   Follow up of acute URI multiple sites after exposure to known  Covid 19 infected person in her extended family. Symptoms began on 11/11/21. Scheduled telehealth visit with me and. Azithromycin and prednisone started for URI symptoms on Friday 11/12/21.  covid testing on Saturday 11/13/21 was negative. Sinus symptoms/ uri symptoms are improving.   Reports ate tilapia last night and had uncontrollable diarrhea from 10pm to 4 am, but no more since then. Took OTC immodium with relief after 5 tablets. Has not eaten solids today due to nausea. Advised to follow full liquid diet today, advance to BRAT diet tomorrow, low residue foods, remain hydrated and seek emergency medical care if unable to tolerate adequate amounts of oral liquids over the weekend or if symptoms worsen.   Exposed to Covid 19 over 5 days ago, her test was negative, but advised may have tested too soon, this may be manifestation of covid.    No other complaints today.   Parts of most recent relevant visit HPI, ROS  and PE may be carried forward and all are updated as appropriate for current situation.    Past Surgical History:   Procedure Laterality Date   • APPENDECTOMY     • BREAST BIOPSY Left    • CHOLECYSTECTOMY     • COLONOSCOPY N/A 11/26/2018    Procedure: COLONOSCOPY;  Surgeon: Meet Mcintyre MD;  Location: [facility] ENDOSCOPY;  Service: General   • CRANIOTOMY FOR ANEURYSM  2003   • ENDOSCOPY N/A 10/16/2019    Procedure: ESOPHAGOGASTRODUODENOSCOPY;  Surgeon: Kory Muhammad MD;  Location: [facility] ENDOSCOPY;  Service: Gastroenterology   • ENDOSCOPY N/A 3/16/2021    Procedure:  ESOPHAGOGASTRODUODENOSCOPY;  Surgeon: Kory Muhammad MD;  Location: [facility] ENDOSCOPY;  Service: Gastroenterology;  Laterality: N/A;   • MASS EXCISION Right 11/5/2020    Procedure: EXCISE SOFT TISSUE MASS RIGHT POSTERIOR FLANK                 (latex allergy);  Surgeon: Meet Mcintyre MD;  Location: [facility] OR;  Service: General;  Laterality: Right;   • PAP SMEAR  08/16/2012   • TONSILLECTOMY     • UPPER GASTROINTESTINAL ENDOSCOPY  10/16/2019      Social History     Socioeconomic History   • Marital status: Legally    Tobacco Use   • Smoking status: Light Tobacco Smoker     Packs/day: 0.25     Years: 20.00     Pack years: 5.00     Types: Cigarettes, Electronic Cigarette   • Smokeless tobacco: Never Used   Vaping Use   • Vaping Use: Some days   • Substances: Nicotine, Flavoring   • Devices: Disposable   Substance and Sexual Activity   • Alcohol use: No   • Drug use: No   • Sexual activity: Not Currently     Partners: Male     Birth control/protection: Condom, Post-menopausal, None      The following portions of the patient's history were reviewed and updated as appropriate: allergies, current medications, past family history, past medical history, past social history, past surgical history and problem list.    Review of Systems   Constitutional: Positive for fatigue. Negative for appetite change, chills and fever.   HENT: Positive for congestion. Negative for postnasal drip, rhinorrhea, sinus pressure and sore throat.    Eyes: Negative.  Negative for blurred vision, double vision, photophobia and visual disturbance.   Respiratory: Negative.  Negative for cough, shortness of breath, wheezing and stridor.    Cardiovascular: Negative.  Negative for chest pain, palpitations and leg swelling.   Gastrointestinal: Positive for diarrhea and nausea. Negative for abdominal distention, abdominal pain, blood in stool, constipation, vomiting, GERD and indigestion.   Endocrine: Negative.  Negative for cold intolerance  and heat intolerance.   Genitourinary: Negative.  Negative for dysuria, flank pain, frequency and urinary incontinence.   Musculoskeletal: Negative.  Negative for arthralgias and back pain.   Skin: Negative.    Allergic/Immunologic: Negative.  Negative for immunocompromised state.   Neurological: Negative.    Hematological: Negative.    Psychiatric/Behavioral: Negative.  Negative for agitation, behavioral problems, decreased concentration, dysphoric mood, hallucinations, self-injury, sleep disturbance, suicidal ideas, negative for hyperactivity, depressed mood and stress. The patient is not nervous/anxious.    All other systems reviewed and are negative.    PHQ-9 Depression Screening  Little interest or pleasure in doing things?     Feeling down, depressed, or hopeless?     Trouble falling or staying asleep, or sleeping too much?     Feeling tired or having little energy?     Poor appetite or overeating?     Feeling bad about yourself - or that you are a failure or have let yourself or your family down?     Trouble concentrating on things, such as reading the newspaper or watching television?     Moving or speaking so slowly that other people could have noticed? Or the opposite - being so fidgety or restless that you have been moving around a lot more than usual?     Thoughts that you would be better off dead, or of hurting yourself in some way?     PHQ-9 Total Score     If you checked off any problems, how difficult have these problems made it for you to do your work, take care of things at home, or get along with other people?        Objective    There were no vitals filed for this visit.  There is no height or weight on file to calculate BMI.    Physical Exam  Vitals and nursing note reviewed.   Constitutional:       General: She is not in acute distress.     Appearance: Normal appearance. She is well-developed and normal weight. She is ill-appearing. She is not diaphoretic.   HENT:      Head: Normocephalic and  atraumatic.   Eyes:      General: No scleral icterus.        Right eye: No discharge.         Left eye: No discharge.      Conjunctiva/sclera: Conjunctivae normal.      Pupils: Pupils are equal, round, and reactive to light.   Neck:      Trachea: No tracheal deviation.   Pulmonary:      Effort: Pulmonary effort is normal. No respiratory distress.      Breath sounds: No wheezing.   Musculoskeletal:         General: No tenderness or deformity. Normal range of motion.      Cervical back: Normal range of motion and neck supple.   Skin:     General: Skin is dry.      Coloration: Skin is not pale.      Findings: No erythema or rash.   Neurological:      General: No focal deficit present.      Mental Status: She is alert and oriented to person, place, and time. Mental status is at baseline.      Cranial Nerves: No cranial nerve deficit.   Psychiatric:         Mood and Affect: Mood normal.         Behavior: Behavior normal.         Thought Content: Thought content normal.         Judgment: Judgment normal.     increase rest and oral fluids.       Assessment/Plan   Diagnoses and all orders for this visit:    1. Diarrhea, unspecified type (Primary)    2. Acquired hypothyroidism  -     levothyroxine (SYNTHROID, LEVOTHROID) 25 MCG tablet; Take 0.5 tablets by mouth Daily.  Dispense: 15 tablet; Refill: 5    3. Acute URI of multiple sites    4. Nausea      Return in about 4 days (around 11/22/2021).               This document has been electronically signed by UMESH Guzmán on November 18, 2021 16:02 CST

## 2021-11-23 ENCOUNTER — TELEPHONE (OUTPATIENT)
Dept: CARDIOLOGY | Facility: CLINIC | Age: 61
End: 2021-11-23

## 2021-11-23 ENCOUNTER — TELEPHONE (OUTPATIENT)
Dept: ORTHOPEDIC SURGERY | Facility: CLINIC | Age: 61
End: 2021-11-23

## 2021-11-23 NOTE — TELEPHONE ENCOUNTER
Contacted PT per Dr. Martinez about testing, will talk to pt after testing by Dr. Muhammad.  Pt was unavailable.    ----- Message from Ramin Martinez MD sent at 11/23/2021 12:21 PM CST -----  Regarding: RE: luis pt  Will wait till Dr. Muhammad's testing  ----- Message -----  From: Saima Fields MA  Sent: 11/18/2021   2:15 PM CST  To: Ramin Martinez MD  Subject: FW: luis pt                                     Hey, naresh remember if I sent this to you or not.  I sent this while you were out.  ----- Message -----  From: Alyse Jeffery RegSched Rep  Sent: 11/4/2021  10:55 AM CST  To: Saima Fields MA  Subject: luis pt                                         Pt called, said that she recently had some testing done by [facility].  She wanted to see if Dr Martinez would look at the testing, and see what he thinks.     Can reach pt at [phone] or  [phone]      Pt advised that Luis was out of office, and that it would be next week before gets a call back

## 2021-11-23 NOTE — TELEPHONE ENCOUNTER
ZEKE       Pt CALLED STATING SHE WAS NOT INFORMED SHE WAS TO COME TO OUR OFFICE ON THE 30 AFTER HER COVID TEST. SHE STATES SHE CAN NOT DO THAT BECAUSE OF HER RIDE     PLEASE ADVISE

## 2021-11-30 ENCOUNTER — PRE-ADMISSION TESTING (OUTPATIENT)
Dept: PREADMISSION TESTING | Facility: HOSPITAL | Age: 61
End: 2021-11-30

## 2021-11-30 ENCOUNTER — OFFICE VISIT (OUTPATIENT)
Dept: ORTHOPEDIC SURGERY | Facility: CLINIC | Age: 61
End: 2021-11-30

## 2021-11-30 ENCOUNTER — LAB (OUTPATIENT)
Dept: LAB | Facility: HOSPITAL | Age: 61
End: 2021-11-30

## 2021-11-30 VITALS — WEIGHT: 109.9 LBS | BODY MASS INDEX: 20.22 KG/M2 | HEIGHT: 62 IN

## 2021-11-30 VITALS
BODY MASS INDEX: 19.88 KG/M2 | WEIGHT: 108 LBS | HEIGHT: 62 IN | RESPIRATION RATE: 18 BRPM | OXYGEN SATURATION: 98 % | HEART RATE: 66 BPM

## 2021-11-30 DIAGNOSIS — Z86.79 HISTORY OF CEREBRAL ANEURYSM: ICD-10-CM

## 2021-11-30 DIAGNOSIS — M67.431 GANGLION CYST OF VOLAR ASPECT OF RIGHT WRIST: ICD-10-CM

## 2021-11-30 DIAGNOSIS — M25.531 RIGHT WRIST PAIN: ICD-10-CM

## 2021-11-30 DIAGNOSIS — J44.9 CHRONIC OBSTRUCTIVE PULMONARY DISEASE, UNSPECIFIED COPD TYPE (HCC): ICD-10-CM

## 2021-11-30 DIAGNOSIS — K21.00 GASTROESOPHAGEAL REFLUX DISEASE WITH ESOPHAGITIS WITHOUT HEMORRHAGE: ICD-10-CM

## 2021-11-30 DIAGNOSIS — M25.531 RIGHT WRIST PAIN: Primary | ICD-10-CM

## 2021-11-30 LAB — SARS-COV-2 N GENE RESP QL NAA+PROBE: NOT DETECTED

## 2021-11-30 PROCEDURE — 99214 OFFICE O/P EST MOD 30 MIN: CPT | Performed by: NURSE PRACTITIONER

## 2021-11-30 PROCEDURE — 87635 SARS-COV-2 COVID-19 AMP PRB: CPT

## 2021-11-30 PROCEDURE — C9803 HOPD COVID-19 SPEC COLLECT: HCPCS

## 2021-11-30 RX ORDER — METOPROLOL SUCCINATE 100 MG/1
100 TABLET, EXTENDED RELEASE ORAL DAILY
COMMUNITY
End: 2022-06-20 | Stop reason: SDUPTHER

## 2021-11-30 RX ORDER — SODIUM CHLORIDE, SODIUM GLUCONATE, SODIUM ACETATE, POTASSIUM CHLORIDE AND MAGNESIUM CHLORIDE 526; 502; 368; 37; 30 MG/100ML; MG/100ML; MG/100ML; MG/100ML; MG/100ML
1000 INJECTION, SOLUTION INTRAVENOUS CONTINUOUS PRN
Status: CANCELLED | OUTPATIENT
Start: 2021-12-03

## 2021-11-30 RX ORDER — LOSARTAN POTASSIUM 50 MG/1
50 TABLET ORAL NIGHTLY
COMMUNITY
End: 2022-02-07

## 2021-11-30 RX ORDER — FLUTICASONE PROPIONATE 50 MCG
2 SPRAY, SUSPENSION (ML) NASAL DAILY PRN
COMMUNITY
End: 2022-12-07

## 2021-11-30 NOTE — PROGRESS NOTES
"Tricia Loyola is a 61 y.o. female returns for     Chief Complaint   Patient presents with   • Right Wrist - Pre-op Exam       HISTORY OF PRESENT ILLNESS: Patient presents to office for follow-up of chronic right wrist pain due to a ganglion cyst along the volar/radial aspect of her right wrist.  The patient has continued to have persistent pain and tenderness at the right wrist/ganglion cyst that has been refractory to conservative treatment efforts.  She has tried use of a wrist control splint, rest/activity modification and ice therapy with minimal improvement.  Patient was previously evaluated per Dr. Vargas to discuss proceeding with surgical intervention and was evaluated in office on 10/29/2021.  Patient is here today for preoperative exam and updated history and physical.  She is scheduled for excision of the volar ganglion cyst from the right wrist to be performed per Dr. Vargas on 12/3/2021.  Patient denies any changes to her medical history.  No new complaints or concerns noted.     CONCURRENT MEDICAL HISTORY:    The following portions of the patient's history were reviewed and updated as appropriate: allergies, current medications, past family history, past medical history, past social history, past surgical history and problem list.     ROS  No fevers or chills.  No chest pain or shortness of air.  No GI or  disturbances.  Right wrist pain.    PHYSICAL EXAMINATION:       Ht 157.5 cm (62\")   Wt 49.9 kg (109 lb 14.4 oz)   BMI 20.10 kg/m²     Physical Exam  Vitals reviewed.   Constitutional:       General: She is not in acute distress.     Appearance: She is well-developed. She is not ill-appearing.   HENT:      Head: Normocephalic.   Cardiovascular:      Rate and Rhythm: Regular rhythm.      Heart sounds: Normal heart sounds.   Pulmonary:      Effort: Pulmonary effort is normal. No respiratory distress.      Breath sounds: Normal breath sounds.   Abdominal:      General: There is no distension.      " Palpations: Abdomen is soft.   Musculoskeletal:         General: Swelling (Right wrist) and tenderness (Right wrist) present. No deformity or signs of injury.   Skin:     General: Skin is warm and dry.      Capillary Refill: Capillary refill takes less than 2 seconds.      Findings: No erythema.   Neurological:      Mental Status: She is alert and oriented to person, place, and time.      GCS: GCS eye subscore is 4. GCS verbal subscore is 5. GCS motor subscore is 6.   Psychiatric:         Speech: Speech normal.         Behavior: Behavior normal.         Thought Content: Thought content normal.         Judgment: Judgment normal.         GAIT:     [x]  Normal  []  Antalgic    Assistive device: [x]  None  []  Walker     []  Crutches  []  Cane     []  Wheelchair  []  Stretcher    Right Hand Exam     Tenderness   Right hand tenderness location: radial/volar wrist/ganglion cyst.    Range of Motion   The patient has normal right wrist ROM.     Muscle Strength   Right wrist normal muscle strength: deferred.    Other   Erythema: absent  Sensation: normal  Pulse: present    Comments:  A tender mass is present to the radial aspect of the volar wrist that is consistent with a ganglion cyst.  It is approximated to be 1.5 cm in size based on what is visible/palpable.  Overall good motion of the wrist joint.  Patient has increased pain at the end range of flexion and extension.  No erythema.  Skin is intact.  Neurovascularly intact.              XR WRIST 3+ VW RIGHT     CLINICAL HISTORY:      61 years  Female  PAIN, M25.531 Pain in right wrist     COMPARISON:      None available     TECHNIQUE:      Three views-AP, oblique, and lateral radiographs of the right  wrist     FINDINGS:      There is no evidence of an acute fracture or malalignment. No  articular abnormality is are identified. There is no significant  soft tissue swelling.     IMPRESSION:        1. Unremarkable exam of the right wrist without evidence of an  acute  fracture or significant degenerative change.           Electronically signed by:  Amita Angel MD  9/23/2021 11:23 AM  CDT Workstation: [id number]      ASSESSMENT:    Diagnoses and all orders for this visit:    Right wrist pain    Ganglion cyst of volar aspect of right wrist    PLAN     The patient has had no changes since her last office visit.  Patient continues to have chronic right wrist pain/swelling due to a ganglion cyst along the radial/volar aspect of the right wrist.  The cyst has not improved with conservative treatment efforts and has remained present/persistent for several months.  The patient complains of pain and tenderness in her right wrist that limits her daily activities and affects her quality of life.  Patient is seeking more definitive resolution.  She is scheduled for excision of this ganglion cyst per Dr. Vargas on 12/3/2021 and is ready to proceed.    The patient is instructed to stop her baby aspirin today and she can resume it following surgery.    The patient voiced understanding of the risks, benefits, and alternative forms of treatment that were discussed and the patient consents to proceed with surgery.  All risks, benefits and alternatives were discussed.  Risks include, but not exclusive to anesthetic complications, including death, MI, CVA, infection, bleeding DVT, fracture, residual pain and need for future surgery.    This discussion was held with the patient by Leonardo Vargas MD and all questions were answered.    Time spent of a minimum of 30 minutes including the face to face evaluation, reviewing of medical history and prior medial records, reviewing of diagnostic studies, documentation, patient education and coordination of care.     EMR Dragon/Transciption Disclaimer: Some of this note may be an electronic transcription/translation of spoken language to printed text.  The electronic translation of spoken language may permit erroneous, or at times, nonsensical words or phrases  to be inadvertently transcribed. Although I have reviewed the note for such errors, some may still exist.     Return for follow up postoperatively.        This document has been electronically signed by UMESH Arzola on November 30, 2021 13:07 CST      UMESH Arzola

## 2021-12-02 ENCOUNTER — ANESTHESIA EVENT (OUTPATIENT)
Dept: PERIOP | Facility: HOSPITAL | Age: 61
End: 2021-12-02

## 2021-12-03 ENCOUNTER — HOSPITAL ENCOUNTER (OUTPATIENT)
Facility: HOSPITAL | Age: 61
Setting detail: HOSPITAL OUTPATIENT SURGERY
Discharge: HOME | End: 2021-12-03
Attending: ORTHOPAEDIC SURGERY | Admitting: ORTHOPAEDIC SURGERY

## 2021-12-03 ENCOUNTER — NURSE TRIAGE (OUTPATIENT)
Dept: CALL CENTER | Facility: HOSPITAL | Age: 61
End: 2021-12-03

## 2021-12-03 ENCOUNTER — ANESTHESIA (OUTPATIENT)
Dept: PERIOP | Facility: HOSPITAL | Age: 61
End: 2021-12-03

## 2021-12-03 VITALS
RESPIRATION RATE: 18 BRPM | OXYGEN SATURATION: 98 % | SYSTOLIC BLOOD PRESSURE: 127 MMHG | BODY MASS INDEX: 20.12 KG/M2 | TEMPERATURE: 97.5 F | HEART RATE: 76 BPM | HEIGHT: 62 IN | DIASTOLIC BLOOD PRESSURE: 60 MMHG | WEIGHT: 109.35 LBS

## 2021-12-03 DIAGNOSIS — F17.200 TOBACCO DEPENDENCE SYNDROME: Chronic | ICD-10-CM

## 2021-12-03 DIAGNOSIS — J44.9 CHRONIC OBSTRUCTIVE PULMONARY DISEASE, UNSPECIFIED COPD TYPE (HCC): ICD-10-CM

## 2021-12-03 DIAGNOSIS — M67.431 GANGLION CYST OF VOLAR ASPECT OF RIGHT WRIST: Primary | ICD-10-CM

## 2021-12-03 DIAGNOSIS — K21.00 GASTROESOPHAGEAL REFLUX DISEASE WITH ESOPHAGITIS WITHOUT HEMORRHAGE: ICD-10-CM

## 2021-12-03 DIAGNOSIS — Z86.79 HISTORY OF CEREBRAL ANEURYSM: ICD-10-CM

## 2021-12-03 DIAGNOSIS — M25.531 RIGHT WRIST PAIN: ICD-10-CM

## 2021-12-03 PROCEDURE — 25010000002 CEFAZOLIN PER 500 MG

## 2021-12-03 PROCEDURE — 25010000002 DEXAMETHASONE PER 1 MG: Performed by: NURSE ANESTHETIST, CERTIFIED REGISTERED

## 2021-12-03 PROCEDURE — 25111 REMOVE WRIST TENDON LESION: CPT | Performed by: ORTHOPAEDIC SURGERY

## 2021-12-03 PROCEDURE — 25010000002 MIDAZOLAM PER 1 MG: Performed by: NURSE ANESTHETIST, CERTIFIED REGISTERED

## 2021-12-03 PROCEDURE — 25010000002 ONDANSETRON PER 1 MG: Performed by: NURSE ANESTHETIST, CERTIFIED REGISTERED

## 2021-12-03 PROCEDURE — 25010000002 PHENYLEPHRINE 10 MG/ML SOLUTION: Performed by: NURSE ANESTHETIST, CERTIFIED REGISTERED

## 2021-12-03 PROCEDURE — 25010000002 FENTANYL CITRATE (PF) 50 MCG/ML SOLUTION: Performed by: NURSE ANESTHETIST, CERTIFIED REGISTERED

## 2021-12-03 PROCEDURE — 25010000002 HYDROMORPHONE 1 MG/ML SOLUTION: Performed by: NURSE ANESTHETIST, CERTIFIED REGISTERED

## 2021-12-03 PROCEDURE — 25010000002 PROPOFOL 10 MG/ML EMULSION: Performed by: NURSE ANESTHETIST, CERTIFIED REGISTERED

## 2021-12-03 PROCEDURE — 88304 TISSUE EXAM BY PATHOLOGIST: CPT

## 2021-12-03 RX ORDER — PROPOFOL 10 MG/ML
VIAL (ML) INTRAVENOUS AS NEEDED
Status: DISCONTINUED | OUTPATIENT
Start: 2021-12-03 | End: 2021-12-03 | Stop reason: SURG

## 2021-12-03 RX ORDER — DEXAMETHASONE SODIUM PHOSPHATE 4 MG/ML
INJECTION, SOLUTION INTRA-ARTICULAR; INTRALESIONAL; INTRAMUSCULAR; INTRAVENOUS; SOFT TISSUE AS NEEDED
Status: DISCONTINUED | OUTPATIENT
Start: 2021-12-03 | End: 2021-12-03 | Stop reason: SURG

## 2021-12-03 RX ORDER — ONDANSETRON 4 MG/1
4 TABLET, FILM COATED ORAL ONCE AS NEEDED
Status: DISCONTINUED | OUTPATIENT
Start: 2021-12-03 | End: 2021-12-03 | Stop reason: HOSPADM

## 2021-12-03 RX ORDER — EPHEDRINE SULFATE 50 MG/ML
INJECTION, SOLUTION INTRAVENOUS AS NEEDED
Status: DISCONTINUED | OUTPATIENT
Start: 2021-12-03 | End: 2021-12-03 | Stop reason: SURG

## 2021-12-03 RX ORDER — OXYCODONE AND ACETAMINOPHEN 10; 325 MG/1; MG/1
1 TABLET ORAL EVERY 4 HOURS PRN
Qty: 30 TABLET | Refills: 0 | Status: SHIPPED | OUTPATIENT
Start: 2021-12-03 | End: 2021-12-07 | Stop reason: SINTOL

## 2021-12-03 RX ORDER — FENTANYL CITRATE 50 UG/ML
INJECTION, SOLUTION INTRAMUSCULAR; INTRAVENOUS AS NEEDED
Status: DISCONTINUED | OUTPATIENT
Start: 2021-12-03 | End: 2021-12-03 | Stop reason: SURG

## 2021-12-03 RX ORDER — BUPIVACAINE HYDROCHLORIDE 2.5 MG/ML
INJECTION, SOLUTION EPIDURAL; INFILTRATION; INTRACAUDAL AS NEEDED
Status: DISCONTINUED | OUTPATIENT
Start: 2021-12-03 | End: 2021-12-03 | Stop reason: HOSPADM

## 2021-12-03 RX ORDER — OXYCODONE AND ACETAMINOPHEN 7.5; 325 MG/1; MG/1
1 TABLET ORAL ONCE AS NEEDED
Status: DISCONTINUED | OUTPATIENT
Start: 2021-12-03 | End: 2021-12-03 | Stop reason: HOSPADM

## 2021-12-03 RX ORDER — MIDAZOLAM HYDROCHLORIDE 1 MG/ML
INJECTION INTRAMUSCULAR; INTRAVENOUS AS NEEDED
Status: DISCONTINUED | OUTPATIENT
Start: 2021-12-03 | End: 2021-12-03 | Stop reason: SURG

## 2021-12-03 RX ORDER — BUPIVACAINE HCL/0.9 % NACL/PF 0.1 %
2 PLASTIC BAG, INJECTION (ML) EPIDURAL ONCE
Status: COMPLETED | OUTPATIENT
Start: 2021-12-03 | End: 2021-12-03

## 2021-12-03 RX ORDER — PHENYLEPHRINE HYDROCHLORIDE 10 MG/ML
INJECTION INTRAVENOUS AS NEEDED
Status: DISCONTINUED | OUTPATIENT
Start: 2021-12-03 | End: 2021-12-03 | Stop reason: SURG

## 2021-12-03 RX ORDER — ONDANSETRON 2 MG/ML
INJECTION INTRAMUSCULAR; INTRAVENOUS AS NEEDED
Status: DISCONTINUED | OUTPATIENT
Start: 2021-12-03 | End: 2021-12-03 | Stop reason: SURG

## 2021-12-03 RX ORDER — MEPERIDINE HYDROCHLORIDE 25 MG/ML
12.5 INJECTION INTRAMUSCULAR; INTRAVENOUS; SUBCUTANEOUS
Status: DISCONTINUED | OUTPATIENT
Start: 2021-12-03 | End: 2021-12-03 | Stop reason: HOSPADM

## 2021-12-03 RX ORDER — SODIUM CHLORIDE, SODIUM GLUCONATE, SODIUM ACETATE, POTASSIUM CHLORIDE AND MAGNESIUM CHLORIDE 526; 502; 368; 37; 30 MG/100ML; MG/100ML; MG/100ML; MG/100ML; MG/100ML
1000 INJECTION, SOLUTION INTRAVENOUS CONTINUOUS PRN
Status: DISCONTINUED | OUTPATIENT
Start: 2021-12-03 | End: 2021-12-03 | Stop reason: HOSPADM

## 2021-12-03 RX ADMIN — PHENYLEPHRINE HYDROCHLORIDE 100 MCG: 10 INJECTION INTRAVENOUS at 12:31

## 2021-12-03 RX ADMIN — PHENYLEPHRINE HYDROCHLORIDE 100 MCG: 10 INJECTION INTRAVENOUS at 11:55

## 2021-12-03 RX ADMIN — PHENYLEPHRINE HYDROCHLORIDE 100 MCG: 10 INJECTION INTRAVENOUS at 12:11

## 2021-12-03 RX ADMIN — FENTANYL CITRATE 100 MCG: 50 INJECTION INTRAMUSCULAR; INTRAVENOUS at 11:43

## 2021-12-03 RX ADMIN — Medication 2 G: at 11:50

## 2021-12-03 RX ADMIN — HYDROMORPHONE HYDROCHLORIDE 0.5 MG: 1 INJECTION, SOLUTION INTRAMUSCULAR; INTRAVENOUS; SUBCUTANEOUS at 13:16

## 2021-12-03 RX ADMIN — PROPOFOL 120 MG: 10 INJECTION, EMULSION INTRAVENOUS at 11:45

## 2021-12-03 RX ADMIN — PHENYLEPHRINE HYDROCHLORIDE 100 MCG: 10 INJECTION INTRAVENOUS at 12:16

## 2021-12-03 RX ADMIN — PHENYLEPHRINE HYDROCHLORIDE 100 MCG: 10 INJECTION INTRAVENOUS at 11:54

## 2021-12-03 RX ADMIN — HYDROMORPHONE HYDROCHLORIDE 0.5 MG: 1 INJECTION, SOLUTION INTRAMUSCULAR; INTRAVENOUS; SUBCUTANEOUS at 13:04

## 2021-12-03 RX ADMIN — ONDANSETRON 4 MG: 2 INJECTION INTRAMUSCULAR; INTRAVENOUS at 12:38

## 2021-12-03 RX ADMIN — MIDAZOLAM HYDROCHLORIDE 2 MG: 1 INJECTION, SOLUTION INTRAMUSCULAR; INTRAVENOUS at 11:43

## 2021-12-03 RX ADMIN — PHENYLEPHRINE HYDROCHLORIDE 100 MCG: 10 INJECTION INTRAVENOUS at 12:03

## 2021-12-03 RX ADMIN — GLYCOPYRROLATE 0.2 MG: 0.2 INJECTION, SOLUTION INTRAMUSCULAR; INTRAVITREAL at 11:57

## 2021-12-03 RX ADMIN — SODIUM CHLORIDE, SODIUM GLUCONATE, SODIUM ACETATE, POTASSIUM CHLORIDE AND MAGNESIUM CHLORIDE 1000 ML: 526; 502; 368; 37; 30 INJECTION, SOLUTION INTRAVENOUS at 10:50

## 2021-12-03 RX ADMIN — PHENYLEPHRINE HYDROCHLORIDE 100 MCG: 10 INJECTION INTRAVENOUS at 11:58

## 2021-12-03 RX ADMIN — EPHEDRINE SULFATE 10 MG: 50 INJECTION INTRAVENOUS at 11:50

## 2021-12-03 RX ADMIN — PHENYLEPHRINE HYDROCHLORIDE 100 MCG: 10 INJECTION INTRAVENOUS at 12:07

## 2021-12-03 RX ADMIN — DEXAMETHASONE SODIUM PHOSPHATE 4 MG: 4 INJECTION, SOLUTION INTRAMUSCULAR; INTRAVENOUS at 11:51

## 2021-12-03 RX ADMIN — SODIUM CHLORIDE, SODIUM GLUCONATE, SODIUM ACETATE, POTASSIUM CHLORIDE AND MAGNESIUM CHLORIDE: 526; 502; 368; 37; 30 INJECTION, SOLUTION INTRAVENOUS at 12:31

## 2021-12-03 RX ADMIN — HYDROMORPHONE HYDROCHLORIDE 0.5 MG: 1 INJECTION, SOLUTION INTRAMUSCULAR; INTRAVENOUS; SUBCUTANEOUS at 13:28

## 2021-12-03 RX ADMIN — PHENYLEPHRINE HYDROCHLORIDE 100 MCG: 10 INJECTION INTRAVENOUS at 12:20

## 2021-12-03 RX ADMIN — EPHEDRINE SULFATE 10 MG: 50 INJECTION INTRAVENOUS at 11:54

## 2021-12-03 NOTE — INTERVAL H&P NOTE
H&P reviewed. The patient was examined and there are no changes to the H&P.      Vitals:    12/03/21 1031   BP: 130/59   Pulse: 61   Resp: 18   Temp: 97.8 °F (36.6 °C)   SpO2: 100%       Allergies   Allergen Reactions    Iodine Anaphylaxis    Nitrofuran Derivatives Hives    Toradol [Ketorolac Tromethamine] Anaphylaxis    Bactrim [Sulfamethoxazole-Trimethoprim] Swelling     eyes    Cleocin [Clindamycin Hcl] Hives and Swelling     Swelling of eyes and hives    Augmentin [Amoxicillin-Pot Clavulanate] Hives    Ciprofloxacin Rash    Fiorinal [Butalbital-Aspirin-Caffeine] Palpitations    Ibuprofen Rash    Imitrex [Sumatriptan] Palpitations    Latex Rash    Other Rash     prego spaghetti sauce    Midrin causes tachycardia    Ultram [Tramadol] Palpitations       Prior to Admission medications    Medication Sig Start Date End Date Taking? Authorizing Provider   amitriptyline (ELAVIL) 75 MG tablet Take 75 mg by mouth Every Night.   Yes Provider, MD Doris   aspirin 81 MG EC tablet Take 1 tablet by mouth Daily. 1/14/20  Yes CeronRosario APRN   atorvastatin (LIPITOR) 40 MG tablet Take 1 tablet by mouth Every Night. For cholesterol 8/12/21  Yes Rosario Ceron APRN   busPIRone (BUSPAR) 30 MG tablet Take 1 tablet by mouth 2 (Two) Times a Day. 3/7/18  Yes Sonia Mata MD   clonazePAM (KlonoPIN) 0.5 MG tablet Take 1 tablet by mouth Daily As Needed for Anxiety. 11/11/21  Yes Rosario Ceron APRN   Dexilant 60 MG capsule TAKE 1 CAPSULE BY MOUTH DAILY  Patient taking differently: Take 1 capsule by mouth Daily. Takes at noon 9/9/21  Yes Rosario Ceron APRN   famotidine (PEPCID) 40 MG tablet Take 1 tablet by mouth Daily. 9/15/21  Yes Johnny Smiley PA-C   HYDROcodone-acetaminophen (NORCO)  MG per tablet Take 1 tablet by mouth Every 6 (Six) Hours As Needed for Moderate Pain . 11/4/21  Yes Rosario Ceron APRN   levothyroxine (SYNTHROID, LEVOTHROID) 25 MCG tablet Take 0.5 tablets by mouth  Daily. 11/18/21  Yes Rosario Ceron APRN   losartan (COZAAR) 50 MG tablet Take 50 mg by mouth Every Night.   Yes Doris Alejo MD   metoprolol succinate XL (TOPROL-XL) 100 MG 24 hr tablet Take 100 mg by mouth Daily. Takes at 3 pm   Yes Doris Alejo MD   OXcarbazepine (TRILEPTAL) 150 MG tablet Take 150 mg by mouth Every Night. Daily at bedtime    Yes Doris Alejo MD   rOPINIRole (REQUIP) 3 MG tablet TAKE 1 TABLET BY MOUTH EVERY NIGHT AT BEDTIME 11/11/21  Yes Rosario Ceron APRN   topiramate (TOPAMAX) 100 MG tablet Take 1 tablet by mouth 2 (Two) Times a Day.  Patient taking differently: Take 100 mg by mouth 2 (Two) Times a Day. Noon and 3 pm 5/28/20  Yes Rosario Ceron APRN   albuterol (ACCUNEB) 1.25 MG/3ML nebulizer solution Take 3 mL by nebulization Every 4 (Four) Hours As Needed for Wheezing or Shortness of Air. 11/15/21   Rosario Cerno APRN   albuterol sulfate  (90 Base) MCG/ACT inhaler Inhale 2 puffs Every 4 (Four) Hours As Needed for Wheezing. 3/28/21   Rosario Ceron APRN   Botox 200 units reconstituted solution INJECT 200 UNITS INTO THE HEAD AND NECK MUSCLES EVERY 12 WEEKS 4/26/21   ProviderDoris MD   Cyanocobalamin 1000 MCG/ML kit Inject 1,000 mL as directed Every 30 (Thirty) Days. 9/24/21   Rosario Ceron APRN   fluticasone (FLONASE) 50 MCG/ACT nasal spray 2 sprays into the nostril(s) as directed by provider Daily As Needed for Rhinitis.    Doris Alejo MD   folic acid (FOLVITE) 800 MCG tablet Take 1 tablet by mouth Daily. 6/28/21   Rosario Ceron APRN   Hydrocortisone, Perianal, (PROCTOCORT) 1 % cream rectal cream Insert  into the rectum 2 (Two) Times a Day. 9/7/21   Rosario Ceron APRN   Linzess 290 MCG capsule capsule Take 290 mcg by mouth Every Morning Before Breakfast. 2/9/21   ProviderDoris MD   montelukast (SINGULAIR) 10 MG tablet Take 1 tablet by mouth Every Night. 10/11/21   Ceron, UMESH Ponce  "  ondansetron ODT (ZOFRAN-ODT) 4 MG disintegrating tablet Place 1 tablet on the tongue Every 8 (Eight) Hours As Needed for Nausea. 6/28/21   Osmany, UMESH Ponce   prochlorperazine (COMPAZINE) 10 MG tablet Take 1 tablet by mouth Every 6 (Six) Hours As Needed for Nausea or Vomiting. 6/28/21   Osmany, UMESH Ponce   sodium-potassium-magnesium sulfates (Suprep Bowel Prep Kit) 17.5-3.13-1.6 GM/177ML solution oral solution As directed per instruction sheet for colonoscopy 10/26/21   Johnny Smiley PA-C   Syringe/Needle, Disp, 25G X 5/8\" 3 ML misc 1 each Every 28 (Twenty-Eight) Days. 9/22/20   Osmany, UMESH Ponce   witch hazel-glycerin (TUCKS) pad Insert  into the rectum As Needed for Hemorrhoids. 8/31/21   Sarah Willett APRN       Past Medical History:   Diagnosis Date    Abdominal pain     Anemia     Anxiety and depression     Chronic post-traumatic headache      rebound       Depressive disorder     Disease of thyroid gland     Encounter for gynecological examination     Endometriosis     Gastroesophageal reflux disease     Generalized anxiety disorder     History of mammogram 08/2008    MAMMOGRAM DIAGNOSTIC BILATERAL 72503 ([facility]) (1)      Hyperlipidemia     Hypertension     Ingrown toenail     Injury of back     Leaky heart valve     Leaky heart valve     Migraine     Nonruptured cerebral aneurysm     Osteoporosis     Posttraumatic stress disorder     Primary osteoarthritis of left knee     Primary osteoarthritis of right knee     PTSD (post-traumatic stress disorder)     Seizure (HCC)     last seizure 2003.  no meds at this time    Skin cancer     basal cell on back    Tachycardia     Viral hepatitis A     Wears dentures     uppers only    Wears glasses        Past Surgical History:   Procedure Laterality Date    APPENDECTOMY      BREAST BIOPSY Left     CHOLECYSTECTOMY      COLONOSCOPY N/A 11/26/2018    Procedure: COLONOSCOPY;  Surgeon: Meet Mcintyre MD;  Location: [facility]" ENDOSCOPY;  Service: General    CRANIOTOMY FOR ANEURYSM  2003    ENDOSCOPY N/A 10/16/2019    Procedure: ESOPHAGOGASTRODUODENOSCOPY;  Surgeon: Kory Muhammad MD;  Location: [facility] ENDOSCOPY;  Service: Gastroenterology    ENDOSCOPY N/A 3/16/2021    Procedure: ESOPHAGOGASTRODUODENOSCOPY;  Surgeon: Kory Muhammad MD;  Location: [facility] ENDOSCOPY;  Service: Gastroenterology;  Laterality: N/A;    MASS EXCISION Right 11/5/2020    Procedure: EXCISE SOFT TISSUE MASS RIGHT POSTERIOR FLANK                 (latex allergy);  Surgeon: Meet Mcintyre MD;  Location: [facility] OR;  Service: General;  Laterality: Right;    PAP SMEAR  08/16/2012    TONSILLECTOMY      UPPER GASTROINTESTINAL ENDOSCOPY  10/16/2019       Social History     Socioeconomic History    Marital status: Legally    Tobacco Use    Smoking status: Heavy Tobacco Smoker     Packs/day: 0.50     Years: 20.00     Pack years: 10.00     Types: Cigarettes, Electronic Cigarette    Smokeless tobacco: Never Used   Vaping Use    Vaping Use: Some days    Substances: Nicotine, Flavoring    Devices: Disposable   Substance and Sexual Activity    Alcohol use: Yes     Comment: socially    Drug use: No    Sexual activity: Defer       Family History   Problem Relation Age of Onset    Constipation Mother     Hypertension Mother     Anxiety disorder Mother     Heart disease Mother     Osteoporosis Mother         Also Myself I    Alcohol abuse Father     Heart disease Father     Anxiety disorder Brother     Kidney disease Brother     Hypertension Brother     Arthritis Brother     Anxiety disorder Brother     Kidney disease Brother     Diabetes Brother     Anxiety disorder Brother     Hypertension Brother     Breast cancer Paternal Aunt     Heart disease Maternal Grandmother     Clotting disorder Maternal Grandmother         Multiple Blood Clots In The Legs    Heart disease Maternal Grandfather      Plan excision of volar ganglion cyst right wrist.    The patient voiced  understanding of the risks, benefits, and alternative forms of treatment that were discussed and the patient consents to proceed with surgery.  All risks, benefits and alternatives were discussed.  Risks include, but not exclusive to anesthetic complications, including death, MI, CVA, infection, bleeding DVT, fracture, residual pain and need for future surgery.    This discussion was held with the patient by Leonardo Vargas MD and all questions were answered.              This document has been electronically signed by Leonardo Vargas MD on December 3, 2021 11:31 CST

## 2021-12-03 NOTE — ANESTHESIA POSTPROCEDURE EVALUATION
Patient: Tricia Loyola    Procedure Summary     Date: 12/03/21 Room / Location: [facility] OR  / [facility] OR    Anesthesia Start: 1143 Anesthesia Stop: 1251    Procedure: EXCISION OF VOLAR GANGLION CYST RIGHT WRIST      (LATEX ALLERGY) (Right Wrist) Diagnosis:       Right wrist pain      Ganglion cyst of volar aspect of right wrist      Gastroesophageal reflux disease with esophagitis without hemorrhage      Chronic obstructive pulmonary disease, unspecified COPD type (HCC)      History of cerebral aneurysm      (Right wrist pain [M25.531])      (Ganglion cyst of volar aspect of right wrist [M67.431])      (Gastroesophageal reflux disease with esophagitis without hemorrhage [K21.00])      (Chronic obstructive pulmonary disease, unspecified COPD type (HCC) [J44.9])      (History of cerebral aneurysm [Z86.79])    Surgeons: Leonardo Vargas MD Provider: Bradly Ta MD    Anesthesia Type: general ASA Status: 3          Anesthesia Type: general    Vitals  No vitals data found for the desired time range.          Post Anesthesia Care and Evaluation    Patient location during evaluation: PACU  Patient participation: complete - patient participated  Level of consciousness: awake and alert  Pain management: adequate  Airway patency: patent  Anesthetic complications: No anesthetic complications    Cardiovascular status: acceptable  Respiratory status: acceptable  Hydration status: acceptable    Comments: ---------------------------               12/03/21                      1252       ---------------------------   BP:          116/72         Pulse:         61           Resp:          18           Temp:   97.8 °F (36.6 °C)   SpO2:         100%         ---------------------------

## 2021-12-03 NOTE — OP NOTE
WRIST GANGLION EXCISION  Procedure Note    Name:    Tricia Loyola  YOB: 1960  Date of surgery:   12/3/2021    Pre-op Diagnosis:   Right wrist pain [M25.531]  Ganglion cyst of volar aspect of right wrist [M67.431]  Gastroesophageal reflux disease with esophagitis without hemorrhage [K21.00]  Chronic obstructive pulmonary disease, unspecified COPD type (HCC) [J44.9]  History of cerebral aneurysm [Z86.79]    Post-op Diagnosis:    Post-Op Diagnosis Codes:     * Right wrist pain [M25.531]     * Ganglion cyst of volar aspect of right wrist [M67.431]     * Gastroesophageal reflux disease with esophagitis without hemorrhage [K21.00]     * Chronic obstructive pulmonary disease, unspecified COPD type (HCC) [J44.9]     * History of cerebral aneurysm [Z86.79]    Procedure:  Procedure(s):  EXCISION OF VOLAR GANGLION CYST RIGHT WRIST      (LATEX ALLERGY)    Surgeon:  Surgeon(s):  Leonardo Vargas MD    Assistant: Niru Kowalski CSA was responsible for performing the following activities: Retraction, Suction, Irrigation, Suturing, Closing and Placing Dressing and their skilled assistance was necessary for the success of this case.     Anesthesia: General    Staff:   Circulator: Franca Flores RN  Scrub Person: Milagro Ross  Assistant: Niru Kowalski CSA    Estimated Blood Loss: minimal    Specimens:                ID Type Source Tests Collected by Time   A (Not marked as sent) : GANGLION CYST FROM RIGHT WRIST Tissue Wrist, Right TISSUE PATHOLOGY EXAM Leonardo Vargas MD 12/3/2021 1230         Drains: * No LDAs found *    Findings:  As below    Complications: None    IMPLANTS:   Nothing was implanted during the procedure      PROCEDURE:    Once consent was obtained the patient was taken to the operating room and once adequate anesthesia was obtained the Right upper extremity was prepped and draped in the standard surgical fashion. A surgical timeout was performed and verified. The Right  upper extremity was then exsanguinated and the tourniquet was inflated to 250 mmHg.  A Chevron incision was then made over overlying the cyst cavity and extended slightly proximal. The dissection was carried down immediately to the cyst. We began circumferentially dissecting around the cyst being careful and wary for the radial artery.      The radial artery was identified on the radial aspect of the cyst.  The radial artery was then very carefully dissected from proximal to the cyst along the side of the cyst and distal to the cyst.  The stalk of the cyst was noted to be volar and proximal to the bifurcation of the radial artery.  Once the circumference of the cyst was dissected free and the stalk was clearly identified. A purse string suture was placed in the base of the cyst stalk and then tied. The cyst stalk was then cut with the scissors and cauterized on its end. Cyst cavity was then passed off for specimen.     The wound was then copiously irrigated with bacitracin saline. The tourniquet was let down and hemostasis was obtained. The radial artery was then visually noted to be intact.  A regular pulse was noted in the radial artery. Once adequate hemostasis was obtained and the wound was closed using 2-0 Vicryl for the subcutaneous tissue and then nylon suture for the skin. The wound was covered with Xeroform gauze 4 x 4's and a well-padded volar splint was applied with the wrist in approximately 30-40° of dorsiflexion. Patient was awakened and taken to the recovery room in good condition, he tolerated the procedure very well.      Leonardo Vargas MD     Date: 12/3/2021  Time: 12:54 CST

## 2021-12-03 NOTE — ANESTHESIA PREPROCEDURE EVALUATION
Anesthesia Evaluation     no history of anesthetic complications:  NPO Solid Status: > 8 hours  NPO Liquid Status: > 2 hours           Airway   Mallampati: III  TM distance: >3 FB  Neck ROM: limited  Possible difficult intubation and Small opening  Dental    (+) edentulous and upper dentures    Pulmonary - normal exam    breath sounds clear to auscultation  (+) a smoker (0.5 ppd) Current Abstained day of surgery, COPD, asthma,  (-) sleep apnea  Cardiovascular   Exercise tolerance: good (4-7 METS)    ECG reviewed  Patient on routine beta blocker and Beta blocker given within 24 hours of surgery  Rhythm: regular  Rate: normal    (+) hypertension well controlled 2 medications or greater, valvular problems/murmurs murmur and AI, murmur, hyperlipidemia,   (-) past MI, dysrhythmias, angina, cardiac stents    ROS comment: 10/28/20  Normal sinus rhythm  Possible Left atrial enlargement  Rightward axis  Incomplete right bundle branch block  Septal infarct , age undetermined  Abnormal ECG  No previous ECGs available  Confirmed by DANNI    · Left ventricular ejection fraction appears to be 66 - 70%. Left ventricular systolic function is normal.  · Left ventricular diastolic function was normal.  · Moderate aortic valve regurgitation is present.  · Estimated right ventricular systolic pressure from tricuspid regurgitation is normal (<35 mmHg).        Neuro/Psych  (+) seizures (last event 8 yrs ago) well controlled, headaches (migraines controlled with meds), psychiatric history Anxiety and Depression,     (-) TIA, CVA, weakness, numbness    ROS Comment: Brain aneurysm 2003  GI/Hepatic/Renal/Endo    (+)  GERD well controlled,  hepatitis (many yrs ago) A, liver disease, renal disease stones, thyroid problem hypothyroidism  (-) diabetes    Musculoskeletal     (+) arthralgias,       ROS comment: Ganglion cyst right wrist  Abdominal    Substance History   (-) alcohol use, drug use     OB/GYN          Other   arthritis,     history of cancer (skin)                    Anesthesia Plan    ASA 3     general     intravenous induction     Anesthetic plan, all risks, benefits, and alternatives have been provided, discussed and informed consent has been obtained with: patient.

## 2021-12-03 NOTE — ANESTHESIA PROCEDURE NOTES
Airway  Urgency: elective    Date/Time: 12/3/2021 11:45 AM  Airway not difficult    General Information and Staff    Patient location during procedure: OR  CRNA: Amari Springer CRNA    Indications and Patient Condition  Indications for airway management: airway protection    Preoxygenated: yes  MILS maintained throughout  Mask difficulty assessment: 0 - not attempted    Final Airway Details  Final airway type: supraglottic airway      Successful airway: I-gel  Size 4    Number of attempts at approach: 1  Assessment: lips, teeth, and gum same as pre-op

## 2021-12-04 NOTE — TELEPHONE ENCOUNTER
Caller states Surgery today and that she had to stay over for low b/p and now wanting to know if she should take her night medications. Caller giving current b/p 137//75 and 87 Heart Rate. Caller reporting some dizziness and educated on getting up slowly and taking b/p if having severe dizziness. Caller is going to contact MD on call regarding taking night medications. Advised call back as needed. Caller educated on when to seek emergent care such as low b/p and feeling like going to pass out.     Reason for Disposition  • [1] Caller has URGENT question AND [2] triager unable to answer question    Additional Information  • Negative: Sounds like a life-threatening emergency to the triager  • Negative: Chest pain  • Negative: Difficulty breathing  • Negative: Acting confused (e.g., disoriented, slurred speech) or excessively sleepy  • Negative: Surgical incision symptoms and questions  • Negative: [1] Discomfort (pain, burning or stinging) when passing urine AND [2] male  • Negative: [1] Discomfort (pain, burning or stinging) when passing urine AND [2] female  • Negative: Constipation  • Negative: New or worsening leg (calf, thigh) pain  • Negative: New or worsening leg swelling  • Negative: Dizziness is severe, or persists > 24 hours after surgery  • Negative: Pain, redness, swelling, or pus at IV Site  • Negative: Symptoms arising from use of a urinary catheter (Marquez or Coude)  • Negative: Cast problems or questions  • Negative: Medication question  • Negative: [1] Widespread rash AND [2] bright red, sunburn-like  • Negative: [1] SEVERE headache AND [2] after spinal (epidural) anesthesia  • Negative: [1] Vomiting AND [2] persists > 4 hours  • Negative: [1] Vomiting AND [2] abdomen looks much more swollen than usual  • Negative: [1] Drinking very little AND [2] dehydration suspected (e.g., no urine > 12 hours, very dry mouth, very lightheaded)  • Negative: Patient sounds very sick or weak to the triager  • Negative:  "Sounds like a serious complication to the triager  • Negative: Fever > 100.4 F (38.0 C)  • Negative: [1] SEVERE post-op pain (e.g., excruciating, pain scale 8-10) AND [2] not controlled with pain medications    Answer Assessment - Initial Assessment Questions  1. SYMPTOM: \"What's the main symptom you're concerned about?\" (e.g., pain, fever, vomiting)      Should I take my night medications   2. ONSET: \"When did  start?\"      Dizziness since leaving hospital   3. SURGERY: \"What surgery was performed?\"      EXCISION OF VOLAR GANGLION CYST RIGHT WRIST      (LATEX ALLERGY)  4. DATE of SURGERY: \"When was surgery performed?\"       12/3  5. ANESTHESIA: \" What type of anesthesia did you have?\" (e.g., general, spinal, epidural, local)        6. PAIN: \"Is there any pain?\" If Yes, ask: \"How bad is it?\"  (Scale 1-10; or mild, moderate, severe)      Denies   7. FEVER: \"Do you have a fever?\" If Yes, ask: \"What is your temperature, how was it measured, and when did it start?\"      Denies   8. VOMITING: \"Is there any vomiting?\" If yes, ask: \"How many times?\"      Denies   9. BLEEDING: \"Is there any bleeding?\" If Yes, ask: \"How much?\" and \"Where?\"      Denies   10. OTHER SYMPTOMS: \"Do you have any other symptoms?\" (e.g., drainage from wound, painful urination, constipation)        Dizziness since leaving    Protocols used: POST-OP SYMPTOMS AND QUESTIONS-ADULT-      "

## 2021-12-06 ENCOUNTER — TELEPHONE (OUTPATIENT)
Dept: ORTHOPEDIC SURGERY | Facility: CLINIC | Age: 61
End: 2021-12-06

## 2021-12-06 RX ORDER — DEXLANSOPRAZOLE 60 MG/1
1 CAPSULE, DELAYED RELEASE ORAL DAILY
Qty: 90 CAPSULE | Refills: 0 | Status: SHIPPED | OUTPATIENT
Start: 2021-12-06 | End: 2022-03-01

## 2021-12-06 RX ORDER — FAMOTIDINE 40 MG/1
40 TABLET, FILM COATED ORAL DAILY
Qty: 90 TABLET | Refills: 0 | Status: SHIPPED | OUTPATIENT
Start: 2021-12-06 | End: 2022-03-04 | Stop reason: SDUPTHER

## 2021-12-07 ENCOUNTER — TELEMEDICINE (OUTPATIENT)
Dept: FAMILY MEDICINE CLINIC | Facility: CLINIC | Age: 61
End: 2021-12-07

## 2021-12-07 VITALS — BODY MASS INDEX: 20 KG/M2 | HEIGHT: 62 IN

## 2021-12-07 DIAGNOSIS — G89.29 CHRONIC MIDLINE LOW BACK PAIN WITHOUT SCIATICA: Primary | ICD-10-CM

## 2021-12-07 DIAGNOSIS — M54.50 CHRONIC MIDLINE LOW BACK PAIN WITHOUT SCIATICA: Primary | ICD-10-CM

## 2021-12-07 LAB
LAB AP CASE REPORT: NORMAL
PATH REPORT.FINAL DX SPEC: NORMAL

## 2021-12-07 PROCEDURE — 99214 OFFICE O/P EST MOD 30 MIN: CPT | Performed by: NURSE PRACTITIONER

## 2021-12-07 RX ORDER — HYDROCODONE BITARTRATE AND ACETAMINOPHEN 10; 325 MG/1; MG/1
1 TABLET ORAL EVERY 6 HOURS PRN
Qty: 120 TABLET | Refills: 0 | Status: SHIPPED | OUTPATIENT
Start: 2021-12-07 | End: 2022-01-04 | Stop reason: SDUPTHER

## 2021-12-08 NOTE — PROGRESS NOTES
"Subjective   Tricia Loyola is a 61 y.o. female.   You have chosen to receive care through a telehealth visit.  Do you consent to use a video/audio connection for your medical care today? Yes  This was an audio and video enabled telemedicine encounter.        Chief Complaint   Patient presents with   • Wrist Pain     needing hydrocodone refilled could not take the oxycodone         History of Present Illness     Patient underwent EXCISION OF VOLAR GANGLION CYST RIGHT WRIST     per Dr Leonardo Vargas on 12/3/21. She was prescribed percocet postoperatively. She took none the evening of 12/4/21 or on 12/5/21 as she still was numb from surgery. She took one yestserday on 12/6/21 when pain returned and it made her violently nauseated. She states she was so \"woozy\" she had to lay down until the effects passed, after which she resumed her previous, chronic prescription for hydrocodone written for chronic back pain. This is moderately effective which is good enough, she states, leaving her back improved and her right wrist very sore, as would be expected postoperatively.    Chronic mid and lower back pain, stable, onset more than 1 year ago, with an old/ remote T spine fracture (pathologic) due to osteoporosis contributing to the overall OA/ DDD pain/ stiffness. Pain managed with hydrocodone with adequate relief. Due for refill today.   Compliant with use.     No other complaints today.     Past Surgical History:   Procedure Laterality Date   • APPENDECTOMY     • BREAST BIOPSY Left    • CHOLECYSTECTOMY     • COLONOSCOPY N/A 11/26/2018    Procedure: COLONOSCOPY;  Surgeon: Meet Mcintyre MD;  Location: [facility] ENDOSCOPY;  Service: General   • CRANIOTOMY FOR ANEURYSM  2003   • ENDOSCOPY N/A 10/16/2019    Procedure: ESOPHAGOGASTRODUODENOSCOPY;  Surgeon: Kory Muhammad MD;  Location: [facility] ENDOSCOPY;  Service: Gastroenterology   • ENDOSCOPY N/A 3/16/2021    Procedure: ESOPHAGOGASTRODUODENOSCOPY;  Surgeon: Kory Muhammad MD; "  Location: [facility] ENDOSCOPY;  Service: Gastroenterology;  Laterality: N/A;   • MASS EXCISION Right 11/5/2020    Procedure: EXCISE SOFT TISSUE MASS RIGHT POSTERIOR FLANK                 (latex allergy);  Surgeon: Meet Mcintyre MD;  Location: [facility];  Service: General;  Laterality: Right;   • PAP SMEAR  08/16/2012   • TONSILLECTOMY     • UPPER GASTROINTESTINAL ENDOSCOPY  10/16/2019   • WRIST GANGLION EXCISION Right 12/3/2021    Procedure: EXCISION OF VOLAR GANGLION CYST RIGHT WRIST      (LATEX ALLERGY);  Surgeon: Lenoardo Vargas MD;  Location: [facility] OR;  Service: Orthopedics;  Laterality: Right;      Social History     Socioeconomic History   • Marital status: Legally    Tobacco Use   • Smoking status: Heavy Tobacco Smoker     Packs/day: 0.50     Years: 20.00     Pack years: 10.00     Types: Cigarettes, Electronic Cigarette   • Smokeless tobacco: Never Used   Vaping Use   • Vaping Use: Some days   • Substances: Nicotine, Flavoring   • Devices: Disposable   Substance and Sexual Activity   • Alcohol use: Yes     Comment: socially   • Drug use: No   • Sexual activity: Defer      The following portions of the patient's history were reviewed and updated as appropriate: allergies, current medications, past family history, past medical history, past social history, past surgical history and problem list.    Review of Systems   Respiratory: Negative.    Cardiovascular: Negative.    Musculoskeletal: Positive for arthralgias (right wrist postop pain/ tenderness as expected) and back pain.   Psychiatric/Behavioral: The patient is nervous/anxious.    All other systems reviewed and are negative.    PHQ-9 Depression Screening  Little interest or pleasure in doing things?     Feeling down, depressed, or hopeless?     Trouble falling or staying asleep, or sleeping too much?     Feeling tired or having little energy?     Poor appetite or overeating?     Feeling bad about yourself - or that you are a failure  "or have let yourself or your family down?     Trouble concentrating on things, such as reading the newspaper or watching television?     Moving or speaking so slowly that other people could have noticed? Or the opposite - being so fidgety or restless that you have been moving around a lot more than usual?     Thoughts that you would be better off dead, or of hurting yourself in some way?     PHQ-9 Total Score     If you checked off any problems, how difficult have these problems made it for you to do your work, take care of things at home, or get along with other people?      Patient understands the risks associated with this controlled medication, including tolerance and addiction.  Patient also agrees to only obtain this medication from me, and not from a another provider, unless that provider is covering for me in my absence.  Patient also agrees to be compliant in dosing, and not self adjust the dose of medication.  A signed controlled substance agreement is on file, and the patient has received a controlled substance education sheet at this a previous visit.  The patient has also signed a consent for treatment with a controlled substance as per [facility] policy. KATE was obtained.   Objective    Vitals:    12/07/21 0805   Height: 157.5 cm (62\")   PainSc:   7   PainLoc: Wrist       Physical Exam  Vitals and nursing note reviewed.   Constitutional:       General: She is not in acute distress.  Pulmonary:      Effort: Pulmonary effort is normal. No respiratory distress.      Breath sounds: No stridor.   Neurological:      Mental Status: She is alert and oriented to person, place, and time.   Psychiatric:         Behavior: Behavior normal.         Thought Content: Thought content normal.         Judgment: Judgment normal.           Assessment/Plan   Diagnoses and all orders for this visit:    1. Chronic midline low back pain without sciatica (Primary)  -     HYDROcodone-acetaminophen (NORCO)  MG per " tablet; Take 1 tablet by mouth Every 6 (Six) Hours As Needed for Moderate Pain . (cannot tolerate nausea/ sedation of percocet/ was discontinued)  Dispense: 120 tablet; Refill: 0      Return in about 12 weeks (around 3/1/2022).               This document has been electronically signed by UMESH Guzmán on December 7, 2021 20:59 CST

## 2021-12-17 ENCOUNTER — OFFICE VISIT (OUTPATIENT)
Dept: ORTHOPEDIC SURGERY | Facility: CLINIC | Age: 61
End: 2021-12-17

## 2021-12-17 VITALS — BODY MASS INDEX: 20.06 KG/M2 | WEIGHT: 109 LBS | HEIGHT: 62 IN

## 2021-12-17 DIAGNOSIS — Z98.890 H/O EXCISION OF GANGLION CYST: Primary | ICD-10-CM

## 2021-12-17 PROCEDURE — 99024 POSTOP FOLLOW-UP VISIT: CPT | Performed by: NURSE PRACTITIONER

## 2021-12-17 NOTE — PROGRESS NOTES
Tricia Loyola is a 61 y.o. female is s/p    12/03/21 (2w) Leonardo Vargas MD   Excision Of Volar Ganglion Cyst Right Wrist      (latex Allergy) - Right          Chief Complaint   Patient presents with   • Right Wrist - Follow-up       HISTORY OF PRESENT ILLNESS:   Answers for HPI/ROS submitted by the patient on 12/16/2021  Please describe your symptoms.: Stitches To Be Removed From Surgery Of Ganglion Cyst Removel On Dec 3,2021  Have you had these symptoms before?: Yes  How long have you been having these symptoms?: Greater than 2 weeks  Please list any medications you are currently taking for this condition.: Hydrocodone 10/325  Please describe any probable cause for these symptoms. : Ganglion Cyst Removed Post Op  What is the primary reason for your visit?: Other      Patient is a 61-year-old female who presents today for postop evaluation after undergoing excision of volar ganglion cyst of right wrist on 12/3/2021.  Patient is 2 weeks postop, surgery was performed by Dr. Vargas.  Patient reports pain is adequately controlled.  She denies burning, tingling, numbness.  She denies fever, nausea, vomiting.  She reports reports having upcoming appointment to start physical therapy.  She has no unusual complaints.      Allergies   Allergen Reactions   • Iodine Anaphylaxis   • Nitrofuran Derivatives Hives   • Toradol [Ketorolac Tromethamine] Anaphylaxis   • Bactrim [Sulfamethoxazole-Trimethoprim] Swelling     eyes   • Cleocin [Clindamycin Hcl] Hives and Swelling     Swelling of eyes and hives   • Augmentin [Amoxicillin-Pot Clavulanate] Hives   • Ciprofloxacin Rash   • Fiorinal [Butalbital-Aspirin-Caffeine] Palpitations   • Ibuprofen Rash   • Imitrex [Sumatriptan] Palpitations   • Latex Rash   • Other Rash     prego spaghetti sauce    Midrin causes tachycardia   • Ultram [Tramadol] Palpitations         Current Outpatient Medications:   •  albuterol (ACCUNEB) 1.25 MG/3ML nebulizer solution, Take 3 mL by  nebulization Every 4 (Four) Hours As Needed for Wheezing or Shortness of Air., Disp: 100 each, Rfl: 5  •  albuterol sulfate  (90 Base) MCG/ACT inhaler, Inhale 2 puffs Every 4 (Four) Hours As Needed for Wheezing., Disp: 18 g, Rfl: 5  •  amitriptyline (ELAVIL) 75 MG tablet, Take 75 mg by mouth Every Night., Disp: , Rfl:   •  aspirin 81 MG EC tablet, Take 1 tablet by mouth Daily., Disp: 30 tablet, Rfl: 0  •  atorvastatin (LIPITOR) 40 MG tablet, Take 1 tablet by mouth Every Night. For cholesterol, Disp: 90 tablet, Rfl: 1  •  Botox 200 units reconstituted solution, INJECT 200 UNITS INTO THE HEAD AND NECK MUSCLES EVERY 12 WEEKS, Disp: , Rfl:   •  busPIRone (BUSPAR) 30 MG tablet, Take 1 tablet by mouth 2 (Two) Times a Day., Disp: 60 tablet, Rfl: 0  •  clonazePAM (KlonoPIN) 0.5 MG tablet, Take 1 tablet by mouth Daily As Needed for Anxiety., Disp: 30 tablet, Rfl: 0  •  Cyanocobalamin 1000 MCG/ML kit, Inject 1,000 mL as directed Every 30 (Thirty) Days., Disp: 1 kit, Rfl: 5  •  Dexilant 60 MG capsule, TAKE 1 CAPSULE BY MOUTH DAILY, Disp: 90 capsule, Rfl: 0  •  famotidine (PEPCID) 40 MG tablet, TAKE 1 TABLET BY MOUTH DAILY, Disp: 90 tablet, Rfl: 0  •  fluticasone (FLONASE) 50 MCG/ACT nasal spray, 2 sprays into the nostril(s) as directed by provider Daily As Needed for Rhinitis., Disp: , Rfl:   •  folic acid (FOLVITE) 800 MCG tablet, Take 1 tablet by mouth Daily., Disp: 90 tablet, Rfl: 1  •  HYDROcodone-acetaminophen (NORCO)  MG per tablet, Take 1 tablet by mouth Every 6 (Six) Hours As Needed for Moderate Pain . (cannot tolerate nausea/ sedation of percocet/ was discontinued), Disp: 120 tablet, Rfl: 0  •  Hydrocortisone, Perianal, (PROCTOCORT) 1 % cream rectal cream, Insert  into the rectum 2 (Two) Times a Day., Disp: 28.4 g, Rfl: 0  •  levothyroxine (SYNTHROID, LEVOTHROID) 25 MCG tablet, Take 0.5 tablets by mouth Daily., Disp: 15 tablet, Rfl: 5  •  Linzess 290 MCG capsule capsule, Take 290 mcg by mouth Every  "Morning Before Breakfast., Disp: , Rfl:   •  losartan (COZAAR) 50 MG tablet, Take 50 mg by mouth Every Night., Disp: , Rfl:   •  metoprolol succinate XL (TOPROL-XL) 100 MG 24 hr tablet, Take 100 mg by mouth Daily. Takes at 3 pm, Disp: , Rfl:   •  montelukast (SINGULAIR) 10 MG tablet, Take 1 tablet by mouth Every Night., Disp: 30 tablet, Rfl: 5  •  ondansetron ODT (ZOFRAN-ODT) 4 MG disintegrating tablet, Place 1 tablet on the tongue Every 8 (Eight) Hours As Needed for Nausea., Disp: 30 tablet, Rfl: 5  •  OXcarbazepine (TRILEPTAL) 150 MG tablet, Take 150 mg by mouth Every Night. Daily at bedtime , Disp: , Rfl:   •  prochlorperazine (COMPAZINE) 10 MG tablet, Take 1 tablet by mouth Every 6 (Six) Hours As Needed for Nausea or Vomiting., Disp: 360 tablet, Rfl: 1  •  rOPINIRole (REQUIP) 3 MG tablet, TAKE 1 TABLET BY MOUTH EVERY NIGHT AT BEDTIME, Disp: 90 tablet, Rfl: 1  •  sodium-potassium-magnesium sulfates (Suprep Bowel Prep Kit) 17.5-3.13-1.6 GM/177ML solution oral solution, As directed per instruction sheet for colonoscopy, Disp: 354 mL, Rfl: 0  •  Syringe/Needle, Disp, 25G X 5/8\" 3 ML misc, 1 each Every 28 (Twenty-Eight) Days., Disp: 3 each, Rfl: 1  •  topiramate (TOPAMAX) 100 MG tablet, Take 1 tablet by mouth 2 (Two) Times a Day. (Patient taking differently: Take 100 mg by mouth 2 (Two) Times a Day. Noon and 3 pm), Disp: 180 tablet, Rfl: 1  •  witch hazel-glycerin (TUCKS) pad, Insert  into the rectum As Needed for Hemorrhoids., Disp: 40 each, Rfl: 1    Current Facility-Administered Medications:   •  zoledronic acid (RECLAST) infusion 5 mg, 5 mg, Intravenous, Once, Ceron, UMESH Ponce    No fevers or chills.  No nausea or vomiting.      PHYSICAL EXAMINATION:       Tricia Loyola is a 61 y.o. female    Patient is awake and alert, answers questions appropriately and is in no apparent distress.    GAIT:     []  Normal  []  Antalgic    Assistive device: [x]  None  []  Walker     []  Crutches  []  Cane     []  " Wheelchair  []  Stretcher    Right Hand Exam     Other   Erythema: absent  Sensation: normal  Pulse: present    Comments:  Strength and range of motion testing deferred.  Incision to the volar wrist is clean, dry, intact, well approximated.  No evidence of infection.  Neurovascular is intact.                    ASSESSMENT:    Diagnoses and all orders for this visit:    H/O excision of ganglion cyst          PLAN    Patient placed in Velcro wrist brace.  Patient instructed to keep PT appointment with [facility].  Explained to patient that Dr. Vargas left specific instructions for PT in terms of range of motion, patient instructed to allow physical therapy to guide her on what she is allowed to do and when.  Sutures removed and Steri-Strips placed.  Incision site care explained to patient.  Restrictions explained.  Signs and symptoms to monitor for including signs and symptoms to report explained to patient.  Patient to return in 4 weeks for recheck or sooner if needed.    EMR Dragon/Transciption Disclaimer: Some of this note may be an electronic transcription/translation of spoken language to printed text.  The electronic translation of spoken language may permit erroneous, or at times, nonsensical words or phrases to be inadvertently transcribed. Although I have reviewed the note for such errors, some may still exist.       Return in about 4 weeks (around 1/14/2022).    UMESH Warren

## 2021-12-20 ENCOUNTER — TELEPHONE (OUTPATIENT)
Dept: ORTHOPEDIC SURGERY | Facility: CLINIC | Age: 61
End: 2021-12-20

## 2021-12-20 NOTE — TELEPHONE ENCOUNTER
The brace is meant to provide immobilization to promote healing, if she would prefer to buy her own brace rather than use one from here then that is fine.  If she continues to have increase in pain then she can always make an appointment to come see me if needed.  I would advise she continue with PT as scheduled. Thank you.
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
## 2021-12-20 NOTE — TELEPHONE ENCOUNTER
It shouldn't cause increased pain if anything it should make pain better, if she is having no other symptoms she can come in and see if a new size would fit better. In the mean time I would say remove the brace if it is causing her that much pain.

## 2022-01-04 DIAGNOSIS — G89.29 CHRONIC MIDLINE LOW BACK PAIN WITHOUT SCIATICA: ICD-10-CM

## 2022-01-04 DIAGNOSIS — M54.50 CHRONIC MIDLINE LOW BACK PAIN WITHOUT SCIATICA: ICD-10-CM

## 2022-01-04 NOTE — TELEPHONE ENCOUNTER
Incoming Refill Request      Medication requested (name and dose):   [place]     Pharmacy where request should be sent:   [facility]    Additional details provided by patient:    Best call back number:     Does the patient have less than a 3 day supply:  [] Yes  [] No    Carmen Salamanca  01/04/22, 08:55 CST

## 2022-01-05 ENCOUNTER — TELEPHONE (OUTPATIENT)
Dept: FAMILY MEDICINE CLINIC | Facility: CLINIC | Age: 62
End: 2022-01-05

## 2022-01-05 RX ORDER — HYDROCODONE BITARTRATE AND ACETAMINOPHEN 10; 325 MG/1; MG/1
1 TABLET ORAL EVERY 6 HOURS PRN
Qty: 120 TABLET | Refills: 0 | Status: SHIPPED | OUTPATIENT
Start: 2022-01-05 | End: 2022-02-04 | Stop reason: SDUPTHER

## 2022-01-06 NOTE — TELEPHONE ENCOUNTER
Patient seen in office every 3 months for chronic pain requiring opiate pain medication. Compliant with medication, visits with no adverse effects noted. KATE and UDS current and appropriate. Patient called requesting scheduled refill at appropriate interval. Patient understands the risks associated with this controlled medication, including tolerance and addiction.  Patient also agrees to only obtain this medication from me, and not from a another provider, unless that provider is covering for me in my absence.  Patient also agrees to be compliant in dosing, and not self adjust the dose of medication.  A signed controlled substance agreement is on file, and the patient has received a controlled substance education sheet at this a previous visit.  The patient has also signed a consent for treatment with a controlled substance as per [facility] policy. KATE was obtained.   Refill sent for hydrocodone.     This document has been electronically signed by UMESH Guzmán on January 5, 2022 18:50 CST

## 2022-01-21 ENCOUNTER — OFFICE VISIT (OUTPATIENT)
Dept: FAMILY MEDICINE CLINIC | Facility: CLINIC | Age: 62
End: 2022-01-21

## 2022-01-21 VITALS
HEIGHT: 62 IN | BODY MASS INDEX: 20.24 KG/M2 | RESPIRATION RATE: 16 BRPM | TEMPERATURE: 98.1 F | DIASTOLIC BLOOD PRESSURE: 70 MMHG | SYSTOLIC BLOOD PRESSURE: 120 MMHG | HEART RATE: 68 BPM | OXYGEN SATURATION: 96 % | WEIGHT: 110 LBS

## 2022-01-21 DIAGNOSIS — M25.531 ACUTE PAIN OF RIGHT WRIST: Primary | ICD-10-CM

## 2022-01-21 PROCEDURE — 96372 THER/PROPH/DIAG INJ SC/IM: CPT | Performed by: NURSE PRACTITIONER

## 2022-01-21 PROCEDURE — 99213 OFFICE O/P EST LOW 20 MIN: CPT | Performed by: NURSE PRACTITIONER

## 2022-01-21 RX ORDER — TRIAMCINOLONE ACETONIDE 40 MG/ML
40 INJECTION, SUSPENSION INTRA-ARTICULAR; INTRAMUSCULAR ONCE
Status: COMPLETED | OUTPATIENT
Start: 2022-01-21 | End: 2022-01-21

## 2022-01-21 RX ADMIN — TRIAMCINOLONE ACETONIDE 40 MG: 40 INJECTION, SUSPENSION INTRA-ARTICULAR; INTRAMUSCULAR at 11:33

## 2022-01-31 NOTE — PROGRESS NOTES
Subjective  Answers for HPI/ROS submitted by the patient on 1/20/2022  Please describe your symptoms.: Injured Right Wrist...Had To Cancel Physical Therapy Due To Injury...Also Having Unknown Allergy Symptoms? Which Is Causing Itchiness? Never Had These Symptoms Before  Have you had these symptoms before?: No  How long have you been having these symptoms?: 5-7 days  Please list any medications you are currently taking for this condition.: Normal  Medications As Perscribed...  What is the primary reason for your visit?: Other      Tricia Loyola is a 62 y.o. female.       Chief Complaint   Patient presents with   • Wrist Pain     Right        History of Present Illness   Injury to right wrist doing routine housework at home in past 7 days. Continues to be tender over radial wrist, mild swelling no bruising. S/p wrist surgery in 12/2021. Went to PT postop x 1 as clinic PT sessions were recurrently rescheduled due to staffing issues, was given HEP which she is doing with limited relief. Advised to wear wrist brace and NO using right wrist for 4-5 days minimum, then limit to 5# lifting right hand until PT or ortho advises otherwise.   Will prescribe  Past Surgical History:   Procedure Laterality Date   • APPENDECTOMY     • BREAST BIOPSY Left    • CHOLECYSTECTOMY     • COLONOSCOPY N/A 11/26/2018    Procedure: COLONOSCOPY;  Surgeon: Meet Mcintyre MD;  Location: [facility] ENDOSCOPY;  Service: General   • CRANIOTOMY FOR ANEURYSM  2003   • ENDOSCOPY N/A 10/16/2019    Procedure: ESOPHAGOGASTRODUODENOSCOPY;  Surgeon: Kory Muhammad MD;  Location: [facility] ENDOSCOPY;  Service: Gastroenterology   • ENDOSCOPY N/A 3/16/2021    Procedure: ESOPHAGOGASTRODUODENOSCOPY;  Surgeon: Kory Muhammad MD;  Location: [facility] ENDOSCOPY;  Service: Gastroenterology;  Laterality: N/A;   • MASS EXCISION Right 11/5/2020    Procedure: EXCISE SOFT TISSUE MASS RIGHT POSTERIOR FLANK                 (latex allergy);  Surgeon: Meet Mcintyre MD;   Location: [facility];  Service: General;  Laterality: Right;   • PAP SMEAR  08/16/2012   • TONSILLECTOMY     • UPPER GASTROINTESTINAL ENDOSCOPY  10/16/2019   • WRIST GANGLION EXCISION Right 12/3/2021    Procedure: EXCISION OF VOLAR GANGLION CYST RIGHT WRIST      (LATEX ALLERGY);  Surgeon: Leonardo Vargas MD;  Location: [facility];  Service: Orthopedics;  Laterality: Right;      Social History     Socioeconomic History   • Marital status: Legally    Tobacco Use   • Smoking status: Heavy Tobacco Smoker     Packs/day: 0.50     Years: 20.00     Pack years: 10.00     Types: Cigarettes, Electronic Cigarette   • Smokeless tobacco: Never Used   Vaping Use   • Vaping Use: Some days   • Substances: Nicotine, Flavoring   • Devices: Disposable   Substance and Sexual Activity   • Alcohol use: Yes     Comment: socially   • Drug use: No   • Sexual activity: Defer      The following portions of the patient's history were reviewed and updated as appropriate: allergies, current medications, past family history, past medical history, past social history, past surgical history and problem list.    Review of Systems   Constitutional: Negative.    Respiratory: Negative.    Cardiovascular: Negative.    Musculoskeletal: Positive for arthralgias (right wrist).   All other systems reviewed and are negative.    PHQ-9 Depression Screening  Little interest or pleasure in doing things?     Feeling down, depressed, or hopeless?     Trouble falling or staying asleep, or sleeping too much?     Feeling tired or having little energy?     Poor appetite or overeating?     Feeling bad about yourself - or that you are a failure or have let yourself or your family down?     Trouble concentrating on things, such as reading the newspaper or watching television?     Moving or speaking so slowly that other people could have noticed? Or the opposite - being so fidgety or restless that you have been moving around a lot more than usual?    "  Thoughts that you would be better off dead, or of hurting yourself in some way?     PHQ-9 Total Score     If you checked off any problems, how difficult have these problems made it for you to do your work, take care of things at home, or get along with other people?        Objective    Vitals:    01/21/22 1047   BP: 120/70   Pulse: 68   Resp: 16   Temp: 98.1 °F (36.7 °C)   SpO2: 96%   Weight: 49.9 kg (110 lb)   Height: 157.5 cm (62\")   PainSc:   6   PainLoc: Wrist  Comment: right     Body mass index is 20.12 kg/m².    Physical Exam  Vitals and nursing note reviewed.   Constitutional:       Appearance: She is normal weight. She is not ill-appearing.   Cardiovascular:      Rate and Rhythm: Normal rate and regular rhythm.      Pulses: Normal pulses.      Heart sounds: Normal heart sounds.   Pulmonary:      Effort: Pulmonary effort is normal.      Breath sounds: Normal breath sounds.   Musculoskeletal:         General: Tenderness (right radial wrist tender, mild swelling. pain limited ROM) present.   Skin:     General: Skin is warm and dry.   Neurological:      Mental Status: She is alert.     strain right wrist. Resume wearing wrist brace, no use right hand x 5 days, continue brace use after 5 days until PT or ortho advised otherwise, after 5 days limit lifting to not more than 5# right hand also until PT or orthopedist advises otherwise. Kenalog IM injection today.    Assessment/Plan   Diagnoses and all orders for this visit:    1. Acute pain of right wrist (Primary)  -     triamcinolone acetonide (KENALOG-40) injection 40 mg      Return in about 2 weeks (around 2/4/2022).               This document has been electronically signed by UMESH Guzmán on January 30, 2022 20:57 CST    "

## 2022-02-04 DIAGNOSIS — M54.50 CHRONIC MIDLINE LOW BACK PAIN WITHOUT SCIATICA: ICD-10-CM

## 2022-02-04 DIAGNOSIS — G89.29 CHRONIC MIDLINE LOW BACK PAIN WITHOUT SCIATICA: ICD-10-CM

## 2022-02-04 DIAGNOSIS — J44.9 CHRONIC OBSTRUCTIVE PULMONARY DISEASE, UNSPECIFIED COPD TYPE: Chronic | ICD-10-CM

## 2022-02-05 ENCOUNTER — TELEPHONE (OUTPATIENT)
Dept: FAMILY MEDICINE CLINIC | Facility: CLINIC | Age: 62
End: 2022-02-05

## 2022-02-05 RX ORDER — HYDROCODONE BITARTRATE AND ACETAMINOPHEN 10; 325 MG/1; MG/1
1 TABLET ORAL EVERY 6 HOURS PRN
Qty: 120 TABLET | Refills: 0 | Status: SHIPPED | OUTPATIENT
Start: 2022-02-05 | End: 2022-03-06 | Stop reason: SDUPTHER

## 2022-02-05 RX ORDER — ALBUTEROL SULFATE 90 UG/1
2 AEROSOL, METERED RESPIRATORY (INHALATION) EVERY 4 HOURS PRN
Qty: 18 G | Refills: 5 | Status: SHIPPED | OUTPATIENT
Start: 2022-02-05

## 2022-02-05 NOTE — TELEPHONE ENCOUNTER
Patient seen in office every 3 months for chronic pain requiring opiate pain medication. Compliant with medication, visits with no adverse effects noted. KATE and UDS current and appropriate. Patient called requesting scheduled refill at appropriate interval. Patient understands the risks associated with this controlled medication, including tolerance and addiction.  Patient also agrees to only obtain this medication from me, and not from a another provider, unless that provider is covering for me in my absence.  Patient also agrees to be compliant in dosing, and not self adjust the dose of medication.  A signed controlled substance agreement is on file, and the patient has received a controlled substance education sheet at this a previous visit.  The patient has also signed a consent for treatment with a controlled substance as per [facility] policy. KATE was obtained.   Refill sent for hydrocodone.     This document has been electronically signed by UMESH Guzmán on February 5, 2022 13:48 CST

## 2022-02-07 RX ORDER — LOSARTAN POTASSIUM 50 MG/1
TABLET ORAL
Qty: 90 TABLET | Refills: 3 | Status: SHIPPED | OUTPATIENT
Start: 2022-02-07 | End: 2022-09-15

## 2022-02-10 ENCOUNTER — TELEMEDICINE (OUTPATIENT)
Dept: FAMILY MEDICINE CLINIC | Facility: CLINIC | Age: 62
End: 2022-02-10

## 2022-02-10 DIAGNOSIS — R11.0 NAUSEA: ICD-10-CM

## 2022-02-10 DIAGNOSIS — A08.4 VIRAL GASTROENTERITIS: ICD-10-CM

## 2022-02-10 DIAGNOSIS — R19.7 DIARRHEA, UNSPECIFIED TYPE: Primary | ICD-10-CM

## 2022-02-10 DIAGNOSIS — E78.5 HYPERLIPIDEMIA, UNSPECIFIED HYPERLIPIDEMIA TYPE: ICD-10-CM

## 2022-02-10 PROCEDURE — 99213 OFFICE O/P EST LOW 20 MIN: CPT | Performed by: NURSE PRACTITIONER

## 2022-02-10 RX ORDER — DIPHENOXYLATE HYDROCHLORIDE AND ATROPINE SULFATE 2.5; .025 MG/1; MG/1
1 TABLET ORAL 4 TIMES DAILY PRN
Qty: 12 TABLET | Refills: 0 | Status: SHIPPED | OUTPATIENT
Start: 2022-02-10

## 2022-02-10 NOTE — PROGRESS NOTES
Subjective   Tricia Loyola is a 62 y.o. female.       Chief Complaint   Patient presents with   • Diarrhea     SINCE YESTERDAY        History of Present Illness   Diarrhea x 2 days, maxed out on immodium, still having diarrhea today x 6-8 times. Just water looking stool. Some nausea relieved with phenergan and zofran. No fevers. Able to tolerate oral fluids, keeping fluid intake up to match output.  + for headache. No SOB or cough.    Discussed treatment, advised to try Lomotil, rx sent today. If unable to tolerate fluids or if symptoms worsen, go to ER or urgent care for evaluation/ treatment.     No other complain today.     Past Surgical History:   Procedure Laterality Date   • APPENDECTOMY     • BREAST BIOPSY Left    • CHOLECYSTECTOMY     • COLONOSCOPY N/A 11/26/2018    Procedure: COLONOSCOPY;  Surgeon: Meet Mcintyre MD;  Location: [facility] ENDOSCOPY;  Service: General   • CRANIOTOMY FOR ANEURYSM  2003   • ENDOSCOPY N/A 10/16/2019    Procedure: ESOPHAGOGASTRODUODENOSCOPY;  Surgeon: Kory Muhammad MD;  Location: [facility] ENDOSCOPY;  Service: Gastroenterology   • ENDOSCOPY N/A 3/16/2021    Procedure: ESOPHAGOGASTRODUODENOSCOPY;  Surgeon: Kory Muhammad MD;  Location: [facility] ENDOSCOPY;  Service: Gastroenterology;  Laterality: N/A;   • MASS EXCISION Right 11/5/2020    Procedure: EXCISE SOFT TISSUE MASS RIGHT POSTERIOR FLANK                 (latex allergy);  Surgeon: Meet Mcintyre MD;  Location: [facility] OR;  Service: General;  Laterality: Right;   • PAP SMEAR  08/16/2012   • TONSILLECTOMY     • UPPER GASTROINTESTINAL ENDOSCOPY  10/16/2019   • WRIST GANGLION EXCISION Right 12/3/2021    Procedure: EXCISION OF VOLAR GANGLION CYST RIGHT WRIST      (LATEX ALLERGY);  Surgeon: Leonardo Vargas MD;  Location: [facility] OR;  Service: Orthopedics;  Laterality: Right;      Social History     Socioeconomic History   • Marital status: Legally    Tobacco Use   • Smoking status: Heavy Tobacco Smoker      Packs/day: 0.50     Years: 20.00     Pack years: 10.00     Types: Cigarettes, Electronic Cigarette   • Smokeless tobacco: Never Used   Vaping Use   • Vaping Use: Some days   • Substances: Nicotine, Flavoring   • Devices: Disposable   Substance and Sexual Activity   • Alcohol use: Yes     Comment: socially   • Drug use: No   • Sexual activity: Defer      The following portions of the patient's history were reviewed and updated as appropriate: allergies, current medications, past family history, past medical history, past social history, past surgical history and problem list.    Review of Systems   Constitutional: Positive for fatigue. Negative for appetite change, chills and diaphoresis.   HENT: Negative.  Negative for congestion.    Eyes: Negative.  Negative for blurred vision, double vision, photophobia and visual disturbance.   Respiratory: Negative.  Negative for cough, shortness of breath, wheezing and stridor.    Cardiovascular: Negative.  Negative for chest pain, palpitations and leg swelling.   Gastrointestinal: Positive for diarrhea and nausea. Negative for abdominal distention, abdominal pain, blood in stool, constipation, vomiting, GERD and indigestion.   Endocrine: Negative.  Negative for cold intolerance and heat intolerance.   Genitourinary: Negative.  Negative for dysuria, flank pain, frequency and urinary incontinence.   Musculoskeletal: Positive for back pain. Negative for arthralgias.   Skin: Negative.    Allergic/Immunologic: Negative.  Negative for immunocompromised state.   Neurological: Positive for light-headedness.   Hematological: Negative.    Psychiatric/Behavioral: Negative for agitation, behavioral problems, decreased concentration, dysphoric mood, hallucinations, self-injury, sleep disturbance, suicidal ideas, negative for hyperactivity, depressed mood and stress. The patient is nervous/anxious.    All other systems reviewed and are negative.    PHQ-9 Depression Screening  Little  interest or pleasure in doing things?     Feeling down, depressed, or hopeless?     Trouble falling or staying asleep, or sleeping too much?     Feeling tired or having little energy?     Poor appetite or overeating?     Feeling bad about yourself - or that you are a failure or have let yourself or your family down?     Trouble concentrating on things, such as reading the newspaper or watching television?     Moving or speaking so slowly that other people could have noticed? Or the opposite - being so fidgety or restless that you have been moving around a lot more than usual?     Thoughts that you would be better off dead, or of hurting yourself in some way?     PHQ-9 Total Score     If you checked off any problems, how difficult have these problems made it for you to do your work, take care of things at home, or get along with other people?      Patient understands the risks associated with this controlled medication, including tolerance and addiction.  Patient also agrees to only obtain this medication from me, and not from a another provider, unless that provider is covering for me in my absence.  Patient also agrees to be compliant in dosing, and not self adjust the dose of medication.  A signed controlled substance agreement is on file, and the patient has received a controlled substance education sheet at this a previous visit.  The patient has also signed a consent for treatment with a controlled substance as per [facility] policy. KATE was obtained.    Objective    There were no vitals filed for this visit.  There is no height or weight on file to calculate BMI.    Physical Exam  Vitals and nursing note reviewed.   Constitutional:       General: She is not in acute distress.     Appearance: Normal appearance. She is well-developed. She is ill-appearing (sounds ill). She is not diaphoretic.   HENT:      Head: Normocephalic and atraumatic.   Eyes:      General: No scleral icterus.        Right eye: No  discharge.         Left eye: No discharge.      Conjunctiva/sclera: Conjunctivae normal.      Pupils: Pupils are equal, round, and reactive to light.   Neck:      Trachea: No tracheal deviation.   Pulmonary:      Effort: Pulmonary effort is normal. No respiratory distress.      Breath sounds: No stridor. No wheezing.   Musculoskeletal:         General: No tenderness or deformity. Normal range of motion.      Cervical back: Normal range of motion and neck supple.   Skin:     General: Skin is dry.      Coloration: Skin is not pale.      Findings: No erythema or rash.   Neurological:      General: No focal deficit present.      Mental Status: She is alert and oriented to person, place, and time. Mental status is at baseline.      Cranial Nerves: No cranial nerve deficit.   Psychiatric:         Mood and Affect: Mood normal.         Behavior: Behavior normal.         Thought Content: Thought content normal.       Viral GE. Increase oral fluids, rest. [place] diet as tolerated. Lomotil for diarrhea, zofran for nausea from home supply. Follow up 4 days sooner if needed.    Assessment/Plan   Diagnoses and all orders for this visit:    1. Diarrhea, unspecified type (Primary)  -     diphenoxylate-atropine (Lomotil) 2.5-0.025 MG per tablet; Take 1 tablet by mouth 4 (Four) Times a Day As Needed for Diarrhea.  Dispense: 12 tablet; Refill: 0    2. Viral gastroenteritis    3. Nausea      Return in about 4 days (around 2/14/2022).               This document has been electronically signed by UMESH Guzmán on February 10, 2022 16:20 CST

## 2022-02-11 RX ORDER — ATORVASTATIN CALCIUM 40 MG/1
40 TABLET, FILM COATED ORAL NIGHTLY
Qty: 90 TABLET | Refills: 1 | Status: SHIPPED | OUTPATIENT
Start: 2022-02-11 | End: 2022-08-18 | Stop reason: SDUPTHER

## 2022-02-12 DIAGNOSIS — K64.9 HEMORRHOIDS, UNSPECIFIED HEMORRHOID TYPE: ICD-10-CM

## 2022-02-12 DIAGNOSIS — K64.4 EXTERNAL HEMORRHOIDS: ICD-10-CM

## 2022-02-14 ENCOUNTER — TELEPHONE (OUTPATIENT)
Dept: FAMILY MEDICINE CLINIC | Facility: CLINIC | Age: 62
End: 2022-02-14

## 2022-02-14 RX ORDER — HYDROCORTISONE 10 MG/G
CREAM TOPICAL 2 TIMES DAILY
Qty: 28.4 G | Refills: 0 | Status: SHIPPED | OUTPATIENT
Start: 2022-02-14 | End: 2022-09-15

## 2022-02-14 NOTE — TELEPHONE ENCOUNTER
Patient called she went to the ER last night and she is wanting you to look at her chart from the hospital she stated they told her that er colon was very inflamed her liver enzymes where up and she is scared and she wants to know should she start taking the antibiotics they gave her last night .

## 2022-02-15 ENCOUNTER — OFFICE VISIT (OUTPATIENT)
Dept: FAMILY MEDICINE CLINIC | Facility: CLINIC | Age: 62
End: 2022-02-15

## 2022-02-15 VITALS — HEIGHT: 62 IN | WEIGHT: 110 LBS | BODY MASS INDEX: 20.24 KG/M2

## 2022-02-15 DIAGNOSIS — R19.7 DIARRHEA OF PRESUMED INFECTIOUS ORIGIN: Primary | ICD-10-CM

## 2022-02-15 DIAGNOSIS — R10.9 ACUTE RIGHT FLANK PAIN: ICD-10-CM

## 2022-02-15 DIAGNOSIS — N20.0 KIDNEY STONE ON LEFT SIDE: ICD-10-CM

## 2022-02-15 DIAGNOSIS — R79.89 ELEVATED LFTS: ICD-10-CM

## 2022-02-15 PROCEDURE — 99443 PR PHYS/QHP TELEPHONE EVALUATION 21-30 MIN: CPT | Performed by: NURSE PRACTITIONER

## 2022-02-15 NOTE — PROGRESS NOTES
Subjective   Tricia Loyola is a 62 y.o. female.   You have chosen to receive care through a telephone visit. Do you consent to use a telephone visit for your medical care today? Yes  28 minutes medical discussion.           Chief Complaint   Patient presents with   • Diarrhea     feels no better         History of Present Illness   Seen in ED on 2/13/22 for severe diarrhea, pain left LQ abdomen. CT abd pelvis showed enteritis of small bowel, nonspecific;unremarkable anastamosis at rectum, s/p lennox, appendix not well viewed. LFT with [id number]   And   alkphos 272 Lipase was normal at 178.  Prescribed levaquin and metronidazole, given IVF x 2 L and discharged home.     Today she reports very nervous about what is going on. Taking antibiotics as prescribed. Will repeat LFT tomorrow with amylase, lipase. Diarrhea continues and was unable to produce a specimen for ED, so is advised to bring specimen in for studies tomorrow as well.     No fevers or chills, somewhat nauseated. Concerning for pancreatitis or acute hepatitis.       Past Surgical History:   Procedure Laterality Date   • APPENDECTOMY     • BREAST BIOPSY Left    • CHOLECYSTECTOMY     • COLONOSCOPY N/A 11/26/2018    Procedure: COLONOSCOPY;  Surgeon: Meet Mcintyre MD;  Location: [facility] ENDOSCOPY;  Service: General   • CRANIOTOMY FOR ANEURYSM  2003   • ENDOSCOPY N/A 10/16/2019    Procedure: ESOPHAGOGASTRODUODENOSCOPY;  Surgeon: Kory Muhammad MD;  Location: [facility] ENDOSCOPY;  Service: Gastroenterology   • ENDOSCOPY N/A 3/16/2021    Procedure: ESOPHAGOGASTRODUODENOSCOPY;  Surgeon: Kory Muhammad MD;  Location: [facility] ENDOSCOPY;  Service: Gastroenterology;  Laterality: N/A;   • MASS EXCISION Right 11/5/2020    Procedure: EXCISE SOFT TISSUE MASS RIGHT POSTERIOR FLANK                 (latex allergy);  Surgeon: Meet Mcintyre MD;  Location: [facility] OR;  Service: General;  Laterality: Right;   • PAP SMEAR  08/16/2012   • TONSILLECTOMY     • UPPER  GASTROINTESTINAL ENDOSCOPY  10/16/2019   • WRIST GANGLION EXCISION Right 12/3/2021    Procedure: EXCISION OF VOLAR GANGLION CYST RIGHT WRIST      (LATEX ALLERGY);  Surgeon: Leonardo Vargas MD;  Location: [facility];  Service: Orthopedics;  Laterality: Right;      Social History     Socioeconomic History   • Marital status: Legally    Tobacco Use   • Smoking status: Heavy Tobacco Smoker     Packs/day: 0.50     Years: 20.00     Pack years: 10.00     Types: Cigarettes, Electronic Cigarette   • Smokeless tobacco: Never Used   Vaping Use   • Vaping Use: Some days   • Substances: Nicotine, Flavoring   • Devices: Disposable   Substance and Sexual Activity   • Alcohol use: Yes     Comment: socially   • Drug use: No   • Sexual activity: Defer      The following portions of the patient's history were reviewed and updated as appropriate: allergies, current medications, past family history, past medical history, past social history, past surgical history and problem list.    Review of Systems   Constitutional: Positive for appetite change. Negative for chills, diaphoresis and fatigue.   HENT: Negative.  Negative for congestion.    Eyes: Negative.  Negative for blurred vision, double vision, photophobia and visual disturbance.   Respiratory: Negative.  Negative for cough, shortness of breath, wheezing and stridor.    Cardiovascular: Negative.  Negative for chest pain, palpitations and leg swelling.   Gastrointestinal: Positive for abdominal pain (RUQ abd), diarrhea and nausea. Negative for abdominal distention, blood in stool, constipation, vomiting, GERD and indigestion.   Endocrine: Negative.  Negative for cold intolerance and heat intolerance.   Genitourinary: Negative.  Negative for dysuria, flank pain, frequency and urinary incontinence.   Musculoskeletal: Positive for back pain. Negative for arthralgias.   Skin: Negative.    Allergic/Immunologic: Negative.  Negative for immunocompromised state.    Neurological: Negative.    Hematological: Negative.    Psychiatric/Behavioral: Negative for agitation, behavioral problems, decreased concentration, dysphoric mood, hallucinations, self-injury, sleep disturbance, suicidal ideas, negative for hyperactivity, depressed mood and stress. The patient is nervous/anxious.    All other systems reviewed and are negative.    PHQ-9 Depression Screening  Little interest or pleasure in doing things?     Feeling down, depressed, or hopeless?     Trouble falling or staying asleep, or sleeping too much?     Feeling tired or having little energy?     Poor appetite or overeating?     Feeling bad about yourself - or that you are a failure or have let yourself or your family down?     Trouble concentrating on things, such as reading the newspaper or watching television?     Moving or speaking so slowly that other people could have noticed? Or the opposite - being so fidgety or restless that you have been moving around a lot more than usual?     Thoughts that you would be better off dead, or of hurting yourself in some way?     PHQ-9 Total Score     If you checked off any problems, how difficult have these problems made it for you to do your work, take care of things at home, or get along with other people?      Patient understands the risks associated with this controlled medication, including tolerance and addiction.  Patient also agrees to only obtain this medication from me, and not from a another provider, unless that provider is covering for me in my absence.  Patient also agrees to be compliant in dosing, and not self adjust the dose of medication.  A signed controlled substance agreement is on file, and the patient has received a controlled substance education sheet at this a previous visit.  The patient has also signed a consent for treatment with a controlled substance as per [facility] policy. KATE was obtained.   Objective    Vitals:    02/15/22 1104   Weight: 49.9 kg  "(110 lb)   Height: 157.5 cm (62\")       Physical Exam  Vitals and nursing note reviewed.   Constitutional:       General: She is not in acute distress.     Appearance: She is normal weight. She is ill-appearing (sounds as though she doesnt feel well).   Pulmonary:      Effort: Pulmonary effort is normal. No respiratory distress.      Breath sounds: No stridor.   Neurological:      Mental Status: She is alert and oriented to person, place, and time. Mental status is at baseline.   Psychiatric:         Mood and Affect: Mood normal.         Behavior: Behavior normal.         Thought Content: Thought content normal.         Judgment: Judgment normal.           Assessment/Plan   Diagnoses and all orders for this visit:    1. Diarrhea of presumed infectious origin (Primary)  -     Enteric Bacterial Panel - Stool, Per Rectum; Future    2. Elevated LFTs  -     Hepatic Function Panel; Future  -     CBC w AUTO Differential; Future  -     Amylase; Future  -     Lipase; Future  -     CK; Future  -     Hepatitis panel, acute; Future  -     US Liver    3. Acute right flank pain  -     Hepatic Function Panel; Future  -     CBC w AUTO Differential; Future  -     Amylase; Future  -     Lipase; Future  -     CK; Future  -     Hepatitis panel, acute; Future  -     US Liver    4. Kidney stone on left side    by CT abd pelvis on 2/13/22. 3.2mm left kidney. Non obstructive at that time.    Return in about 1 week (around 2/22/2022).             This document has been electronically signed by UMESH Guzmán on February 15, 2022 16:09 CST    "

## 2022-02-16 ENCOUNTER — LAB (OUTPATIENT)
Dept: LAB | Facility: OTHER | Age: 62
End: 2022-02-16

## 2022-02-16 DIAGNOSIS — R10.9 ACUTE RIGHT FLANK PAIN: ICD-10-CM

## 2022-02-16 DIAGNOSIS — R79.89 ELEVATED LFTS: ICD-10-CM

## 2022-02-16 DIAGNOSIS — R19.7 DIARRHEA OF PRESUMED INFECTIOUS ORIGIN: Primary | ICD-10-CM

## 2022-02-16 LAB
ALBUMIN SERPL-MCNC: 4 G/DL (ref 3.5–5)
ALP SERPL-CCNC: 127 U/L (ref 38–126)
ALT SERPL W P-5'-P-CCNC: 228 U/L
AMYLASE SERPL-CCNC: 95 U/L (ref 30–110)
AST SERPL-CCNC: 179 U/L (ref 14–36)
BILIRUB CONJ SERPL-MCNC: <0 MG/DL (ref 0–0.3)
BILIRUB INDIRECT SERPL-MCNC: 0.2 MG/DL (ref 0–1.1)
BILIRUB SERPL-MCNC: 0.4 MG/DL (ref 0.2–1.3)
CK SERPL-CCNC: 61 U/L (ref 20–180)
DEPRECATED RDW RBC AUTO: 45.7 FL (ref 37–54)
EOSINOPHIL # BLD MANUAL: 0.08 10*3/MM3 (ref 0–0.4)
EOSINOPHIL NFR BLD MANUAL: 1 % (ref 0.3–6.2)
ERYTHROCYTE [DISTWIDTH] IN BLOOD BY AUTOMATED COUNT: 13.6 % (ref 12.3–15.4)
HAV IGM SERPL QL IA: NORMAL
HBV CORE IGM SERPL QL IA: NORMAL
HBV SURFACE AG SERPL QL IA: NORMAL
HCT VFR BLD AUTO: 37.2 % (ref 34–46.6)
HCV AB SER DONR QL: NORMAL
HGB BLD-MCNC: 12.5 G/DL (ref 12–15.9)
LIPASE SERPL-CCNC: 24 U/L (ref 13–60)
LYMPHOCYTES # BLD MANUAL: 2.65 10*3/MM3 (ref 0.7–3.1)
LYMPHOCYTES NFR BLD MANUAL: 11 % (ref 5–12)
MCH RBC QN AUTO: 31.7 PG (ref 26.6–33)
MCHC RBC AUTO-ENTMCNC: 33.6 G/DL (ref 31.5–35.7)
MCV RBC AUTO: 94.4 FL (ref 79–97)
MONOCYTES # BLD: 0.91 10*3/MM3 (ref 0.1–0.9)
NEUTROPHILS # BLD AUTO: 4.64 10*3/MM3 (ref 1.7–7)
NEUTROPHILS NFR BLD MANUAL: 56 % (ref 42.7–76)
PLATELET # BLD AUTO: 275 10*3/MM3 (ref 140–450)
PMV BLD AUTO: 9.3 FL (ref 6–12)
PROT SERPL-MCNC: 6.9 G/DL (ref 6.3–8.6)
RBC # BLD AUTO: 3.94 10*6/MM3 (ref 3.77–5.28)
RBC MORPH BLD: NORMAL
SMALL PLATELETS BLD QL SMEAR: ADEQUATE
VARIANT LYMPHS NFR BLD MANUAL: 32 % (ref 19.6–45.3)
WBC MORPH BLD: NORMAL
WBC NRBC COR # BLD: 8.29 10*3/MM3 (ref 3.4–10.8)

## 2022-02-16 PROCEDURE — 82550 ASSAY OF CK (CPK): CPT | Performed by: NURSE PRACTITIONER

## 2022-02-16 PROCEDURE — 87427 SHIGA-LIKE TOXIN AG IA: CPT | Performed by: NURSE PRACTITIONER

## 2022-02-16 PROCEDURE — 87045 FECES CULTURE AEROBIC BACT: CPT | Performed by: NURSE PRACTITIONER

## 2022-02-16 PROCEDURE — 82150 ASSAY OF AMYLASE: CPT | Performed by: NURSE PRACTITIONER

## 2022-02-16 PROCEDURE — 80074 ACUTE HEPATITIS PANEL: CPT | Performed by: NURSE PRACTITIONER

## 2022-02-16 PROCEDURE — 85025 COMPLETE CBC W/AUTO DIFF WBC: CPT | Performed by: NURSE PRACTITIONER

## 2022-02-16 PROCEDURE — 36415 COLL VENOUS BLD VENIPUNCTURE: CPT | Performed by: NURSE PRACTITIONER

## 2022-02-16 PROCEDURE — 80076 HEPATIC FUNCTION PANEL: CPT | Performed by: NURSE PRACTITIONER

## 2022-02-16 PROCEDURE — 83690 ASSAY OF LIPASE: CPT | Performed by: NURSE PRACTITIONER

## 2022-02-16 PROCEDURE — 87046 STOOL CULTR AEROBIC BACT EA: CPT | Performed by: NURSE PRACTITIONER

## 2022-02-17 DIAGNOSIS — R10.9 ACUTE RIGHT FLANK PAIN: ICD-10-CM

## 2022-02-17 DIAGNOSIS — R79.89 ELEVATED LFTS: Primary | ICD-10-CM

## 2022-02-20 LAB
BACTERIA SPEC CULT: NORMAL
BACTERIA SPEC CULT: NORMAL
CAMPYLOBACTER STL CULT: NORMAL
E COLI SXT STL QL IA: NEGATIVE
SALM + SHIG STL CULT: NORMAL

## 2022-02-28 ENCOUNTER — LAB (OUTPATIENT)
Dept: LAB | Facility: HOSPITAL | Age: 62
End: 2022-02-28

## 2022-03-01 RX ORDER — DEXLANSOPRAZOLE 60 MG/1
CAPSULE, DELAYED RELEASE ORAL
Qty: 90 CAPSULE | Refills: 0 | Status: SHIPPED | OUTPATIENT
Start: 2022-03-01 | End: 2022-06-03 | Stop reason: SDUPTHER

## 2022-03-04 RX ORDER — FAMOTIDINE 40 MG/1
40 TABLET, FILM COATED ORAL DAILY
Qty: 90 TABLET | Refills: 0 | Status: SHIPPED | OUTPATIENT
Start: 2022-03-04 | End: 2022-05-31 | Stop reason: SDUPTHER

## 2022-03-06 DIAGNOSIS — M54.50 CHRONIC MIDLINE LOW BACK PAIN WITHOUT SCIATICA: ICD-10-CM

## 2022-03-06 DIAGNOSIS — G89.29 CHRONIC MIDLINE LOW BACK PAIN WITHOUT SCIATICA: ICD-10-CM

## 2022-03-07 ENCOUNTER — TELEPHONE (OUTPATIENT)
Dept: FAMILY MEDICINE CLINIC | Facility: CLINIC | Age: 62
End: 2022-03-07

## 2022-03-07 RX ORDER — HYDROCODONE BITARTRATE AND ACETAMINOPHEN 10; 325 MG/1; MG/1
1 TABLET ORAL EVERY 6 HOURS PRN
Qty: 120 TABLET | Refills: 0 | Status: SHIPPED | OUTPATIENT
Start: 2022-03-07 | End: 2022-04-07 | Stop reason: SDUPTHER

## 2022-03-07 NOTE — TELEPHONE ENCOUNTER
Patient seen in office every 3 months for chronic pain requiring opiate pain medication. Compliant with medication, visits with no adverse effects noted. KATE and UDS current and appropriate. Patient called requesting scheduled refill at appropriate interval. Patient understands the risks associated with this controlled medication, including tolerance and addiction.  Patient also agrees to only obtain this medication from me, and not from a another provider, unless that provider is covering for me in my absence.  Patient also agrees to be compliant in dosing, and not self adjust the dose of medication.  A signed controlled substance agreement is on file, and the patient has received a controlled substance education sheet at this a previous visit.  The patient has also signed a consent for treatment with a controlled substance as per [facility] policy. KATE was obtained.   Refill sent for hydrocodone.     This document has been electronically signed by UMESH Guzmán on March 7, 2022 13:07 CST

## 2022-03-07 NOTE — TELEPHONE ENCOUNTER
----- Message from UMESH Arriaga sent at 3/7/2022  3:31 PM CST -----  Yes probiotics are safe with her meds.  ----- Message -----  From: Eden Canales MA  Sent: 3/1/2022  11:37 AM CST  To: UMESH Arriaga    Patient called she wants to know is it safe to take a probiotic with what medications she takes

## 2022-03-15 ENCOUNTER — OFFICE VISIT (OUTPATIENT)
Dept: GASTROENTEROLOGY | Facility: CLINIC | Age: 62
End: 2022-03-15

## 2022-03-15 ENCOUNTER — LAB (OUTPATIENT)
Dept: LAB | Facility: OTHER | Age: 62
End: 2022-03-15

## 2022-03-15 VITALS
SYSTOLIC BLOOD PRESSURE: 106 MMHG | BODY MASS INDEX: 19.32 KG/M2 | HEIGHT: 62 IN | DIASTOLIC BLOOD PRESSURE: 63 MMHG | WEIGHT: 105 LBS | HEART RATE: 76 BPM

## 2022-03-15 DIAGNOSIS — R74.8 ELEVATED LIVER ENZYMES: Primary | ICD-10-CM

## 2022-03-15 DIAGNOSIS — K58.2 IRRITABLE BOWEL SYNDROME WITH BOTH CONSTIPATION AND DIARRHEA: ICD-10-CM

## 2022-03-15 DIAGNOSIS — R10.33 PERIUMBILICAL ABDOMINAL PAIN: ICD-10-CM

## 2022-03-15 DIAGNOSIS — R94.5 ABNORMAL RESULTS OF LIVER FUNCTION STUDIES: ICD-10-CM

## 2022-03-15 LAB
ALBUMIN SERPL-MCNC: 4 G/DL (ref 3.5–5)
ALP SERPL-CCNC: 65 U/L (ref 38–126)
ALT SERPL W P-5'-P-CCNC: 19 U/L
AST SERPL-CCNC: 62 U/L (ref 14–36)
BILIRUB CONJ SERPL-MCNC: <0 MG/DL (ref 0–0.3)
BILIRUB INDIRECT SERPL-MCNC: 0.1 MG/DL (ref 0–1.1)
BILIRUB SERPL-MCNC: 0.2 MG/DL (ref 0.2–1.3)
INR PPP: 0.97 (ref 0.8–1.2)
PROT SERPL-MCNC: 6.7 G/DL (ref 6.3–8.6)
PROTHROMBIN TIME: 13.1 SECONDS (ref 11.1–15.3)

## 2022-03-15 PROCEDURE — 86790 VIRUS ANTIBODY NOS: CPT | Performed by: PHYSICIAN ASSISTANT

## 2022-03-15 PROCEDURE — 82977 ASSAY OF GGT: CPT | Performed by: PHYSICIAN ASSISTANT

## 2022-03-15 PROCEDURE — 80076 HEPATIC FUNCTION PANEL: CPT | Performed by: INTERNAL MEDICINE

## 2022-03-15 PROCEDURE — 85610 PROTHROMBIN TIME: CPT | Performed by: INTERNAL MEDICINE

## 2022-03-15 PROCEDURE — 99214 OFFICE O/P EST MOD 30 MIN: CPT | Performed by: PHYSICIAN ASSISTANT

## 2022-03-15 PROCEDURE — 36415 COLL VENOUS BLD VENIPUNCTURE: CPT | Performed by: PHYSICIAN ASSISTANT

## 2022-03-16 LAB — GGT SERPL-CCNC: 47 U/L (ref 5–36)

## 2022-03-22 LAB
HEV IGG SER QL IA: POSITIVE
HEV IGM SER QL: NEGATIVE

## 2022-03-25 ENCOUNTER — OFFICE VISIT (OUTPATIENT)
Dept: FAMILY MEDICINE CLINIC | Facility: CLINIC | Age: 62
End: 2022-03-25

## 2022-03-25 VITALS
SYSTOLIC BLOOD PRESSURE: 132 MMHG | RESPIRATION RATE: 16 BRPM | HEART RATE: 79 BPM | OXYGEN SATURATION: 97 % | WEIGHT: 104 LBS | DIASTOLIC BLOOD PRESSURE: 70 MMHG | BODY MASS INDEX: 19.14 KG/M2 | HEIGHT: 62 IN | TEMPERATURE: 98.6 F

## 2022-03-25 DIAGNOSIS — L24.5 IRRITANT CONTACT DERMATITIS DUE TO CHEMICAL: Primary | ICD-10-CM

## 2022-03-25 DIAGNOSIS — F41.1 GAD (GENERALIZED ANXIETY DISORDER): Chronic | ICD-10-CM

## 2022-03-25 PROCEDURE — 99214 OFFICE O/P EST MOD 30 MIN: CPT | Performed by: NURSE PRACTITIONER

## 2022-03-25 RX ORDER — DEXAMETHASONE 1 MG
1 TABLET ORAL 2 TIMES DAILY WITH MEALS
Qty: 6 TABLET | Refills: 0 | Status: SHIPPED | OUTPATIENT
Start: 2022-03-25 | End: 2022-03-28

## 2022-03-25 RX ORDER — BETAMETHASONE DIPROPIONATE 0.5 MG/G
1 OINTMENT TOPICAL 2 TIMES DAILY
Qty: 45 G | Refills: 1 | Status: SHIPPED | OUTPATIENT
Start: 2022-03-25 | End: 2022-05-24

## 2022-03-25 RX ORDER — CLONAZEPAM 0.5 MG/1
0.5 TABLET ORAL DAILY PRN
Qty: 30 TABLET | Refills: 0 | Status: SHIPPED | OUTPATIENT
Start: 2022-03-25 | End: 2022-07-22 | Stop reason: SDUPTHER

## 2022-03-25 NOTE — PROGRESS NOTES
Subjective   Tricia Loyola is a 62 y.o. female.     has Tobacco dependence syndrome; PRISCILLA (generalized anxiety disorder); PTSD (post-traumatic stress disorder); Gastroesophageal reflux disease with esophagitis without hemorrhage; Hyperlipidemia; Right knee pain; Primary osteoarthritis of right knee; Osteoporosis; Sinus tachycardia; Restless leg syndrome; Chronic midline thoracic back pain; Chronic midline low back pain without sciatica; Panic disorder without agoraphobia; Chronic migraine; Compression fracture of vertebra (HCC); COPD (chronic obstructive pulmonary disease) (HCC); Hearing loss; Migraine; Radiculopathy of thoracolumbar region; Screen for colon cancer; Encounter for colonoscopy due to history of adenomatous colonic polyps; Elevated liver enzymes; Pain of upper abdomen; Hepatic fibrosis; Nausea; Acquired hypothyroidism; Primary osteoarthritis of left knee; History of appendectomy; Soft tissue mass; Gastroesophageal reflux disease with esophagitis without hemorrhage; Intractable migraine without aura and without status migrainosus; Right wrist pain; Ganglion cyst of wrist, right; Periumbilical abdominal pain; Weight loss; Malaise and fatigue; Family history of colon cancer; History of cerebral aneurysm; and Ganglion cyst of volar aspect of right wrist on their problem list.     Chief Complaint   Patient presents with   • hand itching     Redness, 4-5 days        History of Present Illness   Patient recently began Spring cleaning home with use of Mean Green . This was handled one day without gloves, all day and next day developed palmar/ bilateral redness, pain and itch that indicates a stage 1 chemical burn of both hands. She went to ED on 3/21/22 and received an IM steroid injection which helped for about 24 hours then itch, burn returned. Here for treatment.  Also needs refill of chronic clonazepam. KATE approriate.   No other complaint today.   Past Surgical History:   Procedure Laterality  Date   • APPENDECTOMY     • BREAST BIOPSY Left    • CHOLECYSTECTOMY     • COLONOSCOPY N/A 11/26/2018    Procedure: COLONOSCOPY;  Surgeon: Meet Mcintyre MD;  Location: [facility] ENDOSCOPY;  Service: General   • CRANIOTOMY FOR ANEURYSM  2003   • ENDOSCOPY N/A 10/16/2019    Procedure: ESOPHAGOGASTRODUODENOSCOPY;  Surgeon: Kory Muhammad MD;  Location: [facility] ENDOSCOPY;  Service: Gastroenterology   • ENDOSCOPY N/A 3/16/2021    Procedure: ESOPHAGOGASTRODUODENOSCOPY;  Surgeon: Kory Muhammad MD;  Location: [facility] ENDOSCOPY;  Service: Gastroenterology;  Laterality: N/A;   • MASS EXCISION Right 11/5/2020    Procedure: EXCISE SOFT TISSUE MASS RIGHT POSTERIOR FLANK                 (latex allergy);  Surgeon: Meet Mcintyre MD;  Location: [facility] OR;  Service: General;  Laterality: Right;   • PAP SMEAR  08/16/2012   • TONSILLECTOMY     • UPPER GASTROINTESTINAL ENDOSCOPY  10/16/2019   • WRIST GANGLION EXCISION Right 12/3/2021    Procedure: EXCISION OF VOLAR GANGLION CYST RIGHT WRIST      (LATEX ALLERGY);  Surgeon: Leonardo Vargas MD;  Location: [facility] OR;  Service: Orthopedics;  Laterality: Right;      Social History     Socioeconomic History   • Marital status: Legally    Tobacco Use   • Smoking status: Heavy Tobacco Smoker     Packs/day: 0.50     Years: 20.00     Pack years: 10.00     Types: Cigarettes, Electronic Cigarette   • Smokeless tobacco: Never Used   Vaping Use   • Vaping Use: Some days   • Substances: Nicotine, Flavoring   • Devices: Disposable   Substance and Sexual Activity   • Alcohol use: Yes     Comment: socially   • Drug use: No   • Sexual activity: Defer      The following portions of the patient's history were reviewed and updated as appropriate: allergies, current medications, past family history, past medical history, past social history, past surgical history and problem list.    Review of Systems   Constitutional: Negative.    HENT: Negative.  Negative for congestion.    Eyes:  Negative.  Negative for blurred vision, double vision, photophobia and visual disturbance.   Respiratory: Negative.  Negative for cough, shortness of breath, wheezing and stridor.    Cardiovascular: Negative.  Negative for chest pain, palpitations and leg swelling.   Gastrointestinal: Negative.  Negative for abdominal distention, abdominal pain, blood in stool, constipation, diarrhea, nausea, vomiting, GERD and indigestion.   Endocrine: Negative.  Negative for cold intolerance and heat intolerance.   Genitourinary: Negative.  Negative for dysuria, flank pain, frequency and urinary incontinence.   Musculoskeletal: Positive for back pain. Negative for arthralgias.   Skin: Rash: burning very pruritic red rash both palms.   Allergic/Immunologic: Negative.  Negative for immunocompromised state.   Neurological: Negative.    Hematological: Negative.    Psychiatric/Behavioral: Negative for agitation, behavioral problems, decreased concentration, dysphoric mood, hallucinations, self-injury, sleep disturbance, suicidal ideas, negative for hyperactivity, depressed mood and stress. The patient is nervous/anxious.    All other systems reviewed and are negative.    PHQ-9 Depression Screening  Little interest or pleasure in doing things?     Feeling down, depressed, or hopeless?     Trouble falling or staying asleep, or sleeping too much?     Feeling tired or having little energy?     Poor appetite or overeating?     Feeling bad about yourself - or that you are a failure or have let yourself or your family down?     Trouble concentrating on things, such as reading the newspaper or watching television?     Moving or speaking so slowly that other people could have noticed? Or the opposite - being so fidgety or restless that you have been moving around a lot more than usual?     Thoughts that you would be better off dead, or of hurting yourself in some way?     PHQ-9 Total Score     If you checked off any problems, how difficult have  these problems made it for you to do your work, take care of things at home, or get along with other people?        Objective   Physical Exam  Vitals and nursing note reviewed.   Constitutional:       General: She is not in acute distress.     Appearance: Normal appearance. She is well-developed and normal weight. She is not ill-appearing or diaphoretic.   HENT:      Head: Normocephalic and atraumatic.   Eyes:      General: No scleral icterus.        Right eye: No discharge.         Left eye: No discharge.      Conjunctiva/sclera: Conjunctivae normal.      Pupils: Pupils are equal, round, and reactive to light.   Neck:      Thyroid: No thyromegaly.      Vascular: No carotid bruit or JVD.      Trachea: No tracheal deviation.   Cardiovascular:      Rate and Rhythm: Normal rate and regular rhythm.      Pulses: Normal pulses.      Heart sounds: Normal heart sounds. No murmur heard.    No friction rub. No gallop.   Pulmonary:      Effort: Pulmonary effort is normal. No respiratory distress.      Breath sounds: Normal breath sounds. No stridor. No wheezing, rhonchi or rales.   Chest:      Chest wall: No tenderness.   Abdominal:      General: Bowel sounds are normal.      Palpations: Abdomen is soft.   Musculoskeletal:         General: No tenderness or deformity. Normal range of motion.      Cervical back: Normal range of motion and neck supple. No rigidity or tenderness.      Right lower leg: No edema.      Left lower leg: No edema.   Lymphadenopathy:      Cervical: No cervical adenopathy.   Skin:     General: Skin is warm and dry.      Capillary Refill: Capillary refill takes 2 to 3 seconds.      Coloration: Skin is not jaundiced or pale.      Findings: Erythema and rash present. No bruising or lesion.      Comments: Splotchy confluent red, blanchable irritation of 90% of both palms, no open areas and no blisters.    Neurological:      General: No focal deficit present.      Mental Status: She is alert and oriented to  "person, place, and time. Mental status is at baseline.      Motor: No weakness.      Gait: Gait normal.   Psychiatric:         Mood and Affect: Mood normal.         Behavior: Behavior normal.         Thought Content: Thought content normal.         Judgment: Judgment normal.       Vitals:    03/25/22 1023   BP: 132/70   Pulse: 79   Resp: 16   Temp: 98.6 °F (37 °C)   SpO2: 97%   Weight: 47.2 kg (104 lb)   Height: 157.5 cm (62\")   PainSc:   5   PainLoc: Hand  Comment: both      Body mass index is 19.02 kg/m².    Assessment/Plan   Diagnoses and all orders for this visit:    1. Irritant contact dermatitis due to chemical (Primary)  Comments:  related to prolonged contact with Mean Green cleanser. unremitting. responsive to steroid. high strength topical steroid ointment/ decadron Rx today.   Orders:  -     betamethasone, augmented, (Diprolene) 0.05 % ointment; Apply 1 application topically to the appropriate area as directed 2 (Two) Times a Day. Not more than 2 weeks. Apply to hands/ palm side. Do not get in eyes  Dispense: 45 g; Refill: 1  -     dexamethasone (DECADRON) 1 MG tablet; Take 1 tablet by mouth 2 (Two) Times a Day With Meals for 3 days.  Dispense: 6 tablet; Refill: 0    2. PRISCILLA (generalized anxiety disorder)  -     clonazePAM (KlonoPIN) 0.5 MG tablet; Take 1 tablet by mouth Daily As Needed for Anxiety.  Dispense: 30 tablet; Refill: 0        Return in about 4 days (around 3/29/2022), or if symptoms worsen or fail to improve.  Patient Instructions   No dish washing or any activity exposing hands to liquids other than bathing with mild soap permitted x 1 week or until palms return to normal and evidence of 1st degree irritant chemical burn resolves. Apply jersey cotton gloves over steroid ointment BID until itch/ burn resolved and skin texture and color return to normal.               This document has been electronically signed by UMESH Guzmán on March 25, 2022 11:32 CDT    "

## 2022-03-25 NOTE — PATIENT INSTRUCTIONS
No dish washing or any activity exposing hands to liquids other than bathing with mild soap permitted x 1 week or until palms return to normal and evidence of 1st degree irritant chemical burn resolves. Apply jersey cotton gloves over steroid ointment BID until itch/ burn resolved and skin texture and color return to normal.

## 2022-03-31 ENCOUNTER — OFFICE VISIT (OUTPATIENT)
Dept: FAMILY MEDICINE CLINIC | Facility: CLINIC | Age: 62
End: 2022-03-31

## 2022-03-31 VITALS
SYSTOLIC BLOOD PRESSURE: 108 MMHG | DIASTOLIC BLOOD PRESSURE: 70 MMHG | TEMPERATURE: 98.1 F | RESPIRATION RATE: 16 BRPM | BODY MASS INDEX: 19.14 KG/M2 | HEIGHT: 62 IN | WEIGHT: 104 LBS

## 2022-03-31 DIAGNOSIS — T78.40XD ALLERGIC REACTION, SUBSEQUENT ENCOUNTER: ICD-10-CM

## 2022-03-31 DIAGNOSIS — L24.5 IRRITANT CONTACT DERMATITIS DUE TO CHEMICAL: ICD-10-CM

## 2022-03-31 DIAGNOSIS — Z91.013 ALLERGY TO SHRIMP: Primary | ICD-10-CM

## 2022-03-31 PROCEDURE — 99213 OFFICE O/P EST LOW 20 MIN: CPT | Performed by: NURSE PRACTITIONER

## 2022-03-31 RX ORDER — METHYLPREDNISOLONE 4 MG/1
TABLET ORAL
Qty: 1 EACH | Refills: 0 | Status: SHIPPED | OUTPATIENT
Start: 2022-03-31 | End: 2022-05-24

## 2022-03-31 NOTE — PROGRESS NOTES
Subjective   Tricia Loyola is a 62 y.o. female.     has Tobacco dependence syndrome; PRISCILLA (generalized anxiety disorder); PTSD (post-traumatic stress disorder); Gastroesophageal reflux disease with esophagitis without hemorrhage; Hyperlipidemia; Right knee pain; Primary osteoarthritis of right knee; Osteoporosis; Sinus tachycardia; Restless leg syndrome; Chronic midline thoracic back pain; Chronic midline low back pain without sciatica; Panic disorder without agoraphobia; Chronic migraine; Compression fracture of vertebra (HCC); COPD (chronic obstructive pulmonary disease) (HCC); Hearing loss; Migraine; Radiculopathy of thoracolumbar region; Screen for colon cancer; Encounter for colonoscopy due to history of adenomatous colonic polyps; Elevated liver enzymes; Pain of upper abdomen; Hepatic fibrosis; Nausea; Acquired hypothyroidism; Primary osteoarthritis of left knee; History of appendectomy; Soft tissue mass; Gastroesophageal reflux disease with esophagitis without hemorrhage; Intractable migraine without aura and without status migrainosus; Right wrist pain; Ganglion cyst of wrist, right; Periumbilical abdominal pain; Weight loss; Malaise and fatigue; Family history of colon cancer; History of cerebral aneurysm; and Ganglion cyst of volar aspect of right wrist on their problem list.     Chief Complaint   Patient presents with   • Rash     Rash is worse         History of Present Illness   Patient reports compliance with topical steroid cream for contact dermatitis of hands.  Currently wearing wide jersey gloves.  Has avoided detergents and soaking hands since last seen on 3/25/2022.  Her hands do not hurt and rarely itch after completing prednisone and with topical steroid cream.  Currently the outer layer of skin is smooth soft and normal with underlying blotchy redness noted.  I do not believe that this is residual of the contact dermatitis.  This may be due to a food allergy reaction of last evening for which  she presented to the emergency department in [facility].  She had consumed two shrimp at supper with known allergy to iodine contrast solution, never having had a shrimp allergy reaction before.  1 to 2 hours later she developed pruritic rash with some hives all around the torso and up toward the axilla bilaterally.  Her hands began itching in worsened with new redness again.  She was treated with injectable Decadron and advised to take Benadryl as needed at home and discharged.    Today she reports that most of the rash is gone some still remains lateral chest wall and axillary is much less pruritic but still itches some.  Her hands feel much better and the splotchy redness on her hands exhibits normothermia.    At no point during this time did she develop dysphagia, difficulty breathing or angioedema.  Shrimp has been added to her allergy list and she is advised to avoid it completely in the future to avoid a similar or worse reaction.    Discussed treatments and advised to Medrol dose pack and holding the steroid topical ointment/cream for her hands as long as the external surface of the skin remains normal as it is.    Other complaints today: None.      Past Surgical History:   Procedure Laterality Date   • APPENDECTOMY     • BREAST BIOPSY Left    • CHOLECYSTECTOMY     • COLONOSCOPY N/A 11/26/2018    Procedure: COLONOSCOPY;  Surgeon: Meet Mcintyre MD;  Location: [facility] ENDOSCOPY;  Service: General   • CRANIOTOMY FOR ANEURYSM  2003   • ENDOSCOPY N/A 10/16/2019    Procedure: ESOPHAGOGASTRODUODENOSCOPY;  Surgeon: Kory Muhammad MD;  Location: [facility] ENDOSCOPY;  Service: Gastroenterology   • ENDOSCOPY N/A 3/16/2021    Procedure: ESOPHAGOGASTRODUODENOSCOPY;  Surgeon: Kory Muhammad MD;  Location: [facility] ENDOSCOPY;  Service: Gastroenterology;  Laterality: N/A;   • MASS EXCISION Right 11/5/2020    Procedure: EXCISE SOFT TISSUE MASS RIGHT POSTERIOR FLANK                 (latex allergy);  Surgeon: Meet Mcintyre  MD;  Location: [facility];  Service: General;  Laterality: Right;   • PAP SMEAR  08/16/2012   • TONSILLECTOMY     • UPPER GASTROINTESTINAL ENDOSCOPY  10/16/2019   • WRIST GANGLION EXCISION Right 12/3/2021    Procedure: EXCISION OF VOLAR GANGLION CYST RIGHT WRIST      (LATEX ALLERGY);  Surgeon: Leonardo Vargas MD;  Location: [facility];  Service: Orthopedics;  Laterality: Right;      Social History     Socioeconomic History   • Marital status: Legally    Tobacco Use   • Smoking status: Heavy Tobacco Smoker     Packs/day: 0.50     Years: 20.00     Pack years: 10.00     Types: Cigarettes, Electronic Cigarette   • Smokeless tobacco: Never Used   Vaping Use   • Vaping Use: Some days   • Substances: Nicotine, Flavoring   • Devices: Disposable   Substance and Sexual Activity   • Alcohol use: Yes     Comment: socially   • Drug use: No   • Sexual activity: Defer      The following portions of the patient's history were reviewed and updated as appropriate: allergies, current medications, past family history, past medical history, past social history, past surgical history and problem list.    Review of Systems   HENT: Negative for trouble swallowing.    Respiratory: Negative.  Negative for cough, shortness of breath, wheezing and stridor.    Cardiovascular: Negative.  Negative for chest pain, palpitations and leg swelling.   Skin: Positive for rash.   All other systems reviewed and are negative.    PHQ-9 Depression Screening  Little interest or pleasure in doing things? 0-->not at all   Feeling down, depressed, or hopeless? 0-->not at all   Trouble falling or staying asleep, or sleeping too much?     Feeling tired or having little energy?     Poor appetite or overeating?     Feeling bad about yourself - or that you are a failure or have let yourself or your family down?     Trouble concentrating on things, such as reading the newspaper or watching television?     Moving or speaking so slowly that other people  could have noticed? Or the opposite - being so fidgety or restless that you have been moving around a lot more than usual?     Thoughts that you would be better off dead, or of hurting yourself in some way?     PHQ-9 Total Score 0   If you checked off any problems, how difficult have these problems made it for you to do your work, take care of things at home, or get along with other people?        Objective   Physical Exam  Vitals and nursing note reviewed.   Constitutional:       General: She is not in acute distress.     Appearance: Normal appearance. She is well-developed. She is not ill-appearing or diaphoretic.   HENT:      Head: Normocephalic and atraumatic.   Eyes:      General: No scleral icterus.        Right eye: No discharge.         Left eye: No discharge.      Conjunctiva/sclera: Conjunctivae normal.      Pupils: Pupils are equal, round, and reactive to light.   Neck:      Thyroid: No thyromegaly.      Vascular: No carotid bruit or JVD.      Trachea: No tracheal deviation.   Cardiovascular:      Rate and Rhythm: Normal rate and regular rhythm.      Pulses: Normal pulses.      Heart sounds: Normal heart sounds. No murmur heard.    No friction rub. No gallop.   Pulmonary:      Effort: Pulmonary effort is normal. No respiratory distress.      Breath sounds: Normal breath sounds. No stridor. No wheezing, rhonchi or rales.   Chest:      Chest wall: No tenderness.   Abdominal:      General: Bowel sounds are normal.      Palpations: Abdomen is soft.   Musculoskeletal:         General: No tenderness or deformity. Normal range of motion.      Cervical back: Normal range of motion and neck supple. No rigidity or tenderness.      Right lower leg: No edema.      Left lower leg: No edema.   Lymphadenopathy:      Cervical: No cervical adenopathy.   Skin:     General: Skin is warm and dry.      Capillary Refill: Capillary refill takes 2 to 3 seconds.      Coloration: Skin is not jaundiced or pale.      Findings: Rash  "present. No bruising, erythema or lesion.   Neurological:      General: No focal deficit present.      Mental Status: She is alert and oriented to person, place, and time. Mental status is at baseline.      Motor: No weakness.      Gait: Gait normal.   Psychiatric:         Mood and Affect: Mood normal.         Behavior: Behavior normal.         Thought Content: Thought content normal.         Judgment: Judgment normal.       Vitals:    03/31/22 1437   BP: 108/70   Resp: 16   Temp: 98.1 °F (36.7 °C)   Weight: 47.2 kg (104 lb)   Height: 157.5 cm (62\")   PainSc: 0-No pain      Body mass index is 19.02 kg/m².    Assessment/Plan   Diagnoses and all orders for this visit:    1. Allergy to shrimp (Primary)  -     methylPREDNISolone (MEDROL) 4 MG dose pack; Take as directed on package instructions.  Dispense: 1 each; Refill: 0    2. Allergic reaction, subsequent encounter  -     methylPREDNISolone (MEDROL) 4 MG dose pack; Take as directed on package instructions.  Dispense: 1 each; Refill: 0    3. Irritant contact dermatitis due to chemical  Comments:  appears resolved. resume previous activities but no hand exposure to detergents, strong chemicals or perfumes.        Return in about 1 week (around 4/7/2022).  There are no Patient Instructions on file for this visit.             This document has been electronically signed by UMESH Guzmán on March 31, 2022 15:04 CDT    "

## 2022-04-06 DIAGNOSIS — G89.29 CHRONIC MIDLINE LOW BACK PAIN WITHOUT SCIATICA: ICD-10-CM

## 2022-04-06 DIAGNOSIS — M54.50 CHRONIC MIDLINE LOW BACK PAIN WITHOUT SCIATICA: ICD-10-CM

## 2022-04-06 DIAGNOSIS — E53.8 B12 DEFICIENCY: ICD-10-CM

## 2022-04-06 RX ORDER — HYDROCODONE BITARTRATE AND ACETAMINOPHEN 10; 325 MG/1; MG/1
1 TABLET ORAL EVERY 6 HOURS PRN
Qty: 120 TABLET | Refills: 0 | Status: CANCELLED | OUTPATIENT
Start: 2022-04-06

## 2022-04-07 ENCOUNTER — TELEPHONE (OUTPATIENT)
Dept: FAMILY MEDICINE CLINIC | Facility: CLINIC | Age: 62
End: 2022-04-07

## 2022-04-07 DIAGNOSIS — M54.50 CHRONIC MIDLINE LOW BACK PAIN WITHOUT SCIATICA: ICD-10-CM

## 2022-04-07 DIAGNOSIS — G89.29 CHRONIC MIDLINE LOW BACK PAIN WITHOUT SCIATICA: ICD-10-CM

## 2022-04-07 RX ORDER — HYDROCODONE BITARTRATE AND ACETAMINOPHEN 10; 325 MG/1; MG/1
1 TABLET ORAL EVERY 6 HOURS PRN
Qty: 120 TABLET | Refills: 0 | Status: SHIPPED | OUTPATIENT
Start: 2022-04-07 | End: 2022-05-05 | Stop reason: SDUPTHER

## 2022-04-07 NOTE — TELEPHONE ENCOUNTER
Patient seen in office every 3 months for chronic pain requiring opiate pain medication. Compliant with medication, visits with no adverse effects noted. KATE and UDS current and appropriate. Patient called requesting scheduled refill at appropriate interval. Patient understands the risks associated with this controlled medication, including tolerance and addiction.  Patient also agrees to only obtain this medication from me, and not from a another provider, unless that provider is covering for me in my absence.  Patient also agrees to be compliant in dosing, and not self adjust the dose of medication.  A signed controlled substance agreement is on file, and the patient has received a controlled substance education sheet at this a previous visit.  The patient has also signed a consent for treatment with a controlled substance as per [facility] policy. KATE was obtained.   Refill sent for hydrocodone.     This document has been electronically signed by UMESH Guzmán on April 7, 2022 10:37 CDT

## 2022-05-05 ENCOUNTER — TELEPHONE (OUTPATIENT)
Dept: FAMILY MEDICINE CLINIC | Facility: CLINIC | Age: 62
End: 2022-05-05

## 2022-05-05 DIAGNOSIS — M54.50 CHRONIC MIDLINE LOW BACK PAIN WITHOUT SCIATICA: ICD-10-CM

## 2022-05-05 DIAGNOSIS — G89.29 CHRONIC MIDLINE LOW BACK PAIN WITHOUT SCIATICA: ICD-10-CM

## 2022-05-05 RX ORDER — HYDROCODONE BITARTRATE AND ACETAMINOPHEN 10; 325 MG/1; MG/1
1 TABLET ORAL EVERY 6 HOURS PRN
Qty: 120 TABLET | Refills: 0 | Status: SHIPPED | OUTPATIENT
Start: 2022-05-05 | End: 2022-06-03 | Stop reason: SDUPTHER

## 2022-05-05 NOTE — TELEPHONE ENCOUNTER
Patient seen in office every 3 months for chronic pain requiring opiate pain medication. Compliant with medication, visits with no adverse effects noted. KATE and UDS current and appropriate. Patient called requesting scheduled refill at appropriate interval. Patient understands the risks associated with this controlled medication, including tolerance and addiction.  Patient also agrees to only obtain this medication from me, and not from a another provider, unless that provider is covering for me in my absence.  Patient also agrees to be compliant in dosing, and not self adjust the dose of medication.  A signed controlled substance agreement is on file, and the patient has received a controlled substance education sheet at this a previous visit.  The patient has also signed a consent for treatment with a controlled substance as per [facility] policy. KATE was obtained.   Refill sent for hydrocodone.     This document has been electronically signed by UMESH Guzmán on May 5, 2022 17:31 CDT

## 2022-05-05 NOTE — TELEPHONE ENCOUNTER
Incoming Refill Request      Medication requested (name and dose):     Norco     levothyroxine (SYNTHROID, LEVOTHROID) 25 MCG tablet         [facility]     Additional details provided by patient:    Best call back number:   Does the patient have less than a 3 day supply:  [] Yes  [] No    Carmen Salamanca  05/05/22, 08:36 CDT

## 2022-05-10 DIAGNOSIS — E03.9 ACQUIRED HYPOTHYROIDISM: ICD-10-CM

## 2022-05-10 DIAGNOSIS — G25.81 RESTLESS LEG SYNDROME: Chronic | ICD-10-CM

## 2022-05-11 RX ORDER — ROPINIROLE 3 MG/1
3 TABLET, FILM COATED ORAL
Qty: 90 TABLET | Refills: 1 | Status: SHIPPED | OUTPATIENT
Start: 2022-05-11 | End: 2022-11-07

## 2022-05-11 RX ORDER — LEVOTHYROXINE SODIUM 0.03 MG/1
TABLET ORAL
Qty: 15 TABLET | Refills: 5 | Status: SHIPPED | OUTPATIENT
Start: 2022-05-11 | End: 2022-06-16 | Stop reason: SDUPTHER

## 2022-05-16 ENCOUNTER — TELEPHONE (OUTPATIENT)
Dept: FAMILY MEDICINE CLINIC | Facility: CLINIC | Age: 62
End: 2022-05-16

## 2022-05-16 NOTE — TELEPHONE ENCOUNTER
----- Message from UMESH Arriaga sent at 5/16/2022  9:07 AM CDT -----  Ask patient if she doesn't want to see Dr Marie any more? As she is her neurologist for headaches, it has been Dr Chacon practice to not see anyone who sees another neurologist. She can also test for dementia.   ----- Message -----  From: Eden Canales MA  Sent: 5/5/2022   3:50 PM CDT  To: UMESH Arriaga    Patient called wants to know if you have put a referral to the doctor in [place] for testing of dementia

## 2022-05-24 ENCOUNTER — OFFICE VISIT (OUTPATIENT)
Dept: GASTROENTEROLOGY | Facility: CLINIC | Age: 62
End: 2022-05-24

## 2022-05-24 VITALS
SYSTOLIC BLOOD PRESSURE: 104 MMHG | HEIGHT: 62 IN | HEART RATE: 74 BPM | BODY MASS INDEX: 19.88 KG/M2 | DIASTOLIC BLOOD PRESSURE: 62 MMHG | WEIGHT: 108 LBS

## 2022-05-24 DIAGNOSIS — R10.33 PERIUMBILICAL ABDOMINAL PAIN: ICD-10-CM

## 2022-05-24 DIAGNOSIS — K58.2 IRRITABLE BOWEL SYNDROME WITH BOTH CONSTIPATION AND DIARRHEA: ICD-10-CM

## 2022-05-24 DIAGNOSIS — B17.2 HEPATITIS E VIRUS INFECTION, UNSPECIFIED CHRONICITY: Primary | ICD-10-CM

## 2022-05-24 DIAGNOSIS — R74.8 ELEVATED LIVER ENZYMES: ICD-10-CM

## 2022-05-24 DIAGNOSIS — R11.0 NAUSEA: ICD-10-CM

## 2022-05-24 DIAGNOSIS — K21.00 GASTROESOPHAGEAL REFLUX DISEASE WITH ESOPHAGITIS WITHOUT HEMORRHAGE: ICD-10-CM

## 2022-05-24 PROCEDURE — 99213 OFFICE O/P EST LOW 20 MIN: CPT | Performed by: PHYSICIAN ASSISTANT

## 2022-05-31 RX ORDER — LINACLOTIDE 290 UG/1
290 CAPSULE, GELATIN COATED ORAL
Qty: 30 CAPSULE | Refills: 1 | Status: SHIPPED | OUTPATIENT
Start: 2022-05-31 | End: 2023-01-03 | Stop reason: SDUPTHER

## 2022-05-31 RX ORDER — FAMOTIDINE 40 MG/1
40 TABLET, FILM COATED ORAL DAILY
Qty: 30 TABLET | Refills: 1 | Status: SHIPPED | OUTPATIENT
Start: 2022-05-31 | End: 2022-08-02 | Stop reason: SDUPTHER

## 2022-06-03 ENCOUNTER — OFFICE VISIT (OUTPATIENT)
Dept: FAMILY MEDICINE CLINIC | Facility: CLINIC | Age: 62
End: 2022-06-03

## 2022-06-03 DIAGNOSIS — J20.9 ACUTE BRONCHITIS, UNSPECIFIED ORGANISM: ICD-10-CM

## 2022-06-03 DIAGNOSIS — K21.9 GASTROESOPHAGEAL REFLUX DISEASE WITHOUT ESOPHAGITIS: Primary | ICD-10-CM

## 2022-06-03 DIAGNOSIS — G47.00 INSOMNIA, UNSPECIFIED TYPE: ICD-10-CM

## 2022-06-03 DIAGNOSIS — G89.29 CHRONIC MIDLINE LOW BACK PAIN WITHOUT SCIATICA: ICD-10-CM

## 2022-06-03 DIAGNOSIS — M54.50 CHRONIC MIDLINE LOW BACK PAIN WITHOUT SCIATICA: ICD-10-CM

## 2022-06-03 PROCEDURE — 99442 PR PHYS/QHP TELEPHONE EVALUATION 11-20 MIN: CPT | Performed by: NURSE PRACTITIONER

## 2022-06-03 RX ORDER — HYDROCODONE BITARTRATE AND ACETAMINOPHEN 10; 325 MG/1; MG/1
1 TABLET ORAL EVERY 6 HOURS PRN
Qty: 120 TABLET | Refills: 0 | Status: SHIPPED | OUTPATIENT
Start: 2022-06-03 | End: 2022-07-05 | Stop reason: SDUPTHER

## 2022-06-03 RX ORDER — AZITHROMYCIN 250 MG/1
TABLET, FILM COATED ORAL
Qty: 6 TABLET | Refills: 0 | Status: SHIPPED | OUTPATIENT
Start: 2022-06-03 | End: 2022-06-10

## 2022-06-03 RX ORDER — DOXEPIN HYDROCHLORIDE 50 MG/1
50 CAPSULE ORAL NIGHTLY
Qty: 30 CAPSULE | Refills: 2 | Status: SHIPPED | OUTPATIENT
Start: 2022-06-03 | End: 2022-06-10 | Stop reason: SINTOL

## 2022-06-03 RX ORDER — DEXLANSOPRAZOLE 60 MG/1
1 CAPSULE, DELAYED RELEASE ORAL DAILY
Qty: 90 CAPSULE | Refills: 1 | Status: SHIPPED | OUTPATIENT
Start: 2022-06-03 | End: 2022-12-06

## 2022-06-03 NOTE — PROGRESS NOTES
Subjective   Tricia Loyola is a 62 y.o. female.   You have chosen to receive care through a telephone visit. Do you consent to use a telephone visit for your medical care today? Yes  12 minutes medical discussion.   has Tobacco dependence syndrome; PRISCILLA (generalized anxiety disorder); PTSD (post-traumatic stress disorder); Gastroesophageal reflux disease with esophagitis without hemorrhage; Hyperlipidemia; Right knee pain; Primary osteoarthritis of right knee; Osteoporosis; Sinus tachycardia; Restless leg syndrome; Chronic midline thoracic back pain; Chronic midline low back pain without sciatica; Panic disorder without agoraphobia; Chronic migraine; Compression fracture of vertebra (HCC); COPD (chronic obstructive pulmonary disease) (HCC); Hearing loss; Migraine; Radiculopathy of thoracolumbar region; Screen for colon cancer; Encounter for colonoscopy due to history of adenomatous colonic polyps; Elevated liver enzymes; Pain of upper abdomen; Hepatic fibrosis; Nausea; Acquired hypothyroidism; Primary osteoarthritis of left knee; History of appendectomy; Soft tissue mass; Gastroesophageal reflux disease with esophagitis without hemorrhage; Intractable migraine without aura and without status migrainosus; Right wrist pain; Ganglion cyst of wrist, right; Periumbilical abdominal pain; Weight loss; Malaise and fatigue; Family history of colon cancer; History of cerebral aneurysm; and Ganglion cyst of volar aspect of right wrist on their problem list.   Chief Complaint   Patient presents with   • Cough     Cough and dry         History of Present Illness   Worsening anxiety and having lots of trouble getting to sleep. Family issues. Is planning on moving away to a place of her own in [place], which should help once she does it.   Stop amitriptyline, start doxepin for anxiety and insomnia. May take 50mg to 100mg doxepin if needed, let me know if higher dose required for sleep without next day  sedation.    GERD,chronic- stable.  symptoms suppressed with PPI.Refill dexilant.  Chronic midline low back pain w/o sciatica-managed with hydrocodone. Reports adequate relief with current medication, which helps her remain fully active in home and self care. Refill hydrocodone for stable low back pain.  Reports 5 days ago began with persistent usually nonproductive cough with at least daily production in am of moderate amount thick, purulent yellow sputum, this clears during the day with persistent non productive cough all day after. No fever or chills, no dizziness. No n/v/d and no one else in the home is sick. Still smoking, advised to stop but due to anxiety is unable to stop at present. Acute bronchitis in smoker, azithromycin prescribed. No steroid due to current insomnia. Take robitussin plain (mucinex) for cough.    No other complaints today.   Parts of most recent relevant visit HPI, ROS  and PE may be carried forward and all are updated as appropriate for current situation.    Past Surgical History:   Procedure Laterality Date   • APPENDECTOMY     • BREAST BIOPSY Left    • CHOLECYSTECTOMY     • COLONOSCOPY N/A 11/26/2018    Procedure: COLONOSCOPY;  Surgeon: Meet Mcintyre MD;  Location: [facility] ENDOSCOPY;  Service: General   • CRANIOTOMY FOR ANEURYSM  2003   • ENDOSCOPY N/A 10/16/2019    Procedure: ESOPHAGOGASTRODUODENOSCOPY;  Surgeon: Kory Muhammad MD;  Location: [facility] ENDOSCOPY;  Service: Gastroenterology   • ENDOSCOPY N/A 3/16/2021    Procedure: ESOPHAGOGASTRODUODENOSCOPY;  Surgeon: Kory Muhammad MD;  Location: [facility] ENDOSCOPY;  Service: Gastroenterology;  Laterality: N/A;   • MASS EXCISION Right 11/5/2020    Procedure: EXCISE SOFT TISSUE MASS RIGHT POSTERIOR FLANK                 (latex allergy);  Surgeon: Meet Mcintyre MD;  Location: [facility] OR;  Service: General;  Laterality: Right;   • PAP SMEAR  08/16/2012   • TONSILLECTOMY     • UPPER GASTROINTESTINAL ENDOSCOPY  10/16/2019   • WRIST  GANGLION EXCISION Right 12/3/2021    Procedure: EXCISION OF VOLAR GANGLION CYST RIGHT WRIST      (LATEX ALLERGY);  Surgeon: Leonardo Vargas MD;  Location[facility];  Service: Orthopedics;  Laterality: Right;      Social History     Socioeconomic History   • Marital status: Legally    Tobacco Use   • Smoking status: Current Every Day Smoker     Packs/day: 0.50     Years: 20.00     Pack years: 10.00     Types: Cigarettes, Electronic Cigarette   • Smokeless tobacco: Never Used   Vaping Use   • Vaping Use: Some days   • Substances: Nicotine, Flavoring   • Devices: Disposable   Substance and Sexual Activity   • Alcohol use: Yes     Comment: 1 time per year   • Drug use: Never   • Sexual activity: Defer      The following portions of the patient's history were reviewed and updated as appropriate: allergies, current medications, past family history, past medical history, past social history, past surgical history and problem list.    Review of Systems   Constitutional: Negative.  Negative for chills, diaphoresis and fatigue.   HENT: Positive for congestion and postnasal drip. Negative for trouble swallowing.    Eyes: Negative.    Respiratory: Positive for cough and shortness of breath. Negative for wheezing and stridor.    Cardiovascular: Negative.  Negative for chest pain, palpitations and leg swelling.   Gastrointestinal: Negative.    Endocrine: Negative.    Genitourinary: Negative.    Musculoskeletal: Negative.    Skin: Positive for rash.   Allergic/Immunologic: Negative.    Neurological: Negative.    Hematological: Negative.    Psychiatric/Behavioral: Positive for sleep disturbance. The patient is nervous/anxious.    All other systems reviewed and are negative.    PHQ-9 Depression Screening  Little interest or pleasure in doing things?     Feeling down, depressed, or hopeless?     Trouble falling or staying asleep, or sleeping too much?     Feeling tired or having little energy?     Poor appetite or  overeating?     Feeling bad about yourself - or that you are a failure or have let yourself or your family down?     Trouble concentrating on things, such as reading the newspaper or watching television?     Moving or speaking so slowly that other people could have noticed? Or the opposite - being so fidgety or restless that you have been moving around a lot more than usual?     Thoughts that you would be better off dead, or of hurting yourself in some way?     PHQ-9 Total Score     If you checked off any problems, how difficult have these problems made it for you to do your work, take care of things at home, or get along with other people?      Patient understands the risks associated with this controlled medication, including tolerance and addiction.  Patient also agrees to only obtain this medication from me, and not from a another provider, unless that provider is covering for me in my absence.  Patient also agrees to be compliant in dosing, and not self adjust the dose of medication.  A signed controlled substance agreement is on file, and the patient has received a controlled substance education sheet at this a previous visit.  The patient has also signed a consent for treatment with a controlled substance as per [facility] policy. KATE was obtained.    Objective   Physical Exam  Vitals and nursing note reviewed.   Constitutional:       General: She is not in acute distress.     Appearance: She is normal weight.   Pulmonary:      Effort: Pulmonary effort is normal. No respiratory distress.      Breath sounds: No stridor.   Neurological:      Mental Status: She is oriented to person, place, and time.   Psychiatric:         Mood and Affect: Mood normal.         Behavior: Behavior normal.         Thought Content: Thought content normal.         Judgment: Judgment normal.       There were no vitals filed for this visit.  There is no height or weight on file to calculate BMI.  Stop amitriptyline, start  doxepin for anxiety and insomnia. May take 50mg to 100mg doxepin if needed, let me know if higher dose required for sleep without next day sedation.  Refill dexilant for stable GERD.  Refill hydrocodone for stable low back pain.  Acute bronchitis in smoker, azithromycin prescribed. No steroid due to current insomnia. Take robitussin plain (mucinex) for cough.    Assessment & Plan   Diagnoses and all orders for this visit:    1. Gastroesophageal reflux disease without esophagitis (Primary)  -     dexlansoprazole (DEXILANT) 60 MG capsule; Take 1 capsule by mouth Daily.  Dispense: 90 capsule; Refill: 1    2. Chronic midline low back pain without sciatica  -     HYDROcodone-acetaminophen (NORCO)  MG per tablet; Take 1 tablet by mouth Every 6 (Six) Hours As Needed for Moderate Pain .  Dispense: 120 tablet; Refill: 0    3. Insomnia, unspecified type  -     doxepin (SINEquan) 50 MG capsule; Take 1 capsule by mouth Every Night. For anxiety and insomnia  Dispense: 30 capsule; Refill: 2    4. Acute bronchitis, unspecified organism  -     azithromycin (Zithromax Z-Christophe) 250 MG tablet; Take 2 tablets by mouth on day 1, then 1 tablet daily on days 2-5  Dispense: 6 tablet; Refill: 0               Return in about 1 week (around 6/10/2022) for for respiratory infection and insomnia.  There are no Patient Instructions on file for this visit.        This document has been electronically signed by UMESH Guzmán on Carla 3, 2022 14:52 CDT

## 2022-06-09 ENCOUNTER — LAB (OUTPATIENT)
Dept: LAB | Facility: OTHER | Age: 62
End: 2022-06-09

## 2022-06-09 LAB
ALBUMIN SERPL-MCNC: 4.2 G/DL (ref 3.5–5)
ALP SERPL-CCNC: 63 U/L (ref 38–126)
ALT SERPL W P-5'-P-CCNC: 21 U/L
AST SERPL-CCNC: 60 U/L (ref 14–36)
BILIRUB CONJ SERPL-MCNC: 0 MG/DL (ref 0–0.3)
BILIRUB INDIRECT SERPL-MCNC: 0.1 MG/DL (ref 0–1.1)
BILIRUB SERPL-MCNC: 0.4 MG/DL (ref 0.2–1.3)
INR PPP: 0.96 (ref 0.8–1.2)
PROT SERPL-MCNC: 7 G/DL (ref 6.3–8.6)
PROTHROMBIN TIME: 12.6 SECONDS (ref 11.1–15.3)

## 2022-06-09 PROCEDURE — 85610 PROTHROMBIN TIME: CPT | Performed by: INTERNAL MEDICINE

## 2022-06-09 PROCEDURE — 87799 DETECT AGENT NOS DNA QUANT: CPT

## 2022-06-09 PROCEDURE — 80076 HEPATIC FUNCTION PANEL: CPT | Performed by: INTERNAL MEDICINE

## 2022-06-09 PROCEDURE — 36415 COLL VENOUS BLD VENIPUNCTURE: CPT | Performed by: PHYSICIAN ASSISTANT

## 2022-06-10 ENCOUNTER — OFFICE VISIT (OUTPATIENT)
Dept: FAMILY MEDICINE CLINIC | Facility: CLINIC | Age: 62
End: 2022-06-10

## 2022-06-10 VITALS — BODY MASS INDEX: 19.88 KG/M2 | HEIGHT: 62 IN | WEIGHT: 108 LBS

## 2022-06-10 DIAGNOSIS — G47.00 INSOMNIA, UNSPECIFIED TYPE: Primary | ICD-10-CM

## 2022-06-10 DIAGNOSIS — F41.1 GAD (GENERALIZED ANXIETY DISORDER): ICD-10-CM

## 2022-06-10 PROCEDURE — 99441 PR PHYS/QHP TELEPHONE EVALUATION 5-10 MIN: CPT | Performed by: NURSE PRACTITIONER

## 2022-06-10 RX ORDER — TRAZODONE HYDROCHLORIDE 50 MG/1
25 TABLET ORAL NIGHTLY
Qty: 15 TABLET | Refills: 0 | Status: SHIPPED | OUTPATIENT
Start: 2022-06-10 | End: 2022-08-29

## 2022-06-10 RX ORDER — AMITRIPTYLINE HYDROCHLORIDE 75 MG/1
75 TABLET, FILM COATED ORAL NIGHTLY
Qty: 30 TABLET | Refills: 5 | Status: SHIPPED | OUTPATIENT
Start: 2022-06-10

## 2022-06-10 NOTE — PROGRESS NOTES
Subjective   Tricia Loyola is a 62 y.o. female.   You have chosen to receive care through a telephone visit. Do you consent to use a telephone visit for your medical care today? Yes   9 minutes medical discussion.   has Tobacco dependence syndrome; PRISCILLA (generalized anxiety disorder); PTSD (post-traumatic stress disorder); Gastroesophageal reflux disease with esophagitis without hemorrhage; Hyperlipidemia; Right knee pain; Primary osteoarthritis of right knee; Osteoporosis; Sinus tachycardia; Restless leg syndrome; Chronic midline thoracic back pain; Chronic midline low back pain without sciatica; Panic disorder without agoraphobia; Chronic migraine; Compression fracture of vertebra (HCC); COPD (chronic obstructive pulmonary disease) (HCC); Hearing loss; Migraine; Radiculopathy of thoracolumbar region; Screen for colon cancer; Encounter for colonoscopy due to history of adenomatous colonic polyps; Elevated liver enzymes; Pain of upper abdomen; Hepatic fibrosis; Nausea; Acquired hypothyroidism; Primary osteoarthritis of left knee; History of appendectomy; Soft tissue mass; Gastroesophageal reflux disease with esophagitis without hemorrhage; Intractable migraine without aura and without status migrainosus; Right wrist pain; Ganglion cyst of wrist, right; Periumbilical abdominal pain; Weight loss; Malaise and fatigue; Family history of colon cancer; History of cerebral aneurysm; and Ganglion cyst of volar aspect of right wrist on their problem list.   Chief Complaint   Patient presents with   • Follow-up        History of Present Illness     After a course of azithromycin for URI these symptoms are better. Had nausea and headache with the doxepin so went back to amitriptyline. Still having difficultly getting to sleep and staying asleep. Resumed amitriptyline for anxiety. Agreeable to trial low dose trazodone.    No other complaints today.   Parts of most recent relevant visit HPI, ROS  and PE may be carried forward  and all are updated as appropriate for current situation.    Past Surgical History:   Procedure Laterality Date   • APPENDECTOMY     • BREAST BIOPSY Left    • CHOLECYSTECTOMY     • COLONOSCOPY N/A 11/26/2018    Procedure: COLONOSCOPY;  Surgeon: Meet Mcintyre MD;  Location: [facility] ENDOSCOPY;  Service: General   • CRANIOTOMY FOR ANEURYSM  2003   • ENDOSCOPY N/A 10/16/2019    Procedure: ESOPHAGOGASTRODUODENOSCOPY;  Surgeon: Kory Muhammad MD;  Location: [facility] ENDOSCOPY;  Service: Gastroenterology   • ENDOSCOPY N/A 3/16/2021    Procedure: ESOPHAGOGASTRODUODENOSCOPY;  Surgeon: Kory Muhammad MD;  Location: [facility] ENDOSCOPY;  Service: Gastroenterology;  Laterality: N/A;   • MASS EXCISION Right 11/5/2020    Procedure: EXCISE SOFT TISSUE MASS RIGHT POSTERIOR FLANK                 (latex allergy);  Surgeon: Meet Mcintyre MD;  Location: [facility] OR;  Service: General;  Laterality: Right;   • PAP SMEAR  08/16/2012   • TONSILLECTOMY     • UPPER GASTROINTESTINAL ENDOSCOPY  10/16/2019   • WRIST GANGLION EXCISION Right 12/3/2021    Procedure: EXCISION OF VOLAR GANGLION CYST RIGHT WRIST      (LATEX ALLERGY);  Surgeon: Leonardo Vargas MD;  Location: [facility] OR;  Service: Orthopedics;  Laterality: Right;      Social History     Socioeconomic History   • Marital status: Legally    Tobacco Use   • Smoking status: Current Every Day Smoker     Packs/day: 0.50     Years: 20.00     Pack years: 10.00     Types: Cigarettes, Electronic Cigarette   • Smokeless tobacco: Never Used   Vaping Use   • Vaping Use: Some days   • Substances: Nicotine, Flavoring   • Devices: Disposable   Substance and Sexual Activity   • Alcohol use: Yes     Comment: 1 time per year   • Drug use: Never   • Sexual activity: Defer      The following portions of the patient's history were reviewed and updated as appropriate: allergies, current medications, past family history, past medical history, past social history, past surgical history  and problem list.    Review of Systems   Constitutional: Negative.    HENT: Negative.  Negative for congestion.    Eyes: Negative.  Negative for blurred vision, double vision, photophobia and visual disturbance.   Respiratory: Negative.  Negative for cough, shortness of breath, wheezing and stridor.    Cardiovascular: Negative.  Negative for chest pain, palpitations and leg swelling.   Gastrointestinal: Negative.  Negative for abdominal distention, abdominal pain, blood in stool, constipation, diarrhea, nausea, vomiting, GERD and indigestion.   Endocrine: Negative.  Negative for cold intolerance and heat intolerance.   Genitourinary: Negative.  Negative for dysuria, flank pain, frequency and urinary incontinence.   Musculoskeletal: Positive for back pain. Negative for arthralgias.   Skin: Negative.    Allergic/Immunologic: Negative.  Negative for immunocompromised state.   Neurological: Negative.    Hematological: Negative.    Psychiatric/Behavioral: Negative for agitation, behavioral problems, decreased concentration, dysphoric mood, hallucinations, self-injury, sleep disturbance, suicidal ideas, negative for hyperactivity, depressed mood and stress. The patient is nervous/anxious.    All other systems reviewed and are negative.    PHQ-9 Depression Screening  Little interest or pleasure in doing things?     Feeling down, depressed, or hopeless?     Trouble falling or staying asleep, or sleeping too much?     Feeling tired or having little energy?     Poor appetite or overeating?     Feeling bad about yourself - or that you are a failure or have let yourself or your family down?     Trouble concentrating on things, such as reading the newspaper or watching television?     Moving or speaking so slowly that other people could have noticed? Or the opposite - being so fidgety or restless that you have been moving around a lot more than usual?     Thoughts that you would be better off dead, or of hurting yourself in some  "way?     PHQ-9 Total Score     If you checked off any problems, how difficult have these problems made it for you to do your work, take care of things at home, or get along with other people?        Objective   Physical Exam  Vitals and nursing note reviewed.   Constitutional:       General: She is not in acute distress.  Pulmonary:      Effort: Pulmonary effort is normal. No respiratory distress.      Breath sounds: No stridor.   Neurological:      General: No focal deficit present.      Mental Status: She is oriented to person, place, and time.   Psychiatric:         Mood and Affect: Mood normal.         Behavior: Behavior normal.         Thought Content: Thought content normal.         Judgment: Judgment normal.       Vitals:    06/10/22 1408   Weight: 49 kg (108 lb)   Height: 157.5 cm (62.01\")   PainSc: 0-No pain     Body mass index is 19.75 kg/m².      Assessment & Plan   Diagnoses and all orders for this visit:    1. Insomnia, unspecified type (Primary)  -     traZODone (DESYREL) 50 MG tablet; Take 0.5 tablets by mouth Every Night. For insomnia. Stopped doxepin.  Dispense: 15 tablet; Refill: 0    2. PRISCILLA (generalized anxiety disorder)  -     amitriptyline (ELAVIL) 75 MG tablet; Take 1 tablet by mouth Every Night. Resumed 6/10/22.  Dispense: 30 tablet; Refill: 5               Return in about 2 weeks (around 6/24/2022).  There are no Patient Instructions on file for this visit.          This document has been electronically signed by UMESH Guzmán on Carla 10, 2022 14:45 CDT    "

## 2022-06-16 DIAGNOSIS — E03.9 ACQUIRED HYPOTHYROIDISM: ICD-10-CM

## 2022-06-16 RX ORDER — LEVOTHYROXINE SODIUM 0.03 MG/1
12.5 TABLET ORAL DAILY
Qty: 15 TABLET | Refills: 5 | Status: SHIPPED | OUTPATIENT
Start: 2022-06-16 | End: 2022-12-15 | Stop reason: SDUPTHER

## 2022-06-20 RX ORDER — METOPROLOL SUCCINATE 100 MG/1
100 TABLET, EXTENDED RELEASE ORAL DAILY
Qty: 30 TABLET | Refills: 6 | Status: SHIPPED | OUTPATIENT
Start: 2022-06-20 | End: 2022-12-12

## 2022-06-21 LAB — REF LAB TEST RESULTS: NORMAL

## 2022-06-23 ENCOUNTER — TELEPHONE (OUTPATIENT)
Dept: CARDIOLOGY | Facility: CLINIC | Age: 62
End: 2022-06-23

## 2022-06-23 NOTE — TELEPHONE ENCOUNTER
----- Message from Du John sent at 6/23/2022  3:10 PM CDT -----  Contact: [phone]  Tricia Loyola called to see if Dr. Martinez was going to send in her refills for her metoprolol before he leaves. She doesn't seen another cardiologist until October. Return number is [phone]. Thanks.

## 2022-06-23 NOTE — TELEPHONE ENCOUNTER
Returned pt's call to let her know the refills for her Metoprolol were sent over to [facility] in [place] on Monday, 6/20.     Pt voiced verbal understanding.

## 2022-07-05 DIAGNOSIS — M54.50 CHRONIC MIDLINE LOW BACK PAIN WITHOUT SCIATICA: ICD-10-CM

## 2022-07-05 DIAGNOSIS — G89.29 CHRONIC MIDLINE LOW BACK PAIN WITHOUT SCIATICA: ICD-10-CM

## 2022-07-07 ENCOUNTER — TELEPHONE (OUTPATIENT)
Dept: FAMILY MEDICINE CLINIC | Facility: CLINIC | Age: 62
End: 2022-07-07

## 2022-07-07 RX ORDER — HYDROCODONE BITARTRATE AND ACETAMINOPHEN 10; 325 MG/1; MG/1
1 TABLET ORAL EVERY 6 HOURS PRN
Qty: 120 TABLET | Refills: 0 | Status: SHIPPED | OUTPATIENT
Start: 2022-07-07 | End: 2022-08-05 | Stop reason: SDUPTHER

## 2022-07-07 NOTE — TELEPHONE ENCOUNTER
Patient seen in office every 3 months for chronic pain requiring opiate pain medication. Compliant with medication, visits with no adverse effects noted. KATE and UDS current and appropriate. Patient called requesting scheduled refill at appropriate interval. Patient understands the risks associated with this controlled medication, including tolerance and addiction.  Patient also agrees to only obtain this medication from me, and not from a another provider, unless that provider is covering for me in my absence.  Patient also agrees to be compliant in dosing, and not self adjust the dose of medication.  A signed controlled substance agreement is on file, and the patient has received a controlled substance education sheet at this a previous visit.  The patient has also signed a consent for treatment with a controlled substance as per [facility] policy. KATE was obtained.   Refill sent for hydrocodone.     This document has been electronically signed by UMESH Guzmán on July 7, 2022 09:25 CDT

## 2022-07-22 DIAGNOSIS — F41.1 GAD (GENERALIZED ANXIETY DISORDER): Chronic | ICD-10-CM

## 2022-07-22 NOTE — TELEPHONE ENCOUNTER
Incoming Refill Request      Medication requested (name and dose):   clonazePAM (KlonoPIN) 0.5 MG tablet       Pharmacy where request should be sent:   [facility]    Additional details provided by patient:   [facility]    Best call back number:     Does the patient have less than a 3 day supply:  [] Yes  [] No    Carmen Salamanca  07/22/22, 13:58 CDT

## 2022-07-25 RX ORDER — CLONAZEPAM 0.5 MG/1
0.5 TABLET ORAL DAILY PRN
Qty: 30 TABLET | Refills: 0 | Status: SHIPPED | OUTPATIENT
Start: 2022-07-25 | End: 2022-09-08

## 2022-08-02 ENCOUNTER — HOSPITAL ENCOUNTER (OUTPATIENT)
Facility: HOSPITAL | Age: 62
Setting detail: HOSPITAL OUTPATIENT SURGERY
End: 2022-08-02
Attending: INTERNAL MEDICINE | Admitting: INTERNAL MEDICINE

## 2022-08-02 ENCOUNTER — OFFICE VISIT (OUTPATIENT)
Dept: GASTROENTEROLOGY | Facility: CLINIC | Age: 62
End: 2022-08-02

## 2022-08-02 VITALS
WEIGHT: 106 LBS | DIASTOLIC BLOOD PRESSURE: 82 MMHG | HEIGHT: 62 IN | SYSTOLIC BLOOD PRESSURE: 110 MMHG | BODY MASS INDEX: 19.51 KG/M2 | HEART RATE: 85 BPM

## 2022-08-02 DIAGNOSIS — B17.2 HEPATITIS E VIRUS INFECTION, UNSPECIFIED CHRONICITY: Primary | ICD-10-CM

## 2022-08-02 DIAGNOSIS — K58.2 IRRITABLE BOWEL SYNDROME WITH BOTH CONSTIPATION AND DIARRHEA: ICD-10-CM

## 2022-08-02 DIAGNOSIS — R13.10 DYSPHAGIA, UNSPECIFIED TYPE: ICD-10-CM

## 2022-08-02 DIAGNOSIS — K21.00 GASTROESOPHAGEAL REFLUX DISEASE WITH ESOPHAGITIS WITHOUT HEMORRHAGE: ICD-10-CM

## 2022-08-02 DIAGNOSIS — R74.8 ELEVATED LIVER ENZYMES: ICD-10-CM

## 2022-08-02 PROCEDURE — 99214 OFFICE O/P EST MOD 30 MIN: CPT | Performed by: PHYSICIAN ASSISTANT

## 2022-08-02 RX ORDER — FAMOTIDINE 40 MG/1
40 TABLET, FILM COATED ORAL DAILY
Qty: 30 TABLET | Refills: 3 | Status: SHIPPED | OUTPATIENT
Start: 2022-08-02 | End: 2022-12-15 | Stop reason: SDUPTHER

## 2022-08-02 RX ORDER — DEXTROSE AND SODIUM CHLORIDE 5; .45 G/100ML; G/100ML
30 INJECTION, SOLUTION INTRAVENOUS CONTINUOUS PRN
Status: CANCELLED | OUTPATIENT
Start: 2022-09-20

## 2022-08-02 NOTE — PROGRESS NOTES
Chief Complaint   Patient presents with   • Elevated Hepatic Enzymes       ENDO PROCEDURE ORDERED: EGDw/dilation gerd, dysphagia, nausea    Subjective    Tricia Loyola is a 62 y.o. female. she is here today for follow-up.    History of Present Illness    Patient seen on recheck of her elevated liver enzymes with acute hepatitis E, GERD, IBS-C.  Last seen 05/24/2022.  She has had no alcohol.  Laboratories 06/09/2022:  LFTs showed an AST of 60, otherwise normal INR of 0.96.  Hepatitis E HCV RNA by PCR quantitative was not detected.  Repeat laboratories 07/29/2022 showed normal troponin, BMP, CBC, magnesium, lipase.  CMP showed a BUN of 20, creatinine 1.1, sodium 133, AST 60, otherwise normal.    Patient has had some nausea.  Zofran does not really help her, but Compazine does.  She is having some dysphagia to solids.  She is on the Dexilant and Pepcid for chronic heartburn.  IBS-D does well on the Linzess.  Weight is down 2 pounds since last visit.  Last EGD showed esophagitis, gastritis 03/16/2021.  Last colonoscopy with history of polypectomy, family history, 11/26/2018.    ASSESSMENT AND PLAN:  Patient with increasing nausea, GERD, dysphagia.  I suspect peptic stricture.  Recommend EGD with dilatation.  Will do biopsies as indicated.  She appears to have cleared her hepatitis E.  Does have a slight elevation in her AST which may be related to fatty liver.  I refilled her Pepcid.  Will plan followup after the above, further pending clinical course and the results of the above.      The following portions of the patient's history were reviewed and updated as appropriate:   Past Medical History:   Diagnosis Date   • Abdominal pain    • Anemia    • Anxiety and depression    • Chronic post-traumatic headache      rebound      • Depressive disorder    • Disease of thyroid gland    • Encounter for gynecological examination    • Endometriosis    • Gastroesophageal reflux disease    • Generalized anxiety disorder    •  History of mammogram 08/2008    MAMMOGRAM DIAGNOSTIC BILATERAL 78549 ([facility]) (1)     • Hyperlipidemia    • Hypertension    • Ingrown toenail    • Injury of back    • Leaky heart valve    • Leaky heart valve    • Migraine    • Nonruptured cerebral aneurysm    • Osteoporosis    • Posttraumatic stress disorder    • Primary osteoarthritis of left knee    • Primary osteoarthritis of right knee    • PTSD (post-traumatic stress disorder)    • Seizure (HCC)     last seizure 2003.  no meds at this time   • Skin cancer     basal cell on back   • Tachycardia    • Viral hepatitis A    • Wears dentures     uppers only   • Wears glasses      Past Surgical History:   Procedure Laterality Date   • APPENDECTOMY     • BREAST BIOPSY Left    • CHOLECYSTECTOMY     • COLONOSCOPY N/A 11/26/2018    Procedure: COLONOSCOPY;  Surgeon: Meet Mcintyre MD;  Location: [facility] ENDOSCOPY;  Service: General   • CRANIOTOMY FOR ANEURYSM  2003   • ENDOSCOPY N/A 10/16/2019    Procedure: ESOPHAGOGASTRODUODENOSCOPY;  Surgeon: Kory Muhammad MD;  Location: [facility] ENDOSCOPY;  Service: Gastroenterology   • ENDOSCOPY N/A 3/16/2021    Procedure: ESOPHAGOGASTRODUODENOSCOPY;  Surgeon: Kory Muhammad MD;  Location: [facility] ENDOSCOPY;  Service: Gastroenterology;  Laterality: N/A;   • MASS EXCISION Right 11/5/2020    Procedure: EXCISE SOFT TISSUE MASS RIGHT POSTERIOR FLANK                 (latex allergy);  Surgeon: Meet Mcintyre MD;  Location: [facility] OR;  Service: General;  Laterality: Right;   • PAP SMEAR  08/16/2012   • TONSILLECTOMY     • UPPER GASTROINTESTINAL ENDOSCOPY  10/16/2019   • WRIST GANGLION EXCISION Right 12/3/2021    Procedure: EXCISION OF VOLAR GANGLION CYST RIGHT WRIST      (LATEX ALLERGY);  Surgeon: Leonardo Vargas MD;  Location: [facility] OR;  Service: Orthopedics;  Laterality: Right;     Family History   Problem Relation Age of Onset   • Constipation Mother    • Hypertension Mother    • Anxiety disorder Mother    • Heart disease  Mother    • Osteoporosis Mother         Also Myself I   • Alcohol abuse Father    • Heart disease Father    • Anxiety disorder Brother    • Kidney disease Brother    • Hypertension Brother    • Arthritis Brother    • Anxiety disorder Brother    • Kidney disease Brother    • Diabetes Brother    • Anxiety disorder Brother    • Hypertension Brother    • Breast cancer Paternal Aunt    • Heart disease Maternal Grandmother    • Clotting disorder Maternal Grandmother         Multiple Blood Clots In The Legs   • Heart disease Maternal Grandfather      OB History        3    Para   2    Term   2            AB   1    Living   2       SAB   1    IAB        Ectopic        Molar        Multiple        Live Births                  Allergies   Allergen Reactions   • Iodine Anaphylaxis   • Nitrofuran Derivatives Hives   • Toradol [Ketorolac Tromethamine] Anaphylaxis   • Bactrim [Sulfamethoxazole-Trimethoprim] Swelling     eyes   • Cleocin [Clindamycin Hcl] Hives and Swelling     Swelling of eyes and hives   • Augmentin [Amoxicillin-Pot Clavulanate] Hives   • Shrimp Hives and Itching   • Ciprofloxacin Rash   • Fiorinal [Butalbital-Aspirin-Caffeine] Palpitations   • Ibuprofen Rash   • Imitrex [Sumatriptan] Palpitations   • Latex Rash   • Other Rash     prego spaghetti sauce    Midrin causes tachycardia   • Ultram [Tramadol] Palpitations     Social History     Socioeconomic History   • Marital status: Legally    Tobacco Use   • Smoking status: Current Every Day Smoker     Packs/day: 0.50     Years: 20.00     Pack years: 10.00     Types: Cigarettes, Electronic Cigarette   • Smokeless tobacco: Never Used   Vaping Use   • Vaping Use: Some days   • Substances: Nicotine, Flavoring   • Devices: Disposable   Substance and Sexual Activity   • Alcohol use: Yes     Comment: 1 time per year   • Drug use: Never   • Sexual activity: Defer     Current Medications:  Prior to Admission medications    Medication Sig Start Date  End Date Taking? Authorizing Provider   albuterol (ACCUNEB) 1.25 MG/3ML nebulizer solution Take 3 mL by nebulization Every 4 (Four) Hours As Needed for Wheezing or Shortness of Air. 11/15/21  Yes Rosario Ceron APRN   albuterol sulfate  (90 Base) MCG/ACT inhaler Inhale 2 puffs Every 4 (Four) Hours As Needed for Wheezing. 2/5/22  Yes Rosario Ceron APRN   amitriptyline (ELAVIL) 75 MG tablet Take 1 tablet by mouth Every Night. Resumed 6/10/22. 6/10/22  Yes Rosario Ceron APRN   aspirin 81 MG EC tablet Take 1 tablet by mouth Daily. 1/14/20  Yes Rosario Ceron APRN   atorvastatin (LIPITOR) 40 MG tablet TAKE 1 TABLET BY MOUTH EVERY NIGHT FOR CHOLESTEROL 2/11/22  Yes Rosario Ceron APRN   Botox 200 units reconstituted solution INJECT 200 UNITS INTO THE HEAD AND NECK MUSCLES EVERY 12 WEEKS 4/26/21  Yes Provider, MD Doris   busPIRone (BUSPAR) 30 MG tablet Take 1 tablet by mouth 2 (Two) Times a Day. 3/7/18  Yes Sonia Mata MD   clonazePAM (KlonoPIN) 0.5 MG tablet Take 1 tablet by mouth Daily As Needed for Anxiety. 7/25/22  Yes Carolina Baird MD   Cyanocobalamin 1000 MCG/ML kit Inject 1,000 mL as directed Every 30 (Thirty) Days. 4/7/22  Yes Rosario Ceron APRN   dexlansoprazole (DEXILANT) 60 MG capsule Take 1 capsule by mouth Daily. 6/3/22  Yes Rosario Ceron APRN   diphenoxylate-atropine (Lomotil) 2.5-0.025 MG per tablet Take 1 tablet by mouth 4 (Four) Times a Day As Needed for Diarrhea. 2/10/22  Yes Rosario Ceron APRN   famotidine (PEPCID) 40 MG tablet Take 1 tablet by mouth Daily. 5/31/22  Yes Johnny Smiley PA-C   fluticasone (FLONASE) 50 MCG/ACT nasal spray 2 sprays into the nostril(s) as directed by provider Daily As Needed for Rhinitis.   Yes Provider, MD Doris   folic acid (FOLVITE) 800 MCG tablet Take 1 tablet by mouth Daily. 6/28/21  Yes Rosario Ceron APRN   HYDROcodone-acetaminophen (NORCO)  MG per tablet Take 1 tablet by mouth  "Every 6 (Six) Hours As Needed for Moderate Pain . 7/7/22  Yes Rosario Ceron APRN   Hydrocortisone, Perianal, (PROCTOCORT) 1 % cream rectal cream Insert  into the rectum 2 (Two) Times a Day. 2/14/22  Yes Rosario Ceron APRN   levothyroxine (SYNTHROID, LEVOTHROID) 25 MCG tablet Take 0.5 tablets by mouth Daily. 6/16/22  Yes Ceron, UMESH Ponce   Linzess 290 MCG capsule capsule Take 1 capsule by mouth Every Morning Before Breakfast. 5/31/22  Yes Johnny Smiley PA-C   losartan (COZAAR) 50 MG tablet TAKE 1 TABLET BY MOUTH DAILY 2/7/22  Yes Ramin Martinez MD   metoprolol succinate XL (TOPROL-XL) 100 MG 24 hr tablet Take 1 tablet by mouth Daily. Takes at 3 pm 6/20/22  Yes Ghislaine Levi MD   montelukast (SINGULAIR) 10 MG tablet Take 1 tablet by mouth Every Night. 10/11/21  Yes CeronRosario APRN   ondansetron ODT (ZOFRAN-ODT) 4 MG disintegrating tablet Place 1 tablet on the tongue Every 8 (Eight) Hours As Needed for Nausea. 6/28/21  Yes CeronRosario APRN   OXcarbazepine (TRILEPTAL) 150 MG tablet Take 150 mg by mouth Every Night. Daily at bedtime   Yes Provider, MD Doris   prochlorperazine (COMPAZINE) 10 MG tablet Take 1 tablet by mouth Every 6 (Six) Hours As Needed for Nausea or Vomiting. 6/28/21  Yes CeronRosario APRN   rOPINIRole (REQUIP) 3 MG tablet TAKE 1 TABLET BY MOUTH EVERY NIGHT AT BEDTIME 5/11/22  Yes Rosario Ceron APRN   Syringe/Needle, Disp, 25G X 5/8\" 3 ML misc 1 each Every 28 (Twenty-Eight) Days. 9/22/20  Yes Rosario Ceron APRN   topiramate (TOPAMAX) 100 MG tablet Take 1 tablet by mouth 2 (Two) Times a Day.  Patient taking differently: Take 100 mg by mouth 2 (Two) Times a Day. Noon and 3 pm 5/28/20  Yes Rosario Ceron APRN   traZODone (DESYREL) 50 MG tablet Take 0.5 tablets by mouth Every Night. For insomnia. Stopped doxepin. 6/10/22  Yes Ceron, UMESH Ponce   witch hazel-glycerin (TUCKS) pad Insert  into the rectum As Needed for " "Hemorrhoids. 2/14/22  Yes Sarah Willett, UMESH     Review of Systems  Review of Systems   HENT: Positive for trouble swallowing.    Gastrointestinal: Positive for abdominal pain, constipation and nausea.          Objective    /82   Pulse 85   Ht 157.5 cm (62\")   Wt 48.1 kg (106 lb)   BMI 19.39 kg/m²   Physical Exam  Vitals and nursing note reviewed.   Constitutional:       General: She is not in acute distress.     Appearance: She is well-developed.   HENT:      Head: Normocephalic and atraumatic.   Eyes:      Pupils: Pupils are equal, round, and reactive to light.   Cardiovascular:      Rate and Rhythm: Normal rate and regular rhythm.      Heart sounds: Normal heart sounds.   Pulmonary:      Effort: Pulmonary effort is normal.      Breath sounds: Normal breath sounds.   Abdominal:      General: Bowel sounds are normal. There is no distension or abdominal bruit.      Palpations: Abdomen is soft. Abdomen is not rigid. There is no shifting dullness or mass.      Tenderness: There is abdominal tenderness. There is no guarding or rebound.      Hernia: No hernia is present. There is no hernia in the ventral area.   Musculoskeletal:         General: Normal range of motion.      Cervical back: Normal range of motion.   Skin:     General: Skin is warm and dry.   Neurological:      Mental Status: She is alert and oriented to person, place, and time.   Psychiatric:         Behavior: Behavior normal.         Thought Content: Thought content normal.         Judgment: Judgment normal.       Assessment & Plan      1. Hepatitis E virus infection, unspecified chronicity    2. Elevated liver enzymes    3. Irritable bowel syndrome with both constipation and diarrhea    4. Gastroesophageal reflux disease with esophagitis without hemorrhage    5. Dysphagia, unspecified type    .   Diagnoses and all orders for this visit:    1. Hepatitis E virus infection, unspecified chronicity (Primary)    2. Elevated liver " enzymes    3. Irritable bowel syndrome with both constipation and diarrhea    4. Gastroesophageal reflux disease with esophagitis without hemorrhage  -     Case Request; Standing  -     Case Request    5. Dysphagia, unspecified type  -     Case Request; Standing  -     Case Request    Other orders  -     Follow Anesthesia Guidelines / Standing Orders; Future  -     Obtain Informed Consent; Future  -     famotidine (PEPCID) 40 MG tablet; Take 1 tablet by mouth Daily.  Dispense: 30 tablet; Refill: 3        Orders placed during this encounter include:  Orders Placed This Encounter   Procedures   • Follow Anesthesia Guidelines / Standing Orders     Standing Status:   Future   • Obtain Informed Consent     Standing Status:   Future     Order Specific Question:   Informed Consent Given For     Answer:   ESOPHAGOGASTRODUODENOSCOPY WITH DILATATION       Medications prescribed:  New Medications Ordered This Visit   Medications   • famotidine (PEPCID) 40 MG tablet     Sig: Take 1 tablet by mouth Daily.     Dispense:  30 tablet     Refill:  3       Requested Prescriptions     Signed Prescriptions Disp Refills   • famotidine (PEPCID) 40 MG tablet 30 tablet 3     Sig: Take 1 tablet by mouth Daily.       Review and/or summary of lab tests, radiology, procedures, medications. Review and summary of old records and obtaining of history. The risks and benefits of my recommendations, as well as other treatment options were discussed with the patient today. Questions were answered.    Follow-up: Return in about 1 month (around 9/2/2022), or if symptoms worsen or fail to improve.     ESOPHAGOGASTRODUODENOSCOPY WITH DILATATION (N/A)      This document has been electronically signed by Johnny Smiley PA-C on August 5, 2022 12:30 CDT      Results for orders placed or performed in visit on 08/04/22   ECG 12 Lead   Result Value Ref Range    QT Interval 392 ms    QTC Interval 410 ms   Results for orders placed or performed in visit on  05/24/22   HEV RNA by PCR Quant    Specimen: Blood   Result Value Ref Range    Miscellaneous Lab Test Result See attached report    Protime-INR    Specimen: Blood   Result Value Ref Range    Protime 12.6 11.1 - 15.3 Seconds    INR 0.96 0.80 - 1.20   Hepatic Function Panel    Specimen: Blood   Result Value Ref Range    Total Protein 7.0 6.3 - 8.6 g/dL    Albumin 4.20 3.50 - 5.00 g/dL    ALT (SGPT) 21 <=35 U/L    AST (SGOT) 60 (H) 14 - 36 U/L    Alkaline Phosphatase 63 38 - 126 U/L    Total Bilirubin 0.4 0.2 - 1.3 mg/dL    Bilirubin, Direct 0.0 0.0 - 0.3 mg/dL    Bilirubin, Indirect 0.1 0.0 - 1.1 mg/dL   Results for orders placed or performed in visit on 03/15/22   Hepatitis E IgM    Specimen: Blood   Result Value Ref Range    Hepatitis E IgM Negative Negative   Hepatitis E IgG    Specimen: Blood   Result Value Ref Range    Hepatitis E IgG Positive (A) Negative   Protime-INR    Specimen: Blood   Result Value Ref Range    Protime 13.1 11.1 - 15.3 Seconds    INR 0.97 0.80 - 1.20   Gamma GT    Specimen: Blood   Result Value Ref Range    GGT 47 (H) 5 - 36 U/L   Hepatic Function Panel    Specimen: Blood   Result Value Ref Range    Total Protein 6.7 6.3 - 8.6 g/dL    Albumin 4.00 3.50 - 5.00 g/dL    ALT (SGPT) 19 <=35 U/L    AST (SGOT) 62 (H) 14 - 36 U/L    Alkaline Phosphatase 65 38 - 126 U/L    Total Bilirubin 0.2 0.2 - 1.3 mg/dL    Bilirubin, Direct <0.0 (L) 0.0 - 0.3 mg/dL    Bilirubin, Indirect 0.1 0.0 - 1.1 mg/dL   Results for orders placed or performed in visit on 02/16/22   CBC Auto Differential    Specimen: Blood   Result Value Ref Range    WBC 8.29 3.40 - 10.80 10*3/mm3    RBC 3.94 3.77 - 5.28 10*6/mm3    Hemoglobin 12.5 12.0 - 15.9 g/dL    Hematocrit 37.2 34.0 - 46.6 %    MCV 94.4 79.0 - 97.0 fL    MCH 31.7 26.6 - 33.0 pg    MCHC 33.6 31.5 - 35.7 g/dL    RDW 13.6 12.3 - 15.4 %    RDW-SD 45.7 37.0 - 54.0 fl    MPV 9.3 6.0 - 12.0 fL    Platelets 275 140 - 450 10*3/mm3   Hepatitis panel, acute    Specimen: Blood    Result Value Ref Range    Hepatitis B Surface Ag Non-Reactive Non-Reactive    Hep A IgM Non-Reactive Non-Reactive    Hep B C IgM Non-Reactive Non-Reactive    Hepatitis C Ab Non-Reactive Non-Reactive   Manual Differential    Specimen: Blood   Result Value Ref Range    Neutrophil % 56.0 42.7 - 76.0 %    Lymphocyte % 32.0 19.6 - 45.3 %    Monocyte % 11.0 5.0 - 12.0 %    Eosinophil % 1.0 0.3 - 6.2 %    Neutrophils Absolute 4.64 1.70 - 7.00 10*3/mm3    Lymphocytes Absolute 2.65 0.70 - 3.10 10*3/mm3    Monocytes Absolute 0.91 (H) 0.10 - 0.90 10*3/mm3    Eosinophils Absolute 0.08 0.00 - 0.40 10*3/mm3    RBC Morphology Normal Normal    WBC Morphology Normal Normal    Platelet Estimate Adequate Normal   Stool Culture (Reference Lab) - Stool, Per Rectum    Specimen: Per Rectum; Stool   Result Value Ref Range    Salmonella/Shigella Screen Final report     Result 1 Comment     Campylobacter Culture Final report     Result 1 Comment     E coli, Shiga toxin Assay Negative Negative   Lipase    Specimen: Blood   Result Value Ref Range    Lipase 24 13 - 60 U/L   CK    Specimen: Blood   Result Value Ref Range    Creatine Kinase 61 20 - 180 U/L   Amylase    Specimen: Blood   Result Value Ref Range    Amylase 95 30 - 110 U/L   Hepatic Function Panel    Specimen: Blood   Result Value Ref Range    Total Protein 6.9 6.3 - 8.6 g/dL    Albumin 4.00 3.50 - 5.00 g/dL    ALT (SGPT) 228 (H) <=35 U/L    AST (SGOT) 179 (H) 14 - 36 U/L    Alkaline Phosphatase 127 (H) 38 - 126 U/L    Total Bilirubin 0.4 0.2 - 1.3 mg/dL    Bilirubin, Direct <0.0 (L) 0.0 - 0.3 mg/dL    Bilirubin, Indirect 0.2 0.0 - 1.1 mg/dL   Results for orders placed or performed during the hospital encounter of 12/03/21   Tissue Pathology Exam    Specimen: Wrist, Right; Tissue   Result Value Ref Range    Case Report       Surgical Pathology Report                         Case: [id number]                                  Authorizing Provider:  Leonardo Vargas MD  Collected:           12/03/2021 12:30 PM          Ordering Location:     [facility]   Received:            12/03/2021 01:22 PM                                 [place] [facility]                                                              Pathologist:           Mitchell Cassidy MD                                                           Specimen:    Wrist, Right, GANGLION CYST FROM RIGHT WRIST                                               Final Diagnosis       SEE SCANNED REPORT         Results for orders placed or performed in visit on 11/30/21   COVID-19, [facility] IN-HOUSE, NP SWAB IN TRANSPORT MEDIA 8-10 HR TAT - Swab, Nasopharynx    Specimen: Nasopharynx; Swab   Result Value Ref Range    COVID19 Not Detected Not Detected - Ref. Range   Results for orders placed or performed in visit on 10/12/21   CBC Auto Differential    Specimen: Blood   Result Value Ref Range    WBC 6.67 3.40 - 10.80 10*3/mm3    RBC 4.08 3.77 - 5.28 10*6/mm3    Hemoglobin 12.8 12.0 - 15.9 g/dL    Hematocrit 39.4 34.0 - 46.6 %    MCV 96.6 79.0 - 97.0 fL    MCH 31.4 26.6 - 33.0 pg    MCHC 32.5 31.5 - 35.7 g/dL    RDW 14.2 12.3 - 15.4 %    RDW-SD 48.8 37.0 - 54.0 fl    MPV 9.4 6.0 - 12.0 fL    Platelets 267 140 - 450 10*3/mm3    Neutrophil % 47.4 42.7 - 76.0 %    Lymphocyte % 42.0 19.6 - 45.3 %    Monocyte % 9.3 5.0 - 12.0 %    Eosinophil % 0.9 0.3 - 6.2 %    Basophil % 0.4 0.0 - 1.5 %    Neutrophils, Absolute 3.16 1.70 - 7.00 10*3/mm3    Lymphocytes, Absolute 2.80 0.70 - 3.10 10*3/mm3    Monocytes, Absolute 0.62 0.10 - 0.90 10*3/mm3    Eosinophils, Absolute 0.06 0.00 - 0.40 10*3/mm3    Basophils, Absolute 0.03 0.00 - 0.20 10*3/mm3   Nuclear Antigen Antibody, IFA    Specimen: Blood   Result Value Ref Range    YUKO Negative    Sedimentation Rate    Specimen: Blood   Result Value Ref Range    Sed Rate 8 0 - 20 mm/hr   C-reactive Protein    Specimen: Blood   Result Value Ref Range    C-Reactive Protein <0.30 0.00 - 0.50 mg/dL   Lipase     Specimen: Blood   Result Value Ref Range    Lipase 14 13 - 60 U/L   Amylase    Specimen: Blood   Result Value Ref Range    Amylase 72 30 - 110 U/L   Comprehensive Metabolic Panel    Specimen: Blood   Result Value Ref Range    Glucose 94 70 - 99 mg/dL    BUN 15 7 - 23 mg/dL    Creatinine 0.90 0.52 - 1.04 mg/dL    Sodium 138 137 - 145 mmol/L    Potassium 4.2 3.4 - 5.0 mmol/L    Chloride 106 101 - 112 mmol/L    CO2 24.0 22.0 - 30.0 mmol/L    Calcium 9.3 8.4 - 10.2 mg/dL    Total Protein 7.6 6.3 - 8.6 g/dL    Albumin 4.50 3.50 - 5.00 g/dL    ALT (SGPT) 22 <=35 U/L    AST (SGOT) 61 (H) 14 - 36 U/L    Alkaline Phosphatase 65 38 - 126 U/L    Total Bilirubin 0.2 0.2 - 1.3 mg/dL    eGFR Non  Amer 63 45 - 104 mL/min/1.73    Globulin 3.1 2.3 - 3.5 gm/dL    A/G Ratio 1.5 1.1 - 1.8 g/dL    BUN/Creatinine Ratio 16.7 7.0 - 25.0    Anion Gap 8.0 5.0 - 15.0 mmol/L     *Note: Due to a large number of results and/or encounters for the requested time period, some results have not been displayed. A complete set of results can be found in Results Review.

## 2022-08-04 ENCOUNTER — OFFICE VISIT (OUTPATIENT)
Dept: CARDIOLOGY | Facility: CLINIC | Age: 62
End: 2022-08-04

## 2022-08-04 VITALS
HEART RATE: 66 BPM | HEIGHT: 62 IN | SYSTOLIC BLOOD PRESSURE: 100 MMHG | WEIGHT: 107.4 LBS | OXYGEN SATURATION: 100 % | BODY MASS INDEX: 19.77 KG/M2 | DIASTOLIC BLOOD PRESSURE: 68 MMHG

## 2022-08-04 DIAGNOSIS — R07.9 CHEST PAIN, UNSPECIFIED TYPE: ICD-10-CM

## 2022-08-04 DIAGNOSIS — I35.1 AORTIC VALVE INSUFFICIENCY, ETIOLOGY OF CARDIAC VALVE DISEASE UNSPECIFIED: Primary | ICD-10-CM

## 2022-08-04 PROCEDURE — 99214 OFFICE O/P EST MOD 30 MIN: CPT | Performed by: INTERNAL MEDICINE

## 2022-08-04 PROCEDURE — 93000 ELECTROCARDIOGRAM COMPLETE: CPT | Performed by: INTERNAL MEDICINE

## 2022-08-04 RX ORDER — NITROGLYCERIN 0.3 MG/1
TABLET SUBLINGUAL
Qty: 100 TABLET | Refills: 11 | Status: SHIPPED | OUTPATIENT
Start: 2022-08-04 | End: 2022-09-15

## 2022-08-04 NOTE — PROGRESS NOTES
[facility]  OFFICE NOTE    Cardiovascular Medicine  Ghislaine Levi M.D., [facility], [facility], [facility]         No referring provider defined for this encounter.    Thank you for asking me to see Tricia Loyola for valvular disease.    History of Present Illness  This is a 62 y.o. female with:    1.  Hypertension  2.  Palpitations  3.  Moderate aortic insufficiency.    Tricia Loyola is a 62 y.o. female who presents for consultation today.  Patient was being seen by Dr. Martinez previously for palpitations.  Was noted to have moderate aortic insufficiency.  She was asymptomatic.  She was started on amlodipine but could not tolerate.  We will switch to Procardia.    Heart monitor was relatively benign.  PACs and PVCs were rare.  She had a normal stress test back in November 2019.  She has been on aspirin/statin.  On metoprolol and losartan for hypertension.    Patient reported a week ago she had an episode of significant chest pain in the evening time she described as heaviness character localized to substernal region lasted for about 5 minutes.  She went to [facility] ER where she was ruled out for acute coronary syndrome and subsequently discharged home.  Unfortunately continues to smoke.    Review of Systems - ROS  Constitution: Negative for weakness, weight gain and weight loss.   HENT: Negative for congestion.    Eyes: Negative for blurred vision.   Cardiovascular: As mentioned above  Respiratory: Negative for cough and hemoptysis.    Endocrine: Negative for polydipsia and polyuria.   Hematologic/Lymphatic: Negative for bleeding problem. Does not bruise/bleed easily.   Skin: Negative for flushing.   Musculoskeletal: Negative for neck pain and stiffness.   Gastrointestinal: Negative for abdominal pain, diarrhea, jaundice, melena, nausea and vomiting.   Genitourinary: Negative for dysuria and hematuria.   Neurological: Negative for dizziness, focal weakness and numbness.   Psychiatric/Behavioral:  Negative for altered mental status and depression.          All other systems were reviewed and were negative.    family history includes Alcohol abuse in her father; Anxiety disorder in her brother, brother, brother, and mother; Arthritis in her brother; Breast cancer in her paternal aunt; Clotting disorder in her maternal grandmother; Constipation in her mother; Diabetes in her brother; Heart disease in her father, maternal grandfather, maternal grandmother, and mother; Hypertension in her brother, brother, and mother; Kidney disease in her brother and brother; Osteoporosis in her mother.     reports that she has been smoking cigarettes and electronic cigarette. She has a 10.00 pack-year smoking history. She has never used smokeless tobacco. She reports current alcohol use. She reports that she does not use drugs.    Allergies   Allergen Reactions   • Iodine Anaphylaxis   • Nitrofuran Derivatives Hives   • Toradol [Ketorolac Tromethamine] Anaphylaxis   • Bactrim [Sulfamethoxazole-Trimethoprim] Swelling     eyes   • Cleocin [Clindamycin Hcl] Hives and Swelling     Swelling of eyes and hives   • Augmentin [Amoxicillin-Pot Clavulanate] Hives   • Shrimp Hives and Itching   • Ciprofloxacin Rash   • Fiorinal [Butalbital-Aspirin-Caffeine] Palpitations   • Ibuprofen Rash   • Imitrex [Sumatriptan] Palpitations   • Latex Rash   • Other Rash     prego spaghetti sauce    Midrin causes tachycardia   • Ultram [Tramadol] Palpitations         Current Outpatient Medications:   •  albuterol (ACCUNEB) 1.25 MG/3ML nebulizer solution, Take 3 mL by nebulization Every 4 (Four) Hours As Needed for Wheezing or Shortness of Air., Disp: 100 each, Rfl: 5  •  albuterol sulfate  (90 Base) MCG/ACT inhaler, Inhale 2 puffs Every 4 (Four) Hours As Needed for Wheezing., Disp: 18 g, Rfl: 5  •  amitriptyline (ELAVIL) 75 MG tablet, Take 1 tablet by mouth Every Night. Resumed 6/10/22., Disp: 30 tablet, Rfl: 5  •  aspirin 81 MG EC tablet, Take 1  tablet by mouth Daily., Disp: 30 tablet, Rfl: 0  •  atorvastatin (LIPITOR) 40 MG tablet, TAKE 1 TABLET BY MOUTH EVERY NIGHT FOR CHOLESTEROL, Disp: 90 tablet, Rfl: 1  •  Botox 200 units reconstituted solution, INJECT 200 UNITS INTO THE HEAD AND NECK MUSCLES EVERY 12 WEEKS, Disp: , Rfl:   •  busPIRone (BUSPAR) 30 MG tablet, Take 1 tablet by mouth 2 (Two) Times a Day., Disp: 60 tablet, Rfl: 0  •  clonazePAM (KlonoPIN) 0.5 MG tablet, Take 1 tablet by mouth Daily As Needed for Anxiety., Disp: 30 tablet, Rfl: 0  •  Cyanocobalamin 1000 MCG/ML kit, Inject 1,000 mL as directed Every 30 (Thirty) Days., Disp: 1 kit, Rfl: 5  •  dexlansoprazole (DEXILANT) 60 MG capsule, Take 1 capsule by mouth Daily., Disp: 90 capsule, Rfl: 1  •  diphenoxylate-atropine (Lomotil) 2.5-0.025 MG per tablet, Take 1 tablet by mouth 4 (Four) Times a Day As Needed for Diarrhea., Disp: 12 tablet, Rfl: 0  •  famotidine (PEPCID) 40 MG tablet, Take 1 tablet by mouth Daily., Disp: 30 tablet, Rfl: 3  •  fluticasone (FLONASE) 50 MCG/ACT nasal spray, 2 sprays into the nostril(s) as directed by provider Daily As Needed for Rhinitis., Disp: , Rfl:   •  folic acid (FOLVITE) 800 MCG tablet, Take 1 tablet by mouth Daily., Disp: 90 tablet, Rfl: 1  •  HYDROcodone-acetaminophen (NORCO)  MG per tablet, Take 1 tablet by mouth Every 6 (Six) Hours As Needed for Moderate Pain ., Disp: 120 tablet, Rfl: 0  •  Hydrocortisone, Perianal, (PROCTOCORT) 1 % cream rectal cream, Insert  into the rectum 2 (Two) Times a Day., Disp: 28.4 g, Rfl: 0  •  levothyroxine (SYNTHROID, LEVOTHROID) 25 MCG tablet, Take 0.5 tablets by mouth Daily., Disp: 15 tablet, Rfl: 5  •  Linzess 290 MCG capsule capsule, Take 1 capsule by mouth Every Morning Before Breakfast., Disp: 30 capsule, Rfl: 1  •  losartan (COZAAR) 50 MG tablet, TAKE 1 TABLET BY MOUTH DAILY, Disp: 90 tablet, Rfl: 3  •  metoprolol succinate XL (TOPROL-XL) 100 MG 24 hr tablet, Take 1 tablet by mouth Daily. Takes at 3 pm, Disp: 30  "tablet, Rfl: 6  •  montelukast (SINGULAIR) 10 MG tablet, Take 1 tablet by mouth Every Night., Disp: 30 tablet, Rfl: 5  •  ondansetron ODT (ZOFRAN-ODT) 4 MG disintegrating tablet, Place 1 tablet on the tongue Every 8 (Eight) Hours As Needed for Nausea., Disp: 30 tablet, Rfl: 5  •  OXcarbazepine (TRILEPTAL) 150 MG tablet, Take 150 mg by mouth Every Night. Daily at bedtime, Disp: , Rfl:   •  prochlorperazine (COMPAZINE) 10 MG tablet, Take 1 tablet by mouth Every 6 (Six) Hours As Needed for Nausea or Vomiting., Disp: 360 tablet, Rfl: 1  •  rOPINIRole (REQUIP) 3 MG tablet, TAKE 1 TABLET BY MOUTH EVERY NIGHT AT BEDTIME, Disp: 90 tablet, Rfl: 1  •  Syringe/Needle, Disp, 25G X 5/8\" 3 ML misc, 1 each Every 28 (Twenty-Eight) Days., Disp: 3 each, Rfl: 1  •  topiramate (TOPAMAX) 100 MG tablet, Take 1 tablet by mouth 2 (Two) Times a Day. (Patient taking differently: Take 100 mg by mouth 2 (Two) Times a Day. Noon and 3 pm), Disp: 180 tablet, Rfl: 1  •  traZODone (DESYREL) 50 MG tablet, Take 0.5 tablets by mouth Every Night. For insomnia. Stopped doxepin., Disp: 15 tablet, Rfl: 0  •  witch hazel-glycerin (TUCKS) pad, Insert  into the rectum As Needed for Hemorrhoids., Disp: 40 each, Rfl: 1    Current Facility-Administered Medications:   •  zoledronic acid (RECLAST) infusion 5 mg, 5 mg, Intravenous, Once, Ceron, UMESH Ponce    Physical Exam:  Vitals:    08/04/22 0909   BP: 100/68   BP Location: Left arm   Patient Position: Sitting   Cuff Size: Adult   Pulse: 66   SpO2: 100%   Weight: 48.7 kg (107 lb 6.4 oz)   Height: 157.5 cm (62\")   PainSc: 0-No pain     Current Pain Level: none  Pulse Ox: Normal  on room air  General: alert, appears stated age and cooperative     Body Habitus: well-nourished    HEENT: Head: Normocephalic, no lesions, without obvious abnormality. No arcus senilis, xanthelasma or xanthomas.    Neuro: alert, oriented x3  Pulses: 2+ and symmetric  JVP: Volume/Pulsation: Normal.  Normal waveforms.   Appropriate " inspiratory decrease.  No Kussmaul's. No Apoorva's.   Carotid Exam: no bruit normal pulsation bilaterally   Carotid Volume: normal.     Respirations: no increased work of breathing   Chest:  Normal    Pulmonary:Normal   Precordium: Normal impulses. P2 is not palpable.  RV Heave: absent  LV Heave: absent  [place]:  normal size and placement  Palpable S4: absent.  Heart rate: normal    Heart Rhythm: regular     Heart Sounds: S1: normal  S2: normal  S3: absent   S4: absent  Opening Snap: absent    Pericardial Rub:  Absent: .    Abdomen:   Appearance: normal .  Palpation: Soft, non-tender to palpation, bowel sounds positive in all four quadrants; no guarding or rebound tenderness  Extremity: no edema.   LE Skin: no rashes  LE Hair:  normal  LE Pulses: well perfused with normal pulses in the distal extremities  Pallor on elevation: Absent. Rubor on dependency: None      DATA REVIEWED:     EKG. I personally reviewed and interpreted the EKG.    Normal sinus rhythm.  Pulmonary disease pattern.    ECG/EMG Results (all)     Procedure Component Value Units Date/Time    ECG 12 Lead [[id number]] Collected: 08/04/22 0901     Updated: 08/04/22 0907     QT Interval 392 ms      QTC Interval 410 ms     Narrative:      Test Reason : aortic regurgitation  Blood Pressure :   */*   mmHG  Vent. Rate :  66 BPM     Atrial Rate :  66 BPM     P-R Int : 172 ms          QRS Dur :  94 ms      QT Int : 392 ms       P-R-T Axes :  85 104  83 degrees     QTc Int : 410 ms    Normal sinus rhythm  Rightward axis  Pulmonary disease pattern  Abnormal ECG  When compared with ECG of 28-OCT-2020 15:16,  Incomplete right bundle branch block is no longer Present    Referred By:            Confirmed By:         ---------------------------------------------------  TTE/DOMINGA:  Results for orders placed in visit on 08/14/20    Adult Transthoracic Echo Complete W/ Cont if Necessary Per Protocol    Interpretation Summary  · Left ventricular ejection fraction appears to  be 66 - 70%. Left ventricular systolic function is normal.  · Left ventricular diastolic function was normal.  · Moderate aortic valve regurgitation is present.  · Estimated right ventricular systolic pressure from tricuspid regurgitation is normal (<35 mmHg).          --------------------------------------------------------------------------------------------------  LABS:     The CVD Risk score (Lucian, et al., 2008) failed to calculate for the following reasons:    The patient has a prior MI, stroke, CHF, or peripheral vascular disease diagnosis         Lab Results   Component Value Date    GLUCOSE 94 10/26/2021    BUN 20 (H) 07/29/2022    CREATININE 1.1 (H) 07/29/2022    EGFRIFNONA 63 10/26/2021    BCR 16.7 10/26/2021    K 3.8 07/29/2022    CO2 24.0 10/26/2021    CALCIUM 8.9 07/29/2022    ALBUMIN 3.8 07/29/2022    AST 60 (H) 07/29/2022    ALT 34 07/29/2022     Lab Results   Component Value Date    WBC 8.29 02/16/2022    HGB 12.5 02/16/2022    HCT 37.2 02/16/2022    MCV 94.4 02/16/2022     02/16/2022     Lab Results   Component Value Date    CHOL 161 06/08/2021    CHLPL 206 (H) 02/22/2020    TRIG 128 06/08/2021    HDL 39 (L) 06/08/2021    LDL 99 06/08/2021     Lab Results   Component Value Date    TSH 1.830 06/08/2021    X3ALMHX 84.2 06/08/2021    THYROIDAB 16 08/02/2019     Lab Results   Component Value Date    CKTOTAL 61 02/16/2022    TROPONINI <0.02 04/28/2021     Lab Results   Component Value Date    HGBA1C 5.7 02/22/2020     No results found for: DDIMER  Lab Results   Component Value Date    ALT 34 07/29/2022     Lab Results   Component Value Date    HGBA1C 5.7 02/22/2020     Lab Results   Component Value Date    MICROALBUR <1.2 06/08/2021    CREATININE 1.1 (H) 07/29/2022     Lab Results   Component Value Date    IRON 89 08/28/2019    TIBC 289 (L) 08/28/2019    FERRITIN 71.83 08/28/2019     Lab Results   Component Value Date    INR 0.96 06/09/2022    INR 0.97 03/15/2022    INR 0.98 (L) 04/28/2021     PROTIME 12.6 06/09/2022    PROTIME 13.1 03/15/2022    PROTIME 9.8 04/28/2021       [unfilled]    1. Aortic valve insufficiency:  Moderate AI noted on echocardiogram in June 2021.  Asymptomatic.  LV size and function were normal.  We will continue to monitor.  Repeat echocardiogram in 2 to 3 years.    2.  Hypertension: Well-controlled on current regime    3.  Palpitations:  Heart monitor was relatively benign.  On metoprolol.    4.  Chest pain:   Risk factors of hypertension, hyperlipidemia and tobacco use.  EKG in office showed normal sinus rhythm with pulmonary disease pattern.  Previous stress test was in 2019.  Continue aspirin/statin/metoprolol  As needed nitroglycerin  Patient is allergic to iodine.  Plan on nuclear stress test for further evaluation, I explained the risk, benefits, alternatives limitation of nuclear stress test with the patient and the patient is agreeable.  Advised to seek medical attention if significant chest pain unresolving.  Smoking cessation      Prevention:  BMI is within normal parameters. No other follow-up for BMI required.      Tricia Loyola  reports that she has been smoking cigarettes and electronic cigarette. She has a 10.00 pack-year smoking history. She has never used smokeless tobacco.. I have educated her on the risk of diseases from using tobacco products such as cancer, COPD and heart disease.     I advised her to quit and she is not willing to quit.    I spent 3  minutes counseling the patient.                   This document has been electronically signed by Ghislaine Levi MD on August 4, 2022 09:19 CDT

## 2022-08-05 DIAGNOSIS — M54.50 CHRONIC MIDLINE LOW BACK PAIN WITHOUT SCIATICA: ICD-10-CM

## 2022-08-05 DIAGNOSIS — G89.29 CHRONIC MIDLINE LOW BACK PAIN WITHOUT SCIATICA: ICD-10-CM

## 2022-08-06 RX ORDER — HYDROCODONE BITARTRATE AND ACETAMINOPHEN 10; 325 MG/1; MG/1
1 TABLET ORAL EVERY 6 HOURS PRN
Qty: 120 TABLET | Refills: 0 | Status: SHIPPED | OUTPATIENT
Start: 2022-08-06 | End: 2022-09-08 | Stop reason: SDUPTHER

## 2022-08-11 ENCOUNTER — APPOINTMENT (OUTPATIENT)
Dept: NUCLEAR MEDICINE | Facility: HOSPITAL | Age: 62
End: 2022-08-11

## 2022-08-11 ENCOUNTER — APPOINTMENT (OUTPATIENT)
Dept: CARDIOLOGY | Facility: HOSPITAL | Age: 62
End: 2022-08-11

## 2022-08-12 LAB
QT INTERVAL: 392 MS
QTC INTERVAL: 410 MS

## 2022-08-18 ENCOUNTER — TELEPHONE (OUTPATIENT)
Dept: FAMILY MEDICINE CLINIC | Facility: CLINIC | Age: 62
End: 2022-08-18

## 2022-08-18 DIAGNOSIS — E78.5 HYPERLIPIDEMIA, UNSPECIFIED HYPERLIPIDEMIA TYPE: ICD-10-CM

## 2022-08-18 RX ORDER — ATORVASTATIN CALCIUM 40 MG/1
40 TABLET, FILM COATED ORAL NIGHTLY
Qty: 90 TABLET | Refills: 1 | Status: SHIPPED | OUTPATIENT
Start: 2022-08-18 | End: 2023-02-02

## 2022-08-18 NOTE — TELEPHONE ENCOUNTER
Incoming Refill Request      Medication requested (name and dose):   atorvastatin (LIPITOR) 40 MG tablet      [facility] where [facility]     Additional details provided by patient:   [facility]    Best call back number:     Does the patient have less than a 3 day supply:  [] Yes  [] No    Carmen Salamanca  08/18/22, 09:50 CDT

## 2022-08-25 ENCOUNTER — TELEPHONE (OUTPATIENT)
Dept: FAMILY MEDICINE CLINIC | Facility: CLINIC | Age: 62
End: 2022-08-25

## 2022-08-25 NOTE — TELEPHONE ENCOUNTER
Patient called and was wanting to make an appointment due to her back is hurting and nothing is helping patient was informed that Sherry Juan Manuel can not write controls I informed the patient to go back to the hospital or to an urgent care. Patient wants to make an appointment with another office to follow up on her back patient was scheduled with Pacheco on 08/30/2022 at 1:00pm

## 2022-08-29 ENCOUNTER — OFFICE VISIT (OUTPATIENT)
Dept: FAMILY MEDICINE CLINIC | Facility: CLINIC | Age: 62
End: 2022-08-29

## 2022-08-29 VITALS
WEIGHT: 107 LBS | HEIGHT: 62 IN | BODY MASS INDEX: 19.69 KG/M2 | SYSTOLIC BLOOD PRESSURE: 124 MMHG | DIASTOLIC BLOOD PRESSURE: 78 MMHG | OXYGEN SATURATION: 99 % | HEART RATE: 75 BPM

## 2022-08-29 DIAGNOSIS — M54.6 ACUTE MIDLINE THORACIC BACK PAIN: Primary | ICD-10-CM

## 2022-08-29 PROCEDURE — 99213 OFFICE O/P EST LOW 20 MIN: CPT | Performed by: NURSE PRACTITIONER

## 2022-08-29 PROCEDURE — 96372 THER/PROPH/DIAG INJ SC/IM: CPT | Performed by: NURSE PRACTITIONER

## 2022-08-29 RX ORDER — TRIAMCINOLONE ACETONIDE 40 MG/ML
60 INJECTION, SUSPENSION INTRA-ARTICULAR; INTRAMUSCULAR ONCE
Status: COMPLETED | OUTPATIENT
Start: 2022-08-29 | End: 2022-08-29

## 2022-08-29 RX ADMIN — TRIAMCINOLONE ACETONIDE 60 MG: 40 INJECTION, SUSPENSION INTRA-ARTICULAR; INTRAMUSCULAR at 15:35

## 2022-08-29 NOTE — PROGRESS NOTES
CC: Back Pain ([facility] ER FU)      Subjective:  Tricia Loyola is a 62 y.o. female  Pt of [employer] presents to office with C/O Mid back pain that radiates to her low back. Rates pain at 8 on pain scale. States this is the worst pain she has ever had. States it throbs, aches, and is sharp. Onset 8/19/2022 after lifting for a yard sale. Went to the ER on 8/23/2022. X-rays were done of the thoracic and lumbar spines without acute findings. There was DDD, Disc space narrowing, and osteophyte formation, negative for any acute findings. Pt was given zanaflex and medrol dose pack. 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 States the zanaflex will make her too tired and the medrol will make her too anxious. She is agreeable to a steroid injection and referral to physical therapy at [facility].       The following portions of the patient's history were reviewed and updated as appropriate: allergies, current medications, past family history, past medical history, past social history, past surgical history and problem list.    Past Medical History:   Diagnosis Date   • Abdominal pain    • Anemia    • Anxiety and depression    • Chronic post-traumatic headache      rebound      • Depressive disorder    • Disease of thyroid gland    • Encounter for gynecological examination    • Endometriosis    • Gastroesophageal reflux disease    • Generalized anxiety disorder    • History of mammogram 08/2008    MAMMOGRAM DIAGNOSTIC BILATERAL 92142 ([facility]) (1)     • Hyperlipidemia    • Hypertension    • Ingrown toenail    • Injury of back    • Leaky heart valve    • Leaky heart valve    • Migraine    • Nonruptured cerebral aneurysm    • Osteoporosis    • Posttraumatic stress disorder    • Primary osteoarthritis of left knee    • Primary osteoarthritis of right knee    • PTSD (post-traumatic stress disorder)    • Seizure (HCC)     last seizure 2003.  no meds at this time    • Skin cancer     basal cell on back   • Tachycardia    • Viral hepatitis A    • Wears dentures     uppers only   • Wears glasses          Current Outpatient Medications:   •  albuterol (ACCUNEB) 1.25 MG/3ML nebulizer solution, Take 3 mL by nebulization Every 4 (Four) Hours As Needed for Wheezing or Shortness of Air., Disp: 100 each, Rfl: 5  •  albuterol sulfate  (90 Base) MCG/ACT inhaler, Inhale 2 puffs Every 4 (Four) Hours As Needed for Wheezing., Disp: 18 g, Rfl: 5  •  amitriptyline (ELAVIL) 75 MG tablet, Take 1 tablet by mouth Every Night. Resumed 6/10/22., Disp: 30 tablet, Rfl: 5  •  aspirin 81 MG EC tablet, Take 1 tablet by mouth Daily., Disp: 30 tablet, Rfl: 0  •  atorvastatin (LIPITOR) 40 MG tablet, Take 1 tablet by mouth Every Night. For cholesterol, Disp: 90 tablet, Rfl: 1  •  Botox 200 units reconstituted solution, INJECT 200 UNITS INTO THE HEAD AND NECK MUSCLES EVERY 12 WEEKS, Disp: , Rfl:   •  busPIRone (BUSPAR) 30 MG tablet, Take 1 tablet by mouth 2 (Two) Times a Day., Disp: 60 tablet, Rfl: 0  •  clonazePAM (KlonoPIN) 0.5 MG tablet, Take 1 tablet by mouth Daily As Needed for Anxiety., Disp: 30 tablet, Rfl: 0  •  Cyanocobalamin 1000 MCG/ML kit, Inject 1,000 mL as directed Every 30 (Thirty) Days., Disp: 1 kit, Rfl: 5  •  dexlansoprazole (DEXILANT) 60 MG capsule, Take 1 capsule by mouth Daily., Disp: 90 capsule, Rfl: 1  •  diphenoxylate-atropine (Lomotil) 2.5-0.025 MG per tablet, Take 1 tablet by mouth 4 (Four) Times a Day As Needed for Diarrhea., Disp: 12 tablet, Rfl: 0  •  famotidine (PEPCID) 40 MG tablet, Take 1 tablet by mouth Daily., Disp: 30 tablet, Rfl: 3  •  fluticasone (FLONASE) 50 MCG/ACT nasal spray, 2 sprays into the nostril(s) as directed by provider Daily As Needed for Rhinitis., Disp: , Rfl:   •  folic acid (FOLVITE) 800 MCG tablet, Take 1 tablet by mouth Daily., Disp: 90 tablet, Rfl: 1  •  HYDROcodone-acetaminophen (NORCO)  MG per tablet, Take 1 tablet by mouth Every 6 (Six)  "Hours As Needed for Moderate Pain ., Disp: 120 tablet, Rfl: 0  •  Hydrocortisone, Perianal, (PROCTOCORT) 1 % cream rectal cream, Insert  into the rectum 2 (Two) Times a Day., Disp: 28.4 g, Rfl: 0  •  levothyroxine (SYNTHROID, LEVOTHROID) 25 MCG tablet, Take 0.5 tablets by mouth Daily., Disp: 15 tablet, Rfl: 5  •  Linzess 290 MCG capsule capsule, Take 1 capsule by mouth Every Morning Before Breakfast., Disp: 30 capsule, Rfl: 1  •  losartan (COZAAR) 50 MG tablet, TAKE 1 TABLET BY MOUTH DAILY, Disp: 90 tablet, Rfl: 3  •  metoprolol succinate XL (TOPROL-XL) 100 MG 24 hr tablet, Take 1 tablet by mouth Daily. Takes at 3 pm, Disp: 30 tablet, Rfl: 6  •  montelukast (SINGULAIR) 10 MG tablet, Take 1 tablet by mouth Every Night., Disp: 30 tablet, Rfl: 5  •  nitroglycerin (NITROSTAT) 0.3 MG SL tablet, 1 under the tongue as needed for angina, may repeat q5mins for up three doses, Disp: 100 tablet, Rfl: 11  •  ondansetron ODT (ZOFRAN-ODT) 4 MG disintegrating tablet, Place 1 tablet on the tongue Every 8 (Eight) Hours As Needed for Nausea., Disp: 30 tablet, Rfl: 5  •  OXcarbazepine (TRILEPTAL) 150 MG tablet, Take 150 mg by mouth Every Night. Daily at bedtime, Disp: , Rfl:   •  prochlorperazine (COMPAZINE) 10 MG tablet, Take 1 tablet by mouth Every 6 (Six) Hours As Needed for Nausea or Vomiting., Disp: 360 tablet, Rfl: 1  •  rOPINIRole (REQUIP) 3 MG tablet, TAKE 1 TABLET BY MOUTH EVERY NIGHT AT BEDTIME, Disp: 90 tablet, Rfl: 1  •  Syringe/Needle, Disp, 25G X 5/8\" 3 ML misc, 1 each Every 28 (Twenty-Eight) Days., Disp: 3 each, Rfl: 1  •  topiramate (TOPAMAX) 100 MG tablet, Take 1 tablet by mouth 2 (Two) Times a Day. (Patient taking differently: Take 100 mg by mouth 2 (Two) Times a Day. Noon and 3 pm), Disp: 180 tablet, Rfl: 1  •  witch hazel-glycerin (TUCKS) pad, Insert  into the rectum As Needed for Hemorrhoids., Disp: 40 each, Rfl: 1    Current Facility-Administered Medications:   •  triamcinolone acetonide (KENALOG-40) injection 60 " "mg, 60 mg, Intramuscular, Once, Alvina Bergman APRN  •  zoledronic acid (RECLAST) infusion 5 mg, 5 mg, Intravenous, Once, Rosario Ceron APRN    Review of Systems    Review of Systems   Constitutional: Negative.    HENT: Negative.    Eyes: Negative.    Respiratory: Negative.    Cardiovascular: Negative.    Gastrointestinal: Negative.    Endocrine: Negative.    Genitourinary: Negative.    Musculoskeletal: Positive for back pain.        Mid-low back pain    Skin: Negative.    Allergic/Immunologic: Negative.    Neurological: Negative.    Hematological: Negative.    Psychiatric/Behavioral: Negative.    All other systems reviewed and are negative.      Objective  Vitals:    08/29/22 1506   BP: 124/78   Pulse: 75   SpO2: 99%   Weight: 48.5 kg (107 lb)   Height: 157.5 cm (62\")     Body mass index is 19.57 kg/m².    Physical Exam    Physical Exam  Constitutional:       Appearance: Normal appearance. She is normal weight.   HENT:      Head: Normocephalic.      Right Ear: External ear normal.      Left Ear: External ear normal.      Nose: Nose normal.      Mouth/Throat:      Pharynx: Oropharynx is clear.   Eyes:      Extraocular Movements: Extraocular movements intact.      Conjunctiva/sclera: Conjunctivae normal.      Pupils: Pupils are equal, round, and reactive to light.   Cardiovascular:      Rate and Rhythm: Normal rate and regular rhythm.      Pulses: Normal pulses.      Heart sounds: Normal heart sounds.   Pulmonary:      Effort: Pulmonary effort is normal.   Abdominal:      General: Abdomen is flat. Bowel sounds are normal.   Musculoskeletal:         General: Normal range of motion.      Cervical back: Normal range of motion.      Comments: Tenderness on palpation to mid spine   Skin:     General: Skin is warm and dry.      Capillary Refill: Capillary refill takes 2 to 3 seconds.   Neurological:      General: No focal deficit present.      Mental Status: She is alert and oriented to person, place, and " time. Mental status is at baseline.   Psychiatric:         Behavior: Behavior normal.         Thought Content: Thought content normal.         Judgment: Judgment normal.      Comments: Anxious       Diagnoses and all orders for this visit:    1. Acute midline thoracic back pain (Primary)  -     triamcinolone acetonide (KENALOG-40) injection 60 mg  -     Ambulatory Referral to Physical Therapy Evaluate and treat       Kenalog 60mg IM injection administered in the office today. Pt tolerated well with no s/s of allergic reation. Referral placed to [facility] PT. Instructed to follow-up as scheduled with UMESH Alonzo or sooner if needed. Pt verbalized understanding and agreeable to plan. No further questions.     This document has been electronically signed by UMESH Bustos on August 29, 2022 15:28 CDT

## 2022-09-05 DIAGNOSIS — G89.29 CHRONIC MIDLINE LOW BACK PAIN WITHOUT SCIATICA: ICD-10-CM

## 2022-09-05 DIAGNOSIS — M54.50 CHRONIC MIDLINE LOW BACK PAIN WITHOUT SCIATICA: ICD-10-CM

## 2022-09-06 RX ORDER — HYDROCODONE BITARTRATE AND ACETAMINOPHEN 10; 325 MG/1; MG/1
1 TABLET ORAL EVERY 6 HOURS PRN
Qty: 120 TABLET | Refills: 0 | OUTPATIENT
Start: 2022-09-06

## 2022-09-07 DIAGNOSIS — M54.50 CHRONIC MIDLINE LOW BACK PAIN WITHOUT SCIATICA: ICD-10-CM

## 2022-09-07 DIAGNOSIS — G89.29 CHRONIC MIDLINE LOW BACK PAIN WITHOUT SCIATICA: ICD-10-CM

## 2022-09-07 NOTE — TELEPHONE ENCOUNTER
HYDROcodone-acetaminophen (NORCO)  MG per tablet     [facility]

## 2022-09-08 ENCOUNTER — LAB (OUTPATIENT)
Dept: LAB | Facility: OTHER | Age: 62
End: 2022-09-08

## 2022-09-08 ENCOUNTER — OFFICE VISIT (OUTPATIENT)
Dept: FAMILY MEDICINE CLINIC | Facility: CLINIC | Age: 62
End: 2022-09-08

## 2022-09-08 VITALS
SYSTOLIC BLOOD PRESSURE: 118 MMHG | DIASTOLIC BLOOD PRESSURE: 70 MMHG | OXYGEN SATURATION: 98 % | BODY MASS INDEX: 19.69 KG/M2 | WEIGHT: 107 LBS | HEART RATE: 71 BPM | HEIGHT: 62 IN

## 2022-09-08 DIAGNOSIS — K21.00 GASTROESOPHAGEAL REFLUX DISEASE WITH ESOPHAGITIS WITHOUT HEMORRHAGE: ICD-10-CM

## 2022-09-08 DIAGNOSIS — M25.561 BILATERAL CHRONIC KNEE PAIN: ICD-10-CM

## 2022-09-08 DIAGNOSIS — F41.1 GAD (GENERALIZED ANXIETY DISORDER): ICD-10-CM

## 2022-09-08 DIAGNOSIS — G89.29 BILATERAL CHRONIC KNEE PAIN: ICD-10-CM

## 2022-09-08 DIAGNOSIS — G89.29 CHRONIC MIDLINE LOW BACK PAIN WITHOUT SCIATICA: ICD-10-CM

## 2022-09-08 DIAGNOSIS — E78.5 HYPERLIPIDEMIA, UNSPECIFIED HYPERLIPIDEMIA TYPE: ICD-10-CM

## 2022-09-08 DIAGNOSIS — F43.10 PTSD (POST-TRAUMATIC STRESS DISORDER): ICD-10-CM

## 2022-09-08 DIAGNOSIS — Z00.00 ANNUAL PHYSICAL EXAM: Primary | ICD-10-CM

## 2022-09-08 DIAGNOSIS — M54.50 CHRONIC MIDLINE LOW BACK PAIN WITHOUT SCIATICA: ICD-10-CM

## 2022-09-08 DIAGNOSIS — M54.6 CHRONIC MIDLINE THORACIC BACK PAIN: ICD-10-CM

## 2022-09-08 DIAGNOSIS — M25.562 BILATERAL CHRONIC KNEE PAIN: ICD-10-CM

## 2022-09-08 DIAGNOSIS — E03.9 HYPOTHYROIDISM, UNSPECIFIED TYPE: ICD-10-CM

## 2022-09-08 DIAGNOSIS — Z12.31 ENCOUNTER FOR SCREENING MAMMOGRAM FOR MALIGNANT NEOPLASM OF BREAST: ICD-10-CM

## 2022-09-08 DIAGNOSIS — F33.0 MAJOR DEPRESSIVE DISORDER, RECURRENT, MILD: ICD-10-CM

## 2022-09-08 DIAGNOSIS — Z79.899 HIGH RISK MEDICATION USE: ICD-10-CM

## 2022-09-08 DIAGNOSIS — G25.81 RESTLESS LEG SYNDROME: ICD-10-CM

## 2022-09-08 DIAGNOSIS — G43.009 MIGRAINE WITHOUT AURA AND WITHOUT STATUS MIGRAINOSUS, NOT INTRACTABLE: ICD-10-CM

## 2022-09-08 DIAGNOSIS — G89.29 CHRONIC MIDLINE THORACIC BACK PAIN: ICD-10-CM

## 2022-09-08 PROCEDURE — G0481 DRUG TEST DEF 8-14 CLASSES: HCPCS | Performed by: NURSE PRACTITIONER

## 2022-09-08 PROCEDURE — 80307 DRUG TEST PRSMV CHEM ANLYZR: CPT | Performed by: NURSE PRACTITIONER

## 2022-09-08 PROCEDURE — 99214 OFFICE O/P EST MOD 30 MIN: CPT | Performed by: NURSE PRACTITIONER

## 2022-09-08 RX ORDER — HYDROCODONE BITARTRATE AND ACETAMINOPHEN 10; 325 MG/1; MG/1
1 TABLET ORAL EVERY 6 HOURS PRN
Qty: 120 TABLET | Refills: 0 | Status: SHIPPED | OUTPATIENT
Start: 2022-09-08 | End: 2022-10-07 | Stop reason: SDUPTHER

## 2022-09-08 RX ORDER — HYDROCODONE BITARTRATE AND ACETAMINOPHEN 10; 325 MG/1; MG/1
1 TABLET ORAL EVERY 6 HOURS PRN
Qty: 120 TABLET | Refills: 0 | OUTPATIENT
Start: 2022-09-08

## 2022-09-08 RX ORDER — HYDROXYZINE PAMOATE 25 MG/1
25 CAPSULE ORAL 3 TIMES DAILY PRN
Qty: 90 CAPSULE | Refills: 5 | Status: SHIPPED | OUTPATIENT
Start: 2022-09-08 | End: 2022-11-17 | Stop reason: DRUGHIGH

## 2022-09-08 NOTE — PROGRESS NOTES
CC: Establish Care      Subjective:  Tricia Loyola is a 62 y.o. female   Presents to the office today to establish care    CMH: hypothyroidism, hyperlipidemia, anemia, anx/dep/PTSD, GERD, essential HTN, leaky heart valve, migraines, TBI with seizures (no seizures for several years), brain aneurysm, short term memory loss,  tachycardia, smoker, RLS, chronic midline thoracic pain, chronic midline low back pain, OA bilateral knees    Follows with Dr. Levi (Leaky heart valve) and Dr. Lazar (botox for migraines)    Surgical hx: cholecystectomy, tonsillectomy, appendectomy, craniotomy with coils placed to aneurysm     Family history: Father: alcoholism, heart disease,  of MI at 49. Paternal Aunt: breat CA, heart disease. Maternal great-grandmother: colon cancer.  Mother: heart disease. Brother: heroin abuse and renal failure     Current active smoker- 0.5ppd x 40 years    Due for pneumovacc-medication not available at today's visit    Due for mammo-orders placed      The following portions of the patient's history were reviewed and updated as appropriate: allergies, current medications, past family history, past medical history, past social history, past surgical history and problem list.    Past Medical History:   Diagnosis Date   • Abdominal pain    • Anemia    • Anxiety and depression    • Chronic post-traumatic headache      rebound      • Depressive disorder    • Disease of thyroid gland    • Encounter for gynecological examination    • Endometriosis    • Gastroesophageal reflux disease    • Generalized anxiety disorder    • History of mammogram 2008    MAMMOGRAM DIAGNOSTIC BILATERAL 34734 ([facility]) (1)     • Hyperlipidemia    • Hypertension    • Ingrown toenail    • Injury of back    • Leaky heart valve    • Leaky heart valve    • Migraine    • Nonruptured cerebral aneurysm    • Osteoporosis    • Posttraumatic stress disorder    • Primary osteoarthritis of left knee    • Primary osteoarthritis of right  knee    • PTSD (post-traumatic stress disorder)    • Seizure (HCC)     last seizure 2003.  no meds at this time   • Skin cancer     basal cell on back   • Tachycardia    • Viral hepatitis A    • Wears dentures     uppers only   • Wears glasses          Current Outpatient Medications:   •  albuterol (ACCUNEB) 1.25 MG/3ML nebulizer solution, Take 3 mL by nebulization Every 4 (Four) Hours As Needed for Wheezing or Shortness of Air., Disp: 100 each, Rfl: 5  •  albuterol sulfate  (90 Base) MCG/ACT inhaler, Inhale 2 puffs Every 4 (Four) Hours As Needed for Wheezing., Disp: 18 g, Rfl: 5  •  amitriptyline (ELAVIL) 75 MG tablet, Take 1 tablet by mouth Every Night. Resumed 6/10/22., Disp: 30 tablet, Rfl: 5  •  aspirin 81 MG EC tablet, Take 1 tablet by mouth Daily., Disp: 30 tablet, Rfl: 0  •  atorvastatin (LIPITOR) 40 MG tablet, Take 1 tablet by mouth Every Night. For cholesterol, Disp: 90 tablet, Rfl: 1  •  Botox 200 units reconstituted solution, INJECT 200 UNITS INTO THE HEAD AND NECK MUSCLES EVERY 12 WEEKS, Disp: , Rfl:   •  busPIRone (BUSPAR) 30 MG tablet, Take 1 tablet by mouth 2 (Two) Times a Day., Disp: 60 tablet, Rfl: 0  •  Cyanocobalamin 1000 MCG/ML kit, Inject 1,000 mL as directed Every 30 (Thirty) Days., Disp: 1 kit, Rfl: 5  •  dexlansoprazole (DEXILANT) 60 MG capsule, Take 1 capsule by mouth Daily., Disp: 90 capsule, Rfl: 1  •  diphenoxylate-atropine (Lomotil) 2.5-0.025 MG per tablet, Take 1 tablet by mouth 4 (Four) Times a Day As Needed for Diarrhea., Disp: 12 tablet, Rfl: 0  •  famotidine (PEPCID) 40 MG tablet, Take 1 tablet by mouth Daily., Disp: 30 tablet, Rfl: 3  •  fluticasone (FLONASE) 50 MCG/ACT nasal spray, 2 sprays into the nostril(s) as directed by provider Daily As Needed for Rhinitis., Disp: , Rfl:   •  folic acid (FOLVITE) 800 MCG tablet, Take 1 tablet by mouth Daily., Disp: 90 tablet, Rfl: 1  •  HYDROcodone-acetaminophen (NORCO)  MG per tablet, Take 1 tablet by mouth Every 6 (Six) Hours  "As Needed for Moderate Pain., Disp: 120 tablet, Rfl: 0  •  Hydrocortisone, Perianal, (PROCTOCORT) 1 % cream rectal cream, Insert  into the rectum 2 (Two) Times a Day., Disp: 28.4 g, Rfl: 0  •  levothyroxine (SYNTHROID, LEVOTHROID) 25 MCG tablet, Take 0.5 tablets by mouth Daily., Disp: 15 tablet, Rfl: 5  •  Linzess 290 MCG capsule capsule, Take 1 capsule by mouth Every Morning Before Breakfast., Disp: 30 capsule, Rfl: 1  •  losartan (COZAAR) 50 MG tablet, TAKE 1 TABLET BY MOUTH DAILY, Disp: 90 tablet, Rfl: 3  •  metoprolol succinate XL (TOPROL-XL) 100 MG 24 hr tablet, Take 1 tablet by mouth Daily. Takes at 3 pm, Disp: 30 tablet, Rfl: 6  •  montelukast (SINGULAIR) 10 MG tablet, Take 1 tablet by mouth Every Night., Disp: 30 tablet, Rfl: 5  •  nitroglycerin (NITROSTAT) 0.3 MG SL tablet, 1 under the tongue as needed for angina, may repeat q5mins for up three doses, Disp: 100 tablet, Rfl: 11  •  ondansetron ODT (ZOFRAN-ODT) 4 MG disintegrating tablet, Place 1 tablet on the tongue Every 8 (Eight) Hours As Needed for Nausea., Disp: 30 tablet, Rfl: 5  •  OXcarbazepine (TRILEPTAL) 150 MG tablet, Take 150 mg by mouth Every Night. Daily at bedtime, Disp: , Rfl:   •  prochlorperazine (COMPAZINE) 10 MG tablet, Take 1 tablet by mouth Every 6 (Six) Hours As Needed for Nausea or Vomiting., Disp: 360 tablet, Rfl: 1  •  rOPINIRole (REQUIP) 3 MG tablet, TAKE 1 TABLET BY MOUTH EVERY NIGHT AT BEDTIME, Disp: 90 tablet, Rfl: 1  •  Syringe/Needle, Disp, 25G X 5/8\" 3 ML misc, 1 each Every 28 (Twenty-Eight) Days., Disp: 3 each, Rfl: 1  •  topiramate (TOPAMAX) 100 MG tablet, Take 1 tablet by mouth 2 (Two) Times a Day. (Patient taking differently: Take 100 mg by mouth 2 (Two) Times a Day. Noon and 3 pm), Disp: 180 tablet, Rfl: 1  •  witch hazel-glycerin (TUCKS) pad, Insert  into the rectum As Needed for Hemorrhoids., Disp: 40 each, Rfl: 1  •  hydrOXYzine pamoate (Vistaril) 25 MG capsule, Take 1 capsule by mouth 3 (Three) Times a Day As Needed " "for Anxiety., Disp: 90 capsule, Rfl: 5    Current Facility-Administered Medications:   •  zoledronic acid (RECLAST) infusion 5 mg, 5 mg, Intravenous, Once, Ceron, UMESH Ponce    Review of Systems    Review of Systems   Constitutional: Negative.    HENT: Negative.    Eyes: Negative.    Respiratory: Negative.    Cardiovascular: Negative.    Gastrointestinal: Negative.    Endocrine: Negative.    Genitourinary: Negative.    Musculoskeletal: Positive for arthralgias and back pain.   Skin: Negative.    Allergic/Immunologic: Negative.    Neurological: Negative.    Hematological: Negative.    Psychiatric/Behavioral: The patient is nervous/anxious.    All other systems reviewed and are negative.      Objective  Vitals:    09/08/22 1331   BP: 118/70   Pulse: 71   SpO2: 98%   Weight: 48.5 kg (107 lb)   Height: 157.5 cm (62\")     Body mass index is 19.57 kg/m².    Physical Exam    Physical Exam  Vitals and nursing note reviewed.   Constitutional:       Appearance: She is normal weight.   HENT:      Head: Normocephalic.      Right Ear: External ear normal.      Left Ear: External ear normal.      Nose: Nose normal.      Mouth/Throat:      Mouth: Mucous membranes are moist.      Pharynx: Oropharynx is clear.   Eyes:      Extraocular Movements: Extraocular movements intact.      Conjunctiva/sclera: Conjunctivae normal.      Pupils: Pupils are equal, round, and reactive to light.   Cardiovascular:      Rate and Rhythm: Normal rate and regular rhythm.      Pulses: Normal pulses.      Heart sounds: Normal heart sounds. No murmur heard.    No friction rub. No gallop.   Pulmonary:      Effort: Pulmonary effort is normal. No respiratory distress.      Breath sounds: Normal breath sounds. No stridor. No wheezing, rhonchi or rales.   Chest:      Chest wall: No tenderness.   Abdominal:      General: Abdomen is flat. Bowel sounds are normal.      Palpations: Abdomen is soft.   Musculoskeletal:         General: Normal range of motion. "      Cervical back: Normal range of motion and neck supple.      Comments: Thoracic spine tenderness   Skin:     General: Skin is warm and dry.      Capillary Refill: Capillary refill takes less than 2 seconds.   Neurological:      General: No focal deficit present.      Mental Status: She is alert and oriented to person, place, and time. Mental status is at baseline.      Cranial Nerves: No cranial nerve deficit.      Sensory: No sensory deficit.      Motor: No weakness.      Gait: Gait normal.   Psychiatric:         Mood and Affect: Mood normal.         Behavior: Behavior normal.         Thought Content: Thought content normal.         Judgment: Judgment normal.       Diagnoses and all orders for this visit:    1. Annual physical exam (Primary)  -     CBC w AUTO Differential; Future  -     Comprehensive metabolic panel; Future  -     Lipid Panel With LDL/HDL Ratio; Future  -     TSH  -     Hemoglobin A1c    2. PRISCILLA (generalized anxiety disorder)  -     hydrOXYzine pamoate (Vistaril) 25 MG capsule; Take 1 capsule by mouth 3 (Three) Times a Day As Needed for Anxiety.  Dispense: 90 capsule; Refill: 5    3. PTSD (post-traumatic stress disorder)    4. Gastroesophageal reflux disease with esophagitis without hemorrhage    5. Hyperlipidemia, unspecified hyperlipidemia type    6. Restless leg syndrome    7. Migraine without aura and without status migrainosus, not intractable    8. Hypothyroidism, unspecified type    9. Major depressive disorder, recurrent, mild (HCC)    10. High risk medication use  -     ToxASSURE Select 13 (MW) - Urine, Clean Catch; Future    11. Encounter for screening mammogram for malignant neoplasm of breast  -     Mammo Screening Digital Tomosynthesis Bilateral With CAD; Future    12. Chronic midline low back pain without sciatica  -     Ambulatory Referral to Pain Management  -     HYDROcodone-acetaminophen (NORCO)  MG per tablet; Take 1 tablet by mouth Every 6 (Six) Hours As Needed for  Moderate Pain.  Dispense: 120 tablet; Refill: 0    13. Chronic midline thoracic back pain  -     Ambulatory Referral to Pain Management    14. Bilateral chronic knee pain  -     Ambulatory Referral to Pain Management    Will notify patient of lab results over the phone. Medications refilled as requested. Pt advised to D/C Klonopin as she is taking Hydrocodone. Gave Vistaril instead. Pt instructed on how to take medication and possible side effects. Patient understands the risks associated with this controlled medication, including tolerance and addiction.  she also agrees to only obtain this medication from me, and not from a another provider, unless that provider is covering for me in my absence.  she also agrees to be compliant in dosing, and not self adjust the dose of medication.  A signed controlled substance agreement is on file, and she has received a controlled substance education sheet at this a previous visit.  she has also signed a consent for treatment with a controlled substance as per [facility] policy. KATE was obtained and reviewed 9/8/22. Referral placed to pain management for further management of chronic pain. Follow-up in 3 months or sooner if needed. Pt verbalized understanding and is agreeable to plan. No further questions.    This document has been electronically signed by UMESH Bustos on September 8, 2022 15:26 CDT

## 2022-09-15 VITALS — BODY MASS INDEX: 19.69 KG/M2 | WEIGHT: 107 LBS | HEIGHT: 62 IN

## 2022-09-15 RX ORDER — HYDROCORTISONE 10 MG/G
1 CREAM TOPICAL AS NEEDED
COMMUNITY
End: 2022-09-20 | Stop reason: HOSPADM

## 2022-09-15 RX ORDER — NITROGLYCERIN 0.4 MG/1
0.4 TABLET SUBLINGUAL
COMMUNITY
End: 2022-09-20 | Stop reason: HOSPADM

## 2022-09-15 RX ORDER — LOSARTAN POTASSIUM 50 MG/1
50 TABLET ORAL DAILY
COMMUNITY
End: 2022-09-20 | Stop reason: HOSPADM

## 2022-09-16 ENCOUNTER — LAB (OUTPATIENT)
Dept: LAB | Facility: OTHER | Age: 62
End: 2022-09-16

## 2022-09-16 DIAGNOSIS — Z00.00 ANNUAL PHYSICAL EXAM: ICD-10-CM

## 2022-09-16 LAB
ALBUMIN SERPL-MCNC: 4.2 G/DL (ref 3.5–5)
ALBUMIN/GLOB SERPL: 1.4 G/DL (ref 1.1–1.8)
ALP SERPL-CCNC: 67 U/L (ref 38–126)
ALT SERPL W P-5'-P-CCNC: 27 U/L
ANION GAP SERPL CALCULATED.3IONS-SCNC: 7 MMOL/L (ref 5–15)
AST SERPL-CCNC: 64 U/L (ref 14–36)
BASOPHILS # BLD AUTO: 0.03 10*3/MM3 (ref 0–0.2)
BASOPHILS NFR BLD AUTO: 0.3 % (ref 0–1.5)
BILIRUB SERPL-MCNC: 0.4 MG/DL (ref 0.2–1.3)
BUN SERPL-MCNC: 18 MG/DL (ref 7–23)
BUN/CREAT SERPL: 19.1 (ref 7–25)
CALCIUM SPEC-SCNC: 8.9 MG/DL (ref 8.4–10.2)
CHLORIDE SERPL-SCNC: 110 MMOL/L (ref 101–112)
CHOLEST SERPL-MCNC: 164 MG/DL (ref 0–200)
CO2 SERPL-SCNC: 23 MMOL/L (ref 22–30)
CREAT SERPL-MCNC: 0.94 MG/DL (ref 0.52–1.04)
DEPRECATED RDW RBC AUTO: 49.4 FL (ref 37–54)
DRUGS UR: NORMAL
EGFRCR SERPLBLD CKD-EPI 2021: 67.9 ML/MIN/1.73
EOSINOPHIL # BLD AUTO: 0.14 10*3/MM3 (ref 0–0.4)
EOSINOPHIL NFR BLD AUTO: 1.6 % (ref 0.3–6.2)
ERYTHROCYTE [DISTWIDTH] IN BLOOD BY AUTOMATED COUNT: 14.1 % (ref 12.3–15.4)
GLOBULIN UR ELPH-MCNC: 3 GM/DL (ref 2.3–3.5)
GLUCOSE SERPL-MCNC: 101 MG/DL (ref 70–99)
HBA1C MFR BLD: 6.1 % (ref 4.8–5.6)
HCT VFR BLD AUTO: 40 % (ref 34–46.6)
HDLC SERPL-MCNC: 40 MG/DL (ref 40–60)
HGB BLD-MCNC: 12.9 G/DL (ref 12–15.9)
LDLC SERPL CALC-MCNC: 98 MG/DL (ref 0–100)
LDLC/HDLC SERPL: 2.37 {RATIO}
LYMPHOCYTES # BLD AUTO: 3 10*3/MM3 (ref 0.7–3.1)
LYMPHOCYTES NFR BLD AUTO: 34.2 % (ref 19.6–45.3)
MCH RBC QN AUTO: 31.9 PG (ref 26.6–33)
MCHC RBC AUTO-ENTMCNC: 32.3 G/DL (ref 31.5–35.7)
MCV RBC AUTO: 98.8 FL (ref 79–97)
MONOCYTES # BLD AUTO: 1.05 10*3/MM3 (ref 0.1–0.9)
MONOCYTES NFR BLD AUTO: 12 % (ref 5–12)
NEUTROPHILS NFR BLD AUTO: 4.55 10*3/MM3 (ref 1.7–7)
NEUTROPHILS NFR BLD AUTO: 51.9 % (ref 42.7–76)
PLATELET # BLD AUTO: 228 10*3/MM3 (ref 140–450)
PMV BLD AUTO: 9.6 FL (ref 6–12)
POTASSIUM SERPL-SCNC: 4 MMOL/L (ref 3.4–5)
PROT SERPL-MCNC: 7.2 G/DL (ref 6.3–8.6)
RBC # BLD AUTO: 4.05 10*6/MM3 (ref 3.77–5.28)
SODIUM SERPL-SCNC: 140 MMOL/L (ref 137–145)
TRIGL SERPL-MCNC: 146 MG/DL (ref 0–150)
TSH SERPL DL<=0.05 MIU/L-ACNC: 1.69 UIU/ML (ref 0.27–4.2)
VLDLC SERPL-MCNC: 26 MG/DL (ref 5–40)
WBC NRBC COR # BLD: 8.77 10*3/MM3 (ref 3.4–10.8)

## 2022-09-16 PROCEDURE — 85025 COMPLETE CBC W/AUTO DIFF WBC: CPT | Performed by: NURSE PRACTITIONER

## 2022-09-16 PROCEDURE — 80053 COMPREHEN METABOLIC PANEL: CPT | Performed by: NURSE PRACTITIONER

## 2022-09-16 PROCEDURE — 80061 LIPID PANEL: CPT | Performed by: NURSE PRACTITIONER

## 2022-09-16 PROCEDURE — 84443 ASSAY THYROID STIM HORMONE: CPT | Performed by: NURSE PRACTITIONER

## 2022-09-16 PROCEDURE — 83036 HEMOGLOBIN GLYCOSYLATED A1C: CPT | Performed by: NURSE PRACTITIONER

## 2022-09-16 PROCEDURE — 36415 COLL VENOUS BLD VENIPUNCTURE: CPT | Performed by: NURSE PRACTITIONER

## 2022-09-20 ENCOUNTER — OFFICE VISIT (OUTPATIENT)
Dept: FAMILY MEDICINE CLINIC | Facility: CLINIC | Age: 62
End: 2022-09-20

## 2022-09-20 VITALS — HEIGHT: 62 IN | TEMPERATURE: 100.4 F | BODY MASS INDEX: 19.69 KG/M2 | WEIGHT: 107 LBS

## 2022-09-20 DIAGNOSIS — U07.1 COVID-19: Primary | ICD-10-CM

## 2022-09-20 DIAGNOSIS — N20.1 URETEROLITHIASIS: ICD-10-CM

## 2022-09-20 PROCEDURE — 99442 PR PHYS/QHP TELEPHONE EVALUATION 11-20 MIN: CPT | Performed by: NURSE PRACTITIONER

## 2022-09-20 RX ORDER — SULFAMETHOXAZOLE AND TRIMETHOPRIM 800; 160 MG/1; MG/1
1 TABLET ORAL
COMMUNITY
Start: 2022-09-20 | End: 2022-09-26 | Stop reason: HOSPADM

## 2022-09-20 RX ORDER — TAMSULOSIN HYDROCHLORIDE 0.4 MG/1
1 CAPSULE ORAL DAILY
Qty: 30 CAPSULE | Refills: 0 | Status: SHIPPED | OUTPATIENT
Start: 2022-09-20

## 2022-09-20 NOTE — PROGRESS NOTES
CC: Sinusitis (Home covid test positive ) and Hospital Follow Up Visit ([facility] ER FU, kidney stone, needing referral to Urology )      Subjective:  Tricia Loyola is a 62 y.o. female who presents for         This is a telephone visit due to pandemic.  Patient is at her home and I am in my office.  Patient complains of sinus infection symptoms.  But does state that she has had a home COVID test that was positive. She c/o sore throat, fever t max 100.4, and nasal congestion. Denies associated cough or SOB.     She went to the the  on yesterday and was advised to go to ER on yesterday with complaints of abdominal pain and red urine.  She was noted to have a renal calculi on CT scan of abdomen it was 5 mm in size.  Was advised that she needed to see a urologist.  She is needing a referral for this. States she was incontinent of urine x 1 this morning.       The following portions of the patient's history were reviewed and updated as appropriate: allergies, current medications, past family history, past medical history, past social history, past surgical history and problem list.    Past Medical History:   Diagnosis Date   • Abdominal pain    • Anemia    • Anxiety and depression    • Chronic post-traumatic headache      rebound      • Depressive disorder    • Disease of thyroid gland    • Encounter for gynecological examination    • Endometriosis    • Gastroesophageal reflux disease    • Generalized anxiety disorder    • History of mammogram 08/2008    MAMMOGRAM DIAGNOSTIC BILATERAL 79292 ([facility]) (1)     • Hyperlipidemia    • Hypertension    • Ingrown toenail    • Injury of back    • Leaky heart valve    • Leaky heart valve    • Migraine    • Nonruptured cerebral aneurysm    • Osteoporosis    • Posttraumatic stress disorder    • Primary osteoarthritis of left knee    • Primary osteoarthritis of right knee    • PTSD (post-traumatic stress disorder)    • Seizure (HCC)     last seizure 2009.  no meds at this time   • Skin  cancer     basal cell on back   • Tachycardia    • Viral hepatitis A    • Wears dentures     uppers only   • Wears glasses          Current Outpatient Medications:   •  albuterol (ACCUNEB) 1.25 MG/3ML nebulizer solution, Take 3 mL by nebulization Every 4 (Four) Hours As Needed for Wheezing or Shortness of Air., Disp: 100 each, Rfl: 5  •  albuterol sulfate  (90 Base) MCG/ACT inhaler, Inhale 2 puffs Every 4 (Four) Hours As Needed for Wheezing., Disp: 18 g, Rfl: 5  •  amitriptyline (ELAVIL) 75 MG tablet, Take 1 tablet by mouth Every Night. Resumed 6/10/22., Disp: 30 tablet, Rfl: 5  •  aspirin 81 MG EC tablet, Take 1 tablet by mouth Daily., Disp: 30 tablet, Rfl: 0  •  atorvastatin (LIPITOR) 40 MG tablet, Take 1 tablet by mouth Every Night. For cholesterol, Disp: 90 tablet, Rfl: 1  •  Botox 200 units reconstituted solution, Inject 200 Units into the appropriate muscle as directed by prescriber Every 3 (Three) Months., Disp: , Rfl:   •  busPIRone (BUSPAR) 30 MG tablet, Take 1 tablet by mouth 2 (Two) Times a Day., Disp: 60 tablet, Rfl: 0  •  Cyanocobalamin 1000 MCG/ML kit, Inject 1,000 mL as directed Every 30 (Thirty) Days., Disp: 1 kit, Rfl: 5  •  Dexilant 60 MG capsule, , Disp: , Rfl:   •  dexlansoprazole (DEXILANT) 60 MG capsule, Take 1 capsule by mouth Daily., Disp: 90 capsule, Rfl: 1  •  diphenoxylate-atropine (Lomotil) 2.5-0.025 MG per tablet, Take 1 tablet by mouth 4 (Four) Times a Day As Needed for Diarrhea., Disp: 12 tablet, Rfl: 0  •  famotidine (PEPCID) 40 MG tablet, Take 1 tablet by mouth Daily., Disp: 30 tablet, Rfl: 3  •  famotidine (PEPCID) 40 MG tablet, , Disp: , Rfl:   •  fluticasone (FLONASE) 50 MCG/ACT nasal spray, 2 sprays into the nostril(s) as directed by provider Daily As Needed for Rhinitis., Disp: , Rfl:   •  folic acid (FOLVITE) 800 MCG tablet, Take 1 tablet by mouth Daily., Disp: 90 tablet, Rfl: 1  •  HYDROcodone-acetaminophen (NORCO)  MG per tablet, Take 1 tablet by mouth Every 6  "(Six) Hours As Needed for Moderate Pain., Disp: 120 tablet, Rfl: 0  •  HYDROcodone-acetaminophen (NORCO)  MG per tablet, , Disp: , Rfl:   •  hydrOXYzine pamoate (Vistaril) 25 MG capsule, Take 1 capsule by mouth 3 (Three) Times a Day As Needed for Anxiety., Disp: 90 capsule, Rfl: 5  •  hydrOXYzine pamoate (VISTARIL) 25 MG capsule, , Disp: , Rfl:   •  levothyroxine (SYNTHROID, LEVOTHROID) 25 MCG tablet, Take 0.5 tablets by mouth Daily., Disp: 15 tablet, Rfl: 5  •  lidocaine (LIDODERM) 5 %, , Disp: , Rfl:   •  Linzess 290 MCG capsule capsule, Take 1 capsule by mouth Every Morning Before Breakfast., Disp: 30 capsule, Rfl: 1  •  metoprolol succinate XL (TOPROL-XL) 100 MG 24 hr tablet, Take 1 tablet by mouth Daily. Takes at 3 pm, Disp: 30 tablet, Rfl: 6  •  ondansetron ODT (ZOFRAN-ODT) 4 MG disintegrating tablet, Place 1 tablet on the tongue Every 8 (Eight) Hours As Needed for Nausea., Disp: 30 tablet, Rfl: 5  •  OXcarbazepine (TRILEPTAL) 150 MG tablet, Take 150 mg by mouth Every Night. Daily at bedtime, Disp: , Rfl:   •  prochlorperazine (COMPAZINE) 10 MG tablet, Take 1 tablet by mouth Every 6 (Six) Hours As Needed for Nausea or Vomiting., Disp: 360 tablet, Rfl: 1  •  rOPINIRole (REQUIP) 3 MG tablet, TAKE 1 TABLET BY MOUTH EVERY NIGHT AT BEDTIME, Disp: 90 tablet, Rfl: 1  •  sulfamethoxazole-trimethoprim (BACTRIM DS,SEPTRA DS) 800-160 MG per tablet, Take 1 tablet by mouth., Disp: , Rfl:   •  Syringe/Needle, Disp, 25G X 5/8\" 3 ML misc, 1 each Every 28 (Twenty-Eight) Days., Disp: 3 each, Rfl: 1  •  topiramate (TOPAMAX) 100 MG tablet, Take 1 tablet by mouth 2 (Two) Times a Day., Disp: 180 tablet, Rfl: 1  •  witch hazel-glycerin (TUCKS) pad, Insert  into the rectum As Needed for Hemorrhoids., Disp: 40 each, Rfl: 1  •  tamsulosin (FLOMAX) 0.4 MG capsule 24 hr capsule, Take 1 capsule by mouth Daily., Disp: 30 capsule, Rfl: 0    Review of Systems    Review of Systems   Constitutional: Positive for fever.   HENT: Positive " "for congestion and sore throat.    Eyes: Negative.    Respiratory: Negative.  Negative for cough and shortness of breath.    Cardiovascular: Negative.    Gastrointestinal: Positive for abdominal pain.   Endocrine: Negative.    Genitourinary: Negative.    Musculoskeletal: Negative.    Skin: Negative.    Allergic/Immunologic: Negative.    Neurological: Negative.    Hematological: Negative.    Psychiatric/Behavioral: Negative.    All other systems reviewed and are negative.      Objective  Vitals:    09/20/22 1320   Temp: 100.4 °F (38 °C)   Weight: 48.5 kg (107 lb)   Height: 157.5 cm (62\")     Body mass index is 19.57 kg/m².    Physical Exam    Physical Exam  Deferred, Telephone visit because of pandemic    Diagnoses and all orders for this visit:    1. COVID-19 (Primary)    2. Ureterolithiasis  -     tamsulosin (FLOMAX) 0.4 MG capsule 24 hr capsule; Take 1 capsule by mouth Daily.  Dispense: 30 capsule; Refill: 0  -     Ambulatory Referral to Urology       Patient with COVID-19.  Advised Tylenol or ibuprofen if needed for fever or body aches.  For the ureterolithiasis, will start patient on Flomax 0.4 mg p.o. every 24 hours.  Patient instructed on how to take this medication and possible side effects.  Patient asking if she can take extra dose of her hydrocodone as needed.  Advised that this is prescribed to be taken every 6 hours and that she should not take extra dosages of her medication.  Patient states understanding of this and will not take extra.  Will refer patient to urology for further evaluation and treatment.  Patient advised that she would have to wait 5 to 7 days to see the urologist due to her having COVID-19.  Advised if symptoms worsen, to go to the ER so that she can be seen sooner.  Answered all questions.  Patient verbalized understanding of plan of care.      This document has been electronically signed by UMESH Bustos on September 20, 2022 13:56 CDT     You have chosen to receive care " through a telephone visit. Do you consent to use a telephone visit for your medical care today? Yes  This visit has been rescheduled as a phone visit to comply with patient safety concerns in accordance with CDC recommendations. Total time of discussion was 16 minutes.

## 2022-09-21 RX ORDER — BROMPHENIRAMINE MALEATE, PSEUDOEPHEDRINE HYDROCHLORIDE, AND DEXTROMETHORPHAN HYDROBROMIDE 2; 30; 10 MG/5ML; MG/5ML; MG/5ML
5 SYRUP ORAL 4 TIMES DAILY PRN
Qty: 473 ML | Refills: 0 | Status: SHIPPED | OUTPATIENT
Start: 2022-09-21 | End: 2023-01-23

## 2022-09-24 ENCOUNTER — HOSPITAL ENCOUNTER (OUTPATIENT)
Facility: HOSPITAL | Age: 62
LOS: 1 days | Discharge: HOME | End: 2022-09-26
Attending: HOSPITALIST | Admitting: FAMILY MEDICINE

## 2022-09-24 DIAGNOSIS — N20.1 URETERAL STONE: Primary | ICD-10-CM

## 2022-09-24 PROBLEM — N13.9 OBSTRUCTIVE UROPATHY: Status: ACTIVE | Noted: 2022-09-24

## 2022-09-24 LAB
BACTERIA UR QL AUTO: ABNORMAL /HPF
BILIRUB UR QL STRIP: NEGATIVE
CLARITY UR: CLEAR
COLOR UR: YELLOW
D-LACTATE SERPL-SCNC: 0.7 MMOL/L (ref 0.5–2)
GLUCOSE UR STRIP-MCNC: NEGATIVE MG/DL
HGB UR QL STRIP.AUTO: ABNORMAL
HYALINE CASTS UR QL AUTO: ABNORMAL /LPF
KETONES UR QL STRIP: NEGATIVE
LEUKOCYTE ESTERASE UR QL STRIP.AUTO: NEGATIVE
NITRITE UR QL STRIP: NEGATIVE
PH UR STRIP.AUTO: 7 [PH] (ref 5–9)
PROT UR QL STRIP: NEGATIVE
RBC # UR STRIP: ABNORMAL /HPF
REF LAB TEST METHOD: ABNORMAL
SP GR UR STRIP: 1.01 (ref 1–1.03)
SQUAMOUS #/AREA URNS HPF: ABNORMAL /HPF
UROBILINOGEN UR QL STRIP: ABNORMAL
WBC # UR STRIP: ABNORMAL /HPF

## 2022-09-24 PROCEDURE — 96372 THER/PROPH/DIAG INJ SC/IM: CPT

## 2022-09-24 PROCEDURE — 96361 HYDRATE IV INFUSION ADD-ON: CPT

## 2022-09-24 PROCEDURE — 81001 URINALYSIS AUTO W/SCOPE: CPT | Performed by: INTERNAL MEDICINE

## 2022-09-24 PROCEDURE — 25010000002 HEPARIN (PORCINE) PER 1000 UNITS: Performed by: INTERNAL MEDICINE

## 2022-09-24 PROCEDURE — 25010000002 CEFTRIAXONE PER 250 MG: Performed by: INTERNAL MEDICINE

## 2022-09-24 PROCEDURE — 96365 THER/PROPH/DIAG IV INF INIT: CPT

## 2022-09-24 PROCEDURE — G0378 HOSPITAL OBSERVATION PER HR: HCPCS

## 2022-09-24 PROCEDURE — 83605 ASSAY OF LACTIC ACID: CPT | Performed by: INTERNAL MEDICINE

## 2022-09-24 PROCEDURE — 87040 BLOOD CULTURE FOR BACTERIA: CPT | Performed by: INTERNAL MEDICINE

## 2022-09-24 RX ORDER — MORPHINE SULFATE 2 MG/ML
2 INJECTION, SOLUTION INTRAMUSCULAR; INTRAVENOUS EVERY 4 HOURS PRN
Status: DISCONTINUED | OUTPATIENT
Start: 2022-09-24 | End: 2022-09-26

## 2022-09-24 RX ORDER — PANTOPRAZOLE SODIUM 40 MG/1
40 TABLET, DELAYED RELEASE ORAL EVERY MORNING
Status: DISCONTINUED | OUTPATIENT
Start: 2022-09-25 | End: 2022-09-26 | Stop reason: HOSPADM

## 2022-09-24 RX ORDER — NALOXONE HCL 0.4 MG/ML
0.4 VIAL (ML) INJECTION
Status: DISCONTINUED | OUTPATIENT
Start: 2022-09-24 | End: 2022-09-26

## 2022-09-24 RX ORDER — ACETAMINOPHEN 160 MG/5ML
650 SOLUTION ORAL EVERY 4 HOURS PRN
Status: DISCONTINUED | OUTPATIENT
Start: 2022-09-24 | End: 2022-09-24 | Stop reason: SDUPTHER

## 2022-09-24 RX ORDER — SODIUM CHLORIDE 0.9 % (FLUSH) 0.9 %
10 SYRINGE (ML) INJECTION EVERY 12 HOURS SCHEDULED
Status: DISCONTINUED | OUTPATIENT
Start: 2022-09-24 | End: 2022-09-26 | Stop reason: HOSPADM

## 2022-09-24 RX ORDER — PROCHLORPERAZINE EDISYLATE 5 MG/ML
10 INJECTION INTRAMUSCULAR; INTRAVENOUS EVERY 6 HOURS PRN
Status: DISCONTINUED | OUTPATIENT
Start: 2022-09-24 | End: 2022-09-26 | Stop reason: HOSPADM

## 2022-09-24 RX ORDER — ONDANSETRON 2 MG/ML
4 INJECTION INTRAMUSCULAR; INTRAVENOUS EVERY 6 HOURS PRN
Status: DISCONTINUED | OUTPATIENT
Start: 2022-09-24 | End: 2022-09-24

## 2022-09-24 RX ORDER — LANOLIN ALCOHOL/MO/W.PET/CERES
5 CREAM (GRAM) TOPICAL NIGHTLY PRN
Status: DISCONTINUED | OUTPATIENT
Start: 2022-09-24 | End: 2022-09-26 | Stop reason: HOSPADM

## 2022-09-24 RX ORDER — METOPROLOL SUCCINATE 50 MG/1
100 TABLET, EXTENDED RELEASE ORAL DAILY
Status: DISCONTINUED | OUTPATIENT
Start: 2022-09-24 | End: 2022-09-26 | Stop reason: HOSPADM

## 2022-09-24 RX ORDER — ACETAMINOPHEN 325 MG/1
650 TABLET ORAL EVERY 4 HOURS PRN
Status: DISCONTINUED | OUTPATIENT
Start: 2022-09-24 | End: 2022-09-26 | Stop reason: HOSPADM

## 2022-09-24 RX ORDER — SODIUM CHLORIDE 9 MG/ML
125 INJECTION, SOLUTION INTRAVENOUS CONTINUOUS
Status: DISPENSED | OUTPATIENT
Start: 2022-09-24 | End: 2022-09-25

## 2022-09-24 RX ORDER — FLUTICASONE PROPIONATE 50 MCG
2 SPRAY, SUSPENSION (ML) NASAL DAILY PRN
Status: DISCONTINUED | OUTPATIENT
Start: 2022-09-24 | End: 2022-09-26 | Stop reason: HOSPADM

## 2022-09-24 RX ORDER — ROPINIROLE 1 MG/1
3 TABLET, FILM COATED ORAL NIGHTLY
Status: DISCONTINUED | OUTPATIENT
Start: 2022-09-24 | End: 2022-09-26 | Stop reason: HOSPADM

## 2022-09-24 RX ORDER — GUAIFENESIN/DEXTROMETHORPHAN 100-10MG/5
10 SYRUP ORAL EVERY 4 HOURS PRN
Refills: 0 | Status: DISCONTINUED | OUTPATIENT
Start: 2022-09-24 | End: 2022-09-26 | Stop reason: HOSPADM

## 2022-09-24 RX ORDER — TOPIRAMATE 100 MG/1
100 TABLET, FILM COATED ORAL 2 TIMES DAILY
Status: DISCONTINUED | OUTPATIENT
Start: 2022-09-24 | End: 2022-09-26 | Stop reason: HOSPADM

## 2022-09-24 RX ORDER — HEPARIN SODIUM 5000 [USP'U]/ML
5000 INJECTION, SOLUTION INTRAVENOUS; SUBCUTANEOUS EVERY 8 HOURS SCHEDULED
Status: DISCONTINUED | OUTPATIENT
Start: 2022-09-24 | End: 2022-09-26 | Stop reason: HOSPADM

## 2022-09-24 RX ORDER — TAMSULOSIN HYDROCHLORIDE 0.4 MG/1
0.4 CAPSULE ORAL DAILY
Status: DISCONTINUED | OUTPATIENT
Start: 2022-09-25 | End: 2022-09-26 | Stop reason: HOSPADM

## 2022-09-24 RX ORDER — ASPIRIN 81 MG/1
81 TABLET ORAL DAILY
Status: DISCONTINUED | OUTPATIENT
Start: 2022-09-25 | End: 2022-09-24

## 2022-09-24 RX ORDER — LEVOTHYROXINE SODIUM 0.03 MG/1
12.5 TABLET ORAL DAILY
Status: DISCONTINUED | OUTPATIENT
Start: 2022-09-25 | End: 2022-09-26 | Stop reason: HOSPADM

## 2022-09-24 RX ORDER — OXCARBAZEPINE 150 MG/1
150 TABLET, FILM COATED ORAL NIGHTLY
Status: DISCONTINUED | OUTPATIENT
Start: 2022-09-24 | End: 2022-09-26 | Stop reason: HOSPADM

## 2022-09-24 RX ORDER — FAMOTIDINE 40 MG/1
40 TABLET, FILM COATED ORAL DAILY
Status: DISCONTINUED | OUTPATIENT
Start: 2022-09-25 | End: 2022-09-24 | Stop reason: SDUPTHER

## 2022-09-24 RX ORDER — ZINC SULFATE 50(220)MG
220 CAPSULE ORAL DAILY
Status: DISCONTINUED | OUTPATIENT
Start: 2022-09-25 | End: 2022-09-26 | Stop reason: HOSPADM

## 2022-09-24 RX ORDER — SODIUM CHLORIDE 0.9 % (FLUSH) 0.9 %
10 SYRINGE (ML) INJECTION AS NEEDED
Status: DISCONTINUED | OUTPATIENT
Start: 2022-09-24 | End: 2022-09-26 | Stop reason: HOSPADM

## 2022-09-24 RX ORDER — ALBUTEROL SULFATE 90 UG/1
2 AEROSOL, METERED RESPIRATORY (INHALATION) EVERY 4 HOURS PRN
Refills: 5 | Status: DISCONTINUED | OUTPATIENT
Start: 2022-09-24 | End: 2022-09-26 | Stop reason: HOSPADM

## 2022-09-24 RX ORDER — MELATONIN
1000 DAILY
Status: DISCONTINUED | OUTPATIENT
Start: 2022-09-25 | End: 2022-09-26 | Stop reason: HOSPADM

## 2022-09-24 RX ORDER — FOLIC ACID 1 MG/1
1 TABLET ORAL DAILY
Status: DISCONTINUED | OUTPATIENT
Start: 2022-09-25 | End: 2022-09-26 | Stop reason: HOSPADM

## 2022-09-24 RX ORDER — BUSPIRONE HYDROCHLORIDE 15 MG/1
30 TABLET ORAL 2 TIMES DAILY
Status: DISCONTINUED | OUTPATIENT
Start: 2022-09-24 | End: 2022-09-26 | Stop reason: HOSPADM

## 2022-09-24 RX ORDER — ASCORBIC ACID 500 MG
1000 TABLET ORAL DAILY
Status: DISCONTINUED | OUTPATIENT
Start: 2022-09-25 | End: 2022-09-26 | Stop reason: HOSPADM

## 2022-09-24 RX ORDER — HYDROXYZINE PAMOATE 25 MG/1
25 CAPSULE ORAL 3 TIMES DAILY PRN
Status: DISCONTINUED | OUTPATIENT
Start: 2022-09-24 | End: 2022-09-26 | Stop reason: HOSPADM

## 2022-09-24 RX ORDER — CALCIUM CARBONATE 200(500)MG
1 TABLET,CHEWABLE ORAL 2 TIMES DAILY PRN
Status: DISCONTINUED | OUTPATIENT
Start: 2022-09-24 | End: 2022-09-26 | Stop reason: HOSPADM

## 2022-09-24 RX ORDER — LOSARTAN POTASSIUM 50 MG/1
50 TABLET ORAL NIGHTLY
Status: DISCONTINUED | OUTPATIENT
Start: 2022-09-25 | End: 2022-09-26 | Stop reason: HOSPADM

## 2022-09-24 RX ORDER — ATORVASTATIN CALCIUM 40 MG/1
40 TABLET, FILM COATED ORAL NIGHTLY
Status: DISCONTINUED | OUTPATIENT
Start: 2022-09-24 | End: 2022-09-26 | Stop reason: HOSPADM

## 2022-09-24 RX ORDER — ACETAMINOPHEN 650 MG/1
650 SUPPOSITORY RECTAL EVERY 4 HOURS PRN
Status: DISCONTINUED | OUTPATIENT
Start: 2022-09-24 | End: 2022-09-26 | Stop reason: HOSPADM

## 2022-09-24 RX ADMIN — SODIUM CHLORIDE 125 ML/HR: 9 INJECTION, SOLUTION INTRAVENOUS at 21:59

## 2022-09-24 RX ADMIN — ROPINIROLE 3 MG: 1 TABLET, FILM COATED ORAL at 23:07

## 2022-09-24 RX ADMIN — CEFTRIAXONE SODIUM 1 G: 1 INJECTION, POWDER, FOR SOLUTION INTRAMUSCULAR; INTRAVENOUS at 23:06

## 2022-09-24 RX ADMIN — METOPROLOL SUCCINATE 100 MG: 50 TABLET, EXTENDED RELEASE ORAL at 23:06

## 2022-09-24 RX ADMIN — HEPARIN SODIUM 5000 UNITS: 5000 INJECTION INTRAVENOUS; SUBCUTANEOUS at 22:19

## 2022-09-24 RX ADMIN — Medication 10 ML: at 22:00

## 2022-09-25 ENCOUNTER — APPOINTMENT (OUTPATIENT)
Dept: GENERAL RADIOLOGY | Facility: HOSPITAL | Age: 62
End: 2022-09-25

## 2022-09-25 ENCOUNTER — ANESTHESIA (OUTPATIENT)
Dept: PERIOP | Facility: HOSPITAL | Age: 62
End: 2022-09-25

## 2022-09-25 ENCOUNTER — ANESTHESIA EVENT (OUTPATIENT)
Dept: PERIOP | Facility: HOSPITAL | Age: 62
End: 2022-09-25

## 2022-09-25 PROBLEM — N20.1 URETERAL STONE: Status: ACTIVE | Noted: 2022-09-24

## 2022-09-25 LAB
ANION GAP SERPL CALCULATED.3IONS-SCNC: 9 MMOL/L (ref 5–15)
BUN SERPL-MCNC: 13 MG/DL (ref 8–23)
BUN/CREAT SERPL: 15.7 (ref 7–25)
CALCIUM SPEC-SCNC: 7.8 MG/DL (ref 8.6–10.5)
CHLORIDE SERPL-SCNC: 112 MMOL/L (ref 98–107)
CO2 SERPL-SCNC: 18 MMOL/L (ref 22–29)
CREAT SERPL-MCNC: 0.83 MG/DL (ref 0.57–1)
DEPRECATED RDW RBC AUTO: 49 FL (ref 37–54)
EGFRCR SERPLBLD CKD-EPI 2021: 78.8 ML/MIN/1.73
EOSINOPHIL # BLD MANUAL: 0.05 10*3/MM3 (ref 0–0.4)
EOSINOPHIL NFR BLD MANUAL: 1 % (ref 0.3–6.2)
ERYTHROCYTE [DISTWIDTH] IN BLOOD BY AUTOMATED COUNT: 13.9 % (ref 12.3–15.4)
GLUCOSE SERPL-MCNC: 85 MG/DL (ref 65–99)
HCT VFR BLD AUTO: 32.4 % (ref 34–46.6)
HGB BLD-MCNC: 11 G/DL (ref 12–15.9)
LYMPHOCYTES # BLD MANUAL: 2.78 10*3/MM3 (ref 0.7–3.1)
LYMPHOCYTES NFR BLD MANUAL: 2 % (ref 5–12)
MAGNESIUM SERPL-MCNC: 1.6 MG/DL (ref 1.6–2.4)
MCH RBC QN AUTO: 32.4 PG (ref 26.6–33)
MCHC RBC AUTO-ENTMCNC: 34 G/DL (ref 31.5–35.7)
MCV RBC AUTO: 95.3 FL (ref 79–97)
MONOCYTES # BLD: 0.1 10*3/MM3 (ref 0.1–0.9)
NEUTROPHILS # BLD AUTO: 2.16 10*3/MM3 (ref 1.7–7)
NEUTROPHILS NFR BLD MANUAL: 42 % (ref 42.7–76)
PLASMA CELL PREC NFR BLD MANUAL: 1 % (ref 0–0)
PLAT MORPH BLD: NORMAL
PLATELET # BLD AUTO: 218 10*3/MM3 (ref 140–450)
PMV BLD AUTO: 9.5 FL (ref 6–12)
POTASSIUM SERPL-SCNC: 3.7 MMOL/L (ref 3.5–5.2)
RBC # BLD AUTO: 3.4 10*6/MM3 (ref 3.77–5.28)
RBC MORPH BLD: NORMAL
SODIUM SERPL-SCNC: 139 MMOL/L (ref 136–145)
VARIANT LYMPHS NFR BLD MANUAL: 1 % (ref 0–5)
VARIANT LYMPHS NFR BLD MANUAL: 53 % (ref 19.6–45.3)
WBC MORPH BLD: NORMAL
WBC NRBC COR # BLD: 5.14 10*3/MM3 (ref 3.4–10.8)

## 2022-09-25 PROCEDURE — 63710000001 AMITRIPTYLINE 50 MG TABLET: Performed by: UROLOGY

## 2022-09-25 PROCEDURE — 85025 COMPLETE CBC W/AUTO DIFF WBC: CPT | Performed by: INTERNAL MEDICINE

## 2022-09-25 PROCEDURE — 25010000002 FENTANYL CITRATE (PF) 50 MCG/ML SOLUTION: Performed by: NURSE ANESTHETIST, CERTIFIED REGISTERED

## 2022-09-25 PROCEDURE — G0378 HOSPITAL OBSERVATION PER HR: HCPCS

## 2022-09-25 PROCEDURE — 25010000002 PHENYLEPHRINE 10 MG/ML SOLUTION: Performed by: NURSE ANESTHETIST, CERTIFIED REGISTERED

## 2022-09-25 PROCEDURE — 82365 CALCULUS SPECTROSCOPY: CPT | Performed by: UROLOGY

## 2022-09-25 PROCEDURE — 25010000002 DIPHENHYDRAMINE PER 50 MG: Performed by: NURSE ANESTHETIST, CERTIFIED REGISTERED

## 2022-09-25 PROCEDURE — 96361 HYDRATE IV INFUSION ADD-ON: CPT

## 2022-09-25 PROCEDURE — 25010000002 PROPOFOL 10 MG/ML EMULSION: Performed by: NURSE ANESTHETIST, CERTIFIED REGISTERED

## 2022-09-25 PROCEDURE — 63710000001 ATORVASTATIN 40 MG TABLET: Performed by: UROLOGY

## 2022-09-25 PROCEDURE — 85007 BL SMEAR W/DIFF WBC COUNT: CPT | Performed by: INTERNAL MEDICINE

## 2022-09-25 PROCEDURE — 25010000002 MORPHINE PER 10 MG: Performed by: UROLOGY

## 2022-09-25 PROCEDURE — A9270 NON-COVERED ITEM OR SERVICE: HCPCS | Performed by: UROLOGY

## 2022-09-25 PROCEDURE — 63710000001 AMITRIPTYLINE 25 MG TABLET: Performed by: UROLOGY

## 2022-09-25 PROCEDURE — 25010000002 DEXAMETHASONE PER 1 MG: Performed by: NURSE ANESTHETIST, CERTIFIED REGISTERED

## 2022-09-25 PROCEDURE — 63710000001 OXCARBAZEPINE 150 MG TABLET: Performed by: UROLOGY

## 2022-09-25 PROCEDURE — 76000 FLUOROSCOPY <1 HR PHYS/QHP: CPT

## 2022-09-25 PROCEDURE — 96376 TX/PRO/DX INJ SAME DRUG ADON: CPT

## 2022-09-25 PROCEDURE — 96375 TX/PRO/DX INJ NEW DRUG ADDON: CPT

## 2022-09-25 PROCEDURE — 63710000001 METOPROLOL SUCCINATE XL 50 MG TABLET SUSTAINED-RELEASE 24 HOUR: Performed by: UROLOGY

## 2022-09-25 PROCEDURE — 25010000002 CEFTRIAXONE PER 250 MG: Performed by: UROLOGY

## 2022-09-25 PROCEDURE — 25010000002 IOPAMIDOL 61 % SOLUTION: Performed by: UROLOGY

## 2022-09-25 PROCEDURE — 80048 BASIC METABOLIC PNL TOTAL CA: CPT | Performed by: INTERNAL MEDICINE

## 2022-09-25 PROCEDURE — 63710000001 ROPINIROLE 1 MG TABLET: Performed by: UROLOGY

## 2022-09-25 PROCEDURE — C1758 CATHETER, URETERAL: HCPCS | Performed by: UROLOGY

## 2022-09-25 PROCEDURE — 83735 ASSAY OF MAGNESIUM: CPT | Performed by: INTERNAL MEDICINE

## 2022-09-25 PROCEDURE — 25010000002 ONDANSETRON PER 1 MG: Performed by: NURSE ANESTHETIST, CERTIFIED REGISTERED

## 2022-09-25 PROCEDURE — C1769 GUIDE WIRE: HCPCS | Performed by: UROLOGY

## 2022-09-25 PROCEDURE — 25010000002 HEPARIN (PORCINE) PER 1000 UNITS: Performed by: UROLOGY

## 2022-09-25 PROCEDURE — 25010000002 PROCHLORPERAZINE 10 MG/2ML SOLUTION: Performed by: INTERNAL MEDICINE

## 2022-09-25 PROCEDURE — 25010000002 MORPHINE PER 10 MG: Performed by: INTERNAL MEDICINE

## 2022-09-25 RX ORDER — DEXTROSE AND SODIUM CHLORIDE 5; .45 G/100ML; G/100ML
100 INJECTION, SOLUTION INTRAVENOUS CONTINUOUS
Status: DISCONTINUED | OUTPATIENT
Start: 2022-09-25 | End: 2022-09-26 | Stop reason: HOSPADM

## 2022-09-25 RX ORDER — ONDANSETRON 4 MG/1
4 TABLET, FILM COATED ORAL EVERY 6 HOURS PRN
Status: DISCONTINUED | OUTPATIENT
Start: 2022-09-25 | End: 2022-09-26 | Stop reason: HOSPADM

## 2022-09-25 RX ORDER — ONDANSETRON 2 MG/ML
INJECTION INTRAMUSCULAR; INTRAVENOUS AS NEEDED
Status: DISCONTINUED | OUTPATIENT
Start: 2022-09-25 | End: 2022-09-25 | Stop reason: SURG

## 2022-09-25 RX ORDER — FENTANYL CITRATE 50 UG/ML
INJECTION, SOLUTION INTRAMUSCULAR; INTRAVENOUS AS NEEDED
Status: DISCONTINUED | OUTPATIENT
Start: 2022-09-25 | End: 2022-09-25 | Stop reason: SURG

## 2022-09-25 RX ORDER — PROPOFOL 10 MG/ML
VIAL (ML) INTRAVENOUS AS NEEDED
Status: DISCONTINUED | OUTPATIENT
Start: 2022-09-25 | End: 2022-09-25 | Stop reason: SURG

## 2022-09-25 RX ORDER — DEXAMETHASONE SODIUM PHOSPHATE 4 MG/ML
INJECTION, SOLUTION INTRA-ARTICULAR; INTRALESIONAL; INTRAMUSCULAR; INTRAVENOUS; SOFT TISSUE AS NEEDED
Status: DISCONTINUED | OUTPATIENT
Start: 2022-09-25 | End: 2022-09-25 | Stop reason: SURG

## 2022-09-25 RX ORDER — ONDANSETRON 2 MG/ML
4 INJECTION INTRAMUSCULAR; INTRAVENOUS EVERY 6 HOURS PRN
Status: DISCONTINUED | OUTPATIENT
Start: 2022-09-25 | End: 2022-09-26 | Stop reason: HOSPADM

## 2022-09-25 RX ORDER — DIPHENHYDRAMINE HYDROCHLORIDE 50 MG/ML
INJECTION INTRAMUSCULAR; INTRAVENOUS AS NEEDED
Status: DISCONTINUED | OUTPATIENT
Start: 2022-09-25 | End: 2022-09-25 | Stop reason: SURG

## 2022-09-25 RX ORDER — PHENYLEPHRINE HYDROCHLORIDE 10 MG/ML
INJECTION INTRAVENOUS AS NEEDED
Status: DISCONTINUED | OUTPATIENT
Start: 2022-09-25 | End: 2022-09-25 | Stop reason: SURG

## 2022-09-25 RX ADMIN — ONDANSETRON 4 MG: 2 INJECTION INTRAMUSCULAR; INTRAVENOUS at 12:12

## 2022-09-25 RX ADMIN — MORPHINE SULFATE 2 MG: 2 INJECTION, SOLUTION INTRAMUSCULAR; INTRAVENOUS at 14:20

## 2022-09-25 RX ADMIN — OXCARBAZEPINE 150 MG: 150 TABLET, FILM COATED ORAL at 20:12

## 2022-09-25 RX ADMIN — AMITRIPTYLINE HYDROCHLORIDE 75 MG: 50 TABLET, FILM COATED ORAL at 00:09

## 2022-09-25 RX ADMIN — OXCARBAZEPINE 150 MG: 150 TABLET, FILM COATED ORAL at 00:09

## 2022-09-25 RX ADMIN — LOSARTAN POTASSIUM 50 MG: 50 TABLET, FILM COATED ORAL at 00:09

## 2022-09-25 RX ADMIN — ATORVASTATIN CALCIUM 40 MG: 40 TABLET, FILM COATED ORAL at 20:11

## 2022-09-25 RX ADMIN — CEFTRIAXONE SODIUM 1 G: 1 INJECTION, POWDER, FOR SOLUTION INTRAMUSCULAR; INTRAVENOUS at 20:16

## 2022-09-25 RX ADMIN — DIPHENHYDRAMINE HYDROCHLORIDE 12.5 MG: 50 INJECTION INTRAMUSCULAR; INTRAVENOUS at 11:57

## 2022-09-25 RX ADMIN — DEXAMETHASONE SODIUM PHOSPHATE 4 MG: 4 INJECTION, SOLUTION INTRAMUSCULAR; INTRAVENOUS at 11:57

## 2022-09-25 RX ADMIN — HEPARIN SODIUM 5000 UNITS: 5000 INJECTION INTRAVENOUS; SUBCUTANEOUS at 14:21

## 2022-09-25 RX ADMIN — PHENYLEPHRINE HYDROCHLORIDE 100 MCG: 10 INJECTION, SOLUTION INTRAVENOUS at 12:08

## 2022-09-25 RX ADMIN — ROPINIROLE 3 MG: 1 TABLET, FILM COATED ORAL at 20:11

## 2022-09-25 RX ADMIN — PROPOFOL 80 MG: 10 INJECTION, EMULSION INTRAVENOUS at 11:54

## 2022-09-25 RX ADMIN — FENTANYL CITRATE 50 MCG: 50 INJECTION INTRAMUSCULAR; INTRAVENOUS at 11:53

## 2022-09-25 RX ADMIN — PROCHLORPERAZINE EDISYLATE 10 MG: 5 INJECTION INTRAMUSCULAR; INTRAVENOUS at 00:10

## 2022-09-25 RX ADMIN — HEPARIN SODIUM 5000 UNITS: 5000 INJECTION INTRAVENOUS; SUBCUTANEOUS at 20:12

## 2022-09-25 RX ADMIN — MORPHINE SULFATE 2 MG: 2 INJECTION, SOLUTION INTRAMUSCULAR; INTRAVENOUS at 20:10

## 2022-09-25 RX ADMIN — AMITRIPTYLINE HYDROCHLORIDE 75 MG: 50 TABLET, FILM COATED ORAL at 20:11

## 2022-09-25 RX ADMIN — METOPROLOL SUCCINATE 100 MG: 50 TABLET, EXTENDED RELEASE ORAL at 14:20

## 2022-09-25 RX ADMIN — DEXTROSE AND SODIUM CHLORIDE 100 ML/HR: 5; 450 INJECTION, SOLUTION INTRAVENOUS at 14:20

## 2022-09-25 RX ADMIN — PROCHLORPERAZINE EDISYLATE 10 MG: 5 INJECTION INTRAMUSCULAR; INTRAVENOUS at 09:33

## 2022-09-25 RX ADMIN — MORPHINE SULFATE 2 MG: 2 INJECTION, SOLUTION INTRAMUSCULAR; INTRAVENOUS at 09:33

## 2022-09-25 RX ADMIN — SODIUM CHLORIDE 125 ML/HR: 9 INJECTION, SOLUTION INTRAVENOUS at 08:59

## 2022-09-25 RX ADMIN — MORPHINE SULFATE 2 MG: 2 INJECTION, SOLUTION INTRAMUSCULAR; INTRAVENOUS at 00:25

## 2022-09-25 RX ADMIN — PHENYLEPHRINE HYDROCHLORIDE 100 MCG: 10 INJECTION, SOLUTION INTRAVENOUS at 12:06

## 2022-09-25 NOTE — ANESTHESIA PREPROCEDURE EVALUATION
Anesthesia Evaluation     no history of anesthetic complications:  NPO Solid Status: > 8 hours  NPO Liquid Status: > 2 hours           Airway   Mallampati: II  TM distance: >3 FB  Neck ROM: full  no difficulty expected  Dental    (+) edentulous and upper dentures    Pulmonary - normal exam    breath sounds clear to auscultation  (+) a smoker (0.5 ppd) Current Abstained day of surgery, COPD, asthma,  (-) sleep apnea  Cardiovascular - normal exam  Exercise tolerance: good (4-7 METS)    ECG reviewed  Patient on routine beta blocker and Beta blocker given within 24 hours of surgery  Rhythm: regular  Rate: normal    (+) hypertension well controlled 2 medications or greater, valvular problems/murmurs murmur and AI, hyperlipidemia,   (-) past MI, dysrhythmias, angina, cardiac stents    ROS comment: 10/28/20  Normal sinus rhythm  Possible Left atrial enlargement  Rightward axis  Incomplete right bundle branch block  Septal infarct , age undetermined  Abnormal ECG  No previous ECGs available  Confirmed by DANNI    · Left ventricular ejection fraction appears to be 66 - 70%. Left ventricular systolic function is normal.  · Left ventricular diastolic function was normal.  · Moderate aortic valve regurgitation is present.  · Estimated right ventricular systolic pressure from tricuspid regurgitation is normal (<35 mmHg).        Neuro/Psych  (+) seizures well controlled, headaches, numbness, psychiatric history Anxiety and Depression,    (-) TIA, CVA, weakness    ROS Comment: Brain aneurysm 2003  GI/Hepatic/Renal/Endo    (+)  GERD well controlled,  hepatitis A, liver disease, renal disease stones, thyroid problem hypothyroidism  (-) diabetes    Musculoskeletal     (+) arthralgias,       ROS comment: Ganglion cyst right wrist  Abdominal    Substance History   (-) alcohol use, drug use     OB/GYN          Other   arthritis,    history of cancer                      Anesthesia Plan    ASA 3     general     intravenous induction      Anesthetic plan, risks, benefits, and alternatives have been provided, discussed and informed consent has been obtained with: patient.

## 2022-09-25 NOTE — ANESTHESIA PROCEDURE NOTES
Airway  Urgency: elective    Date/Time: 9/25/2022 11:56 AM  Airway not difficult    General Information and Staff    Patient location during procedure: OR  CRNA/CAA: Amari Springer CRNA    Indications and Patient Condition  Indications for airway management: airway protection    Preoxygenated: yes  MILS maintained throughout  Mask difficulty assessment: 0 - not attempted    Final Airway Details  Final airway type: supraglottic airway      Successful airway: I-gel  Size 4    Number of attempts at approach: 1  Assessment: lips, teeth, and gum same as pre-op

## 2022-09-25 NOTE — ANESTHESIA POSTPROCEDURE EVALUATION
Patient: Tricia Loyola    Procedure Summary     Date: 09/25/22 Room / Location: [facility] OR 02 / [facility] OR    Anesthesia Start: 1151 Anesthesia Stop: 1217    Procedure: CYSTOSCOPY, STONE REMOVAL (Left ) Diagnosis:       Ureteral stone      (Ureteral stone [N20.1])    Surgeons: Gladys, Leonardo MCKEON MD Provider: Amari Springer CRNA    Anesthesia Type: general ASA Status: 3          Anesthesia Type: general    Vitals  No vitals data found for the desired time range.          Post Anesthesia Care and Evaluation    Patient location during evaluation: PACU  Patient participation: waiting for patient participation  Level of consciousness: awake and alert  Pain management: adequate    Airway patency: patent  Anesthetic complications: No anesthetic complications    Cardiovascular status: acceptable  Respiratory status: acceptable  Hydration status: acceptable    Comments: ---------------------------               09/25/22                      0900         ---------------------------   BP:          97/54        Pulse:         67           Resp:          16           Temp:   97.7 °F (36.5 °C)   SpO2:          98%         ---------------------------

## 2022-09-26 ENCOUNTER — READMISSION MANAGEMENT (OUTPATIENT)
Dept: CALL CENTER | Facility: HOSPITAL | Age: 62
End: 2022-09-26

## 2022-09-26 VITALS
TEMPERATURE: 97 F | DIASTOLIC BLOOD PRESSURE: 63 MMHG | RESPIRATION RATE: 20 BRPM | WEIGHT: 112.5 LBS | SYSTOLIC BLOOD PRESSURE: 132 MMHG | OXYGEN SATURATION: 98 % | BODY MASS INDEX: 20.58 KG/M2 | HEART RATE: 67 BPM

## 2022-09-26 LAB
ANION GAP SERPL CALCULATED.3IONS-SCNC: 8 MMOL/L (ref 5–15)
BASOPHILS # BLD AUTO: 0.03 10*3/MM3 (ref 0–0.2)
BASOPHILS NFR BLD AUTO: 0.4 % (ref 0–1.5)
BUN SERPL-MCNC: 11 MG/DL (ref 8–23)
BUN/CREAT SERPL: 14.5 (ref 7–25)
CALCIUM SPEC-SCNC: 8 MG/DL (ref 8.6–10.5)
CHLORIDE SERPL-SCNC: 109 MMOL/L (ref 98–107)
CO2 SERPL-SCNC: 21 MMOL/L (ref 22–29)
CREAT SERPL-MCNC: 0.76 MG/DL (ref 0.57–1)
DEPRECATED RDW RBC AUTO: 47.1 FL (ref 37–54)
EGFRCR SERPLBLD CKD-EPI 2021: 87.6 ML/MIN/1.73
EOSINOPHIL # BLD AUTO: 0.06 10*3/MM3 (ref 0–0.4)
EOSINOPHIL NFR BLD AUTO: 0.8 % (ref 0.3–6.2)
ERYTHROCYTE [DISTWIDTH] IN BLOOD BY AUTOMATED COUNT: 13.7 % (ref 12.3–15.4)
GLUCOSE SERPL-MCNC: 137 MG/DL (ref 65–99)
HCT VFR BLD AUTO: 30.6 % (ref 34–46.6)
HGB BLD-MCNC: 10.4 G/DL (ref 12–15.9)
IMM GRANULOCYTES # BLD AUTO: 0.03 10*3/MM3 (ref 0–0.05)
IMM GRANULOCYTES NFR BLD AUTO: 0.4 % (ref 0–0.5)
LYMPHOCYTES # BLD AUTO: 3.72 10*3/MM3 (ref 0.7–3.1)
LYMPHOCYTES NFR BLD AUTO: 46.7 % (ref 19.6–45.3)
MAGNESIUM SERPL-MCNC: 1.6 MG/DL (ref 1.6–2.4)
MCH RBC QN AUTO: 32.2 PG (ref 26.6–33)
MCHC RBC AUTO-ENTMCNC: 34 G/DL (ref 31.5–35.7)
MCV RBC AUTO: 94.7 FL (ref 79–97)
MONOCYTES # BLD AUTO: 0.73 10*3/MM3 (ref 0.1–0.9)
MONOCYTES NFR BLD AUTO: 9.2 % (ref 5–12)
NEUTROPHILS NFR BLD AUTO: 3.4 10*3/MM3 (ref 1.7–7)
NEUTROPHILS NFR BLD AUTO: 42.5 % (ref 42.7–76)
NRBC BLD AUTO-RTO: 0 /100 WBC (ref 0–0.2)
PLATELET # BLD AUTO: 228 10*3/MM3 (ref 140–450)
PMV BLD AUTO: 10 FL (ref 6–12)
POTASSIUM SERPL-SCNC: 3.6 MMOL/L (ref 3.5–5.2)
RBC # BLD AUTO: 3.23 10*6/MM3 (ref 3.77–5.28)
SODIUM SERPL-SCNC: 138 MMOL/L (ref 136–145)
WBC NRBC COR # BLD: 7.97 10*3/MM3 (ref 3.4–10.8)

## 2022-09-26 PROCEDURE — A9270 NON-COVERED ITEM OR SERVICE: HCPCS | Performed by: UROLOGY

## 2022-09-26 PROCEDURE — 63710000001 ONDANSETRON PER 8 MG: Performed by: UROLOGY

## 2022-09-26 PROCEDURE — G0378 HOSPITAL OBSERVATION PER HR: HCPCS

## 2022-09-26 PROCEDURE — 63710000001 PANTOPRAZOLE 40 MG TABLET DELAYED-RELEASE: Performed by: UROLOGY

## 2022-09-26 PROCEDURE — 80048 BASIC METABOLIC PNL TOTAL CA: CPT | Performed by: UROLOGY

## 2022-09-26 PROCEDURE — 85025 COMPLETE CBC W/AUTO DIFF WBC: CPT | Performed by: UROLOGY

## 2022-09-26 PROCEDURE — 83735 ASSAY OF MAGNESIUM: CPT | Performed by: UROLOGY

## 2022-09-26 PROCEDURE — 63710000001 VITAMIN C 500 MG TABLET: Performed by: UROLOGY

## 2022-09-26 PROCEDURE — 25010000002 MAGNESIUM SULFATE IN D5W 1G/100ML (PREMIX) 1-5 GM/100ML-% SOLUTION: Performed by: NURSE PRACTITIONER

## 2022-09-26 PROCEDURE — A9270 NON-COVERED ITEM OR SERVICE: HCPCS | Performed by: NURSE PRACTITIONER

## 2022-09-26 PROCEDURE — 63710000001: Performed by: UROLOGY

## 2022-09-26 PROCEDURE — 63710000001 HYDROCODONE-ACETAMINOPHEN 10-325 MG TABLET: Performed by: NURSE PRACTITIONER

## 2022-09-26 PROCEDURE — 25010000002 HEPARIN (PORCINE) PER 1000 UNITS: Performed by: UROLOGY

## 2022-09-26 PROCEDURE — 63710000001 TOPIRAMATE 100 MG TABLET: Performed by: UROLOGY

## 2022-09-26 PROCEDURE — 63710000001 ZINC SULFATE 220 (50 ZN) MG CAPSULE: Performed by: UROLOGY

## 2022-09-26 PROCEDURE — 63710000001 FOLIC ACID 1 MG TABLET: Performed by: UROLOGY

## 2022-09-26 PROCEDURE — 63710000001 CHOLECALCIFEROL 25 MCG (1000 UT) TABLET: Performed by: UROLOGY

## 2022-09-26 PROCEDURE — 25010000002 MORPHINE PER 10 MG: Performed by: UROLOGY

## 2022-09-26 PROCEDURE — 63710000001 BUSPIRONE 15 MG TABLET: Performed by: UROLOGY

## 2022-09-26 RX ORDER — MAGNESIUM SULFATE 1 G/100ML
1 INJECTION INTRAVENOUS ONCE
Status: COMPLETED | OUTPATIENT
Start: 2022-09-26 | End: 2022-09-26

## 2022-09-26 RX ORDER — TRAMADOL HYDROCHLORIDE 50 MG/1
50 TABLET ORAL EVERY 4 HOURS PRN
Status: DISCONTINUED | OUTPATIENT
Start: 2022-09-26 | End: 2022-09-26

## 2022-09-26 RX ORDER — HYDROCODONE BITARTRATE AND ACETAMINOPHEN 10; 325 MG/1; MG/1
1 TABLET ORAL ONCE AS NEEDED
Status: COMPLETED | OUTPATIENT
Start: 2022-09-26 | End: 2022-09-26

## 2022-09-26 RX ORDER — ACETAMINOPHEN 325 MG/1
650 TABLET ORAL EVERY 4 HOURS PRN
Start: 2022-09-26

## 2022-09-26 RX ADMIN — ONDANSETRON HYDROCHLORIDE 4 MG: 4 TABLET, FILM COATED ORAL at 12:28

## 2022-09-26 RX ADMIN — BUSPIRONE HYDROCHLORIDE 30 MG: 15 TABLET ORAL at 08:37

## 2022-09-26 RX ADMIN — PANTOPRAZOLE SODIUM 40 MG: 40 TABLET, DELAYED RELEASE ORAL at 05:11

## 2022-09-26 RX ADMIN — MAGNESIUM SULFATE HEPTAHYDRATE 1 G: 1 INJECTION, SOLUTION INTRAVENOUS at 09:57

## 2022-09-26 RX ADMIN — HYDROCODONE BITARTRATE AND ACETAMINOPHEN 1 TABLET: 10; 325 TABLET ORAL at 14:03

## 2022-09-26 RX ADMIN — Medication 1000 UNITS: at 08:37

## 2022-09-26 RX ADMIN — Medication 12.5 MCG: at 08:37

## 2022-09-26 RX ADMIN — ZINC SULFATE 220 MG (50 MG) CAPSULE 220 MG: CAPSULE at 08:37

## 2022-09-26 RX ADMIN — TOPIRAMATE 100 MG: 100 TABLET, FILM COATED ORAL at 08:37

## 2022-09-26 RX ADMIN — FOLIC ACID 1 MG: 1 TABLET ORAL at 08:37

## 2022-09-26 RX ADMIN — OXYCODONE HYDROCHLORIDE AND ACETAMINOPHEN 1000 MG: 500 TABLET ORAL at 08:38

## 2022-09-26 RX ADMIN — MORPHINE SULFATE 2 MG: 2 INJECTION, SOLUTION INTRAMUSCULAR; INTRAVENOUS at 09:53

## 2022-09-26 NOTE — OUTREACH NOTE
Prep Survey    Flowsheet Row Responses   [facility] patient discharged from? [place]   Is LACE score < 7 ? Yes   Emergency Room discharge w/ pulse ox? No   Eligibility [facility]   Date of Admission 09/24/22   Date of Discharge 09/26/22   Discharge Disposition Home or Self Care   Discharge diagnosis CYSTOSCOPY, STONE REMOVAL   Does the patient have one of the following disease processes/diagnoses(primary or secondary)? General Surgery   Does the patient have Home health ordered? No   Is there a DME ordered? No   Prep survey completed? Yes          LUISA IGNACIO - Registered Nurse

## 2022-09-27 ENCOUNTER — TRANSITIONAL CARE MANAGEMENT TELEPHONE ENCOUNTER (OUTPATIENT)
Dept: CALL CENTER | Facility: HOSPITAL | Age: 62
End: 2022-09-27

## 2022-09-27 ENCOUNTER — NURSE TRIAGE (OUTPATIENT)
Dept: CALL CENTER | Facility: HOSPITAL | Age: 62
End: 2022-09-27

## 2022-09-27 NOTE — TELEPHONE ENCOUNTER
Caller has a hx of tachycardia and is on metoprolol, she just took it.  She was sitting at her table and felt that her HR was up and it was 120 it is now 105.  Doesn't feel like it is irregular.  She was just discharged yesterday and thinks she may have over done it this am.  She denies CP.  She will continue to monitor and if it gets to 120 and stays or she has CP she will go in for evaluation.  If it stays higher than normal she will contact her cardiologist.  She is also COVID positive but denies SOA.  Reason for Disposition  • Palpitations    Additional Information  • Negative: Passed out (i.e., lost consciousness, collapsed and was not responding)  • Negative: Shock suspected (e.g., cold/pale/clammy skin, too weak to stand, low BP, rapid pulse)  • Negative: Difficult to awaken or acting confused (e.g., disoriented, slurred speech)  • Negative: Visible sweat on face or sweat dripping down face  • Negative: Unable to walk, or can only walk with assistance (e.g., requires support)  • Negative: [1] Received SHOCK from implantable cardiac defibrillator AND [2] persisting symptoms (i.e., palpitations, lightheadedness)  • Negative: [1] Dizziness, lightheadedness, or weakness AND [2] heart beating very rapidly (e.g., > 140 / minute)  • Negative: [1] Dizziness, lightheadedness, or weakness AND [2] heart beating very slowly  (e.g., < 50 / minute)  • Negative: Sounds like a life-threatening emergency to the triager  • Negative: Chest pain  • Negative: Implantable Cardiac Defibrillator (ICD) or a pacemaker symptoms or questions  • Negative: Difficulty breathing  • Negative: Dizziness, lightheadedness, or weakness  • Negative: [1] Heart beating very rapidly (e.g., > 140 / minute) AND [2] present now  (Exception: during exercise)  • Negative: Heart beating very slowly (e.g., < 50 / minute)  (Exception: athlete and heart rate normal for caller)  • Negative: New or worsened shortness of breath with activity (dyspnea on  "exertion)  • Negative: Patient sounds very sick or weak to the triager  • Negative: [1] Heart beating very rapidly (e.g., > 140 / minute) AND [2] not present now  (Exception: during exercise)  • Negative: [1] Skipped or extra beat(s) AND [2] increases with exercise or exertion  • Negative: [1] Skipped or extra beat(s) AND [2] occurs 4 or more times per minute  • Negative: New or worsened ankle swelling  • Negative: History of heart disease  (i.e., heart attack, bypass surgery, angina, angioplasty, CHF) (Exception: brief heartbeat symptoms that went away and now feels well)  • Negative: Age > 60 years (Exception: brief heartbeat symptoms that went away and now feels well)  • Negative: Taking water pill (i.e., diuretic) or heart medication (e.g., digoxin)  • Negative: Wearing a \"Holter monitor\" or \"cardiac event monitor\"  • Negative: [1] Received SHOCK from implantable cardiac defibrillator AND [2] now feels well  • Negative: Heart rhythm alert (e.g., \"you have irregular heartbeat\") from personal wearable device (e.g., Apple Watch)  • Negative: History of hyperthyroidism or taking thyroid medication  • Negative: Substance use (drug use) or misuse, known or suspected  • Negative: [1] ADHD AND [2] taking stimulant medication  • Negative: [1] Palpitations AND [2] no improvement after using CARE ADVICE  • Negative: Problems with anxiety or stress  • Negative: Palpitations are a chronic symptom (recurrent or ongoing AND present > 4 weeks)  • Negative: [1] Skipped or extra beat(s) AND [2] occurs < 4 times / minute    Answer Assessment - Initial Assessment Questions  1. DESCRIPTION: \"Please describe your heart rate or heartbeat that you are having\" (e.g., fast/slow, regular/irregular, skipped or extra beats, \"palpitations\")      Fast regular  2. ONSET: \"When did it start?\" (Minutes, hours or days)       This am  3. DURATION: \"How long does it last\" (e.g., seconds, minutes, hours)      Was 120 now 105  4. PATTERN \"Does it " "come and go, or has it been constant since it started?\"  \"Does it get worse with exertion?\"   \"Are you feeling it now?\"      Was constant  5. TAP: \"Using your hand, can you tap out what you are feeling on a chair or table in front of you, so that I can hear?\" (Note: not all patients can do this)        na  6. HEART RATE: \"Can you tell me your heart rate?\" \"How many beats in 15 seconds?\"  (Note: not all patients can do this)        105 now  7. RECURRENT SYMPTOM: \"Have you ever had this before?\" If Yes, ask: \"When was the last time?\" and \"What happened that time?\"       Yes am on metoprolol  8. CAUSE: \"What do you think is causing the palpitations?\"      Not sure  9. CARDIAC HISTORY: \"Do you have any history of heart disease?\" (e.g., heart attack, angina, bypass surgery, angioplasty, arrhythmia)       yes  10. OTHER SYMPTOMS: \"Do you have any other symptoms?\" (e.g., dizziness, chest pain, sweating, difficulty breathing)        No but am also covid positive  11. PREGNANCY: \"Is there any chance you are pregnant?\" \"When was your last menstrual period?\"        na    Protocols used: HEART RATE AND HEARTBEAT QUESTIONS-ADULT-AH    "

## 2022-09-27 NOTE — OUTREACH NOTE
Call Center TCM Note    Flowsheet Row Responses   [facility] patient discharged from? [place]   Does the patient have one of the following disease processes/diagnoses(primary or secondary)? General Surgery   TCM attempt successful? Yes   Call start time 1524   Call end time 1527   Discharge diagnosis CYSTOSCOPY, STONE REMOVAL   Meds reviewed with patient/caregiver? Yes   Is the patient having any side effects they believe may be caused by any medication additions or changes? No   Does the patient have all medications related to this admission filled (includes all antibiotics, pain medications, etc.) Yes   Is the patient taking all medications as directed (includes completed medication regime)? Yes   Comments 10/3/22 hosp dc fu apt with PCP    Has home health visited the patient within 72 hours of discharge? N/A   Psychosocial issues? No   Did the patient receive a copy of their discharge instructions? Yes   Nursing interventions Reviewed instructions with patient   What is the patient's perception of their health status since discharge? Improving   Nursing interventions Nurse provided patient education   Is the patient /caregiver able to teach back basic post-op care? Practice 'cough and deep breath', Take showers only when approved by MD-sponge bathrigo until then, Drive as instructed by MD in discharge instructions  [no incisional site ]   Is the patient/caregiver able to teach back signs and symptoms of incisional infection? --  [no incisional site/no S/S of infection per pt ]   Is the patient/caregiver able to teach back steps to recovery at home? Set small, achievable goals for return to baseline health, Rest and rebuild strength, gradually increase activity, Eat a well-balance diet   If the patient is a current smoker, are they able to teach back resources for cessation? [id number]   Is the patient/caregiver able to teach back the hierarchy of who to call/visit for symptoms/problems? PCP,  Specialist, Home health nurse, Urgent Care, ED, 911 Yes   TCM call completed? Yes   Wrap up additional comments BP and HR back to normal           Paige Flores RN    9/27/2022, 15:27 CDT

## 2022-09-27 NOTE — OUTREACH NOTE
Call Center TCM Note    Flowsheet Row Responses   [facility] patient discharged from? [place]   Does the patient have one of the following disease processes/diagnoses(primary or secondary)? General Surgery   TCM attempt successful? No   Unsuccessful attempts Attempt 1  [No updated verbal release on file from PCP,  pt attempted-no answer-message left ]   Call Status Left message          Paige Flores RN    9/27/2022, 14:08 CDT

## 2022-09-29 LAB — BACTERIA SPEC AEROBE CULT: NORMAL

## 2022-10-01 LAB
COLOR STONE: NORMAL
COM MFR STONE: 20 %
COMPN STONE: NORMAL
HYDROXYAPATITE 24H ENGDIFF UR: 80 %
LABORATORY COMMENT REPORT: NORMAL
Lab: NORMAL
Lab: NORMAL
PHOTO: NORMAL
SIZE STONE: NORMAL MM
SPEC SOURCE SUBJ: NORMAL
WT STONE: 52 MG

## 2022-10-03 ENCOUNTER — OFFICE VISIT (OUTPATIENT)
Dept: FAMILY MEDICINE CLINIC | Facility: CLINIC | Age: 62
End: 2022-10-03

## 2022-10-03 VITALS — HEIGHT: 62 IN | WEIGHT: 103 LBS | BODY MASS INDEX: 18.95 KG/M2

## 2022-10-03 DIAGNOSIS — Z09 HOSPITAL DISCHARGE FOLLOW-UP: Primary | ICD-10-CM

## 2022-10-03 DIAGNOSIS — N20.1 URETERAL STONE: ICD-10-CM

## 2022-10-03 DIAGNOSIS — J01.40 ACUTE NON-RECURRENT PANSINUSITIS: ICD-10-CM

## 2022-10-03 DIAGNOSIS — R42 DIZZINESS: ICD-10-CM

## 2022-10-03 DIAGNOSIS — R53.1 WEAKNESS: ICD-10-CM

## 2022-10-03 DIAGNOSIS — N13.9 OBSTRUCTIVE UROPATHY: ICD-10-CM

## 2022-10-03 PROCEDURE — 99442 PR PHYS/QHP TELEPHONE EVALUATION 11-20 MIN: CPT | Performed by: NURSE PRACTITIONER

## 2022-10-03 RX ORDER — METHYLPREDNISOLONE 4 MG/1
TABLET ORAL
Qty: 21 TABLET | Refills: 0 | Status: SHIPPED | OUTPATIENT
Start: 2022-10-03 | End: 2022-12-07

## 2022-10-03 RX ORDER — AZITHROMYCIN 250 MG/1
TABLET, FILM COATED ORAL
Qty: 6 TABLET | Refills: 0 | Status: SHIPPED | OUTPATIENT
Start: 2022-10-03 | End: 2022-12-12

## 2022-10-03 NOTE — PROGRESS NOTES
CC: Hospital Follow Up Visit (Pt presented to [facility] ED with c/o left flank pain and nausea. Pt admitted for obstructive left ureteral stone. She underwent cystoscopy with J stent placement on 9/25/22 with urology and was stable post procedure.  She received IV Rocephin for UTI coverage during her stay and continues on cefdinir at discharge. )      Subjective:  Tricia Loyola is a 62 y.o. female who presents for       This is a telephone visit due to pandemic.  Patient is at her home and I am in my office.  Patient presented to [facility]  emergency department with complaint of left flank pain and nausea on 9/24/2022.  She was admitted for obstructive left ureteral stone. It was 5.2 mm. Patient underwent cystoscopy on 9/25/2022 with J stent placement.  She received IV Rocephin for UTI coverage during her hospital stay. Hospital record states she was sent home on Cefdinir. Pt states she was only sent home on flomax.  She was discharged from the hospital on 9/26/2022.  She was to follow-up with Dr. Graham in 3 days and with me in 1 week. States has appointment with Dr. Graham on October 6th. States is feeling much better. Does still have some weakness and dizziness after having Covid at the same time she had the ureteral stone. States she thinks she has developed a sinus infection. Sinuses are tender to touch and blowing thick yellow from her nose. She feels pressure in her sinuses.      The following portions of the patient's history were reviewed and updated as appropriate: allergies, current medications, past family history, past medical history, past social history, past surgical history and problem list.    Past Medical History:   Diagnosis Date   • Abdominal pain    • Anemia    • Anxiety and depression    • Chronic post-traumatic headache      rebound      • Depressive disorder    • Disease of thyroid gland    • Encounter for gynecological examination    • Endometriosis    • Gastroesophageal reflux  disease    • Generalized anxiety disorder    • History of mammogram 08/2008    MAMMOGRAM DIAGNOSTIC BILATERAL 16431 ([facility]) (1)     • Hyperlipidemia    • Hypertension    • Ingrown toenail    • Injury of back    • Leaky heart valve    • Leaky heart valve    • Migraine    • Nonruptured cerebral aneurysm    • Osteoporosis    • Posttraumatic stress disorder    • Primary osteoarthritis of left knee    • Primary osteoarthritis of right knee    • PTSD (post-traumatic stress disorder)    • Seizure (HCC)     last seizure 2009.  no meds at this time   • Skin cancer     basal cell on back   • Tachycardia    • Viral hepatitis A    • Wears dentures     uppers only   • Wears glasses          Current Outpatient Medications:   •  acetaminophen (TYLENOL) 325 MG tablet, Take 2 tablets by mouth Every 4 (Four) Hours As Needed for Mild Pain., Disp: , Rfl:   •  albuterol (ACCUNEB) 1.25 MG/3ML nebulizer solution, Take 3 mL by nebulization Every 4 (Four) Hours As Needed for Wheezing or Shortness of Air., Disp: 100 each, Rfl: 5  •  albuterol sulfate  (90 Base) MCG/ACT inhaler, Inhale 2 puffs Every 4 (Four) Hours As Needed for Wheezing., Disp: 18 g, Rfl: 5  •  amitriptyline (ELAVIL) 75 MG tablet, Take 1 tablet by mouth Every Night. Resumed 6/10/22., Disp: 30 tablet, Rfl: 5  •  aspirin 81 MG EC tablet, Take 1 tablet by mouth Daily., Disp: 30 tablet, Rfl: 0  •  atorvastatin (LIPITOR) 40 MG tablet, Take 1 tablet by mouth Every Night. For cholesterol, Disp: 90 tablet, Rfl: 1  •  Botox 200 units reconstituted solution, Inject 200 Units into the appropriate muscle as directed by prescriber Every 3 (Three) Months., Disp: , Rfl:   •  brompheniramine-pseudoephedrine-DM 30-2-10 MG/5ML syrup, Take 5 mL by mouth 4 (Four) Times a Day As Needed for Congestion or Cough., Disp: 473 mL, Rfl: 0  •  busPIRone (BUSPAR) 30 MG tablet, Take 1 tablet by mouth 2 (Two) Times a Day., Disp: 60 tablet, Rfl: 0  •  Cyanocobalamin 1000 MCG/ML kit, Inject 1,000 mL as  "directed Every 30 (Thirty) Days., Disp: 1 kit, Rfl: 5  •  dexlansoprazole (DEXILANT) 60 MG capsule, Take 1 capsule by mouth Daily., Disp: 90 capsule, Rfl: 1  •  diphenoxylate-atropine (Lomotil) 2.5-0.025 MG per tablet, Take 1 tablet by mouth 4 (Four) Times a Day As Needed for Diarrhea., Disp: 12 tablet, Rfl: 0  •  famotidine (PEPCID) 40 MG tablet, Take 1 tablet by mouth Daily., Disp: 30 tablet, Rfl: 3  •  fluticasone (FLONASE) 50 MCG/ACT nasal spray, 2 sprays into the nostril(s) as directed by provider Daily As Needed for Rhinitis., Disp: , Rfl:   •  folic acid (FOLVITE) 800 MCG tablet, Take 1 tablet by mouth Daily., Disp: 90 tablet, Rfl: 1  •  HYDROcodone-acetaminophen (NORCO)  MG per tablet, Take 1 tablet by mouth Every 6 (Six) Hours As Needed for Moderate Pain., Disp: 120 tablet, Rfl: 0  •  hydrOXYzine pamoate (Vistaril) 25 MG capsule, Take 1 capsule by mouth 3 (Three) Times a Day As Needed for Anxiety., Disp: 90 capsule, Rfl: 5  •  levothyroxine (SYNTHROID, LEVOTHROID) 25 MCG tablet, Take 0.5 tablets by mouth Daily., Disp: 15 tablet, Rfl: 5  •  lidocaine (LIDODERM) 5 %, , Disp: , Rfl:   •  Linzess 290 MCG capsule capsule, Take 1 capsule by mouth Every Morning Before Breakfast., Disp: 30 capsule, Rfl: 1  •  metoprolol succinate XL (TOPROL-XL) 100 MG 24 hr tablet, Take 1 tablet by mouth Daily. Takes at 3 pm, Disp: 30 tablet, Rfl: 6  •  ondansetron ODT (ZOFRAN-ODT) 4 MG disintegrating tablet, Place 1 tablet on the tongue Every 8 (Eight) Hours As Needed for Nausea., Disp: 30 tablet, Rfl: 5  •  OXcarbazepine (TRILEPTAL) 150 MG tablet, Take 150 mg by mouth Every Night. Daily at bedtime, Disp: , Rfl:   •  prochlorperazine (COMPAZINE) 10 MG tablet, Take 1 tablet by mouth Every 6 (Six) Hours As Needed for Nausea or Vomiting., Disp: 360 tablet, Rfl: 1  •  rOPINIRole (REQUIP) 3 MG tablet, TAKE 1 TABLET BY MOUTH EVERY NIGHT AT BEDTIME, Disp: 90 tablet, Rfl: 1  •  Syringe/Needle, Disp, 25G X 5/8\" 3 ML misc, 1 each " "Every 28 (Twenty-Eight) Days., Disp: 3 each, Rfl: 1  •  tamsulosin (FLOMAX) 0.4 MG capsule 24 hr capsule, Take 1 capsule by mouth Daily., Disp: 30 capsule, Rfl: 0  •  topiramate (TOPAMAX) 100 MG tablet, Take 1 tablet by mouth 2 (Two) Times a Day., Disp: 180 tablet, Rfl: 1  •  witch hazel-glycerin (TUCKS) pad, Insert  into the rectum As Needed for Hemorrhoids., Disp: 40 each, Rfl: 1  •  azithromycin (Zithromax Z-Christophe) 250 MG tablet, Take 2 tablets the first day, then 1 tablet daily for 4 days., Disp: 6 tablet, Rfl: 0  •  methylPREDNISolone (MEDROL) 4 MG dose pack, Take as directed on package instructions., Disp: 21 tablet, Rfl: 0    Review of Systems    Review of Systems   HENT: Negative.    Eyes: Negative.    Respiratory: Negative.    Cardiovascular: Negative.    Gastrointestinal: Negative.    Endocrine: Negative.    Genitourinary: Negative.    Musculoskeletal: Negative.    Skin: Negative.    Allergic/Immunologic: Negative.    Neurological: Positive for dizziness and weakness.   Hematological: Negative.    Psychiatric/Behavioral: Negative.    All other systems reviewed and are negative.      Objective  Vitals:    10/03/22 1006   Weight: 46.7 kg (103 lb)   Height: 157.5 cm (62\")     Body mass index is 18.84 kg/m².    Physical Exam    Physical Exam  Deferred, telephone visit because of pandemic      Diagnoses and all orders for this visit:    1. Hospital discharge follow-up (Primary)    2. Obstructive uropathy  Comments:  Resovled after cystoscopy    3. Ureteral stone  Comments:  Resolved after cystoscopy    4. Weakness    5. Dizziness    6. Acute non-recurrent pansinusitis  -     azithromycin (Zithromax Z-Christophe) 250 MG tablet; Take 2 tablets the first day, then 1 tablet daily for 4 days.  Dispense: 6 tablet; Refill: 0  -     methylPREDNISolone (MEDROL) 4 MG dose pack; Take as directed on package instructions.  Dispense: 21 tablet; Refill: 0         Telephone visit for hospital discharge follow-up.  Patient had " obstructive uropathy with a ureteral stone measuring 5.2 mm.  Patient underwent cystoscopy while in the hospital and a JJ stent was placed.  She has follow-up with Dr. Graham on October 6.  Patient advised to keep this appointment.  She still having some weakness and dizziness after having COVID.  She feels she has developed a sinus infection so we will treat this with Zithromax and Medrol Dosepak.  Patient instructed on how to take these medications and possible side effects.  She is advised to increase fluids.  She may take ibuprofen if needed.  Answered all questions.  Patient verbalized understanding of plan of care.        This document has been electronically signed by UMESH Bustos on October 3, 2022 11:30 CDT     You have chosen to receive care through a telephone visit. Do you consent to use a telephone visit for your medical care today? Yes  This visit has been rescheduled as a phone visit to comply with patient safety concerns in accordance with CDC recommendations. Total time of discussion was 13 minutes.

## 2022-10-03 NOTE — PATIENT INSTRUCTIONS
Sinusitis, Adult  Sinusitis is soreness and swelling (inflammation) of your sinuses. Sinuses are hollow spaces in the bones around your face. They are located:  Around your eyes.  In the middle of your forehead.  Behind your nose.  In your cheekbones.  Your sinuses and nasal passages are lined with a fluid called mucus. Mucus drains out of your sinuses. Swelling can trap mucus in your sinuses. This lets germs (bacteria, virus, or fungus) grow, which leads to infection. Most of the time, this condition is caused by a virus.  What are the causes?  This condition is caused by:  Allergies.  Asthma.  Germs.  Things that block your nose or sinuses.  Growths in the nose (nasal polyps).  Chemicals or irritants in the air.  Fungus (rare).  What increases the risk?  You are more likely to develop this condition if:  You have a weak body defense system (immune system).  You do a lot of swimming or diving.  You use nasal sprays too much.  You smoke.  What are the signs or symptoms?  The main symptoms of this condition are pain and a feeling of pressure around the sinuses. Other symptoms include:  Stuffy nose (congestion).  Runny nose (drainage).  Swelling and warmth in the sinuses.  Headache.  Toothache.  A cough that may get worse at night.  Mucus that collects in the throat or the back of the nose (postnasal drip).  Being unable to smell and taste.  Being very tired (fatigue).  A fever.  Sore throat.  Bad breath.  How is this diagnosed?  This condition is diagnosed based on:  Your symptoms.  Your medical history.  A physical exam.  Tests to find out if your condition is short-term (acute) or long-term (chronic). Your doctor may:  Check your nose for growths (polyps).  Check your sinuses using a tool that has a light (endoscope).  Check for allergies or germs.  Do imaging tests, such as an MRI or CT scan.  How is this treated?  Treatment for this condition depends on the cause and whether it is short-term or long-term.  If  caused by a virus, your symptoms should go away on their own within 10 days. You may be given medicines to relieve symptoms. They include:  Medicines that shrink swollen tissue in the nose.  Medicines that treat allergies (antihistamines).  A spray that treats swelling of the nostrils.   Rinses that help get rid of thick mucus in your nose (nasal saline washes).  If caused by bacteria, your doctor may wait to see if you will get better without treatment. You may be given antibiotic medicine if you have:  A very bad infection.  A weak body defense system.  If caused by growths in the nose, you may need to have surgery.  Follow these instructions at home:  Medicines  Take, use, or apply over-the-counter and prescription medicines only as told by your doctor. These may include nasal sprays.  If you were prescribed an antibiotic medicine, take it as told by your doctor. Do not stop taking the antibiotic even if you start to feel better.  Hydrate and humidify    Drink enough water to keep your pee (urine) pale yellow.  Use a cool mist humidifier to keep the humidity level in your home above 50%.  Breathe in steam for 10-15 minutes, 3-4 times a day, or as told by your doctor. You can do this in the bathroom while a hot shower is running.  Try not to spend time in cool or dry air.  Rest  Rest as much as you can.  Sleep with your head raised (elevated).  Make sure you get enough sleep each night.  General instructions    Put a warm, moist washcloth on your face 3-4 times a day, or as often as told by your doctor. This will help with discomfort.  Wash your hands often with soap and water. If there is no soap and water, use hand .  Do not smoke. Avoid being around people who are smoking (secondhand smoke).  Keep all follow-up visits as told by your doctor. This is important.  Contact a doctor if:  You have a fever.  Your symptoms get worse.  Your symptoms do not get better within 10 days.  Get help right away  if:  You have a very bad headache.  You cannot stop throwing up (vomiting).  You have very bad pain or swelling around your face or eyes.  You have trouble seeing.  You feel confused.  Your neck is stiff.  You have trouble breathing.  Summary  Sinusitis is swelling of your sinuses. Sinuses are hollow spaces in the bones around your face.  This condition is caused by tissues in your nose that become inflamed or swollen. This traps germs. These can lead to infection.  If you were prescribed an antibiotic medicine, take it as told by your doctor. Do not stop taking it even if you start to feel better.  Keep all follow-up visits as told by your doctor. This is important.  This information is not intended to replace advice given to you by your health care provider. Make sure you discuss any questions you have with your health care provider.  Document Revised: 05/20/2019 Document Reviewed: 05/20/2019  Elsevier Patient Education © 2022 [employer]

## 2022-10-07 DIAGNOSIS — G89.29 CHRONIC MIDLINE LOW BACK PAIN WITHOUT SCIATICA: ICD-10-CM

## 2022-10-07 DIAGNOSIS — M54.50 CHRONIC MIDLINE LOW BACK PAIN WITHOUT SCIATICA: ICD-10-CM

## 2022-10-07 RX ORDER — HYDROCODONE BITARTRATE AND ACETAMINOPHEN 10; 325 MG/1; MG/1
1 TABLET ORAL EVERY 6 HOURS PRN
Qty: 120 TABLET | Refills: 0 | Status: SHIPPED | OUTPATIENT
Start: 2022-10-07 | End: 2022-11-07 | Stop reason: SDUPTHER

## 2022-10-07 NOTE — TELEPHONE ENCOUNTER
Needs refill on HYDROcodone-acetaminophen (NORCO)  MG, done Monday to [facility] in [place].

## 2022-10-09 DIAGNOSIS — M54.50 CHRONIC MIDLINE LOW BACK PAIN WITHOUT SCIATICA: ICD-10-CM

## 2022-10-09 DIAGNOSIS — G89.29 CHRONIC MIDLINE LOW BACK PAIN WITHOUT SCIATICA: ICD-10-CM

## 2022-10-10 RX ORDER — CYANOCOBALAMIN 1000 UG/ML
INJECTION, SOLUTION INTRAMUSCULAR; SUBCUTANEOUS
Qty: 1 ML | Refills: 2 | OUTPATIENT
Start: 2022-10-10

## 2022-10-10 RX ORDER — HYDROCODONE BITARTRATE AND ACETAMINOPHEN 10; 325 MG/1; MG/1
1 TABLET ORAL EVERY 6 HOURS PRN
Qty: 120 TABLET | Refills: 0 | OUTPATIENT
Start: 2022-10-10

## 2022-11-07 DIAGNOSIS — G25.81 RESTLESS LEG SYNDROME: Chronic | ICD-10-CM

## 2022-11-07 DIAGNOSIS — G89.29 CHRONIC MIDLINE LOW BACK PAIN WITHOUT SCIATICA: ICD-10-CM

## 2022-11-07 DIAGNOSIS — M54.50 CHRONIC MIDLINE LOW BACK PAIN WITHOUT SCIATICA: ICD-10-CM

## 2022-11-07 RX ORDER — ROPINIROLE 3 MG/1
3 TABLET, FILM COATED ORAL
Qty: 90 TABLET | Refills: 0 | Status: SHIPPED | OUTPATIENT
Start: 2022-11-07 | End: 2023-02-06

## 2022-11-08 ENCOUNTER — TELEPHONE (OUTPATIENT)
Dept: FAMILY MEDICINE CLINIC | Facility: CLINIC | Age: 62
End: 2022-11-08

## 2022-11-08 RX ORDER — HYDROCODONE BITARTRATE AND ACETAMINOPHEN 10; 325 MG/1; MG/1
1 TABLET ORAL EVERY 6 HOURS PRN
Qty: 120 TABLET | Refills: 0 | Status: SHIPPED | OUTPATIENT
Start: 2022-11-08 | End: 2022-12-07 | Stop reason: SDUPTHER

## 2022-11-08 NOTE — TELEPHONE ENCOUNTER
Patient called asking for refill on Hydrocodone. Jesus asked me to call and check on patients pain management referral at [facility]. I called and asked if patient had an appointment, and they stated that they had tried calling patient on September 12, 15, 21, and the 29th, but could not get ahold of patient. Patient called and was checking on the status of her refill and I explained to her that pain management had tried getting in contact with her to schedule an appointment. I advised patient to call pain management and set up an appointment and to call me back and let me know the date. Patient called back and stated she has an appointment with Dr. Vanessa on January 11, 2023. Alvina was notified and stated she would write her medicine until then. Patient was notified and stated understanding.

## 2022-11-10 ENCOUNTER — CLINICAL SUPPORT (OUTPATIENT)
Dept: FAMILY MEDICINE CLINIC | Facility: CLINIC | Age: 62
End: 2022-11-10

## 2022-11-10 DIAGNOSIS — Z23 NEED FOR INFLUENZA VACCINATION: Primary | ICD-10-CM

## 2022-11-10 PROCEDURE — 90686 IIV4 VACC NO PRSV 0.5 ML IM: CPT | Performed by: NURSE PRACTITIONER

## 2022-11-10 PROCEDURE — G0008 ADMIN INFLUENZA VIRUS VAC: HCPCS | Performed by: NURSE PRACTITIONER

## 2022-11-14 DIAGNOSIS — E03.9 ACQUIRED HYPOTHYROIDISM: ICD-10-CM

## 2022-11-15 RX ORDER — LEVOTHYROXINE SODIUM 0.03 MG/1
TABLET ORAL
Qty: 15 TABLET | Refills: 5 | OUTPATIENT
Start: 2022-11-15

## 2022-11-17 ENCOUNTER — OFFICE VISIT (OUTPATIENT)
Dept: FAMILY MEDICINE CLINIC | Facility: CLINIC | Age: 62
End: 2022-11-17

## 2022-11-17 VITALS — WEIGHT: 103 LBS | HEIGHT: 62 IN | BODY MASS INDEX: 18.95 KG/M2

## 2022-11-17 DIAGNOSIS — F41.0 PANIC ATTACKS: Primary | Chronic | ICD-10-CM

## 2022-11-17 DIAGNOSIS — F41.1 GAD (GENERALIZED ANXIETY DISORDER): Chronic | ICD-10-CM

## 2022-11-17 PROCEDURE — 99441 PR PHYS/QHP TELEPHONE EVALUATION 5-10 MIN: CPT | Performed by: NURSE PRACTITIONER

## 2022-11-17 RX ORDER — HYDROXYZINE PAMOATE 50 MG/1
50 CAPSULE ORAL 3 TIMES DAILY PRN
Qty: 90 CAPSULE | Refills: 3 | Status: SHIPPED | OUTPATIENT
Start: 2022-11-17

## 2022-11-17 RX ORDER — LOSARTAN POTASSIUM 50 MG/1
50 TABLET ORAL DAILY
COMMUNITY
Start: 2022-11-04 | End: 2023-01-24 | Stop reason: SDUPTHER

## 2022-11-17 NOTE — PROGRESS NOTES
CC: Panic Attack      Subjective:  Tricia Loyola is a 62 y.o. female who presents for         This is a telephone visit due to pandemic.  Patient is at her home and I am in my office.  She complains of panic attacks. This has been ongoing x for the last 3-4 days. States it is horrible. States found out 2 weeks ago that her best friend was diagnosed with stage 4 cancer. States that she just found out she only has 24-48 hours to live. States she wakes up in the middle of the night and can't breathe. She was given Vistaril at last appointment and states this hasn't helped. She takes Buspar 30mg BID. States she goes to [facility] for mental health. States she is leaving tomorrow for [place].      The following portions of the patient's history were reviewed and updated as appropriate: allergies, current medications, past family history, past medical history, past social history, past surgical history and problem list.    Past Medical History:   Diagnosis Date   • Abdominal pain    • Anemia    • Anxiety and depression    • Chronic post-traumatic headache      rebound      • Depressive disorder    • Disease of thyroid gland    • Encounter for gynecological examination    • Endometriosis    • Gastroesophageal reflux disease    • Generalized anxiety disorder    • History of mammogram 08/2008    MAMMOGRAM DIAGNOSTIC BILATERAL 49661 ([facility]) (1)     • Hyperlipidemia    • Hypertension    • Ingrown toenail    • Injury of back    • Leaky heart valve    • Leaky heart valve    • Migraine    • Nonruptured cerebral aneurysm    • Osteoporosis    • Posttraumatic stress disorder    • Primary osteoarthritis of left knee    • Primary osteoarthritis of right knee    • PTSD (post-traumatic stress disorder)    • Seizure (HCC)     last seizure 2009.  no meds at this time   • Skin cancer     basal cell on back   • Tachycardia    • Viral hepatitis A    • Wears dentures     uppers only   • Wears glasses          Current Outpatient Medications:   •   acetaminophen (TYLENOL) 325 MG tablet, Take 2 tablets by mouth Every 4 (Four) Hours As Needed for Mild Pain., Disp: , Rfl:   •  albuterol (ACCUNEB) 1.25 MG/3ML nebulizer solution, Take 3 mL by nebulization Every 4 (Four) Hours As Needed for Wheezing or Shortness of Air., Disp: 100 each, Rfl: 5  •  albuterol sulfate  (90 Base) MCG/ACT inhaler, Inhale 2 puffs Every 4 (Four) Hours As Needed for Wheezing., Disp: 18 g, Rfl: 5  •  amitriptyline (ELAVIL) 75 MG tablet, Take 1 tablet by mouth Every Night. Resumed 6/10/22., Disp: 30 tablet, Rfl: 5  •  aspirin 81 MG EC tablet, Take 1 tablet by mouth Daily., Disp: 30 tablet, Rfl: 0  •  atorvastatin (LIPITOR) 40 MG tablet, Take 1 tablet by mouth Every Night. For cholesterol, Disp: 90 tablet, Rfl: 1  •  azithromycin (Zithromax Z-Christophe) 250 MG tablet, Take 2 tablets the first day, then 1 tablet daily for 4 days., Disp: 6 tablet, Rfl: 0  •  Botox 200 units reconstituted solution, Inject 200 Units into the appropriate muscle as directed by prescriber Every 3 (Three) Months., Disp: , Rfl:   •  brompheniramine-pseudoephedrine-DM 30-2-10 MG/5ML syrup, Take 5 mL by mouth 4 (Four) Times a Day As Needed for Congestion or Cough., Disp: 473 mL, Rfl: 0  •  busPIRone (BUSPAR) 30 MG tablet, Take 1 tablet by mouth 2 (Two) Times a Day., Disp: 60 tablet, Rfl: 0  •  Cyanocobalamin 1000 MCG/ML kit, Inject 1,000 mL as directed Every 30 (Thirty) Days., Disp: 1 kit, Rfl: 5  •  dexlansoprazole (DEXILANT) 60 MG capsule, Take 1 capsule by mouth Daily., Disp: 90 capsule, Rfl: 1  •  diphenoxylate-atropine (Lomotil) 2.5-0.025 MG per tablet, Take 1 tablet by mouth 4 (Four) Times a Day As Needed for Diarrhea., Disp: 12 tablet, Rfl: 0  •  famotidine (PEPCID) 40 MG tablet, Take 1 tablet by mouth Daily., Disp: 30 tablet, Rfl: 3  •  fluticasone (FLONASE) 50 MCG/ACT nasal spray, 2 sprays into the nostril(s) as directed by provider Daily As Needed for Rhinitis., Disp: , Rfl:   •  folic acid (FOLVITE) 800 MCG  "tablet, Take 1 tablet by mouth Daily., Disp: 90 tablet, Rfl: 1  •  HYDROcodone-acetaminophen (NORCO)  MG per tablet, Take 1 tablet by mouth Every 6 (Six) Hours As Needed for Moderate Pain., Disp: 120 tablet, Rfl: 0  •  levothyroxine (SYNTHROID, LEVOTHROID) 25 MCG tablet, Take 0.5 tablets by mouth Daily., Disp: 15 tablet, Rfl: 5  •  lidocaine (LIDODERM) 5 %, , Disp: , Rfl:   •  Linzess 290 MCG capsule capsule, Take 1 capsule by mouth Every Morning Before Breakfast., Disp: 30 capsule, Rfl: 1  •  losartan (COZAAR) 50 MG tablet, Take 50 mg by mouth Daily., Disp: , Rfl:   •  methylPREDNISolone (MEDROL) 4 MG dose pack, Take as directed on package instructions., Disp: 21 tablet, Rfl: 0  •  metoprolol succinate XL (TOPROL-XL) 100 MG 24 hr tablet, Take 1 tablet by mouth Daily. Takes at 3 pm, Disp: 30 tablet, Rfl: 6  •  ondansetron ODT (ZOFRAN-ODT) 4 MG disintegrating tablet, Place 1 tablet on the tongue Every 8 (Eight) Hours As Needed for Nausea., Disp: 30 tablet, Rfl: 5  •  OXcarbazepine (TRILEPTAL) 150 MG tablet, Take 150 mg by mouth Every Night. Daily at bedtime, Disp: , Rfl:   •  prochlorperazine (COMPAZINE) 10 MG tablet, Take 1 tablet by mouth Every 6 (Six) Hours As Needed for Nausea or Vomiting., Disp: 360 tablet, Rfl: 1  •  rOPINIRole (REQUIP) 3 MG tablet, TAKE 1 TABLET BY MOUTH EVERY NIGHT AT BEDTIME, Disp: 90 tablet, Rfl: 0  •  Syringe/Needle, Disp, 25G X 5/8\" 3 ML misc, 1 each Every 28 (Twenty-Eight) Days., Disp: 3 each, Rfl: 1  •  tamsulosin (FLOMAX) 0.4 MG capsule 24 hr capsule, Take 1 capsule by mouth Daily., Disp: 30 capsule, Rfl: 0  •  topiramate (TOPAMAX) 100 MG tablet, Take 1 tablet by mouth 2 (Two) Times a Day., Disp: 180 tablet, Rfl: 1  •  witch hazel-glycerin (TUCKS) pad, Insert  into the rectum As Needed for Hemorrhoids., Disp: 40 each, Rfl: 1  •  hydrOXYzine pamoate (Vistaril) 50 MG capsule, Take 1 capsule by mouth 3 (Three) Times a Day As Needed for Anxiety., Disp: 90 capsule, Rfl: 3    Review of " "Systems    Review of Systems   Constitutional: Negative.    HENT: Negative.    Eyes: Negative.    Respiratory: Negative.    Cardiovascular: Negative.    Gastrointestinal: Negative.    Endocrine: Negative.    Genitourinary: Negative.    Musculoskeletal: Negative.    Skin: Negative.    Allergic/Immunologic: Negative.    Neurological: Negative.    Hematological: Negative.    Psychiatric/Behavioral: Positive for sleep disturbance. Negative for self-injury and suicidal ideas. The patient is nervous/anxious.    All other systems reviewed and are negative.      Objective  Vitals:    11/17/22 1357   Weight: 46.7 kg (103 lb)   Height: 157.5 cm (62\")     Body mass index is 18.84 kg/m².    Physical Exam    Physical Exam  Deferred, telephone visit because of pandemic      Diagnoses and all orders for this visit:    1. Panic attacks (Primary)  -     hydrOXYzine pamoate (Vistaril) 50 MG capsule; Take 1 capsule by mouth 3 (Three) Times a Day As Needed for Anxiety.  Dispense: 90 capsule; Refill: 3    2. PRISCILLA (generalized anxiety disorder)  -     hydrOXYzine pamoate (Vistaril) 50 MG capsule; Take 1 capsule by mouth 3 (Three) Times a Day As Needed for Anxiety.  Dispense: 90 capsule; Refill: 3       Will increase patient's hydroxyzine to 50 mg 3 times daily as needed for anxiety.  Advised patient that I could not give her benzodiazepines because she takes hydrocodone.  Offered referral to mental health for further evaluation and treatment.  She states she has no appointment in December with mental health and declines referral.  She is advised relaxation techniques.  She is advised to keep her scheduled follow-up appointment with me next month or to follow-up sooner if needed.  Answered all questions.  Patient verbalized understanding of plan of care.      This document has been electronically signed by UMESH Bustos on November 17, 2022 14:13 CST     You have chosen to receive care through a telephone visit. Do you consent to " use a telephone visit for your medical care today? Yes  This visit has been rescheduled as a phone visit to comply with patient safety concerns in accordance with CDC recommendations. Total time of discussion was 10 minutes.

## 2022-12-05 DIAGNOSIS — K21.9 GASTROESOPHAGEAL REFLUX DISEASE WITHOUT ESOPHAGITIS: ICD-10-CM

## 2022-12-06 RX ORDER — DEXLANSOPRAZOLE 60 MG/1
CAPSULE, DELAYED RELEASE ORAL
Qty: 90 CAPSULE | Refills: 0 | Status: SHIPPED | OUTPATIENT
Start: 2022-12-06 | End: 2023-03-13

## 2022-12-07 ENCOUNTER — OFFICE VISIT (OUTPATIENT)
Dept: FAMILY MEDICINE CLINIC | Facility: CLINIC | Age: 62
End: 2022-12-07

## 2022-12-07 VITALS — HEIGHT: 62 IN | BODY MASS INDEX: 18.95 KG/M2 | WEIGHT: 103 LBS

## 2022-12-07 DIAGNOSIS — G89.29 CHRONIC MIDLINE LOW BACK PAIN WITHOUT SCIATICA: Primary | Chronic | ICD-10-CM

## 2022-12-07 DIAGNOSIS — J06.9 URI WITH COUGH AND CONGESTION: ICD-10-CM

## 2022-12-07 DIAGNOSIS — M54.50 CHRONIC MIDLINE LOW BACK PAIN WITHOUT SCIATICA: Primary | Chronic | ICD-10-CM

## 2022-12-07 DIAGNOSIS — G89.29 CHRONIC MIDLINE THORACIC BACK PAIN: Chronic | ICD-10-CM

## 2022-12-07 DIAGNOSIS — M54.6 CHRONIC MIDLINE THORACIC BACK PAIN: Chronic | ICD-10-CM

## 2022-12-07 PROCEDURE — 99442 PR PHYS/QHP TELEPHONE EVALUATION 11-20 MIN: CPT | Performed by: NURSE PRACTITIONER

## 2022-12-07 RX ORDER — HYDROCODONE BITARTRATE AND ACETAMINOPHEN 10; 325 MG/1; MG/1
1 TABLET ORAL EVERY 6 HOURS PRN
Qty: 120 TABLET | Refills: 0 | Status: SHIPPED | OUTPATIENT
Start: 2022-12-07 | End: 2023-01-08 | Stop reason: SDUPTHER

## 2022-12-07 RX ORDER — LORATADINE 10 MG/1
10 TABLET ORAL DAILY
Qty: 30 TABLET | Refills: 3 | Status: SHIPPED | OUTPATIENT
Start: 2022-12-07 | End: 2023-01-23

## 2022-12-07 RX ORDER — FLUTICASONE PROPIONATE 50 MCG
2 SPRAY, SUSPENSION (ML) NASAL DAILY
Qty: 9.9 ML | Refills: 3 | Status: SHIPPED | OUTPATIENT
Start: 2022-12-07

## 2022-12-07 NOTE — PROGRESS NOTES
CC: Follow-up (3 month FU) and Sinusitis      Subjective:  Tricia Loyola is a 62 y.o. female who presents for         This is a telephone visit due to pandemic.  Patient is at her home and I am in my office.  This is a 3-month follow-up on her chronic midline low back pain without sciatica, chronic midline thoracic back pain, and bilateral chronic knee pain.  She takes hydrocodone 10 every 6 hours as needed for pain.  At last appointment, she was referred to pain management for further evaluation and treatment of her pain.  She states she has an appointment on January 11, 2023.    She also complains of a possible sinus infection. Onset this morning with sore throat, nasal stuffiness, headache, dry cough, and nausea. She denies associated fever, vomiting, or diarrhea. She took an at home covid test and this was negative. She has treated with green tea and ginger tea. This helped with the nausea.      The following portions of the patient's history were reviewed and updated as appropriate: allergies, current medications, past family history, past medical history, past social history, past surgical history and problem list.    Past Medical History:   Diagnosis Date   • Abdominal pain    • Anemia    • Anxiety and depression    • Chronic post-traumatic headache      rebound      • Depressive disorder    • Disease of thyroid gland    • Encounter for gynecological examination    • Endometriosis    • Gastroesophageal reflux disease    • Generalized anxiety disorder    • History of mammogram 08/2008    MAMMOGRAM DIAGNOSTIC BILATERAL 08592 ([facility]) (1)     • Hyperlipidemia    • Hypertension    • Ingrown toenail    • Injury of back    • Leaky heart valve    • Leaky heart valve    • Migraine    • Nonruptured cerebral aneurysm    • Osteoporosis    • Posttraumatic stress disorder    • Primary osteoarthritis of left knee    • Primary osteoarthritis of right knee    • PTSD (post-traumatic stress disorder)    • Seizure (HCC)     last  seizure 2009.  no meds at this time   • Skin cancer     basal cell on back   • Tachycardia    • Viral hepatitis A    • Wears dentures     uppers only   • Wears glasses          Current Outpatient Medications:   •  acetaminophen (TYLENOL) 325 MG tablet, Take 2 tablets by mouth Every 4 (Four) Hours As Needed for Mild Pain., Disp: , Rfl:   •  albuterol (ACCUNEB) 1.25 MG/3ML nebulizer solution, Take 3 mL by nebulization Every 4 (Four) Hours As Needed for Wheezing or Shortness of Air., Disp: 100 each, Rfl: 5  •  albuterol sulfate  (90 Base) MCG/ACT inhaler, Inhale 2 puffs Every 4 (Four) Hours As Needed for Wheezing., Disp: 18 g, Rfl: 5  •  amitriptyline (ELAVIL) 75 MG tablet, Take 1 tablet by mouth Every Night. Resumed 6/10/22., Disp: 30 tablet, Rfl: 5  •  aspirin 81 MG EC tablet, Take 1 tablet by mouth Daily., Disp: 30 tablet, Rfl: 0  •  atorvastatin (LIPITOR) 40 MG tablet, Take 1 tablet by mouth Every Night. For cholesterol, Disp: 90 tablet, Rfl: 1  •  azithromycin (Zithromax Z-Christophe) 250 MG tablet, Take 2 tablets the first day, then 1 tablet daily for 4 days., Disp: 6 tablet, Rfl: 0  •  Botox 200 units reconstituted solution, Inject 200 Units into the appropriate muscle as directed by prescriber Every 3 (Three) Months., Disp: , Rfl:   •  brompheniramine-pseudoephedrine-DM 30-2-10 MG/5ML syrup, Take 5 mL by mouth 4 (Four) Times a Day As Needed for Congestion or Cough., Disp: 473 mL, Rfl: 0  •  busPIRone (BUSPAR) 30 MG tablet, Take 1 tablet by mouth 2 (Two) Times a Day., Disp: 60 tablet, Rfl: 0  •  Cyanocobalamin 1000 MCG/ML kit, Inject 1,000 mL as directed Every 30 (Thirty) Days., Disp: 1 kit, Rfl: 5  •  Dexilant 60 MG capsule, TAKE 1 CAPSULE BY MOUTH daily, Disp: 90 capsule, Rfl: 0  •  diphenoxylate-atropine (Lomotil) 2.5-0.025 MG per tablet, Take 1 tablet by mouth 4 (Four) Times a Day As Needed for Diarrhea., Disp: 12 tablet, Rfl: 0  •  famotidine (PEPCID) 40 MG tablet, Take 1 tablet by mouth Daily., Disp: 30  "tablet, Rfl: 3  •  folic acid (FOLVITE) 800 MCG tablet, Take 1 tablet by mouth Daily., Disp: 90 tablet, Rfl: 1  •  HYDROcodone-acetaminophen (NORCO)  MG per tablet, Take 1 tablet by mouth Every 6 (Six) Hours As Needed for Moderate Pain., Disp: 120 tablet, Rfl: 0  •  hydrOXYzine pamoate (Vistaril) 50 MG capsule, Take 1 capsule by mouth 3 (Three) Times a Day As Needed for Anxiety., Disp: 90 capsule, Rfl: 3  •  levothyroxine (SYNTHROID, LEVOTHROID) 25 MCG tablet, Take 0.5 tablets by mouth Daily., Disp: 15 tablet, Rfl: 5  •  lidocaine (LIDODERM) 5 %, , Disp: , Rfl:   •  Linzess 290 MCG capsule capsule, Take 1 capsule by mouth Every Morning Before Breakfast., Disp: 30 capsule, Rfl: 1  •  losartan (COZAAR) 50 MG tablet, Take 50 mg by mouth Daily., Disp: , Rfl:   •  metoprolol succinate XL (TOPROL-XL) 100 MG 24 hr tablet, Take 1 tablet by mouth Daily. Takes at 3 pm, Disp: 30 tablet, Rfl: 6  •  ondansetron ODT (ZOFRAN-ODT) 4 MG disintegrating tablet, Place 1 tablet on the tongue Every 8 (Eight) Hours As Needed for Nausea., Disp: 30 tablet, Rfl: 5  •  OXcarbazepine (TRILEPTAL) 150 MG tablet, Take 150 mg by mouth Every Night. Daily at bedtime, Disp: , Rfl:   •  prochlorperazine (COMPAZINE) 10 MG tablet, Take 1 tablet by mouth Every 6 (Six) Hours As Needed for Nausea or Vomiting., Disp: 360 tablet, Rfl: 1  •  rOPINIRole (REQUIP) 3 MG tablet, TAKE 1 TABLET BY MOUTH EVERY NIGHT AT BEDTIME, Disp: 90 tablet, Rfl: 0  •  Syringe/Needle, Disp, 25G X 5/8\" 3 ML misc, 1 each Every 28 (Twenty-Eight) Days., Disp: 3 each, Rfl: 1  •  tamsulosin (FLOMAX) 0.4 MG capsule 24 hr capsule, Take 1 capsule by mouth Daily., Disp: 30 capsule, Rfl: 0  •  topiramate (TOPAMAX) 100 MG tablet, Take 1 tablet by mouth 2 (Two) Times a Day., Disp: 180 tablet, Rfl: 1  •  witch hazel-glycerin (TUCKS) pad, Insert  into the rectum As Needed for Hemorrhoids., Disp: 40 each, Rfl: 1  •  fluticasone (Flonase) 50 MCG/ACT nasal spray, 2 sprays into the nostril(s) " "as directed by provider Daily., Disp: 9.9 mL, Rfl: 3  •  loratadine (Claritin) 10 MG tablet, Take 1 tablet by mouth Daily., Disp: 30 tablet, Rfl: 3    Review of Systems    Review of Systems   Constitutional: Negative.    HENT: Positive for congestion and sore throat.    Eyes: Negative.    Respiratory: Positive for cough.    Cardiovascular: Negative.    Gastrointestinal: Negative.    Endocrine: Negative.    Genitourinary: Negative.    Musculoskeletal: Positive for arthralgias and back pain.   Skin: Negative.    Allergic/Immunologic: Negative.    Neurological: Positive for headaches.   Hematological: Negative.    Psychiatric/Behavioral: Negative.    All other systems reviewed and are negative.      Objective  Vitals:    12/07/22 1308   Weight: 46.7 kg (103 lb)   Height: 157.5 cm (62\")     Body mass index is 18.84 kg/m².    Physical Exam    Physical Exam  Deferred, telephone visit because of pandemic      Diagnoses and all orders for this visit:    1. Chronic midline low back pain without sciatica (Primary)  -     HYDROcodone-acetaminophen (NORCO)  MG per tablet; Take 1 tablet by mouth Every 6 (Six) Hours As Needed for Moderate Pain.  Dispense: 120 tablet; Refill: 0    2. Chronic midline thoracic back pain  -     HYDROcodone-acetaminophen (NORCO)  MG per tablet; Take 1 tablet by mouth Every 6 (Six) Hours As Needed for Moderate Pain.  Dispense: 120 tablet; Refill: 0    3. URI with cough and congestion  -     fluticasone (Flonase) 50 MCG/ACT nasal spray; 2 sprays into the nostril(s) as directed by provider Daily.  Dispense: 9.9 mL; Refill: 3  -     loratadine (Claritin) 10 MG tablet; Take 1 tablet by mouth Daily.  Dispense: 30 tablet; Refill: 3       3-month follow-up on chronic pain.  Patient advised to keep her appointment with pain management in January.  Refilled her Norco today as requested. Patient understands the risks associated with this controlled medication, including tolerance and addiction.  she " also agrees to only obtain this medication from me, and not from a another provider, unless that provider is covering for me in my absence.  she also agrees to be compliant in dosing, and not self adjust the dose of medication.  A signed controlled substance agreement is on file, and she has received a controlled substance education sheet at this a previous visit.  she has also signed a consent for treatment with a controlled substance as per [facility] policy. KATE was obtained.  For the upper respiratory symptoms, will treat this with Flonase and loratadine.  Patient instructed on how to take these medications and possible side effects.  She is advised to increase her fluid intake.  She is advised to follow-up in 3 months or sooner if needed for recheck on her pain and less she has seen the pain clinic.  Then, she will only need to be seen every 6 months.  Answered all questions.  Patient verbalized understanding of plan of care.          This document has been electronically signed by UMESH Bustos on December 7, 2022 13:23 CST     You have chosen to receive care through a telephone visit. Do you consent to use a telephone visit for your medical care today? Yes    This visit has been rescheduled as a phone visit to comply with patient safety concerns in accordance with CDC recommendations. Total time of discussion was 13 minutes.

## 2022-12-12 RX ORDER — METOPROLOL SUCCINATE 100 MG/1
TABLET, EXTENDED RELEASE ORAL
Qty: 30 TABLET | Refills: 1 | Status: SHIPPED | OUTPATIENT
Start: 2022-12-12

## 2022-12-14 DIAGNOSIS — E03.9 ACQUIRED HYPOTHYROIDISM: ICD-10-CM

## 2022-12-15 DIAGNOSIS — E03.9 ACQUIRED HYPOTHYROIDISM: ICD-10-CM

## 2022-12-15 RX ORDER — LEVOTHYROXINE SODIUM 0.03 MG/1
12.5 TABLET ORAL DAILY
Qty: 15 TABLET | Refills: 5 | Status: SHIPPED | OUTPATIENT
Start: 2022-12-15

## 2022-12-15 RX ORDER — LEVOTHYROXINE SODIUM 0.03 MG/1
12.5 TABLET ORAL DAILY
Qty: 15 TABLET | Refills: 5 | OUTPATIENT
Start: 2022-12-15

## 2022-12-16 RX ORDER — FAMOTIDINE 40 MG/1
40 TABLET, FILM COATED ORAL DAILY
Qty: 30 TABLET | Refills: 0 | Status: SHIPPED | OUTPATIENT
Start: 2022-12-16 | End: 2023-01-11

## 2023-01-03 ENCOUNTER — OFFICE VISIT (OUTPATIENT)
Dept: GASTROENTEROLOGY | Facility: CLINIC | Age: 63
End: 2023-01-03
Payer: MEDICARE

## 2023-01-03 VITALS
HEART RATE: 78 BPM | HEIGHT: 62 IN | WEIGHT: 101 LBS | SYSTOLIC BLOOD PRESSURE: 125 MMHG | OXYGEN SATURATION: 97 % | DIASTOLIC BLOOD PRESSURE: 68 MMHG | BODY MASS INDEX: 18.58 KG/M2

## 2023-01-03 DIAGNOSIS — K21.00 GASTROESOPHAGEAL REFLUX DISEASE WITH ESOPHAGITIS WITHOUT HEMORRHAGE: ICD-10-CM

## 2023-01-03 DIAGNOSIS — R79.89 ELEVATED LFTS: ICD-10-CM

## 2023-01-03 DIAGNOSIS — D50.8 OTHER IRON DEFICIENCY ANEMIA: ICD-10-CM

## 2023-01-03 DIAGNOSIS — R13.10 DYSPHAGIA, UNSPECIFIED TYPE: Primary | ICD-10-CM

## 2023-01-03 PROCEDURE — 99214 OFFICE O/P EST MOD 30 MIN: CPT | Performed by: PHYSICIAN ASSISTANT

## 2023-01-03 RX ORDER — LINACLOTIDE 290 UG/1
290 CAPSULE, GELATIN COATED ORAL
Qty: 30 CAPSULE | Refills: 1 | Status: SHIPPED | OUTPATIENT
Start: 2023-01-03 | End: 2023-02-14 | Stop reason: SDUPTHER

## 2023-01-03 RX ORDER — DEXTROSE AND SODIUM CHLORIDE 5; .45 G/100ML; G/100ML
30 INJECTION, SOLUTION INTRAVENOUS CONTINUOUS PRN
Status: CANCELLED | OUTPATIENT
Start: 2023-01-24

## 2023-01-03 NOTE — H&P (VIEW-ONLY)
Chief Complaint   Patient presents with   • Follow-up     5 Month   • Elevated Hepatic Enzymes       ENDO PROCEDURE ORDERED: EGDw/dilation, gerd, dysphagia    Subjective    Tricia Loyola is a 62 y.o. female. she is here today for follow-up.    History of Present Illness    Patient seen on a recheck of her GERD, abdominal pain, dysphagia, IBS-C, elevated liver enzymes. Last seen 08/02/2022, was scheduled for EGD with dilatation, but did not have that done. She states in September she thought she had a UTI, was having fever, was seen by Dr. Graham. She missed several follow-ups with him. She had had a chest x-ray 12/12/2022, I do not see stent placement. She was concerned whether she had a stent or did not have a stent. In reviewing her records, the hospitalist note indicates, discharge note says that there was a J-stent placed, but the only order I can see from Dr. Graham says she had a cystoscopy with stone extraction, but I could not pull up an operative note. The patient had laboratories 09/16/2022. CMP showed glucose 101, AST 64. She went to [facility] on 09/19/2022. CMP showed a potassium 5.2, , . CT abdomen and pelvis without contrast showed left ureteral stone 5.2 mm, anastomosis in the rectum, prior cholecystectomy. Repeat CT scan [facility] abdomen and pelvis without contrast 09/24/2022 showed further migration of the stone to the UVJ on the left. CMP at that time showed creatinine 1.2, sodium 135, AST 98, ALT 91, otherwise normal. Repeat laboratories 09/26/2022: BMP showed glucose 137, chloride 109, C02 21, calcium 8.0, otherwise normal. CBC showed hemoglobin 10.4, hematocrit 30.6, normal magnesium.     Patient presents with 4/10 abdominal pain. She is still having a lot of dysphagia despite taking Dexilant and Pepcid. She is on high-dose Linzess for her IBS-C. Weight is down 5 pounds since last visit. She had a previous EGD showing esophagitis, gastritis 03/16/2021. Last  colonoscopy she had a polyp with family history 11/26/2018.     A/P: Patient with recent urolithiasis, marked elevation in liver enzymes with drop in hemoglobin. Has not had repeat laboratories. I did recommend repeating her laboratories. She will be due for colonoscopy this year, but states her bowels are doing fairly well. Recommend scheduling her for EGD with dilatation for the dysphagia with suspected peptic stricture. Will recommend follow-up in 4-6 weeks, sooner if needed, further pending clinical course and the results of the above.       The following portions of the patient's history were reviewed and updated as appropriate:   Past Medical History:   Diagnosis Date   • Abdominal pain    • Anemia    • Anxiety and depression    • Chronic post-traumatic headache      rebound      • Depressive disorder    • Disease of thyroid gland    • Encounter for gynecological examination    • Endometriosis    • Gastroesophageal reflux disease    • Generalized anxiety disorder    • History of mammogram 08/2008    MAMMOGRAM DIAGNOSTIC BILATERAL 39991 ([facility]) (1)     • Hyperlipidemia    • Hypertension    • Ingrown toenail    • Injury of back    • Leaky heart valve    • Leaky heart valve    • Migraine    • Nonruptured cerebral aneurysm    • Osteoporosis    • Posttraumatic stress disorder    • Primary osteoarthritis of left knee    • Primary osteoarthritis of right knee    • PTSD (post-traumatic stress disorder)    • Seizure (HCC)     last seizure 2009.  no meds at this time   • Skin cancer     basal cell on back   • Tachycardia    • Viral hepatitis A    • Wears dentures     uppers only   • Wears glasses      Past Surgical History:   Procedure Laterality Date   • APPENDECTOMY     • BREAST BIOPSY Left     times 2 both negative   • CHOLECYSTECTOMY     • COLONOSCOPY N/A 11/26/2018    Procedure: COLONOSCOPY;  Surgeon: Meet Mcintyre MD;  Location: [facility] ENDOSCOPY;  Service: General   • CRANIOTOMY FOR ANEURYSM  2003   •  CYSTOSCOPY, URETEROSCOPY, RETROGRADE PYELOGRAM, STENT INSERTION Left 2022    Procedure: CYSTOSCOPY, STONE REMOVAL;  Surgeon: Leonardo Graham MD;  Location: [facility];  Service: Urology;  Laterality: Left;   • ENDOSCOPY N/A 10/16/2019    Procedure: ESOPHAGOGASTRODUODENOSCOPY;  Surgeon: Kory Muhammad MD;  Location: [facility] ENDOSCOPY;  Service: Gastroenterology   • ENDOSCOPY N/A 2021    Procedure: ESOPHAGOGASTRODUODENOSCOPY;  Surgeon: Kory Muhammad MD;  Location: [facility] ENDOSCOPY;  Service: Gastroenterology;  Laterality: N/A;   • MASS EXCISION Right 2020    Procedure: EXCISE SOFT TISSUE MASS RIGHT POSTERIOR FLANK                 (latex allergy);  Surgeon: Meet Mcintyre MD;  Location: [facility];  Service: General;  Laterality: Right;   • PAP SMEAR  2012   • TONSILLECTOMY     • UPPER GASTROINTESTINAL ENDOSCOPY  10/16/2019   • WRIST GANGLION EXCISION Right 2021    Procedure: EXCISION OF VOLAR GANGLION CYST RIGHT WRIST      (LATEX ALLERGY);  Surgeon: Leonardo Vargas MD;  Location: [facility];  Service: Orthopedics;  Laterality: Right;     Family History   Problem Relation Age of Onset   • Constipation Mother    • Hypertension Mother    • Anxiety disorder Mother    • Heart disease Mother    • Osteoporosis Mother         Also Myself I   • Alcohol abuse Father    • Heart disease Father    • Anxiety disorder Brother    • Kidney disease Brother    • Hypertension Brother    • Arthritis Brother    • Anxiety disorder Brother    • Kidney disease Brother    • Diabetes Brother    • Anxiety disorder Brother    • Hypertension Brother    • Heart disease Maternal Grandmother    • Clotting disorder Maternal Grandmother         Multiple Blood Clots In The Legs   • Breast cancer Paternal Aunt    • Heart disease Maternal Grandfather    • Colon cancer Maternal Great-Grandmother      OB History        3    Para   2    Term   2            AB   1    Living   2       SAB   1    IAB         Ectopic        Molar        Multiple        Live Births                  Allergies   Allergen Reactions   • Iodine Anaphylaxis   • Nitrofuran Derivatives Hives   • Toradol [Ketorolac Tromethamine] Anaphylaxis   • Cleocin [Clindamycin Hcl] Hives and Swelling     Swelling of eyes and hives   • Augmentin [Amoxicillin-Pot Clavulanate] Hives   • Shrimp Hives and Itching   • Ciprofloxacin Rash   • Fiorinal [Butalbital-Aspirin-Caffeine] Palpitations   • Ibuprofen Rash   • Imitrex [Sumatriptan] Palpitations   • Latex Rash   • Other Rash     prego spaghetti sauce    Midrin causes tachycardia   • Ultram [Tramadol] Palpitations     Social History     Socioeconomic History   • Marital status: Legally    Tobacco Use   • Smoking status: Every Day     Packs/day: 0.50     Years: 20.00     Pack years: 10.00     Types: Cigarettes, Electronic Cigarette   • Smokeless tobacco: Never   Vaping Use   • Vaping Use: Some days   • Substances: Nicotine, Flavoring   • Devices: Disposable   Substance and Sexual Activity   • Alcohol use: Not Currently   • Drug use: Never   • Sexual activity: Defer     Current Medications:  Prior to Admission medications    Medication Sig Start Date End Date Taking? Authorizing Provider   albuterol (ACCUNEB) 1.25 MG/3ML nebulizer solution Take 3 mL by nebulization Every 4 (Four) Hours As Needed for Wheezing or Shortness of Air. 11/15/21  Yes Rosario Ceron APRN   albuterol sulfate  (90 Base) MCG/ACT inhaler Inhale 2 puffs Every 4 (Four) Hours As Needed for Wheezing. 2/5/22  Yes Rosario Ceron APRN   amitriptyline (ELAVIL) 75 MG tablet Take 1 tablet by mouth Every Night. Resumed 6/10/22. 6/10/22  Yes Rosario Ceron APRN   aspirin 81 MG EC tablet Take 1 tablet by mouth Daily. 1/14/20  Yes Rosario Ceron APRN   Botox 200 units reconstituted solution Inject 200 Units into the appropriate muscle as directed by prescriber Every 3 (Three) Months. 4/26/21  Yes Provider, Historical,  MD   busPIRone (BUSPAR) 30 MG tablet Take 1 tablet by mouth 2 (Two) Times a Day. 3/7/18  Yes Sonia Mata MD   Cyanocobalamin 1000 MCG/ML kit Inject 1,000 mL as directed Every 30 (Thirty) Days. 4/7/22  Yes Rosario Ceron APRN   Dexilant 60 MG capsule TAKE 1 CAPSULE BY MOUTH daily 12/6/22  Yes Alvina Bergman APRN   diphenoxylate-atropine (Lomotil) 2.5-0.025 MG per tablet Take 1 tablet by mouth 4 (Four) Times a Day As Needed for Diarrhea. 2/10/22  Yes Rosario Ceron APRN   famotidine (PEPCID) 40 MG tablet Take 1 tablet by mouth Daily. 12/16/22  Yes Johnny Smiley PA-C   fluticasone (Flonase) 50 MCG/ACT nasal spray 2 sprays into the nostril(s) as directed by provider Daily. 12/7/22  Yes Alvina Bergman APRN   folic acid (FOLVITE) 800 MCG tablet Take 1 tablet by mouth Daily. 6/28/21  Yes Rosario Ceron APRN   HYDROcodone-acetaminophen (NORCO)  MG per tablet Take 1 tablet by mouth Every 6 (Six) Hours As Needed for Moderate Pain. 12/7/22  Yes Alvina Bergman APRN   hydrOXYzine pamoate (Vistaril) 50 MG capsule Take 1 capsule by mouth 3 (Three) Times a Day As Needed for Anxiety. 11/17/22  Yes Alvina Bergman APRN   levothyroxine (SYNTHROID, LEVOTHROID) 25 MCG tablet Take 0.5 tablets by mouth Daily. 12/15/22  Yes Alvina Bergman APRN   lidocaine (LIDODERM) 5 %  8/29/22  Yes Emergency, Nurse Mariama, RN   Linzess 290 MCG capsule capsule Take 1 capsule by mouth Every Morning Before Breakfast. 1/3/23  Yes Johnny Smiley PA-C   losartan (COZAAR) 50 MG tablet Take 50 mg by mouth Daily. 11/4/22  Yes Provider, MD Doris   metoprolol succinate XL (TOPROL-XL) 100 MG 24 hr tablet TAKE 1 TABLET BY MOUTH DAILY at 3pm 12/12/22  Yes Ghislaine Levi MD   ondansetron ODT (ZOFRAN-ODT) 4 MG disintegrating tablet Place 1 tablet on the tongue Every 8 (Eight) Hours As Needed for Nausea. 6/28/21  Yes Rosario Ceron APRN   OXcarbazepine (TRILEPTAL) 150 MG tablet Take 150  "mg by mouth Every Night. Daily at bedtime   Yes Provider, MD Doris   prochlorperazine (COMPAZINE) 10 MG tablet Take 1 tablet by mouth Every 6 (Six) Hours As Needed for Nausea or Vomiting. 6/28/21  Yes Rosario Ceron APRN   rOPINIRole (REQUIP) 3 MG tablet TAKE 1 TABLET BY MOUTH EVERY NIGHT AT BEDTIME 11/7/22  Yes Alvina Bergman APRN   Syringe/Needle, Disp, 25G X 5/8\" 3 ML misc 1 each Every 28 (Twenty-Eight) Days. 9/12/22  Yes Alvina Bergman APRN   topiramate (TOPAMAX) 100 MG tablet Take 1 tablet by mouth 2 (Two) Times a Day. 5/28/20  Yes Rosario Ceron APRN   witch hazel-glycerin (TUCKS) pad Insert  into the rectum As Needed for Hemorrhoids. 2/14/22  Yes Sarah Willett APRN   Linzess 290 MCG capsule capsule Take 1 capsule by mouth Every Morning Before Breakfast. 5/31/22 1/3/23 Yes Johnny Smiley PA-C   acetaminophen (TYLENOL) 325 MG tablet Take 2 tablets by mouth Every 4 (Four) Hours As Needed for Mild Pain. 9/26/22   Azul Powell APRN   atorvastatin (LIPITOR) 40 MG tablet Take 1 tablet by mouth Every Night. For cholesterol 8/18/22   Sherry Zambrano PA-C   azithromycin (ZITHROMAX) 250 MG tablet Take 2 tablets the first day, then 1 tablet daily for 4 days. 12/12/22   Estefania Nichols APRN   brompheniramine-pseudoephedrine-DM 30-2-10 MG/5ML syrup Take 5 mL by mouth 4 (Four) Times a Day As Needed for Congestion or Cough. 9/21/22   Alvina Bergman APRN   loratadine (Claritin) 10 MG tablet Take 1 tablet by mouth Daily. 12/7/22   Alvina Bergman APRN   tamsulosin (FLOMAX) 0.4 MG capsule 24 hr capsule Take 1 capsule by mouth Daily. 9/20/22   Alvina Bergman APRN   nitroglycerin (NITROSTAT) 0.4 MG SL tablet Place 0.4 mg under the tongue Every 5 (Five) Minutes As Needed for Chest Pain. Take no more than 3 doses in 15 minutes.  9/20/22  Provider, Doris, MD     Review of Systems  Review of Systems   HENT: Positive for trouble swallowing.  " "  Gastrointestinal: Positive for abdominal pain and constipation.          Objective    /68 (BP Location: Left arm, Patient Position: Sitting)   Pulse 78   Ht 157.5 cm (62\")   Wt 45.8 kg (101 lb)   SpO2 97%   BMI 18.47 kg/m²   Physical Exam  Vitals and nursing note reviewed.   Constitutional:       General: She is not in acute distress.     Appearance: She is well-developed.   HENT:      Head: Normocephalic and atraumatic.   Eyes:      Pupils: Pupils are equal, round, and reactive to light.   Cardiovascular:      Rate and Rhythm: Normal rate and regular rhythm.      Heart sounds: Normal heart sounds.   Pulmonary:      Effort: Pulmonary effort is normal.      Breath sounds: Normal breath sounds.   Abdominal:      General: Bowel sounds are normal. There is no distension or abdominal bruit.      Palpations: Abdomen is soft. Abdomen is not rigid. There is no shifting dullness or mass.      Tenderness: There is abdominal tenderness. There is no guarding or rebound.      Hernia: No hernia is present. There is no hernia in the ventral area.   Musculoskeletal:         General: Normal range of motion.      Cervical back: Normal range of motion.   Skin:     General: Skin is warm and dry.   Neurological:      Mental Status: She is alert and oriented to person, place, and time.   Psychiatric:         Behavior: Behavior normal.         Thought Content: Thought content normal.         Judgment: Judgment normal.       Assessment & Plan      1. Dysphagia, unspecified type    2. Gastroesophageal reflux disease with esophagitis without hemorrhage    3. Other iron deficiency anemia    4. Elevated LFTs    .   Diagnoses and all orders for this visit:    1. Dysphagia, unspecified type (Primary)  -     Case Request; Standing  -     dextrose 5 % and sodium chloride 0.45 % infusion  -     Case Request    2. Gastroesophageal reflux disease with esophagitis without hemorrhage  -     Case Request; Standing  -     dextrose 5 % and " sodium chloride 0.45 % infusion  -     Case Request    3. Other iron deficiency anemia  -     CBC & Differential  -     Comprehensive Metabolic Panel    4. Elevated LFTs  -     CBC & Differential  -     Comprehensive Metabolic Panel    Other orders  -     Linzess 290 MCG capsule capsule; Take 1 capsule by mouth Every Morning Before Breakfast.  Dispense: 30 capsule; Refill: 1  -     Follow Anesthesia Guidelines / Protocol; Future  -     Obtain Informed Consent; Future  -     Obtain Informed Consent; Standing  -     POC Glucose Once; Standing        Orders placed during this encounter include:  Orders Placed This Encounter   Procedures   • Comprehensive Metabolic Panel     Order Specific Question:   Release to patient     Answer:   Routine Release   • Obtain Informed Consent     Standing Status:   Future     Order Specific Question:   Informed Consent Given For     Answer:   ESOPHAGOGASTRODUODENOSCOPY WITH DILATATION   • CBC & Differential     Order Specific Question:   Manual Differential     Answer:   No       Medications prescribed:  New Medications Ordered This Visit   Medications   • Linzess 290 MCG capsule capsule     Sig: Take 1 capsule by mouth Every Morning Before Breakfast.     Dispense:  30 capsule     Refill:  1       Requested Prescriptions     Signed Prescriptions Disp Refills   • Linzess 290 MCG capsule capsule 30 capsule 1     Sig: Take 1 capsule by mouth Every Morning Before Breakfast.       Review and/or summary of lab tests, radiology, procedures, medications. Review and summary of old records and obtaining of history. The risks and benefits of my recommendations, as well as other treatment options were discussed with the patient today. Questions were answered.    Follow-up: Return in about 6 weeks (around 2/14/2023), or if symptoms worsen or fail to improve.     ESOPHAGOGASTRODUODENOSCOPY WITH DILATATION (N/A)      This document has been electronically signed by Johnny Smiley PA-C on January 6,  2023 13:30 CST      Results for orders placed or performed during the hospital encounter of 12/12/22   Covid-19 + Flu A&B AG, Veritor [id number]    Specimen: Swab   Result Value Ref Range    COVID19 Not Detected     Influenza A Antigen MELI Not Detected     Influenza B Antigen MELI Not Detected     Internal Control Passed     Lot Number [id number]     Expiration Date 3/2/23    Results for orders placed or performed during the hospital encounter of 09/24/22   STONE ANALYSIS - Calculus, Ureter, Left    Specimen: Ureter, Left; Calculus   Result Value Ref Range    Stone Source Comment     Color Brown     Size 4x4 mm    Stone Weight 52 mg    Composition Comment     Ca Oxalate - Monohydrate, Stone 20 %    HYDROXYAPATITE 80 %    Photo Comment     Comments: Comment     Please note Comment     Disclaimer: Comment    Urinalysis, Microscopic Only - Urine, Clean Catch    Specimen: Urine, Clean Catch   Result Value Ref Range    RBC, UA 13-20 (A) None Seen /HPF    WBC, UA 0-2 None Seen, 0-2, 3-5 /HPF    Bacteria, UA None Seen None Seen /HPF    Squamous Epithelial Cells, UA 0-2 None Seen, 0-2 /HPF    Hyaline Casts, UA None Seen None Seen /LPF    Methodology Automated Microscopy    Urinalysis With Culture If Indicated - Urine, Clean Catch    Specimen: Urine, Clean Catch   Result Value Ref Range    Color, UA Yellow Yellow, Straw, Dark Yellow, Paige    Appearance, UA Clear Clear    pH, UA 7.0 5.0 - 9.0    Specific Randolph, UA 1.011 1.003 - 1.030    Glucose, UA Negative Negative    Ketones, UA Negative Negative    Bilirubin, UA Negative Negative    Blood, UA Moderate (2+) (A) Negative    Protein, UA Negative Negative    Leuk Esterase, UA Negative Negative    Nitrite, UA Negative Negative    Urobilinogen, UA 0.2 E.U./dL 0.2 - 1.0 E.U./dL   CBC Auto Differential    Specimen: Blood   Result Value Ref Range    WBC 7.97 3.40 - 10.80 10*3/mm3    RBC 3.23 (L) 3.77 - 5.28 10*6/mm3    Hemoglobin 10.4 (L) 12.0 - 15.9 g/dL    Hematocrit 30.6 (L) 34.0  - 46.6 %    MCV 94.7 79.0 - 97.0 fL    MCH 32.2 26.6 - 33.0 pg    MCHC 34.0 31.5 - 35.7 g/dL    RDW 13.7 12.3 - 15.4 %    RDW-SD 47.1 37.0 - 54.0 fl    MPV 10.0 6.0 - 12.0 fL    Platelets 228 140 - 450 10*3/mm3    Neutrophil % 42.5 (L) 42.7 - 76.0 %    Lymphocyte % 46.7 (H) 19.6 - 45.3 %    Monocyte % 9.2 5.0 - 12.0 %    Eosinophil % 0.8 0.3 - 6.2 %    Basophil % 0.4 0.0 - 1.5 %    Immature Grans % 0.4 0.0 - 0.5 %    Neutrophils, Absolute 3.40 1.70 - 7.00 10*3/mm3    Lymphocytes, Absolute 3.72 (H) 0.70 - 3.10 10*3/mm3    Monocytes, Absolute 0.73 0.10 - 0.90 10*3/mm3    Eosinophils, Absolute 0.06 0.00 - 0.40 10*3/mm3    Basophils, Absolute 0.03 0.00 - 0.20 10*3/mm3    Immature Grans, Absolute 0.03 0.00 - 0.05 10*3/mm3    nRBC 0.0 0.0 - 0.2 /100 WBC   CBC Auto Differential    Specimen: Blood   Result Value Ref Range    WBC 5.14 3.40 - 10.80 10*3/mm3    RBC 3.40 (L) 3.77 - 5.28 10*6/mm3    Hemoglobin 11.0 (L) 12.0 - 15.9 g/dL    Hematocrit 32.4 (L) 34.0 - 46.6 %    MCV 95.3 79.0 - 97.0 fL    MCH 32.4 26.6 - 33.0 pg    MCHC 34.0 31.5 - 35.7 g/dL    RDW 13.9 12.3 - 15.4 %    RDW-SD 49.0 37.0 - 54.0 fl    MPV 9.5 6.0 - 12.0 fL    Platelets 218 140 - 450 10*3/mm3   Manual Differential    Specimen: Blood   Result Value Ref Range    Neutrophil % 42.0 (L) 42.7 - 76.0 %    Lymphocyte % 53.0 (H) 19.6 - 45.3 %    Monocyte % 2.0 (L) 5.0 - 12.0 %    Eosinophil % 1.0 0.3 - 6.2 %    Atypical Lymphocyte % 1.0 0.0 - 5.0 %    Plasma Cells % 1.0 (H) 0.0 - 0.0 %    Neutrophils Absolute 2.16 1.70 - 7.00 10*3/mm3    Lymphocytes Absolute 2.78 0.70 - 3.10 10*3/mm3    Monocytes Absolute 0.10 0.10 - 0.90 10*3/mm3    Eosinophils Absolute 0.05 0.00 - 0.40 10*3/mm3    RBC Morphology Normal Normal    WBC Morphology Normal Normal    Platelet Morphology Normal Normal   Blood Culture - Blood, Arm, Left    Specimen: Arm, Left; Blood   Result Value Ref Range    Blood Culture No growth at 5 days    Magnesium    Specimen: Blood   Result Value Ref Range     Magnesium 1.6 1.6 - 2.4 mg/dL   Magnesium    Specimen: Blood   Result Value Ref Range    Magnesium 1.6 1.6 - 2.4 mg/dL   Lactic Acid, Plasma    Specimen: Blood   Result Value Ref Range    Lactate 0.7 0.5 - 2.0 mmol/L   Basic Metabolic Panel    Specimen: Blood   Result Value Ref Range    Glucose 137 (H) 65 - 99 mg/dL    BUN 11 8 - 23 mg/dL    Creatinine 0.76 0.57 - 1.00 mg/dL    Sodium 138 136 - 145 mmol/L    Potassium 3.6 3.5 - 5.2 mmol/L    Chloride 109 (H) 98 - 107 mmol/L    CO2 21.0 (L) 22.0 - 29.0 mmol/L    Calcium 8.0 (L) 8.6 - 10.5 mg/dL    BUN/Creatinine Ratio 14.5 7.0 - 25.0    Anion Gap 8.0 5.0 - 15.0 mmol/L    eGFR 87.6 >60.0 mL/min/1.73   Basic Metabolic Panel    Specimen: Blood   Result Value Ref Range    Glucose 85 65 - 99 mg/dL    BUN 13 8 - 23 mg/dL    Creatinine 0.83 0.57 - 1.00 mg/dL    Sodium 139 136 - 145 mmol/L    Potassium 3.7 3.5 - 5.2 mmol/L    Chloride 112 (H) 98 - 107 mmol/L    CO2 18.0 (L) 22.0 - 29.0 mmol/L    Calcium 7.8 (L) 8.6 - 10.5 mg/dL    BUN/Creatinine Ratio 15.7 7.0 - 25.0    Anion Gap 9.0 5.0 - 15.0 mmol/L    eGFR 78.8 >60.0 mL/min/1.73     *Note: Due to a large number of results and/or encounters for the requested time period, some results have not been displayed. A complete set of results can be found in Results Review.

## 2023-01-03 NOTE — PROGRESS NOTES
Chief Complaint   Patient presents with   • Follow-up     5 Month   • Elevated Hepatic Enzymes       ENDO PROCEDURE ORDERED: EGDw/dilation, gerd, dysphagia    Subjective    Tricia Loyola is a 62 y.o. female. she is here today for follow-up.    History of Present Illness    Patient seen on a recheck of her GERD, abdominal pain, dysphagia, IBS-C, elevated liver enzymes. Last seen 08/02/2022, was scheduled for EGD with dilatation, but did not have that done. She states in September she thought she had a UTI, was having fever, was seen by Dr. Graham. She missed several follow-ups with him. She had had a chest x-ray 12/12/2022, I do not see stent placement. She was concerned whether she had a stent or did not have a stent. In reviewing her records, the hospitalist note indicates, discharge note says that there was a J-stent placed, but the only order I can see from Dr. Graham says she had a cystoscopy with stone extraction, but I could not pull up an operative note. The patient had laboratories 09/16/2022. CMP showed glucose 101, AST 64. She went to [facility] on 09/19/2022. CMP showed a potassium 5.2, , . CT abdomen and pelvis without contrast showed left ureteral stone 5.2 mm, anastomosis in the rectum, prior cholecystectomy. Repeat CT scan [facility] abdomen and pelvis without contrast 09/24/2022 showed further migration of the stone to the UVJ on the left. CMP at that time showed creatinine 1.2, sodium 135, AST 98, ALT 91, otherwise normal. Repeat laboratories 09/26/2022: BMP showed glucose 137, chloride 109, C02 21, calcium 8.0, otherwise normal. CBC showed hemoglobin 10.4, hematocrit 30.6, normal magnesium.     Patient presents with 4/10 abdominal pain. She is still having a lot of dysphagia despite taking Dexilant and Pepcid. She is on high-dose Linzess for her IBS-C. Weight is down 5 pounds since last visit. She had a previous EGD showing esophagitis, gastritis 03/16/2021. Last  colonoscopy she had a polyp with family history 11/26/2018.     A/P: Patient with recent urolithiasis, marked elevation in liver enzymes with drop in hemoglobin. Has not had repeat laboratories. I did recommend repeating her laboratories. She will be due for colonoscopy this year, but states her bowels are doing fairly well. Recommend scheduling her for EGD with dilatation for the dysphagia with suspected peptic stricture. Will recommend follow-up in 4-6 weeks, sooner if needed, further pending clinical course and the results of the above.       The following portions of the patient's history were reviewed and updated as appropriate:   Past Medical History:   Diagnosis Date   • Abdominal pain    • Anemia    • Anxiety and depression    • Chronic post-traumatic headache      rebound      • Depressive disorder    • Disease of thyroid gland    • Encounter for gynecological examination    • Endometriosis    • Gastroesophageal reflux disease    • Generalized anxiety disorder    • History of mammogram 08/2008    MAMMOGRAM DIAGNOSTIC BILATERAL 81492 ([facility]) (1)     • Hyperlipidemia    • Hypertension    • Ingrown toenail    • Injury of back    • Leaky heart valve    • Leaky heart valve    • Migraine    • Nonruptured cerebral aneurysm    • Osteoporosis    • Posttraumatic stress disorder    • Primary osteoarthritis of left knee    • Primary osteoarthritis of right knee    • PTSD (post-traumatic stress disorder)    • Seizure (HCC)     last seizure 2009.  no meds at this time   • Skin cancer     basal cell on back   • Tachycardia    • Viral hepatitis A    • Wears dentures     uppers only   • Wears glasses      Past Surgical History:   Procedure Laterality Date   • APPENDECTOMY     • BREAST BIOPSY Left     times 2 both negative   • CHOLECYSTECTOMY     • COLONOSCOPY N/A 11/26/2018    Procedure: COLONOSCOPY;  Surgeon: Meet Mcintyre MD;  Location: [facility] ENDOSCOPY;  Service: General   • CRANIOTOMY FOR ANEURYSM  2003   •  CYSTOSCOPY, URETEROSCOPY, RETROGRADE PYELOGRAM, STENT INSERTION Left 2022    Procedure: CYSTOSCOPY, STONE REMOVAL;  Surgeon: Leonardo Graham MD;  Location: [facility];  Service: Urology;  Laterality: Left;   • ENDOSCOPY N/A 10/16/2019    Procedure: ESOPHAGOGASTRODUODENOSCOPY;  Surgeon: Kory Muhammad MD;  Location: [facility] ENDOSCOPY;  Service: Gastroenterology   • ENDOSCOPY N/A 2021    Procedure: ESOPHAGOGASTRODUODENOSCOPY;  Surgeon: Kory Muhammad MD;  Location: [facility] ENDOSCOPY;  Service: Gastroenterology;  Laterality: N/A;   • MASS EXCISION Right 2020    Procedure: EXCISE SOFT TISSUE MASS RIGHT POSTERIOR FLANK                 (latex allergy);  Surgeon: Meet Mcintyre MD;  Location: [facility];  Service: General;  Laterality: Right;   • PAP SMEAR  2012   • TONSILLECTOMY     • UPPER GASTROINTESTINAL ENDOSCOPY  10/16/2019   • WRIST GANGLION EXCISION Right 2021    Procedure: EXCISION OF VOLAR GANGLION CYST RIGHT WRIST      (LATEX ALLERGY);  Surgeon: Leonardo Vargas MD;  Location: [facility];  Service: Orthopedics;  Laterality: Right;     Family History   Problem Relation Age of Onset   • Constipation Mother    • Hypertension Mother    • Anxiety disorder Mother    • Heart disease Mother    • Osteoporosis Mother         Also Myself I   • Alcohol abuse Father    • Heart disease Father    • Anxiety disorder Brother    • Kidney disease Brother    • Hypertension Brother    • Arthritis Brother    • Anxiety disorder Brother    • Kidney disease Brother    • Diabetes Brother    • Anxiety disorder Brother    • Hypertension Brother    • Heart disease Maternal Grandmother    • Clotting disorder Maternal Grandmother         Multiple Blood Clots In The Legs   • Breast cancer Paternal Aunt    • Heart disease Maternal Grandfather    • Colon cancer Maternal Great-Grandmother      OB History        3    Para   2    Term   2            AB   1    Living   2       SAB   1    IAB         Ectopic        Molar        Multiple        Live Births                  Allergies   Allergen Reactions   • Iodine Anaphylaxis   • Nitrofuran Derivatives Hives   • Toradol [Ketorolac Tromethamine] Anaphylaxis   • Cleocin [Clindamycin Hcl] Hives and Swelling     Swelling of eyes and hives   • Augmentin [Amoxicillin-Pot Clavulanate] Hives   • Shrimp Hives and Itching   • Ciprofloxacin Rash   • Fiorinal [Butalbital-Aspirin-Caffeine] Palpitations   • Ibuprofen Rash   • Imitrex [Sumatriptan] Palpitations   • Latex Rash   • Other Rash     prego spaghetti sauce    Midrin causes tachycardia   • Ultram [Tramadol] Palpitations     Social History     Socioeconomic History   • Marital status: Legally    Tobacco Use   • Smoking status: Every Day     Packs/day: 0.50     Years: 20.00     Pack years: 10.00     Types: Cigarettes, Electronic Cigarette   • Smokeless tobacco: Never   Vaping Use   • Vaping Use: Some days   • Substances: Nicotine, Flavoring   • Devices: Disposable   Substance and Sexual Activity   • Alcohol use: Not Currently   • Drug use: Never   • Sexual activity: Defer     Current Medications:  Prior to Admission medications    Medication Sig Start Date End Date Taking? Authorizing Provider   albuterol (ACCUNEB) 1.25 MG/3ML nebulizer solution Take 3 mL by nebulization Every 4 (Four) Hours As Needed for Wheezing or Shortness of Air. 11/15/21  Yes Rosario Ceron APRN   albuterol sulfate  (90 Base) MCG/ACT inhaler Inhale 2 puffs Every 4 (Four) Hours As Needed for Wheezing. 2/5/22  Yes Rosario Ceron APRN   amitriptyline (ELAVIL) 75 MG tablet Take 1 tablet by mouth Every Night. Resumed 6/10/22. 6/10/22  Yes Rosario Ceron APRN   aspirin 81 MG EC tablet Take 1 tablet by mouth Daily. 1/14/20  Yes Rosario Ceron APRN   Botox 200 units reconstituted solution Inject 200 Units into the appropriate muscle as directed by prescriber Every 3 (Three) Months. 4/26/21  Yes Provider, Historical,  MD   busPIRone (BUSPAR) 30 MG tablet Take 1 tablet by mouth 2 (Two) Times a Day. 3/7/18  Yes Sonia Mata MD   Cyanocobalamin 1000 MCG/ML kit Inject 1,000 mL as directed Every 30 (Thirty) Days. 4/7/22  Yes Rosario Ceron APRN   Dexilant 60 MG capsule TAKE 1 CAPSULE BY MOUTH daily 12/6/22  Yes Alvina Bergman APRN   diphenoxylate-atropine (Lomotil) 2.5-0.025 MG per tablet Take 1 tablet by mouth 4 (Four) Times a Day As Needed for Diarrhea. 2/10/22  Yes Rosario Ceron APRN   famotidine (PEPCID) 40 MG tablet Take 1 tablet by mouth Daily. 12/16/22  Yes Johnny Smiley PA-C   fluticasone (Flonase) 50 MCG/ACT nasal spray 2 sprays into the nostril(s) as directed by provider Daily. 12/7/22  Yes Alvina Bergman APRN   folic acid (FOLVITE) 800 MCG tablet Take 1 tablet by mouth Daily. 6/28/21  Yes Rosario Ceron APRN   HYDROcodone-acetaminophen (NORCO)  MG per tablet Take 1 tablet by mouth Every 6 (Six) Hours As Needed for Moderate Pain. 12/7/22  Yes Alvina Bergman APRN   hydrOXYzine pamoate (Vistaril) 50 MG capsule Take 1 capsule by mouth 3 (Three) Times a Day As Needed for Anxiety. 11/17/22  Yes Alvina Bergman APRN   levothyroxine (SYNTHROID, LEVOTHROID) 25 MCG tablet Take 0.5 tablets by mouth Daily. 12/15/22  Yes Alvina Bergman APRN   lidocaine (LIDODERM) 5 %  8/29/22  Yes Emergency, Nurse Mariama, RN   Linzess 290 MCG capsule capsule Take 1 capsule by mouth Every Morning Before Breakfast. 1/3/23  Yes Johnny Smiley PA-C   losartan (COZAAR) 50 MG tablet Take 50 mg by mouth Daily. 11/4/22  Yes Provider, MD Doris   metoprolol succinate XL (TOPROL-XL) 100 MG 24 hr tablet TAKE 1 TABLET BY MOUTH DAILY at 3pm 12/12/22  Yes Ghislaine Levi MD   ondansetron ODT (ZOFRAN-ODT) 4 MG disintegrating tablet Place 1 tablet on the tongue Every 8 (Eight) Hours As Needed for Nausea. 6/28/21  Yes Rosario Ceron APRN   OXcarbazepine (TRILEPTAL) 150 MG tablet Take 150  mg by mouth Every Night. Daily at bedtime   Yes Provider, MD Doris   prochlorperazine (COMPAZINE) 10 MG tablet Take 1 tablet by mouth Every 6 (Six) Hours As Needed for Nausea or Vomiting. 6/28/21  Yes Rosario Ceron APRN   rOPINIRole (REQUIP) 3 MG tablet TAKE 1 TABLET BY MOUTH EVERY NIGHT AT BEDTIME 11/7/22  Yes Alvina Bergman APRN   Syringe/Needle, Disp, 25G X 5/8\" 3 ML misc 1 each Every 28 (Twenty-Eight) Days. 9/12/22  Yes Alvina Bergman APRN   topiramate (TOPAMAX) 100 MG tablet Take 1 tablet by mouth 2 (Two) Times a Day. 5/28/20  Yes Rosario Ceron APRN   witch hazel-glycerin (TUCKS) pad Insert  into the rectum As Needed for Hemorrhoids. 2/14/22  Yes Sarah Willett APRN   Linzess 290 MCG capsule capsule Take 1 capsule by mouth Every Morning Before Breakfast. 5/31/22 1/3/23 Yes Johnny Smiley PA-C   acetaminophen (TYLENOL) 325 MG tablet Take 2 tablets by mouth Every 4 (Four) Hours As Needed for Mild Pain. 9/26/22   Azul Powell APRN   atorvastatin (LIPITOR) 40 MG tablet Take 1 tablet by mouth Every Night. For cholesterol 8/18/22   Sherry Zambrano PA-C   azithromycin (ZITHROMAX) 250 MG tablet Take 2 tablets the first day, then 1 tablet daily for 4 days. 12/12/22   Estefania Nichols APRN   brompheniramine-pseudoephedrine-DM 30-2-10 MG/5ML syrup Take 5 mL by mouth 4 (Four) Times a Day As Needed for Congestion or Cough. 9/21/22   Alvina Bergman APRN   loratadine (Claritin) 10 MG tablet Take 1 tablet by mouth Daily. 12/7/22   Alvina Bergman APRN   tamsulosin (FLOMAX) 0.4 MG capsule 24 hr capsule Take 1 capsule by mouth Daily. 9/20/22   Alvina Bergman APRN   nitroglycerin (NITROSTAT) 0.4 MG SL tablet Place 0.4 mg under the tongue Every 5 (Five) Minutes As Needed for Chest Pain. Take no more than 3 doses in 15 minutes.  9/20/22  Provider, Doris, MD     Review of Systems  Review of Systems   HENT: Positive for trouble swallowing.     Gastrointestinal: Positive for abdominal pain and constipation.          Objective    /68 (BP Location: Left arm, Patient Position: Sitting)   Pulse 78   Ht 157.5 cm (62\")   Wt 45.8 kg (101 lb)   SpO2 97%   BMI 18.47 kg/m²   Physical Exam  Vitals and nursing note reviewed.   Constitutional:       General: She is not in acute distress.     Appearance: She is well-developed.   HENT:      Head: Normocephalic and atraumatic.   Eyes:      Pupils: Pupils are equal, round, and reactive to light.   Cardiovascular:      Rate and Rhythm: Normal rate and regular rhythm.      Heart sounds: Normal heart sounds.   Pulmonary:      Effort: Pulmonary effort is normal.      Breath sounds: Normal breath sounds.   Abdominal:      General: Bowel sounds are normal. There is no distension or abdominal bruit.      Palpations: Abdomen is soft. Abdomen is not rigid. There is no shifting dullness or mass.      Tenderness: There is abdominal tenderness. There is no guarding or rebound.      Hernia: No hernia is present. There is no hernia in the ventral area.   Musculoskeletal:         General: Normal range of motion.      Cervical back: Normal range of motion.   Skin:     General: Skin is warm and dry.   Neurological:      Mental Status: She is alert and oriented to person, place, and time.   Psychiatric:         Behavior: Behavior normal.         Thought Content: Thought content normal.         Judgment: Judgment normal.       Assessment & Plan      1. Dysphagia, unspecified type    2. Gastroesophageal reflux disease with esophagitis without hemorrhage    3. Other iron deficiency anemia    4. Elevated LFTs    .   Diagnoses and all orders for this visit:    1. Dysphagia, unspecified type (Primary)  -     Case Request; Standing  -     dextrose 5 % and sodium chloride 0.45 % infusion  -     Case Request    2. Gastroesophageal reflux disease with esophagitis without hemorrhage  -     Case Request; Standing  -     dextrose 5 % and  sodium chloride 0.45 % infusion  -     Case Request    3. Other iron deficiency anemia  -     CBC & Differential  -     Comprehensive Metabolic Panel    4. Elevated LFTs  -     CBC & Differential  -     Comprehensive Metabolic Panel    Other orders  -     Linzess 290 MCG capsule capsule; Take 1 capsule by mouth Every Morning Before Breakfast.  Dispense: 30 capsule; Refill: 1  -     Follow Anesthesia Guidelines / Protocol; Future  -     Obtain Informed Consent; Future  -     Obtain Informed Consent; Standing  -     POC Glucose Once; Standing        Orders placed during this encounter include:  Orders Placed This Encounter   Procedures   • Comprehensive Metabolic Panel     Order Specific Question:   Release to patient     Answer:   Routine Release   • Obtain Informed Consent     Standing Status:   Future     Order Specific Question:   Informed Consent Given For     Answer:   ESOPHAGOGASTRODUODENOSCOPY WITH DILATATION   • CBC & Differential     Order Specific Question:   Manual Differential     Answer:   No       Medications prescribed:  New Medications Ordered This Visit   Medications   • Linzess 290 MCG capsule capsule     Sig: Take 1 capsule by mouth Every Morning Before Breakfast.     Dispense:  30 capsule     Refill:  1       Requested Prescriptions     Signed Prescriptions Disp Refills   • Linzess 290 MCG capsule capsule 30 capsule 1     Sig: Take 1 capsule by mouth Every Morning Before Breakfast.       Review and/or summary of lab tests, radiology, procedures, medications. Review and summary of old records and obtaining of history. The risks and benefits of my recommendations, as well as other treatment options were discussed with the patient today. Questions were answered.    Follow-up: Return in about 6 weeks (around 2/14/2023), or if symptoms worsen or fail to improve.     ESOPHAGOGASTRODUODENOSCOPY WITH DILATATION (N/A)      This document has been electronically signed by Johnny Smiley PA-C on January 6,  2023 13:30 CST      Results for orders placed or performed during the hospital encounter of 12/12/22   Covid-19 + Flu A&B AG, Veritor [id number]    Specimen: Swab   Result Value Ref Range    COVID19 Not Detected     Influenza A Antigen MELI Not Detected     Influenza B Antigen MELI Not Detected     Internal Control Passed     Lot Number [id number]     Expiration Date 3/2/23    Results for orders placed or performed during the hospital encounter of 09/24/22   STONE ANALYSIS - Calculus, Ureter, Left    Specimen: Ureter, Left; Calculus   Result Value Ref Range    Stone Source Comment     Color Brown     Size 4x4 mm    Stone Weight 52 mg    Composition Comment     Ca Oxalate - Monohydrate, Stone 20 %    HYDROXYAPATITE 80 %    Photo Comment     Comments: Comment     Please note Comment     Disclaimer: Comment    Urinalysis, Microscopic Only - Urine, Clean Catch    Specimen: Urine, Clean Catch   Result Value Ref Range    RBC, UA 13-20 (A) None Seen /HPF    WBC, UA 0-2 None Seen, 0-2, 3-5 /HPF    Bacteria, UA None Seen None Seen /HPF    Squamous Epithelial Cells, UA 0-2 None Seen, 0-2 /HPF    Hyaline Casts, UA None Seen None Seen /LPF    Methodology Automated Microscopy    Urinalysis With Culture If Indicated - Urine, Clean Catch    Specimen: Urine, Clean Catch   Result Value Ref Range    Color, UA Yellow Yellow, Straw, Dark Yellow, Paige    Appearance, UA Clear Clear    pH, UA 7.0 5.0 - 9.0    Specific Wilcox, UA 1.011 1.003 - 1.030    Glucose, UA Negative Negative    Ketones, UA Negative Negative    Bilirubin, UA Negative Negative    Blood, UA Moderate (2+) (A) Negative    Protein, UA Negative Negative    Leuk Esterase, UA Negative Negative    Nitrite, UA Negative Negative    Urobilinogen, UA 0.2 E.U./dL 0.2 - 1.0 E.U./dL   CBC Auto Differential    Specimen: Blood   Result Value Ref Range    WBC 7.97 3.40 - 10.80 10*3/mm3    RBC 3.23 (L) 3.77 - 5.28 10*6/mm3    Hemoglobin 10.4 (L) 12.0 - 15.9 g/dL    Hematocrit 30.6 (L) 34.0  - 46.6 %    MCV 94.7 79.0 - 97.0 fL    MCH 32.2 26.6 - 33.0 pg    MCHC 34.0 31.5 - 35.7 g/dL    RDW 13.7 12.3 - 15.4 %    RDW-SD 47.1 37.0 - 54.0 fl    MPV 10.0 6.0 - 12.0 fL    Platelets 228 140 - 450 10*3/mm3    Neutrophil % 42.5 (L) 42.7 - 76.0 %    Lymphocyte % 46.7 (H) 19.6 - 45.3 %    Monocyte % 9.2 5.0 - 12.0 %    Eosinophil % 0.8 0.3 - 6.2 %    Basophil % 0.4 0.0 - 1.5 %    Immature Grans % 0.4 0.0 - 0.5 %    Neutrophils, Absolute 3.40 1.70 - 7.00 10*3/mm3    Lymphocytes, Absolute 3.72 (H) 0.70 - 3.10 10*3/mm3    Monocytes, Absolute 0.73 0.10 - 0.90 10*3/mm3    Eosinophils, Absolute 0.06 0.00 - 0.40 10*3/mm3    Basophils, Absolute 0.03 0.00 - 0.20 10*3/mm3    Immature Grans, Absolute 0.03 0.00 - 0.05 10*3/mm3    nRBC 0.0 0.0 - 0.2 /100 WBC   CBC Auto Differential    Specimen: Blood   Result Value Ref Range    WBC 5.14 3.40 - 10.80 10*3/mm3    RBC 3.40 (L) 3.77 - 5.28 10*6/mm3    Hemoglobin 11.0 (L) 12.0 - 15.9 g/dL    Hematocrit 32.4 (L) 34.0 - 46.6 %    MCV 95.3 79.0 - 97.0 fL    MCH 32.4 26.6 - 33.0 pg    MCHC 34.0 31.5 - 35.7 g/dL    RDW 13.9 12.3 - 15.4 %    RDW-SD 49.0 37.0 - 54.0 fl    MPV 9.5 6.0 - 12.0 fL    Platelets 218 140 - 450 10*3/mm3   Manual Differential    Specimen: Blood   Result Value Ref Range    Neutrophil % 42.0 (L) 42.7 - 76.0 %    Lymphocyte % 53.0 (H) 19.6 - 45.3 %    Monocyte % 2.0 (L) 5.0 - 12.0 %    Eosinophil % 1.0 0.3 - 6.2 %    Atypical Lymphocyte % 1.0 0.0 - 5.0 %    Plasma Cells % 1.0 (H) 0.0 - 0.0 %    Neutrophils Absolute 2.16 1.70 - 7.00 10*3/mm3    Lymphocytes Absolute 2.78 0.70 - 3.10 10*3/mm3    Monocytes Absolute 0.10 0.10 - 0.90 10*3/mm3    Eosinophils Absolute 0.05 0.00 - 0.40 10*3/mm3    RBC Morphology Normal Normal    WBC Morphology Normal Normal    Platelet Morphology Normal Normal   Blood Culture - Blood, Arm, Left    Specimen: Arm, Left; Blood   Result Value Ref Range    Blood Culture No growth at 5 days    Magnesium    Specimen: Blood   Result Value Ref Range     Magnesium 1.6 1.6 - 2.4 mg/dL   Magnesium    Specimen: Blood   Result Value Ref Range    Magnesium 1.6 1.6 - 2.4 mg/dL   Lactic Acid, Plasma    Specimen: Blood   Result Value Ref Range    Lactate 0.7 0.5 - 2.0 mmol/L   Basic Metabolic Panel    Specimen: Blood   Result Value Ref Range    Glucose 137 (H) 65 - 99 mg/dL    BUN 11 8 - 23 mg/dL    Creatinine 0.76 0.57 - 1.00 mg/dL    Sodium 138 136 - 145 mmol/L    Potassium 3.6 3.5 - 5.2 mmol/L    Chloride 109 (H) 98 - 107 mmol/L    CO2 21.0 (L) 22.0 - 29.0 mmol/L    Calcium 8.0 (L) 8.6 - 10.5 mg/dL    BUN/Creatinine Ratio 14.5 7.0 - 25.0    Anion Gap 8.0 5.0 - 15.0 mmol/L    eGFR 87.6 >60.0 mL/min/1.73   Basic Metabolic Panel    Specimen: Blood   Result Value Ref Range    Glucose 85 65 - 99 mg/dL    BUN 13 8 - 23 mg/dL    Creatinine 0.83 0.57 - 1.00 mg/dL    Sodium 139 136 - 145 mmol/L    Potassium 3.7 3.5 - 5.2 mmol/L    Chloride 112 (H) 98 - 107 mmol/L    CO2 18.0 (L) 22.0 - 29.0 mmol/L    Calcium 7.8 (L) 8.6 - 10.5 mg/dL    BUN/Creatinine Ratio 15.7 7.0 - 25.0    Anion Gap 9.0 5.0 - 15.0 mmol/L    eGFR 78.8 >60.0 mL/min/1.73     *Note: Due to a large number of results and/or encounters for the requested time period, some results have not been displayed. A complete set of results can be found in Results Review.

## 2023-01-04 RX ORDER — CYANOCOBALAMIN 1000 UG/ML
INJECTION, SOLUTION INTRAMUSCULAR; SUBCUTANEOUS
Qty: 1 ML | Refills: 2 | Status: SHIPPED | OUTPATIENT
Start: 2023-01-04 | End: 2023-01-23

## 2023-01-08 DIAGNOSIS — M54.6 CHRONIC MIDLINE THORACIC BACK PAIN: Chronic | ICD-10-CM

## 2023-01-08 DIAGNOSIS — M54.50 CHRONIC MIDLINE LOW BACK PAIN WITHOUT SCIATICA: Chronic | ICD-10-CM

## 2023-01-08 DIAGNOSIS — G89.29 CHRONIC MIDLINE THORACIC BACK PAIN: Chronic | ICD-10-CM

## 2023-01-08 DIAGNOSIS — G89.29 CHRONIC MIDLINE LOW BACK PAIN WITHOUT SCIATICA: Chronic | ICD-10-CM

## 2023-01-09 RX ORDER — HYDROCODONE BITARTRATE AND ACETAMINOPHEN 10; 325 MG/1; MG/1
1 TABLET ORAL EVERY 6 HOURS PRN
Qty: 8 TABLET | Refills: 0 | Status: SHIPPED | OUTPATIENT
Start: 2023-01-09

## 2023-01-11 RX ORDER — FAMOTIDINE 40 MG/1
40 TABLET, FILM COATED ORAL DAILY
Qty: 30 TABLET | Refills: 0 | Status: SHIPPED | OUTPATIENT
Start: 2023-01-11 | End: 2023-02-14 | Stop reason: SDUPTHER

## 2023-01-16 ENCOUNTER — OFFICE VISIT (OUTPATIENT)
Dept: FAMILY MEDICINE CLINIC | Facility: CLINIC | Age: 63
End: 2023-01-16
Payer: MEDICARE

## 2023-01-16 ENCOUNTER — LAB (OUTPATIENT)
Dept: LAB | Facility: OTHER | Age: 63
End: 2023-01-16
Payer: MEDICARE

## 2023-01-16 VITALS
HEART RATE: 66 BPM | OXYGEN SATURATION: 98 % | BODY MASS INDEX: 18.62 KG/M2 | SYSTOLIC BLOOD PRESSURE: 122 MMHG | DIASTOLIC BLOOD PRESSURE: 78 MMHG | HEIGHT: 62 IN | WEIGHT: 101.2 LBS

## 2023-01-16 DIAGNOSIS — R63.4 WEIGHT LOSS: Primary | ICD-10-CM

## 2023-01-16 DIAGNOSIS — R63.4 WEIGHT LOSS: ICD-10-CM

## 2023-01-16 DIAGNOSIS — R42 DIZZINESS: ICD-10-CM

## 2023-01-16 DIAGNOSIS — R63.0 DECREASED APPETITE: Chronic | ICD-10-CM

## 2023-01-16 DIAGNOSIS — R11.0 NAUSEA: Chronic | ICD-10-CM

## 2023-01-16 LAB
ALBUMIN SERPL-MCNC: 4.2 G/DL (ref 3.5–5)
ALBUMIN/GLOB SERPL: 1.4 G/DL (ref 1.1–1.8)
ALP SERPL-CCNC: 55 U/L (ref 38–126)
ALT SERPL W P-5'-P-CCNC: 26 U/L
ANION GAP SERPL CALCULATED.3IONS-SCNC: 7 MMOL/L (ref 5–15)
AST SERPL-CCNC: 61 U/L (ref 14–36)
BASOPHILS # BLD AUTO: 0.02 10*3/MM3 (ref 0–0.2)
BASOPHILS NFR BLD AUTO: 0.3 % (ref 0–1.5)
BILIRUB SERPL-MCNC: 0.2 MG/DL (ref 0.2–1.3)
BUN SERPL-MCNC: 15 MG/DL (ref 7–23)
BUN/CREAT SERPL: 14.9 (ref 7–25)
CALCIUM SPEC-SCNC: 8.9 MG/DL (ref 8.4–10.2)
CHLORIDE SERPL-SCNC: 103 MMOL/L (ref 101–112)
CO2 SERPL-SCNC: 25 MMOL/L (ref 22–30)
CREAT SERPL-MCNC: 1.01 MG/DL (ref 0.52–1.04)
DEPRECATED RDW RBC AUTO: 44.4 FL (ref 37–54)
EGFRCR SERPLBLD CKD-EPI 2021: 62.3 ML/MIN/1.73
EOSINOPHIL # BLD AUTO: 0.08 10*3/MM3 (ref 0–0.4)
EOSINOPHIL NFR BLD AUTO: 1.2 % (ref 0.3–6.2)
ERYTHROCYTE [DISTWIDTH] IN BLOOD BY AUTOMATED COUNT: 13.3 % (ref 12.3–15.4)
GLOBULIN UR ELPH-MCNC: 3.1 GM/DL (ref 2.3–3.5)
GLUCOSE SERPL-MCNC: 101 MG/DL (ref 70–99)
HCT VFR BLD AUTO: 37.6 % (ref 34–46.6)
HGB BLD-MCNC: 12.7 G/DL (ref 12–15.9)
LYMPHOCYTES # BLD AUTO: 2.66 10*3/MM3 (ref 0.7–3.1)
LYMPHOCYTES NFR BLD AUTO: 39.3 % (ref 19.6–45.3)
MCH RBC QN AUTO: 31.8 PG (ref 26.6–33)
MCHC RBC AUTO-ENTMCNC: 33.8 G/DL (ref 31.5–35.7)
MCV RBC AUTO: 94 FL (ref 79–97)
MONOCYTES # BLD AUTO: 0.77 10*3/MM3 (ref 0.1–0.9)
MONOCYTES NFR BLD AUTO: 11.4 % (ref 5–12)
NEUTROPHILS NFR BLD AUTO: 3.23 10*3/MM3 (ref 1.7–7)
NEUTROPHILS NFR BLD AUTO: 47.8 % (ref 42.7–76)
PLATELET # BLD AUTO: 262 10*3/MM3 (ref 140–450)
PMV BLD AUTO: 8.9 FL (ref 6–12)
POTASSIUM SERPL-SCNC: 3.8 MMOL/L (ref 3.4–5)
PROT SERPL-MCNC: 7.3 G/DL (ref 6.3–8.6)
RBC # BLD AUTO: 4 10*6/MM3 (ref 3.77–5.28)
SODIUM SERPL-SCNC: 135 MMOL/L (ref 137–145)
WBC NRBC COR # BLD: 6.76 10*3/MM3 (ref 3.4–10.8)

## 2023-01-16 PROCEDURE — 80053 COMPREHEN METABOLIC PANEL: CPT | Performed by: PHYSICIAN ASSISTANT

## 2023-01-16 PROCEDURE — 99214 OFFICE O/P EST MOD 30 MIN: CPT | Performed by: NURSE PRACTITIONER

## 2023-01-16 PROCEDURE — 85025 COMPLETE CBC W/AUTO DIFF WBC: CPT | Performed by: PHYSICIAN ASSISTANT

## 2023-01-16 PROCEDURE — 36415 COLL VENOUS BLD VENIPUNCTURE: CPT | Performed by: PHYSICIAN ASSISTANT

## 2023-01-16 PROCEDURE — 84443 ASSAY THYROID STIM HORMONE: CPT | Performed by: NURSE PRACTITIONER

## 2023-01-16 RX ORDER — MEGESTROL ACETATE 20 MG/1
20 TABLET ORAL DAILY
Qty: 30 TABLET | Refills: 5 | Status: SHIPPED | OUTPATIENT
Start: 2023-01-16

## 2023-01-16 RX ORDER — PROCHLORPERAZINE MALEATE 10 MG
10 TABLET ORAL EVERY 6 HOURS PRN
Qty: 120 TABLET | Refills: 5 | Status: SHIPPED | OUTPATIENT
Start: 2023-01-16

## 2023-01-16 NOTE — PROGRESS NOTES
CC: Follow-up (Requesting lab work )      Subjective:  Tricia Loyola is a 62 y.o. female who presents for         Patient presents to office with complaints of weight loss.  She is requesting lab work to evaluate this.  She is currently 101 pounds at office visit today.  At office visit on 12/12/2022 she was 99 pounds. She does get nauseated a lot, has loss of appetite, has light headedness, and dizziness. She takes compazine for the nausea. States Zofran doesn't help. She denies abdominal pain  She does have an EGD scheduled on 1/24/2023 with Dr. Muhammad. She states she has been weighing around 97 lbs on her brother's scales at home. She has had a lot of stress recently.      The following portions of the patient's history were reviewed and updated as appropriate: allergies, current medications, past family history, past medical history, past social history, past surgical history and problem list.    Past Medical History:   Diagnosis Date   • Abdominal pain    • Anemia    • Anxiety and depression    • Chronic post-traumatic headache      rebound      • Depressive disorder    • Disease of thyroid gland    • Encounter for gynecological examination    • Endometriosis    • Gastroesophageal reflux disease    • Generalized anxiety disorder    • History of mammogram 08/2008    MAMMOGRAM DIAGNOSTIC BILATERAL 12627 ([facility]) (1)     • Hyperlipidemia    • Hypertension    • Ingrown toenail    • Injury of back    • Leaky heart valve    • Leaky heart valve    • Migraine    • Nonruptured cerebral aneurysm    • Osteoporosis    • Posttraumatic stress disorder    • Primary osteoarthritis of left knee    • Primary osteoarthritis of right knee    • PTSD (post-traumatic stress disorder)    • Seizure (HCC)     last seizure 2009.  no meds at this time   • Skin cancer     basal cell on back   • Tachycardia    • Viral hepatitis A    • Wears dentures     uppers only   • Wears glasses          Current Outpatient Medications:   •   acetaminophen (TYLENOL) 325 MG tablet, Take 2 tablets by mouth Every 4 (Four) Hours As Needed for Mild Pain., Disp: , Rfl:   •  albuterol (ACCUNEB) 1.25 MG/3ML nebulizer solution, Take 3 mL by nebulization Every 4 (Four) Hours As Needed for Wheezing or Shortness of Air., Disp: 100 each, Rfl: 5  •  albuterol sulfate  (90 Base) MCG/ACT inhaler, Inhale 2 puffs Every 4 (Four) Hours As Needed for Wheezing., Disp: 18 g, Rfl: 5  •  amitriptyline (ELAVIL) 75 MG tablet, Take 1 tablet by mouth Every Night. Resumed 6/10/22., Disp: 30 tablet, Rfl: 5  •  aspirin 81 MG EC tablet, Take 1 tablet by mouth Daily., Disp: 30 tablet, Rfl: 0  •  atorvastatin (LIPITOR) 40 MG tablet, Take 1 tablet by mouth Every Night. For cholesterol, Disp: 90 tablet, Rfl: 1  •  azithromycin (ZITHROMAX) 250 MG tablet, Take 2 tablets the first day, then 1 tablet daily for 4 days., Disp: 6 tablet, Rfl: 0  •  Botox 200 units reconstituted solution, Inject 200 Units into the appropriate muscle as directed by prescriber Every 3 (Three) Months., Disp: , Rfl:   •  brompheniramine-pseudoephedrine-DM 30-2-10 MG/5ML syrup, Take 5 mL by mouth 4 (Four) Times a Day As Needed for Congestion or Cough., Disp: 473 mL, Rfl: 0  •  busPIRone (BUSPAR) 30 MG tablet, Take 1 tablet by mouth 2 (Two) Times a Day., Disp: 60 tablet, Rfl: 0  •  cyanocobalamin 1000 MCG/ML injection, inject 1ml INTO THE MUSCLE as directed every 30 DAYS, Disp: 1 mL, Rfl: 2  •  Cyanocobalamin 1000 MCG/ML kit, Inject 1,000 mL as directed Every 30 (Thirty) Days., Disp: 1 kit, Rfl: 5  •  Dexilant 60 MG capsule, TAKE 1 CAPSULE BY MOUTH daily, Disp: 90 capsule, Rfl: 0  •  diphenoxylate-atropine (Lomotil) 2.5-0.025 MG per tablet, Take 1 tablet by mouth 4 (Four) Times a Day As Needed for Diarrhea., Disp: 12 tablet, Rfl: 0  •  famotidine (PEPCID) 40 MG tablet, TAKE 1 TABLET BY MOUTH daily, Disp: 30 tablet, Rfl: 0  •  fluticasone (Flonase) 50 MCG/ACT nasal spray, 2 sprays into the nostril(s) as directed by  "provider Daily., Disp: 9.9 mL, Rfl: 3  •  folic acid (FOLVITE) 800 MCG tablet, Take 1 tablet by mouth Daily., Disp: 90 tablet, Rfl: 1  •  HYDROcodone-acetaminophen (NORCO)  MG per tablet, Take 1 tablet by mouth Every 6 (Six) Hours As Needed for Moderate Pain., Disp: 8 tablet, Rfl: 0  •  hydrOXYzine pamoate (Vistaril) 50 MG capsule, Take 1 capsule by mouth 3 (Three) Times a Day As Needed for Anxiety., Disp: 90 capsule, Rfl: 3  •  levothyroxine (SYNTHROID, LEVOTHROID) 25 MCG tablet, Take 0.5 tablets by mouth Daily., Disp: 15 tablet, Rfl: 5  •  lidocaine (LIDODERM) 5 %, , Disp: , Rfl:   •  Linzess 290 MCG capsule capsule, Take 1 capsule by mouth Every Morning Before Breakfast., Disp: 30 capsule, Rfl: 1  •  loratadine (Claritin) 10 MG tablet, Take 1 tablet by mouth Daily., Disp: 30 tablet, Rfl: 3  •  losartan (COZAAR) 50 MG tablet, Take 50 mg by mouth Daily., Disp: , Rfl:   •  metoprolol succinate XL (TOPROL-XL) 100 MG 24 hr tablet, TAKE 1 TABLET BY MOUTH DAILY at 3pm, Disp: 30 tablet, Rfl: 1  •  ondansetron ODT (ZOFRAN-ODT) 4 MG disintegrating tablet, Place 1 tablet on the tongue Every 8 (Eight) Hours As Needed for Nausea., Disp: 30 tablet, Rfl: 5  •  OXcarbazepine (TRILEPTAL) 150 MG tablet, Take 150 mg by mouth Every Night. Daily at bedtime, Disp: , Rfl:   •  prochlorperazine (COMPAZINE) 10 MG tablet, Take 1 tablet by mouth Every 6 (Six) Hours As Needed for Nausea or Vomiting., Disp: 120 tablet, Rfl: 5  •  rOPINIRole (REQUIP) 3 MG tablet, TAKE 1 TABLET BY MOUTH EVERY NIGHT AT BEDTIME, Disp: 90 tablet, Rfl: 0  •  Syringe/Needle, Disp, 25G X 5/8\" 3 ML misc, 1 each Every 28 (Twenty-Eight) Days., Disp: 3 each, Rfl: 1  •  tamsulosin (FLOMAX) 0.4 MG capsule 24 hr capsule, Take 1 capsule by mouth Daily., Disp: 30 capsule, Rfl: 0  •  topiramate (TOPAMAX) 100 MG tablet, Take 1 tablet by mouth 2 (Two) Times a Day., Disp: 180 tablet, Rfl: 1  •  witch hazel-glycerin (TUCKS) pad, Insert  into the rectum As Needed for " "Hemorrhoids., Disp: 40 each, Rfl: 1  •  megestrol (MEGACE) 20 MG tablet, Take 1 tablet by mouth Daily., Disp: 30 tablet, Rfl: 5    Review of Systems    Review of Systems   Constitutional: Positive for unexpected weight change.   HENT: Negative.    Eyes: Negative.    Respiratory: Negative.    Cardiovascular: Negative.    Gastrointestinal: Positive for nausea. Negative for abdominal pain, blood in stool and vomiting.   Endocrine: Negative.    Genitourinary: Negative.    Musculoskeletal: Negative.    Skin: Negative.    Allergic/Immunologic: Negative.    Neurological: Positive for dizziness and light-headedness.   Hematological: Negative.    Psychiatric/Behavioral: Negative.    All other systems reviewed and are negative.      Objective  Vitals:    01/16/23 1333   BP: 122/78   Pulse: 66   SpO2: 98%   Weight: 45.9 kg (101 lb 3.2 oz)   Height: 157.5 cm (62\")     Body mass index is 18.51 kg/m².    Physical Exam    Physical Exam  Vitals and nursing note reviewed.   Constitutional:       General: She is not in acute distress.     Appearance: Normal appearance. She is not ill-appearing, toxic-appearing or diaphoretic.   HENT:      Head: Normocephalic and atraumatic.      Right Ear: Tympanic membrane, ear canal and external ear normal. There is no impacted cerumen.      Left Ear: Tympanic membrane, ear canal and external ear normal. There is no impacted cerumen.   Cardiovascular:      Rate and Rhythm: Normal rate and regular rhythm.      Pulses: Normal pulses.      Heart sounds: Normal heart sounds. No murmur heard.    No friction rub. No gallop.   Pulmonary:      Effort: Pulmonary effort is normal. No respiratory distress.      Breath sounds: Normal breath sounds. No stridor. No wheezing, rhonchi or rales.   Chest:      Chest wall: No tenderness.   Musculoskeletal:      Cervical back: Normal range of motion and neck supple.      Right lower leg: No edema.      Left lower leg: No edema.   Skin:     General: Skin is warm and " dry.      Findings: No rash.   Neurological:      Mental Status: She is alert.             Diagnoses and all orders for this visit:    1. Weight loss (Primary)  -     CBC w AUTO Differential; Future  -     Comprehensive metabolic panel; Future  -     TSH  -     megestrol (MEGACE) 20 MG tablet; Take 1 tablet by mouth Daily.  Dispense: 30 tablet; Refill: 5    2. Decreased appetite  -     CBC w AUTO Differential; Future  -     Comprehensive metabolic panel; Future  -     TSH  -     megestrol (MEGACE) 20 MG tablet; Take 1 tablet by mouth Daily.  Dispense: 30 tablet; Refill: 5    3. Nausea  -     CBC w AUTO Differential; Future  -     Comprehensive metabolic panel; Future  -     TSH  -     prochlorperazine (COMPAZINE) 10 MG tablet; Take 1 tablet by mouth Every 6 (Six) Hours As Needed for Nausea or Vomiting.  Dispense: 120 tablet; Refill: 5    4. Dizziness  -     CBC w AUTO Differential; Future  -     Comprehensive metabolic panel; Future  -     TSH       Patient with weight loss, decreased appetite, nausea, and dizziness.  We will check a CBC, CMP, and TSH on patient's way out today.  We will notify her of these results once they are available.  Patient advised to move positions slowly as she could be having some orthostatic changes in her blood pressure causing dizziness.  Refilled patient's Compazine today as it does help with the nausea.  We will start patient on Megace to help with her appetite.  Patient instructed on how to take this medication and possible side effects.  She is advised to keep her scheduled appointment for her EGD this month.  She is to follow-up with me in 5 months or sooner if needed for her annual physical exam.  Answered all questions.  Patient verbalized understanding of plan of care.          This document has been electronically signed by UMESH Bustos on January 16, 2023 13:56 CST

## 2023-01-17 LAB — TSH SERPL DL<=0.05 MIU/L-ACNC: 1.62 UIU/ML (ref 0.27–4.2)

## 2023-01-24 ENCOUNTER — ANESTHESIA (OUTPATIENT)
Dept: GASTROENTEROLOGY | Facility: HOSPITAL | Age: 63
End: 2023-01-24
Payer: MEDICARE

## 2023-01-24 ENCOUNTER — HOSPITAL ENCOUNTER (OUTPATIENT)
Facility: HOSPITAL | Age: 63
Setting detail: HOSPITAL OUTPATIENT SURGERY
Discharge: HOME | End: 2023-01-24
Attending: INTERNAL MEDICINE | Admitting: INTERNAL MEDICINE
Payer: MEDICARE

## 2023-01-24 ENCOUNTER — ANESTHESIA EVENT (OUTPATIENT)
Dept: GASTROENTEROLOGY | Facility: HOSPITAL | Age: 63
End: 2023-01-24
Payer: MEDICARE

## 2023-01-24 VITALS
WEIGHT: 102 LBS | DIASTOLIC BLOOD PRESSURE: 51 MMHG | HEIGHT: 62 IN | OXYGEN SATURATION: 99 % | TEMPERATURE: 97.1 F | SYSTOLIC BLOOD PRESSURE: 96 MMHG | BODY MASS INDEX: 18.77 KG/M2 | HEART RATE: 57 BPM | RESPIRATION RATE: 18 BRPM

## 2023-01-24 DIAGNOSIS — R13.10 DYSPHAGIA, UNSPECIFIED TYPE: ICD-10-CM

## 2023-01-24 DIAGNOSIS — K21.00 GASTROESOPHAGEAL REFLUX DISEASE WITH ESOPHAGITIS WITHOUT HEMORRHAGE: ICD-10-CM

## 2023-01-24 PROCEDURE — 88305 TISSUE EXAM BY PATHOLOGIST: CPT

## 2023-01-24 PROCEDURE — 25010000002 PROPOFOL 10 MG/ML EMULSION: Performed by: NURSE ANESTHETIST, CERTIFIED REGISTERED

## 2023-01-24 PROCEDURE — 43248 EGD GUIDE WIRE INSERTION: CPT | Performed by: INTERNAL MEDICINE

## 2023-01-24 PROCEDURE — 43239 EGD BIOPSY SINGLE/MULTIPLE: CPT | Performed by: INTERNAL MEDICINE

## 2023-01-24 RX ORDER — LIDOCAINE HYDROCHLORIDE 20 MG/ML
INJECTION, SOLUTION INTRAVENOUS AS NEEDED
Status: DISCONTINUED | OUTPATIENT
Start: 2023-01-24 | End: 2023-01-24 | Stop reason: SURG

## 2023-01-24 RX ORDER — PROPOFOL 10 MG/ML
VIAL (ML) INTRAVENOUS AS NEEDED
Status: DISCONTINUED | OUTPATIENT
Start: 2023-01-24 | End: 2023-01-24 | Stop reason: SURG

## 2023-01-24 RX ORDER — DEXTROSE AND SODIUM CHLORIDE 5; .45 G/100ML; G/100ML
30 INJECTION, SOLUTION INTRAVENOUS CONTINUOUS PRN
Status: DISCONTINUED | OUTPATIENT
Start: 2023-01-24 | End: 2023-01-24 | Stop reason: HOSPADM

## 2023-01-24 RX ORDER — LOSARTAN POTASSIUM 50 MG/1
50 TABLET ORAL DAILY
Qty: 30 TABLET | Refills: 6 | Status: SHIPPED | OUTPATIENT
Start: 2023-01-24

## 2023-01-24 RX ADMIN — PROPOFOL 20 MG: 10 INJECTION, EMULSION INTRAVENOUS at 13:14

## 2023-01-24 RX ADMIN — PROPOFOL 50 MG: 10 INJECTION, EMULSION INTRAVENOUS at 13:12

## 2023-01-24 RX ADMIN — DEXTROSE AND SODIUM CHLORIDE 30 ML/HR: 5; 450 INJECTION, SOLUTION INTRAVENOUS at 12:36

## 2023-01-24 RX ADMIN — LIDOCAINE HYDROCHLORIDE 45 MG: 20 INJECTION, SOLUTION INTRAVENOUS at 13:13

## 2023-01-24 RX ADMIN — PROPOFOL 20 MG: 10 INJECTION, EMULSION INTRAVENOUS at 13:16

## 2023-01-24 NOTE — ANESTHESIA POSTPROCEDURE EVALUATION
Patient: Tricia Loyola    Procedure Summary     Date: 01/24/23 Room / Location: [facility] ENDOSCOPY 1 / [facility] ENDOSCOPY    Anesthesia Start: 1303 Anesthesia Stop: 1319    Procedure: ESOPHAGOGASTRODUODENOSCOPY WITH DILATATION Diagnosis:       Dysphagia, unspecified type      Gastroesophageal reflux disease with esophagitis without hemorrhage      (Dysphagia, unspecified type [R13.10])      (Gastroesophageal reflux disease with esophagitis without hemorrhage [K21.00])    Surgeons: Kory Muhammad MD Provider: Chano Galdamez CRNA    Anesthesia Type: general ASA Status: 3          Anesthesia Type: general    Vitals  No vitals data found for the desired time range.          Post Anesthesia Care and Evaluation    Patient location during evaluation: PHASE II  Patient participation: complete - patient participated  Level of consciousness: awake and alert  Pain management: adequate    Airway patency: patent  Anesthetic complications: No anesthetic complications    Cardiovascular status: acceptable  Respiratory status: acceptable  Hydration status: acceptable    Comments: HR 66  /65  SPO2 100

## 2023-01-24 NOTE — ANESTHESIA PREPROCEDURE EVALUATION
Anesthesia Evaluation     no history of anesthetic complications:  NPO Solid Status: > 8 hours  NPO Liquid Status: > 2 hours           Airway   Mallampati: II  TM distance: >3 FB  Neck ROM: full  no difficulty expected  Dental    (+) edentulous and upper dentures    Pulmonary - normal exam    breath sounds clear to auscultation  (+) a smoker (0.5 ppd) Current Abstained day of surgery, COPD, asthma,  (-) sleep apnea  Cardiovascular - normal exam  Exercise tolerance: good (4-7 METS)    ECG reviewed  Patient on routine beta blocker and Beta blocker given within 24 hours of surgery  Rhythm: regular  Rate: normal    (+) hypertension well controlled 2 medications or greater, valvular problems/murmurs murmur and AI, hyperlipidemia,   (-) past MI, dysrhythmias, angina, cardiac stents    ROS comment: 10/28/20  Normal sinus rhythm  Possible Left atrial enlargement  Rightward axis  Incomplete right bundle branch block  Septal infarct , age undetermined  Abnormal ECG  No previous ECGs available  Confirmed by DANNI    · Left ventricular ejection fraction appears to be 66 - 70%. Left ventricular systolic function is normal.  · Left ventricular diastolic function was normal.  · Moderate aortic valve regurgitation is present.  · Estimated right ventricular systolic pressure from tricuspid regurgitation is normal (<35 mmHg).        Neuro/Psych  (+) seizures well controlled, headaches, numbness, psychiatric history Anxiety and Depression,    (-) TIA, CVA, weakness    ROS Comment: Brain aneurysm 2003  GI/Hepatic/Renal/Endo    (+)  GERD well controlled,  hepatitis A, liver disease, renal disease stones, thyroid problem hypothyroidism  (-) diabetes    Musculoskeletal     (+) arthralgias,       ROS comment: Ganglion cyst right wrist  Abdominal    Substance History   (-) alcohol use, drug use     OB/GYN          Other   arthritis,    history of cancer                      Anesthesia Plan    ASA 3     general   total IV  anesthesia  intravenous induction     Anesthetic plan, risks, benefits, and alternatives have been provided, discussed and informed consent has been obtained with: patient.

## 2023-01-30 ENCOUNTER — OFFICE VISIT (OUTPATIENT)
Dept: FAMILY MEDICINE CLINIC | Facility: CLINIC | Age: 63
End: 2023-01-30
Payer: MEDICARE

## 2023-01-30 ENCOUNTER — LAB (OUTPATIENT)
Dept: LAB | Facility: OTHER | Age: 63
End: 2023-01-30
Payer: MEDICARE

## 2023-01-30 VITALS
SYSTOLIC BLOOD PRESSURE: 124 MMHG | HEIGHT: 62 IN | DIASTOLIC BLOOD PRESSURE: 82 MMHG | OXYGEN SATURATION: 98 % | WEIGHT: 102 LBS | HEART RATE: 65 BPM | BODY MASS INDEX: 18.77 KG/M2

## 2023-01-30 DIAGNOSIS — R30.0 DYSURIA: Primary | ICD-10-CM

## 2023-01-30 DIAGNOSIS — N30.00 ACUTE CYSTITIS WITHOUT HEMATURIA: Primary | ICD-10-CM

## 2023-01-30 LAB
BACTERIA UR QL AUTO: ABNORMAL /HPF
BILIRUB UR QL STRIP: NEGATIVE
CLARITY UR: ABNORMAL
COLOR UR: YELLOW
GLUCOSE UR STRIP-MCNC: NEGATIVE MG/DL
HGB UR QL STRIP.AUTO: ABNORMAL
HYALINE CASTS UR QL AUTO: ABNORMAL /LPF
KETONES UR QL STRIP: NEGATIVE
LEUKOCYTE ESTERASE UR QL STRIP.AUTO: ABNORMAL
MUCOUS THREADS URNS QL MICRO: ABNORMAL /HPF
NITRITE UR QL STRIP: NEGATIVE
PH UR STRIP.AUTO: 7 [PH] (ref 5.5–8)
PROT UR QL STRIP: ABNORMAL
RBC # UR STRIP: ABNORMAL /HPF
REF LAB TEST METHOD: ABNORMAL
REF LAB TEST METHOD: NORMAL
SP GR UR STRIP: 1.01 (ref 1–1.03)
SQUAMOUS #/AREA URNS HPF: ABNORMAL /HPF
UROBILINOGEN UR QL STRIP: ABNORMAL
WBC # UR STRIP: ABNORMAL /HPF

## 2023-01-30 PROCEDURE — 99213 OFFICE O/P EST LOW 20 MIN: CPT | Performed by: NURSE PRACTITIONER

## 2023-01-30 PROCEDURE — 87077 CULTURE AEROBIC IDENTIFY: CPT | Performed by: NURSE PRACTITIONER

## 2023-01-30 PROCEDURE — 87186 SC STD MICRODIL/AGAR DIL: CPT | Performed by: NURSE PRACTITIONER

## 2023-01-30 PROCEDURE — 87086 URINE CULTURE/COLONY COUNT: CPT | Performed by: NURSE PRACTITIONER

## 2023-01-30 PROCEDURE — 81001 URINALYSIS AUTO W/SCOPE: CPT | Performed by: NURSE PRACTITIONER

## 2023-01-30 RX ORDER — SULFAMETHOXAZOLE AND TRIMETHOPRIM 800; 160 MG/1; MG/1
1 TABLET ORAL 2 TIMES DAILY
Qty: 10 TABLET | Refills: 0 | Status: SHIPPED | OUTPATIENT
Start: 2023-01-30

## 2023-01-30 NOTE — PROGRESS NOTES
CC: Urinary Tract Infection      Subjective:  Tricia Loyola is a 63 y.o. female who presents for         Pt presents to office with c/o dysuria. Onset yesterday and worsening.    Difficulty Urinating  This is a new problem. The current episode started yesterday. The problem occurs constantly. The problem has been gradually worsening. Pertinent negatives include no abdominal pain, change in bowel habit, chills, congestion, coughing, diaphoresis, fever, nausea or vomiting. Nothing aggravates the symptoms. She has tried nothing for the symptoms. The treatment provided no relief.       The following portions of the patient's history were reviewed and updated as appropriate: allergies, current medications, past family history, past medical history, past social history, past surgical history and problem list.    Past Medical History:   Diagnosis Date   • Abdominal pain    • Anemia    • Anxiety and depression    • Chronic post-traumatic headache      rebound      • Depressive disorder    • Disease of thyroid gland    • Encounter for gynecological examination    • Endometriosis    • Gastroesophageal reflux disease    • Generalized anxiety disorder    • History of mammogram 08/2008    MAMMOGRAM DIAGNOSTIC BILATERAL 27570 ([facility]) (1)     • Hyperlipidemia    • Hypertension    • Ingrown toenail    • Injury of back    • Leaky heart valve    • Leaky heart valve    • Migraine    • Nonruptured cerebral aneurysm    • Osteoporosis    • Posttraumatic stress disorder    • Primary osteoarthritis of left knee    • Primary osteoarthritis of right knee    • PTSD (post-traumatic stress disorder)    • Seizure (HCC)     last seizure 2009.  no meds at this time   • Skin cancer     basal cell on back   • Tachycardia    • Viral hepatitis A    • Wears dentures     uppers only   • Wears glasses          Current Outpatient Medications:   •  acetaminophen (TYLENOL) 325 MG tablet, Take 2 tablets by mouth Every 4 (Four) Hours As Needed for Mild Pain.,  Disp: , Rfl:   •  albuterol (ACCUNEB) 1.25 MG/3ML nebulizer solution, Take 3 mL by nebulization Every 4 (Four) Hours As Needed for Wheezing or Shortness of Air., Disp: 100 each, Rfl: 5  •  albuterol sulfate  (90 Base) MCG/ACT inhaler, Inhale 2 puffs Every 4 (Four) Hours As Needed for Wheezing., Disp: 18 g, Rfl: 5  •  amitriptyline (ELAVIL) 75 MG tablet, Take 1 tablet by mouth Every Night. Resumed 6/10/22., Disp: 30 tablet, Rfl: 5  •  aspirin 81 MG EC tablet, Take 1 tablet by mouth Daily., Disp: 30 tablet, Rfl: 0  •  atorvastatin (LIPITOR) 40 MG tablet, Take 1 tablet by mouth Every Night. For cholesterol, Disp: 90 tablet, Rfl: 1  •  Botox 200 units reconstituted solution, Inject 200 Units into the appropriate muscle as directed by prescriber Every 3 (Three) Months., Disp: , Rfl:   •  busPIRone (BUSPAR) 30 MG tablet, Take 1 tablet by mouth 2 (Two) Times a Day., Disp: 60 tablet, Rfl: 0  •  Cyanocobalamin 1000 MCG/ML kit, Inject 1,000 mL as directed Every 30 (Thirty) Days., Disp: 1 kit, Rfl: 5  •  Dexilant 60 MG capsule, TAKE 1 CAPSULE BY MOUTH daily, Disp: 90 capsule, Rfl: 0  •  diphenoxylate-atropine (Lomotil) 2.5-0.025 MG per tablet, Take 1 tablet by mouth 4 (Four) Times a Day As Needed for Diarrhea., Disp: 12 tablet, Rfl: 0  •  famotidine (PEPCID) 40 MG tablet, TAKE 1 TABLET BY MOUTH daily, Disp: 30 tablet, Rfl: 0  •  fluticasone (Flonase) 50 MCG/ACT nasal spray, 2 sprays into the nostril(s) as directed by provider Daily., Disp: 9.9 mL, Rfl: 3  •  folic acid (FOLVITE) 800 MCG tablet, Take 1 tablet by mouth Daily., Disp: 90 tablet, Rfl: 1  •  HYDROcodone-acetaminophen (NORCO)  MG per tablet, Take 1 tablet by mouth Every 6 (Six) Hours As Needed for Moderate Pain., Disp: 8 tablet, Rfl: 0  •  hydrOXYzine pamoate (Vistaril) 50 MG capsule, Take 1 capsule by mouth 3 (Three) Times a Day As Needed for Anxiety., Disp: 90 capsule, Rfl: 3  •  levothyroxine (SYNTHROID, LEVOTHROID) 25 MCG tablet, Take 0.5 tablets by  "mouth Daily., Disp: 15 tablet, Rfl: 5  •  lidocaine (LIDODERM) 5 %, , Disp: , Rfl:   •  Linzess 290 MCG capsule capsule, Take 1 capsule by mouth Every Morning Before Breakfast., Disp: 30 capsule, Rfl: 1  •  losartan (COZAAR) 50 MG tablet, Take 1 tablet by mouth Daily., Disp: 30 tablet, Rfl: 6  •  megestrol (MEGACE) 20 MG tablet, Take 1 tablet by mouth Daily., Disp: 30 tablet, Rfl: 5  •  metoprolol succinate XL (TOPROL-XL) 100 MG 24 hr tablet, TAKE 1 TABLET BY MOUTH DAILY at 3pm, Disp: 30 tablet, Rfl: 1  •  ondansetron ODT (ZOFRAN-ODT) 4 MG disintegrating tablet, Place 1 tablet on the tongue Every 8 (Eight) Hours As Needed for Nausea., Disp: 30 tablet, Rfl: 5  •  OXcarbazepine (TRILEPTAL) 150 MG tablet, Take 150 mg by mouth Every Night. Daily at bedtime, Disp: , Rfl:   •  prochlorperazine (COMPAZINE) 10 MG tablet, Take 1 tablet by mouth Every 6 (Six) Hours As Needed for Nausea or Vomiting., Disp: 120 tablet, Rfl: 5  •  rOPINIRole (REQUIP) 3 MG tablet, TAKE 1 TABLET BY MOUTH EVERY NIGHT AT BEDTIME, Disp: 90 tablet, Rfl: 0  •  Syringe/Needle, Disp, 25G X 5/8\" 3 ML misc, 1 each Every 28 (Twenty-Eight) Days., Disp: 3 each, Rfl: 1  •  tamsulosin (FLOMAX) 0.4 MG capsule 24 hr capsule, Take 1 capsule by mouth Daily., Disp: 30 capsule, Rfl: 0  •  topiramate (TOPAMAX) 100 MG tablet, Take 1 tablet by mouth 2 (Two) Times a Day., Disp: 180 tablet, Rfl: 1  •  witch hazel-glycerin (TUCKS) pad, Insert  into the rectum As Needed for Hemorrhoids., Disp: 40 each, Rfl: 1  •  sulfamethoxazole-trimethoprim (Bactrim DS) 800-160 MG per tablet, Take 1 tablet by mouth 2 (Two) Times a Day., Disp: 10 tablet, Rfl: 0    Review of Systems    Review of Systems   Constitutional: Negative.  Negative for chills, diaphoresis and fever.   HENT: Negative.  Negative for congestion.    Eyes: Negative.    Respiratory: Negative.  Negative for cough.    Cardiovascular: Negative.    Gastrointestinal: Negative.  Negative for abdominal pain, change in bowel " "habit, nausea and vomiting.   Endocrine: Negative.    Genitourinary: Positive for difficulty urinating.   Musculoskeletal: Negative.    Skin: Negative.    Allergic/Immunologic: Negative.    Neurological: Negative.    Hematological: Negative.    Psychiatric/Behavioral: Negative.    All other systems reviewed and are negative.      Objective  Vitals:    01/30/23 1045   BP: 124/82   Pulse: 65   SpO2: 98%   Weight: 46.3 kg (102 lb)   Height: 157.5 cm (62\")     Body mass index is 18.66 kg/m².    Physical Exam    Physical Exam  Vitals and nursing note reviewed.   Constitutional:       General: She is not in acute distress.     Appearance: Normal appearance. She is not ill-appearing, toxic-appearing or diaphoretic.   HENT:      Head: Normocephalic and atraumatic.   Cardiovascular:      Rate and Rhythm: Normal rate and regular rhythm.      Pulses: Normal pulses.      Heart sounds: Normal heart sounds. No murmur heard.    No friction rub. No gallop.   Pulmonary:      Effort: Pulmonary effort is normal. No respiratory distress.      Breath sounds: Normal breath sounds. No stridor. No wheezing, rhonchi or rales.   Chest:      Chest wall: No tenderness.   Abdominal:      General: Abdomen is flat. Bowel sounds are normal. There is no distension.      Palpations: Abdomen is soft. There is no mass.      Tenderness: There is no abdominal tenderness. There is no guarding or rebound.      Hernia: No hernia is present.   Musculoskeletal:      Cervical back: Normal range of motion and neck supple.   Neurological:      Mental Status: She is alert.             Diagnoses and all orders for this visit:    1. Acute cystitis without hematuria (Primary)  -     sulfamethoxazole-trimethoprim (Bactrim DS) 800-160 MG per tablet; Take 1 tablet by mouth 2 (Two) Times a Day.  Dispense: 10 tablet; Refill: 0         Urinalysis was obtained prior to appointment.  The results were reviewed with patient while in office.  Patient does have an UTI.  Will " treat with Bactrim DS p.o. twice daily for 5 days.  We will call patient with culture results and if we need to make changes in medications.  She is advised to increase her water intake.  She may drink cranberry juice as needed also.  Advised that she can take her pain medication as needed if she develops any pain.  Answered all questions.  Patient verbalized understanding of plan of care.  She is to follow-up as needed.        This document has been electronically signed by UMESH Bustos on January 30, 2023 11:35 CST

## 2023-02-01 LAB — BACTERIA SPEC AEROBE CULT: ABNORMAL

## 2023-02-02 DIAGNOSIS — E78.5 HYPERLIPIDEMIA, UNSPECIFIED HYPERLIPIDEMIA TYPE: ICD-10-CM

## 2023-02-02 RX ORDER — ATORVASTATIN CALCIUM 40 MG/1
TABLET, FILM COATED ORAL
Qty: 90 TABLET | Refills: 1 | Status: SHIPPED | OUTPATIENT
Start: 2023-02-02

## 2023-02-03 DIAGNOSIS — G25.81 RESTLESS LEG SYNDROME: Chronic | ICD-10-CM

## 2023-02-06 RX ORDER — ROPINIROLE 3 MG/1
3 TABLET, FILM COATED ORAL
Qty: 90 TABLET | Refills: 0 | Status: SHIPPED | OUTPATIENT
Start: 2023-02-06

## 2023-02-14 ENCOUNTER — OFFICE VISIT (OUTPATIENT)
Dept: GASTROENTEROLOGY | Facility: CLINIC | Age: 63
End: 2023-02-14
Payer: MEDICARE

## 2023-02-14 VITALS
DIASTOLIC BLOOD PRESSURE: 82 MMHG | HEART RATE: 72 BPM | BODY MASS INDEX: 18.44 KG/M2 | SYSTOLIC BLOOD PRESSURE: 110 MMHG | WEIGHT: 100.2 LBS | HEIGHT: 62 IN

## 2023-02-14 DIAGNOSIS — K21.00 GASTROESOPHAGEAL REFLUX DISEASE WITH ESOPHAGITIS WITHOUT HEMORRHAGE: ICD-10-CM

## 2023-02-14 DIAGNOSIS — K22.2 PEPTIC STRICTURE OF ESOPHAGUS: ICD-10-CM

## 2023-02-14 DIAGNOSIS — R79.89 ELEVATED LFTS: ICD-10-CM

## 2023-02-14 DIAGNOSIS — K58.2 IRRITABLE BOWEL SYNDROME WITH BOTH CONSTIPATION AND DIARRHEA: ICD-10-CM

## 2023-02-14 DIAGNOSIS — R13.10 DYSPHAGIA, UNSPECIFIED TYPE: Primary | ICD-10-CM

## 2023-02-14 PROCEDURE — 99214 OFFICE O/P EST MOD 30 MIN: CPT | Performed by: PHYSICIAN ASSISTANT

## 2023-02-14 RX ORDER — LINACLOTIDE 290 UG/1
290 CAPSULE, GELATIN COATED ORAL
Qty: 90 CAPSULE | Refills: 1 | Status: SHIPPED | OUTPATIENT
Start: 2023-02-14

## 2023-02-14 RX ORDER — FAMOTIDINE 40 MG/1
40 TABLET, FILM COATED ORAL DAILY
Qty: 90 TABLET | Refills: 1 | Status: SHIPPED | OUTPATIENT
Start: 2023-02-14

## 2023-02-14 NOTE — PROGRESS NOTES
Chief Complaint   Patient presents with   • Abdominal Pain   • Heartburn   • Difficulty Swallowing       ENDO PROCEDURE ORDERED:    Subjective    Tricia Loyola is a 63 y.o. female. she is here today for follow-up.    History of Present Illness    Patient is seen on a recheck of her GERD, abdominal pain, dysphagia. Last seen on 01/03/2023. She did undergo EGD on 01/24/2023 that showed esophageal stricture dilated to a 54 Trin[place] with Savary dilator. Also showed grade 3 esophagitis, diffuse gastritis. Distal esophageal biopsy was benign. Antral biopsy showed reactive gastropathy. She states she is still having some dysphagia. She is out of her medications for reflux. She is on the Linzess 290 mcg for chronic constipation. Weight is down is less than 1 pound since last visit. She had a prior colonoscopy showing a polyp with positive family history on 11/26/2018.     Laboratory on 01/16/2023 showed normal CBC, TSH. CMP showed a glucose of 101, sodium 135, AST 61, otherwise normal.     ASSESSMENT/PLAN:  Patient with peptic stricture with severe dysphagia, esophagitis, gastritis. I refilled her medications. She will be due for a colonoscopy this year. We will see if her dysphagia improves with removing her medication. As long as she is improved we will plan follow-up in 3-6 months, sooner if needed. Encouraged dietary modification and weight loss. She does have a slight elevation in AST, further pending clinical course and the results of the above.      The following portions of the patient's history were reviewed and updated as appropriate:   Past Medical History:   Diagnosis Date   • Abdominal pain    • Anemia    • Anxiety and depression    • Chronic post-traumatic headache      rebound      • Depressive disorder    • Disease of thyroid gland    • Encounter for gynecological examination    • Endometriosis    • Gastroesophageal reflux disease    • Generalized anxiety disorder    • History of mammogram 08/2008     MAMMOGRAM DIAGNOSTIC BILATERAL 53752 ([facility]) (1)     • Hyperlipidemia    • Hypertension    • Ingrown toenail    • Injury of back    • Leaky heart valve    • Leaky heart valve    • Migraine    • Nonruptured cerebral aneurysm    • Osteoporosis    • Posttraumatic stress disorder    • Primary osteoarthritis of left knee    • Primary osteoarthritis of right knee    • PTSD (post-traumatic stress disorder)    • Seizure (HCC)     last seizure 2009.  no meds at this time   • Skin cancer     basal cell on back   • Tachycardia    • Viral hepatitis A    • Wears dentures     uppers only   • Wears glasses      Past Surgical History:   Procedure Laterality Date   • APPENDECTOMY     • BREAST BIOPSY Left     times 2 both negative   • CHOLECYSTECTOMY     • COLONOSCOPY N/A 11/26/2018    Procedure: COLONOSCOPY;  Surgeon: Meet Mcintyre MD;  Location: [facility] ENDOSCOPY;  Service: General   • CRANIOTOMY FOR ANEURYSM  2003   • CYSTOSCOPY, URETEROSCOPY, RETROGRADE PYELOGRAM, STENT INSERTION Left 09/25/2022    Procedure: CYSTOSCOPY, STONE REMOVAL;  Surgeon: Leonardo Graham MD;  Location: [facility] OR;  Service: Urology;  Laterality: Left;   • ENDOSCOPY N/A 10/16/2019    Procedure: ESOPHAGOGASTRODUODENOSCOPY;  Surgeon: Kory Muhammad MD;  Location: [facility] ENDOSCOPY;  Service: Gastroenterology   • ENDOSCOPY N/A 03/16/2021    Procedure: ESOPHAGOGASTRODUODENOSCOPY;  Surgeon: Kory Muhammad MD;  Location: [facility] ENDOSCOPY;  Service: Gastroenterology;  Laterality: N/A;   • ENDOSCOPY N/A 1/24/2023    Procedure: ESOPHAGOGASTRODUODENOSCOPY WITH DILATATION;  Surgeon: Kory Muhammad MD;  Location: [facility] ENDOSCOPY;  Service: Gastroenterology;  Laterality: N/A;   • MASS EXCISION Right 11/05/2020    Procedure: EXCISE SOFT TISSUE MASS RIGHT POSTERIOR FLANK                 (latex allergy);  Surgeon: Meet Mcintyre MD;  Location: [facility] OR;  Service: General;  Laterality: Right;   • PAP SMEAR  08/16/2012   • TONSILLECTOMY     • UPPER  GASTROINTESTINAL ENDOSCOPY  10/16/2019   • WRIST GANGLION EXCISION Right 2021    Procedure: EXCISION OF VOLAR GANGLION CYST RIGHT WRIST      (LATEX ALLERGY);  Surgeon: Leonardo Vargas MD;  Location: [facility];  Service: Orthopedics;  Laterality: Right;     Family History   Problem Relation Age of Onset   • Constipation Mother    • Hypertension Mother    • Anxiety disorder Mother    • Heart disease Mother    • Osteoporosis Mother         Also Myself I   • Alcohol abuse Father    • Heart disease Father    • Anxiety disorder Brother    • Kidney disease Brother    • Hypertension Brother    • Arthritis Brother    • Anxiety disorder Brother    • Kidney disease Brother    • Diabetes Brother    • Anxiety disorder Brother    • Hypertension Brother    • Heart disease Maternal Grandmother    • Clotting disorder Maternal Grandmother         Multiple Blood Clots In The Legs   • Breast cancer Paternal Aunt    • Heart disease Maternal Grandfather    • Colon cancer Maternal Great-Grandmother      OB History        3    Para   2    Term   2            AB   1    Living   2       SAB   1    IAB        Ectopic        Molar        Multiple        Live Births                  Allergies   Allergen Reactions   • Iodine Anaphylaxis   • Nitrofuran Derivatives Hives   • Toradol [Ketorolac Tromethamine] Anaphylaxis   • Cleocin [Clindamycin Hcl] Hives and Swelling     Swelling of eyes and hives   • Augmentin [Amoxicillin-Pot Clavulanate] Hives   • Shrimp Hives and Itching   • Ciprofloxacin Rash   • Fiorinal [Butalbital-Aspirin-Caffeine] Palpitations   • Ibuprofen Rash   • Imitrex [Sumatriptan] Palpitations   • Latex Rash   • Other Rash     prego spaghetti sauce    Midrin causes tachycardia   • Ultram [Tramadol] Palpitations     Social History     Socioeconomic History   • Marital status: Legally    Tobacco Use   • Smoking status: Every Day     Packs/day: 0.50     Years: 20.00     Pack years: 10.00     Types:  Cigarettes, Electronic Cigarette   • Smokeless tobacco: Never   Vaping Use   • Vaping Use: Some days   • Substances: Nicotine, Flavoring   • Devices: Disposable   Substance and Sexual Activity   • Alcohol use: Not Currently   • Drug use: Never   • Sexual activity: Defer     Current Medications:  Prior to Admission medications    Medication Sig Start Date End Date Taking? Authorizing Provider   acetaminophen (TYLENOL) 325 MG tablet Take 2 tablets by mouth Every 4 (Four) Hours As Needed for Mild Pain. 9/26/22  Yes Azul Powell APRN   albuterol (ACCUNEB) 1.25 MG/3ML nebulizer solution Take 3 mL by nebulization Every 4 (Four) Hours As Needed for Wheezing or Shortness of Air. 11/15/21  Yes Rosario Ceron APRN   albuterol sulfate  (90 Base) MCG/ACT inhaler Inhale 2 puffs Every 4 (Four) Hours As Needed for Wheezing. 2/5/22  Yes Rosario Ceron APRN   amitriptyline (ELAVIL) 75 MG tablet Take 1 tablet by mouth Every Night. Resumed 6/10/22. 6/10/22  Yes Rosario Ceron APRN   aspirin 81 MG EC tablet Take 1 tablet by mouth Daily. 1/14/20  Yes Rosario Ceron APRN   atorvastatin (LIPITOR) 40 MG tablet TAKE 1 TABLET BY MOUTH NIGHTLY FOR CHOLESTEROL 2/2/23  Yes Alvina Bergman APRN   Botox 200 units reconstituted solution Inject 200 Units into the appropriate muscle as directed by prescriber Every 3 (Three) Months. 4/26/21  Yes Provider, MD Doris   busPIRone (BUSPAR) 30 MG tablet Take 1 tablet by mouth 2 (Two) Times a Day. 3/7/18  Yes Sonia Mata MD   Cyanocobalamin 1000 MCG/ML kit Inject 1,000 mL as directed Every 30 (Thirty) Days. 4/7/22  Yes Rosario Ceron APRN   Dexilant 60 MG capsule TAKE 1 CAPSULE BY MOUTH daily 12/6/22  Yes Alvina Bergman APRN   diphenoxylate-atropine (Lomotil) 2.5-0.025 MG per tablet Take 1 tablet by mouth 4 (Four) Times a Day As Needed for Diarrhea. 2/10/22  Yes Ceron, Crystal Rhoda, APRN   famotidine (PEPCID) 40 MG tablet TAKE 1 TABLET BY  MOUTH daily 1/11/23  Yes Johnny Smiley PA-C   fluticasone (Flonase) 50 MCG/ACT nasal spray 2 sprays into the nostril(s) as directed by provider Daily. 12/7/22  Yes Alvina Bergman APRN   folic acid (FOLVITE) 800 MCG tablet Take 1 tablet by mouth Daily. 6/28/21  Yes Rosario Ceron APRN   HYDROcodone-acetaminophen (NORCO)  MG per tablet Take 1 tablet by mouth Every 6 (Six) Hours As Needed for Moderate Pain. 1/9/23  Yes Alvina Bergman APRN   hydrOXYzine pamoate (Vistaril) 50 MG capsule Take 1 capsule by mouth 3 (Three) Times a Day As Needed for Anxiety. 11/17/22  Yes Alvina Bergman APRN   levothyroxine (SYNTHROID, LEVOTHROID) 25 MCG tablet Take 0.5 tablets by mouth Daily. 12/15/22  Yes Alvina Bergman APRN   lidocaine (LIDODERM) 5 %  8/29/22  Yes Emergency, Nurse Epic, RN   Linzess 290 MCG capsule capsule Take 1 capsule by mouth Every Morning Before Breakfast. 1/3/23  Yes Johnny Smiley PA-C   losartan (COZAAR) 50 MG tablet Take 1 tablet by mouth Daily. 1/24/23  Yes Ghislaine Levi MD   megestrol (MEGACE) 20 MG tablet Take 1 tablet by mouth Daily. 1/16/23  Yes Alvina Bergman APRN   metoprolol succinate XL (TOPROL-XL) 100 MG 24 hr tablet TAKE 1 TABLET BY MOUTH DAILY at 3pm 12/12/22  Yes Ghislaine Levi MD   ondansetron ODT (ZOFRAN-ODT) 4 MG disintegrating tablet Place 1 tablet on the tongue Every 8 (Eight) Hours As Needed for Nausea. 6/28/21  Yes Rosario Ceron APRN   OXcarbazepine (TRILEPTAL) 150 MG tablet Take 150 mg by mouth Every Night. Daily at bedtime   Yes Provider, MD Doris   prochlorperazine (COMPAZINE) 10 MG tablet Take 1 tablet by mouth Every 6 (Six) Hours As Needed for Nausea or Vomiting. 1/16/23  Yes Alvina Bergman APRN   rOPINIRole (REQUIP) 3 MG tablet TAKE 1 TABLET BY MOUTH EVERY NIGHT AT BEDTIME 2/6/23  Yes Alvina Bergman APRN   sulfamethoxazole-trimethoprim (Bactrim DS) 800-160 MG per tablet Take 1 tablet by mouth 2 (Two)  "Times a Day. 1/30/23  Yes Alvina Bergman APRN   Syringe/Needle, Disp, 25G X 5/8\" 3 ML misc 1 each Every 28 (Twenty-Eight) Days. 9/12/22  Yes Alvina Bergman APRN   tamsulosin (FLOMAX) 0.4 MG capsule 24 hr capsule Take 1 capsule by mouth Daily. 9/20/22  Yes Alvina Bergman APRN   topiramate (TOPAMAX) 100 MG tablet Take 1 tablet by mouth 2 (Two) Times a Day. 5/28/20  Yes Rosario Ceron APRN   witch hazel-glycerin (TUCKS) pad Insert  into the rectum As Needed for Hemorrhoids. 2/14/22  Yes Sarah Willett APRN   nitroglycerin (NITROSTAT) 0.4 MG SL tablet Place 0.4 mg under the tongue Every 5 (Five) Minutes As Needed for Chest Pain. Take no more than 3 doses in 15 minutes.  9/20/22  Provider, MD Doris     Review of Systems  Review of Systems       Objective    /82 (BP Location: Left arm)   Pulse 72   Ht 157.5 cm (62\")   Wt 45.5 kg (100 lb 3.2 oz)   BMI 18.33 kg/m²   Physical Exam  Vitals and nursing note reviewed.   Constitutional:       General: She is not in acute distress.     Appearance: She is well-developed.   HENT:      Head: Normocephalic and atraumatic.   Eyes:      Pupils: Pupils are equal, round, and reactive to light.   Cardiovascular:      Rate and Rhythm: Normal rate and regular rhythm.      Heart sounds: Normal heart sounds.   Pulmonary:      Effort: Pulmonary effort is normal.      Breath sounds: Normal breath sounds.   Abdominal:      General: Bowel sounds are normal. There is no distension or abdominal bruit.      Palpations: Abdomen is soft. Abdomen is not rigid. There is no shifting dullness or mass.      Tenderness: There is abdominal tenderness. There is no guarding or rebound.      Hernia: No hernia is present. There is no hernia in the ventral area.   Musculoskeletal:         General: Normal range of motion.      Cervical back: Normal range of motion.   Skin:     General: Skin is warm and dry.   Neurological:      Mental Status: She is " alert and oriented to person, place, and time.   Psychiatric:         Behavior: Behavior normal.         Thought Content: Thought content normal.         Judgment: Judgment normal.       Assessment & Plan      1. Dysphagia, unspecified type    2. Peptic stricture of esophagus    3. Elevated LFTs    4. Gastroesophageal reflux disease with esophagitis without hemorrhage    5. Irritable bowel syndrome with both constipation and diarrhea    .   Diagnoses and all orders for this visit:    1. Dysphagia, unspecified type (Primary)    2. Peptic stricture of esophagus    3. Elevated LFTs    4. Gastroesophageal reflux disease with esophagitis without hemorrhage    5. Irritable bowel syndrome with both constipation and diarrhea    Other orders  -     Linzess 290 MCG capsule capsule; Take 1 capsule by mouth Every Morning Before Breakfast.  Dispense: 90 capsule; Refill: 1  -     famotidine (PEPCID) 40 MG tablet; Take 1 tablet by mouth Daily.  Dispense: 90 tablet; Refill: 1    [id number]    Orders placed during this encounter include:  No orders of the defined types were placed in this encounter.      Medications prescribed:  New Medications Ordered This Visit   Medications   • Linzess 290 MCG capsule capsule     Sig: Take 1 capsule by mouth Every Morning Before Breakfast.     Dispense:  90 capsule     Refill:  1   • famotidine (PEPCID) 40 MG tablet     Sig: Take 1 tablet by mouth Daily.     Dispense:  90 tablet     Refill:  1       Requested Prescriptions     Signed Prescriptions Disp Refills   • Linzess 290 MCG capsule capsule 90 capsule 1     Sig: Take 1 capsule by mouth Every Morning Before Breakfast.   • famotidine (PEPCID) 40 MG tablet 90 tablet 1     Sig: Take 1 tablet by mouth Daily.       Review and/or summary of lab tests, radiology, procedures, medications. Review and summary of old records and obtaining of history. The risks and benefits of my recommendations, as well as  other treatment options were discussed with the patient today. Questions were answered.    Follow-up: Return in about 6 months (around 8/14/2023), or if symptoms worsen or fail to improve.     * Surgery not found *      This document has been electronically signed by Johnny Smiley PA-C on February 27, 2023 19:41 CST      Results for orders placed or performed in visit on 01/30/23   Urinalysis, Microscopic Only - Urine, Clean Catch    Specimen: Urine, Clean Catch   Result Value Ref Range    RBC, UA 13-20 (A) None Seen /HPF    WBC, UA 31-50 (A) None Seen /HPF    Bacteria, UA 1+ (A) None Seen /HPF    Squamous Epithelial Cells, UA 3-6 (A) None Seen, 0-2 /HPF    Hyaline Casts, UA None Seen None Seen /LPF    Mucus, UA Small/1+ (A) None Seen, Trace /HPF    Methodology Manual Light Microscopy    Urinalysis With Culture If Indicated - Urine, Clean Catch    Specimen: Urine, Clean Catch   Result Value Ref Range    Color, UA Yellow Yellow, Straw    Appearance, UA Cloudy (A) Clear    pH, UA 7.0 5.5 - 8.0    Specific Gravity, UA 1.015 1.005 - 1.030    Glucose, UA Negative Negative    Ketones, UA Negative Negative    Bilirubin, UA Negative Negative    Blood, UA Moderate (2+) (A) Negative    Protein, UA 30 mg/dL (1+) (A) Negative    Leuk Esterase, UA Moderate (2+) (A) Negative    Nitrite, UA Negative Negative    Urobilinogen, UA 0.2 E.U./dL 0.2 - 1.0 E.U./dL   Urine Culture - Urine, Urine, Clean Catch    Specimen: Urine, Clean Catch   Result Value Ref Range    Urine Culture 25,000 CFU/mL Escherichia coli (A)        Susceptibility    Escherichia coli - MARCO     Ampicillin  Susceptible ug/ml     Ampicillin + Sulbactam  Susceptible ug/ml     Cefazolin  Susceptible ug/ml     Cefepime  Susceptible ug/ml     Ceftazidime  Susceptible ug/ml     Ceftriaxone  Susceptible ug/ml     Gentamicin  Susceptible ug/ml     Levofloxacin  Susceptible ug/ml     Nitrofurantoin  Susceptible ug/ml     Piperacillin + Tazobactam  Susceptible ug/ml      "Trimethoprim + Sulfamethoxazole  Susceptible ug/ml   Results for orders placed or performed during the hospital encounter of 23   TISSUE EXAM, [facility] ([facility])    Specimen: A: Gastric, Antrum; Tissue    B: Esophagus, Distal; Tissue   Result Value Ref Range    Reference Lab Report       [facility]  [address] [place] [address]  Phone: [phone] or [phone]  Fax: [phone]  Goyo Fallon M.D., Medical Director    PATIENT NAME                           LABORATORY NO.  [id number]  NITA DEAN.                      [id number]  [id number]                         AGE              SEX  SSN           CLIENT REF #  [facility]           63      1960  F    [id number]   [id number]    Tarpley                       REQUESTING M.D.     ATTENDING MJennaD.     COPY TO.  [address] [place] [address]                 [place] [address]             DATE COLLECTED      DATE RECEIVED      DATE REPORTED  2023    DIAGNOSIS:  A.   GASTRIC ANTRUM, BIOPSY:  Reactive gastropathy  B.   ESOPHAGUS, BIOPSY, DISTAL:  Benign squamous mucosa    JBS/sm    CLINICAL HISTORY:  Dysphagia, unspecified type, gastroesophageal reflux disease with  esophagitis  without hemorrhage    SPECIMENS RECEIVED:  A.  GASTRIC ANTRUM, BIOPSY  B.  ESOPHAGUS, BIOPSY, DISTAL    MICROSCOPIC DESCRIPTION:  Tissue blocks are prepared and slides are examined microscopically on all  specimens. See diagnosis for details.    Professional interpretation rendered by Mitchell Cassidy M.D. at [facility],  [facility], [address] [place] [address].    GROSS DESCRIPTION:  A.  Specimen is received in 1 formalin filled container labeled \"gastric antrum\"  and consists of a single portion of tan soft tissue measuring 0.3 x 0.2 x 0.2  cm.  The specimen is submitted entirely in 1 cassette.  BW  B.  Specimen is received in 1 " "formalin filled container labeled \"distal  esophagus\" and consists of a single portion of gray soft tissue measuring  0.5 x 0.2 x 0.1 cm.  The specimen is submitted entirely in 1 cassette.    REVIEWED, DIAGNOSED AND ELECTRONICALLY  SIGNED BY:    Mitchell Cassidy M.D.  CPT CODES:  88305x2     Results for orders placed or performed in visit on 01/16/23   TSH    Specimen: Blood   Result Value Ref Range    TSH 1.620 0.270 - 4.200 uIU/mL   Results for orders placed or performed in visit on 01/03/23   CBC Auto Differential    Specimen: Blood   Result Value Ref Range    WBC 6.76 3.40 - 10.80 10*3/mm3    RBC 4.00 3.77 - 5.28 10*6/mm3    Hemoglobin 12.7 12.0 - 15.9 g/dL    Hematocrit 37.6 34.0 - 46.6 %    MCV 94.0 79.0 - 97.0 fL    MCH 31.8 26.6 - 33.0 pg    MCHC 33.8 31.5 - 35.7 g/dL    RDW 13.3 12.3 - 15.4 %    RDW-SD 44.4 37.0 - 54.0 fl    MPV 8.9 6.0 - 12.0 fL    Platelets 262 140 - 450 10*3/mm3    Neutrophil % 47.8 42.7 - 76.0 %    Lymphocyte % 39.3 19.6 - 45.3 %    Monocyte % 11.4 5.0 - 12.0 %    Eosinophil % 1.2 0.3 - 6.2 %    Basophil % 0.3 0.0 - 1.5 %    Neutrophils, Absolute 3.23 1.70 - 7.00 10*3/mm3    Lymphocytes, Absolute 2.66 0.70 - 3.10 10*3/mm3    Monocytes, Absolute 0.77 0.10 - 0.90 10*3/mm3    Eosinophils, Absolute 0.08 0.00 - 0.40 10*3/mm3    Basophils, Absolute 0.02 0.00 - 0.20 10*3/mm3   Comprehensive Metabolic Panel    Specimen: Blood   Result Value Ref Range    Glucose 101 (H) 70 - 99 mg/dL    BUN 15 7 - 23 mg/dL    Creatinine 1.01 0.52 - 1.04 mg/dL    Sodium 135 (L) 137 - 145 mmol/L    Potassium 3.8 3.4 - 5.0 mmol/L    Chloride 103 101 - 112 mmol/L    CO2 25.0 22.0 - 30.0 mmol/L    Calcium 8.9 8.4 - 10.2 mg/dL    Total Protein 7.3 6.3 - 8.6 g/dL    Albumin 4.2 3.5 - 5.0 g/dL    ALT (SGPT) 26 <=35 U/L    AST (SGOT) 61 (H) 14 - 36 U/L    Alkaline Phosphatase 55 38 - 126 U/L    Total Bilirubin 0.2 0.2 - 1.3 mg/dL    Globulin 3.1 2.3 - 3.5 gm/dL    A/G Ratio 1.4 1.1 - 1.8 g/dL    BUN/Creatinine Ratio " 14.9 7.0 - 25.0    Anion Gap 7.0 5.0 - 15.0 mmol/L    eGFR 62.3 >60.0 mL/min/1.73   Results for orders placed or performed during the hospital encounter of 12/12/22   Covid-19 + Flu A&B AG, Katie [id number]    Specimen: Swab   Result Value Ref Range    COVID19 Not Detected     Influenza A Antigen MELI Not Detected     Influenza B Antigen MELI Not Detected     Internal Control Passed     Lot Number [id number]     Expiration Date 3/2/23    Results for orders placed or performed during the hospital encounter of 09/24/22   STONE ANALYSIS - Calculus, Ureter, Left    Specimen: Ureter, Left; Calculus   Result Value Ref Range    Stone Source Comment     Color Brown     Size 4x4 mm    Stone Weight 52 mg    Composition Comment     Ca Oxalate - Monohydrate, Stone 20 %    HYDROXYAPATITE 80 %    Photo Comment     Comments: Comment     Please note Comment     Disclaimer: Comment    Urinalysis, Microscopic Only - Urine, Clean Catch    Specimen: Urine, Clean Catch   Result Value Ref Range    RBC, UA 13-20 (A) None Seen /HPF    WBC, UA 0-2 None Seen, 0-2, 3-5 /HPF    Bacteria, UA None Seen None Seen /HPF    Squamous Epithelial Cells, UA 0-2 None Seen, 0-2 /HPF    Hyaline Casts, UA None Seen None Seen /LPF    Methodology Automated Microscopy    Urinalysis With Culture If Indicated - Urine, Clean Catch    Specimen: Urine, Clean Catch   Result Value Ref Range    Color, UA Yellow Yellow, Straw, Dark Yellow, Paige    Appearance, UA Clear Clear    pH, UA 7.0 5.0 - 9.0    Specific [place], UA 1.011 1.003 - 1.030    Glucose, UA Negative Negative    Ketones, UA Negative Negative    Bilirubin, UA Negative Negative    Blood, UA Moderate (2+) (A) Negative    Protein, UA Negative Negative    Leuk Esterase, UA Negative Negative    Nitrite, UA Negative Negative    Urobilinogen, UA 0.2 E.U./dL 0.2 - 1.0 E.U./dL   CBC Auto Differential    Specimen: Blood   Result Value Ref Range    WBC 7.97 3.40 - 10.80 10*3/mm3    RBC 3.23 (L) 3.77 - 5.28 10*6/mm3     Hemoglobin 10.4 (L) 12.0 - 15.9 g/dL    Hematocrit 30.6 (L) 34.0 - 46.6 %    MCV 94.7 79.0 - 97.0 fL    MCH 32.2 26.6 - 33.0 pg    MCHC 34.0 31.5 - 35.7 g/dL    RDW 13.7 12.3 - 15.4 %    RDW-SD 47.1 37.0 - 54.0 fl    MPV 10.0 6.0 - 12.0 fL    Platelets 228 140 - 450 10*3/mm3    Neutrophil % 42.5 (L) 42.7 - 76.0 %    Lymphocyte % 46.7 (H) 19.6 - 45.3 %    Monocyte % 9.2 5.0 - 12.0 %    Eosinophil % 0.8 0.3 - 6.2 %    Basophil % 0.4 0.0 - 1.5 %    Immature Grans % 0.4 0.0 - 0.5 %    Neutrophils, Absolute 3.40 1.70 - 7.00 10*3/mm3    Lymphocytes, Absolute 3.72 (H) 0.70 - 3.10 10*3/mm3    Monocytes, Absolute 0.73 0.10 - 0.90 10*3/mm3    Eosinophils, Absolute 0.06 0.00 - 0.40 10*3/mm3    Basophils, Absolute 0.03 0.00 - 0.20 10*3/mm3    Immature Grans, Absolute 0.03 0.00 - 0.05 10*3/mm3    nRBC 0.0 0.0 - 0.2 /100 WBC     *Note: Due to a large number of results and/or encounters for the requested time period, some results have not been displayed. A complete set of results can be found in Results Review.

## 2023-02-15 RX ORDER — FAMOTIDINE 40 MG/1
40 TABLET, FILM COATED ORAL DAILY
Qty: 1 TABLET | Refills: 0 | OUTPATIENT
Start: 2023-02-15

## 2023-03-10 DIAGNOSIS — K21.9 GASTROESOPHAGEAL REFLUX DISEASE WITHOUT ESOPHAGITIS: ICD-10-CM

## 2023-03-13 ENCOUNTER — OFFICE VISIT (OUTPATIENT)
Dept: FAMILY MEDICINE CLINIC | Facility: CLINIC | Age: 63
End: 2023-03-13
Payer: MEDICARE

## 2023-03-13 ENCOUNTER — PRIOR AUTHORIZATION (OUTPATIENT)
Dept: FAMILY MEDICINE CLINIC | Facility: CLINIC | Age: 63
End: 2023-03-13
Payer: MEDICARE

## 2023-03-13 VITALS
OXYGEN SATURATION: 99 % | HEIGHT: 62 IN | DIASTOLIC BLOOD PRESSURE: 84 MMHG | WEIGHT: 102 LBS | BODY MASS INDEX: 18.77 KG/M2 | SYSTOLIC BLOOD PRESSURE: 118 MMHG | HEART RATE: 71 BPM

## 2023-03-13 DIAGNOSIS — H61.112 EAR CARTILAGE DEFORMITY, LEFT: Primary | ICD-10-CM

## 2023-03-13 PROCEDURE — 1159F MED LIST DOCD IN RCRD: CPT | Performed by: NURSE PRACTITIONER

## 2023-03-13 PROCEDURE — 1160F RVW MEDS BY RX/DR IN RCRD: CPT | Performed by: NURSE PRACTITIONER

## 2023-03-13 PROCEDURE — 99213 OFFICE O/P EST LOW 20 MIN: CPT | Performed by: NURSE PRACTITIONER

## 2023-03-13 RX ORDER — DEXLANSOPRAZOLE 60 MG/1
CAPSULE, DELAYED RELEASE ORAL
Qty: 90 CAPSULE | Refills: 0 | Status: SHIPPED | OUTPATIENT
Start: 2023-03-13 | End: 2023-04-03

## 2023-03-13 NOTE — PROGRESS NOTES
"Answers for HPI/ROS submitted by the patient on 3/12/2023  What is the primary reason for your visit?: Ear Pain    CC: Follow-up (Urgent care FU, bumps on ear)      Subjective:  Tricia Loyola is a 63 y.o. female who presents for         Patient presents for follow-up from urgent care.  She was seen on 3/8/2023 in the urgent care with complaints of a \"bump\" behind her left ear.  She reports that her glasses rub against the bump causing tenderness.  She says that the bump has been present for 1 year but she is now worried about it because of the soreness.  She says that the area is red and looks similar to a pimple.  Denies fever, chills, headache, nausea, or vomiting.  Denies drainage from the bump.  She was treated with Keflex.  She continues to take the Keflex. States her mom looked at this, also and states there are 2 bumps. She has called Dermatology to get an appointment. States she doesn't need a referral.    Earache   There is pain in the left ear. This is a new problem. The current episode started more than 1 year ago. The problem occurs constantly. The problem has been gradually worsening. There has been no fever.       The following portions of the patient's history were reviewed and updated as appropriate: allergies, current medications, past family history, past medical history, past social history, past surgical history and problem list.    Past Medical History:   Diagnosis Date   • Abdominal pain    • Anemia    • Anxiety and depression    • Chronic post-traumatic headache      rebound      • Depressive disorder    • Disease of thyroid gland    • Encounter for gynecological examination    • Endometriosis    • Gastroesophageal reflux disease    • Generalized anxiety disorder    • History of mammogram 08/2008    MAMMOGRAM DIAGNOSTIC BILATERAL 77574 ([facility]) (1)     • Hyperlipidemia    • Hypertension    • Ingrown toenail    • Injury of back    • Leaky heart valve    • Leaky heart valve    • Migraine    • " "Nonruptured cerebral aneurysm    • Osteoporosis    • Posttraumatic stress disorder    • Primary osteoarthritis of left knee    • Primary osteoarthritis of right knee    • PTSD (post-traumatic stress disorder)    • Seizure (HCC)     last seizure 2009.  no meds at this time   • Skin cancer     basal cell on back   • Tachycardia    • Viral hepatitis A    • Wears dentures     uppers only   • Wears glasses          Current Outpatient Medications:   •  acetaminophen (TYLENOL) 325 MG tablet, Take 2 tablets by mouth Every 4 (Four) Hours As Needed for Mild Pain., Disp: , Rfl:   •  albuterol (ACCUNEB) 1.25 MG/3ML nebulizer solution, Take 3 mL by nebulization Every 4 (Four) Hours As Needed for Wheezing or Shortness of Air., Disp: 100 each, Rfl: 5  •  albuterol sulfate  (90 Base) MCG/ACT inhaler, Inhale 2 puffs Every 4 (Four) Hours As Needed for Wheezing., Disp: 18 g, Rfl: 5  •  amitriptyline (ELAVIL) 75 MG tablet, Take 1 tablet by mouth Every Night. Resumed 6/10/22., Disp: 30 tablet, Rfl: 5  •  aspirin 81 MG EC tablet, Take 1 tablet by mouth Daily., Disp: 30 tablet, Rfl: 0  •  atorvastatin (LIPITOR) 40 MG tablet, TAKE 1 TABLET BY MOUTH NIGHTLY FOR CHOLESTEROL, Disp: 90 tablet, Rfl: 1  •  B-D 3CC LUER-KAITLIN SYR 25GX5/8\" 25G X 5/8\" 3 ML misc, USE AS DIRECTED for b12 injection, Disp: 3 each, Rfl: 1  •  Botox 200 units reconstituted solution, Inject 200 Units into the appropriate muscle as directed by prescriber Every 3 (Three) Months., Disp: , Rfl:   •  busPIRone (BUSPAR) 30 MG tablet, Take 1 tablet by mouth 2 (Two) Times a Day., Disp: 60 tablet, Rfl: 0  •  cephalexin (KEFLEX) 250 MG capsule, Take 1 capsule by mouth 2 (Two) Times a Day for 7 days., Disp: 14 capsule, Rfl: 0  •  Cyanocobalamin 1000 MCG/ML kit, Inject 1,000 mL as directed Every 30 (Thirty) Days., Disp: 1 kit, Rfl: 5  •  Dexilant 60 MG capsule, TAKE 1 CAPSULE BY MOUTH daily, Disp: 90 capsule, Rfl: 0  •  diphenhydrAMINE (SOMINEX) 25 MG tablet, Take 1 tablet by " mouth., Disp: , Rfl:   •  diphenoxylate-atropine (Lomotil) 2.5-0.025 MG per tablet, Take 1 tablet by mouth 4 (Four) Times a Day As Needed for Diarrhea., Disp: 12 tablet, Rfl: 0  •  famotidine (PEPCID) 40 MG tablet, Take 1 tablet by mouth Daily., Disp: 90 tablet, Rfl: 1  •  fluticasone (Flonase) 50 MCG/ACT nasal spray, 2 sprays into the nostril(s) as directed by provider Daily., Disp: 9.9 mL, Rfl: 3  •  folic acid (FOLVITE) 800 MCG tablet, Take 1 tablet by mouth Daily., Disp: 90 tablet, Rfl: 1  •  HYDROcodone-acetaminophen (NORCO)  MG per tablet, Take 1 tablet by mouth Every 6 (Six) Hours As Needed for Moderate Pain., Disp: 8 tablet, Rfl: 0  •  hydrOXYzine pamoate (Vistaril) 50 MG capsule, Take 1 capsule by mouth 3 (Three) Times a Day As Needed for Anxiety., Disp: 90 capsule, Rfl: 3  •  levothyroxine (SYNTHROID, LEVOTHROID) 25 MCG tablet, Take 0.5 tablets by mouth Daily., Disp: 15 tablet, Rfl: 5  •  lidocaine (LIDODERM) 5 %, , Disp: , Rfl:   •  Linzess 290 MCG capsule capsule, Take 1 capsule by mouth Every Morning Before Breakfast., Disp: 90 capsule, Rfl: 1  •  losartan (COZAAR) 50 MG tablet, Take 1 tablet by mouth Daily., Disp: 30 tablet, Rfl: 6  •  megestrol (MEGACE) 20 MG tablet, Take 1 tablet by mouth Daily., Disp: 30 tablet, Rfl: 5  •  metoprolol succinate XL (TOPROL-XL) 100 MG 24 hr tablet, TAKE 1 TABLET BY MOUTH DAILY at 3pm, Disp: 30 tablet, Rfl: 1  •  ondansetron ODT (ZOFRAN-ODT) 4 MG disintegrating tablet, Place 1 tablet on the tongue Every 8 (Eight) Hours As Needed for Nausea., Disp: 30 tablet, Rfl: 5  •  OXcarbazepine (TRILEPTAL) 150 MG tablet, Take 1 tablet by mouth Every Night. Daily at bedtime, Disp: , Rfl:   •  prochlorperazine (COMPAZINE) 10 MG tablet, Take 1 tablet by mouth Every 6 (Six) Hours As Needed for Nausea or Vomiting., Disp: 120 tablet, Rfl: 5  •  rOPINIRole (REQUIP) 3 MG tablet, TAKE 1 TABLET BY MOUTH EVERY NIGHT AT BEDTIME, Disp: 90 tablet, Rfl: 0  •  sulfamethoxazole-trimethoprim  "(Bactrim DS) 800-160 MG per tablet, Take 1 tablet by mouth 2 (Two) Times a Day., Disp: 10 tablet, Rfl: 0  •  tamsulosin (FLOMAX) 0.4 MG capsule 24 hr capsule, Take 1 capsule by mouth Daily., Disp: 30 capsule, Rfl: 0  •  topiramate (TOPAMAX) 100 MG tablet, Take 1 tablet by mouth 2 (Two) Times a Day., Disp: 180 tablet, Rfl: 1  •  witch hazel-glycerin (TUCKS) pad, Insert  into the rectum As Needed for Hemorrhoids., Disp: 40 each, Rfl: 1    Review of Systems    Review of Systems   Constitutional: Negative.    HENT: Positive for ear pain.    Eyes: Negative.    Respiratory: Negative.    Cardiovascular: Negative.    Gastrointestinal: Negative.    Endocrine: Negative.    Genitourinary: Negative.    Musculoskeletal: Negative.    Skin:        Lesion to skin behind Lt ear   Allergic/Immunologic: Negative.    Neurological: Negative.    Hematological: Negative.    Psychiatric/Behavioral: Negative.    All other systems reviewed and are negative.      Objective  Vitals:    03/13/23 1320   BP: 118/84   Pulse: 71   SpO2: 99%   Weight: 46.3 kg (102 lb)   Height: 157.5 cm (62\")     Body mass index is 18.66 kg/m².    Physical Exam    Physical Exam  Vitals and nursing note reviewed.   Constitutional:       General: She is not in acute distress.     Appearance: Normal appearance. She is not ill-appearing, toxic-appearing or diaphoretic.   HENT:      Head: Normocephalic and atraumatic.   Cardiovascular:      Rate and Rhythm: Normal rate and regular rhythm.      Pulses: Normal pulses.      Heart sounds: Normal heart sounds. No murmur heard.    No friction rub. No gallop.   Pulmonary:      Effort: Pulmonary effort is normal. No respiratory distress.      Breath sounds: Normal breath sounds. No stridor. No wheezing, rhonchi or rales.   Chest:      Chest wall: No tenderness.   Musculoskeletal:      Cervical back: Normal range of motion and neck supple.   Skin:     General: Skin is warm and dry.      Findings: No rash.      Comments: There is " what appears to be raised cartilage to the posterior ear that has been rubbed by her glasses.  No erythema or edema.   Neurological:      Mental Status: She is alert.             Diagnoses and all orders for this visit:    1. Ear cartilage deformity, left (Primary)       Pt reassured about findings. She is still going to see the Dermatologist to make sure this is ok. She is advised to follow up with me when scheduled or as needed.          This document has been electronically signed by UMESH Bustos on March 13, 2023 13:33 CDT

## 2023-04-03 ENCOUNTER — PRIOR AUTHORIZATION (OUTPATIENT)
Dept: FAMILY MEDICINE CLINIC | Facility: CLINIC | Age: 63
End: 2023-04-03
Payer: MEDICARE

## 2023-04-03 DIAGNOSIS — K21.9 GASTROESOPHAGEAL REFLUX DISEASE WITHOUT ESOPHAGITIS: Primary | ICD-10-CM

## 2023-04-03 RX ORDER — PANTOPRAZOLE SODIUM 40 MG/1
40 TABLET, DELAYED RELEASE ORAL DAILY
Qty: 90 TABLET | Refills: 1 | Status: SHIPPED | OUTPATIENT
Start: 2023-04-03

## 2023-04-03 NOTE — TELEPHONE ENCOUNTER
Patient called and asked for a PA for Dexilant. She advised she has been unable to get her medication.     I initiated a PA. Ref PA Key [id number]. DX used k21.9 GERD w/o     Error message received: There is an EOC that was clinically denied within the last 60 days. This request needs to be sent to the Grievance and Appeals Dept. at [facility]. Appeal requests must come from the member or the provider.    I am sending this back to Alvina Bergman's office for further review. Previous EOC is not in the file.     Per Alvina send in Protonix 40 mg 1 daily. Patient advised.

## 2023-05-03 DIAGNOSIS — G25.81 RESTLESS LEG SYNDROME: Chronic | ICD-10-CM

## 2023-05-03 RX ORDER — ROPINIROLE 3 MG/1
3 TABLET, FILM COATED ORAL
Qty: 30 TABLET | Refills: 0 | Status: SHIPPED | OUTPATIENT
Start: 2023-05-03

## 2023-05-09 ENCOUNTER — OFFICE VISIT (OUTPATIENT)
Dept: FAMILY MEDICINE CLINIC | Facility: CLINIC | Age: 63
End: 2023-05-09
Payer: MEDICARE

## 2023-05-09 VITALS — HEIGHT: 62 IN | WEIGHT: 102 LBS | BODY MASS INDEX: 18.77 KG/M2

## 2023-05-09 DIAGNOSIS — G25.81 RESTLESS LEG SYNDROME: Chronic | ICD-10-CM

## 2023-05-09 DIAGNOSIS — E46 PROTEIN-CALORIE MALNUTRITION, UNSPECIFIED SEVERITY: Chronic | ICD-10-CM

## 2023-05-09 DIAGNOSIS — E78.5 HYPERLIPIDEMIA, UNSPECIFIED HYPERLIPIDEMIA TYPE: Chronic | ICD-10-CM

## 2023-05-09 DIAGNOSIS — J44.9 CHRONIC OBSTRUCTIVE PULMONARY DISEASE, UNSPECIFIED COPD TYPE: Chronic | ICD-10-CM

## 2023-05-09 DIAGNOSIS — F41.1 GAD (GENERALIZED ANXIETY DISORDER): Chronic | ICD-10-CM

## 2023-05-09 DIAGNOSIS — I38 LEAKY HEART VALVE: Chronic | ICD-10-CM

## 2023-05-09 DIAGNOSIS — R00.0 SINUS TACHYCARDIA: Chronic | ICD-10-CM

## 2023-05-09 DIAGNOSIS — G89.29 CHRONIC MIDLINE THORACIC BACK PAIN: Chronic | ICD-10-CM

## 2023-05-09 DIAGNOSIS — E53.8 B12 DEFICIENCY: Chronic | ICD-10-CM

## 2023-05-09 DIAGNOSIS — I10 ESSENTIAL HYPERTENSION: Chronic | ICD-10-CM

## 2023-05-09 DIAGNOSIS — Z86.79 HISTORY OF CEREBRAL ANEURYSM: Chronic | ICD-10-CM

## 2023-05-09 DIAGNOSIS — E03.9 ACQUIRED HYPOTHYROIDISM: Primary | Chronic | ICD-10-CM

## 2023-05-09 DIAGNOSIS — K21.00 GASTROESOPHAGEAL REFLUX DISEASE WITH ESOPHAGITIS WITHOUT HEMORRHAGE: Chronic | ICD-10-CM

## 2023-05-09 DIAGNOSIS — G89.29 CHRONIC MIDLINE LOW BACK PAIN WITHOUT SCIATICA: Chronic | ICD-10-CM

## 2023-05-09 DIAGNOSIS — F17.200 TOBACCO DEPENDENCE SYNDROME: Chronic | ICD-10-CM

## 2023-05-09 DIAGNOSIS — D64.9 ANEMIA, UNSPECIFIED TYPE: ICD-10-CM

## 2023-05-09 DIAGNOSIS — F43.10 PTSD (POST-TRAUMATIC STRESS DISORDER): Chronic | ICD-10-CM

## 2023-05-09 DIAGNOSIS — M17.12 PRIMARY OSTEOARTHRITIS OF LEFT KNEE: Chronic | ICD-10-CM

## 2023-05-09 DIAGNOSIS — M17.11 PRIMARY OSTEOARTHRITIS OF RIGHT KNEE: Chronic | ICD-10-CM

## 2023-05-09 DIAGNOSIS — G43.009 MIGRAINE WITHOUT AURA AND WITHOUT STATUS MIGRAINOSUS, NOT INTRACTABLE: Chronic | ICD-10-CM

## 2023-05-09 DIAGNOSIS — M54.6 CHRONIC MIDLINE THORACIC BACK PAIN: Chronic | ICD-10-CM

## 2023-05-09 DIAGNOSIS — E03.9 ACQUIRED HYPOTHYROIDISM: ICD-10-CM

## 2023-05-09 DIAGNOSIS — M54.50 CHRONIC MIDLINE LOW BACK PAIN WITHOUT SCIATICA: Chronic | ICD-10-CM

## 2023-05-09 DIAGNOSIS — Z78.0 POSTMENOPAUSAL: ICD-10-CM

## 2023-05-09 RX ORDER — LEVOTHYROXINE SODIUM 0.03 MG/1
TABLET ORAL
Qty: 15 TABLET | Refills: 5 | Status: SHIPPED | OUTPATIENT
Start: 2023-05-09

## 2023-05-09 NOTE — PROGRESS NOTES
CC: Follow-up (3 month FU)      Subjective:  Tricia Loyola is a 63 y.o. female who presents for         This is a telephone due to pandemic.  Patient is at her home and I am in the office.  This is a 6-month follow-up of medical conditions.  Patient has hypothyroidism in which she takes levothyroxine 12.5 mcg daily for.  She has hyperlipidemia in which she takes atorvastatin for.  She has history of anemia.  She is currently on B12 injections and folic acid.  She has anxiety and PTSD in which she takes BuSpar, Vistaril, and Trileptal for.  She has essential hypertension and takes metoprolol for.  She has leaky heart valves in which she is followed by cardiology for.  She has migraines that is followed by neurology Dr. Lazar for.  She gets Botox injections and takes Topamax for as a preventative therapy she has history of a traumatic brain injury with seizures.  She states she has not had seizures in years.  She has a history of a brain aneurysm status post coiling.  She has short-term memory loss.  She is followed by neurology for all of these.  She has tachycardia that is rate controlled with metoprolol.  She does follow with cardiology.  She has restless leg syndrome and takes Requip for.  She has chronic midline thoracic back pain, chronic midline low back pain, osteoarthritis of bilateral knees.  She is followed by the pain clinic for this and takes hydrocodone as needed for.  She has GERD and takes Pepcid and Protonix for.  Her symptoms are well controlled with these medications.  Patient is a smoker.  She smokes a half a pack per day and has for 41 years.    Patient is due for a bone density. Is willing to have this ordered today.    Patient is due for pneumonia vaccine. She will come by at a later date to get this.      The following portions of the patient's history were reviewed and updated as appropriate: allergies, current medications, past family history, past medical history, past social history,  "past surgical history and problem list.    Past Medical History:   Diagnosis Date   • Abdominal pain    • Anemia    • Anxiety and depression    • Chronic post-traumatic headache      rebound      • Depressive disorder    • Disease of thyroid gland    • Encounter for gynecological examination    • Endometriosis    • Gastroesophageal reflux disease    • Generalized anxiety disorder    • History of mammogram 08/2008    MAMMOGRAM DIAGNOSTIC BILATERAL 45309 ([facility]) (1)     • Hyperlipidemia    • Hypertension    • Ingrown toenail    • Injury of back    • Leaky heart valve    • Leaky heart valve    • Migraine    • Nonruptured cerebral aneurysm    • Osteoporosis    • Posttraumatic stress disorder    • Primary osteoarthritis of left knee    • Primary osteoarthritis of right knee    • PTSD (post-traumatic stress disorder)    • Seizure     last seizure 2009.  no meds at this time   • Skin cancer     basal cell on back   • Tachycardia    • Viral hepatitis A    • Wears dentures     uppers only   • Wears glasses          Current Outpatient Medications:   •  acetaminophen (TYLENOL) 325 MG tablet, Take 2 tablets by mouth Every 4 (Four) Hours As Needed for Mild Pain., Disp: , Rfl:   •  albuterol (ACCUNEB) 1.25 MG/3ML nebulizer solution, Take 3 mL by nebulization Every 4 (Four) Hours As Needed for Wheezing or Shortness of Air., Disp: 100 each, Rfl: 5  •  albuterol sulfate  (90 Base) MCG/ACT inhaler, Inhale 2 puffs Every 4 (Four) Hours As Needed for Wheezing., Disp: 18 g, Rfl: 5  •  amitriptyline (ELAVIL) 75 MG tablet, Take 1 tablet by mouth Every Night. Resumed 6/10/22., Disp: 30 tablet, Rfl: 5  •  aspirin 81 MG EC tablet, Take 1 tablet by mouth Daily., Disp: 30 tablet, Rfl: 0  •  atorvastatin (LIPITOR) 40 MG tablet, TAKE 1 TABLET BY MOUTH NIGHTLY FOR CHOLESTEROL, Disp: 90 tablet, Rfl: 1  •  B-D 3CC LUER-KAITLIN SYR 25GX5/8\" 25G X 5/8\" 3 ML misc, USE AS DIRECTED for b12 injection, Disp: 3 each, Rfl: 1  •  Botox 200 units " reconstituted solution, Inject 200 Units into the appropriate muscle as directed by prescriber Every 3 (Three) Months., Disp: , Rfl:   •  busPIRone (BUSPAR) 30 MG tablet, Take 1 tablet by mouth 2 (Two) Times a Day., Disp: 60 tablet, Rfl: 0  •  Cyanocobalamin 1000 MCG/ML kit, Inject 1,000 mL as directed Every 30 (Thirty) Days. Please give syringes also, Disp: 1 kit, Rfl: 12  •  diphenhydrAMINE (SOMINEX) 25 MG tablet, Take 1 tablet by mouth., Disp: , Rfl:   •  diphenoxylate-atropine (Lomotil) 2.5-0.025 MG per tablet, Take 1 tablet by mouth 4 (Four) Times a Day As Needed for Diarrhea., Disp: 12 tablet, Rfl: 0  •  famotidine (PEPCID) 40 MG tablet, Take 1 tablet by mouth Daily., Disp: 90 tablet, Rfl: 1  •  fluticasone (Flonase) 50 MCG/ACT nasal spray, 2 sprays into the nostril(s) as directed by provider Daily., Disp: 9.9 mL, Rfl: 3  •  folic acid (FOLVITE) 800 MCG tablet, Take 1 tablet by mouth Daily., Disp: 90 tablet, Rfl: 1  •  HYDROcodone-acetaminophen (NORCO)  MG per tablet, Take 1 tablet by mouth Every 6 (Six) Hours As Needed for Moderate Pain., Disp: 8 tablet, Rfl: 0  •  hydrOXYzine pamoate (Vistaril) 50 MG capsule, Take 1 capsule by mouth 3 (Three) Times a Day As Needed for Anxiety., Disp: 90 capsule, Rfl: 3  •  levothyroxine (SYNTHROID, LEVOTHROID) 25 MCG tablet, TAKE 1/2 TABLET BY MOUTH DAILY, Disp: 15 tablet, Rfl: 5  •  lidocaine (LIDODERM) 5 %, , Disp: , Rfl:   •  Linzess 290 MCG capsule capsule, Take 1 capsule by mouth Every Morning Before Breakfast., Disp: 90 capsule, Rfl: 1  •  losartan (COZAAR) 50 MG tablet, Take 1 tablet by mouth Daily., Disp: 30 tablet, Rfl: 6  •  megestrol (MEGACE) 20 MG tablet, Take 1 tablet by mouth Daily., Disp: 30 tablet, Rfl: 5  •  metoprolol succinate XL (TOPROL-XL) 100 MG 24 hr tablet, TAKE 1 TABLET BY MOUTH DAILY at 3pm, Disp: 30 tablet, Rfl: 1  •  ondansetron ODT (ZOFRAN-ODT) 4 MG disintegrating tablet, Place 1 tablet on the tongue Every 8 (Eight) Hours As Needed for  "Nausea., Disp: 30 tablet, Rfl: 5  •  OXcarbazepine (TRILEPTAL) 150 MG tablet, Take 1 tablet by mouth Every Night. Daily at bedtime, Disp: , Rfl:   •  pantoprazole (Protonix) 40 MG EC tablet, Take 1 tablet by mouth Daily., Disp: 90 tablet, Rfl: 1  •  prochlorperazine (COMPAZINE) 10 MG tablet, Take 1 tablet by mouth Every 6 (Six) Hours As Needed for Nausea or Vomiting., Disp: 120 tablet, Rfl: 5  •  rOPINIRole (REQUIP) 3 MG tablet, TAKE 1 TABLET BY MOUTH EVERY NIGHT AT BEDTIME, Disp: 30 tablet, Rfl: 0  •  sulfamethoxazole-trimethoprim (Bactrim DS) 800-160 MG per tablet, Take 1 tablet by mouth 2 (Two) Times a Day., Disp: 10 tablet, Rfl: 0  •  tamsulosin (FLOMAX) 0.4 MG capsule 24 hr capsule, Take 1 capsule by mouth Daily., Disp: 30 capsule, Rfl: 0  •  topiramate (TOPAMAX) 100 MG tablet, Take 1 tablet by mouth 2 (Two) Times a Day., Disp: 180 tablet, Rfl: 1  •  witch hazel-glycerin (TUCKS) pad, Insert  into the rectum As Needed for Hemorrhoids., Disp: 40 each, Rfl: 1    Review of Systems    Review of Systems   Constitutional: Negative.    HENT: Negative.    Eyes: Negative.    Respiratory: Negative.    Cardiovascular: Negative.    Gastrointestinal: Negative.    Endocrine: Negative.    Genitourinary: Negative.    Musculoskeletal: Positive for arthralgias and back pain.   Skin: Negative.    Allergic/Immunologic: Negative.    Neurological: Negative.    Hematological: Negative.    Psychiatric/Behavioral: Negative.    All other systems reviewed and are negative.      Objective  Vitals:    05/09/23 1508   Weight: 46.3 kg (102 lb)   Height: 157.5 cm (62\")     Body mass index is 18.66 kg/m².    Physical Exam    Physical Exam  Deferred, telephone visit because of pandemic      Diagnoses and all orders for this visit:    1. Acquired hypothyroidism (Primary)  -     TSH    2. Hyperlipidemia, unspecified hyperlipidemia type  -     Comprehensive metabolic panel; Future  -     LDL cholesterol, direct; Future    3. Anemia, unspecified " type  -     CBC w AUTO Differential; Future  -     Iron and TIBC; Future  -     Ferritin; Future  -     Folate; Future  -     Vitamin B12; Future    4. PRISCILLA (generalized anxiety disorder)    5. PTSD (post-traumatic stress disorder)    6. Essential hypertension  -     Comprehensive metabolic panel; Future    7. Leaky heart valve    8. Migraine without aura and without status migrainosus, not intractable    9. Sinus tachycardia    10. Restless leg syndrome    11. History of cerebral aneurysm    12. Tobacco dependence syndrome    13. Gastroesophageal reflux disease with esophagitis without hemorrhage    14. Primary osteoarthritis of left knee    15. Primary osteoarthritis of right knee    16. Chronic midline low back pain without sciatica    17. Chronic midline thoracic back pain    18. Protein-calorie malnutrition, unspecified severity    19. Chronic obstructive pulmonary disease, unspecified COPD type    20. Postmenopausal  -     DEXA Bone Density Axial    21. B12 deficiency  -     Cyanocobalamin 1000 MCG/ML kit; Inject 1,000 mL as directed Every 30 (Thirty) Days. Please give syringes also  Dispense: 1 kit; Refill: 12       Is a telephone visit due to pandemic.  This is a 6-month follow-up of medical conditions.  Patient to continue her current medications.  The only refill she needs today is on the B12 this has been sent in as requested.  Labs have been ordered as above.  Patient to come in fasting to have these drawn.  We will notify patient of these results once they are available.  She is advised to keep her scheduled follow-up appointments with cardiology and neurology.  She is counseled on the need for a pneumonia vaccine today.  She states she will come by at some point and get this.  Smoking cessation counseling of less than 3 minutes provided.  Patient is not interested in smoking cessation at this time.  Bone density has been ordered.  Patient is to follow-up in 6 months or sooner if needed.  At that time we  will perform an annual wellness visit with labs.  Answered all questions.  Patient verbalized understanding of plan of care.      This document has been electronically signed by UMESH Bustos on May 9, 2023 15:39 CDT     You have chosen to receive care through a telephone visit. Do you consent to use a telephone visit for your medical care today? Yes    This visit has been rescheduled as a phone visit to comply with patient safety concerns in accordance with CDC recommendations. Total time of discussion was 22 minutes.

## 2023-05-25 ENCOUNTER — LAB (OUTPATIENT)
Dept: LAB | Facility: OTHER | Age: 63
End: 2023-05-25

## 2023-05-25 DIAGNOSIS — E78.5 HYPERLIPIDEMIA, UNSPECIFIED HYPERLIPIDEMIA TYPE: Chronic | ICD-10-CM

## 2023-05-25 DIAGNOSIS — D64.9 ANEMIA, UNSPECIFIED TYPE: ICD-10-CM

## 2023-05-25 DIAGNOSIS — I10 ESSENTIAL HYPERTENSION: Chronic | ICD-10-CM

## 2023-05-25 LAB
ALBUMIN SERPL-MCNC: 3.9 G/DL (ref 3.5–5)
ALBUMIN/GLOB SERPL: 1.4 G/DL (ref 1.1–1.8)
ALP SERPL-CCNC: 61 U/L (ref 38–126)
ALT SERPL W P-5'-P-CCNC: 25 U/L
ANION GAP SERPL CALCULATED.3IONS-SCNC: 6 MMOL/L (ref 5–15)
ARTICHOKE IGE QN: 94 MG/DL (ref 0–100)
AST SERPL-CCNC: 62 U/L (ref 14–36)
BASOPHILS # BLD AUTO: 0.05 10*3/MM3 (ref 0–0.2)
BASOPHILS NFR BLD AUTO: 0.7 % (ref 0–1.5)
BILIRUB SERPL-MCNC: 0.4 MG/DL (ref 0.2–1.3)
BUN SERPL-MCNC: 18 MG/DL (ref 7–23)
BUN/CREAT SERPL: 17.5 (ref 7–25)
CALCIUM SPEC-SCNC: 9.2 MG/DL (ref 8.4–10.2)
CHLORIDE SERPL-SCNC: 105 MMOL/L (ref 101–112)
CO2 SERPL-SCNC: 26 MMOL/L (ref 22–30)
CREAT SERPL-MCNC: 1.03 MG/DL (ref 0.52–1.04)
DEPRECATED RDW RBC AUTO: 48.3 FL (ref 37–54)
EGFRCR SERPLBLD CKD-EPI 2021: 60.5 ML/MIN/1.73
EOSINOPHIL # BLD AUTO: 0.13 10*3/MM3 (ref 0–0.4)
EOSINOPHIL NFR BLD AUTO: 1.8 % (ref 0.3–6.2)
ERYTHROCYTE [DISTWIDTH] IN BLOOD BY AUTOMATED COUNT: 13.9 % (ref 12.3–15.4)
FERRITIN SERPL-MCNC: 62.9 NG/ML (ref 13–150)
FOLATE SERPL-MCNC: 2.92 NG/ML (ref 4.78–24.2)
GLOBULIN UR ELPH-MCNC: 2.8 GM/DL (ref 2.3–3.5)
GLUCOSE SERPL-MCNC: 98 MG/DL (ref 70–99)
HCT VFR BLD AUTO: 40.3 % (ref 34–46.6)
HGB BLD-MCNC: 13.2 G/DL (ref 12–15.9)
IRON 24H UR-MRATE: 70 MCG/DL (ref 37–145)
IRON SATN MFR SERPL: 22 % (ref 20–50)
LYMPHOCYTES # BLD AUTO: 3.31 10*3/MM3 (ref 0.7–3.1)
LYMPHOCYTES NFR BLD AUTO: 45.7 % (ref 19.6–45.3)
MCH RBC QN AUTO: 31.8 PG (ref 26.6–33)
MCHC RBC AUTO-ENTMCNC: 32.8 G/DL (ref 31.5–35.7)
MCV RBC AUTO: 97.1 FL (ref 79–97)
MONOCYTES # BLD AUTO: 0.93 10*3/MM3 (ref 0.1–0.9)
MONOCYTES NFR BLD AUTO: 12.8 % (ref 5–12)
NEUTROPHILS NFR BLD AUTO: 2.83 10*3/MM3 (ref 1.7–7)
NEUTROPHILS NFR BLD AUTO: 39 % (ref 42.7–76)
PLATELET # BLD AUTO: 258 10*3/MM3 (ref 140–450)
PMV BLD AUTO: 9.4 FL (ref 6–12)
POTASSIUM SERPL-SCNC: 4.2 MMOL/L (ref 3.4–5)
PROT SERPL-MCNC: 6.7 G/DL (ref 6.3–8.6)
RBC # BLD AUTO: 4.15 10*6/MM3 (ref 3.77–5.28)
SODIUM SERPL-SCNC: 137 MMOL/L (ref 137–145)
TIBC SERPL-MCNC: 320 MCG/DL (ref 298–536)
TRANSFERRIN SERPL-MCNC: 215 MG/DL (ref 200–360)
TSH SERPL DL<=0.05 MIU/L-ACNC: 2.1 UIU/ML (ref 0.27–4.2)
VIT B12 BLD-MCNC: 728 PG/ML (ref 211–946)
WBC NRBC COR # BLD: 7.25 10*3/MM3 (ref 3.4–10.8)

## 2023-05-25 PROCEDURE — 84443 ASSAY THYROID STIM HORMONE: CPT | Performed by: NURSE PRACTITIONER

## 2023-05-25 PROCEDURE — 85025 COMPLETE CBC W/AUTO DIFF WBC: CPT | Performed by: NURSE PRACTITIONER

## 2023-05-25 PROCEDURE — 84466 ASSAY OF TRANSFERRIN: CPT | Performed by: NURSE PRACTITIONER

## 2023-05-25 PROCEDURE — 36415 COLL VENOUS BLD VENIPUNCTURE: CPT | Performed by: NURSE PRACTITIONER

## 2023-05-25 PROCEDURE — 83721 ASSAY OF BLOOD LIPOPROTEIN: CPT | Performed by: NURSE PRACTITIONER

## 2023-05-25 PROCEDURE — 82746 ASSAY OF FOLIC ACID SERUM: CPT | Performed by: NURSE PRACTITIONER

## 2023-05-25 PROCEDURE — 80053 COMPREHEN METABOLIC PANEL: CPT | Performed by: NURSE PRACTITIONER

## 2023-05-25 PROCEDURE — 82728 ASSAY OF FERRITIN: CPT | Performed by: NURSE PRACTITIONER

## 2023-05-25 PROCEDURE — 82607 VITAMIN B-12: CPT | Performed by: NURSE PRACTITIONER

## 2023-05-25 PROCEDURE — 83540 ASSAY OF IRON: CPT | Performed by: NURSE PRACTITIONER

## 2023-05-26 ENCOUNTER — OFFICE VISIT (OUTPATIENT)
Dept: OBSTETRICS AND GYNECOLOGY | Facility: CLINIC | Age: 63
End: 2023-05-26
Payer: MEDICARE

## 2023-05-26 VITALS
WEIGHT: 100.2 LBS | HEIGHT: 62 IN | DIASTOLIC BLOOD PRESSURE: 62 MMHG | BODY MASS INDEX: 18.44 KG/M2 | SYSTOLIC BLOOD PRESSURE: 110 MMHG | HEART RATE: 67 BPM

## 2023-05-26 DIAGNOSIS — N90.7 INCLUSION CYST OF VULVA: Primary | ICD-10-CM

## 2023-05-26 PROCEDURE — 3078F DIAST BP <80 MM HG: CPT | Performed by: NURSE PRACTITIONER

## 2023-05-26 PROCEDURE — 1160F RVW MEDS BY RX/DR IN RCRD: CPT | Performed by: NURSE PRACTITIONER

## 2023-05-26 PROCEDURE — 99213 OFFICE O/P EST LOW 20 MIN: CPT | Performed by: NURSE PRACTITIONER

## 2023-05-26 PROCEDURE — 1159F MED LIST DOCD IN RCRD: CPT | Performed by: NURSE PRACTITIONER

## 2023-05-26 PROCEDURE — 3074F SYST BP LT 130 MM HG: CPT | Performed by: NURSE PRACTITIONER

## 2023-06-02 DIAGNOSIS — G25.81 RESTLESS LEG SYNDROME: Chronic | ICD-10-CM

## 2023-06-05 RX ORDER — ROPINIROLE 3 MG/1
3 TABLET, FILM COATED ORAL
Qty: 90 TABLET | Refills: 0 | Status: SHIPPED | OUTPATIENT
Start: 2023-06-05

## 2023-06-13 ENCOUNTER — OFFICE VISIT (OUTPATIENT)
Dept: FAMILY MEDICINE CLINIC | Facility: CLINIC | Age: 63
End: 2023-06-13
Payer: MEDICARE

## 2023-06-13 VITALS
WEIGHT: 101 LBS | BODY MASS INDEX: 18.58 KG/M2 | DIASTOLIC BLOOD PRESSURE: 68 MMHG | HEART RATE: 66 BPM | OXYGEN SATURATION: 97 % | HEIGHT: 62 IN | SYSTOLIC BLOOD PRESSURE: 112 MMHG

## 2023-06-13 DIAGNOSIS — M54.50 CHRONIC MIDLINE LOW BACK PAIN WITHOUT SCIATICA: Chronic | ICD-10-CM

## 2023-06-13 DIAGNOSIS — M81.0 AGE-RELATED OSTEOPOROSIS WITHOUT CURRENT PATHOLOGICAL FRACTURE: Primary | Chronic | ICD-10-CM

## 2023-06-13 DIAGNOSIS — E53.8 FOLATE DEFICIENCY: Chronic | ICD-10-CM

## 2023-06-13 DIAGNOSIS — E03.9 ACQUIRED HYPOTHYROIDISM: Chronic | ICD-10-CM

## 2023-06-13 DIAGNOSIS — G89.29 CHRONIC MIDLINE LOW BACK PAIN WITHOUT SCIATICA: Chronic | ICD-10-CM

## 2023-06-13 PROCEDURE — 3074F SYST BP LT 130 MM HG: CPT | Performed by: NURSE PRACTITIONER

## 2023-06-13 PROCEDURE — 1159F MED LIST DOCD IN RCRD: CPT | Performed by: NURSE PRACTITIONER

## 2023-06-13 PROCEDURE — 3078F DIAST BP <80 MM HG: CPT | Performed by: NURSE PRACTITIONER

## 2023-06-13 PROCEDURE — 1160F RVW MEDS BY RX/DR IN RCRD: CPT | Performed by: NURSE PRACTITIONER

## 2023-06-13 PROCEDURE — 99214 OFFICE O/P EST MOD 30 MIN: CPT | Performed by: NURSE PRACTITIONER

## 2023-06-13 RX ORDER — LEVOTHYROXINE SODIUM 0.03 MG/1
12.5 TABLET ORAL DAILY
Qty: 15 TABLET | Refills: 5 | Status: SHIPPED | OUTPATIENT
Start: 2023-06-13

## 2023-06-13 RX ORDER — IBANDRONATE SODIUM 150 MG/1
150 TABLET, FILM COATED ORAL
Qty: 3 TABLET | Refills: 3 | Status: SHIPPED | OUTPATIENT
Start: 2023-06-13

## 2023-06-13 RX ORDER — ERGOCALCIFEROL 1.25 MG/1
50000 CAPSULE ORAL WEEKLY
Qty: 12 CAPSULE | Refills: 3 | Status: SHIPPED | OUTPATIENT
Start: 2023-06-13

## 2023-06-13 NOTE — PROGRESS NOTES
CC: Follow-up (Bone density and lab results )      Subjective:  Tricia Loyola is a 63 y.o. female who presents for         Patient in to discuss bone density results and recent lab results.  Her bone density revealed osteoporosis.  I have discussed treatment options with patient while in office today. She has taken Reclast for in the past. She has also been on Fosamax. She would like to try something else.    She also states she needs a new referral to pain management for her chronic pain.    She needs a refill on her levothyroxine today.      The following portions of the patient's history were reviewed and updated as appropriate: allergies, current medications, past family history, past medical history, past social history, past surgical history and problem list.    Past Medical History:   Diagnosis Date    Abdominal pain     Anemia     Anxiety and depression     Chronic post-traumatic headache      rebound       Depressive disorder     Disease of thyroid gland     Encounter for gynecological examination     Endometriosis     Gastroesophageal reflux disease     Generalized anxiety disorder     History of mammogram 08/2008    MAMMOGRAM DIAGNOSTIC BILATERAL 68867 ([facility]) (1)      Hyperlipidemia     Hypertension     Ingrown toenail     Injury of back     Leaky heart valve     Leaky heart valve     Migraine     Nonruptured cerebral aneurysm     Osteoporosis     Posttraumatic stress disorder     Primary osteoarthritis of left knee     Primary osteoarthritis of right knee     PTSD (post-traumatic stress disorder)     Seizure     last seizure 2009.  no meds at this time    Skin cancer     basal cell on back    Tachycardia     Viral hepatitis A     Wears dentures     uppers only    Wears glasses          Current Outpatient Medications:     acetaminophen (TYLENOL) 325 MG tablet, Take 2 tablets by mouth Every 4 (Four) Hours As Needed for Mild Pain., Disp: , Rfl:     albuterol (ACCUNEB) 1.25 MG/3ML nebulizer solution, Take 3  "mL by nebulization Every 4 (Four) Hours As Needed for Wheezing or Shortness of Air., Disp: 100 each, Rfl: 5    albuterol sulfate  (90 Base) MCG/ACT inhaler, Inhale 2 puffs Every 4 (Four) Hours As Needed for Wheezing., Disp: 18 g, Rfl: 5    amitriptyline (ELAVIL) 75 MG tablet, Take 1 tablet by mouth Every Night. Resumed 6/10/22., Disp: 30 tablet, Rfl: 5    aspirin 81 MG EC tablet, Take 1 tablet by mouth Daily., Disp: 30 tablet, Rfl: 0    atorvastatin (LIPITOR) 40 MG tablet, TAKE 1 TABLET BY MOUTH NIGHTLY FOR CHOLESTEROL, Disp: 90 tablet, Rfl: 1    B-D 3CC LUER-KAITLIN SYR 25GX5/8\" 25G X 5/8\" 3 ML misc, USE AS DIRECTED for b12 injection, Disp: 3 each, Rfl: 1    Botox 200 units reconstituted solution, Inject 200 Units into the appropriate muscle as directed by prescriber Every 3 (Three) Months., Disp: , Rfl:     busPIRone (BUSPAR) 30 MG tablet, Take 1 tablet by mouth 2 (Two) Times a Day., Disp: 60 tablet, Rfl: 0    Cyanocobalamin 1000 MCG/ML kit, Inject 1,000 mL as directed Every 30 (Thirty) Days. Please give syringes also, Disp: 1 kit, Rfl: 12    famotidine (PEPCID) 40 MG tablet, Take 1 tablet by mouth Daily., Disp: 90 tablet, Rfl: 1    fluticasone (Flonase) 50 MCG/ACT nasal spray, 2 sprays into the nostril(s) as directed by provider Daily., Disp: 9.9 mL, Rfl: 3    folic acid (FOLVITE) 800 MCG tablet, Take 1 tablet by mouth Daily., Disp: 90 tablet, Rfl: 1    HYDROcodone-acetaminophen (NORCO)  MG per tablet, Take 1 tablet by mouth Every 6 (Six) Hours As Needed for Moderate Pain., Disp: 8 tablet, Rfl: 0    hydrOXYzine pamoate (Vistaril) 50 MG capsule, Take 1 capsule by mouth 3 (Three) Times a Day As Needed for Anxiety., Disp: 90 capsule, Rfl: 3    levothyroxine (SYNTHROID, LEVOTHROID) 25 MCG tablet, Take 0.5 tablets by mouth Daily., Disp: 15 tablet, Rfl: 5    lidocaine (LIDODERM) 5 %, , Disp: , Rfl:     Linzess 290 MCG capsule capsule, Take 1 capsule by mouth Every Morning Before Breakfast., Disp: 90 capsule, " "Rfl: 1    losartan (COZAAR) 50 MG tablet, Take 1 tablet by mouth Daily., Disp: 30 tablet, Rfl: 6    megestrol (MEGACE) 20 MG tablet, Take 1 tablet by mouth Daily., Disp: 30 tablet, Rfl: 5    metoprolol succinate XL (TOPROL-XL) 100 MG 24 hr tablet, TAKE 1 TABLET BY MOUTH DAILY at 3pm, Disp: 30 tablet, Rfl: 1    OXcarbazepine (TRILEPTAL) 150 MG tablet, Take 1 tablet by mouth Every Night. Daily at bedtime, Disp: , Rfl:     pantoprazole (Protonix) 40 MG EC tablet, Take 1 tablet by mouth Daily., Disp: 90 tablet, Rfl: 1    prochlorperazine (COMPAZINE) 10 MG tablet, Take 1 tablet by mouth Every 6 (Six) Hours As Needed for Nausea or Vomiting., Disp: 120 tablet, Rfl: 5    rOPINIRole (REQUIP) 3 MG tablet, Take 1 tablet by mouth every night at bedtime., Disp: 90 tablet, Rfl: 0    topiramate (TOPAMAX) 100 MG tablet, Take 1 tablet by mouth 2 (Two) Times a Day., Disp: 180 tablet, Rfl: 1    ibandronate (Boniva) 150 MG tablet, Take 1 tablet by mouth Every 30 (Thirty) Days., Disp: 3 tablet, Rfl: 3    vitamin D (ERGOCALCIFEROL) 1.25 MG (88971 UT) capsule capsule, Take 1 capsule by mouth 1 (One) Time Per Week., Disp: 12 capsule, Rfl: 3    Review of Systems    Review of Systems   Constitutional: Negative.    HENT: Negative.     Eyes: Negative.    Respiratory: Negative.     Cardiovascular: Negative.    Gastrointestinal: Negative.    Endocrine: Negative.    Genitourinary: Negative.    Musculoskeletal:  Positive for back pain.   Skin: Negative.    Allergic/Immunologic: Negative.    Neurological: Negative.    Hematological: Negative.    Psychiatric/Behavioral: Negative.     All other systems reviewed and are negative.    Objective  Vitals:    06/13/23 1338   BP: 112/68   Pulse: 66   SpO2: 97%   Weight: 45.8 kg (101 lb)   Height: 157.5 cm (62\")     Body mass index is 18.47 kg/m².    Physical Exam    Physical Exam  Vitals and nursing note reviewed.   Constitutional:       General: She is not in acute distress.     Appearance: Normal " appearance. She is not ill-appearing, toxic-appearing or diaphoretic.   HENT:      Head: Normocephalic and atraumatic.   Cardiovascular:      Rate and Rhythm: Normal rate and regular rhythm.      Pulses: Normal pulses.      Heart sounds: Normal heart sounds. No murmur heard.    No friction rub. No gallop.   Pulmonary:      Effort: Pulmonary effort is normal. No respiratory distress.      Breath sounds: Normal breath sounds. No stridor. No wheezing, rhonchi or rales.   Chest:      Chest wall: No tenderness.   Musculoskeletal:      Cervical back: Normal range of motion and neck supple.   Neurological:      Mental Status: She is alert.           Diagnoses and all orders for this visit:    1. Age-related osteoporosis without current pathological fracture (Primary)  -     ibandronate (Boniva) 150 MG tablet; Take 1 tablet by mouth Every 30 (Thirty) Days.  Dispense: 3 tablet; Refill: 3  -     vitamin D (ERGOCALCIFEROL) 1.25 MG (16468 UT) capsule capsule; Take 1 capsule by mouth 1 (One) Time Per Week.  Dispense: 12 capsule; Refill: 3    2. Acquired hypothyroidism  -     levothyroxine (SYNTHROID, LEVOTHROID) 25 MCG tablet; Take 0.5 tablets by mouth Daily.  Dispense: 15 tablet; Refill: 5    3. Chronic midline low back pain without sciatica  -     Ambulatory Referral to Pain Management    4. Folate deficiency       Reviewed results of labs and bone density with patient while in office today.  She does have osteoporosis and has had treatment in the past.  Is wanting to try something different.  We will start patient on Boniva.  I gave instructions at length to patient on how to take this medication and possible side effects.  She is advised to take this 60 minutes before she eats drinks or takes any other medications.  She is also advised that she is not to eat drink or take any other medications for 60 minutes after taking this medication.  She is also advised to not lie down for 2 hours after taking this medication.  She is  also given vitamin D and advised to start taking calcium daily.  She is going to get this over-the-counter.  I refilled her levothyroxine as requested.  For the chronic midline low back pain without sciatica, have put in a new referral as requested to pain management.  Lab results were okay, she was noted to have a folate deficiency.  I was going to start her on folic acid daily and she states she will get this over-the-counter.  Patient is advised to keep her scheduled follow-up appointment in November or to follow-up sooner if needed.  Answered all questions.  Patient verbalized understanding of plan of care.          This document has been electronically signed by UMESH Bustos on June 13, 2023 14:49 CDT

## 2023-07-31 RX ORDER — LINACLOTIDE 290 UG/1
290 CAPSULE, GELATIN COATED ORAL
Qty: 90 CAPSULE | Refills: 0 | Status: SHIPPED | OUTPATIENT
Start: 2023-07-31

## 2023-08-03 DIAGNOSIS — E78.5 HYPERLIPIDEMIA, UNSPECIFIED HYPERLIPIDEMIA TYPE: ICD-10-CM

## 2023-08-03 RX ORDER — ATORVASTATIN CALCIUM 40 MG/1
TABLET, FILM COATED ORAL
Qty: 90 TABLET | Refills: 1 | Status: SHIPPED | OUTPATIENT
Start: 2023-08-03

## 2023-08-04 RX ORDER — LOSARTAN POTASSIUM 50 MG/1
50 TABLET ORAL DAILY
Qty: 30 TABLET | Refills: 6 | OUTPATIENT
Start: 2023-08-04

## 2023-08-10 RX ORDER — FAMOTIDINE 40 MG/1
40 TABLET, FILM COATED ORAL DAILY
Qty: 90 TABLET | Refills: 0 | Status: SHIPPED | OUTPATIENT
Start: 2023-08-10

## 2023-08-15 ENCOUNTER — OFFICE VISIT (OUTPATIENT)
Dept: GASTROENTEROLOGY | Facility: CLINIC | Age: 63
End: 2023-08-15
Payer: MEDICARE

## 2023-08-15 VITALS
WEIGHT: 105 LBS | HEIGHT: 62 IN | DIASTOLIC BLOOD PRESSURE: 69 MMHG | SYSTOLIC BLOOD PRESSURE: 127 MMHG | HEART RATE: 67 BPM | BODY MASS INDEX: 19.32 KG/M2

## 2023-08-15 DIAGNOSIS — K58.2 IRRITABLE BOWEL SYNDROME WITH BOTH CONSTIPATION AND DIARRHEA: ICD-10-CM

## 2023-08-15 DIAGNOSIS — R74.8 ELEVATED LIVER ENZYMES: ICD-10-CM

## 2023-08-15 DIAGNOSIS — Z86.010 HISTORY OF COLON POLYPS: ICD-10-CM

## 2023-08-15 DIAGNOSIS — K64.8 OTHER HEMORRHOIDS: ICD-10-CM

## 2023-08-15 DIAGNOSIS — K62.89 ANAL OR RECTAL PAIN: ICD-10-CM

## 2023-08-15 DIAGNOSIS — E53.8 FOLATE DEFICIENCY: ICD-10-CM

## 2023-08-15 DIAGNOSIS — Z80.0 FAMILY HISTORY OF COLON CANCER: ICD-10-CM

## 2023-08-15 DIAGNOSIS — K21.00 GASTROESOPHAGEAL REFLUX DISEASE WITH ESOPHAGITIS WITHOUT HEMORRHAGE: Primary | ICD-10-CM

## 2023-08-15 DIAGNOSIS — R79.89 ELEVATED LFTS: ICD-10-CM

## 2023-08-15 PROCEDURE — 3078F DIAST BP <80 MM HG: CPT | Performed by: PHYSICIAN ASSISTANT

## 2023-08-15 PROCEDURE — 3074F SYST BP LT 130 MM HG: CPT | Performed by: PHYSICIAN ASSISTANT

## 2023-08-15 PROCEDURE — 99214 OFFICE O/P EST MOD 30 MIN: CPT | Performed by: PHYSICIAN ASSISTANT

## 2023-08-15 PROCEDURE — 1160F RVW MEDS BY RX/DR IN RCRD: CPT | Performed by: PHYSICIAN ASSISTANT

## 2023-08-15 PROCEDURE — 1159F MED LIST DOCD IN RCRD: CPT | Performed by: PHYSICIAN ASSISTANT

## 2023-08-15 RX ORDER — PLECANATIDE 3 MG/1
1 TABLET ORAL DAILY
Qty: 30 TABLET | Refills: 2 | Status: SHIPPED | OUTPATIENT
Start: 2023-08-15

## 2023-08-15 RX ORDER — POLYETHYLENE GLYCOL 3350 17 G/17G
17 POWDER, FOR SOLUTION ORAL ONCE
Qty: 250 G | Refills: 0 | Status: SHIPPED | OUTPATIENT
Start: 2023-08-15 | End: 2023-08-15

## 2023-08-15 RX ORDER — SODIUM PICOSULFATE, MAGNESIUM OXIDE, AND ANHYDROUS CITRIC ACID 12; 3.5; 1 G/175ML; G/175ML; MG/175ML
2 LIQUID ORAL TAKE AS DIRECTED
Qty: 350 ML | Refills: 0 | Status: SHIPPED | OUTPATIENT
Start: 2023-08-15

## 2023-08-15 RX ORDER — DEXTROSE AND SODIUM CHLORIDE 5; .45 G/100ML; G/100ML
30 INJECTION, SOLUTION INTRAVENOUS CONTINUOUS PRN
OUTPATIENT
Start: 2023-08-15

## 2023-08-15 RX ORDER — HYDROCORTISONE 25 MG/G
CREAM TOPICAL 2 TIMES DAILY
Qty: 28 G | Refills: 1 | Status: SHIPPED | OUTPATIENT
Start: 2023-08-15

## 2023-08-15 NOTE — H&P (VIEW-ONLY)
Chief Complaint   Patient presents with    Difficulty Swallowing    Elevated Hepatic Enzymes       ENDO PROCEDURE ORDERED: COLON hx polyp, +FH    Subjective    Tricia Loyola is a 63 y.o. female. she is here today for follow-up.    History of Present Illness    Patient seen on a recheck of her GERD, abdominal pain, IBS, elevated liver enzymes. Last seen 02/14/2023, was late for this follow-up. She states her GERD is doing well on the Protonix and Pepcid. She denied significant dysphagia. She has been having some hemorrhoid problems and bleeding. She does not think the Linzess 290 mcg is working as well as it was. Weight is up 4.5 pounds since last visit. Last colonoscopy showed colon polyps with family history 11/26/2018. She is due this for year for surveillance. Last EGD with dilatation 01/24/203.     Laboratories 05/25/2023: B12 was normal, folate was low 2.92, ferritin 62.9, iron studies were okay. LDL normal. CMP showed an AST of 62, otherwise normal. TSH normal. CBC showed an MCV of 97.1.     A/P: Patient due for surveillance colonoscopy this year. Given her increasing constipation and bleeding, recommend colonoscopy. Will try to discontinue the Linzess and switch her to Trulance. I also gave her some Anusol for hemorrhoidal disease. She does need folate supplementation. Will plan follow-up after the above, further pending clinical course and results of the above.          The following portions of the patient's history were reviewed and updated as appropriate:   Past Medical History:   Diagnosis Date    Abdominal pain     Anemia     Anxiety and depression     Chronic post-traumatic headache      rebound       Depressive disorder     Disease of thyroid gland     Encounter for gynecological examination     Endometriosis     Gastroesophageal reflux disease     Generalized anxiety disorder     History of mammogram 08/2008    MAMMOGRAM DIAGNOSTIC BILATERAL 97685 ([facility]) (1)      Hyperlipidemia     Hypertension      Ingrown toenail     Injury of back     Leaky heart valve     Leaky heart valve     Migraine     Nonruptured cerebral aneurysm     Osteoporosis     Posttraumatic stress disorder     Primary osteoarthritis of left knee     Primary osteoarthritis of right knee     PTSD (post-traumatic stress disorder)     Seizure     last seizure 2009.  no meds at this time    Skin cancer     basal cell on back    Tachycardia     Viral hepatitis A     Wears dentures     uppers only    Wears glasses      Past Surgical History:   Procedure Laterality Date    APPENDECTOMY      BREAST BIOPSY Left     times 2 both negative    CHOLECYSTECTOMY      COLONOSCOPY N/A 11/26/2018    Procedure: COLONOSCOPY;  Surgeon: Meet Mcintyre MD;  Location: [facility] ENDOSCOPY;  Service: General    CRANIOTOMY FOR ANEURYSM  2003    CYSTOSCOPY, URETEROSCOPY, RETROGRADE PYELOGRAM, STENT INSERTION Left 09/25/2022    Procedure: CYSTOSCOPY, STONE REMOVAL;  Surgeon: Leonardo Graham MD;  Location: [facility] OR;  Service: Urology;  Laterality: Left;    ENDOSCOPY N/A 10/16/2019    Procedure: ESOPHAGOGASTRODUODENOSCOPY;  Surgeon: Kory Muhammad MD;  Location: [facility] ENDOSCOPY;  Service: Gastroenterology    ENDOSCOPY N/A 03/16/2021    Procedure: ESOPHAGOGASTRODUODENOSCOPY;  Surgeon: Kory Muhammad MD;  Location: [facility] ENDOSCOPY;  Service: Gastroenterology;  Laterality: N/A;    ENDOSCOPY N/A 1/24/2023    Procedure: ESOPHAGOGASTRODUODENOSCOPY WITH DILATATION;  Surgeon: Kory Muhammad MD;  Location: [facility] ENDOSCOPY;  Service: Gastroenterology;  Laterality: N/A;    MASS EXCISION Right 11/05/2020    Procedure: EXCISE SOFT TISSUE MASS RIGHT POSTERIOR FLANK                 (latex allergy);  Surgeon: Meet Mcintyre MD;  Location: [facility] OR;  Service: General;  Laterality: Right;    PAP SMEAR  08/16/2012    TONSILLECTOMY      UPPER GASTROINTESTINAL ENDOSCOPY  10/16/2019    WRIST GANGLION EXCISION Right 12/03/2021    Procedure: EXCISION OF VOLAR GANGLION CYST RIGHT  WRIST      (LATEX ALLERGY);  Surgeon: Leonardo Vargas MD;  Location: [facility];  Service: Orthopedics;  Laterality: Right;     Family History   Problem Relation Age of Onset    Constipation Mother     Hypertension Mother     Anxiety disorder Mother     Heart disease Mother     Osteoporosis Mother         Also Myself I    Alcohol abuse Father     Heart disease Father     Anxiety disorder Brother     Kidney disease Brother     Hypertension Brother     Arthritis Brother     Anxiety disorder Brother     Kidney disease Brother     Diabetes Brother     Anxiety disorder Brother     Hypertension Brother     Heart disease Maternal Grandmother     Clotting disorder Maternal Grandmother         Multiple Blood Clots In The Legs    Breast cancer Paternal Aunt     Heart disease Maternal Grandfather     Colon cancer Maternal Great-Grandmother      OB History          3    Para   2    Term   2            AB   1    Living   2         SAB   1    IAB        Ectopic        Molar        Multiple        Live Births                  Allergies   Allergen Reactions    Iodine Anaphylaxis    Nitrofuran Derivatives Hives    Toradol [Ketorolac Tromethamine] Anaphylaxis    Cleocin [Clindamycin Hcl] Hives and Swelling     Swelling of eyes and hives    Augmentin [Amoxicillin-Pot Clavulanate] Hives    Shrimp Hives and Itching    Ciprofloxacin Rash    Fiorinal [Butalbital-Aspirin-Caffeine] Palpitations    Ibuprofen Rash    Imitrex [Sumatriptan] Palpitations    Latex Rash    Other Rash     prego spaghetti sauce    Midrin causes tachycardia    Ultram [Tramadol] Palpitations     Social History     Socioeconomic History    Marital status: Legally    Tobacco Use    Smoking status: Every Day     Packs/day: 0.50     Years: 20.00     Pack years: 10.00     Types: Cigarettes, Electronic Cigarette    Smokeless tobacco: Never   Vaping Use    Vaping Use: Some days    Substances: Nicotine, Flavoring    Devices: Disposable  "  Substance and Sexual Activity    Alcohol use: Not Currently    Drug use: Never    Sexual activity: Not Currently     Birth control/protection: Post-menopausal     Current Medications:  Prior to Admission medications    Medication Sig Start Date End Date Taking? Authorizing Provider   acetaminophen (TYLENOL) 325 MG tablet Take 2 tablets by mouth Every 4 (Four) Hours As Needed for Mild Pain. 9/26/22  Yes Azul Powell APRN   albuterol (ACCUNEB) 1.25 MG/3ML nebulizer solution Take 3 mL by nebulization Every 4 (Four) Hours As Needed for Wheezing or Shortness of Air. 11/15/21  Yes Rosario Ceron APRN   albuterol sulfate  (90 Base) MCG/ACT inhaler Inhale 2 puffs Every 4 (Four) Hours As Needed for Wheezing. 2/5/22  Yes Rosario Ceron APRN   amitriptyline (ELAVIL) 75 MG tablet Take 1 tablet by mouth Every Night. Resumed 6/10/22. 6/10/22  Yes Rosario Ceron APRN   atorvastatin (LIPITOR) 40 MG tablet TAKE 1 TABLET BY MOUTH NIGHTLY FOR CHOLESTEROL 8/3/23  Yes Alvina Bergman APRN   B-D 3CC LUER-KAITLIN SYR 25GX5/8\" 25G X 5/8\" 3 ML misc USE AS DIRECTED for b12 injection 2/27/23  Yes Alvina Bergman APRN   Botox 200 units reconstituted solution Inject 200 Units into the appropriate muscle as directed by prescriber Every 3 (Three) Months. 4/26/21  Yes Provider, MD Doris   busPIRone (BUSPAR) 30 MG tablet Take 1 tablet by mouth 2 (Two) Times a Day. 3/7/18  Yes Sonia Mata MD   aspirin 81 MG EC tablet Take 1 tablet by mouth Daily.  Patient not taking: Reported on 8/15/2023 1/14/20   Rosario Ceron APRN   Cyanocobalamin 1000 MCG/ML kit Inject 1,000 mL as directed Every 30 (Thirty) Days. Please give syringes also 5/9/23   Alvina Bergman APRN   famotidine (PEPCID) 40 MG tablet Take 1 tablet by mouth Daily. 8/10/23   Johnny Smiley PA-C   fluticasone (Flonase) 50 MCG/ACT nasal spray 2 sprays into the nostril(s) as directed by provider Daily. 6/26/23   Alvina Bergman " UMESH Nguyen   folic acid (FOLVITE) 800 MCG tablet Take 1 tablet by mouth Daily. 6/28/21   Ceron, UMESH Ponce   HYDROcodone-acetaminophen (NORCO)  MG per tablet Take 1 tablet by mouth Every 6 (Six) Hours As Needed for Moderate Pain. 1/9/23   Alvina Bergman APRN   hydrOXYzine pamoate (Vistaril) 50 MG capsule Take 1 capsule by mouth 3 (Three) Times a Day As Needed for Anxiety. 11/17/22   Alvina Bergman APRN   ibandronate (Boniva) 150 MG tablet Take 1 tablet by mouth Every 30 (Thirty) Days. 6/13/23   Alvina Bergman APRN   levothyroxine (SYNTHROID, LEVOTHROID) 25 MCG tablet Take 0.5 tablets by mouth Daily. 6/13/23   Alvina Bergman APRN   lidocaine (LIDODERM) 5 %  8/29/22   Emergency, Nurse Epic, KYLE   Linzess 290 MCG capsule capsule TAKE 1 CAPSULE BY MOUTH EVERY MORNING BEFORE BREAKFAST 7/31/23   Johnny Smiley, PAAmadorC   losartan (COZAAR) 50 MG tablet Take 1 tablet by mouth Daily. 1/24/23   Ghislaine Levi MD   megestrol (MEGACE) 20 MG tablet Take 1 tablet by mouth Daily. 1/16/23   Alvina Bergman APRN   metoprolol succinate XL (TOPROL-XL) 100 MG 24 hr tablet TAKE 1 TABLET BY MOUTH DAILY at 3pm 12/12/22   Ghislaine Levi MD   OXcarbazepine (TRILEPTAL) 150 MG tablet Take 1 tablet by mouth Every Night. Daily at bedtime    Provider, MD Doris   pantoprazole (Protonix) 40 MG EC tablet Take 1 tablet by mouth Daily. 4/3/23   Alvina Bergman APRN   prochlorperazine (COMPAZINE) 10 MG tablet Take 1 tablet by mouth Every 6 (Six) Hours As Needed for Nausea or Vomiting. 1/16/23   Alvina Bergman APRN   rOPINIRole (REQUIP) 3 MG tablet Take 1 tablet by mouth every night at bedtime. 6/5/23   Alvina Bergman APRN   topiramate (TOPAMAX) 100 MG tablet Take 1 tablet by mouth 2 (Two) Times a Day. 5/28/20   Rosario Ceron APRN   vitamin D (ERGOCALCIFEROL) 1.25 MG (85488 UT) capsule capsule Take 1 capsule by mouth 1 (One) Time Per Week. 6/13/23   Alvina Bergman  "UMESH Nguyen   nitroglycerin (NITROSTAT) 0.4 MG SL tablet Place 0.4 mg under the tongue Every 5 (Five) Minutes As Needed for Chest Pain. Take no more than 3 doses in 15 minutes.  9/20/22  Provider, MD Doris     Review of Systems  Review of Systems   Constitutional:  Negative for unexpected weight change.   HENT:  Positive for trouble swallowing.    Gastrointestinal:  Positive for abdominal pain, anal bleeding and blood in stool.        Objective    /69 (BP Location: Right arm)   Pulse 67   Ht 157.5 cm (62\")   Wt 47.6 kg (105 lb)   LMP  (LMP Unknown) Comment: periods stopped in late 40's  BMI 19.20 kg/m²   Physical Exam  Vitals and nursing note reviewed.   Constitutional:       General: She is not in acute distress.     Appearance: She is well-developed. She is ill-appearing.   HENT:      Head: Normocephalic and atraumatic.   Eyes:      Pupils: Pupils are equal, round, and reactive to light.   Cardiovascular:      Rate and Rhythm: Normal rate and regular rhythm.      Heart sounds: Normal heart sounds.   Pulmonary:      Effort: Pulmonary effort is normal.      Breath sounds: Normal breath sounds.   Abdominal:      General: Bowel sounds are normal. There is no distension or abdominal bruit.      Palpations: Abdomen is soft. Abdomen is not rigid. There is no shifting dullness or mass.      Tenderness: There is abdominal tenderness. There is no guarding or rebound.      Hernia: No hernia is present. There is no hernia in the ventral area.   Musculoskeletal:         General: Normal range of motion.      Cervical back: Normal range of motion.   Skin:     General: Skin is warm and dry.   Neurological:      Mental Status: She is alert and oriented to person, place, and time.   Psychiatric:         Behavior: Behavior normal.         Thought Content: Thought content normal.         Judgment: Judgment normal.     Assessment & Plan      1. Gastroesophageal reflux disease with esophagitis without hemorrhage    2. " Irritable bowel syndrome with both constipation and diarrhea    3. Elevated LFTs    4. Folate deficiency    5. History of colon polyps    6. Family history of colon cancer    7. Other hemorrhoids    8. Anal or rectal pain    9. Elevated liver enzymes    .   Diagnoses and all orders for this visit:    1. Gastroesophageal reflux disease with esophagitis without hemorrhage (Primary)    2. Irritable bowel syndrome with both constipation and diarrhea    3. Elevated LFTs    4. Folate deficiency    5. History of colon polyps  -     Case Request; Standing  -     dextrose 5 % and sodium chloride 0.45 % infusion  -     Case Request    6. Family history of colon cancer  -     Case Request; Standing  -     dextrose 5 % and sodium chloride 0.45 % infusion  -     Case Request    7. Other hemorrhoids    8. Anal or rectal pain    9. Elevated liver enzymes    Other orders  -     Plecanatide (Trulance) 3 MG tablet; Take 1 tablet by mouth Daily.  Dispense: 30 tablet; Refill: 2  -     Obtain Informed Consent; Standing  -     Follow Anesthesia Guidelines / Protocol; Future  -     Obtain Informed Consent; Future  -     POC Glucose Once; Standing  -     Hydrocortisone, Perianal, (ANUSOL-HC) 2.5 % rectal cream; Insert  into the rectum 2 (Two) Times a Day.  Dispense: 28 g; Refill: 1  -     polyethylene glycol (MiraLax) 17 GM/SCOOP powder; Take 17 g by mouth 1 (One) Time for 1 dose. Instructions per instruction sheet.  Dispense: 250 g; Refill: 0  -     Sod Picosulfate-Mag Ox-Cit Acd (Clenpiq) 10-3.5-12 MG-GM -GM/175ML solution; Take 2 bottles by mouth Take As Directed. Please use the instructions given in office  Dispense: 350 mL; Refill: 0        Orders placed during this encounter include:  Orders Placed This Encounter   Procedures    Obtain Informed Consent     Standing Status:   Future     Order Specific Question:   Informed Consent Given For     Answer:   COLONOSCOPY       Medications prescribed:  New Medications Ordered This Visit    Medications    Plecanatide (Trulance) 3 MG tablet     Sig: Take 1 tablet by mouth Daily.     Dispense:  30 tablet     Refill:  2    Hydrocortisone, Perianal, (ANUSOL-HC) 2.5 % rectal cream     Sig: Insert  into the rectum 2 (Two) Times a Day.     Dispense:  28 g     Refill:  1    polyethylene glycol (MiraLax) 17 GM/SCOOP powder     Sig: Take 17 g by mouth 1 (One) Time for 1 dose. Instructions per instruction sheet.     Dispense:  250 g     Refill:  0    Sod Picosulfate-Mag Ox-Cit Acd (Clenpiq) 10-3.5-12 MG-GM -GM/175ML solution     Sig: Take 2 bottles by mouth Take As Directed. Please use the instructions given in office     Dispense:  350 mL     Refill:  0       Requested Prescriptions     Signed Prescriptions Disp Refills    Plecanatide (Trulance) 3 MG tablet 30 tablet 2     Sig: Take 1 tablet by mouth Daily.    Hydrocortisone, Perianal, (ANUSOL-HC) 2.5 % rectal cream 28 g 1     Sig: Insert  into the rectum 2 (Two) Times a Day.    polyethylene glycol (MiraLax) 17 GM/SCOOP powder 250 g 0     Sig: Take 17 g by mouth 1 (One) Time for 1 dose. Instructions per instruction sheet.    Sod Picosulfate-Mag Ox-Cit Acd (Clenpiq) 10-3.5-12 MG-GM -GM/175ML solution 350 mL 0     Sig: Take 2 bottles by mouth Take As Directed. Please use the instructions given in office       Review and/or summary of lab tests, radiology, procedures, medications. Review and summary of old records and obtaining of history. The risks and benefits of my recommendations, as well as other treatment options were discussed with the patient today. Questions were answered.    Follow-up: Return in about 6 weeks (around 9/26/2023), or if symptoms worsen or fail to improve.     COLONOSCOPY (N/A)      This document has been electronically signed by Johnny Smiley PA-C on August 29, 2023 20:06 CDT      Results for orders placed or performed in visit on 05/25/23   CBC Auto Differential    Specimen: Blood   Result Value Ref Range    WBC 7.25 3.40 - 10.80  10*3/mm3    RBC 4.15 3.77 - 5.28 10*6/mm3    Hemoglobin 13.2 12.0 - 15.9 g/dL    Hematocrit 40.3 34.0 - 46.6 %    MCV 97.1 (H) 79.0 - 97.0 fL    MCH 31.8 26.6 - 33.0 pg    MCHC 32.8 31.5 - 35.7 g/dL    RDW 13.9 12.3 - 15.4 %    RDW-SD 48.3 37.0 - 54.0 fl    MPV 9.4 6.0 - 12.0 fL    Platelets 258 140 - 450 10*3/mm3    Neutrophil % 39.0 (L) 42.7 - 76.0 %    Lymphocyte % 45.7 (H) 19.6 - 45.3 %    Monocyte % 12.8 (H) 5.0 - 12.0 %    Eosinophil % 1.8 0.3 - 6.2 %    Basophil % 0.7 0.0 - 1.5 %    Neutrophils, Absolute 2.83 1.70 - 7.00 10*3/mm3    Lymphocytes, Absolute 3.31 (H) 0.70 - 3.10 10*3/mm3    Monocytes, Absolute 0.93 (H) 0.10 - 0.90 10*3/mm3    Eosinophils, Absolute 0.13 0.00 - 0.40 10*3/mm3    Basophils, Absolute 0.05 0.00 - 0.20 10*3/mm3   Iron and TIBC    Specimen: Blood   Result Value Ref Range    Iron 70 37 - 145 mcg/dL    Iron Saturation (TSAT) 22 20 - 50 %    Transferrin 215 200 - 360 mg/dL    TIBC 320 298 - 536 mcg/dL   LDL cholesterol, direct    Specimen: Blood   Result Value Ref Range    LDL Cholesterol  94 0 - 100 mg/dL   Folate    Specimen: Blood   Result Value Ref Range    Folate 2.92 (L) 4.78 - 24.20 ng/mL   Ferritin    Specimen: Blood   Result Value Ref Range    Ferritin 62.90 13.00 - 150.00 ng/mL   Vitamin B12    Specimen: Blood   Result Value Ref Range    Vitamin B-12 728 211 - 946 pg/mL   Comprehensive metabolic panel    Specimen: Blood   Result Value Ref Range    Glucose 98 70 - 99 mg/dL    BUN 18 7 - 23 mg/dL    Creatinine 1.03 0.52 - 1.04 mg/dL    Sodium 137 137 - 145 mmol/L    Potassium 4.2 3.4 - 5.0 mmol/L    Chloride 105 101 - 112 mmol/L    CO2 26.0 22.0 - 30.0 mmol/L    Calcium 9.2 8.4 - 10.2 mg/dL    Total Protein 6.7 6.3 - 8.6 g/dL    Albumin 3.9 3.5 - 5.0 g/dL    ALT (SGPT) 25 <=35 U/L    AST (SGOT) 62 (H) 14 - 36 U/L    Alkaline Phosphatase 61 38 - 126 U/L    Total Bilirubin 0.4 0.2 - 1.3 mg/dL    Globulin 2.8 2.3 - 3.5 gm/dL    A/G Ratio 1.4 1.1 - 1.8 g/dL    BUN/Creatinine Ratio 17.5  7.0 - 25.0    Anion Gap 6.0 5.0 - 15.0 mmol/L    eGFR 60.5 >60.0 mL/min/1.73   Results for orders placed or performed in visit on 05/09/23   TSH    Specimen: Blood   Result Value Ref Range    TSH 2.100 0.270 - 4.200 uIU/mL   Results for orders placed or performed in visit on 01/30/23   Urinalysis, Microscopic Only - Urine, Clean Catch    Specimen: Urine, Clean Catch   Result Value Ref Range    RBC, UA 13-20 (A) None Seen /HPF    WBC, UA 31-50 (A) None Seen /HPF    Bacteria, UA 1+ (A) None Seen /HPF    Squamous Epithelial Cells, UA 3-6 (A) None Seen, 0-2 /HPF    Hyaline Casts, UA None Seen None Seen /LPF    Mucus, UA Small/1+ (A) None Seen, Trace /HPF    Methodology Manual Light Microscopy    Urinalysis With Culture If Indicated - Urine, Clean Catch    Specimen: Urine, Clean Catch   Result Value Ref Range    Color, UA Yellow Yellow, Straw    Appearance, UA Cloudy (A) Clear    pH, UA 7.0 5.5 - 8.0    Specific Gravity, UA 1.015 1.005 - 1.030    Glucose, UA Negative Negative    Ketones, UA Negative Negative    Bilirubin, UA Negative Negative    Blood, UA Moderate (2+) (A) Negative    Protein, UA 30 mg/dL (1+) (A) Negative    Leuk Esterase, UA Moderate (2+) (A) Negative    Nitrite, UA Negative Negative    Urobilinogen, UA 0.2 E.U./dL 0.2 - 1.0 E.U./dL   Urine Culture - Urine, Urine, Clean Catch    Specimen: Urine, Clean Catch   Result Value Ref Range    Urine Culture 25,000 CFU/mL Escherichia coli (A)        Susceptibility    Escherichia coli - MARCO     Ampicillin  Susceptible ug/ml     Ampicillin + Sulbactam  Susceptible ug/ml     Cefazolin  Susceptible ug/ml     Cefepime  Susceptible ug/ml     Ceftazidime  Susceptible ug/ml     Ceftriaxone  Susceptible ug/ml     Gentamicin  Susceptible ug/ml     Levofloxacin  Susceptible ug/ml     Nitrofurantoin  Susceptible ug/ml     Piperacillin + Tazobactam  Susceptible ug/ml     Trimethoprim + Sulfamethoxazole  Susceptible ug/ml   Results for orders placed or performed during the  "hospital encounter of 23   TISSUE EXAM, [facility] ([facility])    Specimen: A: Gastric, Antrum; Tissue    B: Esophagus, Distal; Tissue   Result Value Ref Range    Reference Lab Report       [facility]  [address] [place] [address]  Phone: [phone] or [phone]  Fax: [phone]  Goyo Fallon M.D., Medical Director    PATIENT NAME                           LABORATORY NO.  NITA CAMACHO.                      [id number]  [id number]                         AGE              SEX  SSN           CLIENT REF #  [facility]           63      1960  F    [id number]   [id number]    [place]                       REQUESTING M.D.     ATTENDING M.D.     COPY TO.  [address] [place] [address]                 [place] [address]             DATE COLLECTED      DATE RECEIVED      DATE REPORTED  2023    DIAGNOSIS:  A.   GASTRIC ANTRUM, BIOPSY:  Reactive gastropathy  B.   ESOPHAGUS, BIOPSY, DISTAL:  Benign squamous mucosa    JBS/sm    CLINICAL HISTORY:  Dysphagia, unspecified type, gastroesophageal reflux disease with  esophagitis  without hemorrhage    SPECIMENS RECEIVED:  A.  GASTRIC ANTRUM, BIOPSY  B.  ESOPHAGUS, BIOPSY, DISTAL    MICROSCOPIC DESCRIPTION:  Tissue blocks are prepared and slides are examined microscopically on all  specimens. See diagnosis for details.    Professional interpretation rendered by Mitchell Cassidy M.D. at [facility],  [facility], [address] [place] [address].    GROSS DESCRIPTION:  A.  Specimen is received in 1 formalin filled container labeled \"gastric antrum\"  and consists of a single portion of tan soft tissue measuring 0.3 x 0.2 x 0.2  cm.  The specimen is submitted entirely in 1 cassette.  BW  B.  Specimen is received in 1 formalin filled container labeled \"distal  esophagus\" and consists of a single portion of gray soft tissue " measuring  0.5 x 0.2 x 0.1 cm.  The specimen is submitted entirely in 1 cassette.    REVIEWED, DIAGNOSED AND ELECTRONICALLY  SIGNED BY:    Mitchell Cassidy M.D.  CPT CODES:  88305x2     Results for orders placed or performed in visit on 01/16/23   TSH    Specimen: Blood   Result Value Ref Range    TSH 1.620 0.270 - 4.200 uIU/mL   Results for orders placed or performed in visit on 01/03/23   CBC Auto Differential    Specimen: Blood   Result Value Ref Range    WBC 6.76 3.40 - 10.80 10*3/mm3    RBC 4.00 3.77 - 5.28 10*6/mm3    Hemoglobin 12.7 12.0 - 15.9 g/dL    Hematocrit 37.6 34.0 - 46.6 %    MCV 94.0 79.0 - 97.0 fL    MCH 31.8 26.6 - 33.0 pg    MCHC 33.8 31.5 - 35.7 g/dL    RDW 13.3 12.3 - 15.4 %    RDW-SD 44.4 37.0 - 54.0 fl    MPV 8.9 6.0 - 12.0 fL    Platelets 262 140 - 450 10*3/mm3    Neutrophil % 47.8 42.7 - 76.0 %    Lymphocyte % 39.3 19.6 - 45.3 %    Monocyte % 11.4 5.0 - 12.0 %    Eosinophil % 1.2 0.3 - 6.2 %    Basophil % 0.3 0.0 - 1.5 %    Neutrophils, Absolute 3.23 1.70 - 7.00 10*3/mm3    Lymphocytes, Absolute 2.66 0.70 - 3.10 10*3/mm3    Monocytes, Absolute 0.77 0.10 - 0.90 10*3/mm3    Eosinophils, Absolute 0.08 0.00 - 0.40 10*3/mm3    Basophils, Absolute 0.02 0.00 - 0.20 10*3/mm3   Comprehensive Metabolic Panel    Specimen: Blood   Result Value Ref Range    Glucose 101 (H) 70 - 99 mg/dL    BUN 15 7 - 23 mg/dL    Creatinine 1.01 0.52 - 1.04 mg/dL    Sodium 135 (L) 137 - 145 mmol/L    Potassium 3.8 3.4 - 5.0 mmol/L    Chloride 103 101 - 112 mmol/L    CO2 25.0 22.0 - 30.0 mmol/L    Calcium 8.9 8.4 - 10.2 mg/dL    Total Protein 7.3 6.3 - 8.6 g/dL    Albumin 4.2 3.5 - 5.0 g/dL    ALT (SGPT) 26 <=35 U/L    AST (SGOT) 61 (H) 14 - 36 U/L    Alkaline Phosphatase 55 38 - 126 U/L    Total Bilirubin 0.2 0.2 - 1.3 mg/dL    Globulin 3.1 2.3 - 3.5 gm/dL    A/G Ratio 1.4 1.1 - 1.8 g/dL    BUN/Creatinine Ratio 14.9 7.0 - 25.0    Anion Gap 7.0 5.0 - 15.0 mmol/L    eGFR 62.3 >60.0 mL/min/1.73     *Note: Due to a large  number of results and/or encounters for the requested time period, some results have not been displayed. A complete set of results can be found in Results Review.

## 2023-08-15 NOTE — PROGRESS NOTES
Chief Complaint   Patient presents with    Difficulty Swallowing    Elevated Hepatic Enzymes       ENDO PROCEDURE ORDERED: COLON hx polyp, +FH    Subjective    Tricia Loyola is a 63 y.o. female. she is here today for follow-up.    History of Present Illness    Patient seen on a recheck of her GERD, abdominal pain, IBS, elevated liver enzymes. Last seen 02/14/2023, was late for this follow-up. She states her GERD is doing well on the Protonix and Pepcid. She denied significant dysphagia. She has been having some hemorrhoid problems and bleeding. She does not think the Linzess 290 mcg is working as well as it was. Weight is up 4.5 pounds since last visit. Last colonoscopy showed colon polyps with family history 11/26/2018. She is due this for year for surveillance. Last EGD with dilatation 01/24/203.     Laboratories 05/25/2023: B12 was normal, folate was low 2.92, ferritin 62.9, iron studies were okay. LDL normal. CMP showed an AST of 62, otherwise normal. TSH normal. CBC showed an MCV of 97.1.     A/P: Patient due for surveillance colonoscopy this year. Given her increasing constipation and bleeding, recommend colonoscopy. Will try to discontinue the Linzess and switch her to Trulance. I also gave her some Anusol for hemorrhoidal disease. She does need folate supplementation. Will plan follow-up after the above, further pending clinical course and results of the above.          The following portions of the patient's history were reviewed and updated as appropriate:   Past Medical History:   Diagnosis Date    Abdominal pain     Anemia     Anxiety and depression     Chronic post-traumatic headache      rebound       Depressive disorder     Disease of thyroid gland     Encounter for gynecological examination     Endometriosis     Gastroesophageal reflux disease     Generalized anxiety disorder     History of mammogram 08/2008    MAMMOGRAM DIAGNOSTIC BILATERAL 53796 ([facility]) (1)      Hyperlipidemia     Hypertension      Ingrown toenail     Injury of back     Leaky heart valve     Leaky heart valve     Migraine     Nonruptured cerebral aneurysm     Osteoporosis     Posttraumatic stress disorder     Primary osteoarthritis of left knee     Primary osteoarthritis of right knee     PTSD (post-traumatic stress disorder)     Seizure     last seizure 2009.  no meds at this time    Skin cancer     basal cell on back    Tachycardia     Viral hepatitis A     Wears dentures     uppers only    Wears glasses      Past Surgical History:   Procedure Laterality Date    APPENDECTOMY      BREAST BIOPSY Left     times 2 both negative    CHOLECYSTECTOMY      COLONOSCOPY N/A 11/26/2018    Procedure: COLONOSCOPY;  Surgeon: Meet Mcintyre MD;  Location: [facility] ENDOSCOPY;  Service: General    CRANIOTOMY FOR ANEURYSM  2003    CYSTOSCOPY, URETEROSCOPY, RETROGRADE PYELOGRAM, STENT INSERTION Left 09/25/2022    Procedure: CYSTOSCOPY, STONE REMOVAL;  Surgeon: Leonardo Graham MD;  Location: [facility] OR;  Service: Urology;  Laterality: Left;    ENDOSCOPY N/A 10/16/2019    Procedure: ESOPHAGOGASTRODUODENOSCOPY;  Surgeon: Kory Muhammad MD;  Location: [facility] ENDOSCOPY;  Service: Gastroenterology    ENDOSCOPY N/A 03/16/2021    Procedure: ESOPHAGOGASTRODUODENOSCOPY;  Surgeon: Kroy Muhammad MD;  Location: [facility] ENDOSCOPY;  Service: Gastroenterology;  Laterality: N/A;    ENDOSCOPY N/A 1/24/2023    Procedure: ESOPHAGOGASTRODUODENOSCOPY WITH DILATATION;  Surgeon: Kory Muhammad MD;  Location: [facility] ENDOSCOPY;  Service: Gastroenterology;  Laterality: N/A;    MASS EXCISION Right 11/05/2020    Procedure: EXCISE SOFT TISSUE MASS RIGHT POSTERIOR FLANK                 (latex allergy);  Surgeon: Meet Mcintyre MD;  Location: [facility] OR;  Service: General;  Laterality: Right;    PAP SMEAR  08/16/2012    TONSILLECTOMY      UPPER GASTROINTESTINAL ENDOSCOPY  10/16/2019    WRIST GANGLION EXCISION Right 12/03/2021    Procedure: EXCISION OF VOLAR GANGLION CYST RIGHT  WRIST      (LATEX ALLERGY);  Surgeon: Leonardo Vargas MD;  Location: [facility];  Service: Orthopedics;  Laterality: Right;     Family History   Problem Relation Age of Onset    Constipation Mother     Hypertension Mother     Anxiety disorder Mother     Heart disease Mother     Osteoporosis Mother         Also Myself I    Alcohol abuse Father     Heart disease Father     Anxiety disorder Brother     Kidney disease Brother     Hypertension Brother     Arthritis Brother     Anxiety disorder Brother     Kidney disease Brother     Diabetes Brother     Anxiety disorder Brother     Hypertension Brother     Heart disease Maternal Grandmother     Clotting disorder Maternal Grandmother         Multiple Blood Clots In The Legs    Breast cancer Paternal Aunt     Heart disease Maternal Grandfather     Colon cancer Maternal Great-Grandmother      OB History          3    Para   2    Term   2            AB   1    Living   2         SAB   1    IAB        Ectopic        Molar        Multiple        Live Births                  Allergies   Allergen Reactions    Iodine Anaphylaxis    Nitrofuran Derivatives Hives    Toradol [Ketorolac Tromethamine] Anaphylaxis    Cleocin [Clindamycin Hcl] Hives and Swelling     Swelling of eyes and hives    Augmentin [Amoxicillin-Pot Clavulanate] Hives    Shrimp Hives and Itching    Ciprofloxacin Rash    Fiorinal [Butalbital-Aspirin-Caffeine] Palpitations    Ibuprofen Rash    Imitrex [Sumatriptan] Palpitations    Latex Rash    Other Rash     prego spaghetti sauce    Midrin causes tachycardia    Ultram [Tramadol] Palpitations     Social History     Socioeconomic History    Marital status: Legally    Tobacco Use    Smoking status: Every Day     Packs/day: 0.50     Years: 20.00     Pack years: 10.00     Types: Cigarettes, Electronic Cigarette    Smokeless tobacco: Never   Vaping Use    Vaping Use: Some days    Substances: Nicotine, Flavoring    Devices: Disposable  "  Substance and Sexual Activity    Alcohol use: Not Currently    Drug use: Never    Sexual activity: Not Currently     Birth control/protection: Post-menopausal     Current Medications:  Prior to Admission medications    Medication Sig Start Date End Date Taking? Authorizing Provider   acetaminophen (TYLENOL) 325 MG tablet Take 2 tablets by mouth Every 4 (Four) Hours As Needed for Mild Pain. 9/26/22  Yes Azul Powell APRN   albuterol (ACCUNEB) 1.25 MG/3ML nebulizer solution Take 3 mL by nebulization Every 4 (Four) Hours As Needed for Wheezing or Shortness of Air. 11/15/21  Yes Rosario Ceron APRN   albuterol sulfate  (90 Base) MCG/ACT inhaler Inhale 2 puffs Every 4 (Four) Hours As Needed for Wheezing. 2/5/22  Yes Rosario Ceron APRN   amitriptyline (ELAVIL) 75 MG tablet Take 1 tablet by mouth Every Night. Resumed 6/10/22. 6/10/22  Yes Rosario Ceron APRN   atorvastatin (LIPITOR) 40 MG tablet TAKE 1 TABLET BY MOUTH NIGHTLY FOR CHOLESTEROL 8/3/23  Yes Alvina Bergman APRN   B-D 3CC LUER-KAITLIN SYR 25GX5/8\" 25G X 5/8\" 3 ML misc USE AS DIRECTED for b12 injection 2/27/23  Yes Alvina Bergman APRN   Botox 200 units reconstituted solution Inject 200 Units into the appropriate muscle as directed by prescriber Every 3 (Three) Months. 4/26/21  Yes Provider, MD Doris   busPIRone (BUSPAR) 30 MG tablet Take 1 tablet by mouth 2 (Two) Times a Day. 3/7/18  Yes Sonia Mata MD   aspirin 81 MG EC tablet Take 1 tablet by mouth Daily.  Patient not taking: Reported on 8/15/2023 1/14/20   Rosario Ceron APRN   Cyanocobalamin 1000 MCG/ML kit Inject 1,000 mL as directed Every 30 (Thirty) Days. Please give syringes also 5/9/23   Alvina Bergman APRN   famotidine (PEPCID) 40 MG tablet Take 1 tablet by mouth Daily. 8/10/23   Johnny Smiley PA-C   fluticasone (Flonase) 50 MCG/ACT nasal spray 2 sprays into the nostril(s) as directed by provider Daily. 6/26/23   Alvina Bergman " UMESH Nguyen   folic acid (FOLVITE) 800 MCG tablet Take 1 tablet by mouth Daily. 6/28/21   Ceron, UMESH Ponce   HYDROcodone-acetaminophen (NORCO)  MG per tablet Take 1 tablet by mouth Every 6 (Six) Hours As Needed for Moderate Pain. 1/9/23   Alvina Bergman APRN   hydrOXYzine pamoate (Vistaril) 50 MG capsule Take 1 capsule by mouth 3 (Three) Times a Day As Needed for Anxiety. 11/17/22   Alvina Bergman APRN   ibandronate (Boniva) 150 MG tablet Take 1 tablet by mouth Every 30 (Thirty) Days. 6/13/23   Alvina Bergman APRN   levothyroxine (SYNTHROID, LEVOTHROID) 25 MCG tablet Take 0.5 tablets by mouth Daily. 6/13/23   Alvina Bergman APRN   lidocaine (LIDODERM) 5 %  8/29/22   Emergency, Nurse Epic, KYLE   Linzess 290 MCG capsule capsule TAKE 1 CAPSULE BY MOUTH EVERY MORNING BEFORE BREAKFAST 7/31/23   Johnny Smiley, PAAmadorC   losartan (COZAAR) 50 MG tablet Take 1 tablet by mouth Daily. 1/24/23   Ghislaine Levi MD   megestrol (MEGACE) 20 MG tablet Take 1 tablet by mouth Daily. 1/16/23   Alvina Bergman APRN   metoprolol succinate XL (TOPROL-XL) 100 MG 24 hr tablet TAKE 1 TABLET BY MOUTH DAILY at 3pm 12/12/22   Ghislaine Levi MD   OXcarbazepine (TRILEPTAL) 150 MG tablet Take 1 tablet by mouth Every Night. Daily at bedtime    Provider, MD Doris   pantoprazole (Protonix) 40 MG EC tablet Take 1 tablet by mouth Daily. 4/3/23   Alvina Bergman APRN   prochlorperazine (COMPAZINE) 10 MG tablet Take 1 tablet by mouth Every 6 (Six) Hours As Needed for Nausea or Vomiting. 1/16/23   Alvina Bergman APRN   rOPINIRole (REQUIP) 3 MG tablet Take 1 tablet by mouth every night at bedtime. 6/5/23   Alvina Bergman APRN   topiramate (TOPAMAX) 100 MG tablet Take 1 tablet by mouth 2 (Two) Times a Day. 5/28/20   Rosario Ceron APRN   vitamin D (ERGOCALCIFEROL) 1.25 MG (79195 UT) capsule capsule Take 1 capsule by mouth 1 (One) Time Per Week. 6/13/23   Alvina Bergman  "UMESH Nguyen   nitroglycerin (NITROSTAT) 0.4 MG SL tablet Place 0.4 mg under the tongue Every 5 (Five) Minutes As Needed for Chest Pain. Take no more than 3 doses in 15 minutes.  9/20/22  Provider, MD Doris     Review of Systems  Review of Systems   Constitutional:  Negative for unexpected weight change.   HENT:  Positive for trouble swallowing.    Gastrointestinal:  Positive for abdominal pain, anal bleeding and blood in stool.        Objective    /69 (BP Location: Right arm)   Pulse 67   Ht 157.5 cm (62\")   Wt 47.6 kg (105 lb)   LMP  (LMP Unknown) Comment: periods stopped in late 40's  BMI 19.20 kg/mý   Physical Exam  Vitals and nursing note reviewed.   Constitutional:       General: She is not in acute distress.     Appearance: She is well-developed. She is ill-appearing.   HENT:      Head: Normocephalic and atraumatic.   Eyes:      Pupils: Pupils are equal, round, and reactive to light.   Cardiovascular:      Rate and Rhythm: Normal rate and regular rhythm.      Heart sounds: Normal heart sounds.   Pulmonary:      Effort: Pulmonary effort is normal.      Breath sounds: Normal breath sounds.   Abdominal:      General: Bowel sounds are normal. There is no distension or abdominal bruit.      Palpations: Abdomen is soft. Abdomen is not rigid. There is no shifting dullness or mass.      Tenderness: There is abdominal tenderness. There is no guarding or rebound.      Hernia: No hernia is present. There is no hernia in the ventral area.   Musculoskeletal:         General: Normal range of motion.      Cervical back: Normal range of motion.   Skin:     General: Skin is warm and dry.   Neurological:      Mental Status: She is alert and oriented to person, place, and time.   Psychiatric:         Behavior: Behavior normal.         Thought Content: Thought content normal.         Judgment: Judgment normal.     Assessment & Plan      1. Gastroesophageal reflux disease with esophagitis without hemorrhage    2. " Irritable bowel syndrome with both constipation and diarrhea    3. Elevated LFTs    4. Folate deficiency    5. History of colon polyps    6. Family history of colon cancer    7. Other hemorrhoids    8. Anal or rectal pain    9. Elevated liver enzymes    .   Diagnoses and all orders for this visit:    1. Gastroesophageal reflux disease with esophagitis without hemorrhage (Primary)    2. Irritable bowel syndrome with both constipation and diarrhea    3. Elevated LFTs    4. Folate deficiency    5. History of colon polyps  -     Case Request; Standing  -     dextrose 5 % and sodium chloride 0.45 % infusion  -     Case Request    6. Family history of colon cancer  -     Case Request; Standing  -     dextrose 5 % and sodium chloride 0.45 % infusion  -     Case Request    7. Other hemorrhoids    8. Anal or rectal pain    9. Elevated liver enzymes    Other orders  -     Plecanatide (Trulance) 3 MG tablet; Take 1 tablet by mouth Daily.  Dispense: 30 tablet; Refill: 2  -     Obtain Informed Consent; Standing  -     Follow Anesthesia Guidelines / Protocol; Future  -     Obtain Informed Consent; Future  -     POC Glucose Once; Standing  -     Hydrocortisone, Perianal, (ANUSOL-HC) 2.5 % rectal cream; Insert  into the rectum 2 (Two) Times a Day.  Dispense: 28 g; Refill: 1  -     polyethylene glycol (MiraLax) 17 GM/SCOOP powder; Take 17 g by mouth 1 (One) Time for 1 dose. Instructions per instruction sheet.  Dispense: 250 g; Refill: 0  -     Sod Picosulfate-Mag Ox-Cit Acd (Clenpiq) 10-3.5-12 MG-GM -GM/175ML solution; Take 2 bottles by mouth Take As Directed. Please use the instructions given in office  Dispense: 350 mL; Refill: 0        Orders placed during this encounter include:  Orders Placed This Encounter   Procedures    Obtain Informed Consent     Standing Status:   Future     Order Specific Question:   Informed Consent Given For     Answer:   COLONOSCOPY       Medications prescribed:  New Medications Ordered This Visit    Medications    Plecanatide (Trulance) 3 MG tablet     Sig: Take 1 tablet by mouth Daily.     Dispense:  30 tablet     Refill:  2    Hydrocortisone, Perianal, (ANUSOL-HC) 2.5 % rectal cream     Sig: Insert  into the rectum 2 (Two) Times a Day.     Dispense:  28 g     Refill:  1    polyethylene glycol (MiraLax) 17 GM/SCOOP powder     Sig: Take 17 g by mouth 1 (One) Time for 1 dose. Instructions per instruction sheet.     Dispense:  250 g     Refill:  0    Sod Picosulfate-Mag Ox-Cit Acd (Clenpiq) 10-3.5-12 MG-GM -GM/175ML solution     Sig: Take 2 bottles by mouth Take As Directed. Please use the instructions given in office     Dispense:  350 mL     Refill:  0       Requested Prescriptions     Signed Prescriptions Disp Refills    Plecanatide (Trulance) 3 MG tablet 30 tablet 2     Sig: Take 1 tablet by mouth Daily.    Hydrocortisone, Perianal, (ANUSOL-HC) 2.5 % rectal cream 28 g 1     Sig: Insert  into the rectum 2 (Two) Times a Day.    polyethylene glycol (MiraLax) 17 GM/SCOOP powder 250 g 0     Sig: Take 17 g by mouth 1 (One) Time for 1 dose. Instructions per instruction sheet.    Sod Picosulfate-Mag Ox-Cit Acd (Clenpiq) 10-3.5-12 MG-GM -GM/175ML solution 350 mL 0     Sig: Take 2 bottles by mouth Take As Directed. Please use the instructions given in office       Review and/or summary of lab tests, radiology, procedures, medications. Review and summary of old records and obtaining of history. The risks and benefits of my recommendations, as well as other treatment options were discussed with the patient today. Questions were answered.    Follow-up: Return in about 6 weeks (around 9/26/2023), or if symptoms worsen or fail to improve.     COLONOSCOPY (N/A)      This document has been electronically signed by Johnny Smiley PA-C on August 29, 2023 20:06 CDT      Results for orders placed or performed in visit on 05/25/23   CBC Auto Differential    Specimen: Blood   Result Value Ref Range    WBC 7.25 3.40 - 10.80  10*3/mm3    RBC 4.15 3.77 - 5.28 10*6/mm3    Hemoglobin 13.2 12.0 - 15.9 g/dL    Hematocrit 40.3 34.0 - 46.6 %    MCV 97.1 (H) 79.0 - 97.0 fL    MCH 31.8 26.6 - 33.0 pg    MCHC 32.8 31.5 - 35.7 g/dL    RDW 13.9 12.3 - 15.4 %    RDW-SD 48.3 37.0 - 54.0 fl    MPV 9.4 6.0 - 12.0 fL    Platelets 258 140 - 450 10*3/mm3    Neutrophil % 39.0 (L) 42.7 - 76.0 %    Lymphocyte % 45.7 (H) 19.6 - 45.3 %    Monocyte % 12.8 (H) 5.0 - 12.0 %    Eosinophil % 1.8 0.3 - 6.2 %    Basophil % 0.7 0.0 - 1.5 %    Neutrophils, Absolute 2.83 1.70 - 7.00 10*3/mm3    Lymphocytes, Absolute 3.31 (H) 0.70 - 3.10 10*3/mm3    Monocytes, Absolute 0.93 (H) 0.10 - 0.90 10*3/mm3    Eosinophils, Absolute 0.13 0.00 - 0.40 10*3/mm3    Basophils, Absolute 0.05 0.00 - 0.20 10*3/mm3   Iron and TIBC    Specimen: Blood   Result Value Ref Range    Iron 70 37 - 145 mcg/dL    Iron Saturation (TSAT) 22 20 - 50 %    Transferrin 215 200 - 360 mg/dL    TIBC 320 298 - 536 mcg/dL   LDL cholesterol, direct    Specimen: Blood   Result Value Ref Range    LDL Cholesterol  94 0 - 100 mg/dL   Folate    Specimen: Blood   Result Value Ref Range    Folate 2.92 (L) 4.78 - 24.20 ng/mL   Ferritin    Specimen: Blood   Result Value Ref Range    Ferritin 62.90 13.00 - 150.00 ng/mL   Vitamin B12    Specimen: Blood   Result Value Ref Range    Vitamin B-12 728 211 - 946 pg/mL   Comprehensive metabolic panel    Specimen: Blood   Result Value Ref Range    Glucose 98 70 - 99 mg/dL    BUN 18 7 - 23 mg/dL    Creatinine 1.03 0.52 - 1.04 mg/dL    Sodium 137 137 - 145 mmol/L    Potassium 4.2 3.4 - 5.0 mmol/L    Chloride 105 101 - 112 mmol/L    CO2 26.0 22.0 - 30.0 mmol/L    Calcium 9.2 8.4 - 10.2 mg/dL    Total Protein 6.7 6.3 - 8.6 g/dL    Albumin 3.9 3.5 - 5.0 g/dL    ALT (SGPT) 25 <=35 U/L    AST (SGOT) 62 (H) 14 - 36 U/L    Alkaline Phosphatase 61 38 - 126 U/L    Total Bilirubin 0.4 0.2 - 1.3 mg/dL    Globulin 2.8 2.3 - 3.5 gm/dL    A/G Ratio 1.4 1.1 - 1.8 g/dL    BUN/Creatinine Ratio 17.5  7.0 - 25.0    Anion Gap 6.0 5.0 - 15.0 mmol/L    eGFR 60.5 >60.0 mL/min/1.73   Results for orders placed or performed in visit on 05/09/23   TSH    Specimen: Blood   Result Value Ref Range    TSH 2.100 0.270 - 4.200 uIU/mL   Results for orders placed or performed in visit on 01/30/23   Urinalysis, Microscopic Only - Urine, Clean Catch    Specimen: Urine, Clean Catch   Result Value Ref Range    RBC, UA 13-20 (A) None Seen /HPF    WBC, UA 31-50 (A) None Seen /HPF    Bacteria, UA 1+ (A) None Seen /HPF    Squamous Epithelial Cells, UA 3-6 (A) None Seen, 0-2 /HPF    Hyaline Casts, UA None Seen None Seen /LPF    Mucus, UA Small/1+ (A) None Seen, Trace /HPF    Methodology Manual Light Microscopy    Urinalysis With Culture If Indicated - Urine, Clean Catch    Specimen: Urine, Clean Catch   Result Value Ref Range    Color, UA Yellow Yellow, Straw    Appearance, UA Cloudy (A) Clear    pH, UA 7.0 5.5 - 8.0    Specific Gravity, UA 1.015 1.005 - 1.030    Glucose, UA Negative Negative    Ketones, UA Negative Negative    Bilirubin, UA Negative Negative    Blood, UA Moderate (2+) (A) Negative    Protein, UA 30 mg/dL (1+) (A) Negative    Leuk Esterase, UA Moderate (2+) (A) Negative    Nitrite, UA Negative Negative    Urobilinogen, UA 0.2 E.U./dL 0.2 - 1.0 E.U./dL   Urine Culture - Urine, Urine, Clean Catch    Specimen: Urine, Clean Catch   Result Value Ref Range    Urine Culture 25,000 CFU/mL Escherichia coli (A)        Susceptibility    Escherichia coli - MARCO     Ampicillin  Susceptible ug/ml     Ampicillin + Sulbactam  Susceptible ug/ml     Cefazolin  Susceptible ug/ml     Cefepime  Susceptible ug/ml     Ceftazidime  Susceptible ug/ml     Ceftriaxone  Susceptible ug/ml     Gentamicin  Susceptible ug/ml     Levofloxacin  Susceptible ug/ml     Nitrofurantoin  Susceptible ug/ml     Piperacillin + Tazobactam  Susceptible ug/ml     Trimethoprim + Sulfamethoxazole  Susceptible ug/ml   Results for orders placed or performed during the  "hospital encounter of 23   TISSUE EXAM, [facility] ([facility])    Specimen: A: Gastric, Antrum; Tissue    B: Esophagus, Distal; Tissue   Result Value Ref Range    Reference Lab Report       [facility]  [address] [place] [address]  Phone: [phone] or [phone]  Fax: [phone]  Goyo Fallon M.D., Medical Director    PATIENT NAME                           LABORATORY NO.  NITA CAMACHO.                      [id number]  [id number]                         AGE              SEX  SSN           CLIENT REF #  [facility]           63      1960  F    [id number]   [id number]    [place]                       REQUESTING M.D.     ATTENDING M.D.     COPY TO.  [address] [place] [address]                 [place] [address]             DATE COLLECTED      DATE RECEIVED      DATE REPORTED  2023    DIAGNOSIS:  A.   GASTRIC ANTRUM, BIOPSY:  Reactive gastropathy  B.   ESOPHAGUS, BIOPSY, DISTAL:  Benign squamous mucosa    JBS/sm    CLINICAL HISTORY:  Dysphagia, unspecified type, gastroesophageal reflux disease with  esophagitis  without hemorrhage    SPECIMENS RECEIVED:  A.  GASTRIC ANTRUM, BIOPSY  B.  ESOPHAGUS, BIOPSY, DISTAL    MICROSCOPIC DESCRIPTION:  Tissue blocks are prepared and slides are examined microscopically on all  specimens. See diagnosis for details.    Professional interpretation rendered by Mitchell Cassidy M.D. at [facility],  [facility], [address] [place] [address].    GROSS DESCRIPTION:  A.  Specimen is received in 1 formalin filled container labeled \"gastric antrum\"  and consists of a single portion of tan soft tissue measuring 0.3 x 0.2 x 0.2  cm.  The specimen is submitted entirely in 1 cassette.  BW  B.  Specimen is received in 1 formalin filled container labeled \"distal  esophagus\" and consists of a single portion of gray soft tissue " measuring  0.5 x 0.2 x 0.1 cm.  The specimen is submitted entirely in 1 cassette.    REVIEWED, DIAGNOSED AND ELECTRONICALLY  SIGNED BY:    Mitchell Cassidy M.D.  CPT CODES:  88305x2     Results for orders placed or performed in visit on 01/16/23   TSH    Specimen: Blood   Result Value Ref Range    TSH 1.620 0.270 - 4.200 uIU/mL   Results for orders placed or performed in visit on 01/03/23   CBC Auto Differential    Specimen: Blood   Result Value Ref Range    WBC 6.76 3.40 - 10.80 10*3/mm3    RBC 4.00 3.77 - 5.28 10*6/mm3    Hemoglobin 12.7 12.0 - 15.9 g/dL    Hematocrit 37.6 34.0 - 46.6 %    MCV 94.0 79.0 - 97.0 fL    MCH 31.8 26.6 - 33.0 pg    MCHC 33.8 31.5 - 35.7 g/dL    RDW 13.3 12.3 - 15.4 %    RDW-SD 44.4 37.0 - 54.0 fl    MPV 8.9 6.0 - 12.0 fL    Platelets 262 140 - 450 10*3/mm3    Neutrophil % 47.8 42.7 - 76.0 %    Lymphocyte % 39.3 19.6 - 45.3 %    Monocyte % 11.4 5.0 - 12.0 %    Eosinophil % 1.2 0.3 - 6.2 %    Basophil % 0.3 0.0 - 1.5 %    Neutrophils, Absolute 3.23 1.70 - 7.00 10*3/mm3    Lymphocytes, Absolute 2.66 0.70 - 3.10 10*3/mm3    Monocytes, Absolute 0.77 0.10 - 0.90 10*3/mm3    Eosinophils, Absolute 0.08 0.00 - 0.40 10*3/mm3    Basophils, Absolute 0.02 0.00 - 0.20 10*3/mm3   Comprehensive Metabolic Panel    Specimen: Blood   Result Value Ref Range    Glucose 101 (H) 70 - 99 mg/dL    BUN 15 7 - 23 mg/dL    Creatinine 1.01 0.52 - 1.04 mg/dL    Sodium 135 (L) 137 - 145 mmol/L    Potassium 3.8 3.4 - 5.0 mmol/L    Chloride 103 101 - 112 mmol/L    CO2 25.0 22.0 - 30.0 mmol/L    Calcium 8.9 8.4 - 10.2 mg/dL    Total Protein 7.3 6.3 - 8.6 g/dL    Albumin 4.2 3.5 - 5.0 g/dL    ALT (SGPT) 26 <=35 U/L    AST (SGOT) 61 (H) 14 - 36 U/L    Alkaline Phosphatase 55 38 - 126 U/L    Total Bilirubin 0.2 0.2 - 1.3 mg/dL    Globulin 3.1 2.3 - 3.5 gm/dL    A/G Ratio 1.4 1.1 - 1.8 g/dL    BUN/Creatinine Ratio 14.9 7.0 - 25.0    Anion Gap 7.0 5.0 - 15.0 mmol/L    eGFR 62.3 >60.0 mL/min/1.73     *Note: Due to a large  number of results and/or encounters for the requested time period, some results have not been displayed. A complete set of results can be found in Results Review.

## 2023-08-16 ENCOUNTER — TELEPHONE (OUTPATIENT)
Dept: GASTROENTEROLOGY | Facility: CLINIC | Age: 63
End: 2023-08-16
Payer: MEDICARE

## 2023-08-28 DIAGNOSIS — K21.9 GASTROESOPHAGEAL REFLUX DISEASE WITHOUT ESOPHAGITIS: ICD-10-CM

## 2023-08-28 RX ORDER — PANTOPRAZOLE SODIUM 40 MG/1
40 TABLET, DELAYED RELEASE ORAL DAILY
Qty: 90 TABLET | Refills: 1 | OUTPATIENT
Start: 2023-08-28

## 2023-08-29 DIAGNOSIS — G25.81 RESTLESS LEG SYNDROME: Chronic | ICD-10-CM

## 2023-08-30 RX ORDER — ROPINIROLE 3 MG/1
TABLET, FILM COATED ORAL
Qty: 90 TABLET | Refills: 0 | Status: SHIPPED | OUTPATIENT
Start: 2023-08-30

## 2023-09-11 ENCOUNTER — ANESTHESIA EVENT (OUTPATIENT)
Dept: GASTROENTEROLOGY | Facility: HOSPITAL | Age: 63
End: 2023-09-11
Payer: MEDICARE

## 2023-09-12 ENCOUNTER — ANESTHESIA (OUTPATIENT)
Dept: GASTROENTEROLOGY | Facility: HOSPITAL | Age: 63
End: 2023-09-12
Payer: MEDICARE

## 2023-09-12 ENCOUNTER — HOSPITAL ENCOUNTER (OUTPATIENT)
Facility: HOSPITAL | Age: 63
Setting detail: HOSPITAL OUTPATIENT SURGERY
Discharge: HOME | End: 2023-09-12
Attending: INTERNAL MEDICINE | Admitting: PHYSICIAN ASSISTANT
Payer: MEDICARE

## 2023-09-12 VITALS
HEART RATE: 61 BPM | RESPIRATION RATE: 18 BRPM | SYSTOLIC BLOOD PRESSURE: 101 MMHG | OXYGEN SATURATION: 98 % | DIASTOLIC BLOOD PRESSURE: 50 MMHG | BODY MASS INDEX: 18.4 KG/M2 | WEIGHT: 100 LBS | HEIGHT: 62 IN | TEMPERATURE: 98.6 F

## 2023-09-12 DIAGNOSIS — Z86.010 HISTORY OF COLON POLYPS: ICD-10-CM

## 2023-09-12 DIAGNOSIS — Z80.0 FAMILY HISTORY OF COLON CANCER: ICD-10-CM

## 2023-09-12 PROCEDURE — 45385 COLONOSCOPY W/LESION REMOVAL: CPT | Performed by: INTERNAL MEDICINE

## 2023-09-12 PROCEDURE — 25010000002 PROPOFOL 10 MG/ML EMULSION

## 2023-09-12 RX ORDER — DEXTROSE AND SODIUM CHLORIDE 5; .45 G/100ML; G/100ML
30 INJECTION, SOLUTION INTRAVENOUS CONTINUOUS PRN
Status: DISCONTINUED | OUTPATIENT
Start: 2023-09-12 | End: 2023-09-12 | Stop reason: HOSPADM

## 2023-09-12 RX ORDER — LIDOCAINE HYDROCHLORIDE 20 MG/ML
INJECTION, SOLUTION EPIDURAL; INFILTRATION; INTRACAUDAL; PERINEURAL AS NEEDED
Status: DISCONTINUED | OUTPATIENT
Start: 2023-09-12 | End: 2023-09-12 | Stop reason: SURG

## 2023-09-12 RX ORDER — PROPOFOL 10 MG/ML
VIAL (ML) INTRAVENOUS AS NEEDED
Status: DISCONTINUED | OUTPATIENT
Start: 2023-09-12 | End: 2023-09-12 | Stop reason: SURG

## 2023-09-12 RX ADMIN — PROPOFOL 60 MG: 10 INJECTION, EMULSION INTRAVENOUS at 15:09

## 2023-09-12 RX ADMIN — DEXTROSE AND SODIUM CHLORIDE 30 ML/HR: 5; 450 INJECTION, SOLUTION INTRAVENOUS at 14:44

## 2023-09-12 RX ADMIN — PROPOFOL 20 MG: 10 INJECTION, EMULSION INTRAVENOUS at 15:10

## 2023-09-12 RX ADMIN — LIDOCAINE HYDROCHLORIDE 100 MG: 20 INJECTION, SOLUTION EPIDURAL; INFILTRATION; INTRACAUDAL; PERINEURAL at 15:09

## 2023-09-12 RX ADMIN — PROPOFOL 10 MG: 10 INJECTION, EMULSION INTRAVENOUS at 15:16

## 2023-09-12 RX ADMIN — PROPOFOL 10 MG: 10 INJECTION, EMULSION INTRAVENOUS at 15:12

## 2023-09-12 RX ADMIN — PROPOFOL 10 MG: 10 INJECTION, EMULSION INTRAVENOUS at 15:15

## 2023-09-12 NOTE — ANESTHESIA PREPROCEDURE EVALUATION
Anesthesia Evaluation     Patient summary reviewed and Nursing notes reviewed   no history of anesthetic complications:   NPO Solid Status: > 8 hours  NPO Liquid Status: > 2 hours           Airway   Mallampati: II  TM distance: >3 FB  Neck ROM: full  no difficulty expected  Dental    (+) edentulous and upper dentures    Pulmonary - normal exam    breath sounds clear to auscultation  (+) a smoker (0.5 ppd) Current, COPD, asthma,  Cardiovascular - normal exam  Exercise tolerance: good (4-7 METS)    ECG reviewed  Patient on routine beta blocker and Beta blocker given within 24 hours of surgery  Rhythm: regular  Rate: normal    (+) hypertension well controlled 2 medications or greater, valvular problems/murmurs murmur and AI, dysrhythmias Tachycardia, hyperlipidemia    ROS comment: 10/28/20  Normal sinus rhythm  Possible Left atrial enlargement  Rightward axis  Incomplete right bundle branch block  Septal infarct , age undetermined  Abnormal ECG  No previous ECGs available  Confirmed by DANNI    · Left ventricular ejection fraction appears to be 66 - 70%. Left ventricular systolic function is normal.  · Left ventricular diastolic function was normal.  · Moderate aortic valve regurgitation is present.  · Estimated right ventricular systolic pressure from tricuspid regurgitation is normal (<35 mmHg).        Neuro/Psych  (+) seizures well controlled, headaches, numbness, psychiatric history Anxiety, Depression and PTSD    ROS Comment: Brain aneurysm 2003  GI/Hepatic/Renal/Endo    (+) GERD well controlled, hepatitis A, liver disease, renal disease stones, thyroid problem hypothyroidism    Musculoskeletal     (+) arthralgias, back pain      ROS comment: Ganglion cyst right wrist  Abdominal    Substance History   (-) alcohol use, drug use     OB/GYN          Other   arthritis,   history of cancer      Other Comment: Skin cancer basal cell on back                  Anesthesia Plan    ASA 3     general   total IV  anesthesia  intravenous induction     Anesthetic plan, risks, benefits, and alternatives have been provided, discussed and informed consent has been obtained with: patient.    Plan discussed with CRNA.

## 2023-09-12 NOTE — ANESTHESIA POSTPROCEDURE EVALUATION
Patient: Tricia Loyola    Procedure Summary       Date: 09/12/23 Room / Location: [facility] ENDOSCOPY 1 / [facility] ENDOSCOPY    Anesthesia Start: 1505 Anesthesia Stop: 1523    Procedure: COLONOSCOPY Diagnosis:       History of colon polyps      Family history of colon cancer      (History of colon polyps [Z86.010])      (Family history of colon cancer [Z80.0])    Surgeons: Kory Muhammad MD Provider: Goldie Garsia CRNA    Anesthesia Type: general ASA Status: 3            Anesthesia Type: general    Vitals  No vitals data found for the desired time range.          Post Anesthesia Care and Evaluation    Patient location during evaluation: bedside  Patient participation: waiting for patient participation  Level of consciousness: responsive to verbal stimuli  Pain management: adequate    Airway patency: patent  Anesthetic complications: No anesthetic complications  PONV Status: none  Cardiovascular status: acceptable  Respiratory status: acceptable  Hydration status: acceptable    Comments: ---------------------------               09/12/23                      1432         ---------------------------   BP:          110/57         Pulse:         66           Resp:          18           Temp:   97.4 °F (36.3 °C)   SpO2:         100%         ---------------------------

## 2023-09-14 LAB — REF LAB TEST METHOD: NORMAL

## 2023-09-26 ENCOUNTER — OFFICE VISIT (OUTPATIENT)
Dept: GASTROENTEROLOGY | Facility: CLINIC | Age: 63
End: 2023-09-26
Payer: MEDICARE

## 2023-09-26 VITALS
DIASTOLIC BLOOD PRESSURE: 75 MMHG | SYSTOLIC BLOOD PRESSURE: 115 MMHG | BODY MASS INDEX: 18.4 KG/M2 | HEIGHT: 62 IN | WEIGHT: 100 LBS | HEART RATE: 66 BPM

## 2023-09-26 DIAGNOSIS — R94.5 ABNORMAL RESULTS OF LIVER FUNCTION STUDIES: ICD-10-CM

## 2023-09-26 DIAGNOSIS — R79.89 ELEVATED LFTS: ICD-10-CM

## 2023-09-26 DIAGNOSIS — D12.6 TUBULAR ADENOMA OF COLON: Primary | ICD-10-CM

## 2023-09-26 DIAGNOSIS — K58.2 IRRITABLE BOWEL SYNDROME WITH BOTH CONSTIPATION AND DIARRHEA: ICD-10-CM

## 2023-09-26 DIAGNOSIS — K21.00 GASTROESOPHAGEAL REFLUX DISEASE WITH ESOPHAGITIS WITHOUT HEMORRHAGE: ICD-10-CM

## 2023-09-26 DIAGNOSIS — K76.0 FATTY (CHANGE OF) LIVER, NOT ELSEWHERE CLASSIFIED: ICD-10-CM

## 2023-09-26 DIAGNOSIS — K21.9 GASTROESOPHAGEAL REFLUX DISEASE WITHOUT ESOPHAGITIS: ICD-10-CM

## 2023-09-26 DIAGNOSIS — E71.30 DISORDER OF FATTY-ACID METABOLISM, UNSPECIFIED: ICD-10-CM

## 2023-09-26 PROCEDURE — 3078F DIAST BP <80 MM HG: CPT | Performed by: PHYSICIAN ASSISTANT

## 2023-09-26 PROCEDURE — 3074F SYST BP LT 130 MM HG: CPT | Performed by: PHYSICIAN ASSISTANT

## 2023-09-26 PROCEDURE — 1159F MED LIST DOCD IN RCRD: CPT | Performed by: PHYSICIAN ASSISTANT

## 2023-09-26 PROCEDURE — 1160F RVW MEDS BY RX/DR IN RCRD: CPT | Performed by: PHYSICIAN ASSISTANT

## 2023-09-26 PROCEDURE — 99213 OFFICE O/P EST LOW 20 MIN: CPT | Performed by: PHYSICIAN ASSISTANT

## 2023-09-26 RX ORDER — PANTOPRAZOLE SODIUM 40 MG/1
40 TABLET, DELAYED RELEASE ORAL DAILY
Qty: 90 TABLET | Refills: 1 | Status: SHIPPED | OUTPATIENT
Start: 2023-09-26

## 2023-09-26 NOTE — PROGRESS NOTES
Chief Complaint   Patient presents with    Heartburn     Endo f/u        ENDO PROCEDURE ORDERED:    Subjective    Tricia Loyola is a 63 y.o. female. she is here today for follow-up.    History of Present Illness    Patient seen on a recheck of her EGD, IBS, elevated liver enzymes.  Last seen 08/15/2023.  At that time she was having some bleeding and I changed her to Trulance and gave her some Anusol.  She is still having some breakthrough reflux despite the Protonix and Pepcid but she feels the Trulance is doing much better for her bowels. Weight is down 5 pounds since last visit.  She states she is eating some cereal before bedtime.  Underwent colonoscopy 09/12/2023, showed internal/external hemorrhoids with adequate prep, small polyp in the transverse colon with a tubular adenoma 4 mm in greatest dimension. Recommend repeat colonoscopy in 3 years.  She does have family history.  Last imaging she had 09/24/2022, she had a renal stone on a CT abdomen and pelvis.     A/P:  Patient with adenomatous polyp. Encouraged high fiber diet. She appears improved on the Trulance, will continue that. Would consider imaging, would consider EGD but she wished to continue current medications.  Will continue dietary modification.  Plan follow-up in 3 months with [facility], INR, CMP prior, further pending clinical course and the results of the above.              The following portions of the patient's history were reviewed and updated as appropriate:   Past Medical History:   Diagnosis Date    Abdominal pain     Anemia     Anxiety and depression     Chronic post-traumatic headache      rebound       Depressive disorder     Disease of thyroid gland     Encounter for gynecological examination     Endometriosis     Gastroesophageal reflux disease     Generalized anxiety disorder     Hemorrhoid     History of mammogram 08/2008    MAMMOGRAM DIAGNOSTIC BILATERAL 75369 ([facility]) (1)      Hyperlipidemia     Hypertension     Ingrown  toenail     Injury of back     Leaky heart valve     Leaky heart valve     Migraine     Nonruptured cerebral aneurysm     Osteoporosis     Posttraumatic stress disorder     Primary osteoarthritis of left knee     Primary osteoarthritis of right knee     PTSD (post-traumatic stress disorder)     Seizure     last seizure 2009.  no meds at this time    Skin cancer     basal cell on back    Tachycardia     Viral hepatitis A     Wears dentures     uppers only    Wears glasses      Past Surgical History:   Procedure Laterality Date    APPENDECTOMY      BREAST BIOPSY Left     times 2 both negative    CHOLECYSTECTOMY      COLONOSCOPY N/A 11/26/2018    Procedure: COLONOSCOPY;  Surgeon: Meet Mcintyre MD;  Location: [facility] ENDOSCOPY;  Service: General    COLONOSCOPY N/A 9/12/2023    Procedure: COLONOSCOPY;  Surgeon: Kory Muhammad MD;  Location: [facility] ENDOSCOPY;  Service: Gastroenterology;  Laterality: N/A;    CRANIOTOMY FOR ANEURYSM  2003    CYSTOSCOPY, URETEROSCOPY, RETROGRADE PYELOGRAM, STENT INSERTION Left 09/25/2022    Procedure: CYSTOSCOPY, STONE REMOVAL;  Surgeon: Leonardo Graham MD;  Location: [facility] OR;  Service: Urology;  Laterality: Left;    ENDOSCOPY N/A 10/16/2019    Procedure: ESOPHAGOGASTRODUODENOSCOPY;  Surgeon: Kory Muhammad MD;  Location: [facility] ENDOSCOPY;  Service: Gastroenterology    ENDOSCOPY N/A 03/16/2021    Procedure: ESOPHAGOGASTRODUODENOSCOPY;  Surgeon: Kory Muhammad MD;  Location: [facility] ENDOSCOPY;  Service: Gastroenterology;  Laterality: N/A;    ENDOSCOPY N/A 1/24/2023    Procedure: ESOPHAGOGASTRODUODENOSCOPY WITH DILATATION;  Surgeon: Kory Muhammad MD;  Location: [facility] ENDOSCOPY;  Service: Gastroenterology;  Laterality: N/A;    MASS EXCISION Right 11/05/2020    Procedure: EXCISE SOFT TISSUE MASS RIGHT POSTERIOR FLANK                 (latex allergy);  Surgeon: Meet Mcintyre MD;  Location: [facility] OR;  Service: General;  Laterality: Right;    PAP SMEAR  08/16/2012     TONSILLECTOMY      UPPER GASTROINTESTINAL ENDOSCOPY  10/16/2019    WRIST GANGLION EXCISION Right 2021    Procedure: EXCISION OF VOLAR GANGLION CYST RIGHT WRIST      (LATEX ALLERGY);  Surgeon: Leonardo Vargas MD;  Location: [facility];  Service: Orthopedics;  Laterality: Right;     Family History   Problem Relation Age of Onset    Constipation Mother     Hypertension Mother     Anxiety disorder Mother     Heart disease Mother     Osteoporosis Mother         Also Myself I    Alcohol abuse Father     Heart disease Father     Anxiety disorder Brother     Kidney disease Brother     Hypertension Brother     Arthritis Brother     Anxiety disorder Brother     Kidney disease Brother     Diabetes Brother     Anxiety disorder Brother     Hypertension Brother     Heart disease Maternal Grandmother     Clotting disorder Maternal Grandmother         Multiple Blood Clots In The Legs    Breast cancer Paternal Aunt     Heart disease Maternal Grandfather     Colon cancer Maternal Great-Grandmother      OB History          3    Para   2    Term   2            AB   1    Living   2         SAB   1    IAB        Ectopic        Molar        Multiple        Live Births                  Allergies   Allergen Reactions    Iodine Anaphylaxis    Nitrofuran Derivatives Hives    Toradol [Ketorolac Tromethamine] Anaphylaxis    Cleocin [Clindamycin Hcl] Hives and Swelling     Swelling of eyes and hives    Augmentin [Amoxicillin-Pot Clavulanate] Hives    Shrimp Hives and Itching    Ciprofloxacin Rash    Fiorinal [Butalbital-Aspirin-Caffeine] Palpitations    Ibuprofen Rash    Imitrex [Sumatriptan] Palpitations    Latex Rash    Other Rash     prego spaghetti sauce    Midrin causes tachycardia    Ultram [Tramadol] Palpitations     Social History     Socioeconomic History    Marital status: Legally    Tobacco Use    Smoking status: Every Day     Packs/day: 0.50     Years: 20.00     Pack years: 10.00     Types:  "Cigarettes, Electronic Cigarette    Smokeless tobacco: Never   Vaping Use    Vaping Use: Some days    Substances: Nicotine, Flavoring    Devices: Disposable   Substance and Sexual Activity    Alcohol use: Not Currently    Drug use: Never    Sexual activity: Not Currently     Birth control/protection: Post-menopausal     Current Medications:  Prior to Admission medications    Medication Sig Start Date End Date Taking? Authorizing Provider   albuterol (ACCUNEB) 1.25 MG/3ML nebulizer solution Take 3 mL by nebulization Every 4 (Four) Hours As Needed for Wheezing or Shortness of Air. 11/15/21  Yes Rosario Ceron APRN   albuterol sulfate  (90 Base) MCG/ACT inhaler Inhale 2 puffs Every 4 (Four) Hours As Needed for Wheezing. 2/5/22  Yes Rosario Ceron APRN   amitriptyline (ELAVIL) 75 MG tablet Take 1 tablet by mouth Every Night. Resumed 6/10/22. 6/10/22  Yes Rosario Ceron APRN   atorvastatin (LIPITOR) 40 MG tablet TAKE 1 TABLET BY MOUTH NIGHTLY FOR CHOLESTEROL 8/3/23  Yes Alvina Bergman APRN   B-D 3CC LUER-KAITLIN SYR 25GX5/8\" 25G X 5/8\" 3 ML misc USE AS DIRECTED for b12 injection 2/27/23  Yes Alvina Bergman APRN   Botox 200 units reconstituted solution Inject 200 Units into the appropriate muscle as directed by prescriber Every 3 (Three) Months. 4/26/21  Yes Provider, MD Doris   busPIRone (BUSPAR) 30 MG tablet Take 1 tablet by mouth 2 (Two) Times a Day. 3/7/18  Yes Sonia Mata MD   Cyanocobalamin 1000 MCG/ML kit Inject 1,000 mL as directed Every 30 (Thirty) Days. Please give syringes also 5/9/23  Yes Alvina Bergman APRN   famotidine (PEPCID) 40 MG tablet Take 1 tablet by mouth Daily. 8/10/23  Yes Johnny Smiley PA-C   fluticasone (Flonase) 50 MCG/ACT nasal spray 2 sprays into the nostril(s) as directed by provider Daily. 6/26/23  Yes Alvina Bergman APRN   HYDROcodone-acetaminophen (NORCO)  MG per tablet Take 1 tablet by mouth Every 6 (Six) Hours As " Needed for Moderate Pain. 1/9/23  Yes Alvina Bergman APRN   Hydrocortisone, Perianal, (ANUSOL-HC) 2.5 % rectal cream Insert  into the rectum 2 (Two) Times a Day. 8/15/23  Yes Johnny Smiley PA-C   hydrOXYzine pamoate (Vistaril) 50 MG capsule Take 1 capsule by mouth 3 (Three) Times a Day As Needed for Anxiety. 11/17/22  Yes Alvina Bergman APRN   ibandronate (Boniva) 150 MG tablet Take 1 tablet by mouth Every 30 (Thirty) Days. 6/13/23  Yes Alvina Bergman APRN   levothyroxine (SYNTHROID, LEVOTHROID) 25 MCG tablet Take 0.5 tablets by mouth Daily. 6/13/23  Yes Alvina Bergman APRN   lidocaine (LIDODERM) 5 %  8/29/22  Yes Emergency, Nurse Epic, RN   losartan (COZAAR) 50 MG tablet Take 1 tablet by mouth Daily. 1/24/23  Yes Ghislaine Levi MD   metoprolol succinate XL (TOPROL-XL) 100 MG 24 hr tablet TAKE 1 TABLET BY MOUTH DAILY at 3pm 12/12/22  Yes Ghislaine Levi MD   OXcarbazepine (TRILEPTAL) 150 MG tablet Take 1 tablet by mouth Every Night. Daily at bedtime   Yes ProviderDoris MD   pantoprazole (Protonix) 40 MG EC tablet Take 1 tablet by mouth Daily. 4/3/23  Yes Alvina Bergman APRN   Plecanatide (Trulance) 3 MG tablet Take 1 tablet by mouth Daily. 8/15/23  Yes Johnny Smiley PA-C   prochlorperazine (COMPAZINE) 10 MG tablet Take 1 tablet by mouth Every 6 (Six) Hours As Needed for Nausea or Vomiting. 1/16/23  Yes Alvina Bergman APRN   rOPINIRole (REQUIP) 3 MG tablet TAKE 1 TABLET BY MOUTH NIGHTLY EVERY NIGHT AT BEDTIME 8/30/23  Yes Alvina Bergman APRN   topiramate (TOPAMAX) 100 MG tablet Take 1 tablet by mouth 2 (Two) Times a Day. 5/28/20  Yes Rosario Ceron APRN   vitamin D (ERGOCALCIFEROL) 1.25 MG (78525 UT) capsule capsule Take 1 capsule by mouth 1 (One) Time Per Week. 6/13/23  Yes Alvina Bergman APRN   nitroglycerin (NITROSTAT) 0.4 MG SL tablet Place 0.4 mg under the tongue Every 5 (Five) Minutes As Needed for Chest Pain. Take no more than  "3 doses in 15 minutes.  9/20/22  Provider, MD Doris   Sod Picosulfate-Mag Ox-Cit Acd (Clenpiq) 10-3.5-12 MG-GM -GM/175ML solution Take 2 bottles by mouth Take As Directed. Please use the instructions given in office 8/15/23 9/26/23  Johnny Smiley PA-C     Review of Systems  Review of Systems       Objective    /75 (BP Location: Right arm)   Pulse 66   Ht 157.5 cm (62\")   Wt 45.4 kg (100 lb)   LMP  (LMP Unknown) Comment: periods stopped in late 40's  BMI 18.29 kg/m²   Physical Exam  Vitals and nursing note reviewed.   Constitutional:       General: She is not in acute distress.     Appearance: She is well-developed. She is ill-appearing.   HENT:      Head: Normocephalic and atraumatic.   Eyes:      Pupils: Pupils are equal, round, and reactive to light.   Cardiovascular:      Rate and Rhythm: Normal rate and regular rhythm.      Heart sounds: Normal heart sounds.   Pulmonary:      Effort: Pulmonary effort is normal.      Breath sounds: Normal breath sounds.   Abdominal:      General: Bowel sounds are normal. There is no distension or abdominal bruit.      Palpations: Abdomen is soft. Abdomen is not rigid. There is no shifting dullness or mass.      Tenderness: There is abdominal tenderness. There is no guarding or rebound.      Hernia: No hernia is present. There is no hernia in the ventral area.   Musculoskeletal:         General: Normal range of motion.      Cervical back: Normal range of motion.   Skin:     General: Skin is warm and dry.   Neurological:      Mental Status: She is alert and oriented to person, place, and time.   Psychiatric:         Behavior: Behavior normal.         Thought Content: Thought content normal.         Judgment: Judgment normal.     Assessment & Plan      1. Tubular adenoma of colon    2. Gastroesophageal reflux disease with esophagitis without hemorrhage    3. Irritable bowel syndrome with both constipation and diarrhea    4. Elevated LFTs    5. Fatty (change of) " liver, not elsewhere classified    6. Disorder of fatty-acid metabolism, unspecified    7. Abnormal results of liver function studies    .   Diagnoses and all orders for this visit:    1. Tubular adenoma of colon (Primary)    2. Gastroesophageal reflux disease with esophagitis without hemorrhage  -     [facility] Plus; Future  -     Protime-INR; Future  -     Comprehensive Metabolic Panel; Future    3. Irritable bowel syndrome with both constipation and diarrhea  -     [facility]; Future  -     Protime-INR; Future  -     Comprehensive Metabolic Panel; Future    4. Elevated LFTs  -     [facility] Plus; Future  -     Protime-INR; Future  -     Comprehensive Metabolic Panel; Future    5. Fatty (change of) liver, not elsewhere classified  -     [facility] Plus; Future    6. Disorder of fatty-acid metabolism, unspecified  -     [facility] Plus; Future    7. Abnormal results of liver function studies  -     Protime-INR; Future        Orders placed during this encounter include:  Orders Placed This Encounter   Procedures    [facility] Plus     Standing Status:   Future     Standing Expiration Date:   3/24/2024     Order Specific Question:   Release to patient     Answer:   Routine Release [[id number]]    Protime-INR     Standing Status:   Future     Standing Expiration Date:   3/24/2024     Order Specific Question:   Release to patient     Answer:   Routine Release [[id number]]    Comprehensive Metabolic Panel     Standing Status:   Future     Standing Expiration Date:   3/24/2024     Order Specific Question:   Release to patient     Answer:   Routine Release [[id number]]       Medications prescribed:  No orders of the defined types were placed in this encounter.    Discontinued Medications         Reason for Discontinue     Sod Picosulfate-Mag Ox-Cit Acd (Clenpiq) 10-3.5-12 MG-GM -GM/175ML solution    *Therapy completed          Requested Prescriptions      No prescriptions requested or  ordered in this encounter       Review and/or summary of lab tests, radiology, procedures, medications. Review and summary of old records and obtaining of history. The risks and benefits of my recommendations, as well as other treatment options were discussed with the patient today. Questions were answered.    Follow-up: Return in about 3 months (around 2023), or if symptoms worsen or fail to improve.     * Surgery not found *      This document has been electronically signed by Johnny Smiley PA-C on 2023 19:31 CDT      Results for orders placed or performed during the hospital encounter of 23   TISSUE EXAM, [facility] ([facility])    Specimen: Large Intestine, Transverse Colon; Polyp   Result Value Ref Range    Reference Lab Report       [facility]  [address] [place] [address]  Phone: [phone] or [phone]  Fax: [phone]  Goyo Fallon M.D., Medical Director    PATIENT NAME                                     LABORATORY NO.  1800   NITA DEAN.                                [id number]  [id number]                                 AGE                    SEX   SSN              CLIENT REF #  [facility]                   63        1960      F     [id number]      [id number]    [place]                               REQUESTING M.D.           ATTENDING M.D.         COPY TO.  [address] [place] [address]                         [place] [address]                     DATE COLLECTED            DATE RECEIVED          DATE REPORTED  2023    DIAGNOSIS:  TRANSVERSE COLON POLYP:  Tubular adenoma, negative for high-grade dysplasia.    CLINICAL HISTORY:  History  of colon polyps  Family history of colon cancer    SPECIMENS RECEIVED:  TRANSVERSE COLON POLYP    MICROSCOPIC DESCRIPTION:  Tissue blocks are prepared and slides are  "examined microscopically on all  specimens. See diagnosis for details.    Professional interpretation rendered by Trip Steel M.D. at [facility],  [employer], [address] [place] [address].    GROSS DESCRIPTION:  Labeled \"transverse colon polyp\" are 2 pieces of tan soft tissue measuring 0.4 x  0.3 x 0.2 cm aggregate.  Submitted entirely in 1 block.  KJ    REVIEWED, DIAGNOSED AND ELECTRONICALLY  SIGNED BY:    Trip Steel M.D.  CPT CODES:  14857     Results for orders placed or performed in visit on 05/25/23   CBC Auto Differential    Specimen: Blood   Result Value Ref Range    WBC 7.25 3.40 - 10.80 10*3/mm3    RBC 4.15 3.77 - 5.28 10*6/mm3    Hemoglobin 13.2 12.0 - 15.9 g/dL    Hematocrit 40.3 34.0 - 46.6 %    MCV 97.1 (H) 79.0 - 97.0 fL    MCH 31.8 26.6 - 33.0 pg    MCHC 32.8 31.5 - 35.7 g/dL    RDW 13.9 12.3 - 15.4 %    RDW-SD 48.3 37.0 - 54.0 fl    MPV 9.4 6.0 - 12.0 fL    Platelets 258 140 - 450 10*3/mm3    Neutrophil % 39.0 (L) 42.7 - 76.0 %    Lymphocyte % 45.7 (H) 19.6 - 45.3 %    Monocyte % 12.8 (H) 5.0 - 12.0 %    Eosinophil % 1.8 0.3 - 6.2 %    Basophil % 0.7 0.0 - 1.5 %    Neutrophils, Absolute 2.83 1.70 - 7.00 10*3/mm3    Lymphocytes, Absolute 3.31 (H) 0.70 - 3.10 10*3/mm3    Monocytes, Absolute 0.93 (H) 0.10 - 0.90 10*3/mm3    Eosinophils, Absolute 0.13 0.00 - 0.40 10*3/mm3    Basophils, Absolute 0.05 0.00 - 0.20 10*3/mm3   Iron and TIBC    Specimen: Blood   Result Value Ref Range    Iron 70 37 - 145 mcg/dL    Iron Saturation (TSAT) 22 20 - 50 %    Transferrin 215 200 - 360 mg/dL    TIBC 320 298 - 536 mcg/dL   LDL cholesterol, direct    Specimen: Blood   Result Value Ref Range    LDL Cholesterol  94 0 - 100 mg/dL   Folate    Specimen: Blood   Result Value Ref Range    Folate 2.92 (L) 4.78 - 24.20 ng/mL   Ferritin    Specimen: Blood   Result Value Ref Range    Ferritin 62.90 13.00 - 150.00 ng/mL   Vitamin B12    Specimen: Blood   Result Value Ref Range    Vitamin B-12 068 211 - 189 " pg/mL   Comprehensive metabolic panel    Specimen: Blood   Result Value Ref Range    Glucose 98 70 - 99 mg/dL    BUN 18 7 - 23 mg/dL    Creatinine 1.03 0.52 - 1.04 mg/dL    Sodium 137 137 - 145 mmol/L    Potassium 4.2 3.4 - 5.0 mmol/L    Chloride 105 101 - 112 mmol/L    CO2 26.0 22.0 - 30.0 mmol/L    Calcium 9.2 8.4 - 10.2 mg/dL    Total Protein 6.7 6.3 - 8.6 g/dL    Albumin 3.9 3.5 - 5.0 g/dL    ALT (SGPT) 25 <=35 U/L    AST (SGOT) 62 (H) 14 - 36 U/L    Alkaline Phosphatase 61 38 - 126 U/L    Total Bilirubin 0.4 0.2 - 1.3 mg/dL    Globulin 2.8 2.3 - 3.5 gm/dL    A/G Ratio 1.4 1.1 - 1.8 g/dL    BUN/Creatinine Ratio 17.5 7.0 - 25.0    Anion Gap 6.0 5.0 - 15.0 mmol/L    eGFR 60.5 >60.0 mL/min/1.73   Results for orders placed or performed in visit on 05/09/23   TSH    Specimen: Blood   Result Value Ref Range    TSH 2.100 0.270 - 4.200 uIU/mL   Results for orders placed or performed in visit on 01/30/23   Urinalysis, Microscopic Only - Urine, Clean Catch    Specimen: Urine, Clean Catch   Result Value Ref Range    RBC, UA 13-20 (A) None Seen /HPF    WBC, UA 31-50 (A) None Seen /HPF    Bacteria, UA 1+ (A) None Seen /HPF    Squamous Epithelial Cells, UA 3-6 (A) None Seen, 0-2 /HPF    Hyaline Casts, UA None Seen None Seen /LPF    Mucus, UA Small/1+ (A) None Seen, Trace /HPF    Methodology Manual Light Microscopy    Urinalysis With Culture If Indicated - Urine, Clean Catch    Specimen: Urine, Clean Catch   Result Value Ref Range    Color, UA Yellow Yellow, Straw    Appearance, UA Cloudy (A) Clear    pH, UA 7.0 5.5 - 8.0    Specific Gravity, UA 1.015 1.005 - 1.030    Glucose, UA Negative Negative    Ketones, UA Negative Negative    Bilirubin, UA Negative Negative    Blood, UA Moderate (2+) (A) Negative    Protein, UA 30 mg/dL (1+) (A) Negative    Leuk Esterase, UA Moderate (2+) (A) Negative    Nitrite, UA Negative Negative    Urobilinogen, UA 0.2 E.U./dL 0.2 - 1.0 E.U./dL   Urine Culture - Urine, Urine, Clean Catch     Specimen: Urine, Clean Catch   Result Value Ref Range    Urine Culture 25,000 CFU/mL Escherichia coli (A)        Susceptibility    Escherichia coli - MARCO     Ampicillin  Susceptible ug/ml     Ampicillin + Sulbactam  Susceptible ug/ml     Cefazolin  Susceptible ug/ml     Cefepime  Susceptible ug/ml     Ceftazidime  Susceptible ug/ml     Ceftriaxone  Susceptible ug/ml     Gentamicin  Susceptible ug/ml     Levofloxacin  Susceptible ug/ml     Nitrofurantoin  Susceptible ug/ml     Piperacillin + Tazobactam  Susceptible ug/ml     Trimethoprim + Sulfamethoxazole  Susceptible ug/ml   Results for orders placed or performed during the hospital encounter of 23   TISSUE EXAM, [facility] ([facility])    Specimen: A: Gastric, Antrum; Tissue    B: Esophagus, Distal; Tissue   Result Value Ref Range    Reference Lab Report       [facility]  [address] [place] [address]  Phone: [phone] or [phone]  Fax: [phone]  Goyo Fallon M.D., Medical Director    PATIENT NAME                           LABORATORY NO.  NITA CAMACHO.                      [id number]  [id number]                         AGE              SEX  SSN           CLIENT REF #  [facility]           63      1960  F    [id number]   [id number]    [place]                       REQUESTING M.D.     ATTENDING M.D.     COPY TO.  [address] [place] [address] [place] [address]             DATE COLLECTED      DATE RECEIVED      DATE REPORTED  2023    DIAGNOSIS:  A.   GASTRIC ANTRUM, BIOPSY:  Reactive gastropathy  B.   ESOPHAGUS, BIOPSY, DISTAL:  Benign squamous mucosa    JBS/sm    CLINICAL HISTORY:  Dysphagia, unspecified type, gastroesophageal reflux disease with  esophagitis  without hemorrhage    SPECIMENS RECEIVED:  A.  GASTRIC ANTRUM, BIOPSY  B.  ESOPHAGUS, BIOPSY, DISTAL    MICROSCOPIC  "DESCRIPTION:  Tissue blocks are prepared and slides are examined microscopically on all  specimens. See diagnosis for details.    Professional interpretation rendered by Mitchell Cassidy M.D. at [employer],  [employer], [address] [place] [address].    GROSS DESCRIPTION:  A.  Specimen is received in 1 formalin filled container labeled \"gastric antrum\"  and consists of a single portion of tan soft tissue measuring 0.3 x 0.2 x 0.2  cm.  The specimen is submitted entirely in 1 cassette.  BW  B.  Specimen is received in 1 formalin filled container labeled \"distal  esophagus\" and consists of a single portion of gray soft tissue measuring  0.5 x 0.2 x 0.1 cm.  The specimen is submitted entirely in 1 cassette.    REVIEWED, DIAGNOSED AND ELECTRONICALLY  SIGNED BY:    Mitchell Cassidy M.D.  CPT CODES:  88305x2     Results for orders placed or performed in visit on 01/16/23   TSH    Specimen: Blood   Result Value Ref Range    TSH 1.620 0.270 - 4.200 uIU/mL   Results for orders placed or performed in visit on 01/03/23   CBC Auto Differential    Specimen: Blood   Result Value Ref Range    WBC 6.76 3.40 - 10.80 10*3/mm3    RBC 4.00 3.77 - 5.28 10*6/mm3    Hemoglobin 12.7 12.0 - 15.9 g/dL    Hematocrit 37.6 34.0 - 46.6 %    MCV 94.0 79.0 - 97.0 fL    MCH 31.8 26.6 - 33.0 pg    MCHC 33.8 31.5 - 35.7 g/dL    RDW 13.3 12.3 - 15.4 %    RDW-SD 44.4 37.0 - 54.0 fl    MPV 8.9 6.0 - 12.0 fL    Platelets 262 140 - 450 10*3/mm3    Neutrophil % 47.8 42.7 - 76.0 %    Lymphocyte % 39.3 19.6 - 45.3 %    Monocyte % 11.4 5.0 - 12.0 %    Eosinophil % 1.2 0.3 - 6.2 %    Basophil % 0.3 0.0 - 1.5 %    Neutrophils, Absolute 3.23 1.70 - 7.00 10*3/mm3    Lymphocytes, Absolute 2.66 0.70 - 3.10 10*3/mm3    Monocytes, Absolute 0.77 0.10 - 0.90 10*3/mm3    Eosinophils, Absolute 0.08 0.00 - 0.40 10*3/mm3    Basophils, Absolute 0.02 0.00 - 0.20 10*3/mm3   Comprehensive Metabolic Panel    Specimen: Blood   Result Value Ref Range    Glucose 101 (H) 70 " - 99 mg/dL    BUN 15 7 - 23 mg/dL    Creatinine 1.01 0.52 - 1.04 mg/dL    Sodium 135 (L) 137 - 145 mmol/L    Potassium 3.8 3.4 - 5.0 mmol/L    Chloride 103 101 - 112 mmol/L    CO2 25.0 22.0 - 30.0 mmol/L    Calcium 8.9 8.4 - 10.2 mg/dL    Total Protein 7.3 6.3 - 8.6 g/dL    Albumin 4.2 3.5 - 5.0 g/dL    ALT (SGPT) 26 <=35 U/L    AST (SGOT) 61 (H) 14 - 36 U/L    Alkaline Phosphatase 55 38 - 126 U/L    Total Bilirubin 0.2 0.2 - 1.3 mg/dL    Globulin 3.1 2.3 - 3.5 gm/dL    A/G Ratio 1.4 1.1 - 1.8 g/dL    BUN/Creatinine Ratio 14.9 7.0 - 25.0    Anion Gap 7.0 5.0 - 15.0 mmol/L    eGFR 62.3 >60.0 mL/min/1.73     *Note: Due to a large number of results and/or encounters for the requested time period, some results have not been displayed. A complete set of results can be found in Results Review.

## 2024-01-15 NOTE — PROGRESS NOTES
"Subjective   Tricia Loyola is a 63 y.o. female.     History of Present Illness   Pt presents with concerns of a \"white bump\" on the left labia majora x 2 weeks. Not painful, hasn't drained. Not pruritic. Unchanged in the last 2 weeks. Not red, hot or swollen. No vaginal discharge, itching, burning or sores. Not sexually active. Last exam 9/2021 WNL.     The following portions of the patient's history were reviewed and updated as appropriate: allergies, current medications, past family history, past medical history, past social history, past surgical history and problem list.    Review of Systems   Constitutional: Negative.  Negative for chills and fever.   Respiratory: Negative.    Cardiovascular: Negative.    Genitourinary: Positive for genital sores (\"white bump\"). Negative for dyspareunia, dysuria, urgency, vaginal bleeding, vaginal discharge and vaginal pain.   Psychiatric/Behavioral: The patient is nervous/anxious (about finding).        Objective   Physical Exam  Vitals reviewed. Exam conducted with a chaperone present.   Constitutional:       Appearance: Normal appearance.   Cardiovascular:      Rate and Rhythm: Normal rate.   Pulmonary:      Effort: Pulmonary effort is normal.   Genitourinary:     Labia:         Right: No rash, tenderness, lesion or injury.         Left: Lesion present. No rash, tenderness or injury.        Neurological:      Mental Status: She is alert and oriented to person, place, and time.   Psychiatric:         Mood and Affect: Mood is anxious.           Assessment & Plan   Diagnoses and all orders for this visit:    1. Inclusion cyst of vulva (Primary)      Reassured pt of findings on exam consistent with a benign inclusion cyst. I would recommend against picking at the area as this may aggravate it. It does not appear infected or inflamed. Monitor and RTC if it enlarges, becomes painful, swollen or produced exudate. Pt voices understanding and agreement with this plan of care. "
Warm

## (undated) DEVICE — CATH URETRL OPN/END 5F70CM

## (undated) DEVICE — GLV SURG SENSICARE PI PF LF 7 GRN STRL

## (undated) DEVICE — SYR LL TP 10ML STRL

## (undated) DEVICE — DRSNG GZ CURAD XEROFORM NONADHS 5X9IN STRL

## (undated) DEVICE — SPNG GZ WOVN 4X4IN 12PLY 10/BX STRL

## (undated) DEVICE — SUT VIC 3/0 SH 27IN J416H

## (undated) DEVICE — STERILE POLYISOPRENE POWDER-FREE SURGICAL GLOVES WITH EMOLLIENT COATING: Brand: PROTEXIS

## (undated) DEVICE — GOWN,NON-REINFORCED,SIRUS,SET IN SLV,XL: Brand: MEDLINE

## (undated) DEVICE — CANN SMPL SOFTECH BIFLO ETCO2 A/M 7FT

## (undated) DEVICE — SOL IRR NACL 0.9PCT BT 1000ML

## (undated) DEVICE — Device: Brand: DISPOSABLE ELECTROSURGICAL SNARE

## (undated) DEVICE — SINGLE-USE BIOPSY FORCEPS: Brand: RADIAL JAW 4

## (undated) DEVICE — GW PTFE FIX/CORE FLXTIP .038 3X150CM

## (undated) DEVICE — GAUZE,SPONGE,4"X4",16PLY,XRAY,STRL,LF: Brand: MEDLINE

## (undated) DEVICE — SUT VIC 3/0 RB1 27IN J215H

## (undated) DEVICE — SOL IRR NACL 0.9PCT 3000ML

## (undated) DEVICE — PATIENT RETURN ELECTRODE, SINGLE-USE, CONTACT QUALITY MONITORING, ADULT, WITH 9FT CORD, FOR PATIENTS WEIGING OVER 33LBS. (15KG): Brand: MEGADYNE

## (undated) DEVICE — DISPOSABLE TOURNIQUET CUFF SINGLE BLADDER, DUAL PORT AND QUICK CONNECT CONNECTOR: Brand: COLOR CUFF

## (undated) DEVICE — PENCL ES MEGADINE EZ/CLEAN BUTN W/HOLSTR 10FT

## (undated) DEVICE — PK CYSTO LF 60

## (undated) DEVICE — BITEBLOCK ENDO W/STRAP 60F A/ LF DISP

## (undated) DEVICE — GLV SURG SENSICARE PI LF PF 7.5 GRN STRL

## (undated) DEVICE — PK MAJ PROC LF 60

## (undated) DEVICE — ELECTRD NDL MEGADYNE 2.75IN SS

## (undated) DEVICE — SUT ETHLN 4/0 FS2 18IN 662H

## (undated) DEVICE — GLV SURG SENSICARE PI MIC PF SZ7.5 LF STRL

## (undated) DEVICE — GLV SURG NEOPRN SENSICARE SZ8

## (undated) DEVICE — BLD SCLPL BEAVR MINI STR 2BVL 180D LF

## (undated) DEVICE — GOWN,AURORA,NOREINF,RAGLAN,XL,STERILE: Brand: MEDLINE

## (undated) DEVICE — SUT MONOCRYL 4/0 PS2 27IN Y426H ETY426H

## (undated) DEVICE — TRAP SXN POLYP QUICKCATCH LF

## (undated) DEVICE — GLV SURG SENSICARE PI ORTHO SZ7.5 LF STRL

## (undated) DEVICE — PK EXTRM LF 60

## (undated) DEVICE — STERILE POLYISOPRENE POWDER-FREE SURGICAL GLOVES: Brand: PROTEXIS

## (undated) DEVICE — PAD UNDERCAST WYTEX 4IN 4YD LF STRL

## (undated) DEVICE — NDL HYPO ECLPS SFTY 25G 1 1/2IN

## (undated) DEVICE — 9165 UNIVERSAL PATIENT PLATE: Brand: 3M™

## (undated) DEVICE — BASKT HELICAL GEMENI 4WIRE FILIFORM 4/5F 120CM 14MM

## (undated) DEVICE — GLV SURG SENSICARE PI ORTHO PF SZ7 LF STRL

## (undated) DEVICE — SKIN AFFIX SURG ADHESIVE 72/CS 0.55ML: Brand: MEDLINE